# Patient Record
Sex: MALE | Race: WHITE | NOT HISPANIC OR LATINO | Employment: OTHER | URBAN - METROPOLITAN AREA
[De-identification: names, ages, dates, MRNs, and addresses within clinical notes are randomized per-mention and may not be internally consistent; named-entity substitution may affect disease eponyms.]

---

## 2017-01-08 DIAGNOSIS — E78.00 PURE HYPERCHOLESTEROLEMIA: ICD-10-CM

## 2017-01-08 DIAGNOSIS — E11.9 DIABETES MELLITUS WITHOUT COMPLICATION (HCC): Primary | ICD-10-CM

## 2017-01-08 DIAGNOSIS — I10 ESSENTIAL HYPERTENSION: ICD-10-CM

## 2017-01-16 DIAGNOSIS — Z23 NEED FOR 23-POLYVALENT PNEUMOCOCCAL POLYSACCHARIDE VACCINE: Primary | ICD-10-CM

## 2017-02-03 RX ORDER — OMEPRAZOLE 40 MG/1
CAPSULE, DELAYED RELEASE ORAL
Qty: 180 CAPSULE | Refills: 2 | Status: SHIPPED | OUTPATIENT
Start: 2017-02-03 | End: 2017-08-29 | Stop reason: SDUPTHER

## 2017-03-22 RX ORDER — LANCETS 28 GAUGE
EACH MISCELLANEOUS
Qty: 100 EACH | Refills: 6 | Status: SHIPPED | OUTPATIENT
Start: 2017-03-22 | End: 2018-07-11 | Stop reason: SDUPTHER

## 2017-04-24 ENCOUNTER — LAB (OUTPATIENT)
Dept: INTERNAL MEDICINE | Facility: CLINIC | Age: 77
End: 2017-04-24

## 2017-04-24 DIAGNOSIS — E11.9 DIABETES MELLITUS WITHOUT COMPLICATION (HCC): ICD-10-CM

## 2017-04-24 DIAGNOSIS — I10 ESSENTIAL HYPERTENSION: ICD-10-CM

## 2017-04-24 DIAGNOSIS — E78.00 PURE HYPERCHOLESTEROLEMIA: ICD-10-CM

## 2017-04-24 LAB
ALBUMIN SERPL-MCNC: 3.72 G/DL (ref 3.4–4.6)
ALBUMIN UR-MCNC: 4.6 MG/L (ref 1.3–20)
ALBUMIN/GLOB SERPL: 1.1 G/DL
ALP SERPL-CCNC: 58 U/L (ref 46–116)
ALT SERPL W P-5'-P-CCNC: 24 U/L (ref 16–63)
ANION GAP SERPL CALCULATED.3IONS-SCNC: 8 MMOL/L
AST SERPL-CCNC: 23 U/L (ref 7–37)
BILIRUB SERPL-MCNC: 0.8 MG/DL (ref 0.2–1)
BUN BLD-MCNC: 16 MG/DL (ref 6–22)
BUN/CREAT SERPL: 20.8 (ref 7–25)
CALCIUM SPEC-SCNC: 8.3 MG/DL (ref 8.6–10.5)
CHLORIDE SERPL-SCNC: 97 MMOL/L (ref 95–107)
CHOLEST SERPL-MCNC: 105 MG/DL (ref 0–200)
CO2 SERPL-SCNC: 28 MMOL/L (ref 23–32)
CREAT BLD-MCNC: 0.77 MG/DL (ref 0.7–1.3)
CREAT UR-MCNC: 30.8 MG/DL (ref 20–320)
GFR SERPL CREATININE-BSD FRML MDRD: 98 ML/MIN/1.73
GLOBULIN UR ELPH-MCNC: 3.4 GM/DL
GLUCOSE BLD-MCNC: 117 MG/DL (ref 70–100)
HBA1C MFR BLD: 5.6 % (ref 4.8–5.6)
HDLC SERPL-MCNC: 42 MG/DL (ref 40–81)
LDLC SERPL CALC-MCNC: 54 MG/DL (ref 0–100)
LDLC/HDLC SERPL: 1.29 {RATIO}
MICROALBUMIN/CREAT UR: 14.9 MG/L (ref 1.3–30)
POTASSIUM BLD-SCNC: 4.6 MMOL/L (ref 3.3–5.3)
PROT SERPL-MCNC: 7.1 G/DL (ref 6.3–8.4)
SODIUM BLD-SCNC: 133 MMOL/L (ref 136–145)
TRIGL SERPL-MCNC: 44 MG/DL (ref 0–150)
VLDLC SERPL-MCNC: 8.8 MG/DL

## 2017-04-24 PROCEDURE — 82570 ASSAY OF URINE CREATININE: CPT | Performed by: NURSE PRACTITIONER

## 2017-04-24 PROCEDURE — 80061 LIPID PANEL: CPT | Performed by: NURSE PRACTITIONER

## 2017-04-24 PROCEDURE — 80053 COMPREHEN METABOLIC PANEL: CPT | Performed by: NURSE PRACTITIONER

## 2017-04-24 PROCEDURE — 82043 UR ALBUMIN QUANTITATIVE: CPT | Performed by: NURSE PRACTITIONER

## 2017-04-28 ENCOUNTER — OFFICE VISIT (OUTPATIENT)
Dept: INTERNAL MEDICINE | Facility: CLINIC | Age: 77
End: 2017-04-28

## 2017-04-28 VITALS
HEART RATE: 81 BPM | OXYGEN SATURATION: 98 % | HEIGHT: 67 IN | WEIGHT: 151 LBS | BODY MASS INDEX: 23.7 KG/M2 | DIASTOLIC BLOOD PRESSURE: 80 MMHG | SYSTOLIC BLOOD PRESSURE: 122 MMHG

## 2017-04-28 DIAGNOSIS — E78.00 PURE HYPERCHOLESTEROLEMIA: ICD-10-CM

## 2017-04-28 DIAGNOSIS — E11.9 DIABETES MELLITUS WITHOUT COMPLICATION (HCC): Primary | ICD-10-CM

## 2017-04-28 DIAGNOSIS — M05.9 RHEUMATOID ARTHRITIS WITH POSITIVE RHEUMATOID FACTOR, INVOLVING UNSPECIFIED SITE (HCC): ICD-10-CM

## 2017-04-28 DIAGNOSIS — I10 ESSENTIAL HYPERTENSION: ICD-10-CM

## 2017-04-28 PROCEDURE — 99214 OFFICE O/P EST MOD 30 MIN: CPT | Performed by: NURSE PRACTITIONER

## 2017-06-19 RX ORDER — MECLIZINE HYDROCHLORIDE 25 MG/1
25 TABLET ORAL 3 TIMES DAILY PRN
Qty: 270 TABLET | Refills: 1 | Status: SHIPPED | OUTPATIENT
Start: 2017-06-19 | End: 2017-07-17 | Stop reason: SDUPTHER

## 2017-06-19 NOTE — TELEPHONE ENCOUNTER
----- Message from Nani Marie MA sent at 6/19/2017  8:30 AM EDT -----  Pt calling for refill    Meclizine 25mg TID    [facility]

## 2017-06-28 DIAGNOSIS — E11.9 DIABETES MELLITUS WITHOUT COMPLICATION (HCC): Primary | ICD-10-CM

## 2017-06-28 NOTE — TELEPHONE ENCOUNTER
----- Message from Marisa Marcano sent at 6/28/2017  1:34 PM EDT -----  Contact: Patient  Patient called requesting refill on     linagliptin (TRADJENTA) 5 MG tablet     Please advise.     Patient:  [phone]    Pharmacy:  [facility] - [address] [phone] [facility] [phone]

## 2017-07-17 ENCOUNTER — OFFICE VISIT (OUTPATIENT)
Dept: INTERNAL MEDICINE | Facility: CLINIC | Age: 77
End: 2017-07-17

## 2017-07-17 VITALS
HEART RATE: 77 BPM | HEIGHT: 67 IN | TEMPERATURE: 98 F | DIASTOLIC BLOOD PRESSURE: 78 MMHG | BODY MASS INDEX: 24.01 KG/M2 | SYSTOLIC BLOOD PRESSURE: 130 MMHG | OXYGEN SATURATION: 98 % | WEIGHT: 153 LBS

## 2017-07-17 DIAGNOSIS — Z72.0 TOBACCO USE: ICD-10-CM

## 2017-07-17 DIAGNOSIS — S29.019A THORACIC MYOFASCIAL STRAIN, INITIAL ENCOUNTER: Primary | ICD-10-CM

## 2017-07-17 PROCEDURE — 99214 OFFICE O/P EST MOD 30 MIN: CPT | Performed by: NURSE PRACTITIONER

## 2017-07-17 RX ORDER — ATORVASTATIN CALCIUM 40 MG/1
40 TABLET, FILM COATED ORAL DAILY
COMMUNITY
End: 2017-08-29 | Stop reason: SDUPTHER

## 2017-07-17 RX ORDER — BACLOFEN 10 MG/1
TABLET ORAL
Qty: 30 TABLET | Refills: 0 | Status: SHIPPED | OUTPATIENT
Start: 2017-07-17 | End: 2017-08-29

## 2017-07-17 NOTE — PROGRESS NOTES
"Subjective   Edward Srinivasan is a 77 y.o. male who presents due to right thoracic back tenderness.    HPI Comments: He was doing his daily stretches approximately 1 week ago when he felt a pull in the posterior chest wall (was lifting arms overhead). Since then he c/o tenderness in right thoracic area which is worse with overhead lifting and with twisting/turning. Denies numbness/tingling in upper extremities, no weakness. He states pain improves with rest and with applying pressure to muscle (leaning up against chair).    Back Pain   This is a new problem. The current episode started in the past 7 days. The problem occurs intermittently. The problem has been waxing and waning since onset. The pain is present in the thoracic spine. The quality of the pain is described as aching. The pain is at a severity of 5/10. The pain is moderate. The pain is worse during the day. The symptoms are aggravated by position and twisting. Associated symptoms include chest pain. Pertinent negatives include no abdominal pain, bladder incontinence, bowel incontinence, dysuria, fever, headaches, leg pain, numbness or weakness. He has tried heat for the symptoms. The treatment provided mild relief.        The following portions of the patient's history were reviewed and updated as appropriate: allergies, current medications, past social history and problem list.    Past Medical History:   Diagnosis Date   • Aortic valve stenosis     Mild AS by echo 8/14/15   • CAD (coronary artery disease)     See \"procedure section\" below for details.   • Carotid arterial disease     60% DENISE stenosis on 8/12/13 by Doppler ultrasound.  Followed by Dr. Floyd.   • Chronic obstructive lung disease    • Esophageal reflux     With a history of erosive gastritis   • Hyperlipidemia    • Hypertension    • Iron deficiency    • Pulmonary fibrosis     By CT scan on 8/12/15   • PVC (premature ventricular contraction)    • Rheumatoid arthritis    • Steroid-induced " diabetes mellitus    • Tobacco use          Current Outpatient Prescriptions:   •  Ascorbic Acid (VITAMIN C) 500 MG tablet, Take by mouth., Disp: , Rfl:   •  aspirin 81 MG chewable tablet, Chew., Disp: , Rfl:   •  atorvastatin (LIPITOR) 40 MG tablet, Take 40 mg by mouth Daily., Disp: , Rfl:   •  beta carotene 46577 UNIT capsule, Take by mouth., Disp: , Rfl:   •  cetirizine (ZyrTEC) 10 MG tablet, Take 10 mg by mouth daily., Disp: , Rfl:   •  Cholecalciferol (VITAMIN D-3 PO), Take  by mouth., Disp: , Rfl:   •  glucose blood (FREESTYLE LITE) test strip, 1 each by Other route 2 (two) times a day. Use as instructed, Disp: 100 each, Rfl: 7  •  Lancets (FREESTYLE) lancets, TEST BLOOD SUGAR TWO TIMES A DAY, Disp: 100 each, Rfl: 6  •  linagliptin (TRADJENTA) 5 MG tablet tablet, Take 1 tablet by mouth Daily., Disp: 90 tablet, Rfl: 3  •  lisinopril (PRINIVIL,ZESTRIL) 10 MG tablet, Take 1 tablet by mouth daily., Disp: 90 tablet, Rfl: 3  •  MAGNESIUM PO, Take by mouth., Disp: , Rfl:   •  meclizine (ANTIVERT) 25 MG tablet, Take by mouth., Disp: , Rfl:   •  nitroglycerin (NITROSTAT) 0.4 MG SL tablet, Place under the tongue. dissolve 1 tab under tongue for chest pain - if pain remains after 5 minutes, call 911 and repeat dose. Max 3 tabs in 15 minutes., Disp: , Rfl:   •  omeprazole (priLOSEC) 40 MG capsule, TAKE 1 CAPSULE TWICE DAILY, Disp: 180 capsule, Rfl: 2  •  pyridoxine (B-6) 100 MG tablet, Take by mouth., Disp: , Rfl:   •  tocilizumab (ACTEMRA) 400 MG/20ML solution injection, Monthly infusions per rheumatology, Disp: 26.88 mL, Rfl:   •  vitamin E 400 UNIT capsule, Take by mouth., Disp: , Rfl:   •  baclofen (LIORESAL) 10 MG tablet, 1/2-1 at bedtime, Disp: 30 tablet, Rfl: 0    Allergies   Allergen Reactions   • Oxaprozin        Review of Systems   Constitutional: Negative for chills, fatigue, fever and unexpected weight change.   HENT: Negative for congestion, ear pain, postnasal drip, sinus pressure, sore throat and trouble  "swallowing.    Eyes: Negative for visual disturbance.   Respiratory: Negative for cough, chest tightness and wheezing.    Cardiovascular: Positive for chest pain. Negative for palpitations and leg swelling.   Gastrointestinal: Negative for abdominal pain, blood in stool, bowel incontinence, nausea and vomiting.   Endocrine: Negative for cold intolerance and heat intolerance.   Genitourinary: Negative for bladder incontinence, dysuria, frequency and urgency.   Musculoskeletal: Positive for arthralgias, back pain and myalgias. Negative for joint swelling.   Skin: Negative for color change.   Allergic/Immunologic: Negative for immunocompromised state.   Neurological: Negative for syncope, weakness, numbness and headaches.   Hematological: Does not bruise/bleed easily.       Objective   Vitals:    07/17/17 0801   BP: 130/78   BP Location: Left arm   Patient Position: Sitting   Cuff Size: Adult   Pulse: 77   Temp: 98 °F (36.7 °C)   TempSrc: Oral   SpO2: 98%   Weight: 153 lb (69.4 kg)   Height: 67\" (170.2 cm)     Physical Exam   Constitutional: He is oriented to person, place, and time. He appears well-developed and well-nourished. No distress.   HENT:   Head: Normocephalic and atraumatic.   Right Ear: External ear normal.   Left Ear: External ear normal.   Eyes: Conjunctivae and EOM are normal. Pupils are equal, round, and reactive to light. No scleral icterus.   Neck: Normal range of motion. Neck supple. No thyromegaly present.   Cardiovascular: Normal rate, regular rhythm, normal heart sounds and intact distal pulses.  Exam reveals no gallop and no friction rub.    No murmur heard.  Pulmonary/Chest: Effort normal and breath sounds normal. No respiratory distress. He exhibits no tenderness.   Musculoskeletal:        Thoracic back: He exhibits tenderness and spasm. He exhibits normal range of motion and no swelling.   Lymphadenopathy:     He has no cervical adenopathy.   Neurological: He is alert and oriented to person, " place, and time. He has normal strength. No sensory deficit.   Skin: Skin is warm and dry. No rash noted. No erythema.   Psychiatric: He has a normal mood and affect. His behavior is normal.   Nursing note and vitals reviewed.      Assessment/Plan    was seen today for back pain, chest pain and shoulder pain.    Diagnoses and all orders for this visit:    Thoracic myofascial strain, initial encounter  Comments:  okay to take Aleve as needed, add muscle relaxer at night & apply heat for muscle spasm/tightness  Orders:  -     baclofen (LIORESAL) 10 MG tablet; 1/2-1 at bedtime    Tobacco use  Comments:  discussed smoking cessation

## 2017-07-31 ENCOUNTER — OFFICE VISIT (OUTPATIENT)
Dept: INTERNAL MEDICINE | Facility: CLINIC | Age: 77
End: 2017-07-31

## 2017-07-31 ENCOUNTER — APPOINTMENT (OUTPATIENT)
Dept: WOMENS IMAGING | Facility: HOSPITAL | Age: 77
End: 2017-07-31

## 2017-07-31 VITALS
HEIGHT: 67 IN | WEIGHT: 153 LBS | DIASTOLIC BLOOD PRESSURE: 64 MMHG | BODY MASS INDEX: 24.01 KG/M2 | SYSTOLIC BLOOD PRESSURE: 136 MMHG

## 2017-07-31 DIAGNOSIS — S29.019A THORACIC MYOFASCIAL STRAIN, INITIAL ENCOUNTER: Primary | ICD-10-CM

## 2017-07-31 PROCEDURE — 72072 X-RAY EXAM THORAC SPINE 3VWS: CPT | Performed by: NURSE PRACTITIONER

## 2017-07-31 PROCEDURE — 72072 X-RAY EXAM THORAC SPINE 3VWS: CPT | Performed by: RADIOLOGY

## 2017-07-31 PROCEDURE — 99213 OFFICE O/P EST LOW 20 MIN: CPT | Performed by: NURSE PRACTITIONER

## 2017-08-01 NOTE — PROGRESS NOTES
"Subjective   Edward Srinivasan is a 77 y.o. male who presents for f/u regarding right thoracic pain.    HPI Comments: He was doing his daily stretches approximately 2 weeks ago when he felt a pull in the posterior chest wall (was lifting arms overhead). Since then he c/o tenderness in right thoracic area which is worse with overhead lifting and with twisting/turning. Denies numbness/tingling in upper extremities, no weakness. He states pain improves with rest and with applying pressure to muscle (leaning up against chair).  He took Baclofen but states pain was not improved, still c/o pain in the right thoracic area which is worse with standing up. His sx were improving until Friday morning when he moved and c/o sudden pain in right thoracic area. Denies numbness/tingling in upper extremities, no weakness.    Back Pain   This is a new problem. The current episode started 1 to 4 weeks ago. The problem occurs intermittently. The problem is unchanged. The pain is present in the thoracic spine. The quality of the pain is described as aching. The pain is at a severity of 7/10. The pain is moderate. The pain is worse during the day. The symptoms are aggravated by position and twisting. Associated symptoms include chest pain. Pertinent negatives include no abdominal pain, bladder incontinence, bowel incontinence, dysuria, fever, headaches, leg pain, numbness or weakness. He has tried heat for the symptoms. The treatment provided mild relief.        The following portions of the patient's history were reviewed and updated as appropriate: allergies, current medications, past social history and problem list.    Past Medical History:   Diagnosis Date   • Aortic valve stenosis     Mild AS by echo 8/14/15   • CAD (coronary artery disease)     See \"procedure section\" below for details.   • Carotid arterial disease     60% DENISE stenosis on 8/12/13 by Doppler ultrasound.  Followed by Dr. Floyd.   • Chronic obstructive lung disease    • " Esophageal reflux     With a history of erosive gastritis   • Hyperlipidemia    • Hypertension    • Iron deficiency    • Pulmonary fibrosis     By CT scan on 8/12/15   • PVC (premature ventricular contraction)    • Rheumatoid arthritis    • Steroid-induced diabetes mellitus    • Tobacco use          Current Outpatient Prescriptions:   •  Ascorbic Acid (VITAMIN C) 500 MG tablet, Take by mouth., Disp: , Rfl:   •  aspirin 81 MG chewable tablet, Chew., Disp: , Rfl:   •  atorvastatin (LIPITOR) 40 MG tablet, Take 40 mg by mouth Daily., Disp: , Rfl:   •  baclofen (LIORESAL) 10 MG tablet, 1/2-1 at bedtime, Disp: 30 tablet, Rfl: 0  •  beta carotene 51624 UNIT capsule, Take by mouth., Disp: , Rfl:   •  cetirizine (ZyrTEC) 10 MG tablet, Take 10 mg by mouth daily., Disp: , Rfl:   •  Cholecalciferol (VITAMIN D-3 PO), Take  by mouth., Disp: , Rfl:   •  glucose blood (FREESTYLE LITE) test strip, 1 each by Other route 2 (two) times a day. Use as instructed, Disp: 100 each, Rfl: 7  •  Lancets (FREESTYLE) lancets, TEST BLOOD SUGAR TWO TIMES A DAY, Disp: 100 each, Rfl: 6  •  linagliptin (TRADJENTA) 5 MG tablet tablet, Take 1 tablet by mouth Daily., Disp: 90 tablet, Rfl: 3  •  lisinopril (PRINIVIL,ZESTRIL) 10 MG tablet, Take 1 tablet by mouth daily., Disp: 90 tablet, Rfl: 3  •  MAGNESIUM PO, Take by mouth., Disp: , Rfl:   •  meclizine (ANTIVERT) 25 MG tablet, Take by mouth., Disp: , Rfl:   •  nitroglycerin (NITROSTAT) 0.4 MG SL tablet, Place under the tongue. dissolve 1 tab under tongue for chest pain - if pain remains after 5 minutes, call 911 and repeat dose. Max 3 tabs in 15 minutes., Disp: , Rfl:   •  omeprazole (priLOSEC) 40 MG capsule, TAKE 1 CAPSULE TWICE DAILY, Disp: 180 capsule, Rfl: 2  •  pyridoxine (B-6) 100 MG tablet, Take by mouth., Disp: , Rfl:   •  tocilizumab (ACTEMRA) 400 MG/20ML solution injection, Monthly infusions per rheumatology, Disp: 26.88 mL, Rfl:   •  vitamin E 400 UNIT capsule, Take by mouth., Disp: , Rfl:  "    Allergies   Allergen Reactions   • Oxaprozin        Review of Systems   Constitutional: Negative for chills, fatigue, fever and unexpected weight change.   HENT: Negative for congestion, ear pain, postnasal drip, sinus pressure, sore throat and trouble swallowing.    Eyes: Negative for visual disturbance.   Respiratory: Negative for cough, chest tightness and wheezing.    Cardiovascular: Positive for chest pain. Negative for palpitations and leg swelling.   Gastrointestinal: Negative for abdominal pain, blood in stool, bowel incontinence, nausea and vomiting.   Endocrine: Negative for cold intolerance and heat intolerance.   Genitourinary: Negative for bladder incontinence, dysuria, frequency and urgency.   Musculoskeletal: Positive for back pain. Negative for arthralgias and joint swelling.   Skin: Negative for color change.   Allergic/Immunologic: Negative for immunocompromised state.   Neurological: Negative for syncope, weakness, numbness and headaches.   Hematological: Does not bruise/bleed easily.       Objective   Vitals:    07/31/17 0802   BP: 136/64   Weight: 153 lb (69.4 kg)   Height: 67\" (170.2 cm)     Physical Exam   Constitutional: He is oriented to person, place, and time. He appears well-developed and well-nourished. No distress.   HENT:   Head: Normocephalic and atraumatic.   Right Ear: External ear normal.   Left Ear: External ear normal.   Eyes: Conjunctivae are normal.   Neck: Normal range of motion. Neck supple.   Cardiovascular: Normal rate, regular rhythm, normal heart sounds and intact distal pulses.  Exam reveals no gallop and no friction rub.    No murmur heard.  Pulmonary/Chest: Effort normal and breath sounds normal. No respiratory distress.   Musculoskeletal:        Thoracic back: He exhibits tenderness. He exhibits normal range of motion and no swelling.   Lymphadenopathy:     He has no cervical adenopathy.   Neurological: He is alert and oriented to person, place, and time.   Skin: " Skin is warm and dry. No rash noted. No erythema.   Psychiatric: He has a normal mood and affect. His behavior is normal.   Nursing note and vitals reviewed.      Assessment/Plan   Edward was seen today for shoulder pain.    Diagnoses and all orders for this visit:    Thoracic myofascial strain, initial encounter  Comments:  no acute abnl on xray-will send for review, start Aleve twice a day for the next 5-7 days. He has declined PT referral but will consider if sx persist.  Orders:  -     XR Spine Thoracic 3 View

## 2017-08-03 ENCOUNTER — OFFICE VISIT (OUTPATIENT)
Dept: CARDIOLOGY | Facility: CLINIC | Age: 77
End: 2017-08-03

## 2017-08-03 VITALS
DIASTOLIC BLOOD PRESSURE: 70 MMHG | HEIGHT: 67 IN | WEIGHT: 154 LBS | SYSTOLIC BLOOD PRESSURE: 120 MMHG | BODY MASS INDEX: 24.17 KG/M2 | HEART RATE: 70 BPM

## 2017-08-03 DIAGNOSIS — I35.0 NONRHEUMATIC AORTIC VALVE STENOSIS: ICD-10-CM

## 2017-08-03 DIAGNOSIS — I25.10 CORONARY ARTERY DISEASE INVOLVING NATIVE CORONARY ARTERY OF NATIVE HEART WITHOUT ANGINA PECTORIS: Primary | ICD-10-CM

## 2017-08-03 PROCEDURE — 99214 OFFICE O/P EST MOD 30 MIN: CPT | Performed by: INTERNAL MEDICINE

## 2017-08-03 NOTE — PROGRESS NOTES
Date of Office Visit: 2017  Encounter Provider: Brian Harden MD  Place of Service: [facility] CARDIOLOGY  Patient Name: Edward Srinivasan  :1940    Chief complaint: Follow-up for coronary artery disease, mild AS    History of Present Illness:    Dear Dr. Carroll:       I again had the pleasure of seeing your patient in cardiology office on 2017.  As  you well know, he is a very pleasant, 77 year-old, white male with a past medical history  significant for coronary artery disease, hypertension, hyperlipidemia, and rheumatoid  arthritis who presents for follow-up. The patient was admitted to the hospital on  2015, with chest discomfort. He had been watching his grandson on the lawn   when he developed sudden chest discomfort, which radiated across his precordium and   was described as a pressure sensation. It then started to radiate into his neck bilaterally   as well as his jaws. His initial electrocardiogram showed no ischemic changes and his   troponin was negative. He also had been recently diagnosed with rheumatoid arthritis   and was on prednisone. As a result of this, he had recently been diagnosed with   steroid-induced diabetes. A CT angiogram of the chest on admission in the emergency   room showed chronic obstructive pulmonary disease with bullae formation; however, he   also had an interstitial pattern with early honeycombing consistent with pulmonary fibrosis.   A subsequent CT on 2015, confirmed this. He was seen by pulmonology as well.   He did undergo a Lexiscan Cardiolite stress test, which showed lateral ischemia. A   subsequent cardiac catheterization on 2015, by Dr. Stearns showed a 90% ulcerated   lesion in the mid left circumflex. He subsequently underwent placement of a 3.0 mm x   18 mm Xience drug-eluting stent, although there was a proximal edge dissection   necessitating placement of a second3.0 mm x 12 mm Xience  drug-eluting stent proximal   to the first stent.       The patient presents today for follow-up.  Overall, he is doing well.  His main issue has   been right shoulder discomfort which is likely a combination of musculoskeletal and   osteoarthritis issues.  He does continue to smoke, although he has not had any   significant chest discomfort or shortness of breath.  He also denied any syncope or   presyncope.    Past Medical History:   Diagnosis Date   • Aortic valve stenosis     Mild AS by echo 8/14/15   • CAD (coronary artery disease)    • Carotid arterial disease     60% DENISE stenosis on 8/12/13 by Doppler ultrasound.  Followed by Dr. Floyd.   • Chronic obstructive lung disease    • Esophageal reflux     With a history of erosive gastritis   • Hyperlipidemia    • Hypertension    • Iron deficiency    • Pulmonary fibrosis     By CT scan on 8/12/15   • PVC (premature ventricular contraction)    • Rheumatoid arthritis    • Steroid-induced diabetes mellitus    • Tobacco use        Past Surgical History:   Procedure Laterality Date   • BACK SURGERY      L4-L5 discectomy   • CATARACT EXTRACTION Bilateral    • VASECTOMY         Current Outpatient Prescriptions on File Prior to Visit   Medication Sig Dispense Refill   • Ascorbic Acid (VITAMIN C) 500 MG tablet Take by mouth.     • aspirin 81 MG chewable tablet Chew.     • atorvastatin (LIPITOR) 40 MG tablet Take 40 mg by mouth Daily.     • baclofen (LIORESAL) 10 MG tablet 1/2-1 at bedtime 30 tablet 0   • beta carotene 53888 UNIT capsule Take by mouth.     • cetirizine (ZyrTEC) 10 MG tablet Take 10 mg by mouth daily.     • Cholecalciferol (VITAMIN D-3 PO) Take  by mouth.     • glucose blood (FREESTYLE LITE) test strip 1 each by Other route 2 (two) times a day. Use as instructed 100 each 7   • Lancets (FREESTYLE) lancets TEST BLOOD SUGAR TWO TIMES A  each 6   • linagliptin (TRADJENTA) 5 MG tablet tablet Take 1 tablet by mouth Daily. 90 tablet 3   • lisinopril  (PRINIVIL,ZESTRIL) 10 MG tablet Take 1 tablet by mouth daily. 90 tablet 3   • MAGNESIUM PO Take by mouth.     • meclizine (ANTIVERT) 25 MG tablet Take by mouth.     • nitroglycerin (NITROSTAT) 0.4 MG SL tablet Place under the tongue. dissolve 1 tab under tongue for chest pain - if pain remains after 5 minutes, call 911 and repeat dose. Max 3 tabs in 15 minutes.     • omeprazole (priLOSEC) 40 MG capsule TAKE 1 CAPSULE TWICE DAILY 180 capsule 2   • pyridoxine (B-6) 100 MG tablet Take by mouth.     • tocilizumab (ACTEMRA) 400 MG/20ML solution injection Monthly infusions per rheumatology 26.88 mL    • vitamin E 400 UNIT capsule Take by mouth.       No current facility-administered medications on file prior to visit.      Allergies as of 08/03/2017 - Darin as Reviewed 08/03/2017   Allergen Reaction Noted   • Oxaprozin  11/20/2015     Social History     Social History   • Marital status:      Spouse name: N/A   • Number of children: N/A   • Years of education: N/A     Occupational History   • Not on file.     Social History Main Topics   • Smoking status: Current Every Day Smoker     Types: Cigarettes   • Smokeless tobacco: Never Used      Comment: 1/2 ppd, discussed benefits of cessation   • Alcohol use No   • Drug use: No   • Sexual activity: No     Other Topics Concern   • Not on file     Social History Narrative     Family History   Problem Relation Age of Onset   • Heart failure Mother      congestive   • Heart attack Father 54   • Heart attack Brother 47   • Heart disease Brother      another brother with 4 vessel CABG in his 50s   • Coronary artery disease Other      multiple family members       Review of Systems   Respiratory: Positive for cough.    Endocrine: Positive for cold intolerance and heat intolerance.   Musculoskeletal: Positive for joint pain.   Neurological: Positive for excessive daytime sleepiness.   All other systems reviewed and are negative.    Objective:     Vitals:    08/03/17 1028   BP:  "120/70   BP Location: Right arm   Patient Position: Sitting   Pulse: 70   Weight: 154 lb (69.9 kg)   Height: 67\" (170.2 cm)     Body mass index is 24.12 kg/(m^2).    Physical Exam   Constitutional: He is oriented to person, place, and time. He appears well-developed and well-nourished.   HENT:   Head: Normocephalic and atraumatic.   Eyes: Conjunctivae are normal.   Neck: Neck supple.   Cardiovascular: Normal rate and regular rhythm.  Exam reveals no gallop and no friction rub.    Murmur heard.   Harsh systolic murmur is present with a grade of 3/6  at the upper right sternal border, upper left sternal border  Pulmonary/Chest: Effort normal and breath sounds normal.   Abdominal: Soft. There is no tenderness.   Musculoskeletal: He exhibits no edema.   Neurological: He is alert and oriented to person, place, and time.   Skin: Skin is warm.   Psychiatric: He has a normal mood and affect. His behavior is normal.     Lab Review:   Procedures    Cardiac Procedures:  1. Left heart catheterization on 08/14/2015, by Dr. Stearns showed the following. The  left main was normal. The left anterior descending had luminal irregularities. The first  diagonal branch had a 60% proximal stenosis. The left circumflex had a 90% ulcerated  stenosis in the mid portion. There was 20% to 30% distal disease in the left circumflex  coronary artery. The left posterior descending artery had a 60% ostial lesion. The right  coronary artery was small, nondominant, and had a 30% lesion at the origin. The patient  subsequently underwent placement of a 3.0 mm x 18 mm Xience drug-eluting stent to the  proximal left circumflex; however, there was a proximal edge dissection necessitating  placement of a 3.0 mm x 12 mm Xience drug-eluting stent proximal to the first stent.   2. Echocardiogram on 08/14/2015, showed an ejection fraction of 55% to 60%. There   was grade 1 diastolic dysfunction. There was mild aortic stenosis.       Assessment:       Diagnosis " Plan   1. Coronary artery disease involving native coronary artery of native heart without angina pectoris  Adult Transthoracic Echo Complete   2. Nonrheumatic aortic valve stenosis  Adult Transthoracic Echo Complete     Plan:       The patient seems to be fairly stable at this point.  He has had no new symptoms   recently, and denied any chest discomfort.  We did address smoking cessation   again today.  He is not ready to quit at this point.  He will continue on the aspirin at   81 mg per day, as well as Lipitor for his history of coronary artery disease.  He   has had no angina recently.  His blood pressure is excellent at 120/70 today on the   lisinopril.  He is not on a beta blocker secondary to COPD and pulmonary fibrosis.    He did have mild aortic stenosis 2 years ago on an echocardiogram, and I am   going to check this to assess for any progression.  Otherwise, I will see him back   in 8 months unless other issues arise.    Coronary Artery Disease  Assessment  • The patient has no angina    Plan  • Lifestyle modifications discussed include adhering to a heart healthy diet, avoidance of tobacco products, maintenance of a healthy weight, medication compliance, regular exercise and regular monitoring of cholesterol and blood pressure    Subjective - Objective  • There has been a previous stent procedure using ANNEMARIE on or around 8/14/2015  • Current antiplatelet therapy includes aspirin 81 mg

## 2017-08-08 ENCOUNTER — HOSPITAL ENCOUNTER (OUTPATIENT)
Dept: CARDIOLOGY | Facility: HOSPITAL | Age: 77
Discharge: HOME | End: 2017-08-08
Attending: INTERNAL MEDICINE | Admitting: INTERNAL MEDICINE

## 2017-08-08 DIAGNOSIS — I25.10 CORONARY ARTERY DISEASE INVOLVING NATIVE CORONARY ARTERY OF NATIVE HEART WITHOUT ANGINA PECTORIS: ICD-10-CM

## 2017-08-08 DIAGNOSIS — I35.0 NONRHEUMATIC AORTIC VALVE STENOSIS: ICD-10-CM

## 2017-08-08 LAB
AORTIC DIMENSIONLESS INDEX: 0.3 CM2
BH CV ECHO MEAS - ACS: 1.2 CM
BH CV ECHO MEAS - AO MAX PG (FULL): 28.9 MMHG
BH CV ECHO MEAS - AO MAX PG: 33.1 MMHG
BH CV ECHO MEAS - AO MEAN PG (FULL): 16 MMHG
BH CV ECHO MEAS - AO MEAN PG: 18.4 MMHG
BH CV ECHO MEAS - AO ROOT AREA (BSA CORRECTED): 1.8
BH CV ECHO MEAS - AO ROOT AREA: 8.5 CM^2
BH CV ECHO MEAS - AO ROOT DIAM: 3.3 CM
BH CV ECHO MEAS - AO V2 MAX: 287.8 CM/SEC
BH CV ECHO MEAS - AO V2 MEAN: 201.4 CM/SEC
BH CV ECHO MEAS - AO V2 VTI: 63.1 CM
BH CV ECHO MEAS - AVA(I,A): 1.4 CM^2
BH CV ECHO MEAS - AVA(I,D): 1.4 CM^2
BH CV ECHO MEAS - AVA(V,A): 1.5 CM^2
BH CV ECHO MEAS - AVA(V,D): 1.5 CM^2
BH CV ECHO MEAS - BSA(HAYCOCK): 1.8 M^2
BH CV ECHO MEAS - BSA: 1.8 M^2
BH CV ECHO MEAS - BZI_BMI: 24.1 KILOGRAMS/M^2
BH CV ECHO MEAS - BZI_METRIC_HEIGHT: 170.2 CM
BH CV ECHO MEAS - BZI_METRIC_WEIGHT: 69.9 KG
BH CV ECHO MEAS - CONTRAST EF (2CH): 61.5 ML/M^2
BH CV ECHO MEAS - CONTRAST EF 4CH: 58.9 ML/M^2
BH CV ECHO MEAS - EDV(MOD-SP2): 52 ML
BH CV ECHO MEAS - EDV(MOD-SP4): 56 ML
BH CV ECHO MEAS - EDV(TEICH): 131 ML
BH CV ECHO MEAS - EF(CUBED): 70.7 %
BH CV ECHO MEAS - EF(MOD-SP2): 61.5 %
BH CV ECHO MEAS - EF(MOD-SP4): 60 %
BH CV ECHO MEAS - EF(TEICH): 61.9 %
BH CV ECHO MEAS - ESV(MOD-SP2): 20 ML
BH CV ECHO MEAS - ESV(MOD-SP4): 23 ML
BH CV ECHO MEAS - ESV(TEICH): 49.9 ML
BH CV ECHO MEAS - FS: 33.6 %
BH CV ECHO MEAS - IVS/LVPW: 0.81
BH CV ECHO MEAS - IVSD: 0.89 CM
BH CV ECHO MEAS - LAT PEAK E' VEL: 12 CM/SEC
BH CV ECHO MEAS - LV DIASTOLIC VOL/BSA (35-75): 30.9 ML/M^2
BH CV ECHO MEAS - LV MASS(C)D: 196.3 GRAMS
BH CV ECHO MEAS - LV MASS(C)DI: 108.5 GRAMS/M^2
BH CV ECHO MEAS - LV MAX PG: 4.3 MMHG
BH CV ECHO MEAS - LV MEAN PG: 2.4 MMHG
BH CV ECHO MEAS - LV SYSTOLIC VOL/BSA (12-30): 12.7 ML/M^2
BH CV ECHO MEAS - LV V1 MAX: 103.3 CM/SEC
BH CV ECHO MEAS - LV V1 MEAN: 72.1 CM/SEC
BH CV ECHO MEAS - LV V1 VTI: 21.1 CM
BH CV ECHO MEAS - LVIDD: 5.2 CM
BH CV ECHO MEAS - LVIDS: 3.5 CM
BH CV ECHO MEAS - LVLD AP2: 7 CM
BH CV ECHO MEAS - LVLD AP4: 6.8 CM
BH CV ECHO MEAS - LVLS AP2: 5.7 CM
BH CV ECHO MEAS - LVLS AP4: 5.5 CM
BH CV ECHO MEAS - LVOT AREA (M): 4.2 CM^2
BH CV ECHO MEAS - LVOT AREA: 4.3 CM^2
BH CV ECHO MEAS - LVOT DIAM: 2.3 CM
BH CV ECHO MEAS - LVPWD: 1.1 CM
BH CV ECHO MEAS - MED PEAK E' VEL: 9 CM/SEC
BH CV ECHO MEAS - MV A DUR: 0.13 SEC
BH CV ECHO MEAS - MV A MAX VEL: 111.6 CM/SEC
BH CV ECHO MEAS - MV DEC SLOPE: 280.7 CM/SEC^2
BH CV ECHO MEAS - MV DEC TIME: 0.28 SEC
BH CV ECHO MEAS - MV E MAX VEL: 79.1 CM/SEC
BH CV ECHO MEAS - MV E/A: 0.71
BH CV ECHO MEAS - MV MAX PG: 5 MMHG
BH CV ECHO MEAS - MV MEAN PG: 2.4 MMHG
BH CV ECHO MEAS - MV P1/2T MAX VEL: 80.5 CM/SEC
BH CV ECHO MEAS - MV P1/2T: 84 MSEC
BH CV ECHO MEAS - MV V2 MAX: 112.2 CM/SEC
BH CV ECHO MEAS - MV V2 MEAN: 71.9 CM/SEC
BH CV ECHO MEAS - MV V2 VTI: 41.5 CM
BH CV ECHO MEAS - MVA P1/2T LCG: 2.7 CM^2
BH CV ECHO MEAS - MVA(P1/2T): 2.6 CM^2
BH CV ECHO MEAS - MVA(VTI): 2.2 CM^2
BH CV ECHO MEAS - PA MAX PG (FULL): 3.7 MMHG
BH CV ECHO MEAS - PA MAX PG: 5.4 MMHG
BH CV ECHO MEAS - PA V2 MAX: 115.8 CM/SEC
BH CV ECHO MEAS - PULM A REVS DUR: 0.11 SEC
BH CV ECHO MEAS - PULM A REVS VEL: 40.7 CM/SEC
BH CV ECHO MEAS - PULM DIAS VEL: 46.1 CM/SEC
BH CV ECHO MEAS - PULM S/D: 1.8
BH CV ECHO MEAS - PULM SYS VEL: 83.9 CM/SEC
BH CV ECHO MEAS - RAP SYSTOLE: 3 MMHG
BH CV ECHO MEAS - RV MAX PG: 1.7 MMHG
BH CV ECHO MEAS - RV MEAN PG: 1 MMHG
BH CV ECHO MEAS - RV V1 MAX: 65.5 CM/SEC
BH CV ECHO MEAS - RV V1 MEAN: 46.8 CM/SEC
BH CV ECHO MEAS - RV V1 VTI: 13.9 CM
BH CV ECHO MEAS - RVSP: 22 MMHG
BH CV ECHO MEAS - SI(AO): 294.7 ML/M^2
BH CV ECHO MEAS - SI(CUBED): 55.7 ML/M^2
BH CV ECHO MEAS - SI(LVOT): 50.2 ML/M^2
BH CV ECHO MEAS - SI(MOD-SP2): 17.7 ML/M^2
BH CV ECHO MEAS - SI(MOD-SP4): 18.2 ML/M^2
BH CV ECHO MEAS - SI(TEICH): 44.8 ML/M^2
BH CV ECHO MEAS - SV(AO): 533.3 ML
BH CV ECHO MEAS - SV(CUBED): 100.9 ML
BH CV ECHO MEAS - SV(LVOT): 90.9 ML
BH CV ECHO MEAS - SV(MOD-SP2): 32 ML
BH CV ECHO MEAS - SV(MOD-SP4): 33 ML
BH CV ECHO MEAS - SV(TEICH): 81.1 ML
BH CV ECHO MEAS - TAPSE (>1.6): 2.1 CM2
BH CV ECHO MEAS - TR MAX VEL: 217.2 CM/SEC
BH CV XLRA - RV BASE: 2.1 CM
BH CV XLRA - TDI S': 15 CM/SEC
E/E' RATIO: 7.5
LEFT ATRIUM VOLUME INDEX: 15 ML/M2
SINUS: 3.1 CM
STJ: 3 CM
Z-SCORE OF LEFT VENTRICULAR DIMENSION IN END SYSTOLE: 3.2

## 2017-08-08 PROCEDURE — 93306 TTE W/DOPPLER COMPLETE: CPT | Performed by: INTERNAL MEDICINE

## 2017-08-08 PROCEDURE — 93306 TTE W/DOPPLER COMPLETE: CPT

## 2017-08-17 ENCOUNTER — TELEPHONE (OUTPATIENT)
Dept: INTERNAL MEDICINE | Facility: CLINIC | Age: 77
End: 2017-08-17

## 2017-08-17 DIAGNOSIS — E11.9 DIABETES MELLITUS WITHOUT COMPLICATION (HCC): ICD-10-CM

## 2017-08-17 DIAGNOSIS — E78.00 PURE HYPERCHOLESTEROLEMIA: ICD-10-CM

## 2017-08-17 DIAGNOSIS — I10 ESSENTIAL HYPERTENSION, BENIGN: Primary | ICD-10-CM

## 2017-08-17 NOTE — TELEPHONE ENCOUNTER
----- Message from Cielo Yepez sent at 8/17/2017  8:50 AM EDT -----  Contact: pt  Please have Dr Carroll put lab orders in for this patient. He is coming in on 8/21/17. Thanks

## 2017-08-21 ENCOUNTER — LAB (OUTPATIENT)
Dept: INTERNAL MEDICINE | Facility: CLINIC | Age: 77
End: 2017-08-21

## 2017-08-21 DIAGNOSIS — E11.9 DIABETES MELLITUS WITHOUT COMPLICATION (HCC): ICD-10-CM

## 2017-08-21 DIAGNOSIS — I10 ESSENTIAL HYPERTENSION, BENIGN: ICD-10-CM

## 2017-08-21 DIAGNOSIS — E78.00 PURE HYPERCHOLESTEROLEMIA: ICD-10-CM

## 2017-08-21 LAB
ALBUMIN SERPL-MCNC: 3.55 G/DL (ref 3.4–4.6)
ALBUMIN/GLOB SERPL: 1 G/DL
ALP SERPL-CCNC: 52 U/L (ref 46–116)
ALT SERPL W P-5'-P-CCNC: 32 U/L (ref 16–63)
ANION GAP SERPL CALCULATED.3IONS-SCNC: 9 MMOL/L
AST SERPL-CCNC: 23 U/L (ref 7–37)
BILIRUB SERPL-MCNC: 0.7 MG/DL (ref 0.2–1)
BUN BLD-MCNC: 18 MG/DL (ref 6–22)
BUN/CREAT SERPL: 24.3 (ref 7–25)
CALCIUM SPEC-SCNC: 8.8 MG/DL (ref 8.6–10.5)
CHLORIDE SERPL-SCNC: 100 MMOL/L (ref 95–107)
CHOLEST SERPL-MCNC: 85 MG/DL (ref 0–200)
CO2 SERPL-SCNC: 27 MMOL/L (ref 23–32)
CREAT BLD-MCNC: 0.74 MG/DL (ref 0.7–1.3)
ERYTHROCYTE [DISTWIDTH] IN BLOOD BY AUTOMATED COUNT: 13.7 % (ref 11.5–14.5)
GFR SERPL CREATININE-BSD FRML MDRD: 103 ML/MIN/1.73
GLOBULIN UR ELPH-MCNC: 3.5 GM/DL
GLUCOSE BLD-MCNC: 122 MG/DL (ref 70–100)
HBA1C MFR BLD: 5.71 % (ref 4.8–5.6)
HCT VFR BLD AUTO: 36.5 % (ref 40.4–52.2)
HDLC SERPL-MCNC: 34 MG/DL (ref 40–81)
HGB BLD-MCNC: 12.4 G/DL (ref 13.7–17.6)
LDLC SERPL CALC-MCNC: 44 MG/DL (ref 0–100)
LDLC/HDLC SERPL: 1.31 {RATIO}
MCH RBC QN AUTO: 32.6 PG (ref 27–32.7)
MCHC RBC AUTO-ENTMCNC: 34 G/DL (ref 32.6–36.4)
MCV RBC AUTO: 96.1 FL (ref 79.8–96.2)
PLATELET # BLD AUTO: 132 10*3/MM3 (ref 140–500)
POTASSIUM BLD-SCNC: 4.4 MMOL/L (ref 3.3–5.3)
PROT SERPL-MCNC: 7 G/DL (ref 6.3–8.4)
RBC # BLD AUTO: 3.8 10*6/MM3 (ref 4.6–6)
SODIUM BLD-SCNC: 136 MMOL/L (ref 136–145)
TRIGL SERPL-MCNC: 33 MG/DL (ref 0–150)
VLDLC SERPL-MCNC: 6.6 MG/DL
WBC # BLD AUTO: 9.31 10*3/MM3 (ref 4.5–10.7)

## 2017-08-21 PROCEDURE — 80061 LIPID PANEL: CPT

## 2017-08-21 PROCEDURE — 80053 COMPREHEN METABOLIC PANEL: CPT

## 2017-08-29 ENCOUNTER — OFFICE VISIT (OUTPATIENT)
Dept: INTERNAL MEDICINE | Facility: CLINIC | Age: 77
End: 2017-08-29

## 2017-08-29 VITALS
SYSTOLIC BLOOD PRESSURE: 100 MMHG | HEIGHT: 67 IN | DIASTOLIC BLOOD PRESSURE: 72 MMHG | WEIGHT: 153 LBS | BODY MASS INDEX: 24.01 KG/M2

## 2017-08-29 DIAGNOSIS — K21.9 GASTROESOPHAGEAL REFLUX DISEASE, ESOPHAGITIS PRESENCE NOT SPECIFIED: ICD-10-CM

## 2017-08-29 DIAGNOSIS — I10 ESSENTIAL HYPERTENSION, BENIGN: Primary | ICD-10-CM

## 2017-08-29 DIAGNOSIS — E78.00 PURE HYPERCHOLESTEROLEMIA: ICD-10-CM

## 2017-08-29 DIAGNOSIS — E11.9 DIABETES MELLITUS WITHOUT COMPLICATION (HCC): ICD-10-CM

## 2017-08-29 DIAGNOSIS — R42 DIZZINESS: ICD-10-CM

## 2017-08-29 DIAGNOSIS — M05.9 RHEUMATOID ARTHRITIS WITH POSITIVE RHEUMATOID FACTOR, INVOLVING UNSPECIFIED SITE (HCC): ICD-10-CM

## 2017-08-29 PROCEDURE — 99214 OFFICE O/P EST MOD 30 MIN: CPT | Performed by: NURSE PRACTITIONER

## 2017-08-29 RX ORDER — LISINOPRIL 10 MG/1
10 TABLET ORAL DAILY
Qty: 90 TABLET | Refills: 3 | Status: SHIPPED | OUTPATIENT
Start: 2017-08-29 | End: 2018-09-07 | Stop reason: SDUPTHER

## 2017-08-29 RX ORDER — MECLIZINE HYDROCHLORIDE 25 MG/1
25 TABLET ORAL 3 TIMES DAILY PRN
Qty: 270 TABLET | Refills: 1 | Status: SHIPPED | OUTPATIENT
Start: 2017-08-29 | End: 2019-06-22 | Stop reason: HOSPADM

## 2017-08-29 RX ORDER — OMEPRAZOLE 40 MG/1
40 CAPSULE, DELAYED RELEASE ORAL 2 TIMES DAILY
Qty: 180 CAPSULE | Refills: 2 | Status: SHIPPED | OUTPATIENT
Start: 2017-08-29 | End: 2018-11-09 | Stop reason: SDUPTHER

## 2017-08-29 RX ORDER — ATORVASTATIN CALCIUM 40 MG/1
40 TABLET, FILM COATED ORAL DAILY
Qty: 90 TABLET | Refills: 1 | Status: SHIPPED | OUTPATIENT
Start: 2017-08-29 | End: 2018-01-10 | Stop reason: SDUPTHER

## 2017-08-29 NOTE — PROGRESS NOTES
Subjective   Edward Srinivasan is a 77 y.o. male who presents for f/u regarding DM2, HTN and hyperlipidemia.    HPI Comments: He has noticed a small increase in his am glucose readings (100-120s) over the past few months but his recent A1c is excellent at 5.71.   He states thoracic muscular tightness and discomfort has resolved, has returned to daily exercise without difficulty.  He was switched to Simponi infusions due to low platelets which he has tolerated well thus far.    Hyperlipidemia   This is a chronic problem. The problem is controlled. Recent lipid tests were reviewed and are normal. Associated symptoms include myalgias. Pertinent negatives include no chest pain or shortness of breath. Current antihyperlipidemic treatment includes statins (tolerating the lovastatin, switching to Atorvastatin). The current treatment provides significant improvement of lipids. There are no compliance problems (denies myalgias with Atorvastatin).    Hypertension   This is a chronic problem. The problem is controlled. Pertinent negatives include no anxiety, blurred vision, chest pain, headaches, malaise/fatigue, neck pain, orthopnea, palpitations, peripheral edema, PND or shortness of breath. There are no associated agents to hypertension. Risk factors for coronary artery disease include diabetes mellitus, dyslipidemia and sedentary lifestyle. Past treatments include ACE inhibitors. The current treatment provides significant improvement. There are no compliance problems.  There is no history of angina, kidney disease, CAD/MI, CVA or heart failure.   Diabetes   He presents for his follow-up diabetic visit. He has type 2 diabetes mellitus. No MedicAlert identification noted. There are no hypoglycemic associated symptoms. Pertinent negatives for hypoglycemia include no confusion, dizziness or headaches. Pertinent negatives for diabetes include no blurred vision, no chest pain, no fatigue and no weakness. There are no hypoglycemic  complications. Symptoms are stable. Pertinent negatives for diabetic complications include no CVA. His weight is stable. He is following a diabetic diet. He monitors blood glucose at home 1-2 x per day. His home blood glucose trend is increasing steadily. His breakfast blood glucose range is generally 110-130 mg/dl. An ACE inhibitor/angiotensin II receptor blocker is being taken. He does not see a podiatrist.Eye exam is current (yearly).        The following portions of the patient's history were reviewed and updated as appropriate: allergies, current medications, past social history and problem list.    Past Medical History:   Diagnosis Date   • Aortic valve stenosis     Mild AS by echo 8/14/15   • CAD (coronary artery disease)    • Carotid arterial disease     60% DENISE stenosis on 8/12/13 by Doppler ultrasound.  Followed by Dr. Floyd.   • Chronic obstructive lung disease    • Esophageal reflux     With a history of erosive gastritis   • Hyperlipidemia    • Hypertension    • Iron deficiency    • Pulmonary fibrosis     By CT scan on 8/12/15   • PVC (premature ventricular contraction)    • Rheumatoid arthritis    • Steroid-induced diabetes mellitus    • Tobacco use          Current Outpatient Prescriptions:   •  Ascorbic Acid (VITAMIN C) 500 MG tablet, Take by mouth., Disp: , Rfl:   •  aspirin 81 MG chewable tablet, Chew., Disp: , Rfl:   •  atorvastatin (LIPITOR) 40 MG tablet, Take 1 tablet by mouth Daily., Disp: 90 tablet, Rfl: 1  •  beta carotene 95299 UNIT capsule, Take by mouth., Disp: , Rfl:   •  cetirizine (ZyrTEC) 10 MG tablet, Take 10 mg by mouth daily., Disp: , Rfl:   •  Cholecalciferol (VITAMIN D-3 PO), Take  by mouth., Disp: , Rfl:   •  glucose blood (FREESTYLE LITE) test strip, 1 each by Other route 2 (two) times a day. Use as instructed, Disp: 100 each, Rfl: 7  •  Lancets (FREESTYLE) lancets, TEST BLOOD SUGAR TWO TIMES A DAY, Disp: 100 each, Rfl: 6  •  linagliptin (TRADJENTA) 5 MG tablet tablet, Take 1  tablet by mouth Daily., Disp: 90 tablet, Rfl: 3  •  lisinopril (PRINIVIL,ZESTRIL) 10 MG tablet, Take 1 tablet by mouth Daily., Disp: 90 tablet, Rfl: 3  •  MAGNESIUM PO, Take by mouth., Disp: , Rfl:   •  meclizine (ANTIVERT) 25 MG tablet, Take 1 tablet by mouth 3 (Three) Times a Day As Needed for dizziness., Disp: 270 tablet, Rfl: 1  •  nitroglycerin (NITROSTAT) 0.4 MG SL tablet, Place under the tongue. dissolve 1 tab under tongue for chest pain - if pain remains after 5 minutes, call 911 and repeat dose. Max 3 tabs in 15 minutes., Disp: , Rfl:   •  omeprazole (priLOSEC) 40 MG capsule, Take 1 capsule by mouth 2 (Two) Times a Day., Disp: 180 capsule, Rfl: 2  •  pyridoxine (B-6) 100 MG tablet, Take by mouth., Disp: , Rfl:   •  sodium chloride 0.9 % solution 100 mL with golimumab 50 MG/4ML solution 2 mg/kg, Infuse 2 mg/kg into a venous catheter 1 (One) Time., Disp: , Rfl:   •  vitamin E 400 UNIT capsule, Take by mouth., Disp: , Rfl:     Allergies   Allergen Reactions   • Oxaprozin        Review of Systems   Constitutional: Negative for chills, fatigue, fever, malaise/fatigue and unexpected weight change.   HENT: Negative for congestion, ear pain, postnasal drip, sinus pressure, sore throat and trouble swallowing.         C/o dryness of right nostril   Eyes: Negative for blurred vision and visual disturbance.   Respiratory: Negative for cough, chest tightness, shortness of breath and wheezing.    Cardiovascular: Negative for chest pain, palpitations, orthopnea, leg swelling and PND.   Gastrointestinal: Negative for abdominal pain, blood in stool, nausea and vomiting.   Endocrine: Negative for cold intolerance and heat intolerance.   Genitourinary: Negative for dysuria, frequency and urgency.   Musculoskeletal: Positive for arthralgias and myalgias. Negative for joint swelling and neck pain.   Skin: Negative for color change.   Allergic/Immunologic: Negative for immunocompromised state.   Neurological: Negative for  "dizziness, syncope, weakness and headaches.   Hematological: Does not bruise/bleed easily.   Psychiatric/Behavioral: Negative for confusion.       Objective   Vitals:    08/29/17 0949   BP: 100/72   BP Location: Left arm   Patient Position: Sitting   Cuff Size: Adult   Weight: 153 lb (69.4 kg)   Height: 67\" (170.2 cm)     Physical Exam   Constitutional: He is oriented to person, place, and time. He appears well-developed and well-nourished. No distress.   HENT:   Head: Normocephalic and atraumatic.   Right Ear: External ear normal.   Left Ear: External ear normal.   Eyes: Conjunctivae are normal.   Neck: Normal range of motion. Neck supple.   Cardiovascular: Normal rate, regular rhythm and intact distal pulses.  Exam reveals no gallop and no friction rub.    Murmur heard.   Systolic murmur is present with a grade of 3/6   Pulmonary/Chest: Effort normal and breath sounds normal. No respiratory distress.   Lymphadenopathy:     He has no cervical adenopathy.   Neurological: He is alert and oriented to person, place, and time.   Skin: Skin is warm and dry. No rash noted. No erythema.   Psychiatric: He has a normal mood and affect. His behavior is normal.   Nursing note and vitals reviewed.      Assessment/Plan    was seen today for hyperlipidemia and hypertension.    Diagnoses and all orders for this visit:    Essential hypertension, benign  Comments:  stable on current meds  Orders:  -     lisinopril (PRINIVIL,ZESTRIL) 10 MG tablet; Take 1 tablet by mouth Daily.    Diabetes mellitus without complication  Comments:  HgBA1c 5.71, samples of Tradjenta given  Orders:  -     Discontinue: linagliptin (TRADJENTA) 5 MG tablet tablet; Take 1 tablet by mouth Daily.  -     linagliptin (TRADJENTA) 5 MG tablet tablet; Take 1 tablet by mouth Daily.    Pure hypercholesterolemia  Comments:  LDL 44, HDL 34 (discussed benefits of smoking cessation)  Orders:  -     atorvastatin (LIPITOR) 40 MG tablet; Take 1 tablet by mouth " Daily.    Gastroesophageal reflux disease, esophagitis presence not specified  -     omeprazole (priLOSEC) 40 MG capsule; Take 1 capsule by mouth 2 (Two) Times a Day.    Dizziness  Comments:  intermittently, takes Meclizine with good results  Orders:  -     meclizine (ANTIVERT) 25 MG tablet; Take 1 tablet by mouth 3 (Three) Times a Day As Needed for dizziness.    Rheumatoid arthritis with positive rheumatoid factor, involving unspecified site  Comments:  recently switched to Simponi infusions

## 2017-08-30 RX ORDER — GABAPENTIN 300 MG/1
300 CAPSULE ORAL NIGHTLY
Qty: 30 CAPSULE | Refills: 1 | Status: SHIPPED | OUTPATIENT
Start: 2017-08-30 | End: 2017-08-31

## 2017-09-04 DIAGNOSIS — E11.9 DIABETES MELLITUS WITHOUT COMPLICATION (HCC): ICD-10-CM

## 2017-09-04 DIAGNOSIS — I10 ESSENTIAL HYPERTENSION, BENIGN: Primary | ICD-10-CM

## 2017-09-04 DIAGNOSIS — E78.00 PURE HYPERCHOLESTEROLEMIA: ICD-10-CM

## 2017-11-17 DIAGNOSIS — E11.9 DIABETES MELLITUS WITHOUT COMPLICATION (HCC): ICD-10-CM

## 2017-11-17 RX ORDER — BLOOD-GLUCOSE METER
KIT MISCELLANEOUS
Qty: 100 EACH | Refills: 1 | Status: SHIPPED | OUTPATIENT
Start: 2017-11-17 | End: 2018-03-16 | Stop reason: SDUPTHER

## 2018-01-02 ENCOUNTER — LAB (OUTPATIENT)
Dept: INTERNAL MEDICINE | Facility: CLINIC | Age: 78
End: 2018-01-02

## 2018-01-02 DIAGNOSIS — I10 ESSENTIAL HYPERTENSION, BENIGN: ICD-10-CM

## 2018-01-02 DIAGNOSIS — E78.00 PURE HYPERCHOLESTEROLEMIA: ICD-10-CM

## 2018-01-02 DIAGNOSIS — E11.9 DIABETES MELLITUS WITHOUT COMPLICATION (HCC): ICD-10-CM

## 2018-01-02 LAB
ALBUMIN SERPL-MCNC: 3.8 G/DL (ref 3.5–5.2)
ALBUMIN/GLOB SERPL: 1.2 G/DL
ALP SERPL-CCNC: 66 U/L (ref 39–117)
ALT SERPL W P-5'-P-CCNC: 19 U/L (ref 1–41)
ANION GAP SERPL CALCULATED.3IONS-SCNC: 11.1 MMOL/L
AST SERPL-CCNC: 21 U/L (ref 1–40)
BASOPHILS # BLD AUTO: 0.03 10*3/MM3 (ref 0–0.2)
BASOPHILS NFR BLD AUTO: 0.4 % (ref 0–2)
BILIRUB SERPL-MCNC: 0.4 MG/DL (ref 0.1–1.2)
BUN BLD-MCNC: 18 MG/DL (ref 8–23)
BUN/CREAT SERPL: 21.2 (ref 7–25)
CALCIUM SPEC-SCNC: 8.8 MG/DL (ref 8.6–10.5)
CHLORIDE SERPL-SCNC: 99 MMOL/L (ref 98–107)
CHOLEST SERPL-MCNC: 82 MG/DL (ref 0–200)
CO2 SERPL-SCNC: 26.9 MMOL/L (ref 22–29)
CREAT BLD-MCNC: 0.85 MG/DL (ref 0.76–1.27)
DEPRECATED RDW RBC AUTO: 44.4 FL (ref 37–54)
EOSINOPHIL # BLD AUTO: 0.37 10*3/MM3 (ref 0–0.7)
EOSINOPHIL NFR BLD AUTO: 4.6 % (ref 0–5)
ERYTHROCYTE [DISTWIDTH] IN BLOOD BY AUTOMATED COUNT: 13.3 % (ref 11.5–15)
GFR SERPL CREATININE-BSD FRML MDRD: 88 ML/MIN/1.73
GLOBULIN UR ELPH-MCNC: 3.2 GM/DL
GLUCOSE BLD-MCNC: 142 MG/DL (ref 65–99)
HBA1C MFR BLD: 5.8 % (ref 4.8–5.6)
HCT VFR BLD AUTO: 35.8 % (ref 40.1–51)
HDLC SERPL-MCNC: 35 MG/DL (ref 40–60)
HGB BLD-MCNC: 12.1 G/DL (ref 13.7–17.5)
LDLC SERPL CALC-MCNC: 38 MG/DL (ref 0–100)
LDLC/HDLC SERPL: 1.09 {RATIO}
LYMPHOCYTES # BLD AUTO: 1.7 10*3/MM3 (ref 0.8–7)
LYMPHOCYTES NFR BLD AUTO: 21.1 % (ref 10–60)
MCH RBC QN AUTO: 32.1 PG (ref 26–34)
MCHC RBC AUTO-ENTMCNC: 33.8 G/DL (ref 31–37)
MCV RBC AUTO: 95 FL (ref 80–100)
MONOCYTES # BLD AUTO: 0.75 10*3/MM3 (ref 0–1)
MONOCYTES NFR BLD AUTO: 9.3 % (ref 0–13)
NEUTROPHILS # BLD AUTO: 5.2 10*3/MM3 (ref 1–11)
NEUTROPHILS NFR BLD AUTO: 64.6 % (ref 30–85)
PLATELET # BLD AUTO: 172 10*3/MM3 (ref 150–450)
PMV BLD AUTO: 9.6 FL (ref 6–12)
POTASSIUM BLD-SCNC: 4.8 MMOL/L (ref 3.5–5.2)
PROT SERPL-MCNC: 7 G/DL (ref 6–8.5)
RBC # BLD AUTO: 3.77 10*6/MM3 (ref 4.63–6.08)
SODIUM BLD-SCNC: 137 MMOL/L (ref 136–145)
TRIGL SERPL-MCNC: 45 MG/DL (ref 0–150)
VLDLC SERPL-MCNC: 9 MG/DL (ref 5–40)
WBC NRBC COR # BLD: 8.05 10*3/MM3 (ref 5–10)

## 2018-01-02 PROCEDURE — 80053 COMPREHEN METABOLIC PANEL: CPT | Performed by: NURSE PRACTITIONER

## 2018-01-02 PROCEDURE — 80061 LIPID PANEL: CPT | Performed by: NURSE PRACTITIONER

## 2018-01-02 PROCEDURE — 36415 COLL VENOUS BLD VENIPUNCTURE: CPT | Performed by: NURSE PRACTITIONER

## 2018-01-02 PROCEDURE — 85025 COMPLETE CBC W/AUTO DIFF WBC: CPT | Performed by: NURSE PRACTITIONER

## 2018-01-02 PROCEDURE — 83036 HEMOGLOBIN GLYCOSYLATED A1C: CPT | Performed by: NURSE PRACTITIONER

## 2018-01-10 ENCOUNTER — OFFICE VISIT (OUTPATIENT)
Dept: INTERNAL MEDICINE | Facility: CLINIC | Age: 78
End: 2018-01-10

## 2018-01-10 VITALS
DIASTOLIC BLOOD PRESSURE: 80 MMHG | WEIGHT: 158 LBS | OXYGEN SATURATION: 98 % | HEIGHT: 67 IN | BODY MASS INDEX: 24.8 KG/M2 | HEART RATE: 84 BPM | SYSTOLIC BLOOD PRESSURE: 122 MMHG

## 2018-01-10 DIAGNOSIS — M05.9 RHEUMATOID ARTHRITIS WITH POSITIVE RHEUMATOID FACTOR, INVOLVING UNSPECIFIED SITE (HCC): ICD-10-CM

## 2018-01-10 DIAGNOSIS — E11.42 DIABETIC PERIPHERAL NEUROPATHY (HCC): ICD-10-CM

## 2018-01-10 DIAGNOSIS — E11.9 DIABETES MELLITUS WITHOUT COMPLICATION (HCC): Primary | ICD-10-CM

## 2018-01-10 DIAGNOSIS — Z72.0 TOBACCO USE: ICD-10-CM

## 2018-01-10 DIAGNOSIS — E78.00 PURE HYPERCHOLESTEROLEMIA: ICD-10-CM

## 2018-01-10 DIAGNOSIS — I10 ESSENTIAL HYPERTENSION, BENIGN: ICD-10-CM

## 2018-01-10 PROCEDURE — 99214 OFFICE O/P EST MOD 30 MIN: CPT | Performed by: NURSE PRACTITIONER

## 2018-01-10 RX ORDER — ATORVASTATIN CALCIUM 40 MG/1
40 TABLET, FILM COATED ORAL DAILY
Qty: 90 TABLET | Refills: 1 | Status: SHIPPED | OUTPATIENT
Start: 2018-01-10 | End: 2018-09-07 | Stop reason: SDUPTHER

## 2018-01-11 NOTE — PROGRESS NOTES
Subjective   Edward Srinivasan is a 77 y.o. male who presents for f/u regarding DM2, HTN and hyperlipidemia.    HPI Comments: He reports not watching his diet as closely throughout the Holidays but recent A1c has remained stable (5.80). He continues to walk regularly which he is tolerating well. He has a hx of tobacco use (smokes mayank 7 cigarettes/day which has recently increased to around 9).    Hypertension   This is a chronic problem. The problem is controlled. Associated symptoms include shortness of breath. Pertinent negatives include no anxiety, blurred vision, chest pain, headaches, malaise/fatigue, neck pain, orthopnea, palpitations, peripheral edema or PND. There are no associated agents to hypertension. Risk factors for coronary artery disease include diabetes mellitus, dyslipidemia and sedentary lifestyle. Past treatments include ACE inhibitors. The current treatment provides significant improvement. There are no compliance problems.  There is no history of angina, kidney disease, CAD/MI, CVA or heart failure.   Hyperlipidemia   This is a chronic problem. The problem is controlled. Recent lipid tests were reviewed and are normal. Associated symptoms include myalgias and shortness of breath. Pertinent negatives include no chest pain. Current antihyperlipidemic treatment includes statins (tolerating the lovastatin, switching to Atorvastatin). The current treatment provides significant improvement of lipids. There are no compliance problems (denies myalgias with Atorvastatin).    Diabetes   He presents for his follow-up diabetic visit. He has type 2 diabetes mellitus. No MedicAlert identification noted. There are no hypoglycemic associated symptoms. Pertinent negatives for hypoglycemia include no confusion, dizziness or headaches. Pertinent negatives for diabetes include no blurred vision, no chest pain, no fatigue and no weakness. There are no hypoglycemic complications. Symptoms are stable. Pertinent negatives  for diabetic complications include no CVA. His weight is stable. He is following a diabetic diet. He monitors blood glucose at home 1-2 x per day. His home blood glucose trend is increasing steadily. His breakfast blood glucose range is generally 110-130 mg/dl. An ACE inhibitor/angiotensin II receptor blocker is being taken. He does not see a podiatrist.Eye exam is current (yearly).        The following portions of the patient's history were reviewed and updated as appropriate: allergies, current medications, past social history and problem list.    Past Medical History:   Diagnosis Date   • Aortic valve stenosis     Mild AS by echo 8/14/15   • CAD (coronary artery disease)    • Carotid arterial disease     60% DENISE stenosis on 8/12/13 by Doppler ultrasound.  Followed by Dr. Floyd.   • Chronic obstructive lung disease    • Esophageal reflux     With a history of erosive gastritis   • Hyperlipidemia    • Hypertension    • Iron deficiency    • Pulmonary fibrosis     By CT scan on 8/12/15   • PVC (premature ventricular contraction)    • Rheumatoid arthritis    • Steroid-induced diabetes mellitus    • Tobacco use          Current Outpatient Prescriptions:   •  Ascorbic Acid (VITAMIN C) 500 MG tablet, Take by mouth., Disp: , Rfl:   •  aspirin 81 MG chewable tablet, Chew., Disp: , Rfl:   •  atorvastatin (LIPITOR) 40 MG tablet, Take 1 tablet by mouth Daily., Disp: 90 tablet, Rfl: 1  •  beta carotene 39966 UNIT capsule, Take by mouth., Disp: , Rfl:   •  cetirizine (ZyrTEC) 10 MG tablet, Take 10 mg by mouth daily., Disp: , Rfl:   •  Cholecalciferol (VITAMIN D-3 PO), Take  by mouth., Disp: , Rfl:   •  FREESTYLE LITE test strip, USE 2 STRIPS TO TEST DAILY, Disp: 100 each, Rfl: 1  •  Lancets (FREESTYLE) lancets, TEST BLOOD SUGAR TWO TIMES A DAY, Disp: 100 each, Rfl: 6  •  linagliptin (TRADJENTA) 5 MG tablet tablet, Take 1 tablet by mouth Daily., Disp: 90 tablet, Rfl: 3  •  lisinopril (PRINIVIL,ZESTRIL) 10 MG tablet, Take 1  tablet by mouth Daily., Disp: 90 tablet, Rfl: 3  •  MAGNESIUM PO, Take by mouth., Disp: , Rfl:   •  meclizine (ANTIVERT) 25 MG tablet, Take 1 tablet by mouth 3 (Three) Times a Day As Needed for dizziness., Disp: 270 tablet, Rfl: 1  •  nitroglycerin (NITROSTAT) 0.4 MG SL tablet, Place under the tongue. dissolve 1 tab under tongue for chest pain - if pain remains after 5 minutes, call 911 and repeat dose. Max 3 tabs in 15 minutes., Disp: , Rfl:   •  omeprazole (priLOSEC) 40 MG capsule, Take 1 capsule by mouth 2 (Two) Times a Day., Disp: 180 capsule, Rfl: 2  •  pyridoxine (B-6) 100 MG tablet, Take by mouth., Disp: , Rfl:   •  sodium chloride 0.9 % solution 100 mL with golimumab 50 MG/4ML solution 2 mg/kg, Infuse 2 mg/kg into a venous catheter 1 (One) Time., Disp: , Rfl:   •  vitamin E 400 UNIT capsule, Take by mouth., Disp: , Rfl:     Allergies   Allergen Reactions   • Oxaprozin        Review of Systems   Constitutional: Negative for chills, fatigue, fever, malaise/fatigue and unexpected weight change.   HENT: Negative for congestion, ear pain, postnasal drip, sinus pressure, sore throat and trouble swallowing.    Eyes: Negative for blurred vision and visual disturbance.   Respiratory: Positive for cough and shortness of breath. Negative for chest tightness and wheezing.    Cardiovascular: Negative for chest pain, palpitations, orthopnea, leg swelling and PND.   Gastrointestinal: Negative for abdominal pain, blood in stool, nausea and vomiting.   Endocrine: Negative for cold intolerance and heat intolerance.   Genitourinary: Negative for dysuria, frequency and urgency.   Musculoskeletal: Positive for back pain and myalgias. Negative for arthralgias, joint swelling and neck pain.   Skin: Negative for color change.   Allergic/Immunologic: Negative for immunocompromised state.   Neurological: Positive for numbness. Negative for dizziness, syncope, weakness and headaches.   Hematological: Does not bruise/bleed easily.  "  Psychiatric/Behavioral: Negative for confusion.       Objective   Vitals:    01/10/18 1007   BP: 122/80   BP Location: Left arm   Patient Position: Sitting   Cuff Size: Adult   Pulse: 84   SpO2: 98%   Weight: 71.7 kg (158 lb)   Height: 170.2 cm (67\")     Physical Exam   Constitutional: He appears well-developed and well-nourished. He is cooperative. He does not have a sickly appearance. He does not appear ill.   HENT:   Head: Normocephalic.   Right Ear: Hearing, tympanic membrane and external ear normal. No drainage, swelling or tenderness. No mastoid tenderness. Tympanic membrane is not injected, not scarred, not erythematous and not bulging. Tympanic membrane mobility is normal. No middle ear effusion. No decreased hearing is noted.   Left Ear: Hearing, tympanic membrane and external ear normal.   Nose: Nose normal. No mucosal edema, rhinorrhea, sinus tenderness or nasal deformity. Right sinus exhibits no maxillary sinus tenderness and no frontal sinus tenderness. Left sinus exhibits no maxillary sinus tenderness and no frontal sinus tenderness.   Mouth/Throat: Oropharynx is clear and moist and mucous membranes are normal. Normal dentition.   Eyes: Conjunctivae and lids are normal. Pupils are equal, round, and reactive to light. Right eye exhibits no discharge and no exudate. Left eye exhibits no discharge and no exudate.   Neck: Trachea normal and normal range of motion. Carotid bruit is not present. No edema present. No thyroid mass and no thyromegaly present.   Cardiovascular: Regular rhythm and normal pulses.    Murmur heard.   Systolic murmur is present with a grade of 3/6   Pulmonary/Chest: Breath sounds normal. No respiratory distress. He has no decreased breath sounds. He has no wheezes. He has no rhonchi. He has no rales.     had a diabetic foot exam performed today.   During the foot exam he had a monofilament test performed.    Neurological Sensory Findings - Unaltered hot/cold right ankle/foot " discrimination and unaltered hot/cold left ankle/foot discrimination. Unaltered sharp/dull right ankle/foot discrimination and unaltered sharp/dull left ankle/foot discrimination. Right ankle/foot altered proprioception and left ankle/foot altered proprioception.    Vascular Status -  His exam exhibits right foot vasculature normal. His exam exhibits no right foot edema. His exam exhibits left foot vasculature normal. His exam exhibits no left foot edema.   Skin Integrity  -  His right foot skin is intact.      's left foot skin is intact. .  Lymphadenopathy:        Head (right side): No submental, no submandibular, no tonsillar, no preauricular, no posterior auricular and no occipital adenopathy present.        Head (left side): No submental, no submandibular, no tonsillar, no preauricular, no posterior auricular and no occipital adenopathy present.   Neurological: He is alert.   Skin: Skin is warm, dry and intact. No cyanosis. Nails show no clubbing.       Assessment/Plan    was seen today for hypertension.    Diagnoses and all orders for this visit:    Diabetes mellitus without complication  Comments:  HgB A1c 5.8 with recent labs    Pure hypercholesterolemia  Comments:  LDL 38 with recent labs, tolerating meds  Orders:  -     atorvastatin (LIPITOR) 40 MG tablet; Take 1 tablet by mouth Daily.    Essential hypertension, benign  Comments:  stable on current meds    Rheumatoid arthritis with positive rheumatoid factor, involving unspecified site  Comments:  followed by Dr. Ortega    Diabetic peripheral neuropathy  Comments:  has declined meds for now    Tobacco use  Comments:  discussed smoking cessation

## 2018-03-16 DIAGNOSIS — E11.9 DIABETES MELLITUS WITHOUT COMPLICATION (HCC): ICD-10-CM

## 2018-03-19 ENCOUNTER — TELEPHONE (OUTPATIENT)
Dept: INTERNAL MEDICINE | Facility: CLINIC | Age: 78
End: 2018-03-19

## 2018-03-19 DIAGNOSIS — E11.9 DIABETES MELLITUS WITHOUT COMPLICATION (HCC): ICD-10-CM

## 2018-03-19 NOTE — TELEPHONE ENCOUNTER
----- Message from Gena Calderón sent at 3/16/2018  1:03 PM EDT -----  Contact: pt - Dr Carroll' pr - RE: Rx refill  Pt calling and would like a return call regarding a Rx    FREESTYLE LITE test strip 100 each    Sig: USE 2 STRIPS TO TEST DAILY     [address] AT [facility] & (NESHA PIEDRA)  [phone] [facility] [phone] FX    Pt # [phone]

## 2018-03-21 ENCOUNTER — TELEPHONE (OUTPATIENT)
Dept: INTERNAL MEDICINE | Facility: CLINIC | Age: 78
End: 2018-03-21

## 2018-03-21 NOTE — TELEPHONE ENCOUNTER
PA submitted on RX Omeprazole and approved from 3/21/18---- 3/31/19 per June.     Auth # [id number]   Pt and pharmacy notified

## 2018-04-04 ENCOUNTER — OFFICE VISIT (OUTPATIENT)
Dept: CARDIOLOGY | Facility: CLINIC | Age: 78
End: 2018-04-04

## 2018-04-04 VITALS
HEART RATE: 72 BPM | SYSTOLIC BLOOD PRESSURE: 120 MMHG | WEIGHT: 159 LBS | DIASTOLIC BLOOD PRESSURE: 72 MMHG | BODY MASS INDEX: 24.96 KG/M2 | HEIGHT: 67 IN | OXYGEN SATURATION: 99 %

## 2018-04-04 DIAGNOSIS — I35.0 NONRHEUMATIC AORTIC VALVE STENOSIS: ICD-10-CM

## 2018-04-04 DIAGNOSIS — I25.10 CORONARY ARTERY DISEASE INVOLVING NATIVE CORONARY ARTERY OF NATIVE HEART WITHOUT ANGINA PECTORIS: Primary | ICD-10-CM

## 2018-04-04 PROCEDURE — 99213 OFFICE O/P EST LOW 20 MIN: CPT | Performed by: INTERNAL MEDICINE

## 2018-04-22 NOTE — PROGRESS NOTES
Date of Office Visit: 2018  Encounter Provider: Brian Harden MD  Place of Service: [facility] CARDIOLOGY  Patient Name: Edward Srinivasan  :1940    Chief complaint: Follow-up for coronary artery disease, aortic stenosis.    History of Present Illness:    Dear Dr. Carroll:       I again had the pleasure of seeing your patient in cardiology office on 2018.  As you   well know, he is a very pleasant, 78 year-old, white male with a past medical history  significant for coronary artery disease, hypertension, hyperlipidemia, and rheumatoid  arthritis who presents for follow-up. The patient was admitted to the hospital on  2015, with chest discomfort. He had been watching his grandson on the lawn   when he developed sudden chest discomfort, which radiated across his precordium   and was described as a pressure sensation. It then started to radiate into his neck   bilaterally, as well as his jaws. His initial electrocardiogram showed no ischemic   changes and his troponin was negative. He also had been recently diagnosed with   rheumatoid arthritis and was on prednisone. 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 A CT angiogram of the chest on admission in   the emergency room showed chronic obstructive pulmonary disease with bullae   formation; however, he also had an interstitial pattern with early honeycombing consistent   with pulmonary fibrosis. A subsequent CT on 2015, confirmed this. He was seen by   pulmonology as well.     He did undergo a Lexiscan Cardiolite stress test, which showed lateral ischemia. A   subsequent cardiac catheterization on 2015, by Dr. Stearns showed a 90%   ulcerated lesion in the mid left circumflex. He subsequently underwent placement of a   3.0 mm x 18 mm Xience drug-eluting stent, although there was a proximal edge   dissection necessitating placement of a second3.0 mm x 12 mm  Xience drug-eluting   stent proximal to the first stent.       The patient presents today for follow-up.  Overall, he is doing fairly well.  He has had no   new chest discomfort.  He also denied any shortness of breath.  Unfortunately, he is still   smoking.  He states that his blood pressure has been under good control.    Past Medical History:   Diagnosis Date   • Aortic valve stenosis     Mild to moderate AS by echo on 8/8/17   • CAD (coronary artery disease)    • Carotid arterial disease     Followed by Dr. Floyd.  Carotid Doppler on 9/21/17: 60-70% DENISE, 50-60% LICA.   • Chronic obstructive lung disease    • Esophageal reflux     With a history of erosive gastritis   • Hyperlipidemia    • Hypertension    • Iron deficiency    • Pulmonary fibrosis     By CT scan on 8/12/15   • PVC (premature ventricular contraction)    • Rheumatoid arthritis    • Steroid-induced diabetes mellitus    • Tobacco use        Past Surgical History:   Procedure Laterality Date   • BACK SURGERY      L4-L5 discectomy   • CATARACT EXTRACTION Bilateral    • VASECTOMY         Current Outpatient Prescriptions on File Prior to Visit   Medication Sig Dispense Refill   • Ascorbic Acid (VITAMIN C) 500 MG tablet Take by mouth.     • aspirin 81 MG chewable tablet Chew 81 mg Daily.     • atorvastatin (LIPITOR) 40 MG tablet Take 1 tablet by mouth Daily. 90 tablet 1   • beta carotene 21482 UNIT capsule Take by mouth.     • cetirizine (ZyrTEC) 10 MG tablet Take 10 mg by mouth daily.     • Cholecalciferol (VITAMIN D-3 PO) Take 2,000 Int'l Units/day by mouth Daily.     • glucose blood (FREESTYLE LITE) test strip 1 each by Other route As Needed (100). Use 2 test strips to test twice daily 100 each 1   • Lancets (FREESTYLE) lancets TEST BLOOD SUGAR TWO TIMES A  each 6   • linagliptin (TRADJENTA) 5 MG tablet tablet Take 1 tablet by mouth Daily. 90 tablet 3   • lisinopril (PRINIVIL,ZESTRIL) 10 MG tablet Take 1 tablet by mouth Daily. 90 tablet 3   •  MAGNESIUM PO Take by mouth.     • meclizine (ANTIVERT) 25 MG tablet Take 1 tablet by mouth 3 (Three) Times a Day As Needed for dizziness. 270 tablet 1   • nitroglycerin (NITROSTAT) 0.4 MG SL tablet Place under the tongue. dissolve 1 tab under tongue for chest pain - if pain remains after 5 minutes, call 911 and repeat dose. Max 3 tabs in 15 minutes.     • omeprazole (priLOSEC) 40 MG capsule Take 1 capsule by mouth 2 (Two) Times a Day. 180 capsule 2   • pyridoxine (B-6) 100 MG tablet Take by mouth.     • sodium chloride 0.9 % solution 100 mL with golimumab 50 MG/4ML solution 2 mg/kg Infuse 2 mg/kg into a venous catheter 1 (One) Time.     • vitamin E 400 UNIT capsule Take by mouth.       No current facility-administered medications on file prior to visit.      Allergies as of 04/04/2018 - Reviewed 04/04/2018   Allergen Reaction Noted   • Oxaprozin Hives 11/20/2015     Social History     Social History   • Marital status:      Spouse name: N/A   • Number of children: N/A   • Years of education: N/A     Occupational History   • Not on file.     Social History Main Topics   • Smoking status: Current Every Day Smoker     Types: Cigarettes   • Smokeless tobacco: Never Used      Comment: 1/2 ppd, discussed benefits of cessation   • Alcohol use No      Comment: no caffeine   • Drug use: No   • Sexual activity: No     Other Topics Concern   • Not on file     Social History Narrative   • No narrative on file     Family History   Problem Relation Age of Onset   • Heart failure Mother      CHF   • Heart attack Father 54   • Heart attack Brother 47   • Heart disease Brother      another brother with 4 vessel CABG in his 50s   • Coronary artery disease Other      multiple family members       Review of Systems   Musculoskeletal: Positive for joint pain.   All other systems reviewed and are negative.     Objective:     Vitals:    04/04/18 1046   BP: 120/72   BP Location: Left arm   Pulse: 72   SpO2: 99%   Weight: 72.1 kg  "(159 lb)   Height: 170.2 cm (67\")     Body mass index is 24.9 kg/m².    Physical Exam   Constitutional: He is oriented to person, place, and time. He appears well-developed and well-nourished.   HENT:   Head: Normocephalic and atraumatic.   Eyes: Conjunctivae are normal.   Neck: Neck supple.   Cardiovascular: Normal rate and regular rhythm.  Exam reveals no gallop and no friction rub.    Murmur heard.   Systolic murmur is present with a grade of 3/6  at the upper right sternal border, upper left sternal border  Pulmonary/Chest: Effort normal and breath sounds normal.   Abdominal: Soft. There is no tenderness.   Musculoskeletal: He exhibits no edema.   Neurological: He is alert and oriented to person, place, and time.   Skin: Skin is warm.   Psychiatric: He has a normal mood and affect. His behavior is normal.     Lab Review:   Procedures    Cardiac Procedures:  1. Left heart catheterization on 08/14/2015, by Dr. Stearns showed the following. The  left main was normal. The left anterior descending had luminal irregularities. The first  diagonal branch had a 60% proximal stenosis. The left circumflex had a 90% ulcerated  stenosis in the mid portion. There was 20% to 30% distal disease in the left circumflex  coronary artery. The left posterior descending artery had a 60% ostial lesion. The right  coronary artery was small, nondominant, and had a 30% lesion at the origin.  He then  underwent placement of a 3.0 mm x 18 mm Xience drug-eluting stent to the proximal   left circumflex; however, there was a proximal edge dissection necessitating  placement of a 3.0 mm x 12 mm Xience drug-eluting stent proximal to the first stent.   2.  Echocardiogram on 8/8/2017: Ejection fraction was 60%.  There was grade 1   diastolic dysfunction.  There was mild to moderate aortic stenosis with a peak   gradient of 33 mmHg and a mean gradient of 18 mmHg.  3.  Carotid artery Doppler ultrasound on 9/21/2017: There was a 60-70% stenosis in "   the right internal carotid artery.  There was a 50-60% stenosis in the left internal   carotid artery.    Assessment:       Diagnosis Plan   1. Coronary artery disease involving native coronary artery of native heart without angina pectoris     2. Nonrheumatic aortic valve stenosis       Plan:       The patient seems to be stable from a cardiac standpoint.  He has had no chest discomfort.    He will continue on the aspirin and Lipitor for the coronary artery disease.  His blood   pressure appears to be under excellent control.  He is not on a beta blocker secondary to   COPD and pulmonary fibrosis.  I have addressed smoking cessation with him on multiple   occasions, and again discussed this with him today.  He is not ready to quit unfortunately.    I did encourage him to do so.  His latest echocardiogram on 8/8/2017 showed mild to   moderate aortic stenosis.  His second heart sound is still clearly heard on exam, and he   has no symptoms.  This can be followed serially in the future.  For now, I will plan on seeing   him back in the office within the next 6 months unless other issues arise.    Coronary Artery Disease  Assessment  • The patient has no angina    Plan  • Lifestyle modifications discussed include adhering to a heart healthy diet, avoidance of tobacco products, maintenance of a healthy weight, medication compliance, regular exercise and regular monitoring of cholesterol and blood pressure    Subjective - Objective  • There has been a previous stent procedure using ANNEMARIE on or around 8/14/2015  • Current antiplatelet therapy includes aspirin 81 mg

## 2018-05-14 ENCOUNTER — OFFICE VISIT (OUTPATIENT)
Dept: INTERNAL MEDICINE | Facility: CLINIC | Age: 78
End: 2018-05-14

## 2018-05-14 VITALS
DIASTOLIC BLOOD PRESSURE: 80 MMHG | SYSTOLIC BLOOD PRESSURE: 124 MMHG | BODY MASS INDEX: 24.48 KG/M2 | HEIGHT: 67 IN | WEIGHT: 156 LBS

## 2018-05-14 DIAGNOSIS — E11.9 DIABETES MELLITUS WITHOUT COMPLICATION (HCC): ICD-10-CM

## 2018-05-14 DIAGNOSIS — M05.9 RHEUMATOID ARTHRITIS WITH POSITIVE RHEUMATOID FACTOR, INVOLVING UNSPECIFIED SITE (HCC): ICD-10-CM

## 2018-05-14 DIAGNOSIS — E78.00 PURE HYPERCHOLESTEROLEMIA: Primary | Chronic | ICD-10-CM

## 2018-05-14 DIAGNOSIS — E11.9 TYPE 2 DIABETES MELLITUS WITHOUT COMPLICATION, WITHOUT LONG-TERM CURRENT USE OF INSULIN (HCC): Chronic | ICD-10-CM

## 2018-05-14 DIAGNOSIS — I10 ESSENTIAL HYPERTENSION, BENIGN: Chronic | ICD-10-CM

## 2018-05-14 DIAGNOSIS — K21.9 GASTROESOPHAGEAL REFLUX DISEASE, ESOPHAGITIS PRESENCE NOT SPECIFIED: Chronic | ICD-10-CM

## 2018-05-14 LAB
ALBUMIN SERPL-MCNC: 4.2 G/DL (ref 3.5–5.2)
ALBUMIN/GLOB SERPL: 1.3 G/DL
ALP SERPL-CCNC: 59 U/L (ref 39–117)
ALT SERPL W P-5'-P-CCNC: 16 U/L (ref 1–41)
ANION GAP SERPL CALCULATED.3IONS-SCNC: 14.5 MMOL/L
AST SERPL-CCNC: 21 U/L (ref 1–40)
BILIRUB SERPL-MCNC: 0.6 MG/DL (ref 0.1–1.2)
BUN BLD-MCNC: 17 MG/DL (ref 8–23)
BUN/CREAT SERPL: 20.5 (ref 7–25)
CALCIUM SPEC-SCNC: 9.1 MG/DL (ref 8.6–10.5)
CHLORIDE SERPL-SCNC: 98 MMOL/L (ref 98–107)
CHOLEST SERPL-MCNC: 96 MG/DL (ref 0–200)
CO2 SERPL-SCNC: 25.5 MMOL/L (ref 22–29)
CREAT BLD-MCNC: 0.83 MG/DL (ref 0.76–1.27)
GFR SERPL CREATININE-BSD FRML MDRD: 90 ML/MIN/1.73
GLOBULIN UR ELPH-MCNC: 3.2 GM/DL
GLUCOSE BLD-MCNC: 129 MG/DL (ref 65–99)
HBA1C MFR BLD: 6.39 % (ref 4.8–5.6)
HDLC SERPL-MCNC: 34 MG/DL (ref 40–60)
LDLC SERPL CALC-MCNC: 49 MG/DL (ref 0–100)
LDLC/HDLC SERPL: 1.45 {RATIO}
POTASSIUM BLD-SCNC: 4.9 MMOL/L (ref 3.5–5.2)
PROT SERPL-MCNC: 7.4 G/DL (ref 6–8.5)
SODIUM BLD-SCNC: 138 MMOL/L (ref 136–145)
TRIGL SERPL-MCNC: 63 MG/DL (ref 0–150)
VLDLC SERPL-MCNC: 12.6 MG/DL (ref 5–40)

## 2018-05-14 PROCEDURE — 99214 OFFICE O/P EST MOD 30 MIN: CPT | Performed by: INTERNAL MEDICINE

## 2018-05-14 PROCEDURE — 80053 COMPREHEN METABOLIC PANEL: CPT | Performed by: INTERNAL MEDICINE

## 2018-05-14 PROCEDURE — 80061 LIPID PANEL: CPT | Performed by: INTERNAL MEDICINE

## 2018-05-14 NOTE — PROGRESS NOTES
Subjective   Edward Srinivasan is a 78 y.o. male.     History of Present Illness     The following portions of the patient's history were reviewed and updated as appropriate: allergies, current medications, past family history, past medical history, past social history, past surgical history and problem list.  79 yo/m with HTN, CAD, NIDDM, GERD, RA, here for follow up. He is doing remarkably well overall. No changes needed.  Review of Systems   Constitutional: Positive for fatigue.   HENT: Negative.    Eyes: Negative.    Respiratory: Negative.    Cardiovascular: Negative.    Gastrointestinal: Negative.    Endocrine: Negative.    Genitourinary: Negative.    Musculoskeletal: Positive for arthralgias.   Skin: Negative.    Allergic/Immunologic: Negative.    Neurological: Negative.    Hematological: Negative.    Psychiatric/Behavioral: Negative.        Objective   Physical Exam   Constitutional: He is oriented to person, place, and time. He appears well-developed and well-nourished.   HENT:   Head: Normocephalic and atraumatic.   Eyes: EOM are normal. Pupils are equal, round, and reactive to light.   Neck: Normal range of motion. Neck supple.   Cardiovascular: Normal rate, regular rhythm and normal heart sounds.    Pulmonary/Chest: Effort normal and breath sounds normal.   Abdominal: Soft. Bowel sounds are normal.   Musculoskeletal:   Diffuse arthropathy   Neurological: He is alert and oriented to person, place, and time. He has normal reflexes.   Skin: Skin is warm and dry.   Psychiatric: He has a normal mood and affect. His behavior is normal. Judgment and thought content normal.   Nursing note and vitals reviewed.        Assessment/Plan    was seen today for hypertension, hyperlipidemia and diabetes.    Diagnoses and all orders for this visit:    Pure hypercholesterolemia  Comments:  will check lipids today    Essential hypertension, benign  Comments:  well controlled    Gastroesophageal reflux disease, esophagitis  presence not specified  Comments:  well controlled    Type 2 diabetes mellitus without complication, without long-term current use of insulin  Comments:  need to check an A1c    Rheumatoid arthritis with positive rheumatoid factor, involving unspecified site  Comments:  well controlled at present    Diabetes mellitus without complication  Comments:  HgBA1c 5.71, samples of Tradjenta given  Orders:  -     linagliptin (TRADJENTA) 5 MG tablet tablet; Take 1 tablet by mouth Daily.

## 2018-06-08 ENCOUNTER — TELEPHONE (OUTPATIENT)
Dept: INTERNAL MEDICINE | Facility: CLINIC | Age: 78
End: 2018-06-08

## 2018-06-08 DIAGNOSIS — E11.9 DIABETES MELLITUS WITHOUT COMPLICATION (HCC): ICD-10-CM

## 2018-06-08 NOTE — TELEPHONE ENCOUNTER
----- Message from Gena Calderón sent at 6/8/2018  1:45 PM EDT -----  Contact: pt - Dr Cannon's pt - RE: Rx refill  Pt calling and would like a return call regarding a Rx that was to be called in. Pt informs Rx was to be called in to pharmacy. Could you please call Rx to pharmacy? Please advise. Thank      linagliptin (TRADJENTA) 5 MG tablet tablet       Sig: Take 1 tablet by mouth Daily.       Class: Sample         [facility] - [address] [phone] [facility] [phone] FX    Pt # [phone]

## 2018-06-29 DIAGNOSIS — E11.9 DIABETES MELLITUS WITHOUT COMPLICATION (HCC): ICD-10-CM

## 2018-06-29 RX ORDER — BLOOD-GLUCOSE METER
KIT MISCELLANEOUS
Qty: 100 EACH | Refills: 1 | Status: SHIPPED | OUTPATIENT
Start: 2018-06-29 | End: 2018-10-10 | Stop reason: SDUPTHER

## 2018-07-11 RX ORDER — LANCETS 28 GAUGE
1 EACH MISCELLANEOUS 2 TIMES DAILY
Qty: 100 EACH | Refills: 2 | Status: SHIPPED | OUTPATIENT
Start: 2018-07-11 | End: 2019-07-14 | Stop reason: SDUPTHER

## 2018-09-07 DIAGNOSIS — E78.00 PURE HYPERCHOLESTEROLEMIA: ICD-10-CM

## 2018-09-07 DIAGNOSIS — I10 ESSENTIAL HYPERTENSION, BENIGN: ICD-10-CM

## 2018-09-07 RX ORDER — LISINOPRIL 10 MG/1
10 TABLET ORAL DAILY
Qty: 90 TABLET | Refills: 3 | Status: SHIPPED | OUTPATIENT
Start: 2018-09-07 | End: 2019-06-24 | Stop reason: SDUPTHER

## 2018-09-07 RX ORDER — ATORVASTATIN CALCIUM 40 MG/1
40 TABLET, FILM COATED ORAL DAILY
Qty: 90 TABLET | Refills: 3 | Status: SHIPPED | OUTPATIENT
Start: 2018-09-07 | End: 2019-06-24 | Stop reason: SDUPTHER

## 2018-09-24 ENCOUNTER — OFFICE VISIT (OUTPATIENT)
Dept: INTERNAL MEDICINE | Facility: CLINIC | Age: 78
End: 2018-09-24

## 2018-09-24 VITALS
WEIGHT: 156 LBS | SYSTOLIC BLOOD PRESSURE: 120 MMHG | BODY MASS INDEX: 24.48 KG/M2 | HEIGHT: 67 IN | HEART RATE: 83 BPM | OXYGEN SATURATION: 97 % | DIASTOLIC BLOOD PRESSURE: 78 MMHG | TEMPERATURE: 98 F

## 2018-09-24 DIAGNOSIS — Z72.0 TOBACCO USE: ICD-10-CM

## 2018-09-24 DIAGNOSIS — J06.9 ACUTE URI: Primary | ICD-10-CM

## 2018-09-24 PROCEDURE — 99213 OFFICE O/P EST LOW 20 MIN: CPT | Performed by: NURSE PRACTITIONER

## 2018-09-24 RX ORDER — GUAIFENESIN 600 MG/1
600 TABLET, EXTENDED RELEASE ORAL EVERY 12 HOURS SCHEDULED
Qty: 14 TABLET
Start: 2018-09-24 | End: 2018-10-01

## 2018-09-24 RX ORDER — AMOXICILLIN 875 MG/1
875 TABLET, COATED ORAL EVERY 12 HOURS SCHEDULED
Qty: 14 TABLET | Refills: 0 | Status: SHIPPED | OUTPATIENT
Start: 2018-09-24 | End: 2018-10-01

## 2018-09-24 NOTE — PROGRESS NOTES
Subjective   Edward Srinivasan is a 78 y.o. male who presents due to respiratory symptoms.    URI    This is a new problem. The current episode started in the past 7 days. The problem has been rapidly worsening. There has been no fever. Associated symptoms include congestion, coughing (productive of sputum), rhinorrhea, sinus pain, sneezing and a sore throat. Pertinent negatives include no abdominal pain, chest pain, diarrhea, dysuria, ear pain, headaches, nausea, vomiting or wheezing. Associated symptoms comments: Denies shortness of breath. He has tried nothing for the symptoms.        The following portions of the patient's history were reviewed and updated as appropriate: allergies, current medications, past social history and problem list.    Past Medical History:   Diagnosis Date   • Aortic valve stenosis     Mild to moderate AS by echo on 8/8/17   • CAD (coronary artery disease)    • Carotid arterial disease (CMS/HCC)     Followed by Dr. Floyd.  Carotid Doppler on 9/21/17: 60-70% DENISE, 50-60% LICA.   • Chronic obstructive lung disease (CMS/HCC)    • Esophageal reflux     With a history of erosive gastritis   • Hyperlipidemia    • Hypertension    • Iron deficiency    • Pulmonary fibrosis (CMS/HCC)     By CT scan on 8/12/15   • PVC (premature ventricular contraction)    • Rheumatoid arthritis (CMS/HCC)    • Steroid-induced diabetes mellitus (CMS/HCC)    • Tobacco use          Current Outpatient Prescriptions:   •  Ascorbic Acid (VITAMIN C) 500 MG tablet, Take by mouth., Disp: , Rfl:   •  aspirin 81 MG chewable tablet, Chew 81 mg Daily., Disp: , Rfl:   •  atorvastatin (LIPITOR) 40 MG tablet, Take 1 tablet by mouth Daily., Disp: 90 tablet, Rfl: 3  •  beta carotene 52496 UNIT capsule, Take by mouth., Disp: , Rfl:   •  cetirizine (ZyrTEC) 10 MG tablet, Take 10 mg by mouth daily., Disp: , Rfl:   •  Cholecalciferol (VITAMIN D-3 PO), Take 2,000 Int'l Units/day by mouth Daily., Disp: , Rfl:   •  FREESTYLE LITE test strip,  USE TO TEST TWO TIMES A DAY, Disp: 100 each, Rfl: 1  •  golimumab (SIMPONI ARIA) 50 MG/4ML solution injection, Infuse  into a venous catheter. Infusion q8 weeks, Disp: , Rfl:   •  Lancets (FREESTYLE) lancets, 1 each by Other route 2 (Two) Times a Day. Use as instructed, Disp: 100 each, Rfl: 2  •  linagliptin (TRADJENTA) 5 MG tablet tablet, Take 1 tablet by mouth Daily., Disp: 90 tablet, Rfl: 3  •  lisinopril (PRINIVIL,ZESTRIL) 10 MG tablet, Take 1 tablet by mouth Daily., Disp: 90 tablet, Rfl: 3  •  MAGNESIUM PO, Take by mouth., Disp: , Rfl:   •  meclizine (ANTIVERT) 25 MG tablet, Take 1 tablet by mouth 3 (Three) Times a Day As Needed for dizziness., Disp: 270 tablet, Rfl: 1  •  nitroglycerin (NITROSTAT) 0.4 MG SL tablet, Place under the tongue. dissolve 1 tab under tongue for chest pain - if pain remains after 5 minutes, call 911 and repeat dose. Max 3 tabs in 15 minutes., Disp: , Rfl:   •  omeprazole (priLOSEC) 40 MG capsule, Take 1 capsule by mouth 2 (Two) Times a Day., Disp: 180 capsule, Rfl: 2  •  pyridoxine (B-6) 100 MG tablet, Take by mouth., Disp: , Rfl:   •  sodium chloride 0.9 % solution 100 mL with golimumab 50 MG/4ML solution 2 mg/kg, Infuse 2 mg/kg into a venous catheter 1 (One) Time., Disp: , Rfl:   •  Umeclidinium-Vilanterol (ANORO ELLIPTA IN), Inhale., Disp: , Rfl:   •  vitamin E 400 UNIT capsule, Take by mouth., Disp: , Rfl:   •  amoxicillin (AMOXIL) 875 MG tablet, Take 1 tablet by mouth Every 12 (Twelve) Hours for 7 days., Disp: 14 tablet, Rfl: 0  •  guaiFENesin (MUCINEX) 600 MG 12 hr tablet, Take 1 tablet by mouth Every 12 (Twelve) Hours for 7 days., Disp: 14 tablet, Rfl:     Allergies   Allergen Reactions   • Oxaprozin Hives       Review of Systems   Constitutional: Positive for fatigue. Negative for activity change, appetite change, chills, fever and unexpected weight change.   HENT: Positive for congestion, postnasal drip, rhinorrhea, sinus pain, sinus pressure, sneezing and sore throat.  "Negative for drooling, ear pain, facial swelling, hearing loss, mouth sores, nosebleeds, trouble swallowing and voice change.    Eyes: Negative for pain, discharge, itching and visual disturbance.   Respiratory: Positive for cough (productive of sputum). Negative for choking, chest tightness, shortness of breath and wheezing.    Cardiovascular: Negative for chest pain and palpitations.   Gastrointestinal: Negative for abdominal pain, constipation, diarrhea, nausea and vomiting.   Endocrine: Negative for polyuria.   Genitourinary: Negative for dysuria and frequency.   Musculoskeletal: Negative for back pain and joint swelling.   Neurological: Negative for headaches.       Objective   Vitals:    09/24/18 0757   BP: 120/78   BP Location: Left arm   Patient Position: Sitting   Cuff Size: Adult   Pulse: 83   Temp: 98 °F (36.7 °C)   TempSrc: Oral   SpO2: 97%   Weight: 70.8 kg (156 lb)   Height: 170.2 cm (67\")     Physical Exam   Constitutional: He is oriented to person, place, and time. He appears well-developed and well-nourished. He is cooperative. He does not appear ill. No distress.   HENT:   Head: Normocephalic and atraumatic.   Right Ear: Hearing, tympanic membrane, external ear and ear canal normal. No drainage or tenderness. Tympanic membrane is not scarred and not erythematous. No decreased hearing is noted.   Left Ear: Hearing, tympanic membrane, external ear and ear canal normal. No drainage or tenderness. Tympanic membrane is not scarred and not erythematous. No decreased hearing is noted.   Nose: No mucosal edema, rhinorrhea or nasal deformity. Right sinus exhibits no maxillary sinus tenderness and no frontal sinus tenderness. Left sinus exhibits no maxillary sinus tenderness and no frontal sinus tenderness.   Eyes: Pupils are equal, round, and reactive to light. Lids are normal. Right eye exhibits no discharge and no exudate. Left eye exhibits no discharge and no exudate. Right conjunctiva is not injected. " Left conjunctiva is not injected. No scleral icterus. Right eye exhibits normal extraocular motion and no nystagmus. Left eye exhibits normal extraocular motion and no nystagmus.   Neck: Trachea normal and full passive range of motion without pain. Neck supple. No JVD present. Carotid bruit is not present. No tracheal deviation present. No thyroid mass and no thyromegaly present.   Cardiovascular: Normal rate, regular rhythm, S1 normal and S2 normal.   No extrasystoles are present. PMI is not displaced.  Exam reveals no gallop.    No murmur heard.  Pulses:       Carotid pulses are 2+ on the right side, and 2+ on the left side.       Radial pulses are 2+ on the right side, and 2+ on the left side.        Dorsalis pedis pulses are 2+ on the right side, and 2+ on the left side.        Posterior tibial pulses are 2+ on the right side, and 2+ on the left side.   Pulmonary/Chest: Breath sounds normal. No tachypnea. No respiratory distress.   Abdominal: Soft. Bowel sounds are normal. He exhibits no distension, no abdominal bruit, no ascites and no mass. There is no hepatosplenomegaly. There is no tenderness. There is no tenderness at McBurney's point and negative Barbosa's sign. No hernia.   Musculoskeletal:        Right shoulder: Normal.        Left shoulder: Normal.        Right elbow: Normal.       Left elbow: Normal.        Right hip: Normal.        Left hip: Normal.        Right knee: Normal.        Left knee: Normal.        Cervical back: Normal.        Right hand: Normal.        Left hand: Normal.        Right foot: There is normal range of motion and no deformity.        Left foot: There is normal range of motion and no deformity.     Skin Integrity  -   He has no right foot ulcer, non-callous right foot and no right foot warmth. He has no left foot ulcer, non-callous left foot and no left foot warmth..  Lymphadenopathy:        Head (right side): No submental, no submandibular, no preauricular and no posterior  auricular adenopathy present.        Head (left side): No preauricular and no posterior auricular adenopathy present.     He has no cervical adenopathy.     He has no axillary adenopathy.        Right: No supraclavicular adenopathy present.        Left: No supraclavicular adenopathy present.   Neurological: He is alert and oriented to person, place, and time. He has normal strength and normal reflexes. No cranial nerve deficit or sensory deficit. He displays a negative Romberg sign. Coordination and gait normal.   Skin: Skin is warm and dry. No rash noted. He is not diaphoretic. No cyanosis. No pallor. Nails show no clubbing.   Psychiatric: His speech is normal and behavior is normal. Judgment and thought content normal. His mood appears not anxious. His affect is not inappropriate. Cognition and memory are normal. He does not exhibit a depressed mood.       Assessment/Plan    was seen today for uri.    Diagnoses and all orders for this visit:    Acute URI  -     guaiFENesin (MUCINEX) 600 MG 12 hr tablet; Take 1 tablet by mouth Every 12 (Twelve) Hours for 7 days.  -     amoxicillin (AMOXIL) 875 MG tablet; Take 1 tablet by mouth Every 12 (Twelve) Hours for 7 days.    Tobacco use    Symptoms appear more viral in nature, will start Mucinex for cough and add antibiotic only if sx worsen. He has a longstanding history of tobacco use, discussed cessation today which he is considering.

## 2018-10-10 DIAGNOSIS — E11.9 DIABETES MELLITUS WITHOUT COMPLICATION (HCC): ICD-10-CM

## 2018-10-10 RX ORDER — BLOOD-GLUCOSE METER
KIT MISCELLANEOUS
Qty: 100 EACH | Refills: 2 | Status: SHIPPED | OUTPATIENT
Start: 2018-10-10 | End: 2019-03-22 | Stop reason: SDUPTHER

## 2018-10-25 ENCOUNTER — OFFICE VISIT (OUTPATIENT)
Dept: CARDIOLOGY | Facility: CLINIC | Age: 78
End: 2018-10-25

## 2018-10-25 VITALS
HEART RATE: 72 BPM | DIASTOLIC BLOOD PRESSURE: 74 MMHG | WEIGHT: 154.6 LBS | BODY MASS INDEX: 24.27 KG/M2 | HEIGHT: 67 IN | SYSTOLIC BLOOD PRESSURE: 124 MMHG

## 2018-10-25 DIAGNOSIS — I35.0 NONRHEUMATIC AORTIC VALVE STENOSIS: ICD-10-CM

## 2018-10-25 DIAGNOSIS — I25.10 CORONARY ARTERY DISEASE INVOLVING NATIVE CORONARY ARTERY OF NATIVE HEART WITHOUT ANGINA PECTORIS: Primary | ICD-10-CM

## 2018-10-25 DIAGNOSIS — I65.23 BILATERAL CAROTID ARTERY STENOSIS: ICD-10-CM

## 2018-10-25 PROCEDURE — 99213 OFFICE O/P EST LOW 20 MIN: CPT | Performed by: INTERNAL MEDICINE

## 2018-10-30 ENCOUNTER — TELEPHONE (OUTPATIENT)
Dept: INTERNAL MEDICINE | Facility: CLINIC | Age: 78
End: 2018-10-30

## 2018-10-30 DIAGNOSIS — E13.9 DIABETES 1.5, MANAGED AS TYPE 2 (HCC): Primary | ICD-10-CM

## 2018-10-30 DIAGNOSIS — E78.01 FAMILIAL HYPERCHOLESTEROLEMIA: ICD-10-CM

## 2018-10-30 NOTE — TELEPHONE ENCOUNTER
----- Message from Mraisa Marcano sent at 10/30/2018 10:53 AM EDT -----  Need lab orders please; scheduled for 11/09/2018.  Thanks.

## 2018-11-04 NOTE — PROGRESS NOTES
Date of Office Visit: 10/25/2018  Encounter Provider: Brian Harden MD  Place of Service: [facility] CARDIOLOGY  Patient Name: Edward Srinivasan  :1940    Chief complaint: Follow-up for coronary artery disease, aortic stenosis, and   carotid artery disease.    History of Present Illness:    Dear Dr. Cannon:       I again had the pleasure of seeing your patient in cardiology office on 10/25/2018.  As you   well know, he is a very pleasant, 78 year-old, white male with a past medical history  significant for coronary artery disease, aortic stenosis, hyperlipidemia, and rheumatoid  arthritis who presents for follow-up. The patient was admitted to the hospital on  2015, with chest discomfort. He had been watching his grandson on the lawn   when he developed sudden chest discomfort, which radiated across his precordium   and was described as a pressure sensation. It then started to radiate into his neck   bilaterally, as well as his jaws. His initial electrocardiogram showed no ischemic   changes and his troponin was negative. He also had been recently diagnosed with   rheumatoid arthritis and was on prednisone. 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 A CT angiogram of the chest on admission in   the emergency room showed chronic obstructive pulmonary disease with bullae   formation; however, he also had an interstitial pattern with early honeycombing   consistent with pulmonary fibrosis. A subsequent CT on 2015, confirmed this. He   was seen by pulmonology as well.      He did undergo a Lexiscan Cardiolite stress test, which showed lateral ischemia. A   subsequent cardiac catheterization on 2015, by Dr. Stearns showed a 90%   ulcerated lesion in the mid left circumflex. He subsequently underwent placement of a   3.0 mm x 18 mm Xience drug-eluting stent, although there was a proximal edge   dissection necessitating  placement of a second3.0 mm x 12 mm Xience drug-eluting   stent proximal to the first stent.       The patient presents today for follow-up.  He has had mild shortness of breath with   exertion, which is unchanged.  He does continue to smoke.  He has not had any chest   pain.  He tells me that his blood pressure has been good, and it is excellent at 124/74   today.    Past Medical History:   Diagnosis Date   • Aortic valve stenosis     Mild to moderate AS by echo on 8/8/17   • CAD (coronary artery disease)    • Carotid arterial disease (CMS/HCC)     Followed by Dr. Floyd.  Carotid Doppler on 9/21/17: 60-70% DENISE, 50-60% LICA.   • Chronic obstructive lung disease (CMS/HCC)    • Esophageal reflux     With a history of erosive gastritis   • Hyperlipidemia    • Hypertension    • Iron deficiency    • Pulmonary fibrosis (CMS/HCC)     By CT scan on 8/12/15   • PVC (premature ventricular contraction)    • Rheumatoid arthritis (CMS/HCC)    • Steroid-induced diabetes mellitus (CMS/HCC)    • Tobacco use        Past Surgical History:   Procedure Laterality Date   • BACK SURGERY      L4-L5 discectomy   • CATARACT EXTRACTION Bilateral    • VASECTOMY         Current Outpatient Prescriptions on File Prior to Visit   Medication Sig Dispense Refill   • Ascorbic Acid (VITAMIN C) 500 MG tablet Take by mouth.     • aspirin 81 MG chewable tablet Chew 81 mg Daily.     • atorvastatin (LIPITOR) 40 MG tablet Take 1 tablet by mouth Daily. 90 tablet 3   • beta carotene 01334 UNIT capsule Take by mouth.     • cetirizine (ZyrTEC) 10 MG tablet Take 10 mg by mouth daily.     • Cholecalciferol (VITAMIN D-3 PO) Take 2,000 Int'l Units/day by mouth Daily.     • FREESTYLE LITE test strip TEST TWO TIMES A  each 2   • golimumab (SIMPONI ARIA) 50 MG/4ML solution injection Infuse  into a venous catheter. Infusion q8 weeks     • Lancets (FREESTYLE) lancets 1 each by Other route 2 (Two) Times a Day. Use as instructed 100 each 2   • linagliptin  (TRADJENTA) 5 MG tablet tablet Take 1 tablet by mouth Daily. 90 tablet 3   • lisinopril (PRINIVIL,ZESTRIL) 10 MG tablet Take 1 tablet by mouth Daily. 90 tablet 3   • MAGNESIUM PO Take by mouth.     • meclizine (ANTIVERT) 25 MG tablet Take 1 tablet by mouth 3 (Three) Times a Day As Needed for dizziness. 270 tablet 1   • nitroglycerin (NITROSTAT) 0.4 MG SL tablet Place under the tongue. dissolve 1 tab under tongue for chest pain - if pain remains after 5 minutes, call 911 and repeat dose. Max 3 tabs in 15 minutes.     • omeprazole (priLOSEC) 40 MG capsule Take 1 capsule by mouth 2 (Two) Times a Day. 180 capsule 2   • pyridoxine (B-6) 100 MG tablet Take by mouth.     • sodium chloride 0.9 % solution 100 mL with golimumab 50 MG/4ML solution 2 mg/kg Infuse 2 mg/kg into a venous catheter 1 (One) Time.     • Umeclidinium-Vilanterol (ANORO ELLIPTA IN) Inhale.     • vitamin E 400 UNIT capsule Take by mouth.       No current facility-administered medications on file prior to visit.      Allergies as of 10/25/2018 - Reviewed 09/24/2018   Allergen Reaction Noted   • Oxaprozin Hives 11/20/2015     Social History     Social History   • Marital status:      Spouse name: N/A   • Number of children: N/A   • Years of education: N/A     Occupational History   • Not on file.     Social History Main Topics   • Smoking status: Current Every Day Smoker     Types: Cigarettes   • Smokeless tobacco: Never Used      Comment: 1/2 ppd, discussed benefits of cessation   • Alcohol use No      Comment: no caffeine   • Drug use: No   • Sexual activity: No     Other Topics Concern   • Not on file     Social History Narrative   • No narrative on file     Family History   Problem Relation Age of Onset   • Heart failure Mother         CHF   • Heart attack Father 54   • Heart attack Brother 47   • Heart disease Brother         another brother with 4 vessel CABG in his 50s   • Coronary artery disease Other         multiple family members  "      Review of Systems   Respiratory: Positive for cough and shortness of breath.    All other systems reviewed and are negative.     Objective:     Vitals:    10/25/18 1028   BP: 124/74   Pulse: 72   Weight: 70.1 kg (154 lb 9.6 oz)   Height: 170.2 cm (67.01\")     Body mass index is 24.21 kg/m².    Physical Exam   Constitutional: He is oriented to person, place, and time. He appears well-developed and well-nourished.   HENT:   Head: Normocephalic and atraumatic.   Eyes: Conjunctivae are normal.   Neck: Neck supple.   Cardiovascular: Normal rate and regular rhythm.  Exam reveals no gallop and no friction rub.    Murmur heard.   Systolic murmur is present with a grade of 3/6  at the upper right sternal border, upper left sternal border  Pulmonary/Chest: Effort normal and breath sounds normal.   Abdominal: Soft. There is no tenderness.   Musculoskeletal: He exhibits no edema.   Neurological: He is alert and oriented to person, place, and time.   Skin: Skin is warm.   Psychiatric: He has a normal mood and affect. His behavior is normal.     Lab Review:   Procedures    Cardiac Procedures:  1. Left heart catheterization on 08/14/2015, by Dr. Stearns showed the following. The  left main was normal. The left anterior descending had luminal irregularities. The first  diagonal branch had a 60% proximal stenosis. The left circumflex had a 90% ulcerated  stenosis in the mid portion. There was 20% to 30% distal disease in the left circumflex  coronary artery. The left posterior descending artery had a 60% ostial lesion. The right  coronary artery was small, nondominant, and had a 30% lesion at the origin.  He then  underwent placement of a 3.0 mm x 18 mm Xience drug-eluting stent to the proximal   left circumflex; however, there was a proximal edge dissection necessitating  placement of a 3.0 mm x 12 mm Xience drug-eluting stent proximal to the first stent.   2.  Echocardiogram on 8/8/2017: Ejection fraction was 60%.  There was " grade 1   diastolic dysfunction.  There was mild to moderate aortic stenosis with a peak   gradient of 33 mmHg and a mean gradient of 18 mmHg.  3.  Carotid artery Doppler ultrasound on 9/21/2017: There was a 60-70% stenosis in   the right internal carotid artery.  There was a 50-60% stenosis in the left internal   carotid artery.    Assessment:       Diagnosis Plan   1. Coronary artery disease involving native coronary artery of native heart without angina pectoris     2. Nonrheumatic aortic valve stenosis     3. Bilateral carotid artery stenosis       Plan:       From a coronary artery disease standpoint, the patient seems to be stable.  He has had no   chest discomfort.  He does have lung disease and continues to smoke, and his shortness   of breath is chronic.  I have counseled him on smoking cessation on numerous occasions,   although I do not feel that he is ready to stop at this point.  He will continue on the aspirin and   Lipitor.  His blood pressure has been excellent, and he will continue on the lisinopril.  I have   not placed him on a beta blocker in the past secondary to his COPD and other long issues.    I will plan on rechecking an echocardiogram after his next visit to reevaluate the aortic   stenosis.  This was mild to moderate in August 2017.  He also has carotid artery disease   with up to a 60-70% stenosis in the right internal carotid artery, and a 50-60% stenosis in the   left internal carotid artery on 9/21/2017.  If he has not had a carotid ultrasound by the time of   his next visit, I will also plan on checking a Doppler at that time as well.  I will have him   follow-up with me in 6 months.    Coronary Artery Disease  Assessment  • The patient has no angina    Plan  • Lifestyle modifications discussed include adhering to a heart healthy diet, avoidance of tobacco products, maintenance of a healthy weight, medication compliance, regular exercise and regular monitoring of cholesterol and  blood pressure    Subjective - Objective  • There has been a previous stent procedure using ANNEMARIE on or around 8/14/2015  • Current antiplatelet therapy includes aspirin 81 mg

## 2018-11-09 ENCOUNTER — LAB (OUTPATIENT)
Dept: INTERNAL MEDICINE | Facility: CLINIC | Age: 78
End: 2018-11-09

## 2018-11-09 DIAGNOSIS — E78.01 FAMILIAL HYPERCHOLESTEROLEMIA: ICD-10-CM

## 2018-11-09 DIAGNOSIS — K21.9 GASTROESOPHAGEAL REFLUX DISEASE, ESOPHAGITIS PRESENCE NOT SPECIFIED: ICD-10-CM

## 2018-11-09 DIAGNOSIS — E13.9 DIABETES 1.5, MANAGED AS TYPE 2 (HCC): ICD-10-CM

## 2018-11-09 LAB
ALBUMIN SERPL-MCNC: 4.2 G/DL (ref 3.5–5.2)
ALBUMIN/GLOB SERPL: 1.2 G/DL
ALP SERPL-CCNC: 57 U/L (ref 39–117)
ALT SERPL W P-5'-P-CCNC: 15 U/L (ref 1–41)
ANION GAP SERPL CALCULATED.3IONS-SCNC: 11 MMOL/L
AST SERPL-CCNC: 16 U/L (ref 1–40)
BILIRUB SERPL-MCNC: 0.5 MG/DL (ref 0.1–1.2)
BUN BLD-MCNC: 17 MG/DL (ref 8–23)
BUN/CREAT SERPL: 19.1 (ref 7–25)
CALCIUM SPEC-SCNC: 9.2 MG/DL (ref 8.6–10.5)
CHLORIDE SERPL-SCNC: 99 MMOL/L (ref 98–107)
CHOLEST SERPL-MCNC: 82 MG/DL (ref 0–200)
CO2 SERPL-SCNC: 28 MMOL/L (ref 22–29)
CREAT BLD-MCNC: 0.89 MG/DL (ref 0.76–1.27)
GFR SERPL CREATININE-BSD FRML MDRD: 83 ML/MIN/1.73
GLOBULIN UR ELPH-MCNC: 3.4 GM/DL
GLUCOSE BLD-MCNC: 137 MG/DL (ref 65–99)
HBA1C MFR BLD: 6.09 % (ref 4.8–5.6)
HDLC SERPL-MCNC: 38 MG/DL (ref 40–60)
LDLC SERPL CALC-MCNC: 35 MG/DL (ref 0–100)
LDLC/HDLC SERPL: 0.93 {RATIO}
POTASSIUM BLD-SCNC: 4.6 MMOL/L (ref 3.5–5.2)
PROT SERPL-MCNC: 7.6 G/DL (ref 6–8.5)
SODIUM BLD-SCNC: 138 MMOL/L (ref 136–145)
TRIGL SERPL-MCNC: 43 MG/DL (ref 0–150)
VLDLC SERPL-MCNC: 8.6 MG/DL (ref 5–40)

## 2018-11-09 PROCEDURE — 83036 HEMOGLOBIN GLYCOSYLATED A1C: CPT | Performed by: INTERNAL MEDICINE

## 2018-11-09 PROCEDURE — 80061 LIPID PANEL: CPT | Performed by: INTERNAL MEDICINE

## 2018-11-09 PROCEDURE — 80053 COMPREHEN METABOLIC PANEL: CPT | Performed by: INTERNAL MEDICINE

## 2018-11-09 PROCEDURE — 36415 COLL VENOUS BLD VENIPUNCTURE: CPT | Performed by: INTERNAL MEDICINE

## 2018-11-09 RX ORDER — OMEPRAZOLE 40 MG/1
CAPSULE, DELAYED RELEASE ORAL
Qty: 180 CAPSULE | Refills: 0 | Status: SHIPPED | OUTPATIENT
Start: 2018-11-09 | End: 2019-01-22 | Stop reason: SDUPTHER

## 2018-11-15 ENCOUNTER — OFFICE VISIT (OUTPATIENT)
Dept: INTERNAL MEDICINE | Facility: CLINIC | Age: 78
End: 2018-11-15

## 2018-11-15 VITALS
SYSTOLIC BLOOD PRESSURE: 130 MMHG | RESPIRATION RATE: 14 BRPM | DIASTOLIC BLOOD PRESSURE: 70 MMHG | WEIGHT: 155 LBS | BODY MASS INDEX: 24.33 KG/M2 | HEIGHT: 67 IN

## 2018-11-15 DIAGNOSIS — K21.9 GASTROESOPHAGEAL REFLUX DISEASE, ESOPHAGITIS PRESENCE NOT SPECIFIED: ICD-10-CM

## 2018-11-15 DIAGNOSIS — I10 ESSENTIAL HYPERTENSION, BENIGN: ICD-10-CM

## 2018-11-15 DIAGNOSIS — E78.00 PURE HYPERCHOLESTEROLEMIA: Primary | ICD-10-CM

## 2018-11-15 DIAGNOSIS — D50.8 IRON DEFICIENCY ANEMIA SECONDARY TO INADEQUATE DIETARY IRON INTAKE: ICD-10-CM

## 2018-11-15 DIAGNOSIS — E11.9 DIABETES MELLITUS WITHOUT COMPLICATION (HCC): ICD-10-CM

## 2018-11-15 PROCEDURE — 99214 OFFICE O/P EST MOD 30 MIN: CPT | Performed by: INTERNAL MEDICINE

## 2018-11-15 NOTE — PROGRESS NOTES
Subjective   Edward Srinivasan is a 78 y.o. male. Presents with a chief complaint of DM2, HTN, Hyperlipidemia, GERD, anemia, here for follow up. He is doing well overall.    History of Present Illness     The following portions of the patient's history were reviewed and updated as appropriate: allergies, current medications, past family history, past medical history, past social history, past surgical history and problem list.    Review of Systems   Constitutional: Positive for fatigue.   HENT: Negative.    Eyes: Positive for visual disturbance.   Respiratory: Negative.    Cardiovascular: Negative.    Gastrointestinal: Negative.    Endocrine: Negative.    Genitourinary: Negative.    Musculoskeletal: Positive for arthralgias.   Skin: Negative.    Allergic/Immunologic: Negative.    Neurological: Negative.    Hematological: Negative.    Psychiatric/Behavioral: Negative.        Objective   Physical Exam   Constitutional: He is oriented to person, place, and time. He appears well-developed and well-nourished.   HENT:   Head: Normocephalic and atraumatic.   Eyes: EOM are normal. Pupils are equal, round, and reactive to light.   Neck: Normal range of motion. Neck supple.   Cardiovascular: Normal rate, regular rhythm and normal heart sounds.   Pulmonary/Chest: Effort normal and breath sounds normal.   Abdominal: Soft. Bowel sounds are normal.   Musculoskeletal:   Diffuse arthropathy   Neurological: He is alert and oriented to person, place, and time.   Skin: Skin is warm and dry.   Psychiatric: He has a normal mood and affect. His behavior is normal. Judgment and thought content normal.   Nursing note and vitals reviewed.        Assessment/Plan    was seen today for hyperlipidemia.    Diagnoses and all orders for this visit:    Pure hypercholesterolemia  Comments:  well controlled    Essential hypertension, benign  Comments:  well controlled    Gastroesophageal reflux disease, esophagitis presence not  specified  Comments:  well controlled    Diabetes mellitus without complication (CMS/HCC)  Comments:  well controlled    Iron deficiency anemia secondary to inadequate dietary iron intake  Comments:  will check a CBC

## 2019-01-22 DIAGNOSIS — K21.9 GASTROESOPHAGEAL REFLUX DISEASE, ESOPHAGITIS PRESENCE NOT SPECIFIED: ICD-10-CM

## 2019-01-22 RX ORDER — OMEPRAZOLE 40 MG/1
CAPSULE, DELAYED RELEASE ORAL
Qty: 180 CAPSULE | Refills: 0 | Status: SHIPPED | OUTPATIENT
Start: 2019-01-22 | End: 2019-06-11

## 2019-02-19 ENCOUNTER — OFFICE VISIT (OUTPATIENT)
Dept: INTERNAL MEDICINE | Facility: CLINIC | Age: 79
End: 2019-02-19

## 2019-02-19 VITALS
DIASTOLIC BLOOD PRESSURE: 70 MMHG | BODY MASS INDEX: 23.54 KG/M2 | HEIGHT: 67 IN | SYSTOLIC BLOOD PRESSURE: 110 MMHG | WEIGHT: 150 LBS

## 2019-02-19 DIAGNOSIS — I10 ESSENTIAL HYPERTENSION, BENIGN: Primary | ICD-10-CM

## 2019-02-19 DIAGNOSIS — M05.772 RHEUMATOID ARTHRITIS INVOLVING BOTH FEET WITH POSITIVE RHEUMATOID FACTOR (HCC): ICD-10-CM

## 2019-02-19 DIAGNOSIS — K21.9 GASTROESOPHAGEAL REFLUX DISEASE, ESOPHAGITIS PRESENCE NOT SPECIFIED: Chronic | ICD-10-CM

## 2019-02-19 DIAGNOSIS — M05.771 RHEUMATOID ARTHRITIS INVOLVING BOTH FEET WITH POSITIVE RHEUMATOID FACTOR (HCC): ICD-10-CM

## 2019-02-19 DIAGNOSIS — E78.00 PURE HYPERCHOLESTEROLEMIA: Chronic | ICD-10-CM

## 2019-02-19 DIAGNOSIS — E11.9 DIABETES MELLITUS WITHOUT COMPLICATION (HCC): Chronic | ICD-10-CM

## 2019-02-19 PROCEDURE — 99214 OFFICE O/P EST MOD 30 MIN: CPT | Performed by: INTERNAL MEDICINE

## 2019-02-19 NOTE — PROGRESS NOTES
Subjective   Edward Srinivasan is a 78 y.o. male.  Presents with a chief complaint of rheumatoid arthritis, principally affecting both of his feet, hyperlipidemia, type 2 diabetes mellitus, hypertension, gastroesophageal reflux disease.  All of his current medical issues are relatively stable, his blood sugar ranges 120-130 on a daily basis.  His most pressing difficulty is the pain in his feet caused by his rheumatoid arthritis.  He has a regularly scheduled appointment with his rheumatologist for infusions of biologic agents to address this issue.  I have recommended to him that he take 1 Aleve at night to help with his foot pain in the morning.  We will do a full set of labs including a CMP hemoglobin A1c and a lipid panel upon his next visit.  I have no other recommendations at this time for changes in medical therapy.    History of Present Illness patient's arthritic pain has gotten worse in both of his feet, he is due to see his rheumatology clinic tomorrow to get an infusion of simponi    The following portions of the patient's history were reviewed and updated as appropriate: allergies, current medications, past family history, past medical history, past social history, past surgical history and problem list.    Review of Systems   Constitutional: Positive for fatigue.   HENT: Negative.    Eyes: Negative.    Respiratory: Negative.    Cardiovascular: Negative.    Gastrointestinal: Negative.    Endocrine: Negative.    Genitourinary: Negative.    Musculoskeletal: Positive for arthralgias.   Skin: Negative.    Allergic/Immunologic: Negative.    Neurological: Negative.    Hematological: Negative.    Psychiatric/Behavioral: Negative.        Objective   Physical Exam   Constitutional: He is oriented to person, place, and time. He appears well-developed and well-nourished.   HENT:   Head: Normocephalic and atraumatic.   Eyes: Pupils are equal, round, and reactive to light.   Neck: Normal range of motion.    Cardiovascular: Normal rate, regular rhythm and normal heart sounds.   Pulmonary/Chest: Effort normal and breath sounds normal.   Abdominal: Soft. Bowel sounds are normal.   Musculoskeletal:   Bilateral foot arthropathy   Neurological: He is alert and oriented to person, place, and time.   Skin: Skin is warm.   Psychiatric: He has a normal mood and affect.   Nursing note and vitals reviewed.        Assessment/Plan    was seen today for hypertension, hyperlipidemia, diabetes and foot pain.    Diagnoses and all orders for this visit:    Essential hypertension, benign  Comments:  well controlled, no changes    Pure hypercholesterolemia  Comments:  well controlled, no changes  Orders:  -     Comprehensive metabolic panel; Future  -     Lipid Panel With LDL/HDL Ratio; Future    Gastroesophageal reflux disease, esophagitis presence not specified  Comments:  doing well    Diabetes mellitus without complication (CMS/HCC)  Comments:  obtain an A1c and a CMP  Orders:  -     Hemoglobin A1c; Future    Rheumatoid arthritis involving both feet with positive rheumatoid factor (CMS/HCC)  Comments:  going to get his infusion tomorrow of the Simponi

## 2019-02-21 ENCOUNTER — APPOINTMENT (OUTPATIENT)
Dept: CARDIOLOGY | Facility: HOSPITAL | Age: 79
End: 2019-02-21

## 2019-02-21 ENCOUNTER — HOSPITAL ENCOUNTER (EMERGENCY)
Facility: HOSPITAL | Age: 79
Discharge: HOME | End: 2019-02-21
Attending: EMERGENCY MEDICINE | Admitting: EMERGENCY MEDICINE

## 2019-02-21 ENCOUNTER — OFFICE VISIT (OUTPATIENT)
Dept: INTERNAL MEDICINE | Facility: CLINIC | Age: 79
End: 2019-02-21

## 2019-02-21 VITALS
TEMPERATURE: 97.5 F | HEART RATE: 95 BPM | OXYGEN SATURATION: 97 % | HEIGHT: 67 IN | WEIGHT: 155 LBS | DIASTOLIC BLOOD PRESSURE: 64 MMHG | RESPIRATION RATE: 16 BRPM | BODY MASS INDEX: 24.33 KG/M2 | SYSTOLIC BLOOD PRESSURE: 125 MMHG

## 2019-02-21 VITALS
DIASTOLIC BLOOD PRESSURE: 70 MMHG | BODY MASS INDEX: 23.7 KG/M2 | SYSTOLIC BLOOD PRESSURE: 118 MMHG | WEIGHT: 151 LBS | HEIGHT: 67 IN

## 2019-02-21 DIAGNOSIS — I10 ESSENTIAL HYPERTENSION, BENIGN: Primary | Chronic | ICD-10-CM

## 2019-02-21 DIAGNOSIS — E11.9 DIABETES MELLITUS WITHOUT COMPLICATION (HCC): Chronic | ICD-10-CM

## 2019-02-21 DIAGNOSIS — M79.89 NODULE OF SOFT TISSUE: Primary | ICD-10-CM

## 2019-02-21 DIAGNOSIS — K21.9 GASTROESOPHAGEAL REFLUX DISEASE, ESOPHAGITIS PRESENCE NOT SPECIFIED: Chronic | ICD-10-CM

## 2019-02-21 DIAGNOSIS — T80.90XA INFUSION REACTION, INITIAL ENCOUNTER: Chronic | ICD-10-CM

## 2019-02-21 DIAGNOSIS — M05.771 RHEUMATOID ARTHRITIS INVOLVING BOTH FEET WITH POSITIVE RHEUMATOID FACTOR (HCC): Chronic | ICD-10-CM

## 2019-02-21 DIAGNOSIS — M05.772 RHEUMATOID ARTHRITIS INVOLVING BOTH FEET WITH POSITIVE RHEUMATOID FACTOR (HCC): Chronic | ICD-10-CM

## 2019-02-21 PROBLEM — D84.9 IMMUNODEFICIENCY, UNSPECIFIED: Status: ACTIVE | Noted: 2019-02-21

## 2019-02-21 PROBLEM — Z92.25 PERSONAL HISTORY OF IMMUNOSUPRESSION THERAPY: Status: ACTIVE | Noted: 2019-02-21

## 2019-02-21 PROBLEM — M15.0 PRIMARY GENERALIZED (OSTEO)ARTHRITIS: Status: ACTIVE | Noted: 2019-02-21

## 2019-02-21 PROBLEM — D69.6 THROMBOCYTOPENIA (HCC): Status: ACTIVE | Noted: 2019-02-21

## 2019-02-21 LAB
ALBUMIN SERPL-MCNC: 3.3 G/DL (ref 3.5–5.2)
ALBUMIN/GLOB SERPL: 0.9 G/DL
ALP SERPL-CCNC: 58 U/L (ref 39–117)
ALT SERPL W P-5'-P-CCNC: 15 U/L (ref 1–41)
ANION GAP SERPL CALCULATED.3IONS-SCNC: 12.9 MMOL/L
AST SERPL-CCNC: 18 U/L (ref 1–40)
BASOPHILS # BLD AUTO: 0.05 10*3/MM3 (ref 0–0.2)
BASOPHILS NFR BLD AUTO: 0.6 % (ref 0–1.5)
BH CV UPPER VENOUS LEFT AXILLARY AUGMENT: NORMAL
BH CV UPPER VENOUS LEFT AXILLARY COMPETENT: NORMAL
BH CV UPPER VENOUS LEFT AXILLARY COMPRESS: NORMAL
BH CV UPPER VENOUS LEFT AXILLARY PHASIC: NORMAL
BH CV UPPER VENOUS LEFT AXILLARY SPONT: NORMAL
BH CV UPPER VENOUS LEFT BASILIC FOREARM COMPRESS: NORMAL
BH CV UPPER VENOUS LEFT BASILIC UPPER COMPRESS: NORMAL
BH CV UPPER VENOUS LEFT BRACHIAL COMPRESS: NORMAL
BH CV UPPER VENOUS LEFT CEPHALIC FOREARM COMPRESS: NORMAL
BH CV UPPER VENOUS LEFT CEPHALIC UPPER COMPRESS: NORMAL
BH CV UPPER VENOUS LEFT INTERNAL JUGULAR AUGMENT: NORMAL
BH CV UPPER VENOUS LEFT INTERNAL JUGULAR COMPETENT: NORMAL
BH CV UPPER VENOUS LEFT INTERNAL JUGULAR COMPRESS: NORMAL
BH CV UPPER VENOUS LEFT INTERNAL JUGULAR PHASIC: NORMAL
BH CV UPPER VENOUS LEFT INTERNAL JUGULAR SPONT: NORMAL
BH CV UPPER VENOUS LEFT RADIAL COMPRESS: NORMAL
BH CV UPPER VENOUS LEFT SUBCLAVIAN AUGMENT: NORMAL
BH CV UPPER VENOUS LEFT SUBCLAVIAN COMPETENT: NORMAL
BH CV UPPER VENOUS LEFT SUBCLAVIAN COMPRESS: NORMAL
BH CV UPPER VENOUS LEFT SUBCLAVIAN PHASIC: NORMAL
BH CV UPPER VENOUS LEFT SUBCLAVIAN SPONT: NORMAL
BH CV UPPER VENOUS LEFT ULNAR COMPRESS: NORMAL
BH CV UPPER VENOUS RIGHT AXILLARY AUGMENT: NORMAL
BH CV UPPER VENOUS RIGHT AXILLARY COMPETENT: NORMAL
BH CV UPPER VENOUS RIGHT AXILLARY COMPRESS: NORMAL
BH CV UPPER VENOUS RIGHT AXILLARY PHASIC: NORMAL
BH CV UPPER VENOUS RIGHT AXILLARY SPONT: NORMAL
BH CV UPPER VENOUS RIGHT BASILIC FOREARM COMPRESS: NORMAL
BH CV UPPER VENOUS RIGHT BASILIC UPPER COMPRESS: NORMAL
BH CV UPPER VENOUS RIGHT BRACHIAL COMPRESS: NORMAL
BH CV UPPER VENOUS RIGHT CEPHALIC FOREARM COMPRESS: NORMAL
BH CV UPPER VENOUS RIGHT CEPHALIC UPPER COMPRESS: NORMAL
BH CV UPPER VENOUS RIGHT INTERNAL JUGULAR AUGMENT: NORMAL
BH CV UPPER VENOUS RIGHT INTERNAL JUGULAR COMPETENT: NORMAL
BH CV UPPER VENOUS RIGHT INTERNAL JUGULAR COMPRESS: NORMAL
BH CV UPPER VENOUS RIGHT INTERNAL JUGULAR PHASIC: NORMAL
BH CV UPPER VENOUS RIGHT INTERNAL JUGULAR SPONT: NORMAL
BH CV UPPER VENOUS RIGHT RADIAL COMPRESS: NORMAL
BH CV UPPER VENOUS RIGHT SUBCLAVIAN AUGMENT: NORMAL
BH CV UPPER VENOUS RIGHT SUBCLAVIAN COMPETENT: NORMAL
BH CV UPPER VENOUS RIGHT SUBCLAVIAN COMPRESS: NORMAL
BH CV UPPER VENOUS RIGHT SUBCLAVIAN PHASIC: NORMAL
BH CV UPPER VENOUS RIGHT SUBCLAVIAN SPONT: NORMAL
BH CV UPPER VENOUS RIGHT ULNAR COMPRESS: NORMAL
BILIRUB SERPL-MCNC: 0.3 MG/DL (ref 0.1–1.2)
BUN BLD-MCNC: 23 MG/DL (ref 8–23)
BUN/CREAT SERPL: 23.2 (ref 7–25)
CALCIUM SPEC-SCNC: 8.8 MG/DL (ref 8.6–10.5)
CHLORIDE SERPL-SCNC: 96 MMOL/L (ref 98–107)
CO2 SERPL-SCNC: 24.1 MMOL/L (ref 22–29)
CREAT BLD-MCNC: 0.99 MG/DL (ref 0.76–1.27)
DEPRECATED RDW RBC AUTO: 45.8 FL (ref 37–54)
EOSINOPHIL # BLD AUTO: 0.37 10*3/MM3 (ref 0–0.4)
EOSINOPHIL NFR BLD AUTO: 4.7 % (ref 0.3–6.2)
ERYTHROCYTE [DISTWIDTH] IN BLOOD BY AUTOMATED COUNT: 13.7 % (ref 12.3–15.4)
GFR SERPL CREATININE-BSD FRML MDRD: 73 ML/MIN/1.73
GLOBULIN UR ELPH-MCNC: 3.5 GM/DL
GLUCOSE BLD-MCNC: 158 MG/DL (ref 65–99)
HCT VFR BLD AUTO: 31.2 % (ref 37.5–51)
HGB BLD-MCNC: 10.4 G/DL (ref 13–17.7)
IMM GRANULOCYTES # BLD AUTO: 0.06 10*3/MM3 (ref 0–0.05)
IMM GRANULOCYTES NFR BLD AUTO: 0.8 % (ref 0–0.5)
LYMPHOCYTES # BLD AUTO: 2.33 10*3/MM3 (ref 0.7–3.1)
LYMPHOCYTES NFR BLD AUTO: 29.9 % (ref 19.6–45.3)
MCH RBC QN AUTO: 30.5 PG (ref 26.6–33)
MCHC RBC AUTO-ENTMCNC: 33.3 G/DL (ref 31.5–35.7)
MCV RBC AUTO: 91.5 FL (ref 79–97)
MONOCYTES # BLD AUTO: 0.75 10*3/MM3 (ref 0.1–0.9)
MONOCYTES NFR BLD AUTO: 9.6 % (ref 5–12)
NEUTROPHILS # BLD AUTO: 4.24 10*3/MM3 (ref 1.4–7)
NEUTROPHILS NFR BLD AUTO: 54.4 % (ref 42.7–76)
NRBC BLD AUTO-RTO: 0 /100 WBC (ref 0–0)
PLATELET # BLD AUTO: 230 10*3/MM3 (ref 140–450)
PMV BLD AUTO: 8.6 FL (ref 6–12)
POTASSIUM BLD-SCNC: 4.3 MMOL/L (ref 3.5–5.2)
PROT SERPL-MCNC: 6.8 G/DL (ref 6–8.5)
RBC # BLD AUTO: 3.41 10*6/MM3 (ref 4.14–5.8)
SODIUM BLD-SCNC: 133 MMOL/L (ref 136–145)
WBC NRBC COR # BLD: 7.8 10*3/MM3 (ref 3.4–10.8)

## 2019-02-21 PROCEDURE — 99214 OFFICE O/P EST MOD 30 MIN: CPT | Performed by: INTERNAL MEDICINE

## 2019-02-21 PROCEDURE — 80053 COMPREHEN METABOLIC PANEL: CPT | Performed by: PHYSICIAN ASSISTANT

## 2019-02-21 PROCEDURE — 99282 EMERGENCY DEPT VISIT SF MDM: CPT

## 2019-02-21 PROCEDURE — 99283 EMERGENCY DEPT VISIT LOW MDM: CPT

## 2019-02-21 PROCEDURE — 93970 EXTREMITY STUDY: CPT

## 2019-02-21 PROCEDURE — 85025 COMPLETE CBC W/AUTO DIFF WBC: CPT | Performed by: PHYSICIAN ASSISTANT

## 2019-02-21 NOTE — ED PROVIDER NOTES
EMERGENCY DEPARTMENT ENCOUNTER    CHIEF COMPLAINT  Chief Complaint: R and L arm bumps  History given by: Pt  History limited by: Nothing  Room Number: 27/27  PMD: Freddie Cannon MD      HPI:  Pt is a 78 y.o. male who presents complaining of R and L arm knots which began immediately after having an infusion yesterday. The pt went from getting an infusion in his L arm to have blood drawn in his R arm. The pt states that the swelling has decreased some from his L arm, but his R arm bump is the same size. The pt denies fever, chills, or other symptoms. The pt was sent to the ER to rule out a blood clot.     Duration:  1 day  Onset: Gradual  Timing: Constant  Location: R and L forearm  Radiation: None  Quality: Swollen  Intensity/Severity: Moderate  Progression: Improved some  Associated Symptoms: None  Aggravating Factors: Unknown  Alleviating Factors: Unknown  Treatment before arrival: None    PAST MEDICAL HISTORY  Active Ambulatory Problems     Diagnosis Date Noted   • Absolute anemia 04/16/2016   • Essential hypertension, benign 04/19/2016   • Diabetes mellitus without complication (CMS/HCC) 04/19/2016   • Pure hypercholesterolemia 04/19/2016   • Gastroesophageal reflux disease 04/19/2016   • Intervertebral lumbar disc disorder with myelopathy, lumbar region 04/19/2016   • Rheumatoid arthritis with positive rheumatoid factor (CMS/HCC) 04/19/2016   • Tobacco use 01/10/2018   • Immunodeficiency, unspecified (CMS/HCC) 02/21/2019   • Personal history of immunosupression therapy 02/21/2019   • Primary generalized (osteo)arthritis 02/21/2019   • Thrombocytopenia (CMS/[facility]) 02/21/2019   • Infusion reaction 02/21/2019     Resolved Ambulatory Problems     Diagnosis Date Noted   • Bronchitis 04/19/2016     Past Medical History:   Diagnosis Date   • Aortic valve stenosis    • CAD (coronary artery disease)    • Carotid arterial disease (CMS/HCC)    • Chronic obstructive lung disease (CMS/[facility])    • Esophageal reflux    •  Hyperlipidemia    • Hypertension    • Iron deficiency    • Pulmonary fibrosis (CMS/HCC)    • PVC (premature ventricular contraction)    • Rheumatoid arthritis (CMS/HCC)    • Steroid-induced diabetes mellitus (CMS/HCC)    • Tobacco use        PAST SURGICAL HISTORY  Past Surgical History:   Procedure Laterality Date   • BACK SURGERY      L4-L5 discectomy   • CATARACT EXTRACTION Bilateral    • VASECTOMY         FAMILY HISTORY  Family History   Problem Relation Age of Onset   • Heart failure Mother         CHF   • Heart attack Father 54   • Heart attack Brother 47   • Heart disease Brother         another brother with 4 vessel CABG in his 50s   • Coronary artery disease Other         multiple family members       SOCIAL HISTORY  Social History     Socioeconomic History   • Marital status:      Spouse name: Not on file   • Number of children: Not on file   • Years of education: Not on file   • Highest education level: Not on file   Social Needs   • Financial resource strain: Not on file   • Food insecurity - worry: Not on file   • Food insecurity - inability: Not on file   • Transportation needs - medical: Not on file   • Transportation needs - non-medical: Not on file   Occupational History   • Not on file   Tobacco Use   • Smoking status: Current Every Day Smoker     Types: Cigarettes   • Smokeless tobacco: Never Used   • Tobacco comment: 1/2 ppd, discussed benefits of cessation   Substance and Sexual Activity   • Alcohol use: No     Comment: no caffeine   • Drug use: No   • Sexual activity: No   Other Topics Concern   • Not on file   Social History Narrative   • Not on file       ALLERGIES  Oxaprozin    REVIEW OF SYSTEMS  Review of Systems   Constitutional: Negative for activity change, appetite change and fever.   HENT: Negative for congestion and sore throat.    Respiratory: Negative for cough and shortness of breath.    Cardiovascular: Negative for chest pain and leg swelling.   Gastrointestinal: Negative  for abdominal pain, diarrhea and vomiting.   Genitourinary: Negative for decreased urine volume and dysuria.   Musculoskeletal: Negative for neck pain.        R and L forearm bumps/swelling   Skin: Negative for rash and wound.   Neurological: Negative for weakness, numbness and headaches.   All other systems reviewed and are negative.      PHYSICAL EXAM  ED Triage Vitals [02/21/19 1734]   Temp Heart Rate Resp BP SpO2   97.4 °F (36.3 °C) 98 16 -- 97 %      Temp src Heart Rate Source Patient Position BP Location FiO2 (%)   -- -- -- -- --       Physical Exam   Constitutional: He is oriented to person, place, and time. No distress.   HENT:   Head: Normocephalic and atraumatic.   Eyes: EOM are normal. Pupils are equal, round, and reactive to light.   Neck: Normal range of motion. Neck supple.   Cardiovascular: Normal rate and regular rhythm. Frequent extrasystoles are present.   Murmur heard.   Systolic murmur is present with a grade of 3/6.  Pulmonary/Chest: Effort normal and breath sounds normal. No respiratory distress.   Abdominal: Soft. There is no tenderness. There is no rebound and no guarding.   Musculoskeletal: Normal range of motion. He exhibits no edema.   L anterior wrist: large nodule without fluctuance or erythema, neuro intact distally  R lateral proximal forearm: firm nodule without fluctuance    Neurological: He is alert and oriented to person, place, and time. He has normal sensation and normal strength.   Skin: Skin is warm and dry.   Psychiatric: Mood and affect normal.   Nursing note and vitals reviewed.      LAB RESULTS  Lab Results (last 24 hours)     Procedure Component Value Units Date/Time    CBC & Differential [[id number]] Collected:  02/21/19 1804    Specimen:  Blood Updated:  02/21/19 1816    Narrative:       The following orders were created for panel order CBC & Differential.  Procedure                               Abnormality         Status                     ---------                                -----------         ------                     CBC Auto Differential[[id number]]        Abnormal            Final result                 Please view results for these tests on the individual orders.    Comprehensive Metabolic Panel [[id number]]  (Abnormal) Collected:  02/21/19 1804    Specimen:  Blood Updated:  02/21/19 1834     Glucose 158 mg/dL      BUN 23 mg/dL      Creatinine 0.99 mg/dL      Sodium 133 mmol/L      Potassium 4.3 mmol/L      Chloride 96 mmol/L      CO2 24.1 mmol/L      Calcium 8.8 mg/dL      Total Protein 6.8 g/dL      Albumin 3.30 g/dL      ALT (SGPT) 15 U/L      AST (SGOT) 18 U/L      Alkaline Phosphatase 58 U/L      Total Bilirubin 0.3 mg/dL      eGFR Non African Amer 73 mL/min/1.73      Globulin 3.5 gm/dL      A/G Ratio 0.9 g/dL      BUN/Creatinine Ratio 23.2     Anion Gap 12.9 mmol/L     Narrative:       The MDRD GFR formula is only valid for adults with stable renal function between ages 18 and 70.    CBC Auto Differential [[id number]]  (Abnormal) Collected:  02/21/19 1804    Specimen:  Blood Updated:  02/21/19 1816     WBC 7.80 10*3/mm3      RBC 3.41 10*6/mm3      Hemoglobin 10.4 g/dL      Hematocrit 31.2 %      MCV 91.5 fL      MCH 30.5 pg      MCHC 33.3 g/dL      RDW 13.7 %      RDW-SD 45.8 fl      MPV 8.6 fL      Platelets 230 10*3/mm3      Neutrophil % 54.4 %      Lymphocyte % 29.9 %      Monocyte % 9.6 %      Eosinophil % 4.7 %      Basophil % 0.6 %      Immature Grans % 0.8 %      Neutrophils, Absolute 4.24 10*3/mm3      Lymphocytes, Absolute 2.33 10*3/mm3      Monocytes, Absolute 0.75 10*3/mm3      Eosinophils, Absolute 0.37 10*3/mm3      Basophils, Absolute 0.05 10*3/mm3      Immature Grans, Absolute 0.06 10*3/mm3      nRBC 0.0 /100 WBC           I ordered the above labs and reviewed the results    RADIOLOGY  No orders to display   Doppler BUE: Negative for DVT     I ordered the above noted radiological studies. Interpreted by radiologist. Reviewed by me in PACS.        PROCEDURES  Procedures      PROGRESS AND CONSULTS     1759 Ordered CMP and CBC for further evaluation. Ordered doppler BUE for further evaluation.     1921 Rechecked the pt and discussed his negative doppler BUE. Discussed the plan to discharge the pt, and to use warm compresses for the swelling. The pt agrees with the plan.     MEDICAL DECISION MAKING  Results were reviewed/discussed with the patient and they were also made aware of online access. Pt also made aware that some labs, such as cultures, will not be resulted during ER visit and follow up with PMD is necessary.     MDM  Number of Diagnoses or Management Options     Amount and/or Complexity of Data Reviewed  Clinical lab tests: ordered and reviewed (Hemoglobin: 10.4)  Tests in the radiology section of CPT®: ordered and reviewed (Doppler BUE negative for DVT)  Decide to obtain previous medical records or to obtain history from someone other than the patient: yes  Review and summarize past medical records: yes    Patient Progress  Patient progress: stable         DIAGNOSIS  Final diagnoses:   Nodule of soft tissue       DISPOSITION  Disposition: Discharged.    Patient discharged in stable condition.    Reviewed implications of results, diagnosis, meds, responsibility to follow up, warning signs and symptoms of possible worsening, potential complications and reasons to return to ER, including new or worsening symptoms.    Patient/Family voiced understanding of above instructions.    Discussed plan for discharge, as there is no emergent indication for admission.  Pt/family is agreeable and understands need for follow up and repeat testing.  Pt is aware that discharge does not mean that nothing is wrong but it indicates no emergency is present and they must continue care with follow-up as given below or physician of their choice.     FOLLOW-UP  Freddie Cannon MD  [address] [address]  [phone]    In 1 week  if no  improvement         Medication List      No changes were made to your prescriptions during this visit.         Latest Documented Vital Signs:  As of 7:24 PM  BP- (!) 122/102 HR- 98 Temp- 97.4 °F (36.3 °C) O2 sat- 97%    --  Documentation assistance provided by sergio Rojo for Shahram Arguello PA-C.  Information recorded by the scribe was done at my direction and has been verified and validated by me.     Analy Rojo  02/21/19 [id number]       Analy Rojo  02/21/19 5642       Shahram Arguello PA  02/21/19 4635

## 2019-02-21 NOTE — PROGRESS NOTES
Subjective   Edward Srinivasan is a 78 y.o. male. Presents with a chief complaint of rheumatoid arthritis, principally affecting both of his feet with a recent infusion at his rheumatologist's office that is apparently caused a side effect of swelling of his right forearm, hyperlipidemia, type 2 diabetes mellitus, hypertension, gastroesophageal reflux disease.  All of his current medical issues are relatively stable, his blood sugar ranges 120-130 on a daily basis.  His most pressing difficulty is the pain in his feet caused by his rheumatoid arthritis.  He has a regularly scheduled appointment with his rheumatologist for infusions of biologic agents to address this issue.  I have recommended to him that he take 1 Aleve at night to help with his foot pain in the morning.     History of Present Illness patient recently had an infusion at the rheumatology office and has developed a swelling of his right forearm.    The following portions of the patient's history were reviewed and updated as appropriate: allergies, current medications, past family history, past medical history, past social history, past surgical history and problem list.    Review of Systems   Constitutional: Positive for fatigue.   HENT: Negative.    Eyes: Negative.    Respiratory: Negative.    Cardiovascular: Negative.    Gastrointestinal: Positive for abdominal distention.   Endocrine: Negative.    Genitourinary: Negative.    Musculoskeletal: Positive for arthralgias and myalgias.   Skin: Negative.    Allergic/Immunologic: Negative.    Neurological: Negative.    Hematological: Negative.    Psychiatric/Behavioral: Negative.        Objective   Physical Exam   Constitutional: He appears well-developed and well-nourished.   HENT:   Head: Normocephalic.   Eyes: Pupils are equal, round, and reactive to light.   Neck: Normal range of motion.   Cardiovascular: Normal rate, regular rhythm and normal heart sounds.   Pulmonary/Chest: Effort normal and breath  sounds normal.   Abdominal: Soft.   Musculoskeletal:   There is a swelling of the patient's right forearm just distal to the elbow on the dorsal surface of the arm.  The patient states that that came up after he had had his infusion at the rheumatology office.  Patient states that the swelling is better today but somewhat tender.  There is no bruising or discoloration of swelling and it feels like a muscle body that has swollen.   Neurological: He is alert.   Skin: Skin is warm.   Nursing note and vitals reviewed.        Assessment/Plan    was seen today for knot on both arms.    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

## 2019-02-22 ENCOUNTER — OFFICE VISIT (OUTPATIENT)
Dept: INTERNAL MEDICINE | Facility: CLINIC | Age: 79
End: 2019-02-22

## 2019-02-22 ENCOUNTER — APPOINTMENT (OUTPATIENT)
Dept: WOMENS IMAGING | Facility: HOSPITAL | Age: 79
End: 2019-02-22

## 2019-02-22 ENCOUNTER — TELEPHONE (OUTPATIENT)
Dept: INTERNAL MEDICINE | Facility: CLINIC | Age: 79
End: 2019-02-22

## 2019-02-22 VITALS
WEIGHT: 152 LBS | DIASTOLIC BLOOD PRESSURE: 70 MMHG | BODY MASS INDEX: 23.86 KG/M2 | HEIGHT: 67 IN | SYSTOLIC BLOOD PRESSURE: 112 MMHG

## 2019-02-22 DIAGNOSIS — M05.771 RHEUMATOID ARTHRITIS INVOLVING BOTH FEET WITH POSITIVE RHEUMATOID FACTOR (HCC): ICD-10-CM

## 2019-02-22 DIAGNOSIS — M51.06 INTERVERTEBRAL LUMBAR DISC DISORDER WITH MYELOPATHY, LUMBAR REGION: ICD-10-CM

## 2019-02-22 DIAGNOSIS — E11.9 DIABETES MELLITUS WITHOUT COMPLICATION (HCC): Chronic | ICD-10-CM

## 2019-02-22 DIAGNOSIS — M05.772 RHEUMATOID ARTHRITIS INVOLVING BOTH FEET WITH POSITIVE RHEUMATOID FACTOR (HCC): ICD-10-CM

## 2019-02-22 DIAGNOSIS — I10 ESSENTIAL HYPERTENSION, BENIGN: Primary | ICD-10-CM

## 2019-02-22 PROCEDURE — 72110 X-RAY EXAM L-2 SPINE 4/>VWS: CPT | Performed by: RADIOLOGY

## 2019-02-22 PROCEDURE — 99214 OFFICE O/P EST MOD 30 MIN: CPT | Performed by: INTERNAL MEDICINE

## 2019-02-22 PROCEDURE — 72110 X-RAY EXAM L-2 SPINE 4/>VWS: CPT | Performed by: INTERNAL MEDICINE

## 2019-02-22 RX ORDER — METHYLPREDNISOLONE 4 MG/1
TABLET ORAL
Qty: 21 TABLET | Refills: 0 | Status: SHIPPED | OUTPATIENT
Start: 2019-02-22 | End: 2019-04-10

## 2019-02-22 RX ORDER — OXYCODONE AND ACETAMINOPHEN 10; 325 MG/1; MG/1
1 TABLET ORAL EVERY 6 HOURS PRN
Qty: 45 TABLET | Refills: 0 | Status: SHIPPED | OUTPATIENT
Start: 2019-02-22 | End: 2019-03-13 | Stop reason: SDUPTHER

## 2019-02-22 NOTE — PROGRESS NOTES
Subjective   Edward Srinivasan is a 78 y.o. male.  Presents with a chief complaint of acute onset low back pain over the last 48 hours, with significant muscle spasm that makes it a goal for him to sleep at night.  The patient has had difficulties with his back in the past and had undergone a lumbar fusion.  Currently he has rheumatoid arthritis for which he takes a biologic infusion.  The pain the patient states radiates down both of his legs and there is no comfortable way for him to sit or stand.  No loss of sensation in his lower extremities and no change in his bowel or bladder habits.  I will obtain a lumbar series x-ray this morning and start him on aggressive management of his lower back pain with a Medrol Dosepak and Percocet for pain.  If he does not improve within 1 week I am going to have him see an orthopedic surgeon for follow-up.    History of Present Illness patient had a recent flare up of his low back pain    The following portions of the patient's history were reviewed and updated as appropriate: allergies, current medications, past family history, past medical history, past social history, past surgical history and problem list.    Review of Systems   Constitutional: Negative.    HENT: Negative.    Eyes: Negative.    Respiratory: Negative.    Cardiovascular: Negative.    Gastrointestinal: Negative.    Endocrine: Negative.    Musculoskeletal: Positive for arthralgias, back pain and myalgias.   Skin: Negative.    Allergic/Immunologic: Negative.    Neurological: Negative.    Hematological: Negative.    Psychiatric/Behavioral: Negative.        Objective   Physical Exam   Constitutional: He appears well-developed and well-nourished.   HENT:   Head: Normocephalic and atraumatic.   Eyes: Pupils are equal, round, and reactive to light.   Neck: Normal range of motion.   Cardiovascular: Normal rate, regular rhythm and normal heart sounds.   Pulmonary/Chest: Effort normal and breath sounds normal.   Abdominal:  Soft. Bowel sounds are normal.   Musculoskeletal:   Acute low back pain   Neurological: He is alert.   Skin: Skin is warm.   Psychiatric: He has a normal mood and affect. His behavior is normal. Judgment and thought content normal.   Nursing note and vitals reviewed.        Assessment/Plan    was seen today for back pain.    Diagnoses and all orders for this visit:    Essential hypertension, benign  Comments:  well controlled    Diabetes mellitus without complication (CMS/[facility])  Comments:  no change in therapy    Intervertebral lumbar disc disorder with myelopathy, lumbar region  Comments:  medrol dose pack  percocet  mg  Orders:  -     Cancel: XR Spine Lumbar 2 or 3 View (In Office)  -     XR Spine Lumbar 4+ View (In Office)    Rheumatoid arthritis involving both feet with positive rheumatoid factor (CMS/[facility])  Comments:  follow up with Rheumatology    Other orders  -     methylPREDNISolone (MEDROL, MARTA,) 4 MG tablet; Take as directed on package instructions.  -     oxyCODONE-acetaminophen (PERCOCET)  MG per tablet; Take 1 tablet by mouth Every 6 (Six) Hours As Needed for Moderate Pain .

## 2019-02-22 NOTE — TELEPHONE ENCOUNTER
----- Message from Preethi Christina sent at 2/22/2019 12:05 PM EST -----  [facility]  [facility] calling states that insurance will only approve a 7 day supply of oxyCODONE-acetaminophen (PERCOCET)  MG per tablet. Patient is willing to get the 7 day prescription instead of the 12 days that was prescribed. Pharmacy wanted to let Dr. Cannon know. Please advise.     Pharmacy:[facility] AT [facility] & ([address]) - [phone] [facility] [phone] FX

## 2019-02-22 NOTE — PROGRESS NOTES
NICKIE doppler completed.  Preliminary results:  Negative for DVT and STP in bilateral upper extremities.  Noted a fluid collection at the right proximal forearm and left distal forearm at the areas of interest.  Results given to Dr. Olguin.

## 2019-02-22 NOTE — PROGRESS NOTES
Subjective   Edward Srinivasan is a 78 y.o. male.     History of Present Illness     The following portions of the patient's history were reviewed and updated as appropriate: allergies, current medications, past family history, past medical history, past social history, past surgical history and problem list.    Review of Systems    Objective   Physical Exam      Assessment/Plan    was seen today for back pain.    Diagnoses and all orders for this visit:    Essential hypertension, benign  Comments:  well controlled    Diabetes mellitus without complication (CMS/[facility])  Comments:  no change in therapy    Intervertebral lumbar disc disorder with myelopathy, lumbar region  Comments:  medrol dose pack  percocet  mg  Orders:  -     Cancel: XR Spine Lumbar 2 or 3 View (In Office)  -     XR Spine Lumbar 4+ View (In Office)    Rheumatoid arthritis involving both feet with positive rheumatoid factor (CMS/[facility])  Comments:  follow up with Rheumatology

## 2019-03-13 RX ORDER — OXYCODONE AND ACETAMINOPHEN 10; 325 MG/1; MG/1
1 TABLET ORAL EVERY 6 HOURS PRN
Qty: 45 TABLET | Refills: 0 | Status: SHIPPED | OUTPATIENT
Start: 2019-03-13 | End: 2019-04-12 | Stop reason: SDUPTHER

## 2019-03-22 DIAGNOSIS — E11.9 DIABETES MELLITUS WITHOUT COMPLICATION (HCC): ICD-10-CM

## 2019-03-22 RX ORDER — BLOOD-GLUCOSE METER
KIT MISCELLANEOUS
Qty: 100 EACH | Refills: 1 | Status: SHIPPED | OUTPATIENT
Start: 2019-03-22 | End: 2019-07-14 | Stop reason: SDUPTHER

## 2019-04-01 DIAGNOSIS — E11.9 DIABETES MELLITUS WITHOUT COMPLICATION (HCC): ICD-10-CM

## 2019-04-01 RX ORDER — LINAGLIPTIN 5 MG/1
TABLET, FILM COATED ORAL
Qty: 90 TABLET | Refills: 3 | Status: SHIPPED | OUTPATIENT
Start: 2019-04-01 | End: 2019-06-11

## 2019-04-10 ENCOUNTER — OFFICE VISIT (OUTPATIENT)
Dept: INTERNAL MEDICINE | Facility: CLINIC | Age: 79
End: 2019-04-10

## 2019-04-10 ENCOUNTER — APPOINTMENT (OUTPATIENT)
Dept: WOMENS IMAGING | Facility: HOSPITAL | Age: 79
End: 2019-04-10

## 2019-04-10 VITALS
HEART RATE: 100 BPM | SYSTOLIC BLOOD PRESSURE: 122 MMHG | WEIGHT: 148 LBS | TEMPERATURE: 97.8 F | BODY MASS INDEX: 23.23 KG/M2 | DIASTOLIC BLOOD PRESSURE: 78 MMHG | HEIGHT: 67 IN | OXYGEN SATURATION: 98 %

## 2019-04-10 DIAGNOSIS — R05.9 COUGH: ICD-10-CM

## 2019-04-10 DIAGNOSIS — M51.06 INTERVERTEBRAL LUMBAR DISC DISORDER WITH MYELOPATHY, LUMBAR REGION: Primary | ICD-10-CM

## 2019-04-10 DIAGNOSIS — R06.02 SHORTNESS OF BREATH: ICD-10-CM

## 2019-04-10 DIAGNOSIS — R53.83 FATIGUE, UNSPECIFIED TYPE: ICD-10-CM

## 2019-04-10 DIAGNOSIS — D64.9 ANEMIA, UNSPECIFIED TYPE: ICD-10-CM

## 2019-04-10 LAB
ANION GAP SERPL CALCULATED.3IONS-SCNC: 12.6 MMOL/L
BUN BLD-MCNC: 22 MG/DL (ref 8–23)
BUN/CREAT SERPL: 23.9 (ref 7–25)
CALCIUM SPEC-SCNC: 8.9 MG/DL (ref 8.6–10.5)
CHLORIDE SERPL-SCNC: 92 MMOL/L (ref 98–107)
CO2 SERPL-SCNC: 26.4 MMOL/L (ref 22–29)
CREAT BLD-MCNC: 0.92 MG/DL (ref 0.76–1.27)
DEPRECATED RDW RBC AUTO: 46.7 FL (ref 37–54)
ERYTHROCYTE [DISTWIDTH] IN BLOOD BY AUTOMATED COUNT: 14.4 % (ref 12.3–15.4)
GFR SERPL CREATININE-BSD FRML MDRD: 80 ML/MIN/1.73
GLUCOSE BLD-MCNC: 135 MG/DL (ref 65–99)
HCT VFR BLD AUTO: 32.4 % (ref 37.5–51)
HGB BLD-MCNC: 10.7 G/DL (ref 13–17.7)
MCH RBC QN AUTO: 30.4 PG (ref 26.6–33)
MCHC RBC AUTO-ENTMCNC: 33 G/DL (ref 31.5–35.7)
MCV RBC AUTO: 92 FL (ref 79–97)
PLATELET # BLD AUTO: 264 10*3/MM3 (ref 140–450)
PMV BLD AUTO: 8.7 FL (ref 6–12)
POTASSIUM BLD-SCNC: 4.9 MMOL/L (ref 3.5–5.2)
RBC # BLD AUTO: 3.52 10*6/MM3 (ref 4.14–5.8)
SODIUM BLD-SCNC: 131 MMOL/L (ref 136–145)
TSH SERPL DL<=0.05 MIU/L-ACNC: 1.22 MIU/ML (ref 0.27–4.2)
WBC NRBC COR # BLD: 8.75 10*3/MM3 (ref 3.4–10.8)

## 2019-04-10 PROCEDURE — 84466 ASSAY OF TRANSFERRIN: CPT | Performed by: NURSE PRACTITIONER

## 2019-04-10 PROCEDURE — 71046 X-RAY EXAM CHEST 2 VIEWS: CPT | Performed by: NURSE PRACTITIONER

## 2019-04-10 PROCEDURE — 85027 COMPLETE CBC AUTOMATED: CPT | Performed by: NURSE PRACTITIONER

## 2019-04-10 PROCEDURE — 36415 COLL VENOUS BLD VENIPUNCTURE: CPT | Performed by: NURSE PRACTITIONER

## 2019-04-10 PROCEDURE — 82728 ASSAY OF FERRITIN: CPT | Performed by: NURSE PRACTITIONER

## 2019-04-10 PROCEDURE — 84443 ASSAY THYROID STIM HORMONE: CPT | Performed by: NURSE PRACTITIONER

## 2019-04-10 PROCEDURE — 71046 X-RAY EXAM CHEST 2 VIEWS: CPT | Performed by: RADIOLOGY

## 2019-04-10 PROCEDURE — 80048 BASIC METABOLIC PNL TOTAL CA: CPT | Performed by: NURSE PRACTITIONER

## 2019-04-10 PROCEDURE — 82607 VITAMIN B-12: CPT | Performed by: NURSE PRACTITIONER

## 2019-04-10 PROCEDURE — 83540 ASSAY OF IRON: CPT | Performed by: NURSE PRACTITIONER

## 2019-04-10 PROCEDURE — 99214 OFFICE O/P EST MOD 30 MIN: CPT | Performed by: NURSE PRACTITIONER

## 2019-04-11 DIAGNOSIS — D64.9 ANEMIA, UNSPECIFIED TYPE: Primary | ICD-10-CM

## 2019-04-11 LAB
BNP SERPL-MCNC: 27.5 PG/ML (ref 0–100)
FERRITIN SERPL-MCNC: 234 NG/ML (ref 30–400)
IRON 24H UR-MRATE: 34 MCG/DL (ref 59–158)
IRON SATN MFR SERPL: 12 % (ref 20–50)
TIBC SERPL-MCNC: 276 MCG/DL (ref 298–536)
TRANSFERRIN SERPL-MCNC: 185 MG/DL (ref 200–360)
VIT B12 BLD-MCNC: 448 PG/ML (ref 211–946)

## 2019-04-12 NOTE — TELEPHONE ENCOUNTER
----- Message from Beverley Owens sent at 4/12/2019 10:35 AM EDT -----  Contact: Pt  Pt is calling for a refill     FOR  oxyCODONE-acetaminophen (PERCOCET)  MG per tablet      Patient requests RX SENT TO   [facility] [address] [place] [address] [address] [facility] [address] AT [facility] & ([address])  [phone] [facility] [phone] FX      Caller# [phone]     Please and thank you.

## 2019-04-15 RX ORDER — OXYCODONE AND ACETAMINOPHEN 10; 325 MG/1; MG/1
1 TABLET ORAL EVERY 6 HOURS PRN
Qty: 45 TABLET | Refills: 0 | Status: SHIPPED | OUTPATIENT
Start: 2019-04-15 | End: 2019-05-23

## 2019-04-25 ENCOUNTER — HOSPITAL ENCOUNTER (OUTPATIENT)
Dept: MRI IMAGING | Facility: HOSPITAL | Age: 79
Discharge: HOME | End: 2019-04-25
Admitting: NURSE PRACTITIONER

## 2019-04-25 DIAGNOSIS — M51.06 INTERVERTEBRAL LUMBAR DISC DISORDER WITH MYELOPATHY, LUMBAR REGION: ICD-10-CM

## 2019-04-25 PROCEDURE — 72148 MRI LUMBAR SPINE W/O DYE: CPT

## 2019-04-26 ENCOUNTER — TELEPHONE (OUTPATIENT)
Dept: INTERNAL MEDICINE | Facility: CLINIC | Age: 79
End: 2019-04-26

## 2019-04-26 DIAGNOSIS — R19.00 ABDOMINAL MASS, UNSPECIFIED ABDOMINAL LOCATION: Primary | ICD-10-CM

## 2019-04-26 DIAGNOSIS — Z12.5 PROSTATE CANCER SCREENING: ICD-10-CM

## 2019-04-26 NOTE — TELEPHONE ENCOUNTER
I called patient to inform him of abnormal MRI of lumbar spine, he has extensive degenerative changes. There is also mention of soft tissue mass that requires CT scan abd/pelvis. He is agreeable. He would like to have PSA drawn with upcoming blood work. He is current with PT and reports improvement in symptoms, would like to discuss MRI further with Dr Cannon at his upcoming appointment.

## 2019-04-26 NOTE — ED PROVIDER NOTES
"Pt presents to the ED c/o arm \"bumps\" that started yesterday after pt had an infusion for rheumatoid arthritis. Pt denies SOA. Pt has no other complaints at this time.     PHYSICAL EXAM  GENERAL: not distressed  MUSCULOSKELETAL: there is a non-tender, palpable nodule in R arm and to volar aspect of L wrist. There is no fluctuance or erythema.     Vital signs and nursing notes reviewed.    LAB RESULTS AND RADIOLOGY  I have reviewed the patient's labs and imaging studies.    PROGRESS NOTES    1802 Spoke to midlevel provider Shahram Arguello PA-C, about the pt. After performing my own physical exam, I plan to order a doppler US to both upper extremities.    Attestation:    The LCUIO and I have discussed this patient's history, physical exam, and treatment plan.  I have reviewed the documentation and personally had a face to face interaction with the patient. I affirm the documentation and agree with the treatment and plan.  The attached note describes my personal findings.    Documentation assistance provided by sergio Mao for Dr. Olguin. Information recorded by the sergio was done at my direction and has been verified and validated by me.     Dru Mao  02/21/19 [id number]       Que Olguin MD  02/21/19 1239    " No difficulties

## 2019-05-08 ENCOUNTER — HOSPITAL ENCOUNTER (OUTPATIENT)
Dept: CT IMAGING | Facility: HOSPITAL | Age: 79
Discharge: HOME | End: 2019-05-08
Admitting: NURSE PRACTITIONER

## 2019-05-08 DIAGNOSIS — R19.00 ABDOMINAL MASS, UNSPECIFIED ABDOMINAL LOCATION: ICD-10-CM

## 2019-05-08 LAB — CREAT BLDA-MCNC: 0.7 MG/DL (ref 0.6–1.3)

## 2019-05-08 PROCEDURE — 82565 ASSAY OF CREATININE: CPT

## 2019-05-08 PROCEDURE — 25010000002 IOPAMIDOL 61 % SOLUTION: Performed by: NURSE PRACTITIONER

## 2019-05-08 PROCEDURE — 74177 CT ABD & PELVIS W/CONTRAST: CPT

## 2019-05-08 RX ADMIN — IOPAMIDOL 85 ML: 612 INJECTION, SOLUTION INTRAVENOUS at 08:03

## 2019-05-09 ENCOUNTER — OFFICE VISIT (OUTPATIENT)
Dept: CARDIOLOGY | Facility: CLINIC | Age: 79
End: 2019-05-09

## 2019-05-09 ENCOUNTER — TELEPHONE (OUTPATIENT)
Dept: INTERNAL MEDICINE | Facility: CLINIC | Age: 79
End: 2019-05-09

## 2019-05-09 VITALS
HEIGHT: 67 IN | HEART RATE: 79 BPM | DIASTOLIC BLOOD PRESSURE: 70 MMHG | RESPIRATION RATE: 16 BRPM | WEIGHT: 144 LBS | BODY MASS INDEX: 22.6 KG/M2 | OXYGEN SATURATION: 98 % | SYSTOLIC BLOOD PRESSURE: 110 MMHG

## 2019-05-09 DIAGNOSIS — I25.10 CORONARY ARTERY DISEASE INVOLVING NATIVE CORONARY ARTERY OF NATIVE HEART WITHOUT ANGINA PECTORIS: ICD-10-CM

## 2019-05-09 DIAGNOSIS — I65.23 BILATERAL CAROTID ARTERY STENOSIS: ICD-10-CM

## 2019-05-09 DIAGNOSIS — R19.07 GENERALIZED ABDOMINAL MASS: Primary | ICD-10-CM

## 2019-05-09 DIAGNOSIS — I35.0 NONRHEUMATIC AORTIC VALVE STENOSIS: Primary | ICD-10-CM

## 2019-05-09 PROCEDURE — 99214 OFFICE O/P EST MOD 30 MIN: CPT | Performed by: INTERNAL MEDICINE

## 2019-05-09 PROCEDURE — 93000 ELECTROCARDIOGRAM COMPLETE: CPT | Performed by: INTERNAL MEDICINE

## 2019-05-09 NOTE — TELEPHONE ENCOUNTER
I called patient to discuss his CT imaging findings. There was no answer so I left a message for patient to return call at his convenience.

## 2019-05-14 ENCOUNTER — HOSPITAL ENCOUNTER (OUTPATIENT)
Dept: CARDIOLOGY | Facility: HOSPITAL | Age: 79
Discharge: HOME | End: 2019-05-14
Admitting: INTERNAL MEDICINE

## 2019-05-14 VITALS
BODY MASS INDEX: 22.6 KG/M2 | HEIGHT: 67 IN | HEART RATE: 84 BPM | DIASTOLIC BLOOD PRESSURE: 62 MMHG | WEIGHT: 144 LBS | OXYGEN SATURATION: 100 % | SYSTOLIC BLOOD PRESSURE: 129 MMHG

## 2019-05-14 DIAGNOSIS — I35.0 NONRHEUMATIC AORTIC VALVE STENOSIS: ICD-10-CM

## 2019-05-14 PROCEDURE — 93306 TTE W/DOPPLER COMPLETE: CPT

## 2019-05-14 PROCEDURE — 93306 TTE W/DOPPLER COMPLETE: CPT | Performed by: INTERNAL MEDICINE

## 2019-05-15 LAB
AORTIC ARCH: 2.17 CM
AORTIC DIMENSIONLESS INDEX: 0.3 (DI)
ASCENDING AORTA: 3.3 CM
BH CV ECHO MEAS - ACS: 1.2 CM
BH CV ECHO MEAS - AO MAX PG (FULL): 28.6 MMHG
BH CV ECHO MEAS - AO MAX PG: 31 MMHG
BH CV ECHO MEAS - AO MEAN PG (FULL): 16.6 MMHG
BH CV ECHO MEAS - AO MEAN PG: 18 MMHG
BH CV ECHO MEAS - AO ROOT AREA (BSA CORRECTED): 2
BH CV ECHO MEAS - AO ROOT AREA: 10.2 CM^2
BH CV ECHO MEAS - AO ROOT DIAM: 3.6 CM
BH CV ECHO MEAS - AO V2 MAX: 281.1 CM/SEC
BH CV ECHO MEAS - AO V2 MEAN: 205.5 CM/SEC
BH CV ECHO MEAS - AO V2 VTI: 64.4 CM
BH CV ECHO MEAS - ASC AORTA: 3.3 CM
BH CV ECHO MEAS - AVA(I,A): 1.3 CM^2
BH CV ECHO MEAS - AVA(I,D): 1.3 CM^2
BH CV ECHO MEAS - AVA(V,A): 1.3 CM^2
BH CV ECHO MEAS - AVA(V,D): 1.3 CM^2
BH CV ECHO MEAS - BSA(HAYCOCK): 1.8 M^2
BH CV ECHO MEAS - BSA: 1.8 M^2
BH CV ECHO MEAS - BZI_BMI: 22.6 KILOGRAMS/M^2
BH CV ECHO MEAS - BZI_METRIC_HEIGHT: 170.2 CM
BH CV ECHO MEAS - BZI_METRIC_WEIGHT: 65.3 KG
BH CV ECHO MEAS - EDV(MOD-SP2): 53 ML
BH CV ECHO MEAS - EDV(MOD-SP4): 50 ML
BH CV ECHO MEAS - EDV(TEICH): 168.5 ML
BH CV ECHO MEAS - EF(CUBED): 73.6 %
BH CV ECHO MEAS - EF(MOD-BP): 60 %
BH CV ECHO MEAS - EF(MOD-SP2): 56.6 %
BH CV ECHO MEAS - EF(MOD-SP4): 64 %
BH CV ECHO MEAS - EF(TEICH): 64.6 %
BH CV ECHO MEAS - ESV(MOD-SP2): 23 ML
BH CV ECHO MEAS - ESV(MOD-SP4): 18 ML
BH CV ECHO MEAS - ESV(TEICH): 59.7 ML
BH CV ECHO MEAS - FS: 35.8 %
BH CV ECHO MEAS - IVS/LVPW: 0.96
BH CV ECHO MEAS - IVSD: 1.1 CM
BH CV ECHO MEAS - LAT PEAK E' VEL: 10 CM/SEC
BH CV ECHO MEAS - LV DIASTOLIC VOL/BSA (35-75): 28.4 ML/M^2
BH CV ECHO MEAS - LV MASS(C)D: 280.1 GRAMS
BH CV ECHO MEAS - LV MASS(C)DI: 159.3 GRAMS/M^2
BH CV ECHO MEAS - LV MAX PG: 3 MMHG
BH CV ECHO MEAS - LV MEAN PG: 2 MMHG
BH CV ECHO MEAS - LV SYSTOLIC VOL/BSA (12-30): 10.2 ML/M^2
BH CV ECHO MEAS - LV V1 MAX: 86.3 CM/SEC
BH CV ECHO MEAS - LV V1 MEAN: 67.4 CM/SEC
BH CV ECHO MEAS - LV V1 VTI: 20.2 CM
BH CV ECHO MEAS - LVIDD: 5.8 CM
BH CV ECHO MEAS - LVIDS: 3.7 CM
BH CV ECHO MEAS - LVLD AP2: 7.3 CM
BH CV ECHO MEAS - LVLD AP4: 6.5 CM
BH CV ECHO MEAS - LVLS AP2: 5.7 CM
BH CV ECHO MEAS - LVLS AP4: 5 CM
BH CV ECHO MEAS - LVOT AREA (M): 4.2 CM^2
BH CV ECHO MEAS - LVOT AREA: 4.3 CM^2
BH CV ECHO MEAS - LVOT DIAM: 2.3 CM
BH CV ECHO MEAS - LVPWD: 1.2 CM
BH CV ECHO MEAS - MED PEAK E' VEL: 6 CM/SEC
BH CV ECHO MEAS - MV A DUR: 0.1 SEC
BH CV ECHO MEAS - MV A MAX VEL: 110.2 CM/SEC
BH CV ECHO MEAS - MV DEC SLOPE: 698.3 CM/SEC^2
BH CV ECHO MEAS - MV DEC TIME: 0.17 SEC
BH CV ECHO MEAS - MV E MAX VEL: 65.5 CM/SEC
BH CV ECHO MEAS - MV E/A: 0.59
BH CV ECHO MEAS - MV MAX PG: 5.5 MMHG
BH CV ECHO MEAS - MV MEAN PG: 2.3 MMHG
BH CV ECHO MEAS - MV P1/2T MAX VEL: 68.8 CM/SEC
BH CV ECHO MEAS - MV P1/2T: 28.9 MSEC
BH CV ECHO MEAS - MV V2 MAX: 117.3 CM/SEC
BH CV ECHO MEAS - MV V2 MEAN: 69.7 CM/SEC
BH CV ECHO MEAS - MV V2 VTI: 19.4 CM
BH CV ECHO MEAS - MVA P1/2T LCG: 3.2 CM^2
BH CV ECHO MEAS - MVA(P1/2T): 7.6 CM^2
BH CV ECHO MEAS - MVA(VTI): 4.5 CM^2
BH CV ECHO MEAS - PA ACC TIME: 0.09 SEC
BH CV ECHO MEAS - PA MAX PG (FULL): 8.7 MMHG
BH CV ECHO MEAS - PA MAX PG: 10.4 MMHG
BH CV ECHO MEAS - PA PR(ACCEL): 39.4 MMHG
BH CV ECHO MEAS - PA V2 MAX: 161.5 CM/SEC
BH CV ECHO MEAS - PULM A REVS DUR: 0.1 SEC
BH CV ECHO MEAS - PULM A REVS VEL: 44.9 CM/SEC
BH CV ECHO MEAS - PULM DIAS VEL: 40.5 CM/SEC
BH CV ECHO MEAS - PULM S/D: 1.7
BH CV ECHO MEAS - PULM SYS VEL: 67.8 CM/SEC
BH CV ECHO MEAS - PVA(V,A): 1.2 CM^2
BH CV ECHO MEAS - PVA(V,D): 1.2 CM^2
BH CV ECHO MEAS - QP/QS: 0.43
BH CV ECHO MEAS - RAP SYSTOLE: 3 MMHG
BH CV ECHO MEAS - RV MAX PG: 1.8 MMHG
BH CV ECHO MEAS - RV MEAN PG: 1.2 MMHG
BH CV ECHO MEAS - RV V1 MAX: 66.7 CM/SEC
BH CV ECHO MEAS - RV V1 MEAN: 50.2 CM/SEC
BH CV ECHO MEAS - RV V1 VTI: 12.4 CM
BH CV ECHO MEAS - RVOT AREA: 3 CM^2
BH CV ECHO MEAS - RVOT DIAM: 2 CM
BH CV ECHO MEAS - RVSP: 24 MMHG
BH CV ECHO MEAS - SI(AO): 371.7 ML/M^2
BH CV ECHO MEAS - SI(CUBED): 82.9 ML/M^2
BH CV ECHO MEAS - SI(LVOT): 49.4 ML/M^2
BH CV ECHO MEAS - SI(MOD-SP2): 17.1 ML/M^2
BH CV ECHO MEAS - SI(MOD-SP4): 18.2 ML/M^2
BH CV ECHO MEAS - SI(TEICH): 61.9 ML/M^2
BH CV ECHO MEAS - SUP REN AO DIAM: 1.8 CM
BH CV ECHO MEAS - SV(AO): 653.6 ML
BH CV ECHO MEAS - SV(CUBED): 145.8 ML
BH CV ECHO MEAS - SV(LVOT): 86.8 ML
BH CV ECHO MEAS - SV(MOD-SP2): 30 ML
BH CV ECHO MEAS - SV(MOD-SP4): 32 ML
BH CV ECHO MEAS - SV(RVOT): 37.4 ML
BH CV ECHO MEAS - SV(TEICH): 108.9 ML
BH CV ECHO MEAS - TAPSE (>1.6): 2.8 CM2
BH CV ECHO MEAS - TR MAX VEL: 226.8 CM/SEC
BH CV ECHO MEASUREMENTS AVERAGE E/E' RATIO: 8.19
BH CV VAS BP RIGHT ARM: NORMAL MMHG
BH CV XLRA - RV BASE: 3.25 CM
BH CV XLRA - TDI S': 12 CM/SEC
LEFT ATRIUM VOLUME INDEX: 15 ML/M2
SINUS: 3.25 CM
STJ: 2.9 CM

## 2019-05-16 ENCOUNTER — LAB (OUTPATIENT)
Dept: INTERNAL MEDICINE | Facility: CLINIC | Age: 79
End: 2019-05-16

## 2019-05-16 DIAGNOSIS — Z12.5 PROSTATE CANCER SCREENING: Primary | ICD-10-CM

## 2019-05-16 DIAGNOSIS — E11.9 DIABETES MELLITUS WITHOUT COMPLICATION (HCC): Chronic | ICD-10-CM

## 2019-05-16 DIAGNOSIS — E78.00 PURE HYPERCHOLESTEROLEMIA: Chronic | ICD-10-CM

## 2019-05-16 LAB
ALBUMIN SERPL-MCNC: 3.8 G/DL (ref 3.5–5.2)
ALBUMIN/GLOB SERPL: 1 G/DL
ALP SERPL-CCNC: 59 U/L (ref 39–117)
ALT SERPL W P-5'-P-CCNC: 12 U/L (ref 1–41)
ANION GAP SERPL CALCULATED.3IONS-SCNC: 10.5 MMOL/L
AST SERPL-CCNC: 19 U/L (ref 1–40)
BILIRUB SERPL-MCNC: 0.4 MG/DL (ref 0.2–1.2)
BUN BLD-MCNC: 17 MG/DL (ref 8–23)
BUN/CREAT SERPL: 23 (ref 7–25)
CALCIUM SPEC-SCNC: 9 MG/DL (ref 8.6–10.5)
CHLORIDE SERPL-SCNC: 95 MMOL/L (ref 98–107)
CHOLEST SERPL-MCNC: 88 MG/DL (ref 0–200)
CO2 SERPL-SCNC: 25.5 MMOL/L (ref 22–29)
CREAT BLD-MCNC: 0.74 MG/DL (ref 0.76–1.27)
GFR SERPL CREATININE-BSD FRML MDRD: 103 ML/MIN/1.73
GLOBULIN UR ELPH-MCNC: 3.8 GM/DL
GLUCOSE BLD-MCNC: 116 MG/DL (ref 65–99)
HBA1C MFR BLD: 6.09 % (ref 4.8–5.6)
HDLC SERPL-MCNC: 37 MG/DL (ref 40–60)
LDLC SERPL CALC-MCNC: 42 MG/DL (ref 0–100)
LDLC/HDLC SERPL: 1.15 {RATIO}
POTASSIUM BLD-SCNC: 4.8 MMOL/L (ref 3.5–5.2)
PROT SERPL-MCNC: 7.6 G/DL (ref 6–8.5)
PSA SERPL-MCNC: 0.63 NG/ML (ref 0–4)
SODIUM BLD-SCNC: 131 MMOL/L (ref 136–145)
TRIGL SERPL-MCNC: 43 MG/DL (ref 0–150)
VLDLC SERPL-MCNC: 8.6 MG/DL (ref 5–40)

## 2019-05-16 PROCEDURE — 80053 COMPREHEN METABOLIC PANEL: CPT | Performed by: INTERNAL MEDICINE

## 2019-05-16 PROCEDURE — 36415 COLL VENOUS BLD VENIPUNCTURE: CPT | Performed by: INTERNAL MEDICINE

## 2019-05-16 PROCEDURE — 83036 HEMOGLOBIN GLYCOSYLATED A1C: CPT | Performed by: INTERNAL MEDICINE

## 2019-05-16 PROCEDURE — 80061 LIPID PANEL: CPT | Performed by: INTERNAL MEDICINE

## 2019-05-16 PROCEDURE — G0103 PSA SCREENING: HCPCS | Performed by: INTERNAL MEDICINE

## 2019-05-20 ENCOUNTER — HOSPITAL ENCOUNTER (OUTPATIENT)
Dept: CT IMAGING | Facility: HOSPITAL | Age: 79
Discharge: HOME | End: 2019-05-20
Admitting: RADIOLOGY

## 2019-05-20 VITALS
HEART RATE: 88 BPM | RESPIRATION RATE: 16 BRPM | OXYGEN SATURATION: 97 % | HEIGHT: 67 IN | BODY MASS INDEX: 22.6 KG/M2 | SYSTOLIC BLOOD PRESSURE: 111 MMHG | DIASTOLIC BLOOD PRESSURE: 60 MMHG | TEMPERATURE: 97.1 F | WEIGHT: 143.96 LBS

## 2019-05-20 DIAGNOSIS — R19.07 GENERALIZED ABDOMINAL MASS: ICD-10-CM

## 2019-05-20 LAB
INR PPP: 1.1 (ref 0.8–1.2)
PROTHROMBIN TIME: 13.2 SECONDS (ref 12.8–15.2)

## 2019-05-20 PROCEDURE — 88300 SURGICAL PATH GROSS: CPT | Performed by: INTERNAL MEDICINE

## 2019-05-20 PROCEDURE — 88377 M/PHMTRC ALYS ISHQUANT/SEMIQ: CPT

## 2019-05-20 PROCEDURE — 88342 IMHCHEM/IMCYTCHM 1ST ANTB: CPT | Performed by: INTERNAL MEDICINE

## 2019-05-20 PROCEDURE — 88365 INSITU HYBRIDIZATION (FISH): CPT

## 2019-05-20 PROCEDURE — 88341 IMHCHEM/IMCYTCHM EA ADD ANTB: CPT

## 2019-05-20 PROCEDURE — 88341 IMHCHEM/IMCYTCHM EA ADD ANTB: CPT | Performed by: INTERNAL MEDICINE

## 2019-05-20 PROCEDURE — 77012 CT SCAN FOR NEEDLE BIOPSY: CPT

## 2019-05-20 PROCEDURE — 88360 TUMOR IMMUNOHISTOCHEM/MANUAL: CPT | Performed by: INTERNAL MEDICINE

## 2019-05-20 PROCEDURE — 85610 PROTHROMBIN TIME: CPT

## 2019-05-20 PROCEDURE — 88323 CONSLTJ&REPRT MATRL PREP SLD: CPT

## 2019-05-20 PROCEDURE — 88185 FLOWCYTOMETRY/TC ADD-ON: CPT

## 2019-05-20 PROCEDURE — 88182 CELL MARKER STUDY: CPT

## 2019-05-20 PROCEDURE — 88184 FLOWCYTOMETRY/ TC 1 MARKER: CPT

## 2019-05-20 PROCEDURE — 88305 TISSUE EXAM BY PATHOLOGIST: CPT | Performed by: INTERNAL MEDICINE

## 2019-05-20 RX ORDER — SODIUM CHLORIDE 0.9 % (FLUSH) 0.9 %
3-10 SYRINGE (ML) INJECTION AS NEEDED
Status: DISCONTINUED | OUTPATIENT
Start: 2019-05-20 | End: 2019-05-21 | Stop reason: HOSPADM

## 2019-05-20 RX ORDER — SODIUM CHLORIDE 0.9 % (FLUSH) 0.9 %
3 SYRINGE (ML) INJECTION EVERY 12 HOURS SCHEDULED
Status: DISCONTINUED | OUTPATIENT
Start: 2019-05-20 | End: 2019-05-21 | Stop reason: HOSPADM

## 2019-05-20 RX ORDER — LIDOCAINE HYDROCHLORIDE 10 MG/ML
20 INJECTION, SOLUTION INFILTRATION; PERINEURAL ONCE
Status: COMPLETED | OUTPATIENT
Start: 2019-05-20 | End: 2019-05-20

## 2019-05-20 RX ADMIN — LIDOCAINE HYDROCHLORIDE 20 ML: 10 INJECTION, SOLUTION INFILTRATION; PERINEURAL at 08:39

## 2019-05-20 NOTE — H&P
Name: Edward Srinivasan ADMIT: 2019   : 1940  PCP: Freddie Cannon MD    MRN: [id number] LOS: 0 days   AGE/SEX: 79 y.o. male  ROOM: Room/bed info not found       Chief complaint abnormal nodules    Present Illness or Internal History:  Patient is a 79 y.o. male presents with retroperitoneal masses.     Past Surgical History:  Past Surgical History:   Procedure Laterality Date   • BACK SURGERY      L4-L5 discectomy   • CATARACT EXTRACTION Bilateral    • VASECTOMY         Past Medical History:  Past Medical History:   Diagnosis Date   • Aortic valve stenosis     Mild to moderate AS by echo on 17   • CAD (coronary artery disease)    • Carotid arterial disease (CMS/HCC)     Followed by Dr. Floyd.  Carotid Doppler on 17: 60-70% DENISE, 50-60% LICA.   • Chronic obstructive lung disease (CMS/HCC)    • Esophageal reflux     With a history of erosive gastritis   • Hyperlipidemia    • Hypertension    • Iron deficiency    • Pulmonary fibrosis (CMS/HCC)     By CT scan on 8/12/15   • PVC (premature ventricular contraction)    • Rheumatoid arthritis (CMS/HCC)    • Steroid-induced diabetes mellitus (CMS/HCC)    • Tobacco use        Home Medications:    (Not in a hospital admission)    Allergies:  Oxaprozin    Family History:  Family History   Problem Relation Age of Onset   • Heart failure Mother         CHF   • Heart attack Father 54   • Heart attack Brother 47   • Heart disease Brother         another brother with 4 vessel CABG in his 50s   • Coronary artery disease Other         multiple family members       Social History:  Social History     Tobacco Use   • Smoking status: Current Every Day Smoker     Types: Cigarettes   • Smokeless tobacco: Never Used   • Tobacco comment: 1/2 ppd, discussed benefits of cessation   Substance Use Topics   • Alcohol use: No     Comment: no caffeine   • Drug use: No        Objective     Physical Exam:    No exam performed today,    Vital Signs       Anticipated Surgical  Procedure:  bx    The risks, benefits and alternatives of this procedure have been discussed with the patient or responsible party: Yes        Anil Obregon MD  05/20/19  7:20 AM

## 2019-05-20 NOTE — NURSING NOTE
Verbal and Written D/C instructions given to patient and wife.  They voice understanding and are able to teach back D/C instructions.

## 2019-05-20 NOTE — DISCHARGE INSTRUCTIONS
EDUCATION /DISCHARGE INSTRUCTIONS  CT/US guided biopsy:  A biopsy is a procedure done to remove tissue for further analysis.  Before images are taken to locate the target area.  Images can be obtained using ultrasound, CT or MRI.  A physician will clean your skin with antiseptic soap, place a sterile towel around the site and administer a local anesthetic to numb the area.  The physician will then insert a special needle.  Sometimes images are taken of the needle after it is inserted to ensure the needle is in the correct area to be biopsied.   A sample is obtained and sent to the laboratory for study.  Occasionally the laboratory is unable to make a diagnosis from the sample and the procedure may need to be repeated.  Within a week the radiologist will send a report to your physician.  A pathologist will also examine the tissue and send a report.      Risks of the procedure include but are not limited to:   *  Bleeding    *  Infection   *  Puncture of surrounding organs *  Death     *  Lung collapse if the biopsy is near the chest which may require insertion of a       tube to re-inflate the lung if severe.      Benefits of the procedure:  Using x-ray helps to locate the area that requires a biopsy. The procedure is less invasive than a surgical procedure, there are no large incisions and it does not require anesthesia.      Alternatives to the procedure:  A biopsy can be performed surgically.  Risks of a surgical biopsy include exposure to anesthesia, infection, excessive bleeding and injury to abdominal organs.  A benefit of surgical biopsy is the ability to see the area to be biopsied and remove of a larger piece of tissue.    THIS EDUCATION INFORMATION WAS REVIEWED PRIOR TO PROCEDURE AND CONSENT. Patient initials__________________Time 0745    Post Procedure:    *  Expect the biopsy site may be tender up to one week.    *  Rest today (no pushing pulling or straining).   *  Slowly increase activity tomorrow.    *   If you received sedation do not drive for 24 hours.   *  Keep dressing clean and dry.   *  Leave dressing on puncture site for 24 hours.    *  You may shower when dressing removed.    Call your doctor if experiencing:   *  Signs of infection such as redness, swelling, excessive pain and / or foul        smelling drainage from the puncture site.   *  Chills or fever over 101 degrees (by mouth).   *  Unrelieved pain.   *  Any new or severe symptoms.   *  If experiencing sudden / severe shortness of breath or chest pain go to the       nearest emergency room.     Following the procedure:     Follow-up with the ordering physician as directed.    Continue to take other medications as directed by your physician unless    otherwise instructed.   If applicable, resume taking your blood thinners or Aspirin on 5/21/19.    If you have any concerns please call the Radiology Nurses Desk at [phone].  You are the most important factor in your recovery.  Follow the above instructions carefully.

## 2019-05-20 NOTE — NURSING NOTE
Returned to Triage post biopsy. Drsg to left lower abd dry and intact. No c/o pain. Positioned for comfort. Breakfast served.

## 2019-05-21 ENCOUNTER — TELEPHONE (OUTPATIENT)
Dept: INTERVENTIONAL RADIOLOGY/VASCULAR | Facility: HOSPITAL | Age: 79
End: 2019-05-21

## 2019-05-22 NOTE — PROGRESS NOTES
Date of Office Visit: 2019  Encounter Provider: Brian Harden MD  Place of Service: [facility] CARDIOLOGY  Patient Name: Edward Srinivasan  :1940    Chief complaint: Follow-up for coronary artery disease, aortic stenosis, and   carotid artery disease.    History of Present Illness:    Dear Dr. Cannon:       I again had the pleasure of seeing your patient in cardiology office on 2019.  As you   well know, he is a very pleasant, 79 year-old, white male with a past medical history  significant for coronary artery disease, aortic stenosis, hyperlipidemia, and rheumatoid  arthritis who presents for follow-up. The patient was admitted to the hospital on  2015, with chest discomfort. He had been watching his grandson on the lawn   when he developed sudden chest discomfort, which radiated across his precordium   and was described as a pressure sensation. It then started to radiate into his neck   bilaterally, as well as his jaws. His initial electrocardiogram showed no ischemic   changes and his troponin was negative. He also had been recently diagnosed with   rheumatoid arthritis and was on prednisone. 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 A CT angiogram of the chest on admission in   the emergency room showed chronic obstructive pulmonary disease with bullae   formation; however, he also had an interstitial pattern with early honeycombing   consistent with pulmonary fibrosis. A subsequent CT on 2015, confirmed this. He   was seen by pulmonology as well.      He did undergo a Lexiscan Cardiolite stress test, which showed lateral ischemia. A   subsequent cardiac catheterization on 2015, by Dr. Stearns showed a 90%   ulcerated lesion in the mid left circumflex. He subsequently underwent placement of a   3.0 mm x 18 mm Xience drug-eluting stent, although there was a proximal edge   dissection necessitating  placement of a second 3.0 mm x 12 mm Xience drug-eluting   stent proximal to the first stent.       The patient presents today for follow-up.  He recently was found of possible lymphoma   on a CT scan of his abdomen, and is likely going to need a biopsy.  He also hurt his   lower back recently, and is ambulating with a cane.  From a cardiac standpoint, he has   had no chest pain or exertional chest pressure.  He has baseline shortness of breath   that occurs with moderate exertion, which is unchanged.  Unfortunately, he does   continue to smoke.      Past Medical History:   Diagnosis Date   • Aortic valve stenosis     Mild to moderate AS by echo on 8/8/17   • CAD (coronary artery disease)    • Carotid arterial disease (CMS/HCC)     Followed by Dr. Floyd.  Carotid Doppler on 9/21/17: 60-70% DENISE, 50-60% LICA.   • Chronic obstructive lung disease (CMS/HCC)    • Esophageal reflux     With a history of erosive gastritis   • Hyperlipidemia    • Hypertension    • Iron deficiency    • Pulmonary fibrosis (CMS/HCC)     By CT scan on 8/12/15   • PVC (premature ventricular contraction)    • Rheumatoid arthritis (CMS/HCC)    • Steroid-induced diabetes mellitus (CMS/HCC)    • Tobacco use        Past Surgical History:   Procedure Laterality Date   • BACK SURGERY      L4-L5 discectomy   • CATARACT EXTRACTION Bilateral    • VASECTOMY         Current Outpatient Medications on File Prior to Visit   Medication Sig Dispense Refill   • Ascorbic Acid (VITAMIN C) 500 MG tablet Take by mouth.     • aspirin 81 MG chewable tablet Chew 81 mg Daily.     • atorvastatin (LIPITOR) 40 MG tablet Take 1 tablet by mouth Daily. 90 tablet 3   • beta carotene 61564 UNIT capsule Take by mouth.     • cetirizine (ZyrTEC) 10 MG tablet Take 10 mg by mouth daily.     • Cholecalciferol (VITAMIN D-3 PO) Take 2,000 Int'l Units/day by mouth Daily.     • FREESTYLE LITE test strip TEST TWO TIMES A  each 1   • golimumab (SIMPONI ARIA) 50 MG/4ML solution  injection Infuse  into a venous catheter. Infusion q8 weeks     • Lancets (FREESTYLE) lancets 1 each by Other route 2 (Two) Times a Day. Use as instructed 100 each 2   • lisinopril (PRINIVIL,ZESTRIL) 10 MG tablet Take 1 tablet by mouth Daily. 90 tablet 3   • MAGNESIUM PO Take by mouth.     • meclizine (ANTIVERT) 25 MG tablet Take 1 tablet by mouth 3 (Three) Times a Day As Needed for dizziness. 270 tablet 1   • nitroglycerin (NITROSTAT) 0.4 MG SL tablet Place under the tongue. dissolve 1 tab under tongue for chest pain - if pain remains after 5 minutes, call 911 and repeat dose. Max 3 tabs in 15 minutes.     • omeprazole (priLOSEC) 40 MG capsule TAKE 1 CAPSULE TWICE A  capsule 0   • oxyCODONE-acetaminophen (PERCOCET)  MG per tablet Take 1 tablet by mouth Every 6 (Six) Hours As Needed for Moderate Pain . 45 tablet 0   • pyridoxine (B-6) 100 MG tablet Take by mouth.     • sodium chloride 0.9 % solution 100 mL with golimumab 50 MG/4ML solution 2 mg/kg Infuse 2 mg/kg into a venous catheter 1 (One) Time.     • TRADJENTA 5 MG tablet tablet TAKE 1 TABLET DAILY 90 tablet 3   • Umeclidinium-Vilanterol (ANORO ELLIPTA IN) Inhale.     • vitamin E 400 UNIT capsule Take by mouth.       No current facility-administered medications on file prior to visit.      Allergies as of 05/09/2019 - Reviewed 05/08/2019   Allergen Reaction Noted   • Oxaprozin Hives 11/20/2015     Social History     Socioeconomic History   • Marital status:      Spouse name: Not on file   • Number of children: Not on file   • Years of education: Not on file   • Highest education level: Not on file   Tobacco Use   • Smoking status: Current Every Day Smoker     Types: Cigarettes   • Smokeless tobacco: Never Used   • Tobacco comment: 1/2 ppd, discussed benefits of cessation   Substance and Sexual Activity   • Alcohol use: No     Comment: no caffeine   • Drug use: No   • Sexual activity: No     Family History   Problem Relation Age of Onset   •  "Heart failure Mother         CHF   • Heart attack Father 54   • Heart attack Brother 47   • Heart disease Brother         another brother with 4 vessel CABG in his 50s   • Coronary artery disease Other         multiple family members       Review of Systems   Constitution: Positive for weight loss.   Cardiovascular: Positive for leg swelling.   Endocrine: Positive for cold intolerance.   All other systems reviewed and are negative.     Objective:     Vitals:    05/09/19 1027   BP: 110/70   BP Location: Right arm   Patient Position: Sitting   Cuff Size: Adult   Pulse: 79   Resp: 16   SpO2: 98%   Weight: 65.3 kg (144 lb)   Height: 170.2 cm (67.01\")     Body mass index is 22.55 kg/m².    Physical Exam   Constitutional: He is oriented to person, place, and time. He appears well-developed and well-nourished.   HENT:   Head: Normocephalic and atraumatic.   Eyes: Conjunctivae are normal.   Neck: Neck supple.   Cardiovascular: Normal rate and regular rhythm. Exam reveals no gallop and no friction rub.   Murmur heard.   Systolic murmur is present with a grade of 3/6 at the upper right sternal border and upper left sternal border.  Pulmonary/Chest: Effort normal and breath sounds normal.   Abdominal: Soft. There is no tenderness.   Musculoskeletal: He exhibits no edema.   Neurological: He is alert and oriented to person, place, and time.   Skin: Skin is warm.   Psychiatric: He has a normal mood and affect. His behavior is normal.     Lab Review:     ECG 12 Lead  Date/Time: 5/9/2019 10:32 AM  Performed by: Brian Harden MD  Authorized by: Brian Harden MD   Comparison: not compared with previous ECG   Previous ECG: no previous ECG available  Rhythm: sinus rhythm  Rate: normal  BPM: 87  Other findings: left atrial abnormality    Clinical impression: non-specific ECG          Cardiac Procedures:  1. Left heart catheterization on 08/14/2015, by Dr. Stearns showed the following. The  left main was normal. The left " anterior descending had luminal irregularities. The first  diagonal branch had a 60% proximal stenosis. The left circumflex had a 90% ulcerated  stenosis in the mid portion. There was 20% to 30% distal disease in the left circumflex  coronary artery. The left posterior descending artery had a 60% ostial lesion. The right  coronary artery was small, nondominant, and had a 30% lesion at the origin.  He then  underwent placement of a 3.0 mm x 18 mm Xience drug-eluting stent to the proximal   left circumflex; however, there was a proximal edge dissection necessitating  placement of a 3.0 mm x 12 mm Xience drug-eluting stent proximal to the first stent.   2.  Echocardiogram on 8/8/2017: Ejection fraction was 60%.  There was grade 1   diastolic dysfunction.  There was mild to moderate aortic stenosis with a peak   gradient of 33 mmHg and a mean gradient of 18 mmHg.  3.  Carotid artery Doppler ultrasound on 9/21/2017: There was a 60-70% stenosis in   the right internal carotid artery.  There was a 50-60% stenosis in the left internal   carotid artery.    Assessment:       Diagnosis Plan   1. Nonrheumatic aortic valve stenosis  Adult Transthoracic Echo Complete W/ Cont if Necessary Per Protocol   2. Coronary artery disease involving native coronary artery of native heart without angina pectoris     3. Bilateral carotid artery stenosis       Plan:       His shortness of breath does not change, and is likely related to ongoing tobacco use.  I have   advised him on multiple occasions to stop smoking if at all possible.  We again talked about this   today.  He may need a biopsy for possible lymphoma on a CT scan of his abdomen, and this is   being arranged.  He will continue on the aspirin and Lipitor for his coronary artery disease.  He   has had no angina.  He will continue on lisinopril.  I have not placed him on a beta-blocker in the   past secondary to his COPD and other lung issues.  He does need an echocardiogram at this    point to reevaluate the aortic stenosis.  I suspect that this is still in the moderate range based   on his exam and a mid peaking murmur.  I will see him back in 6 months, and I will plan on   checking a carotid artery ultrasound at that time.  He did have up to 60 to 70% disease in the   right internal carotid artery and 50 to 60% disease in the left internal carotid artery on 9/21/2017.    Coronary Artery Disease  Assessment  • The patient has no angina    Plan  • Lifestyle modifications discussed include adhering to a heart healthy diet, avoidance of tobacco products, maintenance of a healthy weight, medication compliance, regular exercise and regular monitoring of cholesterol and blood pressure    Subjective - Objective  • There has been a previous stent procedure using ANNEMARIE on or around 8/14/2015  • Current antiplatelet therapy includes aspirin 81 mg

## 2019-05-23 ENCOUNTER — OFFICE VISIT (OUTPATIENT)
Dept: INTERNAL MEDICINE | Facility: CLINIC | Age: 79
End: 2019-05-23

## 2019-05-23 VITALS
WEIGHT: 145 LBS | DIASTOLIC BLOOD PRESSURE: 80 MMHG | SYSTOLIC BLOOD PRESSURE: 110 MMHG | HEIGHT: 67 IN | BODY MASS INDEX: 22.76 KG/M2

## 2019-05-23 DIAGNOSIS — C85.93 NON-HODGKIN LYMPHOMA OF INTRA-ABDOMINAL LYMPH NODES, UNSPECIFIED NON-HODGKIN LYMPHOMA TYPE (HCC): Primary | ICD-10-CM

## 2019-05-23 DIAGNOSIS — E78.5 HYPERLIPIDEMIA, UNSPECIFIED HYPERLIPIDEMIA TYPE: ICD-10-CM

## 2019-05-23 DIAGNOSIS — E11.9 TYPE 2 DIABETES MELLITUS WITHOUT COMPLICATION, WITHOUT LONG-TERM CURRENT USE OF INSULIN (HCC): ICD-10-CM

## 2019-05-23 PROCEDURE — 99213 OFFICE O/P EST LOW 20 MIN: CPT | Performed by: FAMILY MEDICINE

## 2019-05-23 NOTE — PROGRESS NOTES
Subjective   Edward Srinivasan is a 79 y.o. male. CC: test results, diabetes    History of Present Illness    is a 79 year old male who comes in today for results of a recent biopsy.  The biopsy was done on an abdominal mass on 5/20.  The biopsy results are positive for non Hodgkins lymphoma.  Special stains are still pending.  The patient states that he had been told that it was probably cancer.  He is agreeable to scheduling an appointment with the [facility].    Also wanted to review recent labs which were done on 5/16.  Everything looks good on the CMP, Lipid Panel and the A1C was excellent at 6.  Diabetes is under good control.  Lipids are also controlled--total is low, HDL is a little low but the LDL is excellent.    Reviewed medications--no changes today.        The following portions of the patient's history were reviewed and updated as appropriate: allergies, current medications, past family history, past medical history, past social history, past surgical history and problem list.    Review of Systems   Constitutional: Negative for activity change, appetite change, fatigue and unexpected weight change.   HENT: Negative.    Respiratory: Negative for cough, chest tightness and shortness of breath.    Cardiovascular: Negative for chest pain, palpitations and leg swelling.   Gastrointestinal: Negative for abdominal distention, abdominal pain, constipation, diarrhea and nausea.   Genitourinary: Negative.  Negative for difficulty urinating.   Skin: Negative for rash.       Objective   Physical Exam   Constitutional: He appears well-developed and well-nourished. No distress.   Neck: Normal range of motion. Neck supple.   Cardiovascular: Normal rate, regular rhythm and normal heart sounds.   Pulmonary/Chest: Effort normal and breath sounds normal.   Lymphadenopathy:     He has no cervical adenopathy.   Skin: Skin is warm and dry. No rash noted. He is not diaphoretic.   Psychiatric: He has a normal mood and  "affect. His behavior is normal.   Nursing note and vitals reviewed.    Vitals:    05/23/19 0842   BP: 110/80   Weight: 65.8 kg (145 lb)   Height: 170.2 cm (67.01\")     Assessment/Plan    was seen today for hypertension, hyperlipidemia and diabetes.    Diagnoses and all orders for this visit:    Non-Hodgkin lymphoma of intra-abdominal lymph nodes, unspecified non-Hodgkin lymphoma type (CMS/HCC)  -     Ambulatory Referral to Hematology / Oncology    Hyperlipidemia, unspecified hyperlipidemia type    Type 2 diabetes mellitus without complication, without long-term current use of insulin (CMS/[facility])      He will return per his usual 3 months routine and will have labs done before the appointment.         "

## 2019-05-28 ENCOUNTER — CLINICAL SUPPORT (OUTPATIENT)
Dept: ONCOLOGY | Facility: CLINIC | Age: 79
End: 2019-05-28

## 2019-05-28 ENCOUNTER — CONSULT (OUTPATIENT)
Dept: ONCOLOGY | Facility: CLINIC | Age: 79
End: 2019-05-28

## 2019-05-28 ENCOUNTER — LAB (OUTPATIENT)
Dept: LAB | Facility: HOSPITAL | Age: 79
End: 2019-05-28

## 2019-05-28 VITALS
DIASTOLIC BLOOD PRESSURE: 69 MMHG | BODY MASS INDEX: 22.74 KG/M2 | HEIGHT: 67 IN | HEART RATE: 84 BPM | TEMPERATURE: 97.5 F | WEIGHT: 144.9 LBS | SYSTOLIC BLOOD PRESSURE: 115 MMHG | OXYGEN SATURATION: 100 % | RESPIRATION RATE: 16 BRPM

## 2019-05-28 DIAGNOSIS — C82.90 NHL (NODULAR HISTIOCYTIC LYMPHOMA) (HCC): Primary | ICD-10-CM

## 2019-05-28 DIAGNOSIS — D84.9 IMMUNODEFICIENCY, UNSPECIFIED (HCC): ICD-10-CM

## 2019-05-28 DIAGNOSIS — E11.42 DIABETIC PERIPHERAL NEUROPATHY (HCC): ICD-10-CM

## 2019-05-28 DIAGNOSIS — D69.6 THROMBOCYTOPENIA (HCC): ICD-10-CM

## 2019-05-28 DIAGNOSIS — C83.33 DIFFUSE LARGE B-CELL LYMPHOMA OF INTRA-ABDOMINAL LYMPH NODES (HCC): Primary | ICD-10-CM

## 2019-05-28 LAB
ALBUMIN SERPL-MCNC: 4 G/DL (ref 3.5–5.2)
ALBUMIN/GLOB SERPL: 1.1 G/DL (ref 1.1–2.4)
ALP SERPL-CCNC: 63 U/L (ref 38–116)
ALT SERPL W P-5'-P-CCNC: 12 U/L (ref 0–41)
ANION GAP SERPL CALCULATED.3IONS-SCNC: 9 MMOL/L
AST SERPL-CCNC: 18 U/L (ref 0–40)
BASOPHILS # BLD AUTO: 0.06 10*3/MM3 (ref 0–0.2)
BASOPHILS NFR BLD AUTO: 0.7 % (ref 0–1.5)
BILIRUB SERPL-MCNC: 0.3 MG/DL (ref 0.2–1.2)
BUN BLD-MCNC: 18 MG/DL (ref 6–20)
BUN/CREAT SERPL: 24.3 (ref 7.3–30)
CALCIUM SPEC-SCNC: 8.8 MG/DL (ref 8.5–10.2)
CHLORIDE SERPL-SCNC: 97 MMOL/L (ref 98–107)
CO2 SERPL-SCNC: 28 MMOL/L (ref 22–29)
CREAT BLD-MCNC: 0.74 MG/DL (ref 0.7–1.3)
CYTO UR: NORMAL
DEPRECATED RDW RBC AUTO: 45.1 FL (ref 37–54)
EOSINOPHIL # BLD AUTO: 0.5 10*3/MM3 (ref 0–0.4)
EOSINOPHIL NFR BLD AUTO: 6.1 % (ref 0.3–6.2)
ERYTHROCYTE [DISTWIDTH] IN BLOOD BY AUTOMATED COUNT: 14.3 % (ref 12.3–15.4)
GFR SERPL CREATININE-BSD FRML MDRD: 103 ML/MIN/1.73
GLOBULIN UR ELPH-MCNC: 3.7 GM/DL (ref 1.8–3.5)
GLUCOSE BLD-MCNC: 96 MG/DL (ref 74–124)
HCT VFR BLD AUTO: 32.8 % (ref 37.5–51)
HGB BLD-MCNC: 11.3 G/DL (ref 13–17.7)
IMM GRANULOCYTES # BLD AUTO: 0.04 10*3/MM3 (ref 0–0.05)
IMM GRANULOCYTES NFR BLD AUTO: 0.5 % (ref 0–0.5)
LAB AP CASE REPORT: NORMAL
LAB AP CLINICAL INFORMATION: NORMAL
LAB AP DIAGNOSIS COMMENT: NORMAL
LDH SERPL-CCNC: 341 U/L (ref 99–259)
LYMPHOCYTES # BLD AUTO: 2.18 10*3/MM3 (ref 0.7–3.1)
LYMPHOCYTES NFR BLD AUTO: 26.6 % (ref 19.6–45.3)
MCH RBC QN AUTO: 29.8 PG (ref 26.6–33)
MCHC RBC AUTO-ENTMCNC: 34.5 G/DL (ref 31.5–35.7)
MCV RBC AUTO: 86.5 FL (ref 79–97)
MONOCYTES # BLD AUTO: 0.82 10*3/MM3 (ref 0.1–0.9)
MONOCYTES NFR BLD AUTO: 10 % (ref 5–12)
NEUTROPHILS # BLD AUTO: 4.59 10*3/MM3 (ref 1.7–7)
NEUTROPHILS NFR BLD AUTO: 56.1 % (ref 42.7–76)
NRBC BLD AUTO-RTO: 0 /100 WBC (ref 0–0.2)
PATH REPORT.ADDENDUM SPEC: NORMAL
PATH REPORT.FINAL DX SPEC: NORMAL
PATH REPORT.GROSS SPEC: NORMAL
PLATELET # BLD AUTO: 205 10*3/MM3 (ref 140–450)
PMV BLD AUTO: 8.7 FL (ref 6–12)
POTASSIUM BLD-SCNC: 4.9 MMOL/L (ref 3.5–4.7)
PROT SERPL-MCNC: 7.7 G/DL (ref 6.3–8)
RBC # BLD AUTO: 3.79 10*6/MM3 (ref 4.14–5.8)
SODIUM BLD-SCNC: 134 MMOL/L (ref 134–145)
URATE SERPL-MCNC: 4.4 MG/DL (ref 2.8–7.4)
WBC NRBC COR # BLD: 8.19 10*3/MM3 (ref 3.4–10.8)

## 2019-05-28 PROCEDURE — 80053 COMPREHEN METABOLIC PANEL: CPT | Performed by: INTERNAL MEDICINE

## 2019-05-28 PROCEDURE — 85025 COMPLETE CBC W/AUTO DIFF WBC: CPT | Performed by: INTERNAL MEDICINE

## 2019-05-28 PROCEDURE — 83615 LACTATE (LD) (LDH) ENZYME: CPT | Performed by: INTERNAL MEDICINE

## 2019-05-28 PROCEDURE — 84550 ASSAY OF BLOOD/URIC ACID: CPT | Performed by: INTERNAL MEDICINE

## 2019-05-28 PROCEDURE — 36415 COLL VENOUS BLD VENIPUNCTURE: CPT | Performed by: INTERNAL MEDICINE

## 2019-05-28 PROCEDURE — 99205 OFFICE O/P NEW HI 60 MIN: CPT | Performed by: INTERNAL MEDICINE

## 2019-05-28 NOTE — PROGRESS NOTES
"REFERRING PROVIDER:    Raven Brown MD  [address] [place] [address]    REASON FOR CONSULTATION:    1.  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  CT imaging of the abdomen and pelvis on 5/8/2019 shows multiple small tissue nodules in the perirenal fat adjacent to the left kidney and a soft tissue mass interposed between the left psoas muscle and spine.    HISTORY OF PRESENT ILLNESS:  Edward Srinivasan is a 79 y.o. 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    He has a history of rheumatoid arthritis currently on injections by Dr. Velásquez, type 2 diabetes on oral medication, coronary artery disease status post stenting x2, aortic stenosis, and nonmelanoma skin cancer resected from his face.  Continues to smoke a little bit less than a half a pack per day and has done this for several decades.    He has a long history of chronic back pain.  In March 2019 he developed some low back pain on the left.  With some physical therapy this is actually improved significantly but over the past couple of weeks he has developed some right-sided back pain.  Actually over the past couple of days this has essentially resolved.  He denies any radiculopathy.  He has chronic vision changes but nothing acute.  He denies any headaches.  No other focal neurologic changes.  He has occasional lightheadedness but this is been a chronic problem for him.  No fevers.  He is cold a lot but no Reiger's.  No night sweats.  He has lost a little bit of weight which she relates to his rheumatoid arthritis and decreased appetite secondary to pain.    He had an MRI of his lumbar spine on 4/25/2019 showing some degenerative changes but also there was a soft tissue mass extending posteriorly from the left common iliac vessels hugging the lateral margin of the " L5 vertebrae extending from the left lateral margin of L4-5 interspace to L5-S1 where it pressed on the left L5 nerve root.  The mass measures 4.4 x 2.1 x 3.5 cm.  CT imaging was suggested and performed on 5/8/2019.  The CT scan showed multiple soft tissue nodules in the perirenal fat on the left adjacent to the kidney and a soft tissue mass interposed between the left psoas muscle and spine.  The mass measures 4.1 x 2.1 cm.    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           Past Medical History:   Diagnosis Date   • Aortic valve stenosis     Mild to moderate AS by echo on 8/8/17   • CAD (coronary artery disease)    • Carotid arterial disease (CMS/HCC)     Followed by Dr. Floyd.  Carotid Doppler on 9/21/17: 60-70% DENISE, 50-60% LICA.   • Chronic obstructive lung disease (CMS/HCC)    • Esophageal reflux     With a history of erosive gastritis   • Hyperlipidemia    • Hypertension    • Iron deficiency    • Pulmonary fibrosis (CMS/HCC)     By CT scan on 8/12/15   • PVC (premature ventricular contraction)    • Rheumatoid arthritis (CMS/HCC)    • Steroid-induced diabetes mellitus (CMS/HCC)    • Tobacco use        Past Surgical History:   Procedure Laterality Date   • BACK SURGERY      L4-L5 discectomy   • CATARACT EXTRACTION Bilateral    • VASECTOMY         SOCIAL HISTORY:   reports that he has been smoking cigarettes.  He has never used smokeless tobacco. He reports that he does not drink alcohol or use drugs.    FAMILY HISTORY:  family history includes Coronary artery disease in his other; Heart attack (age of onset: 47) in his brother; Heart attack (age of onset: 54) in his father; Heart disease in his brother; Heart failure in his mother.    ALLERGIES:  Allergies   Allergen Reactions   • Oxaprozin Hives       MEDICATIONS:  The medication list has been reviewed with  "the patient by the medical assistant, and the list has been updated in the electronic medical record, which I reviewed.  Medication dosages and frequencies were confirmed to be accurate.    Review of Systems   Review of Systems   Constitutional: Negative for appetite change, chills, fatigue, fever and unexpected weight change.   HENT: Negative for congestion, dental problem, ear pain, hearing loss, mouth sores, nosebleeds, postnasal drip, rhinorrhea, tinnitus and trouble swallowing.    Eyes: Positive for visual disturbance (chronic).   Respiratory: Negative for cough, chest tightness, shortness of breath, wheezing and stridor.    Cardiovascular: Negative for chest pain, palpitations and leg swelling.   Gastrointestinal: Negative for abdominal distention, abdominal pain, blood in stool, constipation, diarrhea, nausea and vomiting.   Endocrine: Negative.    Genitourinary: Negative for decreased urine volume, difficulty urinating, dysuria, flank pain, frequency, hematuria, scrotal swelling, testicular pain and urgency.   Musculoskeletal: Positive for back pain. Negative for arthralgias and joint swelling.   Allergic/Immunologic: Negative.    Neurological: Positive for light-headedness. Negative for dizziness, seizures, syncope, weakness, numbness and headaches.   Hematological: Negative for adenopathy. Does not bruise/bleed easily.   Psychiatric/Behavioral: Negative for confusion and sleep disturbance. The patient is not nervous/anxious.    All other systems reviewed and are negative.      Vitals:    05/28/19 1305   BP: 115/69   Pulse: 84   Resp: 16   Temp: 97.5 °F (36.4 °C)   TempSrc: Oral   SpO2: 100%   Weight: 65.7 kg (144 lb 14.4 oz)   Height: 170.2 cm (67\")  Comment: states ht   PainSc: 0-No pain       Physical Exam   Constitutional: He is oriented to person, place, and time. He appears well-developed and well-nourished. No distress.   HENT:   Head: Normocephalic and atraumatic. Hair is normal.   Mouth/Throat: " Oropharynx is clear and moist.   Eyes: Conjunctivae, EOM and lids are normal. Pupils are equal.   Neck: Neck supple. No JVD present. No thyroid mass and no thyromegaly present.   Cardiovascular: Normal rate and regular rhythm. Exam reveals no gallop and no friction rub.   Murmur heard.   Systolic murmur is present with a grade of 2/6.  Pulmonary/Chest: Effort normal and breath sounds normal. No respiratory distress. He has no wheezes. He has no rales.   Abdominal: Soft. He exhibits no distension and no mass. There is no splenomegaly or hepatomegaly. There is no tenderness. There is no guarding.   Musculoskeletal: Normal range of motion. He exhibits no edema, tenderness or deformity.   Lymphadenopathy:     He has no cervical adenopathy.     He has no axillary adenopathy.        Right: No inguinal and no supraclavicular adenopathy present.        Left: No inguinal and no supraclavicular adenopathy present.   Neurological: He is alert and oriented to person, place, and time. He has normal strength.   Skin: Skin is warm and dry. No rash noted.   Psychiatric: He has a normal mood and affect. His behavior is normal. Judgment and thought content normal. Cognition and memory are normal.   Nursing note and vitals reviewed.      DIAGNOSTIC DATA:    Results for orders placed or performed in visit on 05/28/19   CBC Auto Differential   Result Value Ref Range    WBC 8.19 3.40 - 10.80 10*3/mm3    RBC 3.79 (L) 4.14 - 5.80 10*6/mm3    Hemoglobin 11.3 (L) 13.0 - 17.7 g/dL    Hematocrit 32.8 (L) 37.5 - 51.0 %    MCV 86.5 79.0 - 97.0 fL    MCH 29.8 26.6 - 33.0 pg    MCHC 34.5 31.5 - 35.7 g/dL    RDW 14.3 12.3 - 15.4 %    RDW-SD 45.1 37.0 - 54.0 fl    MPV 8.7 6.0 - 12.0 fL    Platelets 205 140 - 450 10*3/mm3    Neutrophil % 56.1 42.7 - 76.0 %    Lymphocyte % 26.6 19.6 - 45.3 %    Monocyte % 10.0 5.0 - 12.0 %    Eosinophil % 6.1 0.3 - 6.2 %    Basophil % 0.7 0.0 - 1.5 %    Immature Grans % 0.5 0.0 - 0.5 %    Neutrophils, Absolute 4.59  "1.70 - 7.00 10*3/mm3    Lymphocytes, Absolute 2.18 0.70 - 3.10 10*3/mm3    Monocytes, Absolute 0.82 0.10 - 0.90 10*3/mm3    Eosinophils, Absolute 0.50 (H) 0.00 - 0.40 10*3/mm3    Basophils, Absolute 0.06 0.00 - 0.20 10*3/mm3    Immature Grans, Absolute 0.04 0.00 - 0.05 10*3/mm3    nRBC 0.0 0.0 - 0.2 /100 WBC       IMAGING:    I personally reviewed CT images of the abdomen and pelvis from 5/8/2019.  Multiple left perirenal soft tissue nodules with the soft tissue mass between the left psoas muscle and spine.    IMPRESSION:  Multiple small soft tissue nodules in the perirenal fat  adjacent to the kidney and also a soft tissue mass interposed between  the left psoas muscle and spine. The primary differential diagnostic  considerations would be lymphoma. Further evaluation with percutaneous  CT guided biopsy is suggested. A lucent lesion with sclerotic margins in  the L3 vertebral body is most likely a large Schmorl's node. There is a  small noncalcified pulmonary nodule at the right lung base posteriorly.    ASSESSMENT:  This is a 79 y.o. male with:  1.  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  Paraspinous involvement puts him at extremely high risk for CNS involvement.  I explained to him and his wife today that this is a very aggressive lymphoma.  We need staging with a PET scan, bone marrow aspiration and biopsy, and a lumbar puncture.  Once we have more staging information we will determine a treatment plan.  He is quite asymptomatic at this time.  He is in reasonably good shape for his age.  If we do find that he he has a high disease burden, we will consider inpatient treatment with dose adjusted R-EPOCH.  If he has a low disease burden, we might consider outpatient therapy with [facility] Rituxan.  Regardless, he will need at least CNS prophylaxis if there is no evidence for CNS involvement.  I did also advise him today " that he will need a Mediport for treatment.    2.  Rheumatoid arthritis currently on Cimzia (certolizumab) injections by Dr. Velásquez.  I advised him that he will need to discontinue these during chemotherapy.  He was previously on Simponi Aria (golimumab) and Actemra (tocilizumab).  These certainly could contribute to the development of lymphoma.  I learned of his treatment history from Dr. Velásquez's notes after his visit today, so I did not discuss the link between these agents and lymphoma with him today.        3.  Coronary artery disease status post stenting as well as moderate aortic stenosis by echocardiogram.  His left ventricular ejection fraction is normal at 60% by echocardiogram on 5/14/2019..    PLAN:   1.  PET scan, lumbar puncture, and bone marrow aspiration and biopsy as soon as possible.  2.  I will see him back soon after we have this data and make further plans for treatment at that time.

## 2019-05-29 ENCOUNTER — HOSPITAL ENCOUNTER (OUTPATIENT)
Dept: CT IMAGING | Facility: HOSPITAL | Age: 79
Discharge: HOME | End: 2019-05-29
Admitting: RADIOLOGY

## 2019-05-29 VITALS
HEART RATE: 79 BPM | RESPIRATION RATE: 16 BRPM | DIASTOLIC BLOOD PRESSURE: 59 MMHG | WEIGHT: 145 LBS | HEIGHT: 67 IN | SYSTOLIC BLOOD PRESSURE: 97 MMHG | BODY MASS INDEX: 22.76 KG/M2 | OXYGEN SATURATION: 100 % | TEMPERATURE: 97 F

## 2019-05-29 DIAGNOSIS — C83.33 DIFFUSE LARGE B-CELL LYMPHOMA OF INTRA-ABDOMINAL LYMPH NODES (HCC): Primary | ICD-10-CM

## 2019-05-29 PROCEDURE — 77012 CT SCAN FOR NEEDLE BIOPSY: CPT

## 2019-05-29 PROCEDURE — 88264 CHROMOSOME ANALYSIS 20-25: CPT

## 2019-05-29 PROCEDURE — 88182 CELL MARKER STUDY: CPT

## 2019-05-29 PROCEDURE — 88300 SURGICAL PATH GROSS: CPT | Performed by: INTERNAL MEDICINE

## 2019-05-29 PROCEDURE — 88313 SPECIAL STAINS GROUP 2: CPT | Performed by: INTERNAL MEDICINE

## 2019-05-29 PROCEDURE — 88237 TISSUE CULTURE BONE MARROW: CPT

## 2019-05-29 PROCEDURE — 88184 FLOWCYTOMETRY/ TC 1 MARKER: CPT

## 2019-05-29 PROCEDURE — 88185 FLOWCYTOMETRY/TC ADD-ON: CPT

## 2019-05-29 PROCEDURE — 88305 TISSUE EXAM BY PATHOLOGIST: CPT | Performed by: INTERNAL MEDICINE

## 2019-05-29 PROCEDURE — 88311 DECALCIFY TISSUE: CPT | Performed by: INTERNAL MEDICINE

## 2019-05-29 RX ORDER — SODIUM CHLORIDE 0.9 % (FLUSH) 0.9 %
3 SYRINGE (ML) INJECTION EVERY 12 HOURS SCHEDULED
Status: DISCONTINUED | OUTPATIENT
Start: 2019-05-29 | End: 2019-05-30 | Stop reason: HOSPADM

## 2019-05-29 RX ORDER — SODIUM CHLORIDE 0.9 % (FLUSH) 0.9 %
1-10 SYRINGE (ML) INJECTION AS NEEDED
Status: DISCONTINUED | OUTPATIENT
Start: 2019-05-29 | End: 2019-05-30 | Stop reason: HOSPADM

## 2019-05-29 RX ORDER — LIDOCAINE HYDROCHLORIDE 10 MG/ML
20 INJECTION, SOLUTION INFILTRATION; PERINEURAL ONCE
Status: COMPLETED | OUTPATIENT
Start: 2019-05-29 | End: 2019-05-29

## 2019-05-29 RX ADMIN — LIDOCAINE HYDROCHLORIDE 20 ML: 10 INJECTION, SOLUTION INFILTRATION; PERINEURAL at 13:52

## 2019-05-30 ENCOUNTER — HOSPITAL ENCOUNTER (OUTPATIENT)
Dept: PET IMAGING | Facility: HOSPITAL | Age: 79
Discharge: HOME | End: 2019-05-30

## 2019-05-30 ENCOUNTER — HOSPITAL ENCOUNTER (OUTPATIENT)
Dept: PET IMAGING | Facility: HOSPITAL | Age: 79
Discharge: HOME | End: 2019-05-30
Admitting: INTERNAL MEDICINE

## 2019-05-30 ENCOUNTER — TELEPHONE (OUTPATIENT)
Dept: INTERVENTIONAL RADIOLOGY/VASCULAR | Facility: HOSPITAL | Age: 79
End: 2019-05-30

## 2019-05-30 DIAGNOSIS — C83.33 DIFFUSE LARGE B-CELL LYMPHOMA OF INTRA-ABDOMINAL LYMPH NODES (HCC): ICD-10-CM

## 2019-05-30 LAB — GLUCOSE BLDC GLUCOMTR-MCNC: 87 MG/DL (ref 70–130)

## 2019-05-30 PROCEDURE — A9552 F18 FDG: HCPCS | Performed by: INTERNAL MEDICINE

## 2019-05-30 PROCEDURE — 0 FLUDEOXYGLUCOSE F18 SOLUTION: Performed by: INTERNAL MEDICINE

## 2019-05-30 PROCEDURE — 78815 PET IMAGE W/CT SKULL-THIGH: CPT

## 2019-05-30 PROCEDURE — 82962 GLUCOSE BLOOD TEST: CPT

## 2019-05-30 RX ADMIN — FLUDEOXYGLUCOSE F18 1 DOSE: 300 INJECTION INTRAVENOUS at 13:04

## 2019-05-31 ENCOUNTER — HOSPITAL ENCOUNTER (OUTPATIENT)
Dept: GENERAL RADIOLOGY | Facility: HOSPITAL | Age: 79
Discharge: HOME | End: 2019-05-31
Admitting: INTERNAL MEDICINE

## 2019-05-31 VITALS
SYSTOLIC BLOOD PRESSURE: 117 MMHG | HEART RATE: 81 BPM | TEMPERATURE: 97 F | OXYGEN SATURATION: 100 % | HEIGHT: 67 IN | DIASTOLIC BLOOD PRESSURE: 65 MMHG | WEIGHT: 145 LBS | RESPIRATION RATE: 16 BRPM | BODY MASS INDEX: 22.76 KG/M2

## 2019-05-31 DIAGNOSIS — C83.33 DIFFUSE LARGE B-CELL LYMPHOMA OF INTRA-ABDOMINAL LYMPH NODES (HCC): ICD-10-CM

## 2019-05-31 LAB
APPEARANCE CSF: CLEAR
APPEARANCE CSF: CLEAR
COLOR CSF: COLORLESS
COLOR CSF: COLORLESS
COLOR SPUN CSF: COLORLESS
COLOR SPUN CSF: COLORLESS
GLUCOSE CSF-MCNC: 67 MG/DL (ref 40–70)
METHOD: ABNORMAL
METHOD: ABNORMAL
NUC CELL # CSF MANUAL: 2 /MM3 (ref 0–5)
NUC CELL # CSF MANUAL: 2 /MM3 (ref 0–5)
PROT CSF-MCNC: 41 MG/DL (ref 15–45)
RBC # CSF MANUAL: 172 /MM3 (ref 0–0)
RBC # CSF MANUAL: 4 /MM3 (ref 0–0)
TUBE # CSF: 1
TUBE # CSF: 4

## 2019-05-31 PROCEDURE — 89050 BODY FLUID CELL COUNT: CPT | Performed by: INTERNAL MEDICINE

## 2019-05-31 PROCEDURE — 87070 CULTURE OTHR SPECIMN AEROBIC: CPT | Performed by: INTERNAL MEDICINE

## 2019-05-31 PROCEDURE — 82945 GLUCOSE OTHER FLUID: CPT | Performed by: INTERNAL MEDICINE

## 2019-05-31 PROCEDURE — 87205 SMEAR GRAM STAIN: CPT | Performed by: INTERNAL MEDICINE

## 2019-05-31 PROCEDURE — 88185 FLOWCYTOMETRY/TC ADD-ON: CPT

## 2019-05-31 PROCEDURE — 88108 CYTOPATH CONCENTRATE TECH: CPT | Performed by: INTERNAL MEDICINE

## 2019-05-31 PROCEDURE — 88184 FLOWCYTOMETRY/ TC 1 MARKER: CPT

## 2019-05-31 PROCEDURE — 77003 FLUOROGUIDE FOR SPINE INJECT: CPT

## 2019-05-31 PROCEDURE — 87015 SPECIMEN INFECT AGNT CONCNTJ: CPT | Performed by: INTERNAL MEDICINE

## 2019-05-31 PROCEDURE — 84157 ASSAY OF PROTEIN OTHER: CPT | Performed by: INTERNAL MEDICINE

## 2019-05-31 RX ORDER — LIDOCAINE HYDROCHLORIDE 10 MG/ML
10 INJECTION, SOLUTION INFILTRATION; PERINEURAL ONCE
Status: DISCONTINUED | OUTPATIENT
Start: 2019-05-31 | End: 2019-06-01 | Stop reason: HOSPADM

## 2019-05-31 RX ORDER — LIDOCAINE HYDROCHLORIDE 10 MG/ML
10 INJECTION, SOLUTION INFILTRATION; PERINEURAL ONCE
Status: COMPLETED | OUTPATIENT
Start: 2019-05-31 | End: 2019-05-31

## 2019-05-31 RX ADMIN — LIDOCAINE HYDROCHLORIDE 10 ML: 10 INJECTION, SOLUTION INFILTRATION; PERINEURAL at 14:04

## 2019-06-01 ENCOUNTER — TELEPHONE (OUTPATIENT)
Dept: INTERVENTIONAL RADIOLOGY/VASCULAR | Facility: HOSPITAL | Age: 79
End: 2019-06-01

## 2019-06-01 LAB — REF LAB TEST METHOD: NORMAL

## 2019-06-03 LAB
BACTERIA SPEC AEROBE CULT: NORMAL
GRAM STN SPEC: NORMAL

## 2019-06-04 LAB
CYTO UR: NORMAL
LAB AP CASE REPORT: NORMAL
LAB AP DIAGNOSIS COMMENT: NORMAL
PATH REPORT.FINAL DX SPEC: NORMAL
PATH REPORT.GROSS SPEC: NORMAL

## 2019-06-07 ENCOUNTER — OFFICE VISIT (OUTPATIENT)
Dept: ONCOLOGY | Facility: CLINIC | Age: 79
End: 2019-06-07

## 2019-06-07 ENCOUNTER — LAB (OUTPATIENT)
Dept: LAB | Facility: HOSPITAL | Age: 79
End: 2019-06-07

## 2019-06-07 VITALS
SYSTOLIC BLOOD PRESSURE: 111 MMHG | OXYGEN SATURATION: 100 % | BODY MASS INDEX: 22.68 KG/M2 | TEMPERATURE: 98 F | WEIGHT: 144.5 LBS | HEART RATE: 87 BPM | RESPIRATION RATE: 16 BRPM | DIASTOLIC BLOOD PRESSURE: 68 MMHG | HEIGHT: 67 IN

## 2019-06-07 DIAGNOSIS — C83.33 DIFFUSE LARGE B-CELL LYMPHOMA OF INTRA-ABDOMINAL LYMPH NODES (HCC): ICD-10-CM

## 2019-06-07 DIAGNOSIS — C83.33 DIFFUSE LARGE B-CELL LYMPHOMA OF INTRA-ABDOMINAL LYMPH NODES (HCC): Primary | ICD-10-CM

## 2019-06-07 LAB
BASOPHILS # BLD AUTO: 0.06 10*3/MM3 (ref 0–0.2)
BASOPHILS NFR BLD AUTO: 0.7 % (ref 0–1.5)
DEPRECATED RDW RBC AUTO: 44.9 FL (ref 37–54)
EOSINOPHIL # BLD AUTO: 0.51 10*3/MM3 (ref 0–0.4)
EOSINOPHIL NFR BLD AUTO: 5.8 % (ref 0.3–6.2)
ERYTHROCYTE [DISTWIDTH] IN BLOOD BY AUTOMATED COUNT: 14.2 % (ref 12.3–15.4)
HCT VFR BLD AUTO: 31.9 % (ref 37.5–51)
HGB BLD-MCNC: 10.9 G/DL (ref 13–17.7)
IMM GRANULOCYTES # BLD AUTO: 0.05 10*3/MM3 (ref 0–0.05)
IMM GRANULOCYTES NFR BLD AUTO: 0.6 % (ref 0–0.5)
LYMPHOCYTES # BLD AUTO: 2.01 10*3/MM3 (ref 0.7–3.1)
LYMPHOCYTES NFR BLD AUTO: 22.8 % (ref 19.6–45.3)
MCH RBC QN AUTO: 29.5 PG (ref 26.6–33)
MCHC RBC AUTO-ENTMCNC: 34.2 G/DL (ref 31.5–35.7)
MCV RBC AUTO: 86.4 FL (ref 79–97)
MONOCYTES # BLD AUTO: 0.77 10*3/MM3 (ref 0.1–0.9)
MONOCYTES NFR BLD AUTO: 8.7 % (ref 5–12)
NEUTROPHILS # BLD AUTO: 5.42 10*3/MM3 (ref 1.7–7)
NEUTROPHILS NFR BLD AUTO: 61.4 % (ref 42.7–76)
NRBC BLD AUTO-RTO: 0 /100 WBC (ref 0–0.2)
PLATELET # BLD AUTO: 241 10*3/MM3 (ref 140–450)
PMV BLD AUTO: 8.6 FL (ref 6–12)
RBC # BLD AUTO: 3.69 10*6/MM3 (ref 4.14–5.8)
WBC NRBC COR # BLD: 8.82 10*3/MM3 (ref 3.4–10.8)

## 2019-06-07 PROCEDURE — 85025 COMPLETE CBC W/AUTO DIFF WBC: CPT | Performed by: INTERNAL MEDICINE

## 2019-06-07 PROCEDURE — 99215 OFFICE O/P EST HI 40 MIN: CPT | Performed by: INTERNAL MEDICINE

## 2019-06-07 PROCEDURE — 36415 COLL VENOUS BLD VENIPUNCTURE: CPT | Performed by: INTERNAL MEDICINE

## 2019-06-07 RX ORDER — ALPRAZOLAM 0.25 MG/1
0.25 TABLET ORAL 2 TIMES DAILY PRN
Qty: 60 TABLET | Refills: 0 | Status: SHIPPED | OUTPATIENT
Start: 2019-06-07 | End: 2019-06-22 | Stop reason: HOSPADM

## 2019-06-07 NOTE — PROGRESS NOTES
"[facility] GROUP OUTPATIENT FOLLOW UP CLINIC VISIT    REASON FOR FOLLOW-UP:    1.  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  CT imaging of the abdomen and pelvis on 5/8/2019 shows multiple small tissue nodules in the perirenal fat adjacent to the left kidney and a soft tissue mass interposed between the left psoas muscle and spine.  2.  RA currently on Cimzia (certollizumab) injections by Dr. Velásquez  3.  Lumbar puncture 5/31/2019- for involvement with lymphoma  4.  Bone marrow aspiration and biopsy 5/29/2018 negative for lymphoma  5.  PET scan on 5/30/2019 with hypermetabolic perinephric soft tissue lesions with mesenteric lymphadenopathy, left paraspinal mass at L5, 6 cm intramuscular mass in the right upper thigh, lesion at the left clavicle and a 5 cm soft tissue mass between the right scapula and right posterior fourth rib.    HISTORY OF PRESENT ILLNESS:  Edward Srinivasan is a 79 y.o. male who returns today for follow up of the above issue.  He returns today to review the results of the PET scan, lumbar puncture, and bone marrow biopsy.  He tolerated all these procedures very well.  Today he became very anxious and requests something for anxiety today.        ONCOLOGIC HISTORY:  He has a history of rheumatoid arthritis currently on injections by Dr. Velásquez, type 2 diabetes on oral medication, coronary artery disease status post stenting x2, aortic stenosis, and nonmelanoma skin cancer resected from his face.  Continues to smoke a little bit less than a half a pack per day and has done this for several decades.     He has a long history of chronic back pain.  In March 2019 he developed some low back pain on the left.  With some physical therapy this is actually improved significantly but over the past couple of weeks he has developed some right-sided back pain.  Actually over the past couple of days " this has essentially resolved.  He denies any radiculopathy.  He has chronic vision changes but nothing acute.  He denies any headaches.  No other focal neurologic changes.  He has occasional lightheadedness but this is been a chronic problem for him.  No fevers.  He is cold a lot but no rigors.  No night sweats.  He has lost a little bit of weight which she relates to his rheumatoid arthritis and decreased appetite secondary to pain.     He had an MRI of his lumbar spine on 4/25/2019 showing some degenerative changes but also there was a soft tissue mass extending posteriorly from the left common iliac vessels hugging the lateral margin of the L5 vertebrae extending from the left lateral margin of L4-5 interspace to L5-S1 where it pressed on the left L5 nerve root.  The mass measures 4.4 x 2.1 x 3.5 cm.  CT imaging was suggested and performed on 5/8/2019.  The CT scan showed multiple soft tissue nodules in the perirenal fat on the left adjacent to the kidney and a soft tissue mass interposed between the left psoas muscle and spine.  The mass measures 4.1 x 2.1 cm.     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    He was seen initially on 5/28/2019.  PET scan, LP, and a bone marrow aspiration and biopsy were requested.    LDH was 341 with uric acid 4.4.       Bone marrow biopsy on 5/29/2019:  normocellular marrow with no evidence for lymphoma.  Flow cytometry negative.  Karyotype pending as of 6/7.      PET scan on 5/20/2019:  IMPRESSION:  1. There are hypermetabolic perinephric soft tissue lesions,  hypermetabolic mesenteric lymphadenopathy, left paraspinal mass at L5,  as well as an intensely hypermetabolic approximately 6 cm intramuscular  mass within the visualized right upper thigh. There is no hypermetabolic  lymphadenopathy within the abdomen or pelvis, but  the shotty mediastinal  and hilar nodes are hypermetabolic and there is also a hypermetabolic  right upper lobe nodular opacity. In addition, there is an intensely  hypermetabolic approximately 5 cm intramuscular mass at the right upper  back and there is a tiny hypermetabolic lesion within the left clavicle.  2. Shotty cervical nodes have low level activity.  3. Uncertain significance of the more than typical metabolic activity at  dependent atelectatic change at both lower lobes.    LP on 5/31/2019:  Cytology and flow cytometry negative.    He needs an urgent Mediport placed next week.  We will plan to start therapy with dose adjusted R-EPOCH with CNS prophylaxis with intrathecal methotrexate on 6/17/2019.    He previously had an echocardiogram on 5/15/2019 showing a normal left ventricular ejection fraction of 60%.    ALLERGIES:  Allergies   Allergen Reactions   • Oxaprozin Hives       MEDICATIONS:  The medication list has been reviewed with the patient by the medical assistant, and the list has been updated in the electronic medical record, which I reviewed.  Medication dosages and frequencies were confirmed to be accurate.    REVIEW OF SYSTEMS:  PAIN:  See Vital Signs below.  GENERAL: Weight loss.  No fevers chills or night sweats.  SKIN:  No rashes or non-healing lesions.  HEME/LYMPH:  No abnormal bleeding.  No palpable lymphadenopathy.  EYES:  No vision changes or diplopia.  ENT:  No sore throat or difficulty swallowing.  RESPIRATORY:  No cough, shortness of breath, hemoptysis, or wheezing.  CARDIOVASCULAR:  No chest pain, palpitations, orthopnea, or dyspnea on exertion.  GASTROINTESTINAL:  No abdominal pain, nausea, vomiting, constipation, diarrhea, melena, or hematochezia.  GENITOURINARY:  No dysuria or hematuria.  MUSCULOSKELETAL: Right upper extremity lesion at the elbow which has improved.  Right thigh lesion which he states has improved.  NEUROLOGIC:  No dizziness, loss of consciousness, or  "seizures.  PSYCHIATRIC:  No depression, anxiety, or mood changes.    Vitals:    06/07/19 1216   BP: 111/68   Pulse: 87   Resp: 16   Temp: 98 °F (36.7 °C)   TempSrc: Oral   SpO2: 100%   Weight: 65.5 kg (144 lb 8 oz)   Height: 170.2 cm (67.01\")   PainSc: 0-No pain       PHYSICAL EXAMINATION:  GENERAL:  Well-developed well-nourished male; awake, alert and oriented, in no acute distress.  SKIN:  Warm and dry, without rashes, purpura, or petechiae.  HEAD:  Normocephalic, atraumatic.  EYES:  Pupils equal, round and reactive to light.  Extraocular movements intact.  Conjunctivae normal.  EARS:  Hearing intact.  NOSE:  Septum midline.  No excoriations or nasal discharge.  MOUTH:  No stomatitis or ulcers.  Lips are normal.  THROAT:  Oropharynx without lesions or exudates.  NECK:  Supple with good range of motion; no thyromegaly or masses; no JVD or bruits.  LYMPHATICS:  No cervical, supraclavicular, axillary lymphadenopathy.  CHEST:  Lungs are clear to auscultation bilaterally.  No wheezes, rales, or rhonchi.  HEART:  Regular rate; normal rhythm.  No murmurs, gallops or rubs.  ABDOMEN:  Soft, non-tender, non-distended.  Normal active bowel sounds.  No organomegaly.  EXTREMITIES:  No clubbing, cyanosis, or edema.  Vague right thigh lesion  NEUROLOGICAL:  No focal neurologic deficits.  He is anxious.    DIAGNOSTIC DATA:  Results for orders placed or performed in visit on 06/07/19   CBC Auto Differential   Result Value Ref Range    WBC 8.82 3.40 - 10.80 10*3/mm3    RBC 3.69 (L) 4.14 - 5.80 10*6/mm3    Hemoglobin 10.9 (L) 13.0 - 17.7 g/dL    Hematocrit 31.9 (L) 37.5 - 51.0 %    MCV 86.4 79.0 - 97.0 fL    MCH 29.5 26.6 - 33.0 pg    MCHC 34.2 31.5 - 35.7 g/dL    RDW 14.2 12.3 - 15.4 %    RDW-SD 44.9 37.0 - 54.0 fl    MPV 8.6 6.0 - 12.0 fL    Platelets 241 140 - 450 10*3/mm3    Neutrophil % 61.4 42.7 - 76.0 %    Lymphocyte % 22.8 19.6 - 45.3 %    Monocyte % 8.7 5.0 - 12.0 %    Eosinophil % 5.8 0.3 - 6.2 %    Basophil % 0.7 0.0 - " "1.5 %    Immature Grans % 0.6 (H) 0.0 - 0.5 %    Neutrophils, Absolute 5.42 1.70 - 7.00 10*3/mm3    Lymphocytes, Absolute 2.01 0.70 - 3.10 10*3/mm3    Monocytes, Absolute 0.77 0.10 - 0.90 10*3/mm3    Eosinophils, Absolute 0.51 (H) 0.00 - 0.40 10*3/mm3    Basophils, Absolute 0.06 0.00 - 0.20 10*3/mm3    Immature Grans, Absolute 0.05 0.00 - 0.05 10*3/mm3    nRBC 0.0 0.0 - 0.2 /100 WBC       IMAGING:  PET scan images from 5/30/2019 images personally reviewed.  Scattered bony lesions and lymphadenopathy as discussed below on both sides of the diaphragm.      IMPRESSION:  1. There are hypermetabolic perinephric soft tissue lesions,  hypermetabolic mesenteric lymphadenopathy, left paraspinal mass at L5,  as well as an intensely hypermetabolic approximately 6 cm intramuscular  mass within the visualized right upper thigh. There is no hypermetabolic  lymphadenopathy within the abdomen or pelvis, but the shotty mediastinal  and hilar nodes are hypermetabolic and there is also a hypermetabolic  right upper lobe nodular opacity. In addition, there is an intensely  hypermetabolic approximately 5 cm intramuscular mass at the right upper  back and there is a tiny hypermetabolic lesion within the left clavicle.  2. Shotty cervical nodes have low level activity.  3. Uncertain significance of the more than typical metabolic activity at  dependent atelectatic change at both lower lobes.    ASSESSMENT:  This is a 79 y.o. male with:  1.  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  His LP and bone marrow biopsies are negative for involvement.  However, his PET scan indicates perinephric soft tissue lesions. hypermetabolic mesenteric lymphadenopathy, left L5 paraspinous mass, an intensely hypermetabolic 6 cm intramuscular mass in the upper right thigh, a right upper lobe nodular opacity, a hypermetabolic mass adjacent to the right " scapula, and a lesion within the left calvicle.  Paraspinous involvement puts him at extremely high risk for CNS involvement.  I explained to him and his wife again today that this is a very aggressive lymphoma. Once we have more staging information we will determine a treatment plan.  He is quite asymptomatic at this time.  He is in reasonably good shape for his age. I think we should consider inpatient treatment with dose adjusted R-EPOCH.  He will need CNS prophylaxis with IT methotrexate with each cycle of therapy.  I did also advise him today that he will need a Mediport for treatment.     2.  Rheumatoid arthritis currently on Cimzia (certolizumab) injections by Dr. Velásquez.  I advised him that he will need to discontinue these during chemotherapy.  He was previously on Simponi Aria (golimumab) and Actemra (tocilizumab).  These certainly could contribute to the development of lymphoma.  I learned of his treatment history from Dr. Velásquez's notes after his visit today, so I did not discuss the link between these agents and lymphoma with him today.         3.  Coronary artery disease status post stenting as well as moderate aortic stenosis by echocardiogram.  His left ventricular ejection fraction is normal at 60% by echocardiogram on 5/14/2019.     4.  Anxiety: This hit him suddenly today.  I gave him a prescription for alprazolam 0.25 mg to take twice daily as needed.  Prescription electronically sent to his pharmacy.    PLAN:   1.  Mediport placement urgently next week as an outpatient    2.  I will plan to see him back on Monday, 6/17/2019 with plans to admit him to [address] that day to initiate therapy with dose adjusted R-EPOCH perhaps at an initial dose reduction.  I will discuss with our clinical pharmacist.  He will need a lumbar puncture with intrathecal methotrexate for CNS prophylaxis.    3.  He has discontinued his rheumatologic medication

## 2019-06-10 DIAGNOSIS — C85.90 LYMPHOMA, UNSPECIFIED BODY REGION, UNSPECIFIED LYMPHOMA TYPE (HCC): Primary | ICD-10-CM

## 2019-06-10 DIAGNOSIS — I87.8 POOR VENOUS ACCESS: ICD-10-CM

## 2019-06-10 RX ORDER — CEFAZOLIN SODIUM 2 G/100ML
2 INJECTION, SOLUTION INTRAVENOUS ONCE
Status: CANCELLED | OUTPATIENT
Start: 2019-06-12 | End: 2019-06-10

## 2019-06-11 LAB
DX PRELIMINARY: NORMAL
LAB AP CASE REPORT: NORMAL
LAB AP CLINICAL INFORMATION: NORMAL
LAB AP CYTOGENETICS REPORT,ADDENDUM: NORMAL
LAB AP DIAGNOSIS COMMENT: NORMAL
LAB AP FLOW CYTOMETRY SUMMARY: NORMAL
LAB AP SPECIAL STAINS: NORMAL
Lab: NORMAL
PATH REPORT.GROSS SPEC: NORMAL

## 2019-06-11 RX ORDER — OMEPRAZOLE 40 MG/1
40 CAPSULE, DELAYED RELEASE ORAL DAILY
COMMUNITY
End: 2019-07-03 | Stop reason: SDUPTHER

## 2019-06-12 ENCOUNTER — APPOINTMENT (OUTPATIENT)
Dept: GENERAL RADIOLOGY | Facility: HOSPITAL | Age: 79
End: 2019-06-12

## 2019-06-12 ENCOUNTER — ANESTHESIA (OUTPATIENT)
Dept: PERIOP | Facility: HOSPITAL | Age: 79
End: 2019-06-12

## 2019-06-12 ENCOUNTER — ANESTHESIA EVENT (OUTPATIENT)
Dept: PERIOP | Facility: HOSPITAL | Age: 79
End: 2019-06-12

## 2019-06-12 ENCOUNTER — HOSPITAL ENCOUNTER (OUTPATIENT)
Facility: HOSPITAL | Age: 79
Setting detail: HOSPITAL OUTPATIENT SURGERY
Discharge: HOME | End: 2019-06-12
Attending: SURGERY | Admitting: SURGERY

## 2019-06-12 VITALS
BODY MASS INDEX: 22.77 KG/M2 | HEART RATE: 76 BPM | TEMPERATURE: 97.7 F | OXYGEN SATURATION: 97 % | SYSTOLIC BLOOD PRESSURE: 122 MMHG | WEIGHT: 145.06 LBS | RESPIRATION RATE: 16 BRPM | HEIGHT: 67 IN | DIASTOLIC BLOOD PRESSURE: 70 MMHG

## 2019-06-12 DIAGNOSIS — C85.90 LYMPHOMA, UNSPECIFIED BODY REGION, UNSPECIFIED LYMPHOMA TYPE (HCC): ICD-10-CM

## 2019-06-12 DIAGNOSIS — I87.8 POOR VENOUS ACCESS: ICD-10-CM

## 2019-06-12 LAB — GLUCOSE BLDC GLUCOMTR-MCNC: 116 MG/DL (ref 70–130)

## 2019-06-12 PROCEDURE — 76000 FLUOROSCOPY <1 HR PHYS/QHP: CPT

## 2019-06-12 PROCEDURE — 36561 INSERT TUNNELED CV CATH: CPT | Performed by: SURGERY

## 2019-06-12 PROCEDURE — S0260 H&P FOR SURGERY: HCPCS | Performed by: SURGERY

## 2019-06-12 PROCEDURE — 25010000002 MIDAZOLAM PER 1 MG: Performed by: ANESTHESIOLOGY

## 2019-06-12 PROCEDURE — 77001 FLUOROGUIDE FOR VEIN DEVICE: CPT | Performed by: SURGERY

## 2019-06-12 PROCEDURE — 82962 GLUCOSE BLOOD TEST: CPT

## 2019-06-12 PROCEDURE — 25010000002 HEPARIN (PORCINE) PER 1000 UNITS: Performed by: SURGERY

## 2019-06-12 PROCEDURE — 25010000002 PROPOFOL 10 MG/ML EMULSION: Performed by: NURSE ANESTHETIST, CERTIFIED REGISTERED

## 2019-06-12 PROCEDURE — C1788 PORT, INDWELLING, IMP: HCPCS | Performed by: SURGERY

## 2019-06-12 PROCEDURE — 25010000002 PHENYLEPHRINE PER 1 ML: Performed by: NURSE ANESTHETIST, CERTIFIED REGISTERED

## 2019-06-12 DEVICE — POWERPORT CLEARVUE IMPLANTABLE PORT WITH ATTACHABLE 8F POLYURETHANE OPEN-ENDED SINGLE-LUMEN VENOUS CATHETER INTERMEDIATE KIT
Type: IMPLANTABLE DEVICE | Status: FUNCTIONAL
Brand: POWERPORT CLEARVUE

## 2019-06-12 RX ORDER — PROMETHAZINE HYDROCHLORIDE 25 MG/ML
12.5 INJECTION, SOLUTION INTRAMUSCULAR; INTRAVENOUS ONCE AS NEEDED
Status: DISCONTINUED | OUTPATIENT
Start: 2019-06-12 | End: 2019-06-12 | Stop reason: HOSPADM

## 2019-06-12 RX ORDER — MIDAZOLAM HYDROCHLORIDE 1 MG/ML
2 INJECTION INTRAMUSCULAR; INTRAVENOUS
Status: DISCONTINUED | OUTPATIENT
Start: 2019-06-12 | End: 2019-06-12 | Stop reason: HOSPADM

## 2019-06-12 RX ORDER — PROPOFOL 10 MG/ML
VIAL (ML) INTRAVENOUS AS NEEDED
Status: DISCONTINUED | OUTPATIENT
Start: 2019-06-12 | End: 2019-06-12 | Stop reason: SURG

## 2019-06-12 RX ORDER — MIDAZOLAM HYDROCHLORIDE 1 MG/ML
1 INJECTION INTRAMUSCULAR; INTRAVENOUS
Status: DISCONTINUED | OUTPATIENT
Start: 2019-06-12 | End: 2019-06-12 | Stop reason: HOSPADM

## 2019-06-12 RX ORDER — LIDOCAINE HYDROCHLORIDE 10 MG/ML
INJECTION, SOLUTION EPIDURAL; INFILTRATION; INTRACAUDAL; PERINEURAL AS NEEDED
Status: DISCONTINUED | OUTPATIENT
Start: 2019-06-12 | End: 2019-06-12 | Stop reason: HOSPADM

## 2019-06-12 RX ORDER — LIDOCAINE HYDROCHLORIDE 20 MG/ML
INJECTION, SOLUTION INFILTRATION; PERINEURAL AS NEEDED
Status: DISCONTINUED | OUTPATIENT
Start: 2019-06-12 | End: 2019-06-12 | Stop reason: SURG

## 2019-06-12 RX ORDER — SODIUM CHLORIDE, SODIUM LACTATE, POTASSIUM CHLORIDE, CALCIUM CHLORIDE 600; 310; 30; 20 MG/100ML; MG/100ML; MG/100ML; MG/100ML
9 INJECTION, SOLUTION INTRAVENOUS CONTINUOUS
Status: DISCONTINUED | OUTPATIENT
Start: 2019-06-12 | End: 2019-06-12 | Stop reason: HOSPADM

## 2019-06-12 RX ORDER — OXYCODONE HYDROCHLORIDE AND ACETAMINOPHEN 5; 325 MG/1; MG/1
TABLET ORAL
Qty: 10 TABLET | Refills: 0 | Status: SHIPPED | OUTPATIENT
Start: 2019-06-12 | End: 2019-06-22 | Stop reason: HOSPADM

## 2019-06-12 RX ORDER — FAMOTIDINE 10 MG/ML
20 INJECTION, SOLUTION INTRAVENOUS ONCE
Status: COMPLETED | OUTPATIENT
Start: 2019-06-12 | End: 2019-06-12

## 2019-06-12 RX ORDER — PROMETHAZINE HYDROCHLORIDE 25 MG/1
25 SUPPOSITORY RECTAL ONCE AS NEEDED
Status: DISCONTINUED | OUTPATIENT
Start: 2019-06-12 | End: 2019-06-12 | Stop reason: HOSPADM

## 2019-06-12 RX ORDER — MAGNESIUM HYDROXIDE 1200 MG/15ML
LIQUID ORAL AS NEEDED
Status: DISCONTINUED | OUTPATIENT
Start: 2019-06-12 | End: 2019-06-12 | Stop reason: HOSPADM

## 2019-06-12 RX ORDER — FENTANYL CITRATE 50 UG/ML
50 INJECTION, SOLUTION INTRAMUSCULAR; INTRAVENOUS
Status: DISCONTINUED | OUTPATIENT
Start: 2019-06-12 | End: 2019-06-12 | Stop reason: HOSPADM

## 2019-06-12 RX ORDER — PROPOFOL 10 MG/ML
VIAL (ML) INTRAVENOUS CONTINUOUS PRN
Status: DISCONTINUED | OUTPATIENT
Start: 2019-06-12 | End: 2019-06-12 | Stop reason: SURG

## 2019-06-12 RX ORDER — SODIUM CHLORIDE 0.9 % (FLUSH) 0.9 %
1-10 SYRINGE (ML) INJECTION AS NEEDED
Status: DISCONTINUED | OUTPATIENT
Start: 2019-06-12 | End: 2019-06-12 | Stop reason: HOSPADM

## 2019-06-12 RX ORDER — LIDOCAINE HYDROCHLORIDE 10 MG/ML
0.5 INJECTION, SOLUTION EPIDURAL; INFILTRATION; INTRACAUDAL; PERINEURAL ONCE AS NEEDED
Status: DISCONTINUED | OUTPATIENT
Start: 2019-06-12 | End: 2019-06-12 | Stop reason: HOSPADM

## 2019-06-12 RX ORDER — CEFAZOLIN SODIUM 2 G/100ML
2 INJECTION, SOLUTION INTRAVENOUS ONCE
Status: DISCONTINUED | OUTPATIENT
Start: 2019-06-12 | End: 2019-06-12 | Stop reason: HOSPADM

## 2019-06-12 RX ORDER — PROMETHAZINE HYDROCHLORIDE 25 MG/1
25 TABLET ORAL ONCE AS NEEDED
Status: DISCONTINUED | OUTPATIENT
Start: 2019-06-12 | End: 2019-06-12 | Stop reason: HOSPADM

## 2019-06-12 RX ADMIN — PROPOFOL 30 MG: 10 INJECTION, EMULSION INTRAVENOUS at 14:26

## 2019-06-12 RX ADMIN — LIDOCAINE HYDROCHLORIDE 50 MG: 20 INJECTION, SOLUTION INFILTRATION; PERINEURAL at 14:25

## 2019-06-12 RX ADMIN — PHENYLEPHRINE HYDROCHLORIDE 100 MCG: 10 INJECTION INTRAVENOUS at 14:53

## 2019-06-12 RX ADMIN — FAMOTIDINE 20 MG: 10 INJECTION INTRAVENOUS at 13:58

## 2019-06-12 RX ADMIN — SODIUM CHLORIDE, POTASSIUM CHLORIDE, SODIUM LACTATE AND CALCIUM CHLORIDE 9 ML/HR: 600; 310; 30; 20 INJECTION, SOLUTION INTRAVENOUS at 13:59

## 2019-06-12 RX ADMIN — MIDAZOLAM 1 MG: 1 INJECTION INTRAMUSCULAR; INTRAVENOUS at 13:59

## 2019-06-12 RX ADMIN — PROPOFOL 100 MCG/KG/MIN: 10 INJECTION, EMULSION INTRAVENOUS at 14:25

## 2019-06-12 NOTE — H&P (VIEW-ONLY)
Patient Care Team:  Freddie Cannon MD as PCP - General (Internal Medicine)  Freddie Cannon MD as PCP - Claims Attributed  Mildred Ortega MD (Rheumatology)  Raven Brown MD as Referring Physician (Family Medicine)  Gabriele Garcia MD as Consulting Physician (Hematology and Oncology)  Melissa Velásquez MD as Consulting Physician (Rheumatology)    Chief complaint: B-cell lymphoma     Subjective     History of Present Illness     The patient is a very pleasant 79-year-old male who has been diagnosed with high-grade B-cell lymphoma.  He will need chemotherapy and has poor venous access.    Review of Systems   Constitutional: Negative for activity change, appetite change, fatigue and fever.   HENT: Negative for trouble swallowing and voice change.    Respiratory: Negative for chest tightness and shortness of breath.    Cardiovascular: Negative for chest pain and palpitations.   Gastrointestinal: Negative for abdominal pain, blood in stool, constipation, diarrhea, nausea and vomiting.   Endocrine: Negative for cold intolerance and heat intolerance.   Genitourinary: Negative for dysuria and flank pain.   Neurological: Negative for dizziness and light-headedness.   Hematological: Negative for adenopathy. Does not bruise/bleed easily.   Psychiatric/Behavioral: Negative for agitation and confusion.        Past Medical History:   Diagnosis Date   • Allergic rhinitis    • Aortic valve stenosis     Mild to moderate AS by echo on 8/8/17   • CAD (coronary artery disease)    • Cancer (CMS/HCC)     LYMPHOMA   • Carotid arterial disease (CMS/HCC)     Followed by Dr. Floyd.  Carotid Doppler on 9/21/17: 60-70% DENISE, 50-60% LICA.   • Chronic obstructive lung disease (CMS/HCC)    • Esophageal reflux     With a history of erosive gastritis   • Hyperlipidemia    • Hypertension    • Iron deficiency    • Pulmonary fibrosis (CMS/HCC)     By CT scan on 8/12/15   • PVC (premature ventricular contraction)    • Rheumatoid  arthritis (CMS/HCC)    • Skin cancer of face     15 year ago on left side of face   • Steroid-induced diabetes mellitus (CMS/HCC)    • Tobacco use      Past Surgical History:   Procedure Laterality Date   • BACK SURGERY  2010    L4-L5 discectomy   • CATARACT EXTRACTION Bilateral 1979   • CORONARY ANGIOPLASTY WITH STENT PLACEMENT  2015   • VASECTOMY  1979     Family History   Problem Relation Age of Onset   • Heart failure Mother         CHF   • Heart attack Father 54   • Heart disease Father    • Heart attack Brother 47   • Heart disease Brother         another brother with 4 vessel CABG in his 50s   • Coronary artery disease Other         multiple family members   • Malig Hyperthermia Neg Hx      Social History     Tobacco Use   • Smoking status: Current Every Day Smoker     Packs/day: 0.50     Years: 50.00     Pack years: 25.00     Types: Cigarettes   • Smokeless tobacco: Never Used   • Tobacco comment: 1/2 ppd, discussed benefits of cessation   Substance Use Topics   • Alcohol use: No     Comment: no caffeine   • Drug use: No     Allergies:  Oxaprozin    Objective      Vital Signs  Temp:  [97.7 °F (36.5 °C)] 97.7 °F (36.5 °C)  Heart Rate:  [85] 85  Resp:  [18] 18  BP: (130)/(83) 130/83    Physical Exam   Constitutional: He is oriented to person, place, and time. He appears well-developed and well-nourished.  Non-toxic appearance.   Eyes: EOM are normal. No scleral icterus.   Pulmonary/Chest: Effort normal. No respiratory distress.   Abdominal: Normal appearance.   Neurological: He is alert and oriented to person, place, and time. Abnormal coordination:      Skin: Skin is warm and dry.   Psychiatric: He has a normal mood and affect. His behavior is normal. Judgment and thought content normal.       Results Review:   I reviewed the patient's new clinical results.      Assessment/Plan       Lymphoma (CMS/HCC)    Poor venous access      Assessment & Plan    1.  B-cell lymphoma and poor venous access: Plan Mediport.   The patient understands the indications, alternatives, risks, and benefits of the procedure and wishes to proceed.    I discussed the patients findings and my recommendations with patient    Philippe Herrera Jr., MD  06/12/19  2:00 PM

## 2019-06-12 NOTE — ANESTHESIA POSTPROCEDURE EVALUATION
"Patient: Edward Srinivasna    Procedure Summary     Date:  06/12/19 Room / Location:  [facility] OR 03 / [facility] MAIN OR    Anesthesia Start:  1421 Anesthesia Stop:  1526    Procedure:  INSERTION RIGHT VENOUS ACCESS DEVICE (N/A ) Diagnosis:       Lymphoma, unspecified body region, unspecified lymphoma type (CMS/HCC)      Poor venous access      (Lymphoma, unspecified body region, unspecified lymphoma type (CMS/HCC) [C85.90])      (Poor venous access [I87.8])    Surgeon:  Philippe Herrera Jr., MD Provider:  Cristhian Luis MD    Anesthesia Type:  MAC ASA Status:  3          Anesthesia Type: MAC  Last vitals  BP   125/59 (06/12/19 1540)   Temp   36.5 °C (97.7 °F) (06/12/19 1520)   Pulse   79 (06/12/19 1540)   Resp   16 (06/12/19 1540)     SpO2   98 % (06/12/19 1540)     Post Anesthesia Care and Evaluation    Patient location during evaluation: bedside  Patient participation: complete - patient participated  Level of consciousness: awake and alert  Pain management: adequate  Airway patency: patent  Anesthetic complications: No anesthetic complications    Cardiovascular status: acceptable  Respiratory status: acceptable  Hydration status: acceptable    Comments: /59   Pulse 79   Temp 36.5 °C (97.7 °F) (Oral)   Resp 16   Ht 170.2 cm (67.01\")   Wt 65.8 kg (145 lb 1 oz)   SpO2 98%   BMI 22.71 kg/m²       "

## 2019-06-12 NOTE — OP NOTE
Surgeon: Philippe Herrera Jr., M.D.    Assistant: None    Pre-Operative Diagnosis:     Lymphoma, unspecified body region, unspecified lymphoma type (CMS/HCC) [C85.90]  Poor venous access [I87.8]    Post-Operative Diagnosis:    Post-Op Diagnosis Codes:     * Lymphoma, unspecified body region, unspecified lymphoma type (CMS/HCC) [C85.90]     * Poor venous access [I87.8]    Procedure Performed:     Mediport    Indications:     The patient is a pleasant 79-year-old male with B-cell lymphoma who will require chemotherapy.  He has poor venous access.  He presents for Mediport placement.  The patient understands the indications, alternatives, risks, and benefits of the procedure and wishes to proceed.    Procedure:     The patient was identified and taken to the operating room where he was placed in the supine position on the operating table.  Monitors were placed and he underwent a MAC and was appropriately monitored throughout the case by the anesthesia personnel.  The right and left chest and shoulder were prepped and draped in the standard surgical fashion.  Attention was first turned to the left side.  The left deltopectoral groove was infiltrated with 1% lidocaine without epinephrine.  A needle was advanced through the deltopectoral groove under the clavicle and the subclavian vein was entered twice but the guidewire would not pass.  Numerous other attempts were made to try to access the vein so that the guidewire could pass but they were unsuccessful.  Attention was then turned to the right side.  The right deltopectoral groove was infiltrated with 1% lidocaine without epinephrine.  A needle was advanced through the deltopectoral groove under the clavicle into the subclavian vein.  The syringe was removed from the needle and a guidewire was advanced through the needle and into the superior vena cava which was confirmed by fluoroscopy.  An area on the chest wall just inferior to the puncture site was selected for port  placement.  This area was infiltrated with 1% lidocaine without epinephrine.  An incision was made at the superior aspect of the pocket site and carried through the skin into the subcutaneous tissue.  The subcutaneous tissue was divided using Bovie electrocautery to create a pocket to permit the port.  The original puncture site was enlarged with a scalpel and carried through the skin into the subcutaneous tissue.  The area between the puncture site and a pocket was then tunneled with a hemostat and the catheter was brought through this tunnel.  The dilator and introducer were then placed over the guidewire using the Seldinger technique without difficulty.  The dilator and guidewire were removed and the catheter was placed through the introducer.  The introducer was removed by the peel-away technique.  The catheter was positioned appropriately in the superior vena cava using fluoroscopic guidance.  The catheter was then attached to the Mediport which was placed in the pocket.  The Mediport was aspirated and flushed with heparinized saline.  The Mediport was secured in the pocket using 2-0 Vicryl suture.  The subcutaneous tissue was then reapproximated using 3-0 Vicryl suture.  All skin incisions were closed with a 4-0 Monocryl in a subcuticular fashion followed by benzoin and Steri-Strips.  A sterile dressing was applied.  The sponge, needle, and instrument counts were correct at the end case.  The patient tolerated procedure well and was transferred to the recovery room in stable condition.    Estimated Blood Loss:      minimal    Specimens:     None    Philippe Herrera Jr., M.D.

## 2019-06-12 NOTE — ANESTHESIA PREPROCEDURE EVALUATION
Anesthesia Evaluation     Patient summary reviewed and Nursing notes reviewed                Airway   Mallampati: II  TM distance: >3 FB  Neck ROM: full  No difficulty expected  Comment: Good ROM neck  Dental    (+) upper dentures and lower dentures    Pulmonary - normal exam   (+) a smoker Current Abstained day of surgery, COPD,     ROS comment: Pulmonary fibrosis  Cardiovascular     Rhythm: regular  Rate: normal    (+) hypertension, valvular problems/murmurs AS, CAD, cardiac stents more than 12 months ago murmur, PVD, hyperlipidemia,  carotid artery disease carotid bilateral    ROS comment: PTCA with stent 2015/carotid dz followed by Dr. Floyd  PE comment: [facility] at [facility]    Neuro/Psych  (+) numbness,       ROS Comment: Diabetic peripheral neuropathy  GI/Hepatic/Renal/Endo    (+)  GERD,  diabetes mellitus type 2,     Musculoskeletal         ROS comment: Lumbar disc dz  Abdominal  - normal exam   Substance History      OB/GYN          Other   (+) arthritis   history of cancer      Other Comment: Lymphoma/also RA                Anesthesia Plan    ASA 3     MAC     intravenous induction   Anesthetic plan, all risks, benefits, and alternatives have been provided, discussed and informed consent has been obtained with: patient.

## 2019-06-17 ENCOUNTER — PREP FOR SURGERY (OUTPATIENT)
Dept: OTHER | Facility: HOSPITAL | Age: 79
End: 2019-06-17

## 2019-06-17 ENCOUNTER — LAB (OUTPATIENT)
Dept: LAB | Facility: HOSPITAL | Age: 79
End: 2019-06-17

## 2019-06-17 ENCOUNTER — HOSPITAL ENCOUNTER (INPATIENT)
Facility: HOSPITAL | Age: 79
LOS: 5 days | Discharge: HOME | End: 2019-06-22
Attending: INTERNAL MEDICINE | Admitting: INTERNAL MEDICINE

## 2019-06-17 ENCOUNTER — OFFICE VISIT (OUTPATIENT)
Dept: ONCOLOGY | Facility: CLINIC | Age: 79
End: 2019-06-17

## 2019-06-17 ENCOUNTER — HOSPITAL ENCOUNTER (OUTPATIENT)
Dept: GENERAL RADIOLOGY | Facility: HOSPITAL | Age: 79
Discharge: HOME | End: 2019-06-17

## 2019-06-17 VITALS
HEIGHT: 67 IN | RESPIRATION RATE: 16 BRPM | DIASTOLIC BLOOD PRESSURE: 66 MMHG | BODY MASS INDEX: 22.76 KG/M2 | HEART RATE: 71 BPM | OXYGEN SATURATION: 100 % | SYSTOLIC BLOOD PRESSURE: 102 MMHG | WEIGHT: 145 LBS | TEMPERATURE: 97 F

## 2019-06-17 VITALS
HEART RATE: 88 BPM | OXYGEN SATURATION: 99 % | TEMPERATURE: 97.9 F | HEIGHT: 67 IN | DIASTOLIC BLOOD PRESSURE: 68 MMHG | BODY MASS INDEX: 22.76 KG/M2 | WEIGHT: 145 LBS | SYSTOLIC BLOOD PRESSURE: 117 MMHG | RESPIRATION RATE: 16 BRPM

## 2019-06-17 DIAGNOSIS — C83.33 DIFFUSE LARGE B-CELL LYMPHOMA OF INTRA-ABDOMINAL LYMPH NODES (HCC): ICD-10-CM

## 2019-06-17 DIAGNOSIS — C83.33 DIFFUSE LARGE B-CELL LYMPHOMA OF INTRA-ABDOMINAL LYMPH NODES (HCC): Primary | ICD-10-CM

## 2019-06-17 DIAGNOSIS — C85.90 LYMPHOMA, UNSPECIFIED BODY REGION, UNSPECIFIED LYMPHOMA TYPE (HCC): Primary | ICD-10-CM

## 2019-06-17 PROBLEM — C83.38 DIFFUSE LARGE B-CELL LYMPHOMA OF LYMPH NODES OF MULTIPLE REGIONS: Status: ACTIVE | Noted: 2019-06-17

## 2019-06-17 LAB
ALBUMIN SERPL-MCNC: 3.2 G/DL (ref 3.5–5.2)
ALBUMIN/GLOB SERPL: 0.9 G/DL
ALP SERPL-CCNC: 54 U/L (ref 39–117)
ALT SERPL W P-5'-P-CCNC: 11 U/L (ref 1–41)
ANION GAP SERPL CALCULATED.3IONS-SCNC: 9.7 MMOL/L
AST SERPL-CCNC: 12 U/L (ref 1–40)
BASOPHILS # BLD AUTO: 0.06 10*3/MM3 (ref 0–0.2)
BASOPHILS NFR BLD AUTO: 0.7 % (ref 0–1.5)
BILIRUB SERPL-MCNC: 0.3 MG/DL (ref 0.2–1.2)
BUN BLD-MCNC: 15 MG/DL (ref 8–23)
BUN/CREAT SERPL: 18.5 (ref 7–25)
CALCIUM SPEC-SCNC: 8.5 MG/DL (ref 8.6–10.5)
CHLORIDE SERPL-SCNC: 98 MMOL/L (ref 98–107)
CO2 SERPL-SCNC: 27.3 MMOL/L (ref 22–29)
CREAT BLD-MCNC: 0.81 MG/DL (ref 0.76–1.27)
DEPRECATED RDW RBC AUTO: 45.8 FL (ref 37–54)
EOSINOPHIL # BLD AUTO: 0.56 10*3/MM3 (ref 0–0.4)
EOSINOPHIL NFR BLD AUTO: 6.7 % (ref 0.3–6.2)
ERYTHROCYTE [DISTWIDTH] IN BLOOD BY AUTOMATED COUNT: 14.3 % (ref 12.3–15.4)
GFR SERPL CREATININE-BSD FRML MDRD: 92 ML/MIN/1.73
GLOBULIN UR ELPH-MCNC: 3.5 GM/DL
GLUCOSE BLD-MCNC: 233 MG/DL (ref 65–99)
HBV SURFACE AB SER RIA-ACNC: NORMAL
HBV SURFACE AG SERPL QL IA: NORMAL
HCT VFR BLD AUTO: 34.3 % (ref 37.5–51)
HGB BLD-MCNC: 11.5 G/DL (ref 13–17.7)
IMM GRANULOCYTES # BLD AUTO: 0.03 10*3/MM3 (ref 0–0.05)
IMM GRANULOCYTES NFR BLD AUTO: 0.4 % (ref 0–0.5)
LDH SERPL-CCNC: 176 U/L (ref 135–225)
LYMPHOCYTES # BLD AUTO: 1.41 10*3/MM3 (ref 0.7–3.1)
LYMPHOCYTES NFR BLD AUTO: 16.8 % (ref 19.6–45.3)
MAGNESIUM SERPL-MCNC: 2 MG/DL (ref 1.6–2.4)
MCH RBC QN AUTO: 29.6 PG (ref 26.6–33)
MCHC RBC AUTO-ENTMCNC: 33.5 G/DL (ref 31.5–35.7)
MCV RBC AUTO: 88.4 FL (ref 79–97)
MONOCYTES # BLD AUTO: 0.75 10*3/MM3 (ref 0.1–0.9)
MONOCYTES NFR BLD AUTO: 8.9 % (ref 5–12)
NEUTROPHILS # BLD AUTO: 5.58 10*3/MM3 (ref 1.7–7)
NEUTROPHILS NFR BLD AUTO: 66.5 % (ref 42.7–76)
NRBC BLD AUTO-RTO: 0 /100 WBC (ref 0–0.2)
PHOSPHATE SERPL-MCNC: 2.7 MG/DL (ref 2.5–4.5)
PLATELET # BLD AUTO: 259 10*3/MM3 (ref 140–450)
PMV BLD AUTO: 8.1 FL (ref 6–12)
POTASSIUM BLD-SCNC: 3.8 MMOL/L (ref 3.5–5.2)
PROT SERPL-MCNC: 6.7 G/DL (ref 6–8.5)
RBC # BLD AUTO: 3.88 10*6/MM3 (ref 4.14–5.8)
SODIUM BLD-SCNC: 135 MMOL/L (ref 136–145)
URATE SERPL-MCNC: 4.7 MG/DL (ref 3.4–7)
WBC NRBC COR # BLD: 8.39 10*3/MM3 (ref 3.4–10.8)

## 2019-06-17 PROCEDURE — 99223 1ST HOSP IP/OBS HIGH 75: CPT | Performed by: INTERNAL MEDICINE

## 2019-06-17 PROCEDURE — 25010000002 RITUXIMAB 10 MG/ML SOLUTION 50 ML VIAL: Performed by: INTERNAL MEDICINE

## 2019-06-17 PROCEDURE — 25010000002 DOXORUBICIN PER 10 MG: Performed by: INTERNAL MEDICINE

## 2019-06-17 PROCEDURE — 25010000002 ETOPOSIDE 100 MG/5ML SOLUTION 5 ML VIAL: Performed by: INTERNAL MEDICINE

## 2019-06-17 PROCEDURE — 25010000002 METHOTREXATE PF 100 MG/4ML SOLUTION 2 ML VIAL: Performed by: INTERNAL MEDICINE

## 2019-06-17 PROCEDURE — 25010000002 VINCRISTINE PER 1 MG: Performed by: INTERNAL MEDICINE

## 2019-06-17 PROCEDURE — 83615 LACTATE (LD) (LDH) ENZYME: CPT | Performed by: INTERNAL MEDICINE

## 2019-06-17 PROCEDURE — 88185 FLOWCYTOMETRY/TC ADD-ON: CPT

## 2019-06-17 PROCEDURE — 87340 HEPATITIS B SURFACE AG IA: CPT | Performed by: INTERNAL MEDICINE

## 2019-06-17 PROCEDURE — 96450 CHEMOTHERAPY INTO CNS: CPT

## 2019-06-17 PROCEDURE — 63710000001 PREDNISONE PER 5 MG: Performed by: INTERNAL MEDICINE

## 2019-06-17 PROCEDURE — 86706 HEP B SURFACE ANTIBODY: CPT | Performed by: INTERNAL MEDICINE

## 2019-06-17 PROCEDURE — 3E04305 INTRODUCTION OF OTHER ANTINEOPLASTIC INTO CENTRAL VEIN, PERCUTANEOUS APPROACH: ICD-10-PCS | Performed by: INTERNAL MEDICINE

## 2019-06-17 PROCEDURE — 36415 COLL VENOUS BLD VENIPUNCTURE: CPT | Performed by: INTERNAL MEDICINE

## 2019-06-17 PROCEDURE — 80053 COMPREHEN METABOLIC PANEL: CPT | Performed by: INTERNAL MEDICINE

## 2019-06-17 PROCEDURE — 25010000002 RITUXIMAB 10 MG/ML SOLUTION 10 ML VIAL: Performed by: INTERNAL MEDICINE

## 2019-06-17 PROCEDURE — 25010000002 DIPHENHYDRAMINE PER 50 MG: Performed by: INTERNAL MEDICINE

## 2019-06-17 PROCEDURE — 85025 COMPLETE CBC W/AUTO DIFF WBC: CPT | Performed by: INTERNAL MEDICINE

## 2019-06-17 PROCEDURE — 77003 FLUOROGUIDE FOR SPINE INJECT: CPT

## 2019-06-17 PROCEDURE — 84550 ASSAY OF BLOOD/URIC ACID: CPT | Performed by: INTERNAL MEDICINE

## 2019-06-17 PROCEDURE — 84100 ASSAY OF PHOSPHORUS: CPT | Performed by: INTERNAL MEDICINE

## 2019-06-17 PROCEDURE — 83735 ASSAY OF MAGNESIUM: CPT | Performed by: INTERNAL MEDICINE

## 2019-06-17 PROCEDURE — 3E0R305 INTRODUCTION OF OTHER ANTINEOPLASTIC INTO SPINAL CANAL, PERCUTANEOUS APPROACH: ICD-10-PCS | Performed by: INTERNAL MEDICINE

## 2019-06-17 PROCEDURE — 88184 FLOWCYTOMETRY/ TC 1 MARKER: CPT | Performed by: INTERNAL MEDICINE

## 2019-06-17 RX ORDER — ACETAMINOPHEN 325 MG/1
650 TABLET ORAL ONCE
Status: CANCELLED | OUTPATIENT
Start: 2019-06-18

## 2019-06-17 RX ORDER — PROCHLORPERAZINE MALEATE 10 MG
10 TABLET ORAL EVERY 6 HOURS PRN
Status: DISCONTINUED | OUTPATIENT
Start: 2019-06-17 | End: 2019-06-22 | Stop reason: HOSPADM

## 2019-06-17 RX ORDER — PREDNISONE 50 MG/1
100 TABLET ORAL 2 TIMES DAILY WITH MEALS
Status: COMPLETED | OUTPATIENT
Start: 2019-06-17 | End: 2019-06-22

## 2019-06-17 RX ORDER — ACETAMINOPHEN 325 MG/1
650 TABLET ORAL EVERY 4 HOURS PRN
Status: DISCONTINUED | OUTPATIENT
Start: 2019-06-17 | End: 2019-06-22 | Stop reason: HOSPADM

## 2019-06-17 RX ORDER — ONDANSETRON 2 MG/ML
4 INJECTION INTRAMUSCULAR; INTRAVENOUS EVERY 6 HOURS PRN
Status: DISCONTINUED | OUTPATIENT
Start: 2019-06-17 | End: 2019-06-22

## 2019-06-17 RX ORDER — PREDNISONE 20 MG/1
100 TABLET ORAL 2 TIMES DAILY WITH MEALS
Status: CANCELLED | OUTPATIENT
Start: 2019-06-18

## 2019-06-17 RX ORDER — LIDOCAINE HYDROCHLORIDE 10 MG/ML
10 INJECTION, SOLUTION INFILTRATION; PERINEURAL ONCE
Status: COMPLETED | OUTPATIENT
Start: 2019-06-17 | End: 2019-06-17

## 2019-06-17 RX ORDER — PROCHLORPERAZINE MALEATE 10 MG
10 TABLET ORAL EVERY 6 HOURS PRN
Status: CANCELLED | OUTPATIENT
Start: 2019-06-18

## 2019-06-17 RX ORDER — CETIRIZINE HYDROCHLORIDE 10 MG/1
5 TABLET ORAL DAILY
Status: DISCONTINUED | OUTPATIENT
Start: 2019-06-18 | End: 2019-06-22 | Stop reason: HOSPADM

## 2019-06-17 RX ORDER — SODIUM CHLORIDE 9 MG/ML
30 INJECTION, SOLUTION INTRAVENOUS CONTINUOUS
Status: DISCONTINUED | OUTPATIENT
Start: 2019-06-17 | End: 2019-06-22

## 2019-06-17 RX ORDER — ONDANSETRON 2 MG/ML
4 INJECTION INTRAMUSCULAR; INTRAVENOUS EVERY 6 HOURS PRN
Status: CANCELLED | OUTPATIENT
Start: 2019-06-18

## 2019-06-17 RX ORDER — FAMOTIDINE 10 MG/ML
20 INJECTION, SOLUTION INTRAVENOUS AS NEEDED
Status: DISCONTINUED | OUTPATIENT
Start: 2019-06-17 | End: 2019-06-22

## 2019-06-17 RX ORDER — MEPERIDINE HYDROCHLORIDE 50 MG/ML
25 INJECTION INTRAMUSCULAR; INTRAVENOUS; SUBCUTANEOUS
Status: CANCELLED | OUTPATIENT
Start: 2019-06-18 | End: 2019-06-18

## 2019-06-17 RX ORDER — LORAZEPAM 0.5 MG/1
0.5 TABLET ORAL EVERY 6 HOURS PRN
Status: CANCELLED | OUTPATIENT
Start: 2019-06-17 | End: 2019-06-27

## 2019-06-17 RX ORDER — SODIUM CHLORIDE 0.9 % (FLUSH) 0.9 %
20 SYRINGE (ML) INJECTION AS NEEDED
Status: DISCONTINUED | OUTPATIENT
Start: 2019-06-17 | End: 2019-06-22

## 2019-06-17 RX ORDER — SODIUM CHLORIDE 0.9 % (FLUSH) 0.9 %
10 SYRINGE (ML) INJECTION AS NEEDED
Status: CANCELLED | OUTPATIENT
Start: 2019-06-17

## 2019-06-17 RX ORDER — PANTOPRAZOLE SODIUM 40 MG/1
40 TABLET, DELAYED RELEASE ORAL
Status: CANCELLED | OUTPATIENT
Start: 2019-06-18

## 2019-06-17 RX ORDER — MEPERIDINE HYDROCHLORIDE 50 MG/ML
25 INJECTION INTRAMUSCULAR; INTRAVENOUS; SUBCUTANEOUS
Status: ACTIVE | OUTPATIENT
Start: 2019-06-17 | End: 2019-06-18

## 2019-06-17 RX ORDER — ALLOPURINOL 300 MG/1
300 TABLET ORAL DAILY
Status: DISCONTINUED | OUTPATIENT
Start: 2019-06-18 | End: 2019-06-22

## 2019-06-17 RX ORDER — ACETAMINOPHEN 325 MG/1
650 TABLET ORAL EVERY 4 HOURS PRN
Status: CANCELLED | OUTPATIENT
Start: 2019-06-17

## 2019-06-17 RX ORDER — LISINOPRIL 10 MG/1
10 TABLET ORAL DAILY
Status: DISCONTINUED | OUTPATIENT
Start: 2019-06-18 | End: 2019-06-22 | Stop reason: HOSPADM

## 2019-06-17 RX ORDER — SODIUM CHLORIDE 0.9 % (FLUSH) 0.9 %
20 SYRINGE (ML) INJECTION AS NEEDED
Status: CANCELLED | OUTPATIENT
Start: 2019-06-17

## 2019-06-17 RX ORDER — SODIUM CHLORIDE 0.9 % (FLUSH) 0.9 %
10 SYRINGE (ML) INJECTION EVERY 12 HOURS SCHEDULED
Status: DISCONTINUED | OUTPATIENT
Start: 2019-06-17 | End: 2019-06-22 | Stop reason: HOSPADM

## 2019-06-17 RX ORDER — PANTOPRAZOLE SODIUM 40 MG/1
40 TABLET, DELAYED RELEASE ORAL
Status: DISPENSED | OUTPATIENT
Start: 2019-06-17 | End: 2019-06-22

## 2019-06-17 RX ORDER — LORAZEPAM 0.5 MG/1
0.5 TABLET ORAL EVERY 6 HOURS PRN
Status: DISCONTINUED | OUTPATIENT
Start: 2019-06-17 | End: 2019-06-22

## 2019-06-17 RX ORDER — SODIUM CHLORIDE 0.9 % (FLUSH) 0.9 %
10 SYRINGE (ML) INJECTION EVERY 12 HOURS SCHEDULED
Status: CANCELLED | OUTPATIENT
Start: 2019-06-17

## 2019-06-17 RX ORDER — DIPHENHYDRAMINE HYDROCHLORIDE 50 MG/ML
50 INJECTION INTRAMUSCULAR; INTRAVENOUS AS NEEDED
Status: DISCONTINUED | OUTPATIENT
Start: 2019-06-17 | End: 2019-06-22

## 2019-06-17 RX ORDER — FAMOTIDINE 10 MG/ML
20 INJECTION, SOLUTION INTRAVENOUS AS NEEDED
Status: CANCELLED | OUTPATIENT
Start: 2019-06-18

## 2019-06-17 RX ORDER — SODIUM CHLORIDE 9 MG/ML
75 INJECTION, SOLUTION INTRAVENOUS CONTINUOUS
Status: CANCELLED | OUTPATIENT
Start: 2019-06-18

## 2019-06-17 RX ORDER — DIPHENHYDRAMINE HYDROCHLORIDE 50 MG/ML
50 INJECTION INTRAMUSCULAR; INTRAVENOUS AS NEEDED
Status: CANCELLED | OUTPATIENT
Start: 2019-06-18

## 2019-06-17 RX ORDER — ATORVASTATIN CALCIUM 20 MG/1
40 TABLET, FILM COATED ORAL DAILY
Status: DISCONTINUED | OUTPATIENT
Start: 2019-06-17 | End: 2019-06-22 | Stop reason: HOSPADM

## 2019-06-17 RX ORDER — ALPRAZOLAM 0.25 MG/1
0.25 TABLET ORAL 2 TIMES DAILY PRN
Status: DISCONTINUED | OUTPATIENT
Start: 2019-06-17 | End: 2019-06-22

## 2019-06-17 RX ORDER — SODIUM CHLORIDE 0.9 % (FLUSH) 0.9 %
10 SYRINGE (ML) INJECTION AS NEEDED
Status: DISCONTINUED | OUTPATIENT
Start: 2019-06-17 | End: 2019-06-22

## 2019-06-17 RX ORDER — ACETAMINOPHEN 325 MG/1
650 TABLET ORAL ONCE
Status: COMPLETED | OUTPATIENT
Start: 2019-06-17 | End: 2019-06-17

## 2019-06-17 RX ADMIN — SODIUM CHLORIDE 12 MG: 9 INJECTION, SOLUTION INTRAMUSCULAR; INTRAVENOUS; SUBCUTANEOUS at 12:48

## 2019-06-17 RX ADMIN — PREDNISONE 100 MG: 50 TABLET ORAL at 18:18

## 2019-06-17 RX ADMIN — SODIUM CHLORIDE, PRESERVATIVE FREE 10 ML: 5 INJECTION INTRAVENOUS at 14:04

## 2019-06-17 RX ADMIN — LIDOCAINE HYDROCHLORIDE 3 ML: 10 INJECTION, SOLUTION INFILTRATION; PERINEURAL at 12:35

## 2019-06-17 RX ADMIN — SODIUM CHLORIDE 75 ML/HR: 9 INJECTION, SOLUTION INTRAVENOUS at 15:58

## 2019-06-17 RX ADMIN — RITUXIMAB 660 MG: 10 INJECTION, SOLUTION INTRAVENOUS at 17:01

## 2019-06-17 RX ADMIN — ACETAMINOPHEN 650 MG: 325 TABLET, FILM COATED ORAL at 16:24

## 2019-06-17 RX ADMIN — DIPHENHYDRAMINE HYDROCHLORIDE 50 MG: 50 INJECTION, SOLUTION INTRAMUSCULAR; INTRAVENOUS at 16:24

## 2019-06-17 RX ADMIN — SODIUM CHLORIDE, PRESERVATIVE FREE 10 ML: 5 INJECTION INTRAVENOUS at 20:24

## 2019-06-17 RX ADMIN — VINCRISTINE SULFATE: 1 INJECTION, SOLUTION INTRAVENOUS at 21:30

## 2019-06-17 NOTE — DISCHARGE INSTRUCTIONS
EDUCATION /DISCHARGE INSTRUCTION   A lumbar puncture involves insertion of a sterile needle into the spinal canal by the physician   This procedure is performed for several reasons: to detect increased intracranial pressure, the presence of blood in cerebrospinal fluid (CFS), to obtain CSF specimens for laboratory studies, to administer drugs and to relieve pressure by removing CSF.  You will lie on your stomach with a pillow under your stomach.  This opens the vertebral space to allow access to the spinal needle.  The physician will sterilize the area using antiseptic solution and numb the area where the spinal needle will be placed.  If CSF is being removed for specimen, you may be asked to push or beardown to assist with the flow of the CSF. When the procedure is complete, a band aid will be placed over the injection site and you will be taken to the recovery area.  POTENTIAL RISKS OF A LUMBAR PUNCTURE INCLUDE BUT ARE NOT LIMITED TO:  *  Headache    *  Swelling at puncture site  *  Bleeding into the spinal canal *  Temporary difficulty urinating  *  Leakage of CSF   *  Fever  *  Pain caused by nerve irritation  BENEFIT OF PROCEDURE:  This is the only way to obtain spinal fluid or relieve pressure due to increased CSF.  There is no alternative.  THIS EDUCATION INFORMATION WAS REVIEWED PRIOR TO PROCEDURE AND CONSENT. Patient initials__________________Time_____1049____________  FOLLOWING A LUMBAR PUNCTURE:  *  Drink plenty of liquids -  Caffeine is recommended   *  Lie down flat for the next 8 hours.  If you get a headache, lie down flat for 24 hours,        continue to force fluids and call your doctor if the headache persists.  *  Go straight home.  DO NOT DRIVE    CALL YOUR DOCTOR IF YOU HAVE:  *  A severe headache that will not go away  *  Redness, swelling or drainage from the puncture site  *  Neck stiffness, numbness or weakness  *  Any new or severe symptoms  Following the procedure, you should continue to  take all of your medications as directed by your primary physician unless otherwise instructed.  There have been no changes to the medications you provided us (with the following exceptions if applicable):  Resume taking your blood thinner or Aspirin on_____06/18/19 after 100 pm  YOU ARE THE MOST IMPORTANT FACTOR IN YOUR RECOVERY.  IF YOU HAVE QUESTIONS OR CONCERNS PLEASE CALL THE RADIOLOGY NURSES -[phone]

## 2019-06-17 NOTE — NURSING NOTE
Report called to Sonam MORGAN. Pt stable with no problems or concerns noted at this time  post Lumbar Puncture with Methadextrate. Pt being admitted inpatient rm [address] [facility].

## 2019-06-17 NOTE — H&P
"[facility] CBC GROUP OUTPATIENT FOLLOW UP CLINIC VISIT    REASON FOR FOLLOW-UP:    1.  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  CT imaging of the abdomen and pelvis on 5/8/2019 shows multiple small tissue nodules in the perirenal fat adjacent to the left kidney and a soft tissue mass interposed between the left psoas muscle and spine.  2.  RA previously on Cimzia (certollizumab) injections by Dr. Velásquez  3.  Lumbar puncture 5/31/2019- for involvement with lymphoma  4.  Bone marrow aspiration and biopsy 5/29/2018 negative for lymphoma  5.  PET scan on 5/30/2019 with hypermetabolic perinephric soft tissue lesions with mesenteric lymphadenopathy, left paraspinal mass at L5, 6 cm intramuscular mass in the right upper thigh, lesion at the left clavicle and a 5 cm soft tissue mass between the right scapula and right posterior fourth rib.  6.  Right subclavian Mediport placed by Dr. Herrera on 6/12/2019.  7.  Admission to [facility] for cycle #1 of dose adjusted R-EPOCH on 6/17/2019.    HISTORY OF PRESENT ILLNESS:  Edward Srinivasan is a 79 y.o. male who returns today for follow up of the above issue.      Today we plan for admission to [facility] for cycle #1 of dose adjusted R-EPOCH on 6/17/2019.    He will have a lumbar puncture on admission.    He has been doing well.  He had a Mediport placed by Dr. Herrera on 6/12/2019.  He tolerated this well with only some mild soreness.  No bleeding.  His anxiety has been doing well and he has not required any alprazolam that he does report some anxiety right now.  No fevers chills night sweats.  No palpable lymphadenopathy.      ONCOLOGIC HISTORY:  He has a history of rheumatoid arthritis currently on injections by Dr. Velásquez, type 2 diabetes on oral medication, coronary artery disease status post stenting x2, aortic stenosis, and nonmelanoma " skin cancer resected from his face.  Continues to smoke a little bit less than a half a pack per day and has done this for several decades.     He has a long history of chronic back pain.  In March 2019 he developed some low back pain on the left.  With some physical therapy this is actually improved significantly but over the past couple of weeks he has developed some right-sided back pain.  Actually over the past couple of days this has essentially resolved.  He denies any radiculopathy.  He has chronic vision changes but nothing acute.  He denies any headaches.  No other focal neurologic changes.  He has occasional lightheadedness but this is been a chronic problem for him.  No fevers.  He is cold a lot but no rigors.  No night sweats.  He has lost a little bit of weight which she relates to his rheumatoid arthritis and decreased appetite secondary to pain.     He had an MRI of his lumbar spine on 4/25/2019 showing some degenerative changes but also there was a soft tissue mass extending posteriorly from the left common iliac vessels hugging the lateral margin of the L5 vertebrae extending from the left lateral margin of L4-5 interspace to L5-S1 where it pressed on the left L5 nerve root.  The mass measures 4.4 x 2.1 x 3.5 cm.  CT imaging was suggested and performed on 5/8/2019.  The CT scan showed multiple soft tissue nodules in the perirenal fat on the left adjacent to the kidney and a soft tissue mass interposed between the left psoas muscle and spine.  The mass measures 4.1 x 2.1 cm.     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    He was seen initially on 5/28/2019.  PET scan, LP, and a bone marrow aspiration and biopsy were requested.    LDH was 341 with uric acid 4.4.       Bone marrow biopsy on 5/29/2019:  normocellular marrow with no evidence for  lymphoma.  Flow cytometry negative.  Karyotype pending as of 6/7.      PET scan on 5/20/2019:  IMPRESSION:  1. There are hypermetabolic perinephric soft tissue lesions,  hypermetabolic mesenteric lymphadenopathy, left paraspinal mass at L5,  as well as an intensely hypermetabolic approximately 6 cm intramuscular  mass within the visualized right upper thigh. There is no hypermetabolic  lymphadenopathy within the abdomen or pelvis, but the shotty mediastinal  and hilar nodes are hypermetabolic and there is also a hypermetabolic  right upper lobe nodular opacity. In addition, there is an intensely  hypermetabolic approximately 5 cm intramuscular mass at the right upper  back and there is a tiny hypermetabolic lesion within the left clavicle.  2. Shotty cervical nodes have low level activity.  3. Uncertain significance of the more than typical metabolic activity at  dependent atelectatic change at both lower lobes.    LP on 5/31/2019:  Cytology and flow cytometry negative.    Mediport placed in the right subclavian area by Dr. Herrera on 6/12/2019.    Plan to start therapy with dose adjusted R-EPOCH with CNS prophylaxis with intrathecal methotrexate on 6/17/2019.    He previously had an echocardiogram on 5/15/2019 showing a normal left ventricular ejection fraction of 60%.    Admission to [facility] on 6/19/2019 for cycle #1 of therapy.    ALLERGIES:  Allergies   Allergen Reactions   • Oxaprozin Hives       MEDICATIONS:  The medication list has been reviewed with the patient by the medical assistant, and the list has been updated in the electronic medical record, which I reviewed.  Medication dosages and frequencies were confirmed to be accurate.    REVIEW OF SYSTEMS:  PAIN:  See Vital Signs below.  GENERAL: Weight loss.  No fevers chills or night sweats.  SKIN:  No rashes or non-healing lesions.  HEME/LYMPH:  No abnormal bleeding.  No palpable lymphadenopathy.  EYES:  No vision changes or diplopia.  ENT:   No sore throat or difficulty swallowing.  RESPIRATORY:  No cough, shortness of breath, hemoptysis, or wheezing.  CARDIOVASCULAR:  No chest pain, palpitations, orthopnea, or dyspnea on exertion.  GASTROINTESTINAL:  No abdominal pain, nausea, vomiting, constipation, diarrhea, melena, or hematochezia.  GENITOURINARY:  No dysuria or hematuria.  MUSCULOSKELETAL: Right upper extremity lesion at the elbow which has improved.  Right thigh lesion which he states has improved.  NEUROLOGIC:  No dizziness, loss of consciousness, or seizures.  PSYCHIATRIC:  No depression, anxiety, or mood changes.    There were no vitals filed for this visit.   See Epic     PHYSICAL EXAMINATION:  GENERAL:  Well-developed well-nourished male; awake, alert and oriented, in no acute distress.  A little anxious.    SKIN:  Warm and dry, without rashes, purpura, or petechiae.  HEAD:  Normocephalic, atraumatic.  EYES:  Pupils equal, round and reactive to light.  Extraocular movements intact.  Conjunctivae normal.  EARS:  Hearing intact.  NOSE:  Septum midline.  No excoriations or nasal discharge.  MOUTH:  No stomatitis or ulcers.  Lips are normal.  THROAT:  Oropharynx without lesions or exudates.  NECK:  Supple with good range of motion; no thyromegaly or masses; no JVD or bruits.  LYMPHATICS:  No cervical, supraclavicular, axillary lymphadenopathy.  CHEST:  Lungs are clear to auscultation bilaterally.  No wheezes, rales, or rhonchi.  HEART:  Regular rate; normal rhythm.  No murmurs, gallops or rubs.  ABDOMEN:  Soft, non-tender, non-distended.  Normal active bowel sounds.  No organomegaly.  EXTREMITIES:  No clubbing, cyanosis, or edema.  Vague right thigh lesion  NEUROLOGICAL:  No focal neurologic deficits.  He is anxious.    DIAGNOSTIC DATA:  Results for orders placed or performed in visit on 06/17/19   CBC Auto Differential   Result Value Ref Range    WBC 8.39 3.40 - 10.80 10*3/mm3    RBC 3.88 (L) 4.14 - 5.80 10*6/mm3    Hemoglobin 11.5 (L) 13.0 -  "17.7 g/dL    Hematocrit 34.3 (L) 37.5 - 51.0 %    MCV 88.4 79.0 - 97.0 fL    MCH 29.6 26.6 - 33.0 pg    MCHC 33.5 31.5 - 35.7 g/dL    RDW 14.3 12.3 - 15.4 %    RDW-SD 45.8 37.0 - 54.0 fl    MPV 8.1 6.0 - 12.0 fL    Platelets 259 140 - 450 10*3/mm3    Neutrophil % 66.5 42.7 - 76.0 %    Lymphocyte % 16.8 (L) 19.6 - 45.3 %    Monocyte % 8.9 5.0 - 12.0 %    Eosinophil % 6.7 (H) 0.3 - 6.2 %    Basophil % 0.7 0.0 - 1.5 %    Immature Grans % 0.4 0.0 - 0.5 %    Neutrophils, Absolute 5.58 1.70 - 7.00 10*3/mm3    Lymphocytes, Absolute 1.41 0.70 - 3.10 10*3/mm3    Monocytes, Absolute 0.75 0.10 - 0.90 10*3/mm3    Eosinophils, Absolute 0.56 (H) 0.00 - 0.40 10*3/mm3    Basophils, Absolute 0.06 0.00 - 0.20 10*3/mm3    Immature Grans, Absolute 0.03 0.00 - 0.05 10*3/mm3    nRBC 0.0 0.0 - 0.2 /100 WBC       IMAGING:  PET scan images from 5/30/2019 images personally reviewed.  Scattered bony lesions and lymphadenopathy as discussed below on both sides of the diaphragm.      IMPRESSION:  1. There are hypermetabolic perinephric soft tissue lesions,  hypermetabolic mesenteric lymphadenopathy, left paraspinal mass at L5,  as well as an intensely hypermetabolic approximately 6 cm intramuscular  mass within the visualized right upper thigh. There is no hypermetabolic  lymphadenopathy within the abdomen or pelvis, but the shotty mediastinal  and hilar nodes are hypermetabolic and there is also a hypermetabolic  right upper lobe nodular opacity. In addition, there is an intensely  hypermetabolic approximately 5 cm intramuscular mass at the right upper  back and there is a tiny hypermetabolic lesion within the left clavicle.  2. Shotty cervical nodes have low level activity.  3. Uncertain significance of the more than typical metabolic activity at  dependent atelectatic change at both lower lobes.    ASSESSMENT:  This is a 79 y.o. male with:  1.  Stage Carlos Eduardo \"double hit\" high grade B-cell lymphoma, CD20 positive, Ki-67 98%, germinal center " immunophenotype, MYC and BCL-2 rearrangements present.  His LP and bone marrow biopsies are negative for involvement.  However, his PET scan indicates perinephric soft tissue lesions. hypermetabolic mesenteric lymphadenopathy, left L5 paraspinous mass, an intensely hypermetabolic 6 cm intramuscular mass in the upper right thigh, a right upper lobe nodular opacity, a hypermetabolic mass adjacent to the right scapula, and a lesion within the left calvicle.  Paraspinous involvement puts him at extremely high risk for CNS involvement.      He is quite asymptomatic at this time.  He is in reasonably good shape for his age. We plan for inpatient treatment with dose adjusted R-EPOCH.  He will need CNS prophylaxis with IT methotrexate with each cycle of therapy.     2.  Rheumatoid arthritis previously on Cimzia (certolizumab) injections by Dr. Velásquez. Plan to discontinue these during chemotherapy.  He was previously on Simponi Aria (golimumab) and Actemra (tocilizumab).  These certainly could contribute to the development of lymphoma.     3.  Coronary artery disease status post stenting as well as moderate aortic stenosis by echocardiogram.  His left ventricular ejection fraction is normal at 60% by echocardiogram on 5/14/2019.     4.  Anxiety:  I previously gave him a prescription for alprazolam 0.25 mg to take twice daily as needed.  Continue benzodiazepines as needed during his hospitalization.    PLAN:   1.  Inpatient [facility] today  2.  Lumbar puncture on admission with a sample to be sent for flow cytometry.  Intrathecal methotrexate 12 mg administration for CNS prophylaxis.  3.  Initiate therapy with dose adjusted R-EPOCH.  He agrees to proceed.  Curative intent.  See the treatment plan for dosing.  4.  Benzodiazepines as needed for anxiety.  5.  Lovenox 40 mg daily for DVT prophylaxis.  I do not think he necessarily needs allopurinol.  6.  IV fluid hydration  7.  At discharge, Neulasta next Monday.   Twice weekly CBC on Mondays and Thursdays for 2 weeks.  I will see him back in 3 weeks for follow-up anticipating admission for cycle #2.  Plan total of 6 cycles of therapy if tolerated with intrathecal methotrexate with each cycle for CNS prophylaxis.

## 2019-06-18 LAB
ALBUMIN SERPL-MCNC: 3.2 G/DL (ref 3.5–5.2)
ALBUMIN/GLOB SERPL: 0.9 G/DL
ALP SERPL-CCNC: 54 U/L (ref 39–117)
ALT SERPL W P-5'-P-CCNC: 11 U/L (ref 1–41)
ANION GAP SERPL CALCULATED.3IONS-SCNC: 12.7 MMOL/L
AST SERPL-CCNC: 16 U/L (ref 1–40)
BASOPHILS # BLD AUTO: 0.01 10*3/MM3 (ref 0–0.2)
BASOPHILS NFR BLD AUTO: 0.2 % (ref 0–1.5)
BILIRUB SERPL-MCNC: 0.3 MG/DL (ref 0.2–1.2)
BUN BLD-MCNC: 15 MG/DL (ref 8–23)
BUN/CREAT SERPL: 19.2 (ref 7–25)
CALCIUM SPEC-SCNC: 8.2 MG/DL (ref 8.6–10.5)
CHLORIDE SERPL-SCNC: 98 MMOL/L (ref 98–107)
CO2 SERPL-SCNC: 24.3 MMOL/L (ref 22–29)
CREAT BLD-MCNC: 0.78 MG/DL (ref 0.76–1.27)
DEPRECATED RDW RBC AUTO: 46.6 FL (ref 37–54)
EOSINOPHIL # BLD AUTO: 0.01 10*3/MM3 (ref 0–0.4)
EOSINOPHIL NFR BLD AUTO: 0.2 % (ref 0.3–6.2)
ERYTHROCYTE [DISTWIDTH] IN BLOOD BY AUTOMATED COUNT: 14.5 % (ref 12.3–15.4)
GFR SERPL CREATININE-BSD FRML MDRD: 97 ML/MIN/1.73
GLOBULIN UR ELPH-MCNC: 3.6 GM/DL
GLUCOSE BLD-MCNC: 203 MG/DL (ref 65–99)
GLUCOSE BLDC GLUCOMTR-MCNC: 265 MG/DL (ref 70–130)
GLUCOSE BLDC GLUCOMTR-MCNC: 283 MG/DL (ref 70–130)
GLUCOSE BLDC GLUCOMTR-MCNC: 327 MG/DL (ref 70–130)
HBV CORE AB SER DONR QL IA: NEGATIVE
HCT VFR BLD AUTO: 32.9 % (ref 37.5–51)
HGB BLD-MCNC: 10.7 G/DL (ref 13–17.7)
IMM GRANULOCYTES # BLD AUTO: 0.03 10*3/MM3 (ref 0–0.05)
IMM GRANULOCYTES NFR BLD AUTO: 0.6 % (ref 0–0.5)
LDH SERPL-CCNC: 200 U/L (ref 135–225)
LYMPHOCYTES # BLD AUTO: 0.29 10*3/MM3 (ref 0.7–3.1)
LYMPHOCYTES NFR BLD AUTO: 5.7 % (ref 19.6–45.3)
MAGNESIUM SERPL-MCNC: 2.1 MG/DL (ref 1.6–2.4)
MCH RBC QN AUTO: 28.9 PG (ref 26.6–33)
MCHC RBC AUTO-ENTMCNC: 32.5 G/DL (ref 31.5–35.7)
MCV RBC AUTO: 88.9 FL (ref 79–97)
MONOCYTES # BLD AUTO: 0.07 10*3/MM3 (ref 0.1–0.9)
MONOCYTES NFR BLD AUTO: 1.4 % (ref 5–12)
NEUTROPHILS # BLD AUTO: 4.67 10*3/MM3 (ref 1.7–7)
NEUTROPHILS NFR BLD AUTO: 91.9 % (ref 42.7–76)
NRBC BLD AUTO-RTO: 0 /100 WBC (ref 0–0.2)
PHOSPHATE SERPL-MCNC: 2.9 MG/DL (ref 2.5–4.5)
PLATELET # BLD AUTO: 196 10*3/MM3 (ref 140–450)
PMV BLD AUTO: 9.3 FL (ref 6–12)
POTASSIUM BLD-SCNC: 4.5 MMOL/L (ref 3.5–5.2)
PROT SERPL-MCNC: 6.8 G/DL (ref 6–8.5)
RBC # BLD AUTO: 3.7 10*6/MM3 (ref 4.14–5.8)
REF LAB TEST METHOD: NORMAL
SODIUM BLD-SCNC: 135 MMOL/L (ref 136–145)
URATE SERPL-MCNC: 4.8 MG/DL (ref 3.4–7)
WBC NRBC COR # BLD: 5.08 10*3/MM3 (ref 3.4–10.8)

## 2019-06-18 PROCEDURE — 84100 ASSAY OF PHOSPHORUS: CPT | Performed by: INTERNAL MEDICINE

## 2019-06-18 PROCEDURE — 82962 GLUCOSE BLOOD TEST: CPT

## 2019-06-18 PROCEDURE — 85025 COMPLETE CBC W/AUTO DIFF WBC: CPT | Performed by: INTERNAL MEDICINE

## 2019-06-18 PROCEDURE — 25010000002 DOXORUBICIN PER 10 MG: Performed by: INTERNAL MEDICINE

## 2019-06-18 PROCEDURE — 25010000002 ONDANSETRON PER 1 MG: Performed by: INTERNAL MEDICINE

## 2019-06-18 PROCEDURE — 25010000002 ENOXAPARIN PER 10 MG: Performed by: INTERNAL MEDICINE

## 2019-06-18 PROCEDURE — 83615 LACTATE (LD) (LDH) ENZYME: CPT | Performed by: INTERNAL MEDICINE

## 2019-06-18 PROCEDURE — 83735 ASSAY OF MAGNESIUM: CPT | Performed by: INTERNAL MEDICINE

## 2019-06-18 PROCEDURE — 25010000002 ETOPOSIDE 100 MG/5ML SOLUTION 5 ML VIAL: Performed by: INTERNAL MEDICINE

## 2019-06-18 PROCEDURE — 63710000001 PREDNISONE PER 5 MG: Performed by: INTERNAL MEDICINE

## 2019-06-18 PROCEDURE — 80053 COMPREHEN METABOLIC PANEL: CPT | Performed by: INTERNAL MEDICINE

## 2019-06-18 PROCEDURE — 99233 SBSQ HOSP IP/OBS HIGH 50: CPT | Performed by: INTERNAL MEDICINE

## 2019-06-18 PROCEDURE — 25010000002 VINCRISTINE PER 1 MG: Performed by: INTERNAL MEDICINE

## 2019-06-18 PROCEDURE — 84550 ASSAY OF BLOOD/URIC ACID: CPT | Performed by: INTERNAL MEDICINE

## 2019-06-18 RX ADMIN — LISINOPRIL 10 MG: 10 TABLET ORAL at 08:21

## 2019-06-18 RX ADMIN — CETIRIZINE HYDROCHLORIDE 5 MG: 10 TABLET, FILM COATED ORAL at 08:21

## 2019-06-18 RX ADMIN — SODIUM CHLORIDE, PRESERVATIVE FREE 10 ML: 5 INJECTION INTRAVENOUS at 08:21

## 2019-06-18 RX ADMIN — PREDNISONE 100 MG: 50 TABLET ORAL at 18:03

## 2019-06-18 RX ADMIN — ONDANSETRON 16 MG: 2 INJECTION INTRAMUSCULAR; INTRAVENOUS at 21:04

## 2019-06-18 RX ADMIN — PREDNISONE 100 MG: 50 TABLET ORAL at 08:21

## 2019-06-18 RX ADMIN — SODIUM CHLORIDE 75 ML/HR: 9 INJECTION, SOLUTION INTRAVENOUS at 18:04

## 2019-06-18 RX ADMIN — LINAGLIPTIN 5 MG: 5 TABLET, FILM COATED ORAL at 08:21

## 2019-06-18 RX ADMIN — ALLOPURINOL 300 MG: 300 TABLET ORAL at 08:21

## 2019-06-18 RX ADMIN — PANTOPRAZOLE SODIUM 40 MG: 40 TABLET, DELAYED RELEASE ORAL at 06:01

## 2019-06-18 RX ADMIN — VINCRISTINE SULFATE: 1 INJECTION, SOLUTION INTRAVENOUS at 21:32

## 2019-06-18 RX ADMIN — ENOXAPARIN SODIUM 40 MG: 40 INJECTION SUBCUTANEOUS at 15:11

## 2019-06-19 LAB
ALBUMIN SERPL-MCNC: 3.3 G/DL (ref 3.5–5.2)
ALBUMIN/GLOB SERPL: 1.2 G/DL
ALP SERPL-CCNC: 46 U/L (ref 39–117)
ALT SERPL W P-5'-P-CCNC: 59 U/L (ref 1–41)
ANION GAP SERPL CALCULATED.3IONS-SCNC: 11.4 MMOL/L
AST SERPL-CCNC: 41 U/L (ref 1–40)
BASOPHILS # BLD AUTO: 0.01 10*3/MM3 (ref 0–0.2)
BASOPHILS NFR BLD AUTO: 0.1 % (ref 0–1.5)
BILIRUB SERPL-MCNC: <0.2 MG/DL (ref 0.2–1.2)
BUN BLD-MCNC: 18 MG/DL (ref 8–23)
BUN/CREAT SERPL: 22.2 (ref 7–25)
CALCIUM SPEC-SCNC: 8.1 MG/DL (ref 8.6–10.5)
CHLORIDE SERPL-SCNC: 102 MMOL/L (ref 98–107)
CO2 SERPL-SCNC: 23.6 MMOL/L (ref 22–29)
CREAT BLD-MCNC: 0.81 MG/DL (ref 0.76–1.27)
DEPRECATED RDW RBC AUTO: 47 FL (ref 37–54)
EOSINOPHIL # BLD AUTO: 0 10*3/MM3 (ref 0–0.4)
EOSINOPHIL NFR BLD AUTO: 0 % (ref 0.3–6.2)
ERYTHROCYTE [DISTWIDTH] IN BLOOD BY AUTOMATED COUNT: 14.3 % (ref 12.3–15.4)
GFR SERPL CREATININE-BSD FRML MDRD: 92 ML/MIN/1.73
GLOBULIN UR ELPH-MCNC: 2.8 GM/DL
GLUCOSE BLD-MCNC: 254 MG/DL (ref 65–99)
GLUCOSE BLDC GLUCOMTR-MCNC: 208 MG/DL (ref 70–130)
GLUCOSE BLDC GLUCOMTR-MCNC: 278 MG/DL (ref 70–130)
GLUCOSE BLDC GLUCOMTR-MCNC: 286 MG/DL (ref 70–130)
GLUCOSE BLDC GLUCOMTR-MCNC: 291 MG/DL (ref 70–130)
HCT VFR BLD AUTO: 27.6 % (ref 37.5–51)
HGB BLD-MCNC: 9 G/DL (ref 13–17.7)
IMM GRANULOCYTES # BLD AUTO: 0.05 10*3/MM3 (ref 0–0.05)
IMM GRANULOCYTES NFR BLD AUTO: 0.7 % (ref 0–0.5)
LDH SERPL-CCNC: 188 U/L (ref 135–225)
LYMPHOCYTES # BLD AUTO: 0.5 10*3/MM3 (ref 0.7–3.1)
LYMPHOCYTES NFR BLD AUTO: 6.8 % (ref 19.6–45.3)
MAGNESIUM SERPL-MCNC: 1.9 MG/DL (ref 1.6–2.4)
MCH RBC QN AUTO: 29.4 PG (ref 26.6–33)
MCHC RBC AUTO-ENTMCNC: 32.6 G/DL (ref 31.5–35.7)
MCV RBC AUTO: 90.2 FL (ref 79–97)
MONOCYTES # BLD AUTO: 0.22 10*3/MM3 (ref 0.1–0.9)
MONOCYTES NFR BLD AUTO: 3 % (ref 5–12)
NEUTROPHILS # BLD AUTO: 6.53 10*3/MM3 (ref 1.7–7)
NEUTROPHILS NFR BLD AUTO: 89.4 % (ref 42.7–76)
NRBC BLD AUTO-RTO: 0 /100 WBC (ref 0–0.2)
PHOSPHATE SERPL-MCNC: 2.6 MG/DL (ref 2.5–4.5)
PLATELET # BLD AUTO: 164 10*3/MM3 (ref 140–450)
PMV BLD AUTO: 9 FL (ref 6–12)
POTASSIUM BLD-SCNC: 4.2 MMOL/L (ref 3.5–5.2)
PROT SERPL-MCNC: 6.1 G/DL (ref 6–8.5)
RBC # BLD AUTO: 3.06 10*6/MM3 (ref 4.14–5.8)
SODIUM BLD-SCNC: 137 MMOL/L (ref 136–145)
URATE SERPL-MCNC: 2.8 MG/DL (ref 3.4–7)
WBC NRBC COR # BLD: 7.31 10*3/MM3 (ref 3.4–10.8)

## 2019-06-19 PROCEDURE — 25010000002 ENOXAPARIN PER 10 MG: Performed by: INTERNAL MEDICINE

## 2019-06-19 PROCEDURE — 85025 COMPLETE CBC W/AUTO DIFF WBC: CPT | Performed by: INTERNAL MEDICINE

## 2019-06-19 PROCEDURE — 82962 GLUCOSE BLOOD TEST: CPT

## 2019-06-19 PROCEDURE — 63710000001 PREDNISONE PER 5 MG: Performed by: INTERNAL MEDICINE

## 2019-06-19 PROCEDURE — 25010000002 VINCRISTINE PER 1 MG: Performed by: INTERNAL MEDICINE

## 2019-06-19 PROCEDURE — 84550 ASSAY OF BLOOD/URIC ACID: CPT | Performed by: INTERNAL MEDICINE

## 2019-06-19 PROCEDURE — 25010000002 DOXORUBICIN PER 10 MG: Performed by: INTERNAL MEDICINE

## 2019-06-19 PROCEDURE — 99233 SBSQ HOSP IP/OBS HIGH 50: CPT | Performed by: INTERNAL MEDICINE

## 2019-06-19 PROCEDURE — 83615 LACTATE (LD) (LDH) ENZYME: CPT | Performed by: INTERNAL MEDICINE

## 2019-06-19 PROCEDURE — 25010000002 ONDANSETRON PER 1 MG: Performed by: INTERNAL MEDICINE

## 2019-06-19 PROCEDURE — 84100 ASSAY OF PHOSPHORUS: CPT | Performed by: INTERNAL MEDICINE

## 2019-06-19 PROCEDURE — 83735 ASSAY OF MAGNESIUM: CPT | Performed by: INTERNAL MEDICINE

## 2019-06-19 PROCEDURE — 25010000002 ETOPOSIDE 500 MG/25ML SOLUTION 25 ML VIAL: Performed by: INTERNAL MEDICINE

## 2019-06-19 PROCEDURE — 80053 COMPREHEN METABOLIC PANEL: CPT | Performed by: INTERNAL MEDICINE

## 2019-06-19 RX ADMIN — PREDNISONE 100 MG: 50 TABLET ORAL at 08:48

## 2019-06-19 RX ADMIN — ALLOPURINOL 300 MG: 300 TABLET ORAL at 08:48

## 2019-06-19 RX ADMIN — ENOXAPARIN SODIUM 40 MG: 40 INJECTION SUBCUTANEOUS at 14:56

## 2019-06-19 RX ADMIN — VINCRISTINE SULFATE: 1 INJECTION, SOLUTION INTRAVENOUS at 21:13

## 2019-06-19 RX ADMIN — ATORVASTATIN CALCIUM 40 MG: 20 TABLET, FILM COATED ORAL at 08:48

## 2019-06-19 RX ADMIN — PANTOPRAZOLE SODIUM 40 MG: 40 TABLET, DELAYED RELEASE ORAL at 05:12

## 2019-06-19 RX ADMIN — CETIRIZINE HYDROCHLORIDE 5 MG: 10 TABLET, FILM COATED ORAL at 08:48

## 2019-06-19 RX ADMIN — SODIUM CHLORIDE, PRESERVATIVE FREE 10 ML: 5 INJECTION INTRAVENOUS at 08:49

## 2019-06-19 RX ADMIN — LISINOPRIL 10 MG: 10 TABLET ORAL at 08:49

## 2019-06-19 RX ADMIN — LORAZEPAM 0.5 MG: 0.5 TABLET ORAL at 22:15

## 2019-06-19 RX ADMIN — LINAGLIPTIN 5 MG: 5 TABLET, FILM COATED ORAL at 08:49

## 2019-06-19 RX ADMIN — PREDNISONE 100 MG: 50 TABLET ORAL at 17:56

## 2019-06-19 RX ADMIN — ONDANSETRON 16 MG: 2 INJECTION INTRAMUSCULAR; INTRAVENOUS at 20:46

## 2019-06-20 LAB
ALBUMIN SERPL-MCNC: 3.1 G/DL (ref 3.5–5.2)
ALBUMIN/GLOB SERPL: 1.1 G/DL
ALP SERPL-CCNC: 45 U/L (ref 39–117)
ALT SERPL W P-5'-P-CCNC: 100 U/L (ref 1–41)
ANION GAP SERPL CALCULATED.3IONS-SCNC: 12.6 MMOL/L
AST SERPL-CCNC: 51 U/L (ref 1–40)
BASOPHILS # BLD AUTO: 0.01 10*3/MM3 (ref 0–0.2)
BASOPHILS NFR BLD AUTO: 0.1 % (ref 0–1.5)
BILIRUB SERPL-MCNC: 0.2 MG/DL (ref 0.2–1.2)
BUN BLD-MCNC: 21 MG/DL (ref 8–23)
BUN/CREAT SERPL: 30.9 (ref 7–25)
CALCIUM SPEC-SCNC: 7.6 MG/DL (ref 8.6–10.5)
CHLORIDE SERPL-SCNC: 101 MMOL/L (ref 98–107)
CO2 SERPL-SCNC: 21.4 MMOL/L (ref 22–29)
CREAT BLD-MCNC: 0.68 MG/DL (ref 0.76–1.27)
DEPRECATED RDW RBC AUTO: 46.5 FL (ref 37–54)
EOSINOPHIL # BLD AUTO: 0 10*3/MM3 (ref 0–0.4)
EOSINOPHIL NFR BLD AUTO: 0 % (ref 0.3–6.2)
ERYTHROCYTE [DISTWIDTH] IN BLOOD BY AUTOMATED COUNT: 14.4 % (ref 12.3–15.4)
GFR SERPL CREATININE-BSD FRML MDRD: 113 ML/MIN/1.73
GLOBULIN UR ELPH-MCNC: 2.7 GM/DL
GLUCOSE BLD-MCNC: 255 MG/DL (ref 65–99)
GLUCOSE BLDC GLUCOMTR-MCNC: 196 MG/DL (ref 70–130)
GLUCOSE BLDC GLUCOMTR-MCNC: 203 MG/DL (ref 70–130)
GLUCOSE BLDC GLUCOMTR-MCNC: 264 MG/DL (ref 70–130)
GLUCOSE BLDC GLUCOMTR-MCNC: 281 MG/DL (ref 70–130)
HCT VFR BLD AUTO: 27.4 % (ref 37.5–51)
HGB BLD-MCNC: 8.9 G/DL (ref 13–17.7)
IMM GRANULOCYTES # BLD AUTO: 0.08 10*3/MM3 (ref 0–0.05)
IMM GRANULOCYTES NFR BLD AUTO: 1.1 % (ref 0–0.5)
LDH SERPL-CCNC: 178 U/L (ref 135–225)
LYMPHOCYTES # BLD AUTO: 0.38 10*3/MM3 (ref 0.7–3.1)
LYMPHOCYTES NFR BLD AUTO: 5.3 % (ref 19.6–45.3)
MAGNESIUM SERPL-MCNC: 1.9 MG/DL (ref 1.6–2.4)
MCH RBC QN AUTO: 29 PG (ref 26.6–33)
MCHC RBC AUTO-ENTMCNC: 32.5 G/DL (ref 31.5–35.7)
MCV RBC AUTO: 89.3 FL (ref 79–97)
MONOCYTES # BLD AUTO: 0.14 10*3/MM3 (ref 0.1–0.9)
MONOCYTES NFR BLD AUTO: 2 % (ref 5–12)
NEUTROPHILS # BLD AUTO: 6.53 10*3/MM3 (ref 1.7–7)
NEUTROPHILS NFR BLD AUTO: 91.5 % (ref 42.7–76)
NRBC BLD AUTO-RTO: 0 /100 WBC (ref 0–0.2)
PHOSPHATE SERPL-MCNC: 2.4 MG/DL (ref 2.5–4.5)
PLATELET # BLD AUTO: 177 10*3/MM3 (ref 140–450)
PMV BLD AUTO: 9.2 FL (ref 6–12)
POTASSIUM BLD-SCNC: 4 MMOL/L (ref 3.5–5.2)
PROT SERPL-MCNC: 5.8 G/DL (ref 6–8.5)
RBC # BLD AUTO: 3.07 10*6/MM3 (ref 4.14–5.8)
SODIUM BLD-SCNC: 135 MMOL/L (ref 136–145)
URATE SERPL-MCNC: 3 MG/DL (ref 3.4–7)
WBC NRBC COR # BLD: 7.14 10*3/MM3 (ref 3.4–10.8)

## 2019-06-20 PROCEDURE — 85025 COMPLETE CBC W/AUTO DIFF WBC: CPT | Performed by: INTERNAL MEDICINE

## 2019-06-20 PROCEDURE — 84550 ASSAY OF BLOOD/URIC ACID: CPT | Performed by: INTERNAL MEDICINE

## 2019-06-20 PROCEDURE — 80053 COMPREHEN METABOLIC PANEL: CPT | Performed by: INTERNAL MEDICINE

## 2019-06-20 PROCEDURE — 25010000002 ETOPOSIDE 500 MG/25ML SOLUTION 25 ML VIAL: Performed by: INTERNAL MEDICINE

## 2019-06-20 PROCEDURE — 25010000002 FUROSEMIDE PER 20 MG: Performed by: INTERNAL MEDICINE

## 2019-06-20 PROCEDURE — 25010000002 VINCRISTINE PER 1 MG: Performed by: INTERNAL MEDICINE

## 2019-06-20 PROCEDURE — 25010000002 ONDANSETRON PER 1 MG: Performed by: INTERNAL MEDICINE

## 2019-06-20 PROCEDURE — 25010000002 DOXORUBICIN PER 10 MG: Performed by: INTERNAL MEDICINE

## 2019-06-20 PROCEDURE — 84100 ASSAY OF PHOSPHORUS: CPT | Performed by: INTERNAL MEDICINE

## 2019-06-20 PROCEDURE — 99233 SBSQ HOSP IP/OBS HIGH 50: CPT | Performed by: INTERNAL MEDICINE

## 2019-06-20 PROCEDURE — 25010000002 ENOXAPARIN PER 10 MG: Performed by: INTERNAL MEDICINE

## 2019-06-20 PROCEDURE — 83615 LACTATE (LD) (LDH) ENZYME: CPT | Performed by: INTERNAL MEDICINE

## 2019-06-20 PROCEDURE — 83735 ASSAY OF MAGNESIUM: CPT | Performed by: INTERNAL MEDICINE

## 2019-06-20 PROCEDURE — 63710000001 PREDNISONE PER 5 MG: Performed by: INTERNAL MEDICINE

## 2019-06-20 PROCEDURE — 82962 GLUCOSE BLOOD TEST: CPT

## 2019-06-20 RX ORDER — FUROSEMIDE 10 MG/ML
20 INJECTION INTRAMUSCULAR; INTRAVENOUS ONCE
Status: COMPLETED | OUTPATIENT
Start: 2019-06-20 | End: 2019-06-20

## 2019-06-20 RX ADMIN — ONDANSETRON 16 MG: 2 INJECTION INTRAMUSCULAR; INTRAVENOUS at 21:46

## 2019-06-20 RX ADMIN — FUROSEMIDE 20 MG: 10 INJECTION, SOLUTION INTRAVENOUS at 14:18

## 2019-06-20 RX ADMIN — SODIUM CHLORIDE, PRESERVATIVE FREE 10 ML: 5 INJECTION INTRAVENOUS at 21:48

## 2019-06-20 RX ADMIN — LISINOPRIL 10 MG: 10 TABLET ORAL at 10:05

## 2019-06-20 RX ADMIN — ENOXAPARIN SODIUM 40 MG: 40 INJECTION SUBCUTANEOUS at 14:17

## 2019-06-20 RX ADMIN — VINCRISTINE SULFATE: 1 INJECTION, SOLUTION INTRAVENOUS at 22:05

## 2019-06-20 RX ADMIN — ATORVASTATIN CALCIUM 40 MG: 20 TABLET, FILM COATED ORAL at 10:06

## 2019-06-20 RX ADMIN — PANTOPRAZOLE SODIUM 40 MG: 40 TABLET, DELAYED RELEASE ORAL at 05:38

## 2019-06-20 RX ADMIN — SODIUM CHLORIDE, PRESERVATIVE FREE 10 ML: 5 INJECTION INTRAVENOUS at 10:07

## 2019-06-20 RX ADMIN — PREDNISONE 100 MG: 50 TABLET ORAL at 18:07

## 2019-06-20 RX ADMIN — PREDNISONE 100 MG: 50 TABLET ORAL at 10:06

## 2019-06-20 RX ADMIN — LORAZEPAM 0.5 MG: 0.5 TABLET ORAL at 21:46

## 2019-06-20 RX ADMIN — CETIRIZINE HYDROCHLORIDE 5 MG: 10 TABLET, FILM COATED ORAL at 10:06

## 2019-06-20 RX ADMIN — LINAGLIPTIN 5 MG: 5 TABLET, FILM COATED ORAL at 10:05

## 2019-06-20 RX ADMIN — ALLOPURINOL 300 MG: 300 TABLET ORAL at 10:05

## 2019-06-21 DIAGNOSIS — C83.38 DIFFUSE LARGE B-CELL LYMPHOMA OF LYMPH NODES OF MULTIPLE REGIONS (HCC): Primary | ICD-10-CM

## 2019-06-21 LAB
ALBUMIN SERPL-MCNC: 3.2 G/DL (ref 3.5–5.2)
ALBUMIN/GLOB SERPL: 1.3 G/DL
ALP SERPL-CCNC: 44 U/L (ref 39–117)
ALT SERPL W P-5'-P-CCNC: 79 U/L (ref 1–41)
ANION GAP SERPL CALCULATED.3IONS-SCNC: 12.8 MMOL/L
AST SERPL-CCNC: 31 U/L (ref 1–40)
BASOPHILS # BLD AUTO: 0 10*3/MM3 (ref 0–0.2)
BASOPHILS NFR BLD AUTO: 0 % (ref 0–1.5)
BILIRUB SERPL-MCNC: 0.3 MG/DL (ref 0.2–1.2)
BUN BLD-MCNC: 21 MG/DL (ref 8–23)
BUN/CREAT SERPL: 26.3 (ref 7–25)
CALCIUM SPEC-SCNC: 7.9 MG/DL (ref 8.6–10.5)
CHLORIDE SERPL-SCNC: 96 MMOL/L (ref 98–107)
CO2 SERPL-SCNC: 24.2 MMOL/L (ref 22–29)
CREAT BLD-MCNC: 0.8 MG/DL (ref 0.76–1.27)
DEPRECATED RDW RBC AUTO: 44 FL (ref 37–54)
EOSINOPHIL # BLD AUTO: 0 10*3/MM3 (ref 0–0.4)
EOSINOPHIL NFR BLD AUTO: 0 % (ref 0.3–6.2)
ERYTHROCYTE [DISTWIDTH] IN BLOOD BY AUTOMATED COUNT: 13.8 % (ref 12.3–15.4)
GFR SERPL CREATININE-BSD FRML MDRD: 94 ML/MIN/1.73
GLOBULIN UR ELPH-MCNC: 2.5 GM/DL
GLUCOSE BLD-MCNC: 235 MG/DL (ref 65–99)
GLUCOSE BLDC GLUCOMTR-MCNC: 230 MG/DL (ref 70–130)
GLUCOSE BLDC GLUCOMTR-MCNC: 255 MG/DL (ref 70–130)
HCT VFR BLD AUTO: 26.6 % (ref 37.5–51)
HGB BLD-MCNC: 9 G/DL (ref 13–17.7)
IMM GRANULOCYTES # BLD AUTO: 0.08 10*3/MM3 (ref 0–0.05)
IMM GRANULOCYTES NFR BLD AUTO: 1.4 % (ref 0–0.5)
LDH SERPL-CCNC: 177 U/L (ref 135–225)
LYMPHOCYTES # BLD AUTO: 0.46 10*3/MM3 (ref 0.7–3.1)
LYMPHOCYTES NFR BLD AUTO: 7.8 % (ref 19.6–45.3)
MAGNESIUM SERPL-MCNC: 1.9 MG/DL (ref 1.6–2.4)
MCH RBC QN AUTO: 29.6 PG (ref 26.6–33)
MCHC RBC AUTO-ENTMCNC: 33.8 G/DL (ref 31.5–35.7)
MCV RBC AUTO: 87.5 FL (ref 79–97)
MONOCYTES # BLD AUTO: 0.1 10*3/MM3 (ref 0.1–0.9)
MONOCYTES NFR BLD AUTO: 1.7 % (ref 5–12)
NEUTROPHILS # BLD AUTO: 5.24 10*3/MM3 (ref 1.7–7)
NEUTROPHILS NFR BLD AUTO: 89.1 % (ref 42.7–76)
NRBC BLD AUTO-RTO: 0 /100 WBC (ref 0–0.2)
PHOSPHATE SERPL-MCNC: 2.7 MG/DL (ref 2.5–4.5)
PLATELET # BLD AUTO: 172 10*3/MM3 (ref 140–450)
PMV BLD AUTO: 9.2 FL (ref 6–12)
POTASSIUM BLD-SCNC: 3.8 MMOL/L (ref 3.5–5.2)
PROT SERPL-MCNC: 5.7 G/DL (ref 6–8.5)
RBC # BLD AUTO: 3.04 10*6/MM3 (ref 4.14–5.8)
SODIUM BLD-SCNC: 133 MMOL/L (ref 136–145)
URATE SERPL-MCNC: 3 MG/DL (ref 3.4–7)
WBC NRBC COR # BLD: 5.88 10*3/MM3 (ref 3.4–10.8)

## 2019-06-21 PROCEDURE — 80053 COMPREHEN METABOLIC PANEL: CPT | Performed by: INTERNAL MEDICINE

## 2019-06-21 PROCEDURE — 84100 ASSAY OF PHOSPHORUS: CPT | Performed by: INTERNAL MEDICINE

## 2019-06-21 PROCEDURE — 85025 COMPLETE CBC W/AUTO DIFF WBC: CPT | Performed by: INTERNAL MEDICINE

## 2019-06-21 PROCEDURE — 63710000001 PREDNISONE PER 5 MG: Performed by: INTERNAL MEDICINE

## 2019-06-21 PROCEDURE — 82962 GLUCOSE BLOOD TEST: CPT

## 2019-06-21 PROCEDURE — 25010000002 CYCLOPHOSPHAMIDE PER 100 MG: Performed by: INTERNAL MEDICINE

## 2019-06-21 PROCEDURE — 83615 LACTATE (LD) (LDH) ENZYME: CPT | Performed by: INTERNAL MEDICINE

## 2019-06-21 PROCEDURE — 99233 SBSQ HOSP IP/OBS HIGH 50: CPT | Performed by: INTERNAL MEDICINE

## 2019-06-21 PROCEDURE — 83735 ASSAY OF MAGNESIUM: CPT | Performed by: INTERNAL MEDICINE

## 2019-06-21 PROCEDURE — 84550 ASSAY OF BLOOD/URIC ACID: CPT | Performed by: INTERNAL MEDICINE

## 2019-06-21 PROCEDURE — 25010000002 ONDANSETRON PER 1 MG: Performed by: INTERNAL MEDICINE

## 2019-06-21 PROCEDURE — 25010000002 ENOXAPARIN PER 10 MG: Performed by: INTERNAL MEDICINE

## 2019-06-21 RX ADMIN — LINAGLIPTIN 5 MG: 5 TABLET, FILM COATED ORAL at 08:16

## 2019-06-21 RX ADMIN — SODIUM CHLORIDE, PRESERVATIVE FREE 10 ML: 5 INJECTION INTRAVENOUS at 20:51

## 2019-06-21 RX ADMIN — CETIRIZINE HYDROCHLORIDE 5 MG: 10 TABLET, FILM COATED ORAL at 08:17

## 2019-06-21 RX ADMIN — ALLOPURINOL 300 MG: 300 TABLET ORAL at 08:16

## 2019-06-21 RX ADMIN — ONDANSETRON 16 MG: 2 INJECTION INTRAMUSCULAR; INTRAVENOUS at 21:20

## 2019-06-21 RX ADMIN — CYCLOPHOSPHAMIDE 1320 MG: 1 INJECTION, POWDER, FOR SOLUTION INTRAVENOUS; ORAL at 21:43

## 2019-06-21 RX ADMIN — SODIUM CHLORIDE, PRESERVATIVE FREE 10 ML: 5 INJECTION INTRAVENOUS at 08:17

## 2019-06-21 RX ADMIN — PREDNISONE 100 MG: 50 TABLET ORAL at 08:17

## 2019-06-21 RX ADMIN — ATORVASTATIN CALCIUM 40 MG: 20 TABLET, FILM COATED ORAL at 08:17

## 2019-06-21 RX ADMIN — LISINOPRIL 10 MG: 10 TABLET ORAL at 08:16

## 2019-06-21 RX ADMIN — ENOXAPARIN SODIUM 40 MG: 40 INJECTION SUBCUTANEOUS at 15:42

## 2019-06-21 RX ADMIN — PANTOPRAZOLE SODIUM 40 MG: 40 TABLET, DELAYED RELEASE ORAL at 05:00

## 2019-06-21 RX ADMIN — SODIUM CHLORIDE 30 ML/HR: 9 INJECTION, SOLUTION INTRAVENOUS at 04:56

## 2019-06-21 RX ADMIN — LORAZEPAM 0.5 MG: 0.5 TABLET ORAL at 22:39

## 2019-06-21 RX ADMIN — PREDNISONE 100 MG: 50 TABLET ORAL at 17:07

## 2019-06-22 VITALS
HEIGHT: 67 IN | DIASTOLIC BLOOD PRESSURE: 66 MMHG | SYSTOLIC BLOOD PRESSURE: 143 MMHG | HEART RATE: 57 BPM | RESPIRATION RATE: 16 BRPM | WEIGHT: 145 LBS | TEMPERATURE: 97.4 F | OXYGEN SATURATION: 97 % | BODY MASS INDEX: 22.76 KG/M2

## 2019-06-22 LAB
ALBUMIN SERPL-MCNC: 3 G/DL (ref 3.5–5.2)
ALBUMIN/GLOB SERPL: 1.3 G/DL
ALP SERPL-CCNC: 40 U/L (ref 39–117)
ALT SERPL W P-5'-P-CCNC: 56 U/L (ref 1–41)
ANION GAP SERPL CALCULATED.3IONS-SCNC: 12.2 MMOL/L
AST SERPL-CCNC: 17 U/L (ref 1–40)
BASOPHILS # BLD AUTO: 0 10*3/MM3 (ref 0–0.2)
BASOPHILS NFR BLD AUTO: 0 % (ref 0–1.5)
BILIRUB SERPL-MCNC: 0.4 MG/DL (ref 0.2–1.2)
BUN BLD-MCNC: 23 MG/DL (ref 8–23)
BUN/CREAT SERPL: 35.4 (ref 7–25)
CALCIUM SPEC-SCNC: 7.9 MG/DL (ref 8.6–10.5)
CHLORIDE SERPL-SCNC: 99 MMOL/L (ref 98–107)
CO2 SERPL-SCNC: 23.8 MMOL/L (ref 22–29)
CREAT BLD-MCNC: 0.65 MG/DL (ref 0.76–1.27)
DEPRECATED RDW RBC AUTO: 43.8 FL (ref 37–54)
EOSINOPHIL # BLD AUTO: 0 10*3/MM3 (ref 0–0.4)
EOSINOPHIL NFR BLD AUTO: 0 % (ref 0.3–6.2)
ERYTHROCYTE [DISTWIDTH] IN BLOOD BY AUTOMATED COUNT: 14.1 % (ref 12.3–15.4)
GFR SERPL CREATININE-BSD FRML MDRD: 119 ML/MIN/1.73
GLOBULIN UR ELPH-MCNC: 2.4 GM/DL
GLUCOSE BLD-MCNC: 248 MG/DL (ref 65–99)
GLUCOSE BLDC GLUCOMTR-MCNC: 172 MG/DL (ref 70–130)
HCT VFR BLD AUTO: 25.4 % (ref 37.5–51)
HGB BLD-MCNC: 8.6 G/DL (ref 13–17.7)
IMM GRANULOCYTES # BLD AUTO: 0.04 10*3/MM3 (ref 0–0.05)
IMM GRANULOCYTES NFR BLD AUTO: 1 % (ref 0–0.5)
LDH SERPL-CCNC: 159 U/L (ref 135–225)
LYMPHOCYTES # BLD AUTO: 0.4 10*3/MM3 (ref 0.7–3.1)
LYMPHOCYTES NFR BLD AUTO: 10.3 % (ref 19.6–45.3)
MAGNESIUM SERPL-MCNC: 1.9 MG/DL (ref 1.6–2.4)
MCH RBC QN AUTO: 29.1 PG (ref 26.6–33)
MCHC RBC AUTO-ENTMCNC: 33.9 G/DL (ref 31.5–35.7)
MCV RBC AUTO: 85.8 FL (ref 79–97)
MONOCYTES # BLD AUTO: 0.02 10*3/MM3 (ref 0.1–0.9)
MONOCYTES NFR BLD AUTO: 0.5 % (ref 5–12)
NEUTROPHILS # BLD AUTO: 3.43 10*3/MM3 (ref 1.7–7)
NEUTROPHILS NFR BLD AUTO: 88.2 % (ref 42.7–76)
NRBC BLD AUTO-RTO: 0 /100 WBC (ref 0–0.2)
PHOSPHATE SERPL-MCNC: 2.5 MG/DL (ref 2.5–4.5)
PLATELET # BLD AUTO: 157 10*3/MM3 (ref 140–450)
PMV BLD AUTO: 9.2 FL (ref 6–12)
POTASSIUM BLD-SCNC: 3.7 MMOL/L (ref 3.5–5.2)
PROT SERPL-MCNC: 5.4 G/DL (ref 6–8.5)
RBC # BLD AUTO: 2.96 10*6/MM3 (ref 4.14–5.8)
SODIUM BLD-SCNC: 135 MMOL/L (ref 136–145)
URATE SERPL-MCNC: 2.8 MG/DL (ref 3.4–7)
WBC NRBC COR # BLD: 3.89 10*3/MM3 (ref 3.4–10.8)

## 2019-06-22 PROCEDURE — 83615 LACTATE (LD) (LDH) ENZYME: CPT | Performed by: INTERNAL MEDICINE

## 2019-06-22 PROCEDURE — 84550 ASSAY OF BLOOD/URIC ACID: CPT | Performed by: INTERNAL MEDICINE

## 2019-06-22 PROCEDURE — 25010000002 PEGFILGRASTIM 6 MG/0.6ML SOLUTION PREFILLED SYRINGE: Performed by: INTERNAL MEDICINE

## 2019-06-22 PROCEDURE — 84100 ASSAY OF PHOSPHORUS: CPT | Performed by: INTERNAL MEDICINE

## 2019-06-22 PROCEDURE — 80053 COMPREHEN METABOLIC PANEL: CPT | Performed by: INTERNAL MEDICINE

## 2019-06-22 PROCEDURE — 99239 HOSP IP/OBS DSCHRG MGMT >30: CPT | Performed by: INTERNAL MEDICINE

## 2019-06-22 PROCEDURE — 82962 GLUCOSE BLOOD TEST: CPT

## 2019-06-22 PROCEDURE — 83735 ASSAY OF MAGNESIUM: CPT | Performed by: INTERNAL MEDICINE

## 2019-06-22 PROCEDURE — 85025 COMPLETE CBC W/AUTO DIFF WBC: CPT | Performed by: INTERNAL MEDICINE

## 2019-06-22 PROCEDURE — 63710000001 PREDNISONE PER 5 MG: Performed by: INTERNAL MEDICINE

## 2019-06-22 RX ORDER — PROCHLORPERAZINE MALEATE 10 MG
10 TABLET ORAL EVERY 6 HOURS PRN
Qty: 30 TABLET | Refills: 3 | Status: SHIPPED | OUTPATIENT
Start: 2019-06-22 | End: 2019-10-21 | Stop reason: SDDI

## 2019-06-22 RX ORDER — ACETAMINOPHEN 325 MG/1
650 TABLET ORAL EVERY 4 HOURS PRN
Start: 2019-06-22 | End: 2019-10-21 | Stop reason: SDDI

## 2019-06-22 RX ADMIN — Medication 500 UNITS: at 09:52

## 2019-06-22 RX ADMIN — PREDNISONE 100 MG: 50 TABLET ORAL at 08:10

## 2019-06-22 RX ADMIN — LINAGLIPTIN 5 MG: 5 TABLET, FILM COATED ORAL at 08:09

## 2019-06-22 RX ADMIN — ALLOPURINOL 300 MG: 300 TABLET ORAL at 08:09

## 2019-06-22 RX ADMIN — PEGFILGRASTIM 6 MG: 6 INJECTION SUBCUTANEOUS at 10:30

## 2019-06-22 RX ADMIN — ATORVASTATIN CALCIUM 40 MG: 20 TABLET, FILM COATED ORAL at 08:10

## 2019-06-22 RX ADMIN — LISINOPRIL 10 MG: 10 TABLET ORAL at 08:09

## 2019-06-22 RX ADMIN — CETIRIZINE HYDROCHLORIDE 5 MG: 10 TABLET, FILM COATED ORAL at 08:10

## 2019-06-22 RX ADMIN — SODIUM CHLORIDE, PRESERVATIVE FREE 10 ML: 5 INJECTION INTRAVENOUS at 08:11

## 2019-06-23 ENCOUNTER — READMISSION MANAGEMENT (OUTPATIENT)
Dept: CALL CENTER | Facility: HOSPITAL | Age: 79
End: 2019-06-23

## 2019-06-23 NOTE — OUTREACH NOTE
Prep Survey      Responses   Facility patient discharged from?  [facility]   Is patient eligible?  Yes   Discharge diagnosis  s/p port placement and chemotherapy, Diffuse large B-cell lymphoma of lymph nodes of multiple regions   Does the patient have one of the following disease processes/diagnoses(primary or secondary)?  Other   Does the patient have Home health ordered?  No   Is there a DME ordered?  No   Comments regarding appointments  Pt to schedule follow up with PCP   Prep survey completed?  Yes          Raven Ruff RN

## 2019-06-24 ENCOUNTER — INFUSION (OUTPATIENT)
Dept: ONCOLOGY | Facility: HOSPITAL | Age: 79
End: 2019-06-24

## 2019-06-24 ENCOUNTER — LAB (OUTPATIENT)
Dept: LAB | Facility: HOSPITAL | Age: 79
End: 2019-06-24

## 2019-06-24 ENCOUNTER — READMISSION MANAGEMENT (OUTPATIENT)
Dept: CALL CENTER | Facility: HOSPITAL | Age: 79
End: 2019-06-24

## 2019-06-24 DIAGNOSIS — I10 ESSENTIAL HYPERTENSION, BENIGN: ICD-10-CM

## 2019-06-24 DIAGNOSIS — C83.33 DIFFUSE LARGE B-CELL LYMPHOMA OF INTRA-ABDOMINAL LYMPH NODES (HCC): Primary | ICD-10-CM

## 2019-06-24 DIAGNOSIS — E78.00 PURE HYPERCHOLESTEROLEMIA: ICD-10-CM

## 2019-06-24 DIAGNOSIS — K21.9 GASTROESOPHAGEAL REFLUX DISEASE, ESOPHAGITIS PRESENCE NOT SPECIFIED: ICD-10-CM

## 2019-06-24 DIAGNOSIS — C83.38 DIFFUSE LARGE B-CELL LYMPHOMA OF LYMPH NODES OF MULTIPLE REGIONS (HCC): ICD-10-CM

## 2019-06-24 LAB
ALBUMIN SERPL-MCNC: 3 G/DL (ref 3.5–5.2)
ALBUMIN/GLOB SERPL: 1.2 G/DL (ref 1.1–2.4)
ALP SERPL-CCNC: 55 U/L (ref 38–116)
ALT SERPL W P-5'-P-CCNC: 33 U/L (ref 0–41)
ANION GAP SERPL CALCULATED.3IONS-SCNC: 8.4 MMOL/L
AST SERPL-CCNC: 15 U/L (ref 0–40)
BASOPHILS # BLD AUTO: 0.1 10*3/MM3 (ref 0–0.2)
BASOPHILS NFR BLD AUTO: 0.4 % (ref 0–1.5)
BILIRUB SERPL-MCNC: 0.4 MG/DL (ref 0.2–1.2)
BUN BLD-MCNC: 23 MG/DL (ref 6–20)
BUN/CREAT SERPL: 32.4 (ref 7.3–30)
CALCIUM SPEC-SCNC: 7.8 MG/DL (ref 8.5–10.2)
CHLORIDE SERPL-SCNC: 100 MMOL/L (ref 98–107)
CO2 SERPL-SCNC: 27.6 MMOL/L (ref 22–29)
CREAT BLD-MCNC: 0.71 MG/DL (ref 0.7–1.3)
DEPRECATED RDW RBC AUTO: 46.1 FL (ref 37–54)
EOSINOPHIL # BLD AUTO: 0.06 10*3/MM3 (ref 0–0.4)
EOSINOPHIL NFR BLD AUTO: 0.3 % (ref 0.3–6.2)
ERYTHROCYTE [DISTWIDTH] IN BLOOD BY AUTOMATED COUNT: 14.3 % (ref 12.3–15.4)
GFR SERPL CREATININE-BSD FRML MDRD: 108 ML/MIN/1.73
GLOBULIN UR ELPH-MCNC: 2.6 GM/DL (ref 1.8–3.5)
GLUCOSE BLD-MCNC: 239 MG/DL (ref 74–124)
HCT VFR BLD AUTO: 28 % (ref 37.5–51)
HGB BLD-MCNC: 9.5 G/DL (ref 13–17.7)
IMM GRANULOCYTES # BLD AUTO: 3.95 10*3/MM3 (ref 0–0.05)
IMM GRANULOCYTES NFR BLD AUTO: 17 % (ref 0–0.5)
LYMPHOCYTES # BLD AUTO: 0.82 10*3/MM3 (ref 0.7–3.1)
LYMPHOCYTES NFR BLD AUTO: 3.5 % (ref 19.6–45.3)
MCH RBC QN AUTO: 29.7 PG (ref 26.6–33)
MCHC RBC AUTO-ENTMCNC: 33.9 G/DL (ref 31.5–35.7)
MCV RBC AUTO: 87.5 FL (ref 79–97)
MONOCYTES # BLD AUTO: 0.02 10*3/MM3 (ref 0.1–0.9)
MONOCYTES NFR BLD AUTO: 0.1 % (ref 5–12)
NEUTROPHILS # BLD AUTO: 18.34 10*3/MM3 (ref 1.7–7)
NEUTROPHILS NFR BLD AUTO: 78.7 % (ref 42.7–76)
NRBC BLD AUTO-RTO: 0 /100 WBC (ref 0–0.2)
PLATELET # BLD AUTO: 121 10*3/MM3 (ref 140–450)
PMV BLD AUTO: 8.8 FL (ref 6–12)
POTASSIUM BLD-SCNC: 4.1 MMOL/L (ref 3.5–4.7)
PROT SERPL-MCNC: 5.6 G/DL (ref 6.3–8)
RBC # BLD AUTO: 3.2 10*6/MM3 (ref 4.14–5.8)
SODIUM BLD-SCNC: 136 MMOL/L (ref 134–145)
WBC NRBC COR # BLD: 23.29 10*3/MM3 (ref 3.4–10.8)

## 2019-06-24 PROCEDURE — 85025 COMPLETE CBC W/AUTO DIFF WBC: CPT | Performed by: INTERNAL MEDICINE

## 2019-06-24 PROCEDURE — 36415 COLL VENOUS BLD VENIPUNCTURE: CPT | Performed by: INTERNAL MEDICINE

## 2019-06-24 PROCEDURE — 80053 COMPREHEN METABOLIC PANEL: CPT | Performed by: INTERNAL MEDICINE

## 2019-06-24 RX ORDER — LISINOPRIL 10 MG/1
TABLET ORAL
Qty: 90 TABLET | Refills: 3 | Status: SHIPPED | OUTPATIENT
Start: 2019-06-24 | End: 2020-07-15 | Stop reason: SDUPTHER

## 2019-06-24 RX ORDER — ATORVASTATIN CALCIUM 40 MG/1
TABLET, FILM COATED ORAL
Qty: 90 TABLET | Refills: 3 | Status: SHIPPED | OUTPATIENT
Start: 2019-06-24 | End: 2019-08-02 | Stop reason: HOSPADM

## 2019-06-24 RX ORDER — OMEPRAZOLE 40 MG/1
CAPSULE, DELAYED RELEASE ORAL
Qty: 180 CAPSULE | Refills: 1 | Status: SHIPPED | OUTPATIENT
Start: 2019-06-24 | End: 2019-07-03 | Stop reason: SDUPTHER

## 2019-06-24 NOTE — PROGRESS NOTES
Standing Order for IT MTX placed for every 3 weeks x 5 per VO Dr Jose Hebert to be sent for flow cytometry   Message to appt desk to arrange

## 2019-06-24 NOTE — OUTREACH NOTE
Medical Week 1 Survey      Responses   Facility patient discharged from?  [facility]   Does the patient have one of the following disease processes/diagnoses(primary or secondary)?  Other   Is there a successful TCM telephone encounter documented?  No   Week 1 attempt successful?  No   Unsuccessful attempts  Attempt 1          Edith Abarca RN

## 2019-06-24 NOTE — PROGRESS NOTES
Patient here for lab review.  Reviewed CBC with patient and wife. Counts are stable at this time.   Patient has a list of questions today.   States that he his swelling in his legs, reassured patient that he received a lot of IV fluids in the hospital and that this should improve over the next few days.  Encouraged him to keep legs elevated when he is at home. States that he had foods with a lot of salt and thought that may be contributing to his swelling.   Instructed him to also watch is salt intake.   Patient also had questions re hi home meds, they were marked to discontinue on his discharge papers.   Reviewed med list with patient and told him it was ok to continue his home meds.   Patient v/u.  Patient was initially on injection schedule for neulasta, but he received this in the hospital on Saturday, day of discharge.

## 2019-06-26 ENCOUNTER — READMISSION MANAGEMENT (OUTPATIENT)
Dept: CALL CENTER | Facility: HOSPITAL | Age: 79
End: 2019-06-26

## 2019-06-26 NOTE — OUTREACH NOTE
Medical Week 1 Survey      Responses   Facility patient discharged from?  [facility]   Does the patient have one of the following disease processes/diagnoses(primary or secondary)?  Other   Is there a successful TCM telephone encounter documented?  No   Week 1 attempt successful?  Yes   Call start time  1446   Call end time  1454   Discharge diagnosis  s/p port placement and chemotherapy, Diffuse large B-cell lymphoma of lymph nodes of multiple regions   Meds reviewed with patient/caregiver?  Yes   Is the patient having any side effects they believe may be caused by any medication additions or changes?  No   Does the patient have all medications ordered at discharge?  Yes   Is the patient taking all medications as directed (includes completed medication regime)?  No   What is preventing the patient from taking all medications as directed?  Other   Medication comments  Dosen't need to take meds. He has restarted soem of the d/c meds on AVS--PCP stated to take ASA and vitamins   Does the patient have a primary care provider?   Yes   Does the patient have an appointment with their PCP within 7 days of discharge?  Yes   Has the patient kept scheduled appointments due by today?  Yes   Has home health visited the patient within 72 hours of discharge?  N/A   Psychosocial issues?  No   Did the patient receive a copy of their discharge instructions?  Yes   Nursing interventions  Reviewed instructions with patient   What is the patient's perception of their health status since discharge?  Improving   Is the patient/caregiver able to teach back signs and symptoms related to disease process for when to call PCP?  Yes   Is the patient/caregiver able to teach back signs and symptoms related to disease process for when to call 911?  Yes   Is the patient/caregiver able to teach back the hierarchy of who to call/visit for symptoms/problems? PCP, Specialist, Home health nurse, Urgent Care, ED, 911  Yes   Additional teach back  comments  having some LE edema. I have advised him to f/u with MD. he needs to protect his feet and he may benefit from LAQUITA hose. he will ask domonique CROW on Friday.   Week 1 call completed?  Yes          Cristin Lux RN

## 2019-06-27 ENCOUNTER — CLINICAL SUPPORT (OUTPATIENT)
Dept: ONCOLOGY | Facility: HOSPITAL | Age: 79
End: 2019-06-27

## 2019-06-27 ENCOUNTER — LAB (OUTPATIENT)
Dept: LAB | Facility: HOSPITAL | Age: 79
End: 2019-06-27

## 2019-06-27 VITALS
HEART RATE: 85 BPM | BODY MASS INDEX: 24.66 KG/M2 | DIASTOLIC BLOOD PRESSURE: 59 MMHG | WEIGHT: 157.5 LBS | TEMPERATURE: 97.5 F | SYSTOLIC BLOOD PRESSURE: 125 MMHG

## 2019-06-27 DIAGNOSIS — C83.38 DIFFUSE LARGE B-CELL LYMPHOMA OF LYMPH NODES OF MULTIPLE REGIONS (HCC): ICD-10-CM

## 2019-06-27 LAB
ALBUMIN SERPL-MCNC: 3.3 G/DL (ref 3.5–5.2)
ALBUMIN/GLOB SERPL: 1.2 G/DL (ref 1.1–2.4)
ALP SERPL-CCNC: 49 U/L (ref 38–116)
ALT SERPL W P-5'-P-CCNC: 16 U/L (ref 0–41)
ANION GAP SERPL CALCULATED.3IONS-SCNC: 10.1 MMOL/L (ref 5–15)
AST SERPL-CCNC: 11 U/L (ref 0–40)
BASOPHILS # BLD AUTO: 0 10*3/MM3 (ref 0–0.2)
BASOPHILS NFR BLD AUTO: 0 % (ref 0–1.5)
BILIRUB SERPL-MCNC: 0.7 MG/DL (ref 0.2–1.2)
BUN BLD-MCNC: 14 MG/DL (ref 6–20)
BUN/CREAT SERPL: 20 (ref 7.3–30)
CALCIUM SPEC-SCNC: 8.2 MG/DL (ref 8.5–10.2)
CHLORIDE SERPL-SCNC: 96 MMOL/L (ref 98–107)
CO2 SERPL-SCNC: 24.9 MMOL/L (ref 22–29)
CREAT BLD-MCNC: 0.7 MG/DL (ref 0.7–1.3)
DEPRECATED RDW RBC AUTO: 44.6 FL (ref 37–54)
EOSINOPHIL # BLD AUTO: 0.01 10*3/MM3 (ref 0–0.4)
EOSINOPHIL NFR BLD AUTO: 2 % (ref 0.3–6.2)
ERYTHROCYTE [DISTWIDTH] IN BLOOD BY AUTOMATED COUNT: 14.2 % (ref 12.3–15.4)
GFR SERPL CREATININE-BSD FRML MDRD: 109 ML/MIN/1.73
GLOBULIN UR ELPH-MCNC: 2.7 GM/DL (ref 1.8–3.5)
GLUCOSE BLD-MCNC: 227 MG/DL (ref 74–124)
HCT VFR BLD AUTO: 26.7 % (ref 37.5–51)
HGB BLD-MCNC: 8.9 G/DL (ref 13–17.7)
IMM GRANULOCYTES # BLD AUTO: 0 10*3/MM3 (ref 0–0.05)
IMM GRANULOCYTES NFR BLD AUTO: 0 % (ref 0–0.5)
LYMPHOCYTES # BLD AUTO: 0.41 10*3/MM3 (ref 0.7–3.1)
LYMPHOCYTES NFR BLD AUTO: 80.4 % (ref 19.6–45.3)
MCH RBC QN AUTO: 28.8 PG (ref 26.6–33)
MCHC RBC AUTO-ENTMCNC: 33.3 G/DL (ref 31.5–35.7)
MCV RBC AUTO: 86.4 FL (ref 79–97)
MONOCYTES # BLD AUTO: 0.05 10*3/MM3 (ref 0.1–0.9)
MONOCYTES NFR BLD AUTO: 9.8 % (ref 5–12)
NEUTROPHILS # BLD AUTO: 0.04 10*3/MM3 (ref 1.7–7)
NEUTROPHILS NFR BLD AUTO: 7.8 % (ref 42.7–76)
NRBC BLD AUTO-RTO: 0 /100 WBC (ref 0–0.2)
PLATELET # BLD AUTO: 47 10*3/MM3 (ref 140–450)
PMV BLD AUTO: 10.1 FL (ref 6–12)
POTASSIUM BLD-SCNC: 4.6 MMOL/L (ref 3.5–4.7)
PROT SERPL-MCNC: 6 G/DL (ref 6.3–8)
RBC # BLD AUTO: 3.09 10*6/MM3 (ref 4.14–5.8)
SODIUM BLD-SCNC: 131 MMOL/L (ref 134–145)
WBC NRBC COR # BLD: 0.51 10*3/MM3 (ref 3.4–10.8)

## 2019-06-27 PROCEDURE — 80053 COMPREHEN METABOLIC PANEL: CPT | Performed by: INTERNAL MEDICINE

## 2019-06-27 PROCEDURE — 85025 COMPLETE CBC W/AUTO DIFF WBC: CPT | Performed by: INTERNAL MEDICINE

## 2019-06-27 PROCEDURE — 36415 COLL VENOUS BLD VENIPUNCTURE: CPT | Performed by: INTERNAL MEDICINE

## 2019-06-27 RX ORDER — LEVOFLOXACIN 500 MG/1
500 TABLET, FILM COATED ORAL DAILY
Qty: 5 TABLET | Refills: 0 | Status: SHIPPED | OUTPATIENT
Start: 2019-06-27 | End: 2019-07-12 | Stop reason: HOSPADM

## 2019-06-27 RX ORDER — FUROSEMIDE 20 MG/1
20 TABLET ORAL DAILY
Qty: 5 TABLET | Refills: 0 | Status: SHIPPED | OUTPATIENT
Start: 2019-06-27 | End: 2019-07-12 | Stop reason: HOSPADM

## 2019-06-27 NOTE — PROGRESS NOTES
Lab Results   Component Value Date    WBC 0.51 (C) 06/27/2019    HGB 8.9 (L) 06/27/2019    HCT 26.7 (L) 06/27/2019    MCV 86.4 06/27/2019    PLT 47 (L) 06/27/2019     CBC RESULTS R/W PT AND HIS WIFE. PT JUST REC'D 1ST C OF EPOCH-R + NEULASTA LAST WK. PT C/O FEELING TIRED AND MISAEL LE EDEMA. OTHERWISE PT IS DOING WELL EATING AND DRINKING OK AND NO NAUSEA. DENIES BLDING AND FEVER. COUNTS TODAY ARE DOWN. S/W SO NP AND SHE IS GOING TO S/W DR. SINGH. PT MIGHT NEED ABO A/O DIURETIC. ADVISED PT TO MONITOR FOR BLDING AND TEMP. PT ALSO ADVISED OF NEUTROPENIC PRECAUTIONS. PT TO CALL HERE W/ ANY ISSUES. PT TO F/U 7/1. COPY OF LABS PROVIDED TO THE PT. WILL CALL PT W/ ANY NEW ORDERS.     PER DR. SINGH PT TO START ON LEVAQUIN 500MG DAILY UNTIL HE RTN'S HERE Monday FOR REPEAT LABS. ON Monday IF ANC >500 THEN PT CAN STOP ABO. RX SENT IN AS ORDERED AND EXPLAINED TO WIFE HOW PT IS TO TAKE. ALSO PER DR. SINGH PT TO START ON LASIX (NO KCL) 20MG DAILY UNTIL Monday. RX SENT IN AS ORDERED. PT ALSO ADVISED TO ELV LEGS AS MUCH AS POSSIBLE. WIFE V/U TO ALL.

## 2019-07-01 ENCOUNTER — LAB (OUTPATIENT)
Dept: LAB | Facility: HOSPITAL | Age: 79
End: 2019-07-01

## 2019-07-01 ENCOUNTER — CLINICAL SUPPORT (OUTPATIENT)
Dept: ONCOLOGY | Facility: HOSPITAL | Age: 79
End: 2019-07-01

## 2019-07-01 DIAGNOSIS — C83.38 DIFFUSE LARGE B-CELL LYMPHOMA OF LYMPH NODES OF MULTIPLE REGIONS (HCC): ICD-10-CM

## 2019-07-01 LAB
ALBUMIN SERPL-MCNC: 3.8 G/DL (ref 3.5–5.2)
ALBUMIN/GLOB SERPL: 1.5 G/DL (ref 1.1–2.4)
ALP SERPL-CCNC: 73 U/L (ref 38–116)
ALT SERPL W P-5'-P-CCNC: 18 U/L (ref 0–41)
ANION GAP SERPL CALCULATED.3IONS-SCNC: 13.6 MMOL/L (ref 5–15)
AST SERPL-CCNC: 22 U/L (ref 0–40)
BASOPHILS # BLD AUTO: 0.16 10*3/MM3 (ref 0–0.2)
BASOPHILS NFR BLD AUTO: 0.6 % (ref 0–1.5)
BILIRUB SERPL-MCNC: 0.4 MG/DL (ref 0.2–1.2)
BUN BLD-MCNC: 17 MG/DL (ref 6–20)
BUN/CREAT SERPL: 18.9 (ref 7.3–30)
CALCIUM SPEC-SCNC: 8.7 MG/DL (ref 8.5–10.2)
CHLORIDE SERPL-SCNC: 92 MMOL/L (ref 98–107)
CO2 SERPL-SCNC: 23.4 MMOL/L (ref 22–29)
CREAT BLD-MCNC: 0.9 MG/DL (ref 0.7–1.3)
DEPRECATED RDW RBC AUTO: 45 FL (ref 37–54)
EOSINOPHIL # BLD AUTO: 0.03 10*3/MM3 (ref 0–0.4)
EOSINOPHIL NFR BLD AUTO: 0.1 % (ref 0.3–6.2)
ERYTHROCYTE [DISTWIDTH] IN BLOOD BY AUTOMATED COUNT: 16.4 % (ref 12.3–15.4)
GFR SERPL CREATININE-BSD FRML MDRD: 82 ML/MIN/1.73
GLOBULIN UR ELPH-MCNC: 2.6 GM/DL (ref 1.8–3.5)
GLUCOSE BLD-MCNC: 212 MG/DL (ref 74–124)
HCT VFR BLD AUTO: 27.4 % (ref 37.5–51)
HGB BLD-MCNC: 9.4 G/DL (ref 13–17.7)
IMM GRANULOCYTES # BLD AUTO: 5.17 10*3/MM3 (ref 0–0.05)
IMM GRANULOCYTES NFR BLD AUTO: 18.7 % (ref 0–0.5)
LYMPHOCYTES # BLD AUTO: 1.31 10*3/MM3 (ref 0.7–3.1)
LYMPHOCYTES NFR BLD AUTO: 4.7 % (ref 19.6–45.3)
MCH RBC QN AUTO: 29.7 PG (ref 26.6–33)
MCHC RBC AUTO-ENTMCNC: 34.3 G/DL (ref 31.5–35.7)
MCV RBC AUTO: 86.7 FL (ref 79–97)
MONOCYTES # BLD AUTO: 3.41 10*3/MM3 (ref 0.1–0.9)
MONOCYTES NFR BLD AUTO: 12.3 % (ref 5–12)
NEUTROPHILS # BLD AUTO: 17.58 10*3/MM3 (ref 1.7–7)
NEUTROPHILS NFR BLD AUTO: 63.6 % (ref 42.7–76)
NRBC BLD AUTO-RTO: 0.3 /100 WBC (ref 0–0.2)
PLATELET # BLD AUTO: 185 10*3/MM3 (ref 140–450)
PMV BLD AUTO: 9.2 FL (ref 6–12)
POTASSIUM BLD-SCNC: 4.6 MMOL/L (ref 3.5–4.7)
PROT SERPL-MCNC: 6.4 G/DL (ref 6.3–8)
RBC # BLD AUTO: 3.16 10*6/MM3 (ref 4.14–5.8)
SODIUM BLD-SCNC: 129 MMOL/L (ref 134–145)
WBC NRBC COR # BLD: 27.66 10*3/MM3 (ref 3.4–10.8)

## 2019-07-01 PROCEDURE — 36415 COLL VENOUS BLD VENIPUNCTURE: CPT | Performed by: INTERNAL MEDICINE

## 2019-07-01 PROCEDURE — 85025 COMPLETE CBC W/AUTO DIFF WBC: CPT | Performed by: INTERNAL MEDICINE

## 2019-07-01 PROCEDURE — 80053 COMPREHEN METABOLIC PANEL: CPT | Performed by: INTERNAL MEDICINE

## 2019-07-01 NOTE — PROGRESS NOTES
Pt here for CBC review.  See notes from Friday.  Pt can discontinue antibiotic.  Swelling is better.  Pt has not had BM in 6 days and c/o abdominal pain.  Reviewed c/o and counts with Dr. Garcia.  Will start senokot and miralax.  Wife v/u to call on Wednesday if no BM by Wednesday and can try fleets enema if needed Wednesday.  Understands to call or go to ER for severe abdominal pain, n/v. Will return on Friday to see MD.    Lab Results   Component Value Date    WBC 27.66 (H) 07/01/2019    HGB 9.4 (L) 07/01/2019    HCT 27.4 (L) 07/01/2019    MCV 86.7 07/01/2019     07/01/2019

## 2019-07-02 RX ORDER — SENNA AND DOCUSATE SODIUM 50; 8.6 MG/1; MG/1
1 TABLET, FILM COATED ORAL DAILY
COMMUNITY
End: 2021-02-12

## 2019-07-02 RX ORDER — POLYETHYLENE GLYCOL 3350 17 G/17G
17 POWDER, FOR SOLUTION ORAL DAILY
COMMUNITY
End: 2021-02-12

## 2019-07-03 ENCOUNTER — READMISSION MANAGEMENT (OUTPATIENT)
Dept: CALL CENTER | Facility: HOSPITAL | Age: 79
End: 2019-07-03

## 2019-07-03 DIAGNOSIS — K21.9 GASTROESOPHAGEAL REFLUX DISEASE, ESOPHAGITIS PRESENCE NOT SPECIFIED: ICD-10-CM

## 2019-07-03 RX ORDER — OMEPRAZOLE 40 MG/1
40 CAPSULE, DELAYED RELEASE ORAL DAILY
Qty: 90 CAPSULE | Refills: 1
Start: 2019-07-03 | End: 2020-07-10 | Stop reason: SDUPTHER

## 2019-07-05 ENCOUNTER — TELEPHONE (OUTPATIENT)
Dept: ONCOLOGY | Facility: HOSPITAL | Age: 79
End: 2019-07-05

## 2019-07-05 ENCOUNTER — CLINICAL SUPPORT (OUTPATIENT)
Dept: ONCOLOGY | Facility: HOSPITAL | Age: 79
End: 2019-07-05

## 2019-07-05 ENCOUNTER — LAB (OUTPATIENT)
Dept: LAB | Facility: HOSPITAL | Age: 79
End: 2019-07-05

## 2019-07-05 DIAGNOSIS — C83.38 DIFFUSE LARGE B-CELL LYMPHOMA OF LYMPH NODES OF MULTIPLE REGIONS (HCC): ICD-10-CM

## 2019-07-05 LAB
ALBUMIN SERPL-MCNC: 3.9 G/DL (ref 3.5–5.2)
ALBUMIN/GLOB SERPL: 1.3 G/DL (ref 1.1–2.4)
ALP SERPL-CCNC: 89 U/L (ref 38–116)
ALT SERPL W P-5'-P-CCNC: 13 U/L (ref 0–41)
ANION GAP SERPL CALCULATED.3IONS-SCNC: 12.2 MMOL/L (ref 5–15)
AST SERPL-CCNC: 16 U/L (ref 0–40)
BASOPHILS # BLD AUTO: 0.14 10*3/MM3 (ref 0–0.2)
BASOPHILS NFR BLD AUTO: 0.6 % (ref 0–1.5)
BILIRUB SERPL-MCNC: 0.3 MG/DL (ref 0.2–1.2)
BUN BLD-MCNC: 15 MG/DL (ref 6–20)
BUN/CREAT SERPL: 20 (ref 7.3–30)
CALCIUM SPEC-SCNC: 8.9 MG/DL (ref 8.5–10.2)
CHLORIDE SERPL-SCNC: 93 MMOL/L (ref 98–107)
CO2 SERPL-SCNC: 23.8 MMOL/L (ref 22–29)
CREAT BLD-MCNC: 0.75 MG/DL (ref 0.7–1.3)
DEPRECATED RDW RBC AUTO: 51.8 FL (ref 37–54)
EOSINOPHIL # BLD AUTO: 0.11 10*3/MM3 (ref 0–0.4)
EOSINOPHIL NFR BLD AUTO: 0.5 % (ref 0.3–6.2)
ERYTHROCYTE [DISTWIDTH] IN BLOOD BY AUTOMATED COUNT: 18.4 % (ref 12.3–15.4)
GFR SERPL CREATININE-BSD FRML MDRD: 101 ML/MIN/1.73
GLOBULIN UR ELPH-MCNC: 3 GM/DL (ref 1.8–3.5)
GLUCOSE BLD-MCNC: 213 MG/DL (ref 74–124)
HCT VFR BLD AUTO: 31 % (ref 37.5–51)
HGB BLD-MCNC: 10.4 G/DL (ref 13–17.7)
IMM GRANULOCYTES # BLD AUTO: 5.43 10*3/MM3 (ref 0–0.05)
IMM GRANULOCYTES NFR BLD AUTO: 24 % (ref 0–0.5)
LYMPHOCYTES # BLD AUTO: 1.09 10*3/MM3 (ref 0.7–3.1)
LYMPHOCYTES NFR BLD AUTO: 4.8 % (ref 19.6–45.3)
MCH RBC QN AUTO: 29.9 PG (ref 26.6–33)
MCHC RBC AUTO-ENTMCNC: 33.5 G/DL (ref 31.5–35.7)
MCV RBC AUTO: 89.1 FL (ref 79–97)
MONOCYTES # BLD AUTO: 1.99 10*3/MM3 (ref 0.1–0.9)
MONOCYTES NFR BLD AUTO: 8.8 % (ref 5–12)
NEUTROPHILS # BLD AUTO: 13.82 10*3/MM3 (ref 1.7–7)
NEUTROPHILS NFR BLD AUTO: 61.3 % (ref 42.7–76)
NRBC BLD AUTO-RTO: 0.1 /100 WBC (ref 0–0.2)
PLATELET # BLD AUTO: 401 10*3/MM3 (ref 140–450)
PMV BLD AUTO: 8.3 FL (ref 6–12)
POTASSIUM BLD-SCNC: 5 MMOL/L (ref 3.5–4.7)
PROT SERPL-MCNC: 6.9 G/DL (ref 6.3–8)
RBC # BLD AUTO: 3.48 10*6/MM3 (ref 4.14–5.8)
SODIUM BLD-SCNC: 129 MMOL/L (ref 134–145)
WBC NRBC COR # BLD: 22.58 10*3/MM3 (ref 3.4–10.8)

## 2019-07-05 PROCEDURE — 36415 COLL VENOUS BLD VENIPUNCTURE: CPT | Performed by: INTERNAL MEDICINE

## 2019-07-05 PROCEDURE — 80053 COMPREHEN METABOLIC PANEL: CPT | Performed by: INTERNAL MEDICINE

## 2019-07-05 PROCEDURE — 85025 COMPLETE CBC W/AUTO DIFF WBC: CPT | Performed by: INTERNAL MEDICINE

## 2019-07-05 NOTE — TELEPHONE ENCOUNTER
----- Message from Gabriele Garcia MD sent at 7/5/2019 10:08 AM EDT -----  I'll address    ----- Message -----  From: Cielo Lau RN  Sent: 7/5/2019   9:52 AM  To: MD Dr. Jose Mackenzie, this pt was here for an RN review today and will see you Monday.  His sodium today was 129.  Looks like it is not abnormal to be low.  Was 129 on Monday as well.  Do I need to do anything further with this today or will you address it Monday?

## 2019-07-05 NOTE — PROGRESS NOTES
Pt here for cbc review.  Labs stable for pt today.  Constipation has gotten better and pt is now going daily.  He is going to cut the senokot back to once a day and see how he does.  Will return on Monday to see Dr. Garcia.  No further questions/concerns.     Lab Results   Component Value Date    WBC 22.58 (H) 07/05/2019    HGB 10.4 (L) 07/05/2019    HCT 31.0 (L) 07/05/2019    MCV 89.1 07/05/2019     07/05/2019

## 2019-07-08 ENCOUNTER — HOSPITAL ENCOUNTER (INPATIENT)
Facility: HOSPITAL | Age: 79
LOS: 4 days | Discharge: HOME | End: 2019-07-12
Attending: INTERNAL MEDICINE | Admitting: INTERNAL MEDICINE

## 2019-07-08 ENCOUNTER — LAB (OUTPATIENT)
Dept: LAB | Facility: HOSPITAL | Age: 79
End: 2019-07-08

## 2019-07-08 ENCOUNTER — HOSPITAL ENCOUNTER (OUTPATIENT)
Dept: GENERAL RADIOLOGY | Facility: HOSPITAL | Age: 79
Discharge: HOME | End: 2019-07-08

## 2019-07-08 ENCOUNTER — PREP FOR SURGERY (OUTPATIENT)
Dept: OTHER | Facility: HOSPITAL | Age: 79
End: 2019-07-08

## 2019-07-08 ENCOUNTER — OFFICE VISIT (OUTPATIENT)
Dept: ONCOLOGY | Facility: CLINIC | Age: 79
End: 2019-07-08

## 2019-07-08 VITALS
HEART RATE: 70 BPM | OXYGEN SATURATION: 100 % | SYSTOLIC BLOOD PRESSURE: 131 MMHG | RESPIRATION RATE: 16 BRPM | DIASTOLIC BLOOD PRESSURE: 64 MMHG

## 2019-07-08 VITALS
HEIGHT: 67 IN | BODY MASS INDEX: 22.13 KG/M2 | SYSTOLIC BLOOD PRESSURE: 117 MMHG | WEIGHT: 141 LBS | OXYGEN SATURATION: 97 % | RESPIRATION RATE: 16 BRPM | TEMPERATURE: 98 F | DIASTOLIC BLOOD PRESSURE: 66 MMHG | HEART RATE: 92 BPM

## 2019-07-08 DIAGNOSIS — C83.38 DIFFUSE LARGE B-CELL LYMPHOMA OF LYMPH NODES OF MULTIPLE REGIONS (HCC): Primary | ICD-10-CM

## 2019-07-08 DIAGNOSIS — C83.38 DIFFUSE LARGE B-CELL LYMPHOMA OF LYMPH NODES OF MULTIPLE REGIONS (HCC): ICD-10-CM

## 2019-07-08 DIAGNOSIS — C83.33 DIFFUSE LARGE B-CELL LYMPHOMA OF INTRA-ABDOMINAL LYMPH NODES (HCC): ICD-10-CM

## 2019-07-08 DIAGNOSIS — C83.33 DIFFUSE LARGE B-CELL LYMPHOMA OF INTRA-ABDOMINAL LYMPH NODES (HCC): Primary | ICD-10-CM

## 2019-07-08 LAB
ALBUMIN SERPL-MCNC: 3.7 G/DL (ref 3.5–5.2)
ALBUMIN SERPL-MCNC: 4.1 G/DL (ref 3.5–5.2)
ALBUMIN/GLOB SERPL: 1.3 G/DL
ALBUMIN/GLOB SERPL: 1.3 G/DL (ref 1.1–2.4)
ALP SERPL-CCNC: 80 U/L (ref 38–116)
ALP SERPL-CCNC: 84 U/L (ref 39–117)
ALT SERPL W P-5'-P-CCNC: 10 U/L (ref 0–41)
ALT SERPL W P-5'-P-CCNC: 14 U/L (ref 1–41)
ANION GAP SERPL CALCULATED.3IONS-SCNC: 8.9 MMOL/L (ref 5–15)
ANION GAP SERPL CALCULATED.3IONS-SCNC: 9.8 MMOL/L (ref 5–15)
ANISOCYTOSIS BLD QL: ABNORMAL
APPEARANCE CSF: CLEAR
APPEARANCE CSF: CLEAR
AST SERPL-CCNC: 14 U/L (ref 0–40)
AST SERPL-CCNC: 15 U/L (ref 1–40)
BASOPHILS # BLD AUTO: 0.12 10*3/MM3 (ref 0–0.2)
BASOPHILS NFR BLD AUTO: 0.8 % (ref 0–1.5)
BILIRUB SERPL-MCNC: 0.3 MG/DL (ref 0.2–1.2)
BILIRUB SERPL-MCNC: 0.4 MG/DL (ref 0.2–1.2)
BUN BLD-MCNC: 16 MG/DL (ref 8–23)
BUN BLD-MCNC: 17 MG/DL (ref 6–20)
BUN/CREAT SERPL: 21.3 (ref 7.3–30)
BUN/CREAT SERPL: 22.2 (ref 7–25)
CALCIUM SPEC-SCNC: 8.6 MG/DL (ref 8.5–10.2)
CALCIUM SPEC-SCNC: 8.8 MG/DL (ref 8.6–10.5)
CHLORIDE SERPL-SCNC: 94 MMOL/L (ref 98–107)
CHLORIDE SERPL-SCNC: 94 MMOL/L (ref 98–107)
CO2 SERPL-SCNC: 26.2 MMOL/L (ref 22–29)
CO2 SERPL-SCNC: 27.1 MMOL/L (ref 22–29)
COLOR CSF: COLORLESS
COLOR CSF: COLORLESS
CREAT BLD-MCNC: 0.72 MG/DL (ref 0.76–1.27)
CREAT BLD-MCNC: 0.8 MG/DL (ref 0.7–1.3)
DEPRECATED RDW RBC AUTO: 55.1 FL (ref 37–54)
DEPRECATED RDW RBC AUTO: 56 FL (ref 37–54)
ELLIPTOCYTES BLD QL SMEAR: ABNORMAL
EOSINOPHIL # BLD AUTO: 0.11 10*3/MM3 (ref 0–0.4)
EOSINOPHIL # BLD MANUAL: 0.37 10*3/MM3 (ref 0–0.4)
EOSINOPHIL NFR BLD AUTO: 0.7 % (ref 0.3–6.2)
EOSINOPHIL NFR BLD MANUAL: 2 % (ref 0.3–6.2)
ERYTHROCYTE [DISTWIDTH] IN BLOOD BY AUTOMATED COUNT: 18 % (ref 12.3–15.4)
ERYTHROCYTE [DISTWIDTH] IN BLOOD BY AUTOMATED COUNT: 18.2 % (ref 12.3–15.4)
GFR SERPL CREATININE-BSD FRML MDRD: 106 ML/MIN/1.73
GFR SERPL CREATININE-BSD FRML MDRD: 94 ML/MIN/1.73
GLOBULIN UR ELPH-MCNC: 2.9 GM/DL (ref 1.8–3.5)
GLOBULIN UR ELPH-MCNC: 3.2 GM/DL
GLUCOSE BLD-MCNC: 267 MG/DL (ref 74–124)
GLUCOSE BLD-MCNC: 95 MG/DL (ref 65–99)
GLUCOSE BLDC GLUCOMTR-MCNC: 169 MG/DL (ref 70–130)
GLUCOSE BLDC GLUCOMTR-MCNC: 195 MG/DL (ref 70–130)
GLUCOSE CSF-MCNC: 67 MG/DL (ref 40–70)
HCT VFR BLD AUTO: 29.9 % (ref 37.5–51)
HCT VFR BLD AUTO: 33.9 % (ref 37.5–51)
HGB BLD-MCNC: 10.1 G/DL (ref 13–17.7)
HGB BLD-MCNC: 11 G/DL (ref 13–17.7)
IMM GRANULOCYTES # BLD AUTO: 1.84 10*3/MM3 (ref 0–0.05)
IMM GRANULOCYTES NFR BLD AUTO: 11.7 % (ref 0–0.5)
LYMPHOCYTES # BLD AUTO: 1.12 10*3/MM3 (ref 0.7–3.1)
LYMPHOCYTES # BLD MANUAL: 1.83 10*3/MM3 (ref 0.7–3.1)
LYMPHOCYTES NFR BLD AUTO: 7.1 % (ref 19.6–45.3)
LYMPHOCYTES NFR BLD MANUAL: 10 % (ref 19.6–45.3)
MCH RBC QN AUTO: 29.1 PG (ref 26.6–33)
MCH RBC QN AUTO: 30 PG (ref 26.6–33)
MCHC RBC AUTO-ENTMCNC: 32.4 G/DL (ref 31.5–35.7)
MCHC RBC AUTO-ENTMCNC: 33.8 G/DL (ref 31.5–35.7)
MCV RBC AUTO: 88.7 FL (ref 79–97)
MCV RBC AUTO: 89.7 FL (ref 79–97)
METAMYELOCYTES NFR BLD MANUAL: 4 % (ref 0–0)
MONOCYTES # BLD AUTO: 1.3 10*3/MM3 (ref 0.1–0.9)
MONOCYTES NFR BLD AUTO: 8.3 % (ref 5–12)
MYELOCYTES NFR BLD MANUAL: 3 % (ref 0–0)
NEUTROPHILS # BLD AUTO: 11.21 10*3/MM3 (ref 1.7–7)
NEUTROPHILS # BLD AUTO: 14.78 10*3/MM3 (ref 1.7–7)
NEUTROPHILS NFR BLD AUTO: 71.4 % (ref 42.7–76)
NEUTROPHILS NFR BLD MANUAL: 81 % (ref 42.7–76)
NRBC BLD AUTO-RTO: 0 /100 WBC (ref 0–0.2)
NRBC BLD AUTO-RTO: 0.1 /100 WBC (ref 0–0.2)
NUC CELL # CSF MANUAL: 2 /MM3 (ref 0–5)
NUC CELL # CSF MANUAL: 2 /MM3 (ref 0–5)
PLAT MORPH BLD: NORMAL
PLATELET # BLD AUTO: 374 10*3/MM3 (ref 140–450)
PLATELET # BLD AUTO: 415 10*3/MM3 (ref 140–450)
PMV BLD AUTO: 8.3 FL (ref 6–12)
PMV BLD AUTO: 8.8 FL (ref 6–12)
POTASSIUM BLD-SCNC: 4.7 MMOL/L (ref 3.5–5.2)
POTASSIUM BLD-SCNC: 5.2 MMOL/L (ref 3.5–4.7)
PROT CSF-MCNC: 39 MG/DL (ref 15–45)
PROT SERPL-MCNC: 6.6 G/DL (ref 6.3–8)
PROT SERPL-MCNC: 7.3 G/DL (ref 6–8.5)
RBC # BLD AUTO: 3.37 10*6/MM3 (ref 4.14–5.8)
RBC # BLD AUTO: 3.78 10*6/MM3 (ref 4.14–5.8)
RBC # CSF MANUAL: 0 /MM3 (ref 0–0)
RBC # CSF MANUAL: 0 /MM3 (ref 0–0)
SODIUM BLD-SCNC: 130 MMOL/L (ref 134–145)
SODIUM BLD-SCNC: 130 MMOL/L (ref 136–145)
TUBE # CSF: 1
TUBE # CSF: 3
WBC MORPH BLD: NORMAL
WBC NRBC COR # BLD: 15.7 10*3/MM3 (ref 3.4–10.8)
WBC NRBC COR # BLD: 18.25 10*3/MM3 (ref 3.4–10.8)

## 2019-07-08 PROCEDURE — 87070 CULTURE OTHR SPECIMN AEROBIC: CPT | Performed by: INTERNAL MEDICINE

## 2019-07-08 PROCEDURE — 85025 COMPLETE CBC W/AUTO DIFF WBC: CPT | Performed by: INTERNAL MEDICINE

## 2019-07-08 PROCEDURE — 82945 GLUCOSE OTHER FLUID: CPT | Performed by: INTERNAL MEDICINE

## 2019-07-08 PROCEDURE — 84157 ASSAY OF PROTEIN OTHER: CPT | Performed by: INTERNAL MEDICINE

## 2019-07-08 PROCEDURE — 87015 SPECIMEN INFECT AGNT CONCNTJ: CPT | Performed by: INTERNAL MEDICINE

## 2019-07-08 PROCEDURE — 63710000001 INSULIN LISPRO (HUMAN) PER 5 UNITS: Performed by: INTERNAL MEDICINE

## 2019-07-08 PROCEDURE — 25010000002 VINCRISTINE PER 1 MG: Performed by: INTERNAL MEDICINE

## 2019-07-08 PROCEDURE — 63710000001 PREDNISONE PER 5 MG: Performed by: INTERNAL MEDICINE

## 2019-07-08 PROCEDURE — 36415 COLL VENOUS BLD VENIPUNCTURE: CPT | Performed by: INTERNAL MEDICINE

## 2019-07-08 PROCEDURE — 80053 COMPREHEN METABOLIC PANEL: CPT | Performed by: INTERNAL MEDICINE

## 2019-07-08 PROCEDURE — 89050 BODY FLUID CELL COUNT: CPT | Performed by: INTERNAL MEDICINE

## 2019-07-08 PROCEDURE — 82962 GLUCOSE BLOOD TEST: CPT

## 2019-07-08 PROCEDURE — 25010000002 DIPHENHYDRAMINE PER 50 MG: Performed by: INTERNAL MEDICINE

## 2019-07-08 PROCEDURE — 25010000002 METHOTREXATE PF 100 MG/4ML SOLUTION 2 ML VIAL: Performed by: INTERNAL MEDICINE

## 2019-07-08 PROCEDURE — 25010000002 ONDANSETRON PER 1 MG: Performed by: INTERNAL MEDICINE

## 2019-07-08 PROCEDURE — 96450 CHEMOTHERAPY INTO CNS: CPT

## 2019-07-08 PROCEDURE — 25010000002 ETOPOSIDE 500 MG/25ML SOLUTION 25 ML VIAL: Performed by: INTERNAL MEDICINE

## 2019-07-08 PROCEDURE — 25010000002 DOXORUBICIN PER 10 MG: Performed by: INTERNAL MEDICINE

## 2019-07-08 PROCEDURE — 87205 SMEAR GRAM STAIN: CPT | Performed by: INTERNAL MEDICINE

## 2019-07-08 PROCEDURE — 25010000002 RITUXIMAB 10 MG/ML SOLUTION 50 ML VIAL: Performed by: INTERNAL MEDICINE

## 2019-07-08 PROCEDURE — 25010000002 RITUXIMAB 10 MG/ML SOLUTION 10 ML VIAL: Performed by: INTERNAL MEDICINE

## 2019-07-08 PROCEDURE — 77003 FLUOROGUIDE FOR SPINE INJECT: CPT

## 2019-07-08 PROCEDURE — 85007 BL SMEAR W/DIFF WBC COUNT: CPT | Performed by: INTERNAL MEDICINE

## 2019-07-08 PROCEDURE — 25010000003 LIDOCAINE 1 % SOLUTION: Performed by: INTERNAL MEDICINE

## 2019-07-08 PROCEDURE — 99223 1ST HOSP IP/OBS HIGH 75: CPT | Performed by: INTERNAL MEDICINE

## 2019-07-08 PROCEDURE — 3E0R305 INTRODUCTION OF OTHER ANTINEOPLASTIC INTO SPINAL CANAL, PERCUTANEOUS APPROACH: ICD-10-PCS | Performed by: RADIOLOGY

## 2019-07-08 RX ORDER — PANTOPRAZOLE SODIUM 40 MG/1
40 TABLET, DELAYED RELEASE ORAL EVERY MORNING
Status: DISCONTINUED | OUTPATIENT
Start: 2019-07-09 | End: 2019-07-09

## 2019-07-08 RX ORDER — PROCHLORPERAZINE MALEATE 10 MG
10 TABLET ORAL EVERY 6 HOURS PRN
Status: DISCONTINUED | OUTPATIENT
Start: 2019-07-08 | End: 2019-07-12 | Stop reason: HOSPADM

## 2019-07-08 RX ORDER — ONDANSETRON 2 MG/ML
4 INJECTION INTRAMUSCULAR; INTRAVENOUS EVERY 6 HOURS PRN
Status: DISCONTINUED | OUTPATIENT
Start: 2019-07-08 | End: 2019-07-12 | Stop reason: HOSPADM

## 2019-07-08 RX ORDER — PREDNISONE 50 MG/1
100 TABLET ORAL 2 TIMES DAILY WITH MEALS
Status: DISCONTINUED | OUTPATIENT
Start: 2019-07-08 | End: 2019-07-12 | Stop reason: HOSPADM

## 2019-07-08 RX ORDER — LIDOCAINE HYDROCHLORIDE 10 MG/ML
10 INJECTION, SOLUTION INFILTRATION; PERINEURAL ONCE
Status: COMPLETED | OUTPATIENT
Start: 2019-07-08 | End: 2019-07-08

## 2019-07-08 RX ORDER — CETIRIZINE HYDROCHLORIDE 10 MG/1
10 TABLET ORAL DAILY
Status: DISCONTINUED | OUTPATIENT
Start: 2019-07-08 | End: 2019-07-12 | Stop reason: HOSPADM

## 2019-07-08 RX ORDER — PANTOPRAZOLE SODIUM 40 MG/1
40 TABLET, DELAYED RELEASE ORAL
Status: DISCONTINUED | OUTPATIENT
Start: 2019-07-08 | End: 2019-07-08

## 2019-07-08 RX ORDER — LISINOPRIL 10 MG/1
10 TABLET ORAL DAILY
Status: DISCONTINUED | OUTPATIENT
Start: 2019-07-08 | End: 2019-07-12 | Stop reason: HOSPADM

## 2019-07-08 RX ORDER — SENNA AND DOCUSATE SODIUM 50; 8.6 MG/1; MG/1
1 TABLET, FILM COATED ORAL DAILY
Status: DISCONTINUED | OUTPATIENT
Start: 2019-07-08 | End: 2019-07-10

## 2019-07-08 RX ORDER — ACETAMINOPHEN 325 MG/1
650 TABLET ORAL ONCE
Status: COMPLETED | OUTPATIENT
Start: 2019-07-08 | End: 2019-07-08

## 2019-07-08 RX ORDER — SODIUM CHLORIDE 0.9 % (FLUSH) 0.9 %
10 SYRINGE (ML) INJECTION EVERY 12 HOURS SCHEDULED
Status: DISCONTINUED | OUTPATIENT
Start: 2019-07-08 | End: 2019-07-12 | Stop reason: HOSPADM

## 2019-07-08 RX ORDER — NICOTINE POLACRILEX 4 MG
15 LOZENGE BUCCAL
Status: DISCONTINUED | OUTPATIENT
Start: 2019-07-08 | End: 2019-07-12 | Stop reason: HOSPADM

## 2019-07-08 RX ORDER — ACYCLOVIR 200 MG/1
400 CAPSULE ORAL DAILY
Status: DISCONTINUED | OUTPATIENT
Start: 2019-07-08 | End: 2019-07-09

## 2019-07-08 RX ORDER — SODIUM CHLORIDE 0.9 % (FLUSH) 0.9 %
20 SYRINGE (ML) INJECTION AS NEEDED
Status: DISCONTINUED | OUTPATIENT
Start: 2019-07-08 | End: 2019-07-12 | Stop reason: HOSPADM

## 2019-07-08 RX ORDER — SODIUM CHLORIDE 0.9 % (FLUSH) 0.9 %
10 SYRINGE (ML) INJECTION AS NEEDED
Status: DISCONTINUED | OUTPATIENT
Start: 2019-07-08 | End: 2019-07-12 | Stop reason: HOSPADM

## 2019-07-08 RX ORDER — POLYETHYLENE GLYCOL 3350 17 G/17G
17 POWDER, FOR SOLUTION ORAL DAILY PRN
Status: DISCONTINUED | OUTPATIENT
Start: 2019-07-08 | End: 2019-07-11

## 2019-07-08 RX ORDER — DIPHENHYDRAMINE HYDROCHLORIDE 50 MG/ML
50 INJECTION INTRAMUSCULAR; INTRAVENOUS AS NEEDED
Status: DISCONTINUED | OUTPATIENT
Start: 2019-07-08 | End: 2019-07-12 | Stop reason: HOSPADM

## 2019-07-08 RX ORDER — SODIUM CHLORIDE 0.9 % (FLUSH) 0.9 %
10 SYRINGE (ML) INJECTION AS NEEDED
Status: CANCELLED | OUTPATIENT
Start: 2019-07-08

## 2019-07-08 RX ORDER — ATORVASTATIN CALCIUM 20 MG/1
40 TABLET, FILM COATED ORAL DAILY
Status: DISCONTINUED | OUTPATIENT
Start: 2019-07-08 | End: 2019-07-12 | Stop reason: HOSPADM

## 2019-07-08 RX ORDER — ACETAMINOPHEN 325 MG/1
650 TABLET ORAL EVERY 4 HOURS PRN
Status: DISCONTINUED | OUTPATIENT
Start: 2019-07-08 | End: 2019-07-12 | Stop reason: HOSPADM

## 2019-07-08 RX ORDER — POLYETHYLENE GLYCOL 3350 17 G/17G
17 POWDER, FOR SOLUTION ORAL DAILY
Status: DISCONTINUED | OUTPATIENT
Start: 2019-07-08 | End: 2019-07-08

## 2019-07-08 RX ORDER — MEPERIDINE HYDROCHLORIDE 50 MG/ML
25 INJECTION INTRAMUSCULAR; INTRAVENOUS; SUBCUTANEOUS
Status: ACTIVE | OUTPATIENT
Start: 2019-07-08 | End: 2019-07-09

## 2019-07-08 RX ORDER — SODIUM CHLORIDE 0.9 % (FLUSH) 0.9 %
20 SYRINGE (ML) INJECTION AS NEEDED
Status: CANCELLED | OUTPATIENT
Start: 2019-07-08

## 2019-07-08 RX ORDER — ACETAMINOPHEN 325 MG/1
650 TABLET ORAL EVERY 4 HOURS PRN
Status: CANCELLED | OUTPATIENT
Start: 2019-07-08

## 2019-07-08 RX ORDER — SODIUM CHLORIDE 0.9 % (FLUSH) 0.9 %
10 SYRINGE (ML) INJECTION EVERY 12 HOURS SCHEDULED
Status: CANCELLED | OUTPATIENT
Start: 2019-07-08

## 2019-07-08 RX ORDER — DEXTROSE MONOHYDRATE 25 G/50ML
25 INJECTION, SOLUTION INTRAVENOUS
Status: DISCONTINUED | OUTPATIENT
Start: 2019-07-08 | End: 2019-07-12 | Stop reason: HOSPADM

## 2019-07-08 RX ORDER — SULFAMETHOXAZOLE AND TRIMETHOPRIM 800; 160 MG/1; MG/1
1 TABLET ORAL 3 TIMES WEEKLY
Status: COMPLETED | OUTPATIENT
Start: 2019-07-08 | End: 2019-07-12

## 2019-07-08 RX ORDER — FAMOTIDINE 10 MG/ML
20 INJECTION, SOLUTION INTRAVENOUS AS NEEDED
Status: DISCONTINUED | OUTPATIENT
Start: 2019-07-08 | End: 2019-07-12 | Stop reason: HOSPADM

## 2019-07-08 RX ORDER — SODIUM CHLORIDE 9 MG/ML
50 INJECTION, SOLUTION INTRAVENOUS CONTINUOUS
Status: DISCONTINUED | OUTPATIENT
Start: 2019-07-08 | End: 2019-07-12 | Stop reason: HOSPADM

## 2019-07-08 RX ADMIN — PANTOPRAZOLE SODIUM 40 MG: 40 TABLET, DELAYED RELEASE ORAL at 11:26

## 2019-07-08 RX ADMIN — INSULIN LISPRO 2 UNITS: 100 INJECTION, SOLUTION INTRAVENOUS; SUBCUTANEOUS at 22:06

## 2019-07-08 RX ADMIN — SODIUM CHLORIDE, PRESERVATIVE FREE 10 ML: 5 INJECTION INTRAVENOUS at 21:54

## 2019-07-08 RX ADMIN — CETIRIZINE HYDROCHLORIDE 10 MG: 10 TABLET, FILM COATED ORAL at 18:00

## 2019-07-08 RX ADMIN — RITUXIMAB 650 MG: 10 INJECTION, SOLUTION INTRAVENOUS at 11:53

## 2019-07-08 RX ADMIN — SENNOSIDES,DOCUSATE SODIUM 1 TABLET: 50; 8.6 TABLET, FILM COATED ORAL at 18:10

## 2019-07-08 RX ADMIN — SULFAMETHOXAZOLE AND TRIMETHOPRIM 160 MG: 800; 160 TABLET ORAL at 14:44

## 2019-07-08 RX ADMIN — ACYCLOVIR 400 MG: 200 CAPSULE ORAL at 18:00

## 2019-07-08 RX ADMIN — SODIUM CHLORIDE, PRESERVATIVE FREE 10 ML: 5 INJECTION INTRAVENOUS at 11:15

## 2019-07-08 RX ADMIN — PREDNISONE 100 MG: 50 TABLET ORAL at 14:44

## 2019-07-08 RX ADMIN — ONDANSETRON 16 MG: 2 INJECTION INTRAMUSCULAR; INTRAVENOUS at 15:22

## 2019-07-08 RX ADMIN — ATORVASTATIN CALCIUM 40 MG: 20 TABLET, FILM COATED ORAL at 18:01

## 2019-07-08 RX ADMIN — LINAGLIPTIN 5 MG: 5 TABLET, FILM COATED ORAL at 18:00

## 2019-07-08 RX ADMIN — INSULIN LISPRO 2 UNITS: 100 INJECTION, SOLUTION INTRAVENOUS; SUBCUTANEOUS at 18:10

## 2019-07-08 RX ADMIN — LIDOCAINE HYDROCHLORIDE 4 ML: 10 INJECTION, SOLUTION INFILTRATION; PERINEURAL at 13:35

## 2019-07-08 RX ADMIN — Medication 400 MG: at 18:00

## 2019-07-08 RX ADMIN — ACETAMINOPHEN 650 MG: 325 TABLET, FILM COATED ORAL at 11:26

## 2019-07-08 RX ADMIN — PREDNISONE 100 MG: 50 TABLET ORAL at 18:24

## 2019-07-08 RX ADMIN — SODIUM CHLORIDE 75 ML/HR: 9 INJECTION, SOLUTION INTRAVENOUS at 11:14

## 2019-07-08 RX ADMIN — DIPHENHYDRAMINE HYDROCHLORIDE 25 MG: 50 INJECTION, SOLUTION INTRAMUSCULAR; INTRAVENOUS at 11:15

## 2019-07-08 RX ADMIN — VINCRISTINE SULFATE: 1 INJECTION, SOLUTION INTRAVENOUS at 15:57

## 2019-07-08 RX ADMIN — SODIUM CHLORIDE 12 MG: 9 INJECTION, SOLUTION INTRAMUSCULAR; INTRAVENOUS; SUBCUTANEOUS at 14:00

## 2019-07-08 NOTE — PLAN OF CARE
Problem: Chemotherapy Effects (Adult)  Goal: Signs and Symptoms of Listed Potential Problems Will be Absent, Minimized or Managed (Chemotherapy Effects)  Outcome: Outcome(s) achieved Date Met: 07/08/19

## 2019-07-08 NOTE — H&P (VIEW-ONLY)
"[facility] OUT[facility]IENT FOLLOW UP CLINIC VISIT    REASON FOR FOLLOW-UP:    1.  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  CT imaging of the abdomen and pelvis on 5/8/2019 shows multiple small tissue nodules in the perirenal fat adjacent to the left kidney and a soft tissue mass interposed between the left psoas muscle and spine.  2.  RA previously on Cimzia (certollizumab) injections by Dr. Velásquez  3.  Lumbar puncture 5/31/2019- for involvement with lymphoma  4.  Bone marrow aspiration and biopsy 5/29/2018 negative for lymphoma  5.  PET scan on 5/30/2019 with hypermetabolic perinephric soft tissue lesions with mesenteric lymphadenopathy, left paraspinal mass at L5, 6 cm intramuscular mass in the right upper thigh, lesion at the left clavicle and a 5 cm soft tissue mass between the right scapula and right posterior fourth rib.  6.  Right subclavian Mediport placed by Dr. Herrera on 6/12/2019.  7.  Admission to [facility] for cycle #1 of dose adjusted R–EPOCH on 6/17/2019.    HISTORY OF PRESENT ILLNESS:  Edward Srinivasan is a 79 y.o. male who returns today for follow up of the above issue.      He initiated dose adjusted R–EPOCH on 6/17/2019.  His LP showed no evidence for lymphoma.  He tolerated therapy well.  He received Neulasta on discharge on 6/22.    Today we plan for admission to [facility] for cycle #2 of dose adjusted R–EPOCH on 7/8/2019.    He will have a lumbar puncture on admission.    He generally tolerated therapy well.  He reports some mild fatigue which is improving over the past week.  He has had some constipation which improved with Senokot and MiraLAX.  He is now taking Senokot just once daily with improvement.  He had lower extremity edema during the admission which also has improved over the past couple of weeks.  No fevers or chills.  No nausea or " vomiting.  He does have some weight loss.  His right thigh lesion which had been prominent has disappeared.      ONCOLOGIC HISTORY:  He has a history of rheumatoid arthritis currently on injections by Dr. Velásquez, type 2 diabetes on oral medication, coronary artery disease status post stenting x2, aortic stenosis, and nonmelanoma skin cancer resected from his face.  Continues to smoke a little bit less than a half a pack per day and has done this for several decades.     He has a long history of chronic back pain.  In March 2019 he developed some low back pain on the left.  With some physical therapy this is actually improved significantly but over the past couple of weeks he has developed some right-sided back pain.  Actually over the past couple of days this has essentially resolved.  He denies any radiculopathy.  He has chronic vision changes but nothing acute.  He denies any headaches.  No other focal neurologic changes.  He has occasional lightheadedness but this is been a chronic problem for him.  No fevers.  He is cold a lot but no rigors.  No night sweats.  He has lost a little bit of weight which she relates to his rheumatoid arthritis and decreased appetite secondary to pain.     He had an MRI of his lumbar spine on 4/25/2019 showing some degenerative changes but also there was a soft tissue mass extending posteriorly from the left common iliac vessels hugging the lateral margin of the L5 vertebrae extending from the left lateral margin of L4-5 interspace to L5-S1 where it pressed on the left L5 nerve root.  The mass measures 4.4 x 2.1 x 3.5 cm.  CT imaging was suggested and performed on 5/8/2019.  The CT scan showed multiple soft tissue nodules in the perirenal fat on the left adjacent to the kidney and a soft tissue mass interposed between the left psoas muscle and spine.  The mass measures 4.1 x 2.1 cm.     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    He was seen initially on 5/28/2019.  PET scan, LP, and a bone marrow aspiration and biopsy were requested.    LDH was 341 with uric acid 4.4.       Bone marrow biopsy on 5/29/2019:  normocellular marrow with no evidence for lymphoma.  Flow cytometry negative.  Karyotype pending as of 6/7.      PET scan on 5/20/2019:  IMPRESSION:  1. There are hypermetabolic perinephric soft tissue lesions,  hypermetabolic mesenteric lymphadenopathy, left paraspinal mass at L5,  as well as an intensely hypermetabolic approximately 6 cm intramuscular  mass within the visualized right upper thigh. There is no hypermetabolic  lymphadenopathy within the abdomen or pelvis, but the shotty mediastinal  and hilar nodes are hypermetabolic and there is also a hypermetabolic  right upper lobe nodular opacity. In addition, there is an intensely  hypermetabolic approximately 5 cm intramuscular mass at the right upper  back and there is a tiny hypermetabolic lesion within the left clavicle.  2. Shotty cervical nodes have low level activity.  3. Uncertain significance of the more than typical metabolic activity at  dependent atelectatic change at both lower lobes.    LP on 5/31/2019:  Cytology and flow cytometry negative.    Mediport placed in the right subclavian area by Dr. Herrera on 6/12/2019.    Plan to start therapy with dose adjusted R–EPOCH with CNS prophylaxis with intrathecal methotrexate on 6/17/2019.    He previously had an echocardiogram on 5/15/2019 showing a normal left ventricular ejection fraction of 60%.    Admission to [facility] on 6/19/2019 for cycle #1 of therapy.    He received Neulasta on discharge on 6/22/2019.    His lowest ANC was 0.04 on 6/27 with a platelet count of 47,000 that day.  He was only neutropenic on one day and his WBC and platelet count improved rapidly.    Admission for cycle #2 on  7/8/2019.    ALLERGIES:  Allergies   Allergen Reactions   • Oxaprozin Hives       MEDICATIONS:  The medication list has been reviewed with the patient by the medical assistant, and the list has been updated in the electronic medical record, which I reviewed.  Medication dosages and frequencies were confirmed to be accurate.    REVIEW OF SYSTEMS:  PAIN:  See Vital Signs below.  GENERAL: Weight loss.  No fevers chills or night sweats.  SKIN:  No rashes or non-healing lesions.  HEME/LYMPH:  No abnormal bleeding.  No palpable lymphadenopathy.  EYES:  No vision changes or diplopia.  ENT:  No sore throat or difficulty swallowing.  RESPIRATORY:  No cough, shortness of breath, hemoptysis, or wheezing.  CARDIOVASCULAR:  No chest pain, palpitations, orthopnea, or dyspnea on exertion.  GASTROINTESTINAL:  No abdominal pain, nausea, vomiting, constipation, diarrhea, melena, or hematochezia.  GENITOURINARY:  No dysuria or hematuria.  MUSCULOSKELETAL: Right upper extremity lesion at the elbow which has improved.  Right thigh lesion which he states has improved.  NEUROLOGIC:  No dizziness, loss of consciousness, or seizures.  PSYCHIATRIC:  No depression, anxiety, or mood changes.    There were no vitals filed for this visit.   See Epic for vitals.       PHYSICAL EXAMINATION:  GENERAL:  Well-developed well-nourished male; awake, alert and oriented, in no acute distress.   SKIN:  Warm and dry, without rashes, purpura, or petechiae.  HEAD:  Normocephalic, atraumatic.  Alopecia is developing.  EYES:  Pupils equal, round and reactive to light.  Extraocular movements intact.  Conjunctivae normal.  EARS:  Hearing intact.  NOSE:  Septum midline.  No excoriations or nasal discharge.  MOUTH:  No stomatitis or ulcers.  Lips are normal.  THROAT:  Oropharynx without lesions or exudates.  NECK:  Supple with good range of motion; no thyromegaly or masses; no JVD or bruits.  LYMPHATICS:  No cervical, supraclavicular, axillary  lymphadenopathy.  CHEST:  Lungs are clear to auscultation bilaterally.  No wheezes, rales, or rhonchi.  HEART:  Regular rate; normal rhythm.  No murmurs, gallops or rubs.  ABDOMEN:  Soft, non-tender, non-distended.  Normal active bowel sounds.  No organomegaly.  EXTREMITIES:  No clubbing, cyanosis, or edema.  No palpable right thigh lesion  NEUROLOGICAL:  No focal neurologic deficits.  He is anxious.    DIAGNOSTIC DATA:  Results for orders placed or performed in visit on 07/08/19   CBC Auto Differential   Result Value Ref Range    WBC 15.70 (H) 3.40 - 10.80 10*3/mm3    RBC 3.37 (L) 4.14 - 5.80 10*6/mm3    Hemoglobin 10.1 (L) 13.0 - 17.7 g/dL    Hematocrit 29.9 (L) 37.5 - 51.0 %    MCV 88.7 79.0 - 97.0 fL    MCH 30.0 26.6 - 33.0 pg    MCHC 33.8 31.5 - 35.7 g/dL    RDW 18.0 (H) 12.3 - 15.4 %    RDW-SD 55.1 (H) 37.0 - 54.0 fl    MPV 8.3 6.0 - 12.0 fL    Platelets 415 140 - 450 10*3/mm3    Neutrophil % 71.4 42.7 - 76.0 %    Lymphocyte % 7.1 (L) 19.6 - 45.3 %    Monocyte % 8.3 5.0 - 12.0 %    Eosinophil % 0.7 0.3 - 6.2 %    Basophil % 0.8 0.0 - 1.5 %    Immature Grans % 11.7 (H) 0.0 - 0.5 %    Neutrophils, Absolute 11.21 (H) 1.70 - 7.00 10*3/mm3    Lymphocytes, Absolute 1.12 0.70 - 3.10 10*3/mm3    Monocytes, Absolute 1.30 (H) 0.10 - 0.90 10*3/mm3    Eosinophils, Absolute 0.11 0.00 - 0.40 10*3/mm3    Basophils, Absolute 0.12 0.00 - 0.20 10*3/mm3    Immature Grans, Absolute 1.84 (H) 0.00 - 0.05 10*3/mm3    nRBC 0.0 0.0 - 0.2 /100 WBC       IMAGING:  PET scan images from 5/30/2019 images personally reviewed.  Scattered bony lesions and lymphadenopathy as discussed below on both sides of the diaphragm.      IMPRESSION:  1. There are hypermetabolic perinephric soft tissue lesions,  hypermetabolic mesenteric lymphadenopathy, left paraspinal mass at L5,  as well as an intensely hypermetabolic approximately 6 cm intramuscular  mass within the visualized right upper thigh. There is no hypermetabolic  lymphadenopathy within the  "abdomen or pelvis, but the shotty mediastinal  and hilar nodes are hypermetabolic and there is also a hypermetabolic  right upper lobe nodular opacity. In addition, there is an intensely  hypermetabolic approximately 5 cm intramuscular mass at the right upper  back and there is a tiny hypermetabolic lesion within the left clavicle.  2. Shotty cervical nodes have low level activity.  3. Uncertain significance of the more than typical metabolic activity at  dependent atelectatic change at both lower lobes.    ASSESSMENT:  This is a 79 y.o. male with:  1.  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  His LP and bone marrow biopsies are negative for involvement.  However, his PET scan indicates perinephric soft tissue lesions. hypermetabolic mesenteric lymphadenopathy, left L5 paraspinous mass, an intensely hypermetabolic 6 cm intramuscular mass in the upper right thigh, a right upper lobe nodular opacity, a hypermetabolic mass adjacent to the right scapula, and a lesion within the left calvicle.  Paraspinous involvement puts him at extremely high risk for CNS involvement.      Plan 6 cycles of inpatient treatment with dose adjusted R–EPOCH.  Plan CNS prophylaxis with IT methotrexate with each cycle of therapy.    Admission to [facility] on 6/19/2019 for cycle #1 of therapy.    He received Neulasta on discharge on 6/22/2019.    His lowest ANC was 0.04 on 6/27 with a platelet count of 47,000 that day.  He was only neutropenic on one day and his WBC and platelet count improved rapidly.    Admit 7/8 for cycle #2.  LP today.       2.  Rheumatoid arthritis previously on Cimzia (certolizumab) injections by Dr. Velásquez. Plan to discontinue these during chemotherapy.  He was previously on Simponi Aria (golimumab) and Actemra (tocilizumab).  These certainly could contribute to the development of lymphoma.     3. "  Coronary artery disease status post stenting as well as moderate aortic stenosis by echocardiogram.  His left ventricular ejection fraction is normal at 60% by echocardiogram on 5/14/2019.     4.  Anxiety:  I previously gave him a prescription for alprazolam 0.25 mg to take twice daily as needed.  Continue benzodiazepines as needed during his hospitalization.    5.  Hyponatremia: Continue to monitor.    6.  Volume overload with cycle #1: We will likely need to decrease IV fluids with this cycle.    7.  Constipation: Continue Senokot daily and MiraLAX as needed.  Increase Senokot to twice daily if needed.    PLAN:   1.  Inpatient [facility] today  2.  Lumbar puncture on admission with a sample to be sent for flow cytometry.  Intrathecal methotrexate 12 mg administration for CNS prophylaxis.  3.  Cycle #2 of therapy with dose adjusted R–EPOCH to start today    4.  Benzodiazepines as needed for anxiety although he has not required any at this point.  5.  Lovenox 40 mg daily for DVT prophylaxis.  No allopurinol  6.  IV fluid hydration  7.  Continue Bactrim and acyclovir for prophylaxis.  7.  At discharge, Neulasta either the day following chemotherapy or next Monday.  Twice weekly CBC on Mondays and Thursdays for 2 weeks.  CT neck chest abdomen and pelvis in 2 weeks.  I will see him back on Thursday, 7/25/2019 to review imaging.  Nurse practitioner visit the following Monday for admission as I will be out that day.  I will have everything arranged for his admission that day.  Plan total of 6 cycles of therapy if tolerated with intrathecal methotrexate with each cycle for CNS prophylaxis.

## 2019-07-08 NOTE — NURSING NOTE
Nursing Chemotherapy Verification:      Etoposide/Doxorubicin/Vincristine 24 hour hang started    Chemotherapy Regimen:   Treatment Plans     Name Type Plan dates Plan Provider         Active    IP LYMPHOMA R-EPOCH (Dose Adjusted) RiTUXimab / Etoposide / DOXOrubicin / VinCRIStine / Cyclophosphamide / PredniSONE ONCOLOGY TREATMENT  5/27/2019 - Present Gabriele Garcia MD                     Current height and weight:   62 kg (136 lb 9.6 oz)  Calculated BSA from current height and weight (Jaz): 1.72 m2    Relevant Labs  Results from last 7 days   Lab Units 07/08/19  1031 07/08/19  0724 07/05/19  0848   WBC 10*3/mm3 18.25* 15.70* 22.58*   HEMOGLOBIN g/dL 11.0* 10.1* 10.4*   HEMATOCRIT % 33.9* 29.9* 31.0*   PLATELETS 10*3/mm3 374 415 401     Lab Results   Component Value Date    NEUTROABS 14.78 (H) 07/08/2019       Results from last 7 days   Lab Units 07/08/19  1031 07/08/19  0724 07/05/19  0848   CREATININE mg/dL 0.72* 0.80 0.75       Serum creatinine: 0.72 mg/dL (L) 07/08/19 1031  Estimated creatinine clearance: 67.8 mL/min (A)    Lab Results   Component Value Date    HEPBCAB Negative 06/17/2019       Verification attestation:  I have personally reviewed the planned regimen and its administration and dosing. I understand the potential side effects.The patient has been instructed on the regimen, potential side effects, and self care measures; the consent form has been completed. I have confirmed that the appropriate premedication, prehydration, post medication and/or emergency medications are ordered in addition to the chemotherapy.    I have independently verified that the height and weight is current and calculated the BSA. I have verified the doses with the planned regimen and have clarified any deviations with the physician (Dr. Gabriele Garcia). I have confirmed the route of administration and patient IV access.    Nurse: Siri Dawn RN  2nd verification Nurse: Sonam Lopes RN

## 2019-07-08 NOTE — NURSING NOTE
Nursing Chemotherapy Verification    Chemotherapy Regimen:   Treatment Plans     Name Type Plan dates Plan Provider         Active    IP LYMPHOMA R-EPOCH (Dose Adjusted) RiTUXimab / Etoposide / DOXOrubicin / VinCRIStine / Cyclophosphamide / PredniSONE ONCOLOGY TREATMENT  5/27/2019 - Present Gabriele Garcia MD                     Current height and weight:   62 kg (136 lb 9.6 oz)  Calculated BSA from current height and weight (Jaz): 1.71 m2    Relevant Labs  Results from last 7 days   Lab Units 07/08/19  1031 07/08/19  0724 07/05/19  0848   WBC 10*3/mm3 18.25* 15.70* 22.58*   HEMOGLOBIN g/dL 11.0* 10.1* 10.4*   HEMATOCRIT % 33.9* 29.9* 31.0*   PLATELETS 10*3/mm3 374 415 401     Lab Results   Component Value Date    NEUTROABS 14.78 (H) 07/08/2019       Results from last 7 days   Lab Units 07/08/19  1031 07/08/19  0724 07/05/19  0848   CREATININE mg/dL 0.72* 0.80 0.75       Serum creatinine: 0.72 mg/dL (L) 07/08/19 1031  Estimated creatinine clearance: 67.8 mL/min (A)    Lab Results   Component Value Date    HEPBCAB Negative 06/17/2019       Verification attestation:  I have personally reviewed the planned regimen and its administration and dosing. I understand the potential side effects.The patient has been instructed on the regimen, potential side effects, and self care measures; the consent form has been completed. I have confirmed that the appropriate premedication, prehydration, post medication and/or emergency medications are ordered in addition to the chemotherapy.    I have independently verified that the height and weight is current and calculated the BSA. I have verified the doses with the planned regimen and have clarified any deviations with the physician (Dr. Gabriele Garcia). I have confirmed the route of administration and patient IV access.    Nurse: Siri Dawn RN  2nd verification Nurse: Jyothi Anand RN

## 2019-07-08 NOTE — PROGRESS NOTES
Order entered for lumbar puncture for chemotherapy administration to be scheduled for 7/29/19 per in-basket message Dr. Garcia

## 2019-07-08 NOTE — H&P
"[facility] CBC GROUP OUTPATIENT FOLLOW UP CLINIC VISIT    REASON FOR FOLLOW-UP:    1.  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  CT imaging of the abdomen and pelvis on 5/8/2019 shows multiple small tissue nodules in the perirenal fat adjacent to the left kidney and a soft tissue mass interposed between the left psoas muscle and spine.  2.  RA previously on Cimzia (certollizumab) injections by Dr. Velásquez  3.  Lumbar puncture 5/31/2019- for involvement with lymphoma  4.  Bone marrow aspiration and biopsy 5/29/2018 negative for lymphoma  5.  PET scan on 5/30/2019 with hypermetabolic perinephric soft tissue lesions with mesenteric lymphadenopathy, left paraspinal mass at L5, 6 cm intramuscular mass in the right upper thigh, lesion at the left clavicle and a 5 cm soft tissue mass between the right scapula and right posterior fourth rib.  6.  Right subclavian Mediport placed by Dr. Herrera on 6/12/2019.  7.  Admission to [facility] for cycle #1 of dose adjusted R-EPOCH on 6/17/2019.    HISTORY OF PRESENT ILLNESS:  Edward Srinivasan is a 79 y.o. male who returns today for follow up of the above issue.      He initiated dose adjusted R-EPOCH on 6/17/2019.  His LP showed no evidence for lymphoma.  He tolerated therapy well.  He received Neulasta on discharge on 6/22.    Today we plan for admission to [facility] for cycle #2 of dose adjusted R-EPOCH on 7/8/2019.    He will have a lumbar puncture on admission.    He generally tolerated therapy well.  He reports some mild fatigue which is improving over the past week.  He has had some constipation which improved with Senokot and MiraLAX.  He is now taking Senokot just once daily with improvement.  He had lower extremity edema during the admission which also has improved over the past couple of weeks.  No fevers or chills.  No nausea or " vomiting.  He does have some weight loss.  His right thigh lesion which had been prominent has disappeared.      ONCOLOGIC HISTORY:  He has a history of rheumatoid arthritis currently on injections by Dr. Velásquez, type 2 diabetes on oral medication, coronary artery disease status post stenting x2, aortic stenosis, and nonmelanoma skin cancer resected from his face.  Continues to smoke a little bit less than a half a pack per day and has done this for several decades.     He has a long history of chronic back pain.  In March 2019 he developed some low back pain on the left.  With some physical therapy this is actually improved significantly but over the past couple of weeks he has developed some right-sided back pain.  Actually over the past couple of days this has essentially resolved.  He denies any radiculopathy.  He has chronic vision changes but nothing acute.  He denies any headaches.  No other focal neurologic changes.  He has occasional lightheadedness but this is been a chronic problem for him.  No fevers.  He is cold a lot but no rigors.  No night sweats.  He has lost a little bit of weight which she relates to his rheumatoid arthritis and decreased appetite secondary to pain.     He had an MRI of his lumbar spine on 4/25/2019 showing some degenerative changes but also there was a soft tissue mass extending posteriorly from the left common iliac vessels hugging the lateral margin of the L5 vertebrae extending from the left lateral margin of L4-5 interspace to L5-S1 where it pressed on the left L5 nerve root.  The mass measures 4.4 x 2.1 x 3.5 cm.  CT imaging was suggested and performed on 5/8/2019.  The CT scan showed multiple soft tissue nodules in the perirenal fat on the left adjacent to the kidney and a soft tissue mass interposed between the left psoas muscle and spine.  The mass measures 4.1 x 2.1 cm.     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    He was seen initially on 5/28/2019.  PET scan, LP, and a bone marrow aspiration and biopsy were requested.    LDH was 341 with uric acid 4.4.       Bone marrow biopsy on 5/29/2019:  normocellular marrow with no evidence for lymphoma.  Flow cytometry negative.  Karyotype pending as of 6/7.      PET scan on 5/20/2019:  IMPRESSION:  1. There are hypermetabolic perinephric soft tissue lesions,  hypermetabolic mesenteric lymphadenopathy, left paraspinal mass at L5,  as well as an intensely hypermetabolic approximately 6 cm intramuscular  mass within the visualized right upper thigh. There is no hypermetabolic  lymphadenopathy within the abdomen or pelvis, but the shotty mediastinal  and hilar nodes are hypermetabolic and there is also a hypermetabolic  right upper lobe nodular opacity. In addition, there is an intensely  hypermetabolic approximately 5 cm intramuscular mass at the right upper  back and there is a tiny hypermetabolic lesion within the left clavicle.  2. Shotty cervical nodes have low level activity.  3. Uncertain significance of the more than typical metabolic activity at  dependent atelectatic change at both lower lobes.    LP on 5/31/2019:  Cytology and flow cytometry negative.    Mediport placed in the right subclavian area by Dr. Herrera on 6/12/2019.    Plan to start therapy with dose adjusted R-EPOCH with CNS prophylaxis with intrathecal methotrexate on 6/17/2019.    He previously had an echocardiogram on 5/15/2019 showing a normal left ventricular ejection fraction of 60%.    Admission to [facility] on 6/19/2019 for cycle #1 of therapy.    He received Neulasta on discharge on 6/22/2019.    His lowest ANC was 0.04 on 6/27 with a platelet count of 47,000 that day.  He was only neutropenic on one day and his WBC and platelet count improved rapidly.    Admission for cycle #2 on  7/8/2019.    ALLERGIES:  Allergies   Allergen Reactions   • Oxaprozin Hives       MEDICATIONS:  The medication list has been reviewed with the patient by the medical assistant, and the list has been updated in the electronic medical record, which I reviewed.  Medication dosages and frequencies were confirmed to be accurate.    REVIEW OF SYSTEMS:  PAIN:  See Vital Signs below.  GENERAL: Weight loss.  No fevers chills or night sweats.  SKIN:  No rashes or non-healing lesions.  HEME/LYMPH:  No abnormal bleeding.  No palpable lymphadenopathy.  EYES:  No vision changes or diplopia.  ENT:  No sore throat or difficulty swallowing.  RESPIRATORY:  No cough, shortness of breath, hemoptysis, or wheezing.  CARDIOVASCULAR:  No chest pain, palpitations, orthopnea, or dyspnea on exertion.  GASTROINTESTINAL:  No abdominal pain, nausea, vomiting, constipation, diarrhea, melena, or hematochezia.  GENITOURINARY:  No dysuria or hematuria.  MUSCULOSKELETAL: Right upper extremity lesion at the elbow which has improved.  Right thigh lesion which he states has improved.  NEUROLOGIC:  No dizziness, loss of consciousness, or seizures.  PSYCHIATRIC:  No depression, anxiety, or mood changes.    There were no vitals filed for this visit.   See Epic for vitals.       PHYSICAL EXAMINATION:  GENERAL:  Well-developed well-nourished male; awake, alert and oriented, in no acute distress.   SKIN:  Warm and dry, without rashes, purpura, or petechiae.  HEAD:  Normocephalic, atraumatic.  Alopecia is developing.  EYES:  Pupils equal, round and reactive to light.  Extraocular movements intact.  Conjunctivae normal.  EARS:  Hearing intact.  NOSE:  Septum midline.  No excoriations or nasal discharge.  MOUTH:  No stomatitis or ulcers.  Lips are normal.  THROAT:  Oropharynx without lesions or exudates.  NECK:  Supple with good range of motion; no thyromegaly or masses; no JVD or bruits.  LYMPHATICS:  No cervical, supraclavicular, axillary  lymphadenopathy.  CHEST:  Lungs are clear to auscultation bilaterally.  No wheezes, rales, or rhonchi.  HEART:  Regular rate; normal rhythm.  No murmurs, gallops or rubs.  ABDOMEN:  Soft, non-tender, non-distended.  Normal active bowel sounds.  No organomegaly.  EXTREMITIES:  No clubbing, cyanosis, or edema.  No palpable right thigh lesion  NEUROLOGICAL:  No focal neurologic deficits.  He is anxious.    DIAGNOSTIC DATA:  Results for orders placed or performed in visit on 07/08/19   CBC Auto Differential   Result Value Ref Range    WBC 15.70 (H) 3.40 - 10.80 10*3/mm3    RBC 3.37 (L) 4.14 - 5.80 10*6/mm3    Hemoglobin 10.1 (L) 13.0 - 17.7 g/dL    Hematocrit 29.9 (L) 37.5 - 51.0 %    MCV 88.7 79.0 - 97.0 fL    MCH 30.0 26.6 - 33.0 pg    MCHC 33.8 31.5 - 35.7 g/dL    RDW 18.0 (H) 12.3 - 15.4 %    RDW-SD 55.1 (H) 37.0 - 54.0 fl    MPV 8.3 6.0 - 12.0 fL    Platelets 415 140 - 450 10*3/mm3    Neutrophil % 71.4 42.7 - 76.0 %    Lymphocyte % 7.1 (L) 19.6 - 45.3 %    Monocyte % 8.3 5.0 - 12.0 %    Eosinophil % 0.7 0.3 - 6.2 %    Basophil % 0.8 0.0 - 1.5 %    Immature Grans % 11.7 (H) 0.0 - 0.5 %    Neutrophils, Absolute 11.21 (H) 1.70 - 7.00 10*3/mm3    Lymphocytes, Absolute 1.12 0.70 - 3.10 10*3/mm3    Monocytes, Absolute 1.30 (H) 0.10 - 0.90 10*3/mm3    Eosinophils, Absolute 0.11 0.00 - 0.40 10*3/mm3    Basophils, Absolute 0.12 0.00 - 0.20 10*3/mm3    Immature Grans, Absolute 1.84 (H) 0.00 - 0.05 10*3/mm3    nRBC 0.0 0.0 - 0.2 /100 WBC       IMAGING:  PET scan images from 5/30/2019 images personally reviewed.  Scattered bony lesions and lymphadenopathy as discussed below on both sides of the diaphragm.      IMPRESSION:  1. There are hypermetabolic perinephric soft tissue lesions,  hypermetabolic mesenteric lymphadenopathy, left paraspinal mass at L5,  as well as an intensely hypermetabolic approximately 6 cm intramuscular  mass within the visualized right upper thigh. There is no hypermetabolic  lymphadenopathy within the  "abdomen or pelvis, but the shotty mediastinal  and hilar nodes are hypermetabolic and there is also a hypermetabolic  right upper lobe nodular opacity. In addition, there is an intensely  hypermetabolic approximately 5 cm intramuscular mass at the right upper  back and there is a tiny hypermetabolic lesion within the left clavicle.  2. Shotty cervical nodes have low level activity.  3. Uncertain significance of the more than typical metabolic activity at  dependent atelectatic change at both lower lobes.    ASSESSMENT:  This is a 79 y.o. male with:  1.  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  His LP and bone marrow biopsies are negative for involvement.  However, his PET scan indicates perinephric soft tissue lesions. hypermetabolic mesenteric lymphadenopathy, left L5 paraspinous mass, an intensely hypermetabolic 6 cm intramuscular mass in the upper right thigh, a right upper lobe nodular opacity, a hypermetabolic mass adjacent to the right scapula, and a lesion within the left calvicle.  Paraspinous involvement puts him at extremely high risk for CNS involvement.      Plan 6 cycles of inpatient treatment with dose adjusted R-EPOCH.  Plan CNS prophylaxis with IT methotrexate with each cycle of therapy.    [facility] for cycle #1 of therapy.    He received Neulasta on discharge on 6/22/2019.    His lowest ANC was 0.04 on 6/27 with a platelet count of 47,000 that day.  He was only neutropenic on one day and his WBC and platelet count improved rapidly.    Admit 7/8 for cycle #2.  LP today.       2.  Rheumatoid arthritis previously on Cimzia (certolizumab) injections by Dr. Velásquez. Plan to discontinue these during chemotherapy.  He was previously on Simponi Aria (golimumab) and Actemra (tocilizumab).  These certainly could contribute to the development of lymphoma.     3. "  Coronary artery disease status post stenting as well as moderate aortic stenosis by echocardiogram.  His left ventricular ejection fraction is normal at 60% by echocardiogram on 5/14/2019.     4.  Anxiety:  I previously gave him a prescription for alprazolam 0.25 mg to take twice daily as needed.  Continue benzodiazepines as needed during his hospitalization.    5.  Hyponatremia: Continue to monitor.    6.  Volume overload with cycle #1: We will likely need to decrease IV fluids with this cycle.    7.  Constipation: Continue Senokot daily and MiraLAX as needed.  Increase Senokot to twice daily if needed.    PLAN:   1.  Inpatient [facility] today  2.  Lumbar puncture on admission with a sample to be sent for flow cytometry.  Intrathecal methotrexate 12 mg administration for CNS prophylaxis.  3.  Cycle #2 of therapy with dose adjusted R-EPOCH to start today    4.  Benzodiazepines as needed for anxiety although he has not required any at this point.  5.  Lovenox 40 mg daily for DVT prophylaxis.  No allopurinol  6.  IV fluid hydration  7.  Continue Bactrim and acyclovir for prophylaxis.  7.  At discharge, Neulasta either the day following chemotherapy or next Monday.  Twice weekly CBC on Mondays and Thursdays for 2 weeks.  CT neck chest abdomen and pelvis in 2 weeks.  I will see him back on Thursday, 7/25/2019 to review imaging.  Nurse practitioner visit the following Monday for admission as I will be out that day.  I will have everything arranged for his admission that day.  Plan total of 6 cycles of therapy if tolerated with intrathecal methotrexate with each cycle for CNS prophylaxis.

## 2019-07-08 NOTE — PLAN OF CARE
Problem: Patient Care Overview  Goal: Plan of Care Review  Outcome: Ongoing (interventions implemented as appropriate)   07/08/19 1073   Coping/Psychosocial   Plan of Care Reviewed With patient   Plan of Care Review   Progress no change   OTHER   Outcome Summary Patient admitted for chemo,cycle 2 of EPOCH. IT methotrexate completed in radiology. Tolerating chemo no c/o pain n/v/d     Goal: Individualization and Mutuality  Outcome: Ongoing (interventions implemented as appropriate)    Goal: Discharge Needs Assessment  Outcome: Ongoing (interventions implemented as appropriate)    Goal: Interprofessional Rounds/Family Conf  Outcome: Ongoing (interventions implemented as appropriate)      Problem: Oncology Care (Adult)  Goal: Signs and Symptoms of Listed Potential Problems Will be Absent, Minimized or Managed (Oncology Care)  Outcome: Ongoing (interventions implemented as appropriate)

## 2019-07-09 ENCOUNTER — READMISSION MANAGEMENT (OUTPATIENT)
Dept: CALL CENTER | Facility: HOSPITAL | Age: 79
End: 2019-07-09

## 2019-07-09 LAB
ALBUMIN SERPL-MCNC: 3.7 G/DL (ref 3.5–5.2)
ALBUMIN/GLOB SERPL: 1.4 G/DL
ALP SERPL-CCNC: 70 U/L (ref 39–117)
ALT SERPL W P-5'-P-CCNC: 12 U/L (ref 1–41)
ANION GAP SERPL CALCULATED.3IONS-SCNC: 11.9 MMOL/L (ref 5–15)
AST SERPL-CCNC: 11 U/L (ref 1–40)
BASOPHILS # BLD AUTO: 0.03 10*3/MM3 (ref 0–0.2)
BASOPHILS NFR BLD AUTO: 0.2 % (ref 0–1.5)
BILIRUB SERPL-MCNC: 0.2 MG/DL (ref 0.2–1.2)
BUN BLD-MCNC: 16 MG/DL (ref 8–23)
BUN/CREAT SERPL: 24.2 (ref 7–25)
CALCIUM SPEC-SCNC: 8.2 MG/DL (ref 8.6–10.5)
CHLORIDE SERPL-SCNC: 98 MMOL/L (ref 98–107)
CO2 SERPL-SCNC: 22.1 MMOL/L (ref 22–29)
CREAT BLD-MCNC: 0.66 MG/DL (ref 0.76–1.27)
DEPRECATED RDW RBC AUTO: 53.2 FL (ref 37–54)
EOSINOPHIL # BLD AUTO: 0 10*3/MM3 (ref 0–0.4)
EOSINOPHIL NFR BLD AUTO: 0 % (ref 0.3–6.2)
ERYTHROCYTE [DISTWIDTH] IN BLOOD BY AUTOMATED COUNT: 17.2 % (ref 12.3–15.4)
GFR SERPL CREATININE-BSD FRML MDRD: 117 ML/MIN/1.73
GLOBULIN UR ELPH-MCNC: 2.6 GM/DL
GLUCOSE BLD-MCNC: 215 MG/DL (ref 65–99)
GLUCOSE BLDC GLUCOMTR-MCNC: 227 MG/DL (ref 70–130)
GLUCOSE BLDC GLUCOMTR-MCNC: 248 MG/DL (ref 70–130)
GLUCOSE BLDC GLUCOMTR-MCNC: 253 MG/DL (ref 70–130)
GLUCOSE BLDC GLUCOMTR-MCNC: 264 MG/DL (ref 70–130)
HCT VFR BLD AUTO: 28.9 % (ref 37.5–51)
HGB BLD-MCNC: 9.6 G/DL (ref 13–17.7)
IMM GRANULOCYTES # BLD AUTO: 0.77 10*3/MM3 (ref 0–0.05)
IMM GRANULOCYTES NFR BLD AUTO: 4.2 % (ref 0–0.5)
LYMPHOCYTES # BLD AUTO: 0.4 10*3/MM3 (ref 0.7–3.1)
LYMPHOCYTES NFR BLD AUTO: 2.2 % (ref 19.6–45.3)
MAGNESIUM SERPL-MCNC: 1.9 MG/DL (ref 1.6–2.4)
MCH RBC QN AUTO: 29.4 PG (ref 26.6–33)
MCHC RBC AUTO-ENTMCNC: 33.2 G/DL (ref 31.5–35.7)
MCV RBC AUTO: 88.4 FL (ref 79–97)
MONOCYTES # BLD AUTO: 0.16 10*3/MM3 (ref 0.1–0.9)
MONOCYTES NFR BLD AUTO: 0.9 % (ref 5–12)
NEUTROPHILS # BLD AUTO: 17.14 10*3/MM3 (ref 1.7–7)
NEUTROPHILS NFR BLD AUTO: 92.5 % (ref 42.7–76)
NRBC BLD AUTO-RTO: 0 /100 WBC (ref 0–0.2)
PLATELET # BLD AUTO: 283 10*3/MM3 (ref 140–450)
PMV BLD AUTO: 8.8 FL (ref 6–12)
POTASSIUM BLD-SCNC: 4.5 MMOL/L (ref 3.5–5.2)
PROT SERPL-MCNC: 6.3 G/DL (ref 6–8.5)
RBC # BLD AUTO: 3.27 10*6/MM3 (ref 4.14–5.8)
SODIUM BLD-SCNC: 132 MMOL/L (ref 136–145)
WBC NRBC COR # BLD: 18.5 10*3/MM3 (ref 3.4–10.8)

## 2019-07-09 PROCEDURE — 25010000002 ENOXAPARIN PER 10 MG: Performed by: INTERNAL MEDICINE

## 2019-07-09 PROCEDURE — 85025 COMPLETE CBC W/AUTO DIFF WBC: CPT | Performed by: INTERNAL MEDICINE

## 2019-07-09 PROCEDURE — 63710000001 PREDNISONE PER 5 MG: Performed by: INTERNAL MEDICINE

## 2019-07-09 PROCEDURE — 83735 ASSAY OF MAGNESIUM: CPT | Performed by: INTERNAL MEDICINE

## 2019-07-09 PROCEDURE — 25010000002 ONDANSETRON PER 1 MG: Performed by: INTERNAL MEDICINE

## 2019-07-09 PROCEDURE — 25010000002 DOXORUBICIN PER 10 MG: Performed by: INTERNAL MEDICINE

## 2019-07-09 PROCEDURE — 80053 COMPREHEN METABOLIC PANEL: CPT | Performed by: INTERNAL MEDICINE

## 2019-07-09 PROCEDURE — 25010000002 VINCRISTINE PER 1 MG: Performed by: INTERNAL MEDICINE

## 2019-07-09 PROCEDURE — 25010000002 ETOPOSIDE 500 MG/25ML SOLUTION 25 ML VIAL: Performed by: INTERNAL MEDICINE

## 2019-07-09 PROCEDURE — 99233 SBSQ HOSP IP/OBS HIGH 50: CPT | Performed by: INTERNAL MEDICINE

## 2019-07-09 PROCEDURE — 63710000001 INSULIN LISPRO (HUMAN) PER 5 UNITS: Performed by: INTERNAL MEDICINE

## 2019-07-09 PROCEDURE — 82962 GLUCOSE BLOOD TEST: CPT

## 2019-07-09 RX ORDER — PANTOPRAZOLE SODIUM 40 MG/1
40 TABLET, DELAYED RELEASE ORAL
Status: DISCONTINUED | OUTPATIENT
Start: 2019-07-09 | End: 2019-07-12 | Stop reason: HOSPADM

## 2019-07-09 RX ORDER — ACYCLOVIR 200 MG/1
400 CAPSULE ORAL 2 TIMES DAILY
Status: DISCONTINUED | OUTPATIENT
Start: 2019-07-09 | End: 2019-07-12 | Stop reason: HOSPADM

## 2019-07-09 RX ORDER — ALUMINA, MAGNESIA, AND SIMETHICONE 2400; 2400; 240 MG/30ML; MG/30ML; MG/30ML
15 SUSPENSION ORAL EVERY 4 HOURS PRN
Status: DISCONTINUED | OUTPATIENT
Start: 2019-07-09 | End: 2019-07-12 | Stop reason: HOSPADM

## 2019-07-09 RX ADMIN — INSULIN LISPRO 4 UNITS: 100 INJECTION, SOLUTION INTRAVENOUS; SUBCUTANEOUS at 12:48

## 2019-07-09 RX ADMIN — ATORVASTATIN CALCIUM 40 MG: 20 TABLET, FILM COATED ORAL at 08:57

## 2019-07-09 RX ADMIN — VINCRISTINE SULFATE: 1 INJECTION, SOLUTION INTRAVENOUS at 16:48

## 2019-07-09 RX ADMIN — INSULIN LISPRO 4 UNITS: 100 INJECTION, SOLUTION INTRAVENOUS; SUBCUTANEOUS at 22:07

## 2019-07-09 RX ADMIN — LINAGLIPTIN 5 MG: 5 TABLET, FILM COATED ORAL at 08:58

## 2019-07-09 RX ADMIN — PANTOPRAZOLE SODIUM 40 MG: 40 TABLET, DELAYED RELEASE ORAL at 06:34

## 2019-07-09 RX ADMIN — ONDANSETRON 16 MG: 2 INJECTION INTRAMUSCULAR; INTRAVENOUS at 16:05

## 2019-07-09 RX ADMIN — PREDNISONE 100 MG: 50 TABLET ORAL at 08:57

## 2019-07-09 RX ADMIN — CETIRIZINE HYDROCHLORIDE 10 MG: 10 TABLET, FILM COATED ORAL at 08:58

## 2019-07-09 RX ADMIN — ALUMINUM HYDROXIDE, MAGNESIUM HYDROXIDE, AND DIMETHICONE 15 ML: 400; 400; 40 SUSPENSION ORAL at 22:07

## 2019-07-09 RX ADMIN — INSULIN LISPRO 3 UNITS: 100 INJECTION, SOLUTION INTRAVENOUS; SUBCUTANEOUS at 18:21

## 2019-07-09 RX ADMIN — PREDNISONE 100 MG: 50 TABLET ORAL at 18:21

## 2019-07-09 RX ADMIN — ACYCLOVIR 400 MG: 200 CAPSULE ORAL at 20:01

## 2019-07-09 RX ADMIN — ACYCLOVIR 400 MG: 200 CAPSULE ORAL at 08:58

## 2019-07-09 RX ADMIN — SODIUM CHLORIDE, PRESERVATIVE FREE 10 ML: 5 INJECTION INTRAVENOUS at 20:01

## 2019-07-09 RX ADMIN — ENOXAPARIN SODIUM 40 MG: 40 INJECTION SUBCUTANEOUS at 08:58

## 2019-07-09 RX ADMIN — SENNOSIDES,DOCUSATE SODIUM 1 TABLET: 50; 8.6 TABLET, FILM COATED ORAL at 08:58

## 2019-07-09 RX ADMIN — INSULIN LISPRO 3 UNITS: 100 INJECTION, SOLUTION INTRAVENOUS; SUBCUTANEOUS at 08:58

## 2019-07-09 RX ADMIN — SODIUM CHLORIDE 75 ML/HR: 9 INJECTION, SOLUTION INTRAVENOUS at 08:58

## 2019-07-09 RX ADMIN — Medication 400 MG: at 08:58

## 2019-07-09 RX ADMIN — LISINOPRIL 10 MG: 10 TABLET ORAL at 08:58

## 2019-07-09 NOTE — PLAN OF CARE
Problem: Patient Care Overview  Goal: Plan of Care Review  Outcome: Ongoing (interventions implemented as appropriate)   07/09/19 0615   Coping/Psychosocial   Plan of Care Reviewed With patient   Plan of Care Review   Progress no change   OTHER   Outcome Summary Uneventful night. Recieving chemo,cycle 2 of EPOCH. No complaints of pain/n/v/d. VSS Will continue to monitor.

## 2019-07-09 NOTE — OUTREACH NOTE
Medical Week 3 Survey      Responses   Facility patient discharged from?  [facility]   Does the patient have one of the following disease processes/diagnoses(primary or secondary)?  Other   Week 3 attempt successful?  No   Revoke  Readmitted          Elvira Grover RN

## 2019-07-09 NOTE — NURSING NOTE
"Nursing Chemotherapy Verification    Chemotherapy Regimen:   Treatment Plans     Name Type Plan dates Plan Provider         Active    IP LYMPHOMA R-EPOCH (Dose Adjusted) RiTUXimab / Etoposide / DOXOrubicin / VinCRIStine / Cyclophosphamide / PredniSONE ONCOLOGY TREATMENT  5/27/2019 - Present Gabriele Garcia MD                     Current height and weight: 170.2 cm (67.01\") 62 kg (136 lb 9.6 oz)  Calculated BSA from current height and weight (Jaz): 1.74  Relevant Labs  Results from last 7 days   Lab Units 07/09/19  0635 07/08/19  1031 07/08/19  0724   WBC 10*3/mm3 18.50* 18.25* 15.70*   HEMOGLOBIN g/dL 9.6* 11.0* 10.1*   HEMATOCRIT % 28.9* 33.9* 29.9*   PLATELETS 10*3/mm3 283 374 415     Lab Results   Component Value Date    NEUTROABS 17.14 (H) 07/09/2019       Results from last 7 days   Lab Units 07/09/19  0635 07/08/19  1031 07/08/19  0724   CREATININE mg/dL 0.66* 0.72* 0.80       Serum creatinine: 0.66 mg/dL (L) 07/09/19 0635  Estimated creatinine clearance: 67.8 mL/min (A)    Lab Results   Component Value Date    HEPBCAB Negative 06/17/2019       Verification attestation:  I have personally reviewed the planned regimen and its administration and dosing. I understand the potential side effects.The patient has been instructed on the regimen, potential side effects, and self care measures; the consent form has been completed. I have confirmed that the appropriate premedication, prehydration, post medication and/or emergency medications are ordered in addition to the chemotherapy.    I have independently verified that the height and weight is current and calculated the BSA. I have verified the doses with the planned regimen and have clarified any deviations with the physician (Dr. Garcia). I have confirmed the route of administration and patient IV access.    Nurse: Jyothi Anand RN  2nd verification Nurse: Elisha Lomeli RN    "

## 2019-07-09 NOTE — PLAN OF CARE
Problem: Patient Care Overview  Goal: Plan of Care Review  Outcome: Ongoing (interventions implemented as appropriate)   07/09/19 1620   Coping/Psychosocial   Plan of Care Reviewed With patient;spouse   Plan of Care Review   Progress no change   OTHER   Outcome Summary Patient tolerating Day 2 of chemo. No c/o of n/v/d or pain. VSS. Ambulating in the room. Continue to monitory      07/09/19 1620   Coping/Psychosocial   Plan of Care Reviewed With patient;spouse   Plan of Care Review   Progress no change   OTHER   Outcome Summary Patient tolerating Day 2 of chemo. No c/o of n/v/d or pain. VSS. Ambulating in the room. Continue to monitor     Goal: Individualization and Mutuality  Outcome: Ongoing (interventions implemented as appropriate)    Goal: Discharge Needs Assessment  Outcome: Ongoing (interventions implemented as appropriate)    Goal: Interprofessional Rounds/Family Conf  Outcome: Ongoing (interventions implemented as appropriate)      Problem: Oncology Care (Adult)  Goal: Signs and Symptoms of Listed Potential Problems Will be Absent, Minimized or Managed (Oncology Care)  Outcome: Ongoing (interventions implemented as appropriate)

## 2019-07-09 NOTE — PROGRESS NOTES
"[facility] CBC GROUP INPATIENT PROGRESS NOTE    Length of Stay:  1 days    CHIEF COMPLAINT/REASON FOR VISIT:  1.  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  CT imaging of the abdomen and pelvis on 5/8/2019 shows multiple small tissue nodules in the perirenal fat adjacent to the left kidney and a soft tissue mass interposed between the left psoas muscle and spine.  2.  RA previously on Cimzia (certollizumab) injections by Dr. Velásquez  3.  Lumbar puncture 5/31/2019- for involvement with lymphoma  4.  Bone marrow aspiration and biopsy 5/29/2018 negative for lymphoma  5.  PET scan on 5/30/2019 with hypermetabolic perinephric soft tissue lesions with mesenteric lymphadenopathy, left paraspinal mass at L5, 6 cm intramuscular mass in the right upper thigh, lesion at the left clavicle and a 5 cm soft tissue mass between the right scapula and right posterior fourth rib.  6.  Right subclavian Mediport placed by Dr. Herrera on 6/12/2019.  7.  Admission to [facility] for cycle #1 of dose adjusted R-EPOCH on 6/17/2019.  8.  Cycle #2 initiated on 7/8/2019      SUBJECTIVE:  LP done yesterday.  He received Rituxan and started his infusion yesterday.  He has tolerated therapy well.  He reports some constipation.  No headache.  No mucositis.  No n/v.  No leg edema       ROS:  Positive for - constipation   Negative for - fever, bleeding, n/v    OBJECTIVE:  Vitals:    07/08/19 1709 07/08/19 1812 07/08/19 2035 07/09/19 0723   BP: 123/64  128/68 121/55   BP Location: Right arm  Right arm Left arm   Patient Position: Lying  Sitting Sitting   Pulse: 78  86 64   Resp: 16  16 16   Temp: 97.5 °F (36.4 °C)  97.4 °F (36.3 °C) 97 °F (36.1 °C)   TempSrc: Oral  Oral Oral   SpO2: 99%  97% 98%   Weight:       Height:  170.2 cm (67.01\")           PHYSICAL EXAMINATION:  General:  NAD, alert/oriented, in chair  Lymph:  No " "palpable LAD  Chest/Lungs:  CTAB, benign mediport  Heart:  RRR grade II/VI systolic murmur  Abdomen/GI:  Soft, NT, ND, NABS  Extremities:  WWP, no edema    DIAGNOSTIC DATA:  Results Review:     I reviewed the patient's new clinical results.    Results from last 7 days   Lab Units 07/09/19  0635   WBC 10*3/mm3 18.50*   HEMOGLOBIN g/dL 9.6*   HEMATOCRIT % 28.9*   PLATELETS 10*3/mm3 283     Lab Results   Component Value Date    NEUTROABS 17.14 (H) 07/09/2019     Results from last 7 days   Lab Units 07/09/19  0635   SODIUM mmol/L 132*   POTASSIUM mmol/L 4.5   CHLORIDE mmol/L 98   CO2 mmol/L 22.1   BUN mg/dL 16   CREATININE mg/dL 0.66*   GLUCOSE mg/dL 215*   CALCIUM mg/dL 8.2*         Results from last 7 days   Lab Units 07/09/19  0635   MAGNESIUM mg/dL 1.9       IMAGING:    None today    Assessment/Plan   ASSESSMENT/PLAN:  This is a 79 y.o. male with:     1.  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  His LP and bone marrow biopsies are negative for involvement.  However, his PET scan indicates perinephric soft tissue lesions. hypermetabolic mesenteric lymphadenopathy, left L5 paraspinous mass, an intensely hypermetabolic 6 cm intramuscular mass in the upper right thigh, a right upper lobe nodular opacity, a hypermetabolic mass adjacent to the right scapula, and a lesion within the left calvicle.  Paraspinous involvement puts him at extremely high risk for CNS involvement.       Plan 6 cycles of inpatient treatment with dose adjusted R-EPOCH.  Plan CNS prophylaxis with IT methotrexate with each cycle of therapy.     Admission to [facility] on 6/19/2019 for cycle #1 of therapy.     He received Neulasta on discharge on 6/22/2019.     His lowest ANC was 0.04 on 6/27 with a platelet count of 47,000 that day.  He was only neutropenic on one day and his WBC and platelet count improved rapidly.     Admit 7/8 " for cycle #2.    Cycle #2 day 2 today      2.  Rheumatoid arthritis previously on Cimzia (certolizumab) injections by Dr. Velásquez. Plan to discontinue these during chemotherapy.  He was previously on Simponi Aria (golimumab) and Actemra (tocilizumab).  These certainly could contribute to the development of lymphoma.     3.  Coronary artery disease status post stenting as well as moderate aortic stenosis by echocardiogram.  His left ventricular ejection fraction is normal at 60% by echocardiogram on 5/14/2019.     4.  Anxiety:  I previously gave him a prescription for alprazolam 0.25 mg to take twice daily as needed.  Continue benzodiazepines as needed during his hospitalization.     5.  Hyponatremia: Continue to monitor.  Improved today     6.  Volume overload with cycle #1: We will likely need to decrease IV fluids with this cycle.     7.  Constipation: Continue Senokot daily and MiraLAX as needed.  Increase Senokot to twice daily if needed.     PLAN:   1.  Cycle #2 day 2 of treatment today  2.  Lovenox 40 mg daily for DVT prophylaxis  3.  PPI for GI prophylaxis  4.  Daily labs  5.  IV fluids and watch for volume overload.  Decrease rate to 50 mL/hr today  6.  Continue Bactrim and acyclovir for prophylaxis.  7.  At discharge, Neulasta either the day following chemotherapy or next Monday.  Twice weekly CBC on Mondays and Thursdays for 2 weeks.  CT neck chest abdomen and pelvis in 2 weeks.  I will see him back on Thursday, 7/25/2019 to review imaging.  Nurse practitioner visit the following Monday for admission as I will be out that day.  I will have everything arranged for his admission that day.  Plan total of 6 cycles of therapy if tolerated with intrathecal methotrexate with each cycle for CNS prophylaxis.         Gabriele Garcia MD

## 2019-07-10 LAB
ALBUMIN SERPL-MCNC: 3.3 G/DL (ref 3.5–5.2)
ALBUMIN/GLOB SERPL: 1.4 G/DL
ALP SERPL-CCNC: 62 U/L (ref 39–117)
ALT SERPL W P-5'-P-CCNC: 11 U/L (ref 1–41)
ANION GAP SERPL CALCULATED.3IONS-SCNC: 9.4 MMOL/L (ref 5–15)
AST SERPL-CCNC: 11 U/L (ref 1–40)
BASOPHILS # BLD AUTO: 0.04 10*3/MM3 (ref 0–0.2)
BASOPHILS NFR BLD AUTO: 0.2 % (ref 0–1.5)
BILIRUB SERPL-MCNC: 0.2 MG/DL (ref 0.2–1.2)
BUN BLD-MCNC: 18 MG/DL (ref 8–23)
BUN/CREAT SERPL: 26.9 (ref 7–25)
CALCIUM SPEC-SCNC: 8 MG/DL (ref 8.6–10.5)
CHLORIDE SERPL-SCNC: 100 MMOL/L (ref 98–107)
CO2 SERPL-SCNC: 23.6 MMOL/L (ref 22–29)
CREAT BLD-MCNC: 0.67 MG/DL (ref 0.76–1.27)
DEPRECATED RDW RBC AUTO: 54.7 FL (ref 37–54)
EOSINOPHIL # BLD AUTO: 0 10*3/MM3 (ref 0–0.4)
EOSINOPHIL NFR BLD AUTO: 0 % (ref 0.3–6.2)
ERYTHROCYTE [DISTWIDTH] IN BLOOD BY AUTOMATED COUNT: 17.6 % (ref 12.3–15.4)
GFR SERPL CREATININE-BSD FRML MDRD: 115 ML/MIN/1.73
GLOBULIN UR ELPH-MCNC: 2.4 GM/DL
GLUCOSE BLD-MCNC: 215 MG/DL (ref 65–99)
GLUCOSE BLDC GLUCOMTR-MCNC: 201 MG/DL (ref 70–130)
GLUCOSE BLDC GLUCOMTR-MCNC: 214 MG/DL (ref 70–130)
GLUCOSE BLDC GLUCOMTR-MCNC: 281 MG/DL (ref 70–130)
GLUCOSE BLDC GLUCOMTR-MCNC: 325 MG/DL (ref 70–130)
HCT VFR BLD AUTO: 24.5 % (ref 37.5–51)
HGB BLD-MCNC: 8.1 G/DL (ref 13–17.7)
IMM GRANULOCYTES # BLD AUTO: 0.52 10*3/MM3 (ref 0–0.05)
IMM GRANULOCYTES NFR BLD AUTO: 2.3 % (ref 0–0.5)
LYMPHOCYTES # BLD AUTO: 0.34 10*3/MM3 (ref 0.7–3.1)
LYMPHOCYTES NFR BLD AUTO: 1.5 % (ref 19.6–45.3)
MAGNESIUM SERPL-MCNC: 2.4 MG/DL (ref 1.6–2.4)
MCH RBC QN AUTO: 29.7 PG (ref 26.6–33)
MCHC RBC AUTO-ENTMCNC: 33.1 G/DL (ref 31.5–35.7)
MCV RBC AUTO: 89.7 FL (ref 79–97)
MONOCYTES # BLD AUTO: 0.53 10*3/MM3 (ref 0.1–0.9)
MONOCYTES NFR BLD AUTO: 2.4 % (ref 5–12)
NEUTROPHILS # BLD AUTO: 20.71 10*3/MM3 (ref 1.7–7)
NEUTROPHILS NFR BLD AUTO: 93.6 % (ref 42.7–76)
NRBC BLD AUTO-RTO: 0 /100 WBC (ref 0–0.2)
PLATELET # BLD AUTO: 237 10*3/MM3 (ref 140–450)
PMV BLD AUTO: 9 FL (ref 6–12)
POTASSIUM BLD-SCNC: 4.1 MMOL/L (ref 3.5–5.2)
PROT SERPL-MCNC: 5.7 G/DL (ref 6–8.5)
RBC # BLD AUTO: 2.73 10*6/MM3 (ref 4.14–5.8)
SODIUM BLD-SCNC: 133 MMOL/L (ref 136–145)
WBC NRBC COR # BLD: 22.14 10*3/MM3 (ref 3.4–10.8)

## 2019-07-10 PROCEDURE — 25010000002 DOXORUBICIN PER 10 MG: Performed by: INTERNAL MEDICINE

## 2019-07-10 PROCEDURE — 82962 GLUCOSE BLOOD TEST: CPT

## 2019-07-10 PROCEDURE — 63710000001 PREDNISONE PER 5 MG: Performed by: INTERNAL MEDICINE

## 2019-07-10 PROCEDURE — 85025 COMPLETE CBC W/AUTO DIFF WBC: CPT | Performed by: INTERNAL MEDICINE

## 2019-07-10 PROCEDURE — 25010000002 ETOPOSIDE 500 MG/25ML SOLUTION 25 ML VIAL: Performed by: INTERNAL MEDICINE

## 2019-07-10 PROCEDURE — 83735 ASSAY OF MAGNESIUM: CPT | Performed by: INTERNAL MEDICINE

## 2019-07-10 PROCEDURE — 25010000002 ENOXAPARIN PER 10 MG: Performed by: INTERNAL MEDICINE

## 2019-07-10 PROCEDURE — 25010000002 VINCRISTINE PER 1 MG: Performed by: INTERNAL MEDICINE

## 2019-07-10 PROCEDURE — 80053 COMPREHEN METABOLIC PANEL: CPT | Performed by: INTERNAL MEDICINE

## 2019-07-10 PROCEDURE — 25010000002 ONDANSETRON PER 1 MG: Performed by: INTERNAL MEDICINE

## 2019-07-10 PROCEDURE — 99233 SBSQ HOSP IP/OBS HIGH 50: CPT | Performed by: INTERNAL MEDICINE

## 2019-07-10 PROCEDURE — 63710000001 INSULIN LISPRO (HUMAN) PER 5 UNITS: Performed by: INTERNAL MEDICINE

## 2019-07-10 RX ORDER — SENNA AND DOCUSATE SODIUM 50; 8.6 MG/1; MG/1
1 TABLET, FILM COATED ORAL 2 TIMES DAILY
Status: DISCONTINUED | OUTPATIENT
Start: 2019-07-10 | End: 2019-07-11

## 2019-07-10 RX ADMIN — PANTOPRAZOLE SODIUM 40 MG: 40 TABLET, DELAYED RELEASE ORAL at 17:18

## 2019-07-10 RX ADMIN — PREDNISONE 100 MG: 50 TABLET ORAL at 08:04

## 2019-07-10 RX ADMIN — INSULIN LISPRO 3 UNITS: 100 INJECTION, SOLUTION INTRAVENOUS; SUBCUTANEOUS at 12:16

## 2019-07-10 RX ADMIN — PANTOPRAZOLE SODIUM 40 MG: 40 TABLET, DELAYED RELEASE ORAL at 06:52

## 2019-07-10 RX ADMIN — CETIRIZINE HYDROCHLORIDE 10 MG: 10 TABLET, FILM COATED ORAL at 08:05

## 2019-07-10 RX ADMIN — LINAGLIPTIN 5 MG: 5 TABLET, FILM COATED ORAL at 08:04

## 2019-07-10 RX ADMIN — PREDNISONE 100 MG: 50 TABLET ORAL at 17:38

## 2019-07-10 RX ADMIN — POLYETHYLENE GLYCOL 3350 17 G: 17 POWDER, FOR SOLUTION ORAL at 06:52

## 2019-07-10 RX ADMIN — LISINOPRIL 10 MG: 10 TABLET ORAL at 08:04

## 2019-07-10 RX ADMIN — ONDANSETRON 16 MG: 2 INJECTION INTRAMUSCULAR; INTRAVENOUS at 15:29

## 2019-07-10 RX ADMIN — SODIUM CHLORIDE, PRESERVATIVE FREE 10 ML: 5 INJECTION INTRAVENOUS at 20:29

## 2019-07-10 RX ADMIN — SENNOSIDES,DOCUSATE SODIUM 1 TABLET: 50; 8.6 TABLET, FILM COATED ORAL at 08:05

## 2019-07-10 RX ADMIN — ACYCLOVIR 400 MG: 200 CAPSULE ORAL at 20:29

## 2019-07-10 RX ADMIN — Medication 400 MG: at 08:04

## 2019-07-10 RX ADMIN — ENOXAPARIN SODIUM 40 MG: 40 INJECTION SUBCUTANEOUS at 08:04

## 2019-07-10 RX ADMIN — VINCRISTINE SULFATE: 1 INJECTION, SOLUTION INTRAVENOUS at 16:25

## 2019-07-10 RX ADMIN — INSULIN LISPRO 3 UNITS: 100 INJECTION, SOLUTION INTRAVENOUS; SUBCUTANEOUS at 08:04

## 2019-07-10 RX ADMIN — SENNOSIDES,DOCUSATE SODIUM 1 TABLET: 50; 8.6 TABLET, FILM COATED ORAL at 20:29

## 2019-07-10 RX ADMIN — SULFAMETHOXAZOLE AND TRIMETHOPRIM 160 MG: 800; 160 TABLET ORAL at 08:04

## 2019-07-10 RX ADMIN — ATORVASTATIN CALCIUM 40 MG: 20 TABLET, FILM COATED ORAL at 08:04

## 2019-07-10 RX ADMIN — INSULIN LISPRO 5 UNITS: 100 INJECTION, SOLUTION INTRAVENOUS; SUBCUTANEOUS at 21:38

## 2019-07-10 RX ADMIN — INSULIN LISPRO 4 UNITS: 100 INJECTION, SOLUTION INTRAVENOUS; SUBCUTANEOUS at 17:38

## 2019-07-10 RX ADMIN — SODIUM CHLORIDE 50 ML/HR: 9 INJECTION, SOLUTION INTRAVENOUS at 05:00

## 2019-07-10 RX ADMIN — ACYCLOVIR 400 MG: 200 CAPSULE ORAL at 08:05

## 2019-07-10 NOTE — PLAN OF CARE
Problem: Patient Care Overview  Goal: Plan of Care Review  Outcome: Ongoing (interventions implemented as appropriate)   07/10/19 0514   Coping/Psychosocial   Plan of Care Reviewed With patient   Plan of Care Review   Progress no change   OTHER   Outcome Summary EPOCH continued. No n/v/d/pain. Complaints of heartburn, Maalox ordered/protonix increased to BID. VSS. Will continue to monitor.

## 2019-07-10 NOTE — PLAN OF CARE
Problem: Patient Care Overview  Goal: Plan of Care Review   07/10/19 0576   Coping/Psychosocial   Plan of Care Reviewed With patient;spouse   Plan of Care Review   Progress no change   OTHER   Outcome Summary Pt is A&Ox4. EPOCH continued. No complaints of n/v/d/pain. VSS. Pt ambulated independantly x2. Will continue to monitor.        Problem: Oncology Care (Adult)  Goal: Signs and Symptoms of Listed Potential Problems Will be Absent, Minimized or Managed (Oncology Care)  Outcome: Ongoing (interventions implemented as appropriate)

## 2019-07-10 NOTE — PROGRESS NOTES
Discharge Planning Assessment  [facility]     Patient Name: Edward Srinivasan  MRN: [id number]  Today's Date: 7/10/2019    Admit Date: 7/8/2019    Discharge Needs Assessment     Row Name 07/10/19 1427       Living Environment    Lives With  spouse    Name(s) of Who Lives With Patient  Radha Srinivasan/spouse [phone]    Current Living Arrangements  home/apartment/condo    Primary Care Provided by  self    Provides Primary Care For  no one    Family Caregiver if Needed  spouse    Family Caregiver Names  Radha Srinivasan/spouse [phone]    Quality of Family Relationships  helpful;involved;supportive    Able to Return to Prior Arrangements  yes    Living Arrangement Comments  Lives with spouse in a single-level house. Two steps to enter.       Resource/Environmental Concerns    Resource/Environmental Concerns  none    Home Accessibility Concerns  stairs to enter home       Transition Planning    Patient/Family Anticipates Transition to  home with family    Patient/Family Anticipated Services at Transition  none    Transportation Anticipated  family or friend will provide       Discharge Needs Assessment    Concerns to be Addressed  no discharge needs identified;denies needs/concerns at this time    Equipment Currently Used at Home  cane, straight;glucometer    Anticipated Changes Related to Illness  none    Equipment Needed After Discharge  none    Offered/Gave Vendor List  no    Patient's Choice of Community Agency(s)  Denies using HH/SNF in the past.    Discharge Coordination/Progress  Spoke with patient/spouse at bedside. Confirmed information on facesheet. IMM 7/8/19.        Discharge Plan     Row Name 07/10/19 144       Plan    Plan  Home with spouse. No needs noted. Continue to follow.    Patient/Family in Agreement with Plan  yes        Destination      No service coordination in this encounter.      Durable Medical Equipment      No service coordination in this encounter.      Dialysis/Infusion      No service  coordination in this encounter.      Home Medical Care      No service coordination in this encounter.      Therapy      No service coordination in this encounter.      Community Resources      No service coordination in this encounter.          Demographic Summary     Row Name 07/10/19 1426       General Information    Admission Type  inpatient    Referral Source  admission list    Reason for Consult  discharge planning        Functional Status     Row Name 07/10/19 1426       Functional Status    Usual Activity Tolerance  good    Current Activity Tolerance  moderate       Functional Status, IADL    Medications  independent    Meal Preparation  assistive person    Housekeeping  assistive person    Laundry  assistive person    Shopping  assistive person        Psychosocial    No documentation.       Abuse/Neglect    No documentation.       Legal    No documentation.       Substance Abuse    No documentation.       Patient Forms    No documentation.           Leticia Reddy RN

## 2019-07-10 NOTE — PROGRESS NOTES
"[facility] INPATIENT PROGRESS NOTE    Length of Stay:  2 days    CHIEF COMPLAINT/REASON FOR VISIT:  1.  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  CT imaging of the abdomen and pelvis on 5/8/2019 shows multiple small tissue nodules in the perirenal fat adjacent to the left kidney and a soft tissue mass interposed between the left psoas muscle and spine.  2.  RA previously on Cimzia (certollizumab) injections by Dr. Velásquez  3.  Lumbar puncture 5/31/2019- for involvement with lymphoma  4.  Bone marrow aspiration and biopsy 5/29/2018 negative for lymphoma  5.  PET scan on 5/30/2019 with hypermetabolic perinephric soft tissue lesions with mesenteric lymphadenopathy, left paraspinal mass at L5, 6 cm intramuscular mass in the right upper thigh, lesion at the left clavicle and a 5 cm soft tissue mass between the right scapula and right posterior fourth rib.  6.  Right subclavian Mediport placed by Dr. Herrera on 6/12/2019.  7.  Admission to [facility] for cycle #1 of dose adjusted R-EPOCH on 6/17/2019.  8.  Cycle #2 initiated on 7/8/2019      SUBJECTIVE:  C2D3 of treatment today.  He had some dyspepsia overnight which improved.  Afebrile.  No edema.  No dyspnea.  He did have a BM but the stool was hard.      ROS:  Positive for - constipation   Negative for - fever, bleeding, n/v    OBJECTIVE:  Vitals:    07/09/19 1546 07/09/19 2100 07/10/19 0540 07/10/19 0800   BP: 110/56 115/56 125/70 123/57   BP Location: Right arm Left arm Left arm Left arm   Patient Position: Sitting  Sitting Sitting   Pulse: 76 65 68 67   Resp: 16 15 16 16   Temp: 97 °F (36.1 °C) 97.3 °F (36.3 °C) 96.8 °F (36 °C) 96.9 °F (36.1 °C)   TempSrc: Oral Oral Oral Oral   SpO2: 97% 97% 99% 97%   Weight:       Height:             PHYSICAL EXAMINATION:  General:  NAD, alert/oriented, in chair  Lymph:  No palpable " "LAD  Chest/Lungs:  CTAB, benign mediport  Heart:  RRR grade II/VI systolic murmur  Abdomen/GI:  Soft, NT, ND, NABS  Extremities:  WWP, no edema  No change from prior    DIAGNOSTIC DATA:  Results Review:     I reviewed the patient's new clinical results.    Results from last 7 days   Lab Units 07/10/19  0501   WBC 10*3/mm3 22.14*   HEMOGLOBIN g/dL 8.1*   HEMATOCRIT % 24.5*   PLATELETS 10*3/mm3 237     Lab Results   Component Value Date    NEUTROABS 20.71 (H) 07/10/2019     Results from last 7 days   Lab Units 07/10/19  0501   SODIUM mmol/L 133*   POTASSIUM mmol/L 4.1   CHLORIDE mmol/L 100   CO2 mmol/L 23.6   BUN mg/dL 18   CREATININE mg/dL 0.67*   GLUCOSE mg/dL 215*   CALCIUM mg/dL 8.0*         Results from last 7 days   Lab Units 07/10/19  0501   MAGNESIUM mg/dL 2.4       IMAGING:    None today    Assessment/Plan   ASSESSMENT/PLAN:  This is a 79 y.o. male with:     1.  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  His LP and bone marrow biopsies are negative for involvement.  However, his PET scan indicates perinephric soft tissue lesions. hypermetabolic mesenteric lymphadenopathy, left L5 paraspinous mass, an intensely hypermetabolic 6 cm intramuscular mass in the upper right thigh, a right upper lobe nodular opacity, a hypermetabolic mass adjacent to the right scapula, and a lesion within the left calvicle.  Paraspinous involvement puts him at extremely high risk for CNS involvement.       Plan 6 cycles of inpatient treatment with dose adjusted R-EPOCH.  Plan CNS prophylaxis with IT methotrexate with each cycle of therapy.     Admission to [facility] on 6/19/2019 for cycle #1 of therapy.     He received Neulasta on discharge on 6/22/2019.     His lowest ANC was 0.04 on 6/27 with a platelet count of 47,000 that day.  He was only neutropenic on one day and his WBC and platelet count improved " rapidly.     Admit 7/8 for cycle #2.    Cycle #2 day 3 today      2.  Rheumatoid arthritis previously on Cimzia (certolizumab) injections by Dr. Velásquez. Plan to discontinue these during chemotherapy.  He was previously on Simponi Aria (golimumab) and Actemra (tocilizumab).  These certainly could contribute to the development of lymphoma.     3.  Coronary artery disease status post stenting as well as moderate aortic stenosis by echocardiogram.  His left ventricular ejection fraction is normal at 60% by echocardiogram on 5/14/2019.     4.  Anxiety:  I previously gave him a prescription for alprazolam 0.25 mg to take twice daily as needed.  Continue benzodiazepines as needed during his hospitalization.     5.  Hyponatremia: Continue to monitor.  Improved again today.       6.  Volume overload with cycle #1: We will likely need to decrease IV fluids with this cycle.     7.  Constipation: Continue Senokot daily and MiraLAX as needed.  Increase Senokot to twice daily if needed.    7.  Anemia:  Due to chemotherapy.  No transfusion but will monitor.       PLAN:   1.  Cycle #2 day 3 of treatment today  2.  Lovenox 40 mg daily for DVT prophylaxis  3.  PPI for GI prophylaxis  4.  Daily labs  5.  IV fluids and watch for volume overload.  Decreased rate to 50 mL/hr   6.  Continue Bactrim and acyclovir for prophylaxis.  7.  Bowel regimen:  Increase senokot to bid if necessary.  He doesn't want to at this time.    8.  At discharge, Neulasta next Monday.  Twice weekly CBC on Mondays and Thursdays for 2 weeks.  CT neck chest abdomen and pelvis in 2 weeks.  I will see him back on Thursday, 7/25/2019 to review imaging.  Nurse practitioner visit the following Monday for admission as I will be out that day.  I will have everything arranged for his admission that day.  Plan total of 6 cycles of therapy if tolerated with intrathecal methotrexate with each cycle for CNS prophylaxis.         Gabriele Garcia MD

## 2019-07-11 LAB
ABO GROUP BLD: NORMAL
ALBUMIN SERPL-MCNC: 3.2 G/DL (ref 3.5–5.2)
ALBUMIN/GLOB SERPL: 1.9 G/DL
ALP SERPL-CCNC: 55 U/L (ref 39–117)
ALT SERPL W P-5'-P-CCNC: 26 U/L (ref 1–41)
ANION GAP SERPL CALCULATED.3IONS-SCNC: 10.1 MMOL/L (ref 5–15)
AST SERPL-CCNC: 19 U/L (ref 1–40)
BACTERIA SPEC AEROBE CULT: NORMAL
BASOPHILS # BLD AUTO: 0.02 10*3/MM3 (ref 0–0.2)
BASOPHILS NFR BLD AUTO: 0.1 % (ref 0–1.5)
BILIRUB SERPL-MCNC: 0.2 MG/DL (ref 0.2–1.2)
BLD GP AB SCN SERPL QL: NEGATIVE
BUN BLD-MCNC: 21 MG/DL (ref 8–23)
BUN/CREAT SERPL: 32.8 (ref 7–25)
CALCIUM SPEC-SCNC: 7.7 MG/DL (ref 8.6–10.5)
CHLORIDE SERPL-SCNC: 101 MMOL/L (ref 98–107)
CO2 SERPL-SCNC: 21.9 MMOL/L (ref 22–29)
CREAT BLD-MCNC: 0.64 MG/DL (ref 0.76–1.27)
DEPRECATED RDW RBC AUTO: 56.1 FL (ref 37–54)
EOSINOPHIL # BLD AUTO: 0 10*3/MM3 (ref 0–0.4)
EOSINOPHIL NFR BLD AUTO: 0 % (ref 0.3–6.2)
ERYTHROCYTE [DISTWIDTH] IN BLOOD BY AUTOMATED COUNT: 17.6 % (ref 12.3–15.4)
GFR SERPL CREATININE-BSD FRML MDRD: 121 ML/MIN/1.73
GLOBULIN UR ELPH-MCNC: 1.7 GM/DL
GLUCOSE BLD-MCNC: 204 MG/DL (ref 65–99)
GLUCOSE BLDC GLUCOMTR-MCNC: 167 MG/DL (ref 70–130)
GLUCOSE BLDC GLUCOMTR-MCNC: 171 MG/DL (ref 70–130)
GLUCOSE BLDC GLUCOMTR-MCNC: 232 MG/DL (ref 70–130)
GLUCOSE BLDC GLUCOMTR-MCNC: 297 MG/DL (ref 70–130)
GRAM STN SPEC: NORMAL
HCT VFR BLD AUTO: 23.3 % (ref 37.5–51)
HGB BLD-MCNC: 7.6 G/DL (ref 13–17.7)
IMM GRANULOCYTES # BLD AUTO: 0.33 10*3/MM3 (ref 0–0.05)
IMM GRANULOCYTES NFR BLD AUTO: 1.9 % (ref 0–0.5)
LYMPHOCYTES # BLD AUTO: 0.34 10*3/MM3 (ref 0.7–3.1)
LYMPHOCYTES NFR BLD AUTO: 2 % (ref 19.6–45.3)
MAGNESIUM SERPL-MCNC: 2.2 MG/DL (ref 1.6–2.4)
MCH RBC QN AUTO: 29.2 PG (ref 26.6–33)
MCHC RBC AUTO-ENTMCNC: 32.6 G/DL (ref 31.5–35.7)
MCV RBC AUTO: 89.6 FL (ref 79–97)
MONOCYTES # BLD AUTO: 0.39 10*3/MM3 (ref 0.1–0.9)
MONOCYTES NFR BLD AUTO: 2.3 % (ref 5–12)
NEUTROPHILS # BLD AUTO: 16.02 10*3/MM3 (ref 1.7–7)
NEUTROPHILS NFR BLD AUTO: 93.7 % (ref 42.7–76)
NRBC BLD AUTO-RTO: 0 /100 WBC (ref 0–0.2)
PLATELET # BLD AUTO: 192 10*3/MM3 (ref 140–450)
PMV BLD AUTO: 8.8 FL (ref 6–12)
POTASSIUM BLD-SCNC: 4.4 MMOL/L (ref 3.5–5.2)
PROT SERPL-MCNC: 4.9 G/DL (ref 6–8.5)
RBC # BLD AUTO: 2.6 10*6/MM3 (ref 4.14–5.8)
RH BLD: POSITIVE
SODIUM BLD-SCNC: 133 MMOL/L (ref 136–145)
T&S EXPIRATION DATE: NORMAL
WBC NRBC COR # BLD: 17.1 10*3/MM3 (ref 3.4–10.8)

## 2019-07-11 PROCEDURE — 86901 BLOOD TYPING SEROLOGIC RH(D): CPT | Performed by: INTERNAL MEDICINE

## 2019-07-11 PROCEDURE — 82962 GLUCOSE BLOOD TEST: CPT

## 2019-07-11 PROCEDURE — 85025 COMPLETE CBC W/AUTO DIFF WBC: CPT | Performed by: INTERNAL MEDICINE

## 2019-07-11 PROCEDURE — 25010000002 VINCRISTINE PER 1 MG: Performed by: INTERNAL MEDICINE

## 2019-07-11 PROCEDURE — 36430 TRANSFUSION BLD/BLD COMPNT: CPT

## 2019-07-11 PROCEDURE — 25010000002 DOXORUBICIN PER 10 MG: Performed by: INTERNAL MEDICINE

## 2019-07-11 PROCEDURE — 63710000001 INSULIN LISPRO (HUMAN) PER 5 UNITS: Performed by: INTERNAL MEDICINE

## 2019-07-11 PROCEDURE — 25010000002 ONDANSETRON PER 1 MG: Performed by: INTERNAL MEDICINE

## 2019-07-11 PROCEDURE — 25010000002 ETOPOSIDE 500 MG/25ML SOLUTION 25 ML VIAL: Performed by: INTERNAL MEDICINE

## 2019-07-11 PROCEDURE — 86900 BLOOD TYPING SEROLOGIC ABO: CPT | Performed by: INTERNAL MEDICINE

## 2019-07-11 PROCEDURE — 86923 COMPATIBILITY TEST ELECTRIC: CPT

## 2019-07-11 PROCEDURE — 86900 BLOOD TYPING SEROLOGIC ABO: CPT

## 2019-07-11 PROCEDURE — 63710000001 PREDNISONE PER 5 MG: Performed by: INTERNAL MEDICINE

## 2019-07-11 PROCEDURE — P9016 RBC LEUKOCYTES REDUCED: HCPCS

## 2019-07-11 PROCEDURE — 83735 ASSAY OF MAGNESIUM: CPT | Performed by: INTERNAL MEDICINE

## 2019-07-11 PROCEDURE — 99233 SBSQ HOSP IP/OBS HIGH 50: CPT | Performed by: INTERNAL MEDICINE

## 2019-07-11 PROCEDURE — 86850 RBC ANTIBODY SCREEN: CPT | Performed by: INTERNAL MEDICINE

## 2019-07-11 PROCEDURE — 25010000002 ENOXAPARIN PER 10 MG: Performed by: INTERNAL MEDICINE

## 2019-07-11 PROCEDURE — 80053 COMPREHEN METABOLIC PANEL: CPT | Performed by: INTERNAL MEDICINE

## 2019-07-11 PROCEDURE — 86901 BLOOD TYPING SEROLOGIC RH(D): CPT

## 2019-07-11 RX ORDER — SENNA AND DOCUSATE SODIUM 50; 8.6 MG/1; MG/1
2 TABLET, FILM COATED ORAL 2 TIMES DAILY
Status: DISCONTINUED | OUTPATIENT
Start: 2019-07-11 | End: 2019-07-12 | Stop reason: HOSPADM

## 2019-07-11 RX ORDER — POLYETHYLENE GLYCOL 3350 17 G/17G
17 POWDER, FOR SOLUTION ORAL DAILY
Status: DISCONTINUED | OUTPATIENT
Start: 2019-07-12 | End: 2019-07-12 | Stop reason: HOSPADM

## 2019-07-11 RX ORDER — ACETAMINOPHEN 325 MG/1
650 TABLET ORAL ONCE
Status: COMPLETED | OUTPATIENT
Start: 2019-07-11 | End: 2019-07-11

## 2019-07-11 RX ADMIN — POLYETHYLENE GLYCOL 3350 17 G: 17 POWDER, FOR SOLUTION ORAL at 06:30

## 2019-07-11 RX ADMIN — PANTOPRAZOLE SODIUM 40 MG: 40 TABLET, DELAYED RELEASE ORAL at 17:42

## 2019-07-11 RX ADMIN — INSULIN LISPRO 4 UNITS: 100 INJECTION, SOLUTION INTRAVENOUS; SUBCUTANEOUS at 20:41

## 2019-07-11 RX ADMIN — INSULIN LISPRO 2 UNITS: 100 INJECTION, SOLUTION INTRAVENOUS; SUBCUTANEOUS at 12:15

## 2019-07-11 RX ADMIN — SENNOSIDES,DOCUSATE SODIUM 1 TABLET: 50; 8.6 TABLET, FILM COATED ORAL at 08:00

## 2019-07-11 RX ADMIN — ACYCLOVIR 400 MG: 200 CAPSULE ORAL at 08:00

## 2019-07-11 RX ADMIN — SODIUM CHLORIDE, PRESERVATIVE FREE 10 ML: 5 INJECTION INTRAVENOUS at 20:42

## 2019-07-11 RX ADMIN — PREDNISONE 100 MG: 50 TABLET ORAL at 17:42

## 2019-07-11 RX ADMIN — LINAGLIPTIN 5 MG: 5 TABLET, FILM COATED ORAL at 08:00

## 2019-07-11 RX ADMIN — INSULIN LISPRO 6 UNITS: 100 INJECTION, SOLUTION INTRAVENOUS; SUBCUTANEOUS at 17:42

## 2019-07-11 RX ADMIN — Medication 400 MG: at 08:00

## 2019-07-11 RX ADMIN — PANTOPRAZOLE SODIUM 40 MG: 40 TABLET, DELAYED RELEASE ORAL at 06:30

## 2019-07-11 RX ADMIN — ATORVASTATIN CALCIUM 40 MG: 20 TABLET, FILM COATED ORAL at 08:00

## 2019-07-11 RX ADMIN — CETIRIZINE HYDROCHLORIDE 10 MG: 10 TABLET, FILM COATED ORAL at 08:00

## 2019-07-11 RX ADMIN — ENOXAPARIN SODIUM 40 MG: 40 INJECTION SUBCUTANEOUS at 08:00

## 2019-07-11 RX ADMIN — VINCRISTINE SULFATE: 1 INJECTION, SOLUTION INTRAVENOUS at 16:05

## 2019-07-11 RX ADMIN — SENNOSIDES,DOCUSATE SODIUM 2 TABLET: 50; 8.6 TABLET, FILM COATED ORAL at 20:08

## 2019-07-11 RX ADMIN — INSULIN LISPRO 2 UNITS: 100 INJECTION, SOLUTION INTRAVENOUS; SUBCUTANEOUS at 07:59

## 2019-07-11 RX ADMIN — LISINOPRIL 10 MG: 10 TABLET ORAL at 08:00

## 2019-07-11 RX ADMIN — ACYCLOVIR 400 MG: 200 CAPSULE ORAL at 20:09

## 2019-07-11 RX ADMIN — ONDANSETRON 16 MG: 2 INJECTION INTRAMUSCULAR; INTRAVENOUS at 15:33

## 2019-07-11 RX ADMIN — ACETAMINOPHEN 650 MG: 325 TABLET, FILM COATED ORAL at 12:32

## 2019-07-11 RX ADMIN — SODIUM CHLORIDE, PRESERVATIVE FREE 10 ML: 5 INJECTION INTRAVENOUS at 08:01

## 2019-07-11 RX ADMIN — PREDNISONE 100 MG: 50 TABLET ORAL at 08:00

## 2019-07-11 NOTE — PLAN OF CARE
Problem: Patient Care Overview  Goal: Plan of Care Review  Outcome: Ongoing (interventions implemented as appropriate)   07/10/19 1644 07/10/19 2029 07/11/19 0311   Coping/Psychosocial   Plan of Care Reviewed With --  spouse;patient --    Plan of Care Review   Progress no change --  --    OTHER   Outcome Summary --  --  No complaints throughout shift. Patient states that he would like to get his bowel regimen on track again, PRN miralax and scheduled senna given. EPOCH continued. VSS. Will continue to monitor.     Goal: Individualization and Mutuality  Outcome: Ongoing (interventions implemented as appropriate)    Goal: Discharge Needs Assessment  Outcome: Ongoing (interventions implemented as appropriate)    Goal: Interprofessional Rounds/Family Conf  Outcome: Ongoing (interventions implemented as appropriate)      Problem: Oncology Care (Adult)  Goal: Signs and Symptoms of Listed Potential Problems Will be Absent, Minimized or Managed (Oncology Care)  Outcome: Ongoing (interventions implemented as appropriate)

## 2019-07-11 NOTE — PROGRESS NOTES
"[facility] CBC GROUP INPATIENT PROGRESS NOTE    Length of Stay:  3 days    CHIEF COMPLAINT/REASON FOR VISIT:  1.  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  CT imaging of the abdomen and pelvis on 5/8/2019 shows multiple small tissue nodules in the perirenal fat adjacent to the left kidney and a soft tissue mass interposed between the left psoas muscle and spine.  2.  RA previously on Cimzia (certollizumab) injections by Dr. Velásquez  3.  Lumbar puncture 5/31/2019- for involvement with lymphoma  4.  Bone marrow aspiration and biopsy 5/29/2018 negative for lymphoma  5.  PET scan on 5/30/2019 with hypermetabolic perinephric soft tissue lesions with mesenteric lymphadenopathy, left paraspinal mass at L5, 6 cm intramuscular mass in the right upper thigh, lesion at the left clavicle and a 5 cm soft tissue mass between the right scapula and right posterior fourth rib.  6.  Right subclavian Mediport placed by Dr. Herrera on 6/12/2019.  7.  Admission to [facility] for cycle #1 of dose adjusted R-EPOCH on 6/17/2019.  8.  Cycle #2 initiated on 7/8/2019      SUBJECTIVE:  C2D4 of treatment today.  Afebrile.  Trace ankle edema, increased slightly.  Still no BM.  Didn't sleep well overnight.      ROS:  Positive for - constipation   Negative for - fever, bleeding, n/v    OBJECTIVE:  Vitals:    07/10/19 1601 07/10/19 1900 07/11/19 0300 07/11/19 0729   BP: 123/56 121/58 149/64 142/70   BP Location: Left arm Left arm Left arm Left arm   Patient Position: Sitting Sitting Lying Sitting   Pulse: 69 69 64 81   Resp: 16 16 16 16   Temp: 97 °F (36.1 °C) 96.9 °F (36.1 °C) 97.3 °F (36.3 °C) 97.2 °F (36.2 °C)   TempSrc: Oral Oral Oral Oral   SpO2: 99% 100% 97% 99%   Weight:       Height:             PHYSICAL EXAMINATION:  General:  NAD, alert/oriented, in chair\  Mouth:  MMM, no ulcers  Lymph:  No palpable " "LAD  Chest/Lungs:  CTAB, benign mediport  Heart:  RRR grade II/VI systolic murmur  Abdomen/GI:  Soft, NT, ND, NABS  Extremities:  WWP, trace BLE ankle edema    DIAGNOSTIC DATA:  Results Review:     I reviewed the patient's new clinical results.    Results from last 7 days   Lab Units 07/11/19  0414   WBC 10*3/mm3 17.10*   HEMOGLOBIN g/dL 7.6*   HEMATOCRIT % 23.3*   PLATELETS 10*3/mm3 192     Lab Results   Component Value Date    NEUTROABS 16.02 (H) 07/11/2019     Results from last 7 days   Lab Units 07/11/19  0414   SODIUM mmol/L 133*   POTASSIUM mmol/L 4.4   CHLORIDE mmol/L 101   CO2 mmol/L 21.9*   BUN mg/dL 21   CREATININE mg/dL 0.64*   GLUCOSE mg/dL 204*   CALCIUM mg/dL 7.7*         Results from last 7 days   Lab Units 07/11/19  0414   MAGNESIUM mg/dL 2.2       IMAGING:    None today    Assessment/Plan   ASSESSMENT/PLAN:  This is a 79 y.o. male with:     1.  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  His LP and bone marrow biopsies are negative for involvement.  However, his PET scan indicates perinephric soft tissue lesions. hypermetabolic mesenteric lymphadenopathy, left L5 paraspinous mass, an intensely hypermetabolic 6 cm intramuscular mass in the upper right thigh, a right upper lobe nodular opacity, a hypermetabolic mass adjacent to the right scapula, and a lesion within the left calvicle.  Paraspinous involvement puts him at extremely high risk for CNS involvement.       Plan 6 cycles of inpatient treatment with dose adjusted R-EPOCH.  Plan CNS prophylaxis with IT methotrexate with each cycle of therapy.     Admission to [facility] on 6/19/2019 for cycle #1 of therapy.     He received Neulasta on discharge on 6/22/2019.     His lowest ANC was 0.04 on 6/27 with a platelet count of 47,000 that day.  He was only neutropenic on one day and his WBC and platelet count improved " rapidly.     Admit 7/8 for cycle #2.    Cycle #2 day 4 today      2.  Rheumatoid arthritis previously on Cimzia (certolizumab) injections by Dr. Velásquez. Plan to discontinue these during chemotherapy.  He was previously on Simponi Aria (golimumab) and Actemra (tocilizumab).  These certainly could contribute to the development of lymphoma.     3.  Coronary artery disease status post stenting as well as moderate aortic stenosis by echocardiogram.  His left ventricular ejection fraction is normal at 60% by echocardiogram on 5/14/2019.     4.  Anxiety:  I previously gave him a prescription for alprazolam 0.25 mg to take twice daily as needed.  Continue benzodiazepines as needed during his hospitalization.     5.  Hyponatremia: Continue to monitor.  Improved again today.       6.  Volume overload with cycle #1: We will likely need to decrease IV fluids with this cycle.     7.  Constipation: Continue Senokot bid, increase to 2 tablets and MiraLAX daily.    7.  Anemia:  Due to chemotherapy.  Transfuse 2 unit pRBCs.     PLAN:   1.  Cycle #2 day 4 of treatment today  2.  Lovenox 40 mg daily for DVT prophylaxis  3.  PPI for GI prophylaxis  4.  Daily labs  5.  IV fluids and watch for volume overload.  Decreased rate to 50 mL/hr   6.  Continue Bactrim and acyclovir for prophylaxis.  7.  Bowel regimen:  Increase Senokot S today to 2 tablets bid and schedule miralax daily  8.  Transfuse 1 unit pRBCs  9.  At discharge, Neulasta next Monday.  Twice weekly CBC on Mondays and Thursdays for 2 weeks.  CT neck chest abdomen and pelvis in 2 weeks.  I will see him back on Thursday, 7/25/2019 to review imaging.  Nurse practitioner visit the following Monday for admission as I will be out that day.  I will have everything arranged for his admission that day.  Plan total of 6 cycles of therapy if tolerated with intrathecal methotrexate with each cycle for CNS prophylaxis.         Gabriele Garcia MD

## 2019-07-11 NOTE — PLAN OF CARE
Problem: Patient Care Overview  Goal: Plan of Care Review   07/11/19 5339   OTHER   Outcome Summary Pt is A&Ox4, no complaints of n/v/d/pain. Had BM. EPOCH day 4 cycle 2. Insulin coverage x3. 1Unit PRBC's given. Will continue to monitor.        Problem: Oncology Care (Adult)  Goal: Signs and Symptoms of Listed Potential Problems Will be Absent, Minimized or Managed (Oncology Care)  Outcome: Ongoing (interventions implemented as appropriate)

## 2019-07-12 VITALS
HEIGHT: 67 IN | TEMPERATURE: 97 F | BODY MASS INDEX: 21.44 KG/M2 | DIASTOLIC BLOOD PRESSURE: 63 MMHG | HEART RATE: 67 BPM | SYSTOLIC BLOOD PRESSURE: 148 MMHG | OXYGEN SATURATION: 97 % | WEIGHT: 136.6 LBS | RESPIRATION RATE: 18 BRPM

## 2019-07-12 LAB
ABO + RH BLD: NORMAL
ALBUMIN SERPL-MCNC: 3.3 G/DL (ref 3.5–5.2)
ALBUMIN/GLOB SERPL: 1.6 G/DL
ALP SERPL-CCNC: 57 U/L (ref 39–117)
ALT SERPL W P-5'-P-CCNC: 31 U/L (ref 1–41)
ANION GAP SERPL CALCULATED.3IONS-SCNC: 11.3 MMOL/L (ref 5–15)
AST SERPL-CCNC: 17 U/L (ref 1–40)
BASOPHILS # BLD AUTO: 0.03 10*3/MM3 (ref 0–0.2)
BASOPHILS NFR BLD AUTO: 0.2 % (ref 0–1.5)
BH BB BLOOD EXPIRATION DATE: NORMAL
BH BB BLOOD TYPE BARCODE: 6200
BH BB DISPENSE STATUS: NORMAL
BH BB PRODUCT CODE: NORMAL
BH BB UNIT NUMBER: NORMAL
BILIRUB SERPL-MCNC: 0.4 MG/DL (ref 0.2–1.2)
BUN BLD-MCNC: 21 MG/DL (ref 8–23)
BUN/CREAT SERPL: 28.8 (ref 7–25)
CALCIUM SPEC-SCNC: 7.9 MG/DL (ref 8.6–10.5)
CHLORIDE SERPL-SCNC: 97 MMOL/L (ref 98–107)
CO2 SERPL-SCNC: 23.7 MMOL/L (ref 22–29)
CREAT BLD-MCNC: 0.73 MG/DL (ref 0.76–1.27)
DEPRECATED RDW RBC AUTO: 50.9 FL (ref 37–54)
EOSINOPHIL # BLD AUTO: 0 10*3/MM3 (ref 0–0.4)
EOSINOPHIL NFR BLD AUTO: 0 % (ref 0.3–6.2)
ERYTHROCYTE [DISTWIDTH] IN BLOOD BY AUTOMATED COUNT: 16.9 % (ref 12.3–15.4)
GFR SERPL CREATININE-BSD FRML MDRD: 104 ML/MIN/1.73
GLOBULIN UR ELPH-MCNC: 2.1 GM/DL
GLUCOSE BLD-MCNC: 215 MG/DL (ref 65–99)
GLUCOSE BLDC GLUCOMTR-MCNC: 180 MG/DL (ref 70–130)
GLUCOSE BLDC GLUCOMTR-MCNC: 206 MG/DL (ref 70–130)
HCT VFR BLD AUTO: 27.8 % (ref 37.5–51)
HGB BLD-MCNC: 9.6 G/DL (ref 13–17.7)
IMM GRANULOCYTES # BLD AUTO: 0.32 10*3/MM3 (ref 0–0.05)
IMM GRANULOCYTES NFR BLD AUTO: 1.6 % (ref 0–0.5)
LYMPHOCYTES # BLD AUTO: 0.23 10*3/MM3 (ref 0.7–3.1)
LYMPHOCYTES NFR BLD AUTO: 1.2 % (ref 19.6–45.3)
MAGNESIUM SERPL-MCNC: 2 MG/DL (ref 1.6–2.4)
MCH RBC QN AUTO: 29.9 PG (ref 26.6–33)
MCHC RBC AUTO-ENTMCNC: 34.5 G/DL (ref 31.5–35.7)
MCV RBC AUTO: 86.6 FL (ref 79–97)
MONOCYTES # BLD AUTO: 0.22 10*3/MM3 (ref 0.1–0.9)
MONOCYTES NFR BLD AUTO: 1.1 % (ref 5–12)
NEUTROPHILS # BLD AUTO: 18.7 10*3/MM3 (ref 1.7–7)
NEUTROPHILS NFR BLD AUTO: 95.9 % (ref 42.7–76)
NRBC BLD AUTO-RTO: 0 /100 WBC (ref 0–0.2)
PLATELET # BLD AUTO: 183 10*3/MM3 (ref 140–450)
PMV BLD AUTO: 8.7 FL (ref 6–12)
POTASSIUM BLD-SCNC: 4 MMOL/L (ref 3.5–5.2)
PROT SERPL-MCNC: 5.4 G/DL (ref 6–8.5)
RBC # BLD AUTO: 3.21 10*6/MM3 (ref 4.14–5.8)
SODIUM BLD-SCNC: 132 MMOL/L (ref 136–145)
UNIT  ABO: NORMAL
UNIT  RH: NORMAL
WBC NRBC COR # BLD: 19.5 10*3/MM3 (ref 3.4–10.8)

## 2019-07-12 PROCEDURE — 85025 COMPLETE CBC W/AUTO DIFF WBC: CPT | Performed by: INTERNAL MEDICINE

## 2019-07-12 PROCEDURE — 63710000001 INSULIN LISPRO (HUMAN) PER 5 UNITS: Performed by: INTERNAL MEDICINE

## 2019-07-12 PROCEDURE — 99239 HOSP IP/OBS DSCHRG MGMT >30: CPT | Performed by: INTERNAL MEDICINE

## 2019-07-12 PROCEDURE — 80053 COMPREHEN METABOLIC PANEL: CPT | Performed by: INTERNAL MEDICINE

## 2019-07-12 PROCEDURE — 83735 ASSAY OF MAGNESIUM: CPT | Performed by: INTERNAL MEDICINE

## 2019-07-12 PROCEDURE — 82962 GLUCOSE BLOOD TEST: CPT

## 2019-07-12 PROCEDURE — 25010000002 ONDANSETRON PER 1 MG: Performed by: INTERNAL MEDICINE

## 2019-07-12 PROCEDURE — 63710000001 PREDNISONE PER 5 MG: Performed by: INTERNAL MEDICINE

## 2019-07-12 PROCEDURE — 25010000002 ENOXAPARIN PER 10 MG: Performed by: INTERNAL MEDICINE

## 2019-07-12 PROCEDURE — 25010000002 CYCLOPHOSPHAMIDE PER 100 MG: Performed by: INTERNAL MEDICINE

## 2019-07-12 RX ORDER — SULFAMETHOXAZOLE AND TRIMETHOPRIM 800; 160 MG/1; MG/1
1 TABLET ORAL 3 TIMES WEEKLY
Qty: 12 TABLET | Refills: 2 | Status: SHIPPED | OUTPATIENT
Start: 2019-07-12 | End: 2019-09-02 | Stop reason: SDUPTHER

## 2019-07-12 RX ORDER — ACYCLOVIR 200 MG/1
400 CAPSULE ORAL 2 TIMES DAILY
Qty: 60 CAPSULE | Refills: 0 | Status: SHIPPED | OUTPATIENT
Start: 2019-07-12 | End: 2019-07-25 | Stop reason: SDUPTHER

## 2019-07-12 RX ORDER — FUROSEMIDE 40 MG/1
40 TABLET ORAL ONCE
Status: COMPLETED | OUTPATIENT
Start: 2019-07-12 | End: 2019-07-12

## 2019-07-12 RX ADMIN — LISINOPRIL 10 MG: 10 TABLET ORAL at 08:49

## 2019-07-12 RX ADMIN — Medication 400 MG: at 08:49

## 2019-07-12 RX ADMIN — ATORVASTATIN CALCIUM 40 MG: 20 TABLET, FILM COATED ORAL at 08:48

## 2019-07-12 RX ADMIN — ENOXAPARIN SODIUM 40 MG: 40 INJECTION SUBCUTANEOUS at 08:51

## 2019-07-12 RX ADMIN — CETIRIZINE HYDROCHLORIDE 10 MG: 10 TABLET, FILM COATED ORAL at 08:49

## 2019-07-12 RX ADMIN — POLYETHYLENE GLYCOL 3350 17 G: 17 POWDER, FOR SOLUTION ORAL at 08:49

## 2019-07-12 RX ADMIN — ONDANSETRON 16 MG: 2 INJECTION INTRAMUSCULAR; INTRAVENOUS at 15:40

## 2019-07-12 RX ADMIN — INSULIN LISPRO 2 UNITS: 100 INJECTION, SOLUTION INTRAVENOUS; SUBCUTANEOUS at 08:48

## 2019-07-12 RX ADMIN — FUROSEMIDE 40 MG: 40 TABLET ORAL at 13:06

## 2019-07-12 RX ADMIN — INSULIN LISPRO 4 UNITS: 100 INJECTION, SOLUTION INTRAVENOUS; SUBCUTANEOUS at 13:06

## 2019-07-12 RX ADMIN — PREDNISONE 100 MG: 50 TABLET ORAL at 08:48

## 2019-07-12 RX ADMIN — CYCLOPHOSPHAMIDE 1290 MG: 1 INJECTION, POWDER, FOR SOLUTION INTRAVENOUS; ORAL at 16:13

## 2019-07-12 RX ADMIN — PANTOPRAZOLE SODIUM 40 MG: 40 TABLET, DELAYED RELEASE ORAL at 05:56

## 2019-07-12 RX ADMIN — LINAGLIPTIN 5 MG: 5 TABLET, FILM COATED ORAL at 08:49

## 2019-07-12 RX ADMIN — ACYCLOVIR 400 MG: 200 CAPSULE ORAL at 08:48

## 2019-07-12 RX ADMIN — SULFAMETHOXAZOLE AND TRIMETHOPRIM 160 MG: 800; 160 TABLET ORAL at 08:49

## 2019-07-12 RX ADMIN — SENNOSIDES,DOCUSATE SODIUM 2 TABLET: 50; 8.6 TABLET, FILM COATED ORAL at 08:48

## 2019-07-12 NOTE — PLAN OF CARE
Problem: Patient Care Overview  Goal: Plan of Care Review  Outcome: Ongoing (interventions implemented as appropriate)   07/10/19 1644 07/11/19 2009 07/12/19 0319   Coping/Psychosocial   Plan of Care Reviewed With --  patient --    Plan of Care Review   Progress no change --  --    OTHER   Outcome Summary --  --  No complaints. Patient receiving 23 hr EPOCH. Patient has slept well overnight. VSS. Will continue to monitor.     Goal: Individualization and Mutuality  Outcome: Ongoing (interventions implemented as appropriate)    Goal: Discharge Needs Assessment  Outcome: Ongoing (interventions implemented as appropriate)    Goal: Interprofessional Rounds/Family Conf  Outcome: Ongoing (interventions implemented as appropriate)      Problem: Oncology Care (Adult)  Goal: Signs and Symptoms of Listed Potential Problems Will be Absent, Minimized or Managed (Oncology Care)  Outcome: Ongoing (interventions implemented as appropriate)

## 2019-07-12 NOTE — NURSING NOTE
"Nursing Chemotherapy Verification    Chemotherapy Regimen:   Treatment Plans     Name Type Plan dates Plan Provider         Active    IP LYMPHOMA R-EPOCH (Dose Adjusted) RiTUXimab / Etoposide / DOXOrubicin / VinCRIStine / Cyclophosphamide / PredniSONE ONCOLOGY TREATMENT  5/27/2019 - Present Gabriele Garcia MD                     Current height and weight: 170.2 cm (67.01\") 62 kg (136 lb 9.6 oz)  Calculated BSA from current height and weight (Jaz): 1.72    Relevant Labs  Results from last 7 days   Lab Units 07/12/19  0434 07/11/19 0414 07/10/19  0501   WBC 10*3/mm3 19.50* 17.10* 22.14*   HEMOGLOBIN g/dL 9.6* 7.6* 8.1*   HEMATOCRIT % 27.8* 23.3* 24.5*   PLATELETS 10*3/mm3 183 192 237     Lab Results   Component Value Date    NEUTROABS 18.70 (H) 07/12/2019       Results from last 7 days   Lab Units 07/12/19  0434 07/11/19 0414 07/10/19  0501   CREATININE mg/dL 0.73* 0.64* 0.67*       Serum creatinine: 0.73 mg/dL (L) 07/12/19 0434  Estimated creatinine clearance: 67.8 mL/min (A)    Lab Results   Component Value Date    HEPBCAB Negative 06/17/2019       Verification attestation:  I have personally reviewed the planned regimen and its administration and dosing. I understand the potential side effects.The patient has been instructed on the regimen, potential side effects, and self care measures; the consent form has been completed. I have confirmed that the appropriate premedication, prehydration, post medication and/or emergency medications are ordered in addition to the chemotherapy.    I have independently verified that the height and weight is current and calculated the BSA. I have verified the doses with the planned regimen and have clarified any deviations with the physician Dr. Gabriele Garcia. I have confirmed the route of administration and patient IV access.    Nurse: Xi King RN  2nd verification Nurse: Danisha Lomeli RN  "

## 2019-07-12 NOTE — DISCHARGE SUMMARY
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  Constipation  3.  Volume overload requiring diuresis  4.  Anemia requiring packed red blood cell transfusion, 1 unit    Presenting Problem/History of Present Illness  Active Hospital Problems    Diagnosis  POA   • Diffuse large B-cell lymphoma of lymph nodes of multiple regions (CMS/HCC) [C83.38]  Yes      Resolved Hospital Problems   No resolved problems to display.        Hospital Course  The patient is a 79 y.o. male who presented with lymphoma for admission.    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  He was admitted for cycle #2 of dose adjusted R-EPOCH.  We proceeded at the same doses as cycle #1.  He did have a lumbar puncture on the day of admission.  Flow cytometry was not sent on the specimen but they have been negative in the past.  He tolerated the lumbar puncture very well.  He tolerated his chemotherapy extremely well.  He had no different problems during the admission.    He did become more anemic and required 1 unit of packed red blood cells.  His hemoglobin improved on the day of discharge.  On the day of discharge as well he became a little bit volume overloaded with mild worsening edema in his legs.  He had become significantly volume overloaded after cycle #1.  We did administer 40 mg of oral Lasix on the day of discharge and he did diurese well.  He will keep an eye on his volume status at home.    At discharge she is doing well and feels well enough to go home.  He will return on Monday to the office for Neulasta.  We will obtain twice weekly labs on Mondays and Thursdays.  PET scan in a week and a half and I will see him back on Thursday in 2 weeks to review the PET scan.  He will then be admitted the following Monday for cycle #3.  Dosing to be determined based on his blood counts.    He was started on prophylactic Bactrim and acyclovir  during the admission as well.    Procedures Performed         Consults:   Consults     No orders found from 6/9/2019 to 7/9/2019.          Pertinent Test Results:    Condition on Discharge:  Stable    Vital Signs  Temp:  [96.8 °F (36 °C)-97.3 °F (36.3 °C)] 97 °F (36.1 °C)  Heart Rate:  [56-91] 67  Resp:  [18] 18  BP: (148-170)/(63-72) 148/63    Physical Exam:     General Appearance:    Alert, cooperative, in no acute distress   Head:    Normocephalic, without obvious abnormality, atraumatic   Eyes:            Lids and lashes normal, conjunctivae and sclerae normal, no   icterus, no pallor, corneas clear, PERRLA   Ears:    Ears appear intact with no abnormalities noted   Throat:   No oral lesions, no thrush, oral mucosa moist   Neck:   No adenopathy, supple, trachea midline, no thyromegaly, no   carotid bruit, no JVD   Back:     No kyphosis present, no scoliosis present, no skin lesions,      erythema or scars, no tenderness to percussion or                   palpation,   range of motion normal   Lungs:     Clear to auscultation,respirations regular, even and                  unlabored    Heart:    Regular rhythm and normal rate, normal S1 and S2, no            murmur, no gallop, no rub, no click   Chest Wall:   Benign-appearing Mediport.  No abnormalities observed   Abdomen:     Normal bowel sounds, no masses, no organomegaly, soft        non-tender, non-distended, no guarding, no rebound                tenderness   Rectal:     Deferred   Extremities:   Moves all extremities well, no edema, no cyanosis, no             redness.  Trace bilateral lower extremity edema.   Pulses:   Pulses palpable and equal bilaterally   Skin:   No bleeding, bruising or rash   Lymph nodes:   No palpable adenopathy   Neurologic:   Cranial nerves 2 - 12 grossly intact, sensation intact, DTR       present and equal bilaterally       Discharge Disposition  Home or Self Care    Discharge Medications     Discharge Medications      New  Medications      Instructions Start Date   acyclovir 200 MG capsule  Commonly known as:  ZOVIRAX   400 mg, Oral, 2 Times Daily      sulfamethoxazole-trimethoprim 800-160 MG per tablet  Commonly known as:  BACTRIM DS,SEPTRA DS   1 tablet, Oral, 3 Times Weekly, Take on Monday, Wed, Friday         Continue These Medications      Instructions Start Date   acetaminophen 325 MG tablet  Commonly known as:  TYLENOL   650 mg, Oral, Every 4 Hours PRN      ANORO ELLIPTA IN   Inhalation, Every 6 Hours PRN      atorvastatin 40 MG tablet  Commonly known as:  LIPITOR   TAKE 1 TABLET DAILY      cetirizine 10 MG tablet  Commonly known as:  zyrTEC   10 mg, Oral, Daily      freestyle lancets   1 each, Other, 2 Times Daily, Use as instructed      FREESTYLE LITE test strip  Generic drug:  glucose blood   TEST TWO TIMES A DAY      linagliptin 5 MG tablet tablet  Commonly known as:  TRADJENTA   5 mg, Oral, Daily      lisinopril 10 MG tablet  Commonly known as:  PRINIVIL,ZESTRIL   TAKE 1 TABLET DAILY      MAGNESIUM PO   500 mg, Oral, Daily      omeprazole 40 MG capsule  Commonly known as:  priLOSEC   40 mg, Oral, Daily      polyethylene glycol packet  Commonly known as:  MIRALAX   17 g, Oral, Daily      prochlorperazine 10 MG tablet  Commonly known as:  COMPAZINE   10 mg, Oral, Every 6 Hours PRN      pyridoxine 100 MG tablet  Commonly known as:  VITAMIN B-6   100 mg, Oral, Daily      sennosides-docusate sodium 8.6-50 MG tablet  Commonly known as:  SENOKOT-S   1 tablet, Oral, Daily      VITAMIN D-3 PO   2,000 Int'l Units/day, Oral, Daily      vitamin E 400 UNIT capsule   400 Units, Oral, Daily         Stop These Medications    furosemide 20 MG tablet  Commonly known as:  LASIX     levoFLOXacin 500 MG tablet  Commonly known as:  LEVAQUIN            Discharge Diet: as tolerated    Activity at Discharge:     Follow-up Appointments  Future Appointments   Date Time Provider Department Center   7/15/2019 11:20 AM LAB CHAIR 6 MIGUELINA POPE  LAB  [facility]   7/15/2019 11:45 AM INJECTION CHAIR CBC [facility]  [facility]   7/18/2019  9:30 AM LAB CHAIR 3 CBC [facility]  LAB [facility]   7/18/2019 10:00 AM RN REV 2 [facility]  [facility]   7/22/2019 12:30 PM LAB CHAIR 5 CBC OLGAE  LAB [facility]   7/22/2019  1:00 PM RN REV 2 CBC [facility]  [facility]   7/22/2019  2:30 PM TERRIE PET CT  [facility] PET [facility]   7/25/2019 11:30 AM LAB CHAIR 6 CBC OLGAE  LAB [facility]   7/25/2019 12:00 PM Gabriele Garcia MD [facility] CBC [facility] CBC [facility]   7/29/2019  7:50 AM LAB CHAIR 3 [facility]  LAB [facility]   7/29/2019  8:20 AM Raquel Berger APRN [facility] CBC [facility] CBC [place]   7/29/2019  9:30 AM TER[facility] XR FL 1  [facility] XRAY [facility]   8/22/2019 10:15 AM LAB PC [facility]AST [facility] PC [facility]ST None   8/29/2019 10:45 AM Freddie Cannon MD [facility] PC [facility] None   11/26/2019 11:20 AM Brian Harden MD [facility] CD L[facility] None         Test Results Pending at Discharge       Gabriele Garcia MD  07/12/19  5:00 PM    Time: Discharge >30 min

## 2019-07-13 ENCOUNTER — READMISSION MANAGEMENT (OUTPATIENT)
Dept: CALL CENTER | Facility: HOSPITAL | Age: 79
End: 2019-07-13

## 2019-07-13 NOTE — OUTREACH NOTE
Prep Survey      Responses   Facility patient discharged from?  [facility]   Is patient eligible?  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  Other   Does the patient have Home health ordered?  No   Is there a DME ordered?  No   Prep survey completed?  Yes          Paulette Holm RN

## 2019-07-14 ENCOUNTER — EPISODE CHANGES (OUTPATIENT)
Dept: CASE MANAGEMENT | Facility: OTHER | Age: 79
End: 2019-07-14

## 2019-07-14 ENCOUNTER — READMISSION MANAGEMENT (OUTPATIENT)
Dept: CALL CENTER | Facility: HOSPITAL | Age: 79
End: 2019-07-14

## 2019-07-14 DIAGNOSIS — E11.9 DIABETES MELLITUS WITHOUT COMPLICATION (HCC): ICD-10-CM

## 2019-07-14 NOTE — OUTREACH NOTE
Medical Week 1 Survey      Responses   Facility patient discharged from?  [facility]   Does the patient have one of the following disease processes/diagnoses(primary or secondary)?  Other   Is there a successful TCM telephone encounter documented?  No   Week 1 attempt successful?  Yes   Call start time  1815   Revoke  Decline to participate          Emely Bhatti, RN

## 2019-07-15 ENCOUNTER — LAB (OUTPATIENT)
Dept: LAB | Facility: HOSPITAL | Age: 79
End: 2019-07-15

## 2019-07-15 ENCOUNTER — INFUSION (OUTPATIENT)
Dept: ONCOLOGY | Facility: HOSPITAL | Age: 79
End: 2019-07-15

## 2019-07-15 ENCOUNTER — EPISODE CHANGES (OUTPATIENT)
Dept: CASE MANAGEMENT | Facility: OTHER | Age: 79
End: 2019-07-15

## 2019-07-15 VITALS
HEART RATE: 90 BPM | TEMPERATURE: 98.3 F | DIASTOLIC BLOOD PRESSURE: 61 MMHG | OXYGEN SATURATION: 97 % | RESPIRATION RATE: 16 BRPM | SYSTOLIC BLOOD PRESSURE: 109 MMHG

## 2019-07-15 DIAGNOSIS — C83.33 DIFFUSE LARGE B-CELL LYMPHOMA OF INTRA-ABDOMINAL LYMPH NODES (HCC): Primary | ICD-10-CM

## 2019-07-15 DIAGNOSIS — C83.38 DIFFUSE LARGE B-CELL LYMPHOMA OF LYMPH NODES OF MULTIPLE REGIONS (HCC): ICD-10-CM

## 2019-07-15 LAB
ALBUMIN SERPL-MCNC: 3.4 G/DL (ref 3.5–5.2)
ALBUMIN/GLOB SERPL: 1.5 G/DL (ref 1.1–2.4)
ALP SERPL-CCNC: 50 U/L (ref 38–116)
ALT SERPL W P-5'-P-CCNC: 15 U/L (ref 0–41)
ANION GAP SERPL CALCULATED.3IONS-SCNC: 9.5 MMOL/L (ref 5–15)
AST SERPL-CCNC: 11 U/L (ref 0–40)
BASOPHILS # BLD AUTO: 0.04 10*3/MM3 (ref 0–0.2)
BASOPHILS NFR BLD AUTO: 0.4 % (ref 0–1.5)
BILIRUB SERPL-MCNC: 0.5 MG/DL (ref 0.2–1.2)
BUN BLD-MCNC: 19 MG/DL (ref 6–20)
BUN/CREAT SERPL: 29.2 (ref 7.3–30)
CALCIUM SPEC-SCNC: 8.4 MG/DL (ref 8.5–10.2)
CHLORIDE SERPL-SCNC: 95 MMOL/L (ref 98–107)
CO2 SERPL-SCNC: 26.5 MMOL/L (ref 22–29)
CREAT BLD-MCNC: 0.65 MG/DL (ref 0.7–1.3)
DEPRECATED RDW RBC AUTO: 53.5 FL (ref 37–54)
EOSINOPHIL # BLD AUTO: 0.04 10*3/MM3 (ref 0–0.4)
EOSINOPHIL NFR BLD AUTO: 0.4 % (ref 0.3–6.2)
ERYTHROCYTE [DISTWIDTH] IN BLOOD BY AUTOMATED COUNT: 16.6 % (ref 12.3–15.4)
GFR SERPL CREATININE-BSD FRML MDRD: 119 ML/MIN/1.73
GLOBULIN UR ELPH-MCNC: 2.2 GM/DL (ref 1.8–3.5)
GLUCOSE BLD-MCNC: 204 MG/DL (ref 74–124)
HCT VFR BLD AUTO: 32.2 % (ref 37.5–51)
HGB BLD-MCNC: 10.7 G/DL (ref 13–17.7)
IMM GRANULOCYTES # BLD AUTO: 0.13 10*3/MM3 (ref 0–0.05)
IMM GRANULOCYTES NFR BLD AUTO: 1.3 % (ref 0–0.5)
LYMPHOCYTES # BLD AUTO: 0.69 10*3/MM3 (ref 0.7–3.1)
LYMPHOCYTES NFR BLD AUTO: 6.9 % (ref 19.6–45.3)
MCH RBC QN AUTO: 29.6 PG (ref 26.6–33)
MCHC RBC AUTO-ENTMCNC: 33.2 G/DL (ref 31.5–35.7)
MCV RBC AUTO: 89.2 FL (ref 79–97)
MONOCYTES # BLD AUTO: 0.05 10*3/MM3 (ref 0.1–0.9)
MONOCYTES NFR BLD AUTO: 0.5 % (ref 5–12)
NEUTROPHILS # BLD AUTO: 8.99 10*3/MM3 (ref 1.7–7)
NEUTROPHILS NFR BLD AUTO: 90.5 % (ref 42.7–76)
NRBC BLD AUTO-RTO: 0 /100 WBC (ref 0–0.2)
PLATELET # BLD AUTO: 155 10*3/MM3 (ref 140–450)
PMV BLD AUTO: 8.8 FL (ref 6–12)
POTASSIUM BLD-SCNC: 4.3 MMOL/L (ref 3.5–4.7)
PROT SERPL-MCNC: 5.6 G/DL (ref 6.3–8)
RBC # BLD AUTO: 3.61 10*6/MM3 (ref 4.14–5.8)
SODIUM BLD-SCNC: 131 MMOL/L (ref 134–145)
WBC NRBC COR # BLD: 9.94 10*3/MM3 (ref 3.4–10.8)

## 2019-07-15 PROCEDURE — 36415 COLL VENOUS BLD VENIPUNCTURE: CPT | Performed by: INTERNAL MEDICINE

## 2019-07-15 PROCEDURE — 25010000002 PEGFILGRASTIM 6 MG/0.6ML SOLUTION PREFILLED SYRINGE: Performed by: INTERNAL MEDICINE

## 2019-07-15 PROCEDURE — 96372 THER/PROPH/DIAG INJ SC/IM: CPT | Performed by: INTERNAL MEDICINE

## 2019-07-15 PROCEDURE — 85025 COMPLETE CBC W/AUTO DIFF WBC: CPT | Performed by: INTERNAL MEDICINE

## 2019-07-15 PROCEDURE — 80053 COMPREHEN METABOLIC PANEL: CPT | Performed by: INTERNAL MEDICINE

## 2019-07-15 RX ORDER — BLOOD-GLUCOSE METER
KIT MISCELLANEOUS
Qty: 100 EACH | Refills: 1 | Status: SHIPPED | OUTPATIENT
Start: 2019-07-15 | End: 2019-08-26

## 2019-07-15 RX ORDER — LANCETS 28 GAUGE
EACH MISCELLANEOUS
Qty: 100 EACH | Refills: 3 | Status: SHIPPED | OUTPATIENT
Start: 2019-07-15 | End: 2019-07-16 | Stop reason: SDUPTHER

## 2019-07-15 RX ADMIN — PEGFILGRASTIM 6 MG: 6 INJECTION SUBCUTANEOUS at 11:43

## 2019-07-15 NOTE — PROGRESS NOTES
Pt is here for lab with RN review and neulasta . CBC reviewed with pt, counts are stable for this pt at this time. Pt has no complaints.  Copy of labs given to pt and f/u appt reviewed. Pt is instructed to call the office with any concerns or new symptoms prior to next visit. Pt vu

## 2019-07-16 DIAGNOSIS — E11.9 DIABETES MELLITUS WITHOUT COMPLICATION (HCC): Primary | ICD-10-CM

## 2019-07-16 RX ORDER — LANCETS 28 GAUGE
EACH MISCELLANEOUS
Qty: 100 EACH | Refills: 3 | Status: SHIPPED | OUTPATIENT
Start: 2019-07-16 | End: 2019-08-26

## 2019-07-18 ENCOUNTER — CLINICAL SUPPORT (OUTPATIENT)
Dept: ONCOLOGY | Facility: HOSPITAL | Age: 79
End: 2019-07-18

## 2019-07-18 ENCOUNTER — LAB (OUTPATIENT)
Dept: LAB | Facility: HOSPITAL | Age: 79
End: 2019-07-18

## 2019-07-18 DIAGNOSIS — C83.38 DIFFUSE LARGE B-CELL LYMPHOMA OF LYMPH NODES OF MULTIPLE REGIONS (HCC): ICD-10-CM

## 2019-07-18 LAB
BASOPHILS # BLD AUTO: 0.03 10*3/MM3 (ref 0–0.2)
BASOPHILS NFR BLD AUTO: 0.5 % (ref 0–1.5)
DEPRECATED RDW RBC AUTO: 52.8 FL (ref 37–54)
EOSINOPHIL # BLD AUTO: 0.03 10*3/MM3 (ref 0–0.4)
EOSINOPHIL NFR BLD AUTO: 0.5 % (ref 0.3–6.2)
ERYTHROCYTE [DISTWIDTH] IN BLOOD BY AUTOMATED COUNT: 16.5 % (ref 12.3–15.4)
HCT VFR BLD AUTO: 29.7 % (ref 37.5–51)
HGB BLD-MCNC: 10 G/DL (ref 13–17.7)
IMM GRANULOCYTES # BLD AUTO: 0.91 10*3/MM3 (ref 0–0.05)
IMM GRANULOCYTES NFR BLD AUTO: 15 % (ref 0–0.5)
LYMPHOCYTES # BLD AUTO: 0.43 10*3/MM3 (ref 0.7–3.1)
LYMPHOCYTES NFR BLD AUTO: 7.1 % (ref 19.6–45.3)
MCH RBC QN AUTO: 29.8 PG (ref 26.6–33)
MCHC RBC AUTO-ENTMCNC: 33.7 G/DL (ref 31.5–35.7)
MCV RBC AUTO: 88.4 FL (ref 79–97)
MONOCYTES # BLD AUTO: 0.2 10*3/MM3 (ref 0.1–0.9)
MONOCYTES NFR BLD AUTO: 3.3 % (ref 5–12)
NEUTROPHILS # BLD AUTO: 4.47 10*3/MM3 (ref 1.7–7)
NEUTROPHILS NFR BLD AUTO: 73.6 % (ref 42.7–76)
NRBC BLD AUTO-RTO: 0 /100 WBC (ref 0–0.2)
PLATELET # BLD AUTO: 57 10*3/MM3 (ref 140–450)
PMV BLD AUTO: 10.4 FL (ref 6–12)
RBC # BLD AUTO: 3.36 10*6/MM3 (ref 4.14–5.8)
WBC NRBC COR # BLD: 6.07 10*3/MM3 (ref 3.4–10.8)

## 2019-07-18 PROCEDURE — 85025 COMPLETE CBC W/AUTO DIFF WBC: CPT | Performed by: INTERNAL MEDICINE

## 2019-07-18 PROCEDURE — 36415 COLL VENOUS BLD VENIPUNCTURE: CPT | Performed by: INTERNAL MEDICINE

## 2019-07-18 NOTE — PROGRESS NOTES
Patient here for CBC and RN Review. Platelets decreased from 155,000 to 57,000. Patient denied any bleeding. All other counts stable for patient at this time. Patient back on Monday for CBC and RN Review. Told him to call us if he had any signs of bleeding or needed anything.   Lab Results   Component Value Date    WBC 6.07 07/18/2019    HGB 10.0 (L) 07/18/2019    HCT 29.7 (L) 07/18/2019    MCV 88.4 07/18/2019    PLT 57 (L) 07/18/2019

## 2019-07-22 ENCOUNTER — HOSPITAL ENCOUNTER (OUTPATIENT)
Dept: PET IMAGING | Facility: HOSPITAL | Age: 79
Discharge: HOME | End: 2019-07-22
Admitting: INTERNAL MEDICINE

## 2019-07-22 ENCOUNTER — INFUSION (OUTPATIENT)
Dept: ONCOLOGY | Facility: HOSPITAL | Age: 79
End: 2019-07-22

## 2019-07-22 ENCOUNTER — LAB (OUTPATIENT)
Dept: LAB | Facility: HOSPITAL | Age: 79
End: 2019-07-22

## 2019-07-22 DIAGNOSIS — C83.38 DIFFUSE LARGE B-CELL LYMPHOMA OF LYMPH NODES OF MULTIPLE REGIONS (HCC): ICD-10-CM

## 2019-07-22 DIAGNOSIS — C83.38 DIFFUSE LARGE B-CELL LYMPHOMA OF LYMPH NODES OF MULTIPLE REGIONS (HCC): Primary | ICD-10-CM

## 2019-07-22 LAB
BASOPHILS # BLD AUTO: 0.08 10*3/MM3 (ref 0–0.2)
BASOPHILS NFR BLD AUTO: 0.4 % (ref 0–1.5)
CREAT BLDA-MCNC: 0.7 MG/DL (ref 0.6–1.3)
DEPRECATED RDW RBC AUTO: 54.7 FL (ref 37–54)
EOSINOPHIL # BLD AUTO: 0.12 10*3/MM3 (ref 0–0.4)
EOSINOPHIL NFR BLD AUTO: 0.6 % (ref 0.3–6.2)
ERYTHROCYTE [DISTWIDTH] IN BLOOD BY AUTOMATED COUNT: 18.3 % (ref 12.3–15.4)
HCT VFR BLD AUTO: 31.1 % (ref 37.5–51)
HGB BLD-MCNC: 10.5 G/DL (ref 13–17.7)
IMM GRANULOCYTES # BLD AUTO: 3.84 10*3/MM3 (ref 0–0.05)
IMM GRANULOCYTES NFR BLD AUTO: 19.4 % (ref 0–0.5)
LYMPHOCYTES # BLD AUTO: 1.11 10*3/MM3 (ref 0.7–3.1)
LYMPHOCYTES NFR BLD AUTO: 5.6 % (ref 19.6–45.3)
MCH RBC QN AUTO: 30.5 PG (ref 26.6–33)
MCHC RBC AUTO-ENTMCNC: 33.8 G/DL (ref 31.5–35.7)
MCV RBC AUTO: 90.4 FL (ref 79–97)
MONOCYTES # BLD AUTO: 2.33 10*3/MM3 (ref 0.1–0.9)
MONOCYTES NFR BLD AUTO: 11.8 % (ref 5–12)
NEUTROPHILS # BLD AUTO: 12.32 10*3/MM3 (ref 1.7–7)
NEUTROPHILS NFR BLD AUTO: 62.2 % (ref 42.7–76)
NRBC BLD AUTO-RTO: 0.3 /100 WBC (ref 0–0.2)
PLATELET # BLD AUTO: 168 10*3/MM3 (ref 140–450)
PMV BLD AUTO: 10 FL (ref 6–12)
RBC # BLD AUTO: 3.44 10*6/MM3 (ref 4.14–5.8)
WBC NRBC COR # BLD: 19.8 10*3/MM3 (ref 3.4–10.8)

## 2019-07-22 PROCEDURE — 71260 CT THORAX DX C+: CPT

## 2019-07-22 PROCEDURE — 82565 ASSAY OF CREATININE: CPT

## 2019-07-22 PROCEDURE — 74177 CT ABD & PELVIS W/CONTRAST: CPT

## 2019-07-22 PROCEDURE — 0 DIATRIZOATE MEGLUMINE & SODIUM PER 1 ML: Performed by: INTERNAL MEDICINE

## 2019-07-22 PROCEDURE — 85025 COMPLETE CBC W/AUTO DIFF WBC: CPT | Performed by: INTERNAL MEDICINE

## 2019-07-22 PROCEDURE — 25010000002 IOPAMIDOL 61 % SOLUTION: Performed by: INTERNAL MEDICINE

## 2019-07-22 PROCEDURE — 70491 CT SOFT TISSUE NECK W/DYE: CPT

## 2019-07-22 PROCEDURE — 36415 COLL VENOUS BLD VENIPUNCTURE: CPT | Performed by: INTERNAL MEDICINE

## 2019-07-22 RX ADMIN — IOPAMIDOL 85 ML: 612 INJECTION, SOLUTION INTRAVENOUS at 14:20

## 2019-07-22 RX ADMIN — DIATRIZOATE MEGLUMINE AND DIATRIZOATE SODIUM 30 ML: 660; 100 LIQUID ORAL; RECTAL at 13:05

## 2019-07-22 NOTE — PROGRESS NOTES
Pt here for RN review. 2 weeks out from EPOCH-R. States he is eating and drinking well this week. The first week after chemo was tiring and no appetite but now feeling better. Denies any N/V. Reviewed labs with pt and wife. Labs improved from last week. Pt will return next week for next cycle of chemo. Informed pt to call with any questions or concerns      Lab Results   Component Value Date    WBC 19.80 (H) 07/22/2019    HGB 10.5 (L) 07/22/2019    HCT 31.1 (L) 07/22/2019    MCV 90.4 07/22/2019     07/22/2019

## 2019-07-25 ENCOUNTER — APPOINTMENT (OUTPATIENT)
Dept: LAB | Facility: HOSPITAL | Age: 79
End: 2019-07-25

## 2019-07-25 ENCOUNTER — PREP FOR SURGERY (OUTPATIENT)
Dept: OTHER | Facility: HOSPITAL | Age: 79
End: 2019-07-25

## 2019-07-25 ENCOUNTER — OFFICE VISIT (OUTPATIENT)
Dept: ONCOLOGY | Facility: CLINIC | Age: 79
End: 2019-07-25

## 2019-07-25 VITALS
TEMPERATURE: 98 F | OXYGEN SATURATION: 98 % | SYSTOLIC BLOOD PRESSURE: 104 MMHG | WEIGHT: 140.7 LBS | HEIGHT: 67 IN | RESPIRATION RATE: 16 BRPM | HEART RATE: 81 BPM | DIASTOLIC BLOOD PRESSURE: 62 MMHG | BODY MASS INDEX: 22.08 KG/M2

## 2019-07-25 DIAGNOSIS — C83.38 DIFFUSE LARGE B-CELL LYMPHOMA OF LYMPH NODES OF MULTIPLE REGIONS (HCC): Primary | ICD-10-CM

## 2019-07-25 DIAGNOSIS — C83.33 DIFFUSE LARGE B-CELL LYMPHOMA OF INTRA-ABDOMINAL LYMPH NODES (HCC): Primary | ICD-10-CM

## 2019-07-25 LAB
BASOPHILS # BLD AUTO: 0.18 10*3/MM3 (ref 0–0.2)
BASOPHILS NFR BLD AUTO: 0.8 % (ref 0–1.5)
DEPRECATED RDW RBC AUTO: 59.5 FL (ref 37–54)
EOSINOPHIL # BLD AUTO: 0.13 10*3/MM3 (ref 0–0.4)
EOSINOPHIL NFR BLD AUTO: 0.5 % (ref 0.3–6.2)
ERYTHROCYTE [DISTWIDTH] IN BLOOD BY AUTOMATED COUNT: 18.8 % (ref 12.3–15.4)
HCT VFR BLD AUTO: 33.6 % (ref 37.5–51)
HGB BLD-MCNC: 11.2 G/DL (ref 13–17.7)
IMM GRANULOCYTES # BLD AUTO: 4.44 10*3/MM3 (ref 0–0.05)
IMM GRANULOCYTES NFR BLD AUTO: 18.7 % (ref 0–0.5)
LYMPHOCYTES # BLD AUTO: 1.22 10*3/MM3 (ref 0.7–3.1)
LYMPHOCYTES NFR BLD AUTO: 5.1 % (ref 19.6–45.3)
MCH RBC QN AUTO: 30 PG (ref 26.6–33)
MCHC RBC AUTO-ENTMCNC: 33.3 G/DL (ref 31.5–35.7)
MCV RBC AUTO: 90.1 FL (ref 79–97)
MONOCYTES # BLD AUTO: 2.04 10*3/MM3 (ref 0.1–0.9)
MONOCYTES NFR BLD AUTO: 8.6 % (ref 5–12)
NEUTROPHILS # BLD AUTO: 15.73 10*3/MM3 (ref 1.7–7)
NEUTROPHILS NFR BLD AUTO: 66.3 % (ref 42.7–76)
NRBC BLD AUTO-RTO: 0.2 /100 WBC (ref 0–0.2)
PLATELET # BLD AUTO: 292 10*3/MM3 (ref 140–450)
PMV BLD AUTO: 8.9 FL (ref 6–12)
RBC # BLD AUTO: 3.73 10*6/MM3 (ref 4.14–5.8)
WBC NRBC COR # BLD: 23.74 10*3/MM3 (ref 3.4–10.8)

## 2019-07-25 PROCEDURE — 85025 COMPLETE CBC W/AUTO DIFF WBC: CPT | Performed by: INTERNAL MEDICINE

## 2019-07-25 PROCEDURE — 99215 OFFICE O/P EST HI 40 MIN: CPT | Performed by: INTERNAL MEDICINE

## 2019-07-25 PROCEDURE — 36415 COLL VENOUS BLD VENIPUNCTURE: CPT | Performed by: INTERNAL MEDICINE

## 2019-07-25 RX ORDER — ASCORBIC ACID 500 MG
500 TABLET ORAL DAILY
COMMUNITY

## 2019-07-25 RX ORDER — ACYCLOVIR 200 MG/1
400 CAPSULE ORAL 2 TIMES DAILY
Qty: 60 CAPSULE | Refills: 4 | Status: SHIPPED | OUTPATIENT
Start: 2019-07-25 | End: 2019-08-09

## 2019-07-25 RX ORDER — SODIUM CHLORIDE 0.9 % (FLUSH) 0.9 %
20 SYRINGE (ML) INJECTION AS NEEDED
Status: CANCELLED | OUTPATIENT
Start: 2019-07-25

## 2019-07-25 RX ORDER — ASPIRIN 81 MG/1
81 TABLET ORAL DAILY
COMMUNITY
End: 2019-08-29

## 2019-07-25 RX ORDER — SODIUM CHLORIDE 0.9 % (FLUSH) 0.9 %
10 SYRINGE (ML) INJECTION EVERY 12 HOURS SCHEDULED
Status: CANCELLED | OUTPATIENT
Start: 2019-07-25

## 2019-07-25 RX ORDER — SODIUM CHLORIDE 0.9 % (FLUSH) 0.9 %
10 SYRINGE (ML) INJECTION AS NEEDED
Status: CANCELLED | OUTPATIENT
Start: 2019-07-25

## 2019-07-25 RX ORDER — MECLIZINE HCL 25MG 25 MG/1
25 TABLET, CHEWABLE ORAL 3 TIMES DAILY PRN
COMMUNITY
End: 2019-09-03

## 2019-07-25 RX ORDER — ACETAMINOPHEN 325 MG/1
650 TABLET ORAL EVERY 4 HOURS PRN
Status: CANCELLED | OUTPATIENT
Start: 2019-07-25

## 2019-07-25 NOTE — PROGRESS NOTES
"[facility] CBC GROUP OUTPATIENT FOLLOW UP CLINIC VISIT    REASON FOR FOLLOW-UP:    1.  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  CT imaging of the abdomen and pelvis on 5/8/2019 shows multiple small tissue nodules in the perirenal fat adjacent to the left kidney and a soft tissue mass interposed between the left psoas muscle and spine.  2.  RA previously on Cimzia (certollizumab) injections by Dr. Velásquez  3.  Lumbar puncture 5/31/2019 negative for involvement with lymphoma  4.  Bone marrow aspiration and biopsy 5/29/2018 negative for lymphoma  5.  PET scan on 5/30/2019 with hypermetabolic perinephric soft tissue lesions with mesenteric lymphadenopathy, left paraspinal mass at L5, 6 cm intramuscular mass in the right upper thigh, lesion at the left clavicle and a 5 cm soft tissue mass between the right scapula and right posterior fourth rib.  6.  Right subclavian Mediport placed by Dr. Herrera on 6/12/2019.  7.  Admission to [facility] for cycle #1 of dose adjusted R-EPOCH on 6/17/2019.  Plan for a lumbar puncture with intrathecal methotrexate with each cycle.  8.  Cycle #2 initiated on 7/8/2019.  Same dosees pursued per the protocol.    HISTORY OF PRESENT ILLNESS:  Edward Srinivasan is a 79 y.o. male who returns today for follow up of the above issue.  We proceeded with cycle #2 of therapy as of 7/8/2019.  We proceeded at the same doses as cycle #1.  He tolerated this very well.  Blood counts were actually better this time with no neutropenia and only 1 day of thrombocytopenia with a platelet count of 57,000.  He denies any headache.  His right thigh mass is gone.  He only has some mild fatigue.  He is having daily bowel movements with a bowel regimen.  He continues acyclovir and Bactrim for prophylaxis.        ONCOLOGIC HISTORY:  He has a history of rheumatoid arthritis currently on injections " by Dr. Velásquez, type 2 diabetes on oral medication, coronary artery disease status post stenting x2, aortic stenosis, and nonmelanoma skin cancer resected from his face.  Continues to smoke a little bit less than a half a pack per day and has done this for several decades.     He has a long history of chronic back pain.  In March 2019 he developed some low back pain on the left.  With some physical therapy this is actually improved significantly but over the past couple of weeks he has developed some right-sided back pain.  Actually over the past couple of days this has essentially resolved.  He denies any radiculopathy.  He has chronic vision changes but nothing acute.  He denies any headaches.  No other focal neurologic changes.  He has occasional lightheadedness but this is been a chronic problem for him.  No fevers.  He is cold a lot but no rigors.  No night sweats.  He has lost a little bit of weight which she relates to his rheumatoid arthritis and decreased appetite secondary to pain.     He had an MRI of his lumbar spine on 4/25/2019 showing some degenerative changes but also there was a soft tissue mass extending posteriorly from the left common iliac vessels hugging the lateral margin of the L5 vertebrae extending from the left lateral margin of L4-5 interspace to L5-S1 where it pressed on the left L5 nerve root.  The mass measures 4.4 x 2.1 x 3.5 cm.  CT imaging was suggested and performed on 5/8/2019.  The CT scan showed multiple soft tissue nodules in the perirenal fat on the left adjacent to the kidney and a soft tissue mass interposed between the left psoas muscle and spine.  The mass measures 4.1 x 2.1 cm.     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     He was seen initially on 5/28/2019.  PET scan, LP, and a bone marrow aspiration and biopsy were  requested.     LDH was 341 with uric acid 4.4.        Bone marrow biopsy on 5/29/2019:  normocellular marrow with no evidence for lymphoma.  Flow cytometry negative.  Karyotype pending as of 6/7.       PET scan on 5/20/2019:  IMPRESSION:  1. There are hypermetabolic perinephric soft tissue lesions,  hypermetabolic mesenteric lymphadenopathy, left paraspinal mass at L5,  as well as an intensely hypermetabolic approximately 6 cm intramuscular  mass within the visualized right upper thigh. There is no hypermetabolic  lymphadenopathy within the abdomen or pelvis, but the shotty mediastinal  and hilar nodes are hypermetabolic and there is also a hypermetabolic  right upper lobe nodular opacity. In addition, there is an intensely  hypermetabolic approximately 5 cm intramuscular mass at the right upper  back and there is a tiny hypermetabolic lesion within the left clavicle.  2. Shotty cervical nodes have low level activity.  3. Uncertain significance of the more than typical metabolic activity at  dependent atelectatic change at both lower lobes.     LP on 5/31/2019:  Cytology and flow cytometry negative.     Mediport placed in the right subclavian area by Dr. Herrera on 6/12/2019.     Plan to start therapy with dose adjusted R-EPOCH with CNS prophylaxis with intrathecal methotrexate on 6/17/2019.     He previously had an echocardiogram on 5/15/2019 showing a normal left ventricular ejection fraction of 60%.     Admission to [facility] on 6/19/2019 for cycle #1 of therapy.     He received Neulasta on discharge on 6/22/2019.     His lowest ANC was 0.04 on 6/27 with a platelet count of 47,000 that day.  He was only neutropenic on one day and his WBC and platelet count improved rapidly.     Admission for cycle #2 on 7/8/2019.  We proceeded with the same doses per the protocol.      ALLERGIES:  Allergies   Allergen Reactions   • Oxaprozin Hives       MEDICATIONS:  The medication list has been reviewed with the  "patient by the medical assistant, and the list has been updated in the electronic medical record, which I reviewed.  Medication dosages and frequencies were confirmed to be accurate.    REVIEW OF SYSTEMS:  PAIN:  See Vital Signs below.  GENERAL: Mild fatigue  SKIN:  No rashes or non-healing lesions.  HEME/LYMPH:  No abnormal bleeding.  No palpable lymphadenopathy.  EYES:  No vision changes or diplopia.  ENT:  No sore throat or difficulty swallowing.  RESPIRATORY:  No cough, shortness of breath, hemoptysis, or wheezing.  CARDIOVASCULAR:  No chest pain, palpitations, orthopnea, or dyspnea on exertion.  GASTROINTESTINAL: Constipation controlled with medication  GENITOURINARY:  No dysuria or hematuria.  MUSCULOSKELETAL:  No joint pain, swelling, or erythema.  NEUROLOGIC:  No dizziness, loss of consciousness, or seizures.  PSYCHIATRIC:  No depression, anxiety, or mood changes.    Vitals:    07/25/19 1216   BP: 104/62   Pulse: 81   Resp: 16   Temp: 98 °F (36.7 °C)   TempSrc: Oral   SpO2: 98%   Weight: 63.8 kg (140 lb 11.2 oz)   Height: 170.2 cm (67.01\")   PainSc: 0-No pain  Comment: LYMPHOMA       PHYSICAL EXAMINATION:  GENERAL:  Well-developed well-nourished male; awake, alert and oriented, in no acute distress.  SKIN:  Warm and dry, without rashes, purpura, or petechiae.  HEAD:  Normocephalic, atraumatic.  EARS:  Hearing intact.  NOSE:  Septum midline.  No excoriations or nasal discharge.  MOUTH:  No stomatitis or ulcers.  Lips are normal.  THROAT:  Oropharynx without lesions or exudates.  NECK:  Supple with good range of motion; no thyromegaly or masses; no JVD or bruits.  LYMPHATICS:  No cervical, supraclavicular, axillary lymphadenopathy.  CHEST:  Lungs are clear to auscultation bilaterally.  No wheezes, rales, or rhonchi.  Benign-appearing Mediport.  HEART:  Regular rate; normal rhythm.  Systolic murmur present  ABDOMEN:  Soft, non-tender, non-distended.  Normal active bowel sounds.  No organomegaly.  EXTREMITIES:  " No clubbing, cyanosis, or edema.  NEUROLOGICAL:  No focal neurologic deficits.    DIAGNOSTIC DATA:  Results for orders placed or performed in visit on 07/25/19   CBC Auto Differential   Result Value Ref Range    WBC 23.74 (H) 3.40 - 10.80 10*3/mm3    RBC 3.73 (L) 4.14 - 5.80 10*6/mm3    Hemoglobin 11.2 (L) 13.0 - 17.7 g/dL    Hematocrit 33.6 (L) 37.5 - 51.0 %    MCV 90.1 79.0 - 97.0 fL    MCH 30.0 26.6 - 33.0 pg    MCHC 33.3 31.5 - 35.7 g/dL    RDW 18.8 (H) 12.3 - 15.4 %    RDW-SD 59.5 (H) 37.0 - 54.0 fl    MPV 8.9 6.0 - 12.0 fL    Platelets 292 140 - 450 10*3/mm3    Neutrophil % 66.3 42.7 - 76.0 %    Lymphocyte % 5.1 (L) 19.6 - 45.3 %    Monocyte % 8.6 5.0 - 12.0 %    Eosinophil % 0.5 0.3 - 6.2 %    Basophil % 0.8 0.0 - 1.5 %    Immature Grans % 18.7 (H) 0.0 - 0.5 %    Neutrophils, Absolute 15.73 (H) 1.70 - 7.00 10*3/mm3    Lymphocytes, Absolute 1.22 0.70 - 3.10 10*3/mm3    Monocytes, Absolute 2.04 (H) 0.10 - 0.90 10*3/mm3    Eosinophils, Absolute 0.13 0.00 - 0.40 10*3/mm3    Basophils, Absolute 0.18 0.00 - 0.20 10*3/mm3    Immature Grans, Absolute 4.44 (H) 0.00 - 0.05 10*3/mm3    nRBC 0.2 0.0 - 0.2 /100 WBC       IMAGING: CT imaging of the neck chest abdomen and pelvis from 7/22/2019 shows a nice response to therapy.  Decreasing lymphadenopathy.  Images personally reviewed.    IMPRESSION:  1. Significant interval improvement. There has been near-complete  resolution of the perinephric lesions and significant decrease in the  left paraspinal mass at L5. The mesenteric lymphadenopathy has  decreased. The shotty mediastinal nodes are slightly smaller and there  is near-complete resolution of the right posterior chest wall mass. Also  resolution of a single enlarged left cervical node. There has also been  resolution of a 1.5 cm right upper lobe nodular opacity which was  hypermetabolic.  2. Significantly thickened gastric folds. The stomach was collapsed on  all of the previous CT examinations which limits  "comparison. Please  correlate clinically for gastritis.    ASSESSMENT:  This is a 79 y.o. male with:  1.  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  His LP and bone marrow biopsies are negative for involvement.  However, his PET scan indicates perinephric soft tissue lesions. hypermetabolic mesenteric lymphadenopathy, left L5 paraspinous mass, an intensely hypermetabolic 6 cm intramuscular mass in the upper right thigh, a right upper lobe nodular opacity, a hypermetabolic mass adjacent to the right scapula, and a lesion within the left calvicle.  Paraspinous involvement puts him at extremely high risk for CNS involvement.       Plan 6 cycles of inpatient treatment with dose adjusted R-EPOCH.  Plan CNS prophylaxis with IT methotrexate with each cycle of therapy.     Admission to [facility] on 6/19/2019 for cycle #1 of therapy.     He received Neulasta on discharge on 6/22/2019.     His lowest ANC was 0.04 on 6/27 with a platelet count of 47,000 that day.  He was only neutropenic on one day and his WBC and platelet count improved rapidly.     Admit 7/8 for cycle #2.  Per the protocol with neutropenia x1 day, we proceeded with the same doses as cycle #1.  Slight dose adjustment for his decreased weight.    Cycle #3 due 7/29/2019.  Lumbar puncture scheduled that day.  Labs for flow cytometry.   Per the protocol, his doses of Adriamycin, etoposide, and Cytoxan could be increased by 20%.  I will discuss this with the pharmacist.     2.  Rheumatoid arthritis previously on Cimzia (certolizumab) injections by Dr. Velásquez. Plan to discontinue these during chemotherapy.  He was previously on Simponi Aria (golimumab) and Actemra (tocilizumab).  These certainly could contribute to the development of lymphoma.     3.  Coronary artery disease status post stenting as well as moderate aortic stenosis by " echocardiogram.  His left ventricular ejection fraction is normal at 60% by echocardiogram on 5/14/2019.     4.  Anxiety:  I previously gave him a prescription for alprazolam 0.25 mg to take twice daily as needed.  Continue benzodiazepines as needed during his hospitalization.     5.  Hyponatremia: Continue to monitor.     6.  Volume overload with cycle #1: We will likely need to decrease IV fluids with this cycle.     7.  Constipation: Continue Senokot and MiraLAX daily     PLAN:   1.  Inpatient [facility] on Monday  2.  Lumbar puncture on admission with a sample to be sent for flow cytometry.  Intrathecal methotrexate 12 mg administration for CNS prophylaxis.  3.  Cycle #3 of therapy with dose adjusted R-EPOCH to start on Monday    4.  Benzodiazepines as needed for anxiety although he has not required any at this point.  5.  Lovenox 40 mg daily for DVT prophylaxis.  No allopurinol at this point  6.  IV fluid hydration  7.  Continue Bactrim and acyclovir for prophylaxis.  8.  At discharge on either Friday or Saturday, Neulasta the following Monday.  This will be arranged. Twice weekly CBC on Mondays and Thursdays for 2 weeks.  This will be arranged.  Plan total of 6 cycles of therapy if tolerated with intrathecal methotrexate with each cycle for CNS prophylaxis.  9.  I will see him back in the morning of August 19 for admission for cycle #4 of therapy.

## 2019-07-25 NOTE — H&P
"[facility] CBC GROUP ADMISSION HISTORY AND PHYSICAL    REASON FOR ADMISSION / CHIEF COMPLAINT:    1.  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  CT imaging of the abdomen and pelvis on 5/8/2019 shows multiple small tissue nodules in the perirenal fat adjacent to the left kidney and a soft tissue mass interposed between the left psoas muscle and spine.  2.  RA previously on Cimzia (certollizumab) injections by Dr. Velásquez  3.  Lumbar puncture 5/31/2019 negative for involvement with lymphoma  4.  Bone marrow aspiration and biopsy 5/29/2018 negative for lymphoma  5.  PET scan on 5/30/2019 with hypermetabolic perinephric soft tissue lesions with mesenteric lymphadenopathy, left paraspinal mass at L5, 6 cm intramuscular mass in the right upper thigh, lesion at the left clavicle and a 5 cm soft tissue mass between the right scapula and right posterior fourth rib.  6.  Right subclavian Mediport placed by Dr. Herrera on 6/12/2019.  7.  Admission to [facility] for cycle #1 of dose adjusted R-EPOCH on 6/17/2019.  Plan for a lumbar puncture with intrathecal methotrexate with each cycle.  8.  Cycle #2 initiated on 7/8/2019.  Same dosees pursued per the protocol.  9.  Admission for cycle #3 on 7/29/2019.    HISTORY OF PRESENT ILLNESS:  Edward Srinivasan is a 79 y.o. male who is being admitted for cycle #3 of dose adjusted R-EPOCH.  He has tolerated cycles #1 and 2 very well.  With cycle #2, we pursued the same doses as cycle #1 per the protocol.     Blood counts were actually better this time with no neutropenia and only 1 day of thrombocytopenia with a platelet count of 57,000.  He denies any headache.  His right thigh mass is gone.  He only has some mild fatigue.  He is having daily bowel movements with a bowel regimen.  He continues acyclovir and Bactrim for prophylaxis.         ONCOLOGIC HISTORY:  He has a " history of rheumatoid arthritis currently on injections by Dr. Velásquez, type 2 diabetes on oral medication, coronary artery disease status post stenting x2, aortic stenosis, and nonmelanoma skin cancer resected from his face.  Continues to smoke a little bit less than a half a pack per day and has done this for several decades.     He has a long history of chronic back pain.  In March 2019 he developed some low back pain on the left.  With some physical therapy this is actually improved significantly but over the past couple of weeks he has developed some right-sided back pain.  Actually over the past couple of days this has essentially resolved.  He denies any radiculopathy.  He has chronic vision changes but nothing acute.  He denies any headaches.  No other focal neurologic changes.  He has occasional lightheadedness but this is been a chronic problem for him.  No fevers.  He is cold a lot but no rigors.  No night sweats.  He has lost a little bit of weight which she relates to his rheumatoid arthritis and decreased appetite secondary to pain.     He had an MRI of his lumbar spine on 4/25/2019 showing some degenerative changes but also there was a soft tissue mass extending posteriorly from the left common iliac vessels hugging the lateral margin of the L5 vertebrae extending from the left lateral margin of L4-5 interspace to L5-S1 where it pressed on the left L5 nerve root.  The mass measures 4.4 x 2.1 x 3.5 cm.  CT imaging was suggested and performed on 5/8/2019.  The CT scan showed multiple soft tissue nodules in the perirenal fat on the left adjacent to the kidney and a soft tissue mass interposed between the left psoas muscle and spine.  The mass measures 4.1 x 2.1 cm.     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     He was seen initially on 5/28/2019.   PET scan, LP, and a bone marrow aspiration and biopsy were requested.     LDH was 341 with uric acid 4.4.        Bone marrow biopsy on 5/29/2019:  normocellular marrow with no evidence for lymphoma.  Flow cytometry negative.  Karyotype pending as of 6/7.       PET scan on 5/20/2019:  IMPRESSION:  1. There are hypermetabolic perinephric soft tissue lesions,  hypermetabolic mesenteric lymphadenopathy, left paraspinal mass at L5,  as well as an intensely hypermetabolic approximately 6 cm intramuscular  mass within the visualized right upper thigh. There is no hypermetabolic  lymphadenopathy within the abdomen or pelvis, but the shotty mediastinal  and hilar nodes are hypermetabolic and there is also a hypermetabolic  right upper lobe nodular opacity. In addition, there is an intensely  hypermetabolic approximately 5 cm intramuscular mass at the right upper  back and there is a tiny hypermetabolic lesion within the left clavicle.  2. Shotty cervical nodes have low level activity.  3. Uncertain significance of the more than typical metabolic activity at  dependent atelectatic change at both lower lobes.     LP on 5/31/2019:  Cytology and flow cytometry negative.     Mediport placed in the right subclavian area by Dr. Herrera on 6/12/2019.     Plan to start therapy with dose adjusted R-EPOCH with CNS prophylaxis with intrathecal methotrexate on 6/17/2019.     He previously had an echocardiogram on 5/15/2019 showing a normal left ventricular ejection fraction of 60%.     Admission to [facility] on 6/19/2019 for cycle #1 of therapy.     He received Neulasta on discharge on 6/22/2019.     His lowest ANC was 0.04 on 6/27 with a platelet count of 47,000 that day.  He was only neutropenic on one day and his WBC and platelet count improved rapidly.     Admission for cycle #2 on 7/8/2019.  We proceeded with the same doses per the protocol.         Past Medical History:   Diagnosis Date   • Allergic rhinitis    •  Aortic valve stenosis     Mild to moderate AS by echo on 8/8/17   • CAD (coronary artery disease)    • Cancer (CMS/HCC)     LYMPHOMA   • Carotid arterial disease (CMS/HCC)     Followed by Dr. Floyd.  Carotid Doppler on 9/21/17: 60-70% DENISE, 50-60% LICA.   • Chronic obstructive lung disease (CMS/HCC)    • Esophageal reflux     With a history of erosive gastritis   • Hyperlipidemia    • Hypertension    • Iron deficiency    • Legally blind     PT WEARS GLASSES    • Pulmonary fibrosis (CMS/HCC)     By CT scan on 8/12/15   • PVC (premature ventricular contraction)    • Rheumatoid arthritis (CMS/HCC)    • Skin cancer of face     15 year ago on left side of face   • Steroid-induced diabetes mellitus (CMS/HCC)    • Tobacco use        Past Surgical History:   Procedure Laterality Date   • BACK SURGERY  2010    L4-L5 discectomy   • CATARACT EXTRACTION Bilateral 1979   • CORONARY ANGIOPLASTY WITH STENT PLACEMENT  2015   • VASECTOMY  1979   • VENOUS ACCESS DEVICE (PORT) INSERTION N/A 6/12/2019    Procedure: INSERTION RIGHT VENOUS ACCESS DEVICE;  Surgeon: Philippe Herrera Jr., MD;  Location: [facility];  Service: General       SOCIAL HISTORY:   reports that he has been smoking cigarettes.  He has a 25.00 pack-year smoking history. He has never used smokeless tobacco. He reports that he does not drink alcohol or use drugs.    FAMILY HISTORY:  family history includes Coronary artery disease in his other; Heart attack (age of onset: 47) in his brother; Heart attack (age of onset: 54) in his father; Heart disease in his brother and father; Heart failure in his mother.    ALLERGIES:  Allergies   Allergen Reactions   • Oxaprozin Hives       MEDICATIONS:  As listed in the electronic medical record.    Review of Systems   Constitutional: Positive for fatigue. Negative for appetite change, chills, fever and unexpected weight change.   HENT: Negative for congestion, dental problem, ear pain, hearing loss, mouth sores, nosebleeds,  postnasal drip, rhinorrhea, tinnitus and trouble swallowing.    Eyes: Negative.    Respiratory: Negative for cough, chest tightness, shortness of breath, wheezing and stridor.    Cardiovascular: Negative for chest pain, palpitations and leg swelling.   Gastrointestinal: Positive for constipation. Negative for abdominal distention, abdominal pain, blood in stool, diarrhea, nausea and vomiting.   Endocrine: Negative.    Genitourinary: Negative for decreased urine volume, difficulty urinating, dysuria, flank pain, frequency, hematuria, scrotal swelling, testicular pain and urgency.   Musculoskeletal: Negative for arthralgias, back pain and joint swelling.   Allergic/Immunologic: Negative.    Neurological: Negative for dizziness, seizures, syncope, weakness, light-headedness, numbness and headaches.   Hematological: Negative for adenopathy. Does not bruise/bleed easily.   Psychiatric/Behavioral: Negative for confusion and sleep disturbance. The patient is not nervous/anxious.    All other systems reviewed and are negative.      There were no vitals filed for this visit.    Physical Exam   Constitutional: He is oriented to person, place, and time. He appears well-developed and well-nourished. No distress.   HENT:   Head: Normocephalic and atraumatic. Hair is normal.   Mouth/Throat: Oropharynx is clear and moist.   Eyes: Conjunctivae, EOM and lids are normal. Pupils are equal.   Neck: Neck supple. No JVD present. No thyroid mass and no thyromegaly present.   Cardiovascular: Normal rate and regular rhythm. Exam reveals no gallop and no friction rub.   Murmur heard.   Systolic murmur is present with a grade of 2/6.  Pulmonary/Chest: Effort normal and breath sounds normal. No respiratory distress. He has no wheezes. He has no rales.   Benign-appearing Mediport   Abdominal: Soft. He exhibits no distension and no mass. There is no splenomegaly or hepatomegaly. There is no tenderness. There is no guarding.   Musculoskeletal:  Normal range of motion. He exhibits no edema, tenderness or deformity.   Lymphadenopathy:     He has no cervical adenopathy.     He has no axillary adenopathy.        Right: No inguinal and no supraclavicular adenopathy present.        Left: No inguinal and no supraclavicular adenopathy present.   Neurological: He is alert and oriented to person, place, and time. He has normal strength.   Skin: Skin is warm and dry. No rash noted.   Psychiatric: He has a normal mood and affect. His behavior is normal. Judgment and thought content normal. Cognition and memory are normal.   Nursing note and vitals reviewed.      DIAGNOSTIC DATA:  WBC   Date Value Ref Range Status   07/25/2019 23.74 (H) 3.40 - 10.80 10*3/mm3 Final     RBC   Date Value Ref Range Status   07/25/2019 3.73 (L) 4.14 - 5.80 10*6/mm3 Final     Hemoglobin   Date Value Ref Range Status   07/25/2019 11.2 (L) 13.0 - 17.7 g/dL Final     Hematocrit   Date Value Ref Range Status   07/25/2019 33.6 (L) 37.5 - 51.0 % Final     MCV   Date Value Ref Range Status   07/25/2019 90.1 79.0 - 97.0 fL Final     MCH   Date Value Ref Range Status   07/25/2019 30.0 26.6 - 33.0 pg Final     MCHC   Date Value Ref Range Status   07/25/2019 33.3 31.5 - 35.7 g/dL Final     RDW   Date Value Ref Range Status   07/25/2019 18.8 (H) 12.3 - 15.4 % Final     RDW-SD   Date Value Ref Range Status   07/25/2019 59.5 (H) 37.0 - 54.0 fl Final     MPV   Date Value Ref Range Status   07/25/2019 8.9 6.0 - 12.0 fL Final     Platelets   Date Value Ref Range Status   07/25/2019 292 140 - 450 10*3/mm3 Final     Neutrophil %   Date Value Ref Range Status   07/25/2019 66.3 42.7 - 76.0 % Final     Lymphocyte %   Date Value Ref Range Status   07/25/2019 5.1 (L) 19.6 - 45.3 % Final     Monocyte %   Date Value Ref Range Status   07/25/2019 8.6 5.0 - 12.0 % Final     Eosinophil %   Date Value Ref Range Status   07/25/2019 0.5 0.3 - 6.2 % Final     Basophil %   Date Value Ref Range Status   07/25/2019 0.8 0.0 -  1.5 % Final     Immature Grans %   Date Value Ref Range Status   07/25/2019 18.7 (H) 0.0 - 0.5 % Final     Neutrophils, Absolute   Date Value Ref Range Status   07/25/2019 15.73 (H) 1.70 - 7.00 10*3/mm3 Final     Lymphocytes, Absolute   Date Value Ref Range Status   07/25/2019 1.22 0.70 - 3.10 10*3/mm3 Final     Monocytes, Absolute   Date Value Ref Range Status   07/25/2019 2.04 (H) 0.10 - 0.90 10*3/mm3 Final     Eosinophils, Absolute   Date Value Ref Range Status   07/25/2019 0.13 0.00 - 0.40 10*3/mm3 Final     Basophils, Absolute   Date Value Ref Range Status   07/25/2019 0.18 0.00 - 0.20 10*3/mm3 Final     Immature Grans, Absolute   Date Value Ref Range Status   07/25/2019 4.44 (H) 0.00 - 0.05 10*3/mm3 Final     nRBC   Date Value Ref Range Status   07/25/2019 0.2 0.0 - 0.2 /100 WBC Final       Glucose   Date Value Ref Range Status   07/15/2019 204 (H) 74 - 124 mg/dL Final     Sodium   Date Value Ref Range Status   07/15/2019 131 (L) 134 - 145 mmol/L Final     Potassium   Date Value Ref Range Status   07/15/2019 4.3 3.5 - 4.7 mmol/L Final     CO2   Date Value Ref Range Status   07/15/2019 26.5 22.0 - 29.0 mmol/L Final     Chloride   Date Value Ref Range Status   07/15/2019 95 (L) 98 - 107 mmol/L Final     Anion Gap   Date Value Ref Range Status   07/15/2019 9.5 5.0 - 15.0 mmol/L Final     Creatinine   Date Value Ref Range Status   07/22/2019 0.70 0.60 - 1.30 mg/dL Final     Comment:     Serial Number: [id number]:  [id number]     BUN   Date Value Ref Range Status   07/15/2019 19 6 - 20 mg/dL Final     BUN/Creatinine Ratio   Date Value Ref Range Status   07/15/2019 29.2 7.3 - 30.0 Final     Calcium   Date Value Ref Range Status   07/15/2019 8.4 (L) 8.5 - 10.2 mg/dL Final     eGFR Non  Amer   Date Value Ref Range Status   07/15/2019 119 >60 mL/min/1.73 Final     Alkaline Phosphatase   Date Value Ref Range Status   07/15/2019 50 38 - 116 U/L Final     Total Protein   Date Value Ref Range Status   07/15/2019 5.6  "(L) 6.3 - 8.0 g/dL Final     ALT (SGPT)   Date Value Ref Range Status   07/15/2019 15 0 - 41 U/L Final     AST (SGOT)   Date Value Ref Range Status   07/15/2019 11 0 - 40 U/L Final     Total Bilirubin   Date Value Ref Range Status   07/15/2019 0.5 0.2 - 1.2 mg/dL Final     Albumin   Date Value Ref Range Status   07/15/2019 3.40 (L) 3.50 - 5.20 g/dL Final     Globulin   Date Value Ref Range Status   07/15/2019 2.2 1.8 - 3.5 gm/dL Final         IMAGING:  CT imaging of the neck chest abdomen and pelvis from 7/22/2019 shows a nice response to therapy.  Decreasing lymphadenopathy.  Images personally reviewed.     IMPRESSION:  1. Significant interval improvement. There has been near-complete  resolution of the perinephric lesions and significant decrease in the  left paraspinal mass at L5. The mesenteric lymphadenopathy has  decreased. The shotty mediastinal nodes are slightly smaller and there  is near-complete resolution of the right posterior chest wall mass. Also  resolution of a single enlarged left cervical node. There has also been  resolution of a 1.5 cm right upper lobe nodular opacity which was  hypermetabolic.  2. Significantly thickened gastric folds. The stomach was collapsed on  all of the previous CT examinations which limits comparison. Please  correlate clinically for gastritis.    ASSESSMENT:  This is a 79 y.o. male with:  1.  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  His LP and bone marrow biopsies are negative for involvement.  However, his PET scan indicates perinephric soft tissue lesions. hypermetabolic mesenteric lymphadenopathy, left L5 paraspinous mass, an intensely hypermetabolic 6 cm intramuscular mass in the upper right thigh, a right upper lobe nodular opacity, a hypermetabolic mass adjacent to the right scapula, and a lesion within the left calvicle.  Paraspinous involvement puts him at " extremely high risk for CNS involvement.       Plan 6 cycles of inpatient treatment with dose adjusted R-EPOCH.  Plan CNS prophylaxis with IT methotrexate with each cycle of therapy.     Admission to [facility] on 6/19/2019 for cycle #1 of therapy.     He received Neulasta on discharge on 6/22/2019.     His lowest ANC was 0.04 on 6/27 with a platelet count of 47,000 that day.  He was only neutropenic on one day and his WBC and platelet count improved rapidly.     Admit 7/8 for cycle #2.  Per the protocol with neutropenia x1 day, we proceeded with the same doses as cycle #1.  Slight dose adjustment for his decreased weight.     Cycle #3 due 7/29/2019.  Lumbar puncture scheduled that day.  Labs for flow cytometry.   Per the protocol, his doses of Adriamycin, etoposide, and Cytoxan could be increased by 20%.  I will discuss this with the pharmacist.     2.  Rheumatoid arthritis previously on Cimzia (certolizumab) injections by Dr. Velásquez. Plan to discontinue these during chemotherapy.  He was previously on Simponi Aria (golimumab) and Actemra (tocilizumab).  These certainly could contribute to the development of lymphoma.     3.  Coronary artery disease status post stenting as well as moderate aortic stenosis by echocardiogram.  His left ventricular ejection fraction is normal at 60% by echocardiogram on 5/14/2019.     4.  Anxiety:  I previously gave him a prescription for alprazolam 0.25 mg to take twice daily as needed.  Continue benzodiazepines as needed during his hospitalization.     5.  Hyponatremia: Continue to monitor.     6.  Volume overload with cycle #1: We will likely need to decrease IV fluids with this cycle.     7.  Constipation: Continue Senokot and MiraLAX daily     PLAN:   1.  Inpatient [facility]  2.  Lumbar puncture on admission with a sample to be sent for flow cytometry.  Intrathecal methotrexate 12 mg administration for CNS prophylaxis.  3.  Cycle #3 of therapy with  dose adjusted R-EPOCH to start following his LP.   4.  Benzodiazepines as needed for anxiety although he has not required any at this point.  5.  Lovenox 40 mg daily for DVT prophylaxis.  No allopurinol at this point  6.  IV fluid hydration.  We have decreased his maintenance fluids to 50 ml/hr due to volume overload with cycle #1.  7.  Continue Bactrim and acyclovir for prophylaxis.  8.  At discharge on either Friday or Saturday, Neulasta the following Monday.  This has been arranged. Twice weekly CBC on Mondays and Thursdays for 2 weeks.  This has been arranged.  Plan total of 6 cycles of therapy if tolerated with intrathecal methotrexate with each cycle for CNS prophylaxis.  9.  I will see him back in the morning of August 19 for admission for cycle #4 of therapy.    Gabriele Garcia MD

## 2019-07-26 DIAGNOSIS — C83.38 DIFFUSE LARGE B-CELL LYMPHOMA OF LYMPH NODES OF MULTIPLE REGIONS (HCC): Primary | ICD-10-CM

## 2019-07-29 ENCOUNTER — APPOINTMENT (OUTPATIENT)
Dept: LAB | Facility: HOSPITAL | Age: 79
End: 2019-07-29

## 2019-07-29 ENCOUNTER — HOSPITAL ENCOUNTER (INPATIENT)
Facility: HOSPITAL | Age: 79
LOS: 4 days | Discharge: HOME | End: 2019-08-02
Attending: INTERNAL MEDICINE | Admitting: INTERNAL MEDICINE

## 2019-07-29 ENCOUNTER — HOSPITAL ENCOUNTER (OUTPATIENT)
Dept: GENERAL RADIOLOGY | Facility: HOSPITAL | Age: 79
Discharge: HOME | End: 2019-07-29

## 2019-07-29 ENCOUNTER — APPOINTMENT (OUTPATIENT)
Dept: ONCOLOGY | Facility: CLINIC | Age: 79
End: 2019-07-29

## 2019-07-29 DIAGNOSIS — C83.38 DIFFUSE LARGE B-CELL LYMPHOMA OF LYMPH NODES OF MULTIPLE REGIONS (HCC): ICD-10-CM

## 2019-07-29 DIAGNOSIS — C83.33 DIFFUSE LARGE B-CELL LYMPHOMA OF INTRA-ABDOMINAL LYMPH NODES (HCC): Primary | ICD-10-CM

## 2019-07-29 LAB
ALBUMIN SERPL-MCNC: 3.9 G/DL (ref 3.5–5.2)
ALBUMIN/GLOB SERPL: 1.5 G/DL
ALP SERPL-CCNC: 76 U/L (ref 39–117)
ALT SERPL W P-5'-P-CCNC: 15 U/L (ref 1–41)
ANION GAP SERPL CALCULATED.3IONS-SCNC: 9.8 MMOL/L (ref 5–15)
AST SERPL-CCNC: 16 U/L (ref 1–40)
BASOPHILS # BLD AUTO: 0.1 10*3/MM3 (ref 0–0.2)
BASOPHILS NFR BLD AUTO: 0.6 % (ref 0–1.5)
BILIRUB SERPL-MCNC: 0.2 MG/DL (ref 0.2–1.2)
BUN BLD-MCNC: 21 MG/DL (ref 8–23)
BUN/CREAT SERPL: 33.9 (ref 7–25)
CALCIUM SPEC-SCNC: 8.8 MG/DL (ref 8.6–10.5)
CHLORIDE SERPL-SCNC: 99 MMOL/L (ref 98–107)
CO2 SERPL-SCNC: 27.2 MMOL/L (ref 22–29)
CREAT BLD-MCNC: 0.62 MG/DL (ref 0.76–1.27)
DEPRECATED RDW RBC AUTO: 61.3 FL (ref 37–54)
EOSINOPHIL # BLD AUTO: 0.11 10*3/MM3 (ref 0–0.4)
EOSINOPHIL NFR BLD AUTO: 0.6 % (ref 0.3–6.2)
ERYTHROCYTE [DISTWIDTH] IN BLOOD BY AUTOMATED COUNT: 18.8 % (ref 12.3–15.4)
GFR SERPL CREATININE-BSD FRML MDRD: 126 ML/MIN/1.73
GLOBULIN UR ELPH-MCNC: 2.6 GM/DL
GLUCOSE BLD-MCNC: 130 MG/DL (ref 65–99)
HCT VFR BLD AUTO: 30.7 % (ref 37.5–51)
HGB BLD-MCNC: 10.2 G/DL (ref 13–17.7)
IMM GRANULOCYTES # BLD AUTO: 0.84 10*3/MM3 (ref 0–0.05)
IMM GRANULOCYTES NFR BLD AUTO: 4.9 % (ref 0–0.5)
LYMPHOCYTES # BLD AUTO: 0.97 10*3/MM3 (ref 0.7–3.1)
LYMPHOCYTES NFR BLD AUTO: 5.7 % (ref 19.6–45.3)
MCH RBC QN AUTO: 30.2 PG (ref 26.6–33)
MCHC RBC AUTO-ENTMCNC: 33.2 G/DL (ref 31.5–35.7)
MCV RBC AUTO: 90.8 FL (ref 79–97)
MONOCYTES # BLD AUTO: 1.2 10*3/MM3 (ref 0.1–0.9)
MONOCYTES NFR BLD AUTO: 7 % (ref 5–12)
NEUTROPHILS # BLD AUTO: 13.85 10*3/MM3 (ref 1.7–7)
NEUTROPHILS NFR BLD AUTO: 81.2 % (ref 42.7–76)
NRBC BLD AUTO-RTO: 0 /100 WBC (ref 0–0.2)
PLATELET # BLD AUTO: 254 10*3/MM3 (ref 140–450)
PMV BLD AUTO: 9.2 FL (ref 6–12)
POTASSIUM BLD-SCNC: 4.6 MMOL/L (ref 3.5–5.2)
PROT SERPL-MCNC: 6.5 G/DL (ref 6–8.5)
RBC # BLD AUTO: 3.38 10*6/MM3 (ref 4.14–5.8)
REF LAB TEST METHOD: NORMAL
SODIUM BLD-SCNC: 136 MMOL/L (ref 136–145)
WBC NRBC COR # BLD: 17.07 10*3/MM3 (ref 3.4–10.8)

## 2019-07-29 PROCEDURE — 25010000002 DOXORUBICIN PER 10 MG: Performed by: INTERNAL MEDICINE

## 2019-07-29 PROCEDURE — 25010000002 RITUXIMAB 10 MG/ML SOLUTION 50 ML VIAL: Performed by: INTERNAL MEDICINE

## 2019-07-29 PROCEDURE — 25010000003 LIDOCAINE 1 % SOLUTION: Performed by: INTERNAL MEDICINE

## 2019-07-29 PROCEDURE — 88185 FLOWCYTOMETRY/TC ADD-ON: CPT

## 2019-07-29 PROCEDURE — 88184 FLOWCYTOMETRY/ TC 1 MARKER: CPT | Performed by: INTERNAL MEDICINE

## 2019-07-29 PROCEDURE — 96450 CHEMOTHERAPY INTO CNS: CPT

## 2019-07-29 PROCEDURE — 25010000002 ETOPOSIDE 100 MG/5ML SOLUTION 5 ML VIAL: Performed by: INTERNAL MEDICINE

## 2019-07-29 PROCEDURE — 80053 COMPREHEN METABOLIC PANEL: CPT | Performed by: INTERNAL MEDICINE

## 2019-07-29 PROCEDURE — 25010000002 VINCRISTINE PER 1 MG: Performed by: INTERNAL MEDICINE

## 2019-07-29 PROCEDURE — 85025 COMPLETE CBC W/AUTO DIFF WBC: CPT | Performed by: INTERNAL MEDICINE

## 2019-07-29 PROCEDURE — 99223 1ST HOSP IP/OBS HIGH 75: CPT | Performed by: INTERNAL MEDICINE

## 2019-07-29 PROCEDURE — 63710000001 PREDNISONE PER 5 MG: Performed by: INTERNAL MEDICINE

## 2019-07-29 PROCEDURE — 25010000002 DIPHENHYDRAMINE PER 50 MG: Performed by: INTERNAL MEDICINE

## 2019-07-29 PROCEDURE — 25010000002 ONDANSETRON PER 1 MG: Performed by: INTERNAL MEDICINE

## 2019-07-29 PROCEDURE — 77003 FLUOROGUIDE FOR SPINE INJECT: CPT

## 2019-07-29 PROCEDURE — 25010000002 RITUXIMAB 10 MG/ML SOLUTION 10 ML VIAL: Performed by: INTERNAL MEDICINE

## 2019-07-29 RX ORDER — DIPHENHYDRAMINE HYDROCHLORIDE 50 MG/ML
50 INJECTION INTRAMUSCULAR; INTRAVENOUS AS NEEDED
Status: DISCONTINUED | OUTPATIENT
Start: 2019-07-29 | End: 2019-08-02 | Stop reason: HOSPADM

## 2019-07-29 RX ORDER — SULFAMETHOXAZOLE AND TRIMETHOPRIM 800; 160 MG/1; MG/1
1 TABLET ORAL 3 TIMES WEEKLY
Status: DISCONTINUED | OUTPATIENT
Start: 2019-07-29 | End: 2019-08-02 | Stop reason: HOSPADM

## 2019-07-29 RX ORDER — LISINOPRIL 10 MG/1
10 TABLET ORAL DAILY
Status: DISCONTINUED | OUTPATIENT
Start: 2019-07-29 | End: 2019-08-02 | Stop reason: HOSPADM

## 2019-07-29 RX ORDER — ACYCLOVIR 200 MG/1
400 CAPSULE ORAL 2 TIMES DAILY
Status: DISCONTINUED | OUTPATIENT
Start: 2019-07-29 | End: 2019-08-02 | Stop reason: HOSPADM

## 2019-07-29 RX ORDER — PANTOPRAZOLE SODIUM 40 MG/1
40 TABLET, DELAYED RELEASE ORAL EVERY MORNING
Status: DISCONTINUED | OUTPATIENT
Start: 2019-07-29 | End: 2019-08-02 | Stop reason: HOSPADM

## 2019-07-29 RX ORDER — PROCHLORPERAZINE MALEATE 10 MG
10 TABLET ORAL EVERY 6 HOURS PRN
Status: DISCONTINUED | OUTPATIENT
Start: 2019-07-29 | End: 2019-08-02 | Stop reason: HOSPADM

## 2019-07-29 RX ORDER — LIDOCAINE HYDROCHLORIDE 10 MG/ML
10 INJECTION, SOLUTION INFILTRATION; PERINEURAL ONCE
Status: COMPLETED | OUTPATIENT
Start: 2019-07-29 | End: 2019-07-29

## 2019-07-29 RX ORDER — CETIRIZINE HYDROCHLORIDE 10 MG/1
10 TABLET ORAL DAILY
Status: DISCONTINUED | OUTPATIENT
Start: 2019-07-29 | End: 2019-08-02 | Stop reason: HOSPADM

## 2019-07-29 RX ORDER — SODIUM CHLORIDE 0.9 % (FLUSH) 0.9 %
20 SYRINGE (ML) INJECTION AS NEEDED
Status: DISCONTINUED | OUTPATIENT
Start: 2019-07-29 | End: 2019-08-02 | Stop reason: HOSPADM

## 2019-07-29 RX ORDER — PREDNISONE 50 MG/1
100 TABLET ORAL 2 TIMES DAILY WITH MEALS
Status: DISCONTINUED | OUTPATIENT
Start: 2019-07-29 | End: 2019-08-02 | Stop reason: HOSPADM

## 2019-07-29 RX ORDER — PANTOPRAZOLE SODIUM 40 MG/1
40 TABLET, DELAYED RELEASE ORAL
Status: DISCONTINUED | OUTPATIENT
Start: 2019-07-29 | End: 2019-07-30

## 2019-07-29 RX ORDER — ATORVASTATIN CALCIUM 20 MG/1
40 TABLET, FILM COATED ORAL DAILY
Status: DISCONTINUED | OUTPATIENT
Start: 2019-07-29 | End: 2019-08-02 | Stop reason: HOSPADM

## 2019-07-29 RX ORDER — ACETAMINOPHEN 325 MG/1
650 TABLET ORAL ONCE
Status: COMPLETED | OUTPATIENT
Start: 2019-07-29 | End: 2019-07-29

## 2019-07-29 RX ORDER — ACETAMINOPHEN 325 MG/1
650 TABLET ORAL EVERY 4 HOURS PRN
Status: DISCONTINUED | OUTPATIENT
Start: 2019-07-29 | End: 2019-08-02 | Stop reason: HOSPADM

## 2019-07-29 RX ORDER — SODIUM CHLORIDE 0.9 % (FLUSH) 0.9 %
10 SYRINGE (ML) INJECTION AS NEEDED
Status: DISCONTINUED | OUTPATIENT
Start: 2019-07-29 | End: 2019-08-02 | Stop reason: HOSPADM

## 2019-07-29 RX ORDER — SENNA AND DOCUSATE SODIUM 50; 8.6 MG/1; MG/1
1 TABLET, FILM COATED ORAL DAILY
Status: DISCONTINUED | OUTPATIENT
Start: 2019-07-29 | End: 2019-08-02 | Stop reason: HOSPADM

## 2019-07-29 RX ORDER — SODIUM CHLORIDE 9 MG/ML
50 INJECTION, SOLUTION INTRAVENOUS CONTINUOUS
Status: DISCONTINUED | OUTPATIENT
Start: 2019-07-29 | End: 2019-08-02 | Stop reason: HOSPADM

## 2019-07-29 RX ORDER — SODIUM CHLORIDE 0.9 % (FLUSH) 0.9 %
10 SYRINGE (ML) INJECTION EVERY 12 HOURS SCHEDULED
Status: DISCONTINUED | OUTPATIENT
Start: 2019-07-29 | End: 2019-08-02 | Stop reason: HOSPADM

## 2019-07-29 RX ORDER — MEPERIDINE HYDROCHLORIDE 50 MG/ML
25 INJECTION INTRAMUSCULAR; INTRAVENOUS; SUBCUTANEOUS
Status: ACTIVE | OUTPATIENT
Start: 2019-07-29 | End: 2019-07-30

## 2019-07-29 RX ORDER — ONDANSETRON 2 MG/ML
4 INJECTION INTRAMUSCULAR; INTRAVENOUS EVERY 6 HOURS PRN
Status: DISCONTINUED | OUTPATIENT
Start: 2019-07-29 | End: 2019-08-02 | Stop reason: HOSPADM

## 2019-07-29 RX ORDER — FAMOTIDINE 10 MG/ML
20 INJECTION, SOLUTION INTRAVENOUS AS NEEDED
Status: DISCONTINUED | OUTPATIENT
Start: 2019-07-29 | End: 2019-08-02 | Stop reason: HOSPADM

## 2019-07-29 RX ADMIN — SULFAMETHOXAZOLE AND TRIMETHOPRIM 160 MG: 800; 160 TABLET ORAL at 14:36

## 2019-07-29 RX ADMIN — PANTOPRAZOLE SODIUM 40 MG: 40 TABLET, DELAYED RELEASE ORAL at 14:36

## 2019-07-29 RX ADMIN — SODIUM CHLORIDE, PRESERVATIVE FREE 10 ML: 5 INJECTION INTRAVENOUS at 21:31

## 2019-07-29 RX ADMIN — SODIUM CHLORIDE 75 ML/HR: 9 INJECTION, SOLUTION INTRAVENOUS at 12:48

## 2019-07-29 RX ADMIN — LINAGLIPTIN 5 MG: 5 TABLET, FILM COATED ORAL at 14:36

## 2019-07-29 RX ADMIN — LIDOCAINE HYDROCHLORIDE 1 ML: 10 INJECTION, SOLUTION INFILTRATION; PERINEURAL at 11:13

## 2019-07-29 RX ADMIN — ATORVASTATIN CALCIUM 40 MG: 20 TABLET, FILM COATED ORAL at 14:35

## 2019-07-29 RX ADMIN — ONDANSETRON 16 MG: 2 INJECTION INTRAMUSCULAR; INTRAVENOUS at 15:21

## 2019-07-29 RX ADMIN — ACETAMINOPHEN 650 MG: 325 TABLET, FILM COATED ORAL at 12:48

## 2019-07-29 RX ADMIN — VINCRISTINE SULFATE: 1 INJECTION, SOLUTION INTRAVENOUS at 15:45

## 2019-07-29 RX ADMIN — RITUXIMAB 650 MG: 10 INJECTION, SOLUTION INTRAVENOUS at 13:49

## 2019-07-29 RX ADMIN — ACYCLOVIR 400 MG: 200 CAPSULE ORAL at 21:30

## 2019-07-29 RX ADMIN — SENNOSIDES AND DOCUSATE SODIUM 1 TABLET: 8.6; 5 TABLET ORAL at 14:36

## 2019-07-29 RX ADMIN — DIPHENHYDRAMINE HYDROCHLORIDE 25 MG: 50 INJECTION, SOLUTION INTRAMUSCULAR; INTRAVENOUS at 12:48

## 2019-07-29 RX ADMIN — CETIRIZINE HYDROCHLORIDE 10 MG: 10 TABLET, FILM COATED ORAL at 14:36

## 2019-07-29 RX ADMIN — PREDNISONE 100 MG: 50 TABLET ORAL at 18:18

## 2019-07-29 RX ADMIN — ACYCLOVIR 400 MG: 200 CAPSULE ORAL at 18:18

## 2019-07-29 NOTE — H&P (VIEW-ONLY)
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  He received 2 cycles of chemotherapy with dose adjusted R-EPOCH with Neulasta support.  He tolerated the chemotherapy treatment well.  Cycle #2 was given with the same dose as cycle #1.  Although he developed thrombocytopenia, lowest platelet count was 57,000.    Patient had a right thigh mass due to the lymphoma.  This mass has resolved.  He had CT scans on 7/22/2019 which showed significant interval improvement.  There was near complete resolution of the perinephric lesions and the left paraspinal mass at L5.  Mesenteric lymphadenopathy and mediastinal lymph nodes also decreased in size and there was near complete resolution of the right posterior chest wall mass.  There was resolution of a left cervical lymph node and a 1.5 cm right upper lobe nodular opacity.    Patient is on prophylaxis with acyclovir and Bactrim.    Patient reports no headaches or nausea.  He reports fatigue.  No problems with bleeding or bruising.  He is no longer feeling the mass in the right thigh.  He is not feeling any new masses.    ONCOLOGIC HISTORY:  He has a history of rheumatoid arthritis currently on injections by Dr. Velásquez, type 2 diabetes on oral medication, coronary artery disease status post stenting x2, aortic stenosis, and nonmelanoma skin cancer resected from his face.  Continues to smoke a little bit less than a half a pack per day and has done this for several decades.     He has a long history of chronic back pain.  In March 2019 he developed some low back pain on the left.  With some physical therapy this is actually improved significantly but over the past couple of weeks he has developed some right-sided back pain.  Actually over the past couple of days this has essentially resolved.  He denies any radiculopathy.  He has chronic vision changes but nothing  acute.  He denies any headaches.  No other focal neurologic changes.  He has occasional lightheadedness but this is been a chronic problem for him.  No fevers.  He is cold a lot but no rigors.  No night sweats.  He has lost a little bit of weight which she relates to his rheumatoid arthritis and decreased appetite secondary to pain.     He had an MRI of his lumbar spine on 4/25/2019 showing some degenerative changes but also there was a soft tissue mass extending posteriorly from the left common iliac vessels hugging the lateral margin of the L5 vertebrae extending from the left lateral margin of L4-5 interspace to L5-S1 where it pressed on the left L5 nerve root.  The mass measures 4.4 x 2.1 x 3.5 cm.  CT imaging was suggested and performed on 5/8/2019.  The CT scan showed multiple soft tissue nodules in the perirenal fat on the left adjacent to the kidney and a soft tissue mass interposed between the left psoas muscle and spine.  The mass measures 4.1 x 2.1 cm.     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     He was seen initially on 5/28/2019.  PET scan, LP, and a bone marrow aspiration and biopsy were requested.     LDH was 341 with uric acid 4.4.        Bone marrow biopsy on 5/29/2019:  normocellular marrow with no evidence for lymphoma.  Flow cytometry negative.  Karyotype pending as of 6/7.       PET scan on 5/20/2019:  IMPRESSION:  1. There are hypermetabolic perinephric soft tissue lesions,  hypermetabolic mesenteric lymphadenopathy, left paraspinal mass at L5,  as well as an intensely hypermetabolic approximately 6 cm intramuscular  mass within the visualized right upper thigh. There is no hypermetabolic  lymphadenopathy within the abdomen or pelvis, but the shotty mediastinal  and hilar nodes are hypermetabolic and there is also a hypermetabolic  right  upper lobe nodular opacity. In addition, there is an intensely  hypermetabolic approximately 5 cm intramuscular mass at the right upper  back and there is a tiny hypermetabolic lesion within the left clavicle.  2. Shotty cervical nodes have low level activity.  3. Uncertain significance of the more than typical metabolic activity at  dependent atelectatic change at both lower lobes.     LP on 5/31/2019:  Cytology and flow cytometry negative.     Mediport placed in the right subclavian area by Dr. Herrera on 6/12/2019.     Plan to start therapy with dose adjusted R–EPOCH with CNS prophylaxis with intrathecal methotrexate on 6/17/2019.     He previously had an echocardiogram on 5/15/2019 showing a normal left ventricular ejection fraction of 60%.     Admission to [facility] on 6/19/2019 for cycle #1 of therapy.     He received Neulasta on discharge on 6/22/2019.     His lowest ANC was 0.04 on 6/27 with a platelet count of 47,000 that day.  He was only neutropenic on one day and his WBC and platelet count improved rapidly.     Admission for cycle #2 on 7/8/2019.  We proceeded with the same doses per the protocol.        Past Medical History:   Diagnosis Date   • Allergic rhinitis    • Aortic valve stenosis     Mild to moderate AS by echo on 8/8/17   • CAD (coronary artery disease)    • Cancer (CMS/HCC)     LYMPHOMA   • Carotid arterial disease (CMS/HCC)     Followed by Dr. Floyd.  Carotid Doppler on 9/21/17: 60-70% DENISE, 50-60% LICA.   • Chronic obstructive lung disease (CMS/HCC)    • Esophageal reflux     With a history of erosive gastritis   • Hyperlipidemia    • Hypertension    • Iron deficiency    • Legally blind     PT WEARS GLASSES    • Pulmonary fibrosis (CMS/HCC)     By CT scan on 8/12/15   • PVC (premature ventricular contraction)    • Rheumatoid arthritis (CMS/HCC)    • Skin cancer of face     15 year ago on left side of face   • Steroid-induced diabetes mellitus (CMS/HCC)    • Tobacco use         Past Surgical History:   Procedure Laterality Date   • BACK SURGERY  2010    L4-L5 discectomy   • CATARACT EXTRACTION Bilateral 1979   • CORONARY ANGIOPLASTY WITH STENT PLACEMENT  2015   • VASECTOMY  1979   • VENOUS ACCESS DEVICE (PORT) INSERTION N/A 6/12/2019    Procedure: INSERTION RIGHT VENOUS ACCESS DEVICE;  Surgeon: Philippe Herrera Jr., MD;  Location: [facility];  Service: General       Social History     Socioeconomic History   • Marital status:      Spouse name: Radha   • Number of children: Not on file   • Years of education: High school   • Highest education level: Not on file   Occupational History   • Occupation:      Employer: RETIRED     Comment: [employer]   Tobacco Use   • Smoking status: Current Every Day Smoker     Packs/day: 0.50     Years: 50.00     Pack years: 25.00     Types: Cigarettes   • Smokeless tobacco: Never Used   • Tobacco comment: 1/2 ppd, discussed benefits of cessation   Substance and Sexual Activity   • Alcohol use: No     Comment: no caffeine   • Drug use: No   • Sexual activity: Defer       Cancer-related family history is not on file.    SCHEDULED MEDS:    enoxaparin 40 mg Subcutaneous Q24H   methotrexate INTRATHECAL chemo injection 12 mg Intrathecal Once   sodium chloride 10 mL Intravenous Q12H     INFUSIONS:     PRN MEDS:  •  acetaminophen  •  heparin flush (porcine)  •  sodium chloride  •  sodium chloride    REVIEW OF SYSTEMS:  GENERAL: Fatigue.  Negative for fever, chills, weight loss.   SKIN: Negative for skin rash or lesion.  HEME/LYMPH: Negative for easy bruisability or enlarged lymph nodes.  EYES:  Negative for vision changes.  ENT:  Negative for mouth sores, mouth bleeding, epistaxis or hoarseness.  RESPIRATORY:  Negative for shortness of breath, cough, sputum or hemoptysis.   CVS:  Negative for chest pain, lower extremity swelling.   GI:  Negative for abdominal pain, nausea, vomiting, constipation, diarrhea, hematochezia or melena.   :   "Negative for hematuria, dysuria or increased frequency.   MUSCULOSKELETAL:  Negative for back pain.  NEUROLOGICAL:  Negative for headaches, dizziness or numbness.  PSYCHIATRIC:  Negative for anxiety or depression.     Objective   VITAL SIGNS:  Vitals:    07/29/19 0751 07/29/19 0752   BP: 99/60    BP Location: Right arm    Patient Position: Sitting    Pulse: 97    Resp: 16    Temp: 97 °F (36.1 °C)    TempSrc: Oral    SpO2: 98%    Weight: 61.6 kg (135 lb 12.8 oz) 60.5 kg (133 lb 6.4 oz)   Height: 170.2 cm (67\")        Wt Readings from Last 3 Encounters:   07/29/19 60.5 kg (133 lb 6.4 oz)   07/25/19 63.8 kg (140 lb 11.2 oz)   07/08/19 62 kg (136 lb 9.6 oz)       PHYSICAL EXAMINATION:  GENERAL:  The patient appears in good general condition, not in acute distress.  SKIN: Warm and dry. No skin rashes, ecchymosis or petechiae.  HEAD:  Normocephalic.  EYES:  No Jaundice. No Pallor. Pupils equal. EOMI.  MOUTH: No Ulcers. No Thrush. No Exudates.  NECK:  Supple with Good ROM. No Thyromegaly. No Masses.  LYMPHATICS:  No cervical or supraclavicular lymphadenopathy.  CHEST: Normal respiratory effort. Lungs clear to auscultation.  Port in the right upper chest appears benign.  CARDIAC:  Normal S1 & S2.  Ejection systolic murmur grade 3 the aortic area.  ABDOMEN:  Soft. No tenderness. No Hepatomegaly. No Splenomegaly. No masses.  EXTREMITIES:  No arthritic changes. No Edema. No Calf tenderness.  NEUROLOGICAL:  No Focal neurological deficits.         RESULT REVIEW:  Results from last 7 days   Lab Units 07/29/19  0957 07/25/19  1201 07/22/19  1236   WBC 10*3/mm3 17.07* 23.74* 19.80*   NEUTROS ABS 10*3/mm3 13.85* 15.73* 12.32*   HEMOGLOBIN g/dL 10.2* 11.2* 10.5*   HEMATOCRIT % 30.7* 33.6* 31.1*   PLATELETS 10*3/mm3 254 292 168     Results from last 7 days   Lab Units 07/29/19  0957 07/22/19  1414   SODIUM mmol/L 136  --    POTASSIUM mmol/L 4.6  --    CHLORIDE mmol/L 99  --    CO2 mmol/L 27.2  --    BUN mg/dL 21  --    CREATININE " "mg/dL 0.62* 0.70   CALCIUM mg/dL 8.8  --    ALBUMIN g/dL 3.90  --    BILIRUBIN mg/dL 0.2  --    ALK PHOS U/L 76  --    ALT (SGPT) U/L 15  --    AST (SGOT) U/L 16  --              Assessment/Plan   1.  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  His LP and bone marrow biopsies are negative for involvement.  However, his PET scan indicates perinephric soft tissue lesions. hypermetabolic mesenteric lymphadenopathy, left L5 paraspinous mass, an intensely hypermetabolic 6 cm intramuscular mass in the upper right thigh, a right upper lobe nodular opacity, a hypermetabolic mass adjacent to the right scapula, and a lesion within the left calvicle.  Paraspinous involvement puts him at extremely high risk for CNS involvement.       Plan 6 cycles of inpatient treatment with dose adjusted R–EPOCH.  Plan CNS prophylaxis with IT methotrexate with each cycle of therapy.     Admission on 6/19/2019 for cycle #1 of therapy with Neulasta support.      His lowest ANC was 0.04 on 6/27 with a platelet count of 47,000 that day.  He was only neutropenic on one day and his WBC and platelet count improved rapidly.     Admission on 7/8 for cycle #2.  Per the protocol with neutropenia x1 day, we proceeded with the same doses as cycle #1.  He had a slight dose adjustment for his decreased weight.    CT scans on 7/22/2019 after 2 cycles of chemotherapy showed evidence of response.  All of the areas of lymphoma showed evidence of improvement.  In addition, a right thigh mass resolved since the start of treatment.     2.  Rheumatoid arthritis.  He was previously on Cimzia. They were discontinued due to the patient receiving chemotherapy.    3.  Coronary disease.  Status post stent.  Echocardiogram showed moderate aortic stenosis.  Ejection fraction was 60% on 5/14/2019.     4.  Anxiety.  He is on alprazolam 0.25 mg twice daily as needed.    5.  " History of hyponatremia, resolved.  Patient developed volume overload with cycle #1.    6.  Constipation.  He is on Senokot and MiraLAX daily.     PLAN:    1.  Patient is admitted to [facility] for cycle #3 of chemotherapy.  2.  Patient will have LP today with IT Methotrexate.  CSF will be sent for flow cytometry.  3.  The dose of Adriamycin, Etoposide and Cytoxan will be increased by 20% as per the protocol.   4.  Patient will receive Neulasta on 8/5/19 as outpatient.  5.  Patient will have DVT prophylaxis with Lovenox 40 mg subcutaneously daily starting tomorrow 7/30/2019.  6.  Patient's IV hydration will be at 50 mL/h due to prior problem with volume overload.  7.  Continue Bactrim and acyclovir for prophylaxis.      Stevie Talely MD  07/29/19

## 2019-07-30 LAB
ALBUMIN SERPL-MCNC: 3.9 G/DL (ref 3.5–5.2)
ALBUMIN/GLOB SERPL: 1.7 G/DL
ALP SERPL-CCNC: 67 U/L (ref 39–117)
ALT SERPL W P-5'-P-CCNC: 17 U/L (ref 1–41)
ANION GAP SERPL CALCULATED.3IONS-SCNC: 10.9 MMOL/L (ref 5–15)
AST SERPL-CCNC: 16 U/L (ref 1–40)
BASOPHILS # BLD AUTO: 0.05 10*3/MM3 (ref 0–0.2)
BASOPHILS NFR BLD AUTO: 0.3 % (ref 0–1.5)
BILIRUB SERPL-MCNC: 0.2 MG/DL (ref 0.2–1.2)
BUN BLD-MCNC: 19 MG/DL (ref 8–23)
BUN/CREAT SERPL: 31.1 (ref 7–25)
CALCIUM SPEC-SCNC: 8.3 MG/DL (ref 8.6–10.5)
CHLORIDE SERPL-SCNC: 99 MMOL/L (ref 98–107)
CO2 SERPL-SCNC: 23.1 MMOL/L (ref 22–29)
CREAT BLD-MCNC: 0.61 MG/DL (ref 0.76–1.27)
DEPRECATED RDW RBC AUTO: 62 FL (ref 37–54)
EOSINOPHIL # BLD AUTO: 0 10*3/MM3 (ref 0–0.4)
EOSINOPHIL NFR BLD AUTO: 0 % (ref 0.3–6.2)
ERYTHROCYTE [DISTWIDTH] IN BLOOD BY AUTOMATED COUNT: 18.6 % (ref 12.3–15.4)
GFR SERPL CREATININE-BSD FRML MDRD: 128 ML/MIN/1.73
GLOBULIN UR ELPH-MCNC: 2.3 GM/DL
GLUCOSE BLD-MCNC: 186 MG/DL (ref 65–99)
HCT VFR BLD AUTO: 32.3 % (ref 37.5–51)
HGB BLD-MCNC: 10.4 G/DL (ref 13–17.7)
IMM GRANULOCYTES # BLD AUTO: 0.47 10*3/MM3 (ref 0–0.05)
IMM GRANULOCYTES NFR BLD AUTO: 2.8 % (ref 0–0.5)
LYMPHOCYTES # BLD AUTO: 0.3 10*3/MM3 (ref 0.7–3.1)
LYMPHOCYTES NFR BLD AUTO: 1.8 % (ref 19.6–45.3)
MCH RBC QN AUTO: 29.6 PG (ref 26.6–33)
MCHC RBC AUTO-ENTMCNC: 32.2 G/DL (ref 31.5–35.7)
MCV RBC AUTO: 92 FL (ref 79–97)
MONOCYTES # BLD AUTO: 0.14 10*3/MM3 (ref 0.1–0.9)
MONOCYTES NFR BLD AUTO: 0.8 % (ref 5–12)
NEUTROPHILS # BLD AUTO: 15.61 10*3/MM3 (ref 1.7–7)
NEUTROPHILS NFR BLD AUTO: 94.3 % (ref 42.7–76)
NRBC BLD AUTO-RTO: 0 /100 WBC (ref 0–0.2)
PLATELET # BLD AUTO: 227 10*3/MM3 (ref 140–450)
PMV BLD AUTO: 9.1 FL (ref 6–12)
POTASSIUM BLD-SCNC: 4.4 MMOL/L (ref 3.5–5.2)
PROT SERPL-MCNC: 6.2 G/DL (ref 6–8.5)
RBC # BLD AUTO: 3.51 10*6/MM3 (ref 4.14–5.8)
SODIUM BLD-SCNC: 133 MMOL/L (ref 136–145)
WBC NRBC COR # BLD: 16.57 10*3/MM3 (ref 3.4–10.8)

## 2019-07-30 PROCEDURE — 63710000001 PREDNISONE PER 5 MG: Performed by: INTERNAL MEDICINE

## 2019-07-30 PROCEDURE — 25010000002 ENOXAPARIN PER 10 MG: Performed by: INTERNAL MEDICINE

## 2019-07-30 PROCEDURE — 80053 COMPREHEN METABOLIC PANEL: CPT | Performed by: INTERNAL MEDICINE

## 2019-07-30 PROCEDURE — 25010000002 DOXORUBICIN PER 10 MG: Performed by: INTERNAL MEDICINE

## 2019-07-30 PROCEDURE — 25010000002 ETOPOSIDE 100 MG/5ML SOLUTION 5 ML VIAL: Performed by: INTERNAL MEDICINE

## 2019-07-30 PROCEDURE — 85025 COMPLETE CBC W/AUTO DIFF WBC: CPT | Performed by: INTERNAL MEDICINE

## 2019-07-30 PROCEDURE — 25010000002 VINCRISTINE PER 1 MG: Performed by: INTERNAL MEDICINE

## 2019-07-30 PROCEDURE — 25010000002 ONDANSETRON PER 1 MG: Performed by: INTERNAL MEDICINE

## 2019-07-30 PROCEDURE — 99233 SBSQ HOSP IP/OBS HIGH 50: CPT | Performed by: INTERNAL MEDICINE

## 2019-07-30 RX ORDER — POLYETHYLENE GLYCOL 3350 17 G/17G
17 POWDER, FOR SOLUTION ORAL DAILY PRN
Status: DISCONTINUED | OUTPATIENT
Start: 2019-07-30 | End: 2019-08-02 | Stop reason: HOSPADM

## 2019-07-30 RX ORDER — POLYETHYLENE GLYCOL 3350 17 G/17G
17 POWDER, FOR SOLUTION ORAL DAILY
Status: DISCONTINUED | OUTPATIENT
Start: 2019-07-30 | End: 2019-07-30

## 2019-07-30 RX ADMIN — PREDNISONE 100 MG: 50 TABLET ORAL at 09:28

## 2019-07-30 RX ADMIN — SODIUM CHLORIDE 75 ML/HR: 9 INJECTION, SOLUTION INTRAVENOUS at 02:55

## 2019-07-30 RX ADMIN — ACYCLOVIR 400 MG: 200 CAPSULE ORAL at 09:29

## 2019-07-30 RX ADMIN — ATORVASTATIN CALCIUM 40 MG: 20 TABLET, FILM COATED ORAL at 09:28

## 2019-07-30 RX ADMIN — ACYCLOVIR 400 MG: 200 CAPSULE ORAL at 21:06

## 2019-07-30 RX ADMIN — SENNOSIDES AND DOCUSATE SODIUM 1 TABLET: 8.6; 5 TABLET ORAL at 09:29

## 2019-07-30 RX ADMIN — SODIUM CHLORIDE, PRESERVATIVE FREE 10 ML: 5 INJECTION INTRAVENOUS at 09:29

## 2019-07-30 RX ADMIN — PREDNISONE 100 MG: 50 TABLET ORAL at 18:35

## 2019-07-30 RX ADMIN — ENOXAPARIN SODIUM 40 MG: 40 INJECTION SUBCUTANEOUS at 09:29

## 2019-07-30 RX ADMIN — SODIUM CHLORIDE, PRESERVATIVE FREE 10 ML: 5 INJECTION INTRAVENOUS at 21:06

## 2019-07-30 RX ADMIN — PANTOPRAZOLE SODIUM 40 MG: 40 TABLET, DELAYED RELEASE ORAL at 06:31

## 2019-07-30 RX ADMIN — SODIUM CHLORIDE 50 ML/HR: 9 INJECTION, SOLUTION INTRAVENOUS at 18:35

## 2019-07-30 RX ADMIN — ONDANSETRON 16 MG: 2 INJECTION INTRAMUSCULAR; INTRAVENOUS at 15:12

## 2019-07-30 RX ADMIN — VINCRISTINE SULFATE: 1 INJECTION, SOLUTION INTRAVENOUS at 15:41

## 2019-07-30 RX ADMIN — LINAGLIPTIN 5 MG: 5 TABLET, FILM COATED ORAL at 09:28

## 2019-07-30 RX ADMIN — CETIRIZINE HYDROCHLORIDE 10 MG: 10 TABLET, FILM COATED ORAL at 09:28

## 2019-07-30 RX ADMIN — LISINOPRIL 10 MG: 10 TABLET ORAL at 09:29

## 2019-07-31 LAB
ALBUMIN SERPL-MCNC: 3.6 G/DL (ref 3.5–5.2)
ALBUMIN/GLOB SERPL: 1.7 G/DL
ALP SERPL-CCNC: 60 U/L (ref 39–117)
ALT SERPL W P-5'-P-CCNC: 15 U/L (ref 1–41)
ANION GAP SERPL CALCULATED.3IONS-SCNC: 11.4 MMOL/L (ref 5–15)
AST SERPL-CCNC: 12 U/L (ref 1–40)
BASOPHILS # BLD AUTO: 0.03 10*3/MM3 (ref 0–0.2)
BASOPHILS NFR BLD AUTO: 0.2 % (ref 0–1.5)
BILIRUB SERPL-MCNC: 0.2 MG/DL (ref 0.2–1.2)
BUN BLD-MCNC: 16 MG/DL (ref 8–23)
BUN/CREAT SERPL: 21.9 (ref 7–25)
CALCIUM SPEC-SCNC: 8.4 MG/DL (ref 8.6–10.5)
CHLORIDE SERPL-SCNC: 99 MMOL/L (ref 98–107)
CO2 SERPL-SCNC: 21.6 MMOL/L (ref 22–29)
CREAT BLD-MCNC: 0.73 MG/DL (ref 0.76–1.27)
DEPRECATED RDW RBC AUTO: 60.3 FL (ref 37–54)
EOSINOPHIL # BLD AUTO: 0 10*3/MM3 (ref 0–0.4)
EOSINOPHIL NFR BLD AUTO: 0 % (ref 0.3–6.2)
ERYTHROCYTE [DISTWIDTH] IN BLOOD BY AUTOMATED COUNT: 18.3 % (ref 12.3–15.4)
GFR SERPL CREATININE-BSD FRML MDRD: 104 ML/MIN/1.73
GLOBULIN UR ELPH-MCNC: 2.1 GM/DL
GLUCOSE BLD-MCNC: 233 MG/DL (ref 65–99)
HCT VFR BLD AUTO: 26.6 % (ref 37.5–51)
HGB BLD-MCNC: 8.8 G/DL (ref 13–17.7)
IMM GRANULOCYTES # BLD AUTO: 0.23 10*3/MM3 (ref 0–0.05)
IMM GRANULOCYTES NFR BLD AUTO: 1.3 % (ref 0–0.5)
LYMPHOCYTES # BLD AUTO: 0.23 10*3/MM3 (ref 0.7–3.1)
LYMPHOCYTES NFR BLD AUTO: 1.3 % (ref 19.6–45.3)
MCH RBC QN AUTO: 30 PG (ref 26.6–33)
MCHC RBC AUTO-ENTMCNC: 33.1 G/DL (ref 31.5–35.7)
MCV RBC AUTO: 90.8 FL (ref 79–97)
MONOCYTES # BLD AUTO: 0.28 10*3/MM3 (ref 0.1–0.9)
MONOCYTES NFR BLD AUTO: 1.6 % (ref 5–12)
NEUTROPHILS # BLD AUTO: 16.59 10*3/MM3 (ref 1.7–7)
NEUTROPHILS NFR BLD AUTO: 95.6 % (ref 42.7–76)
NRBC BLD AUTO-RTO: 0 /100 WBC (ref 0–0.2)
PLATELET # BLD AUTO: 187 10*3/MM3 (ref 140–450)
PMV BLD AUTO: 9 FL (ref 6–12)
POTASSIUM BLD-SCNC: 4 MMOL/L (ref 3.5–5.2)
PROT SERPL-MCNC: 5.7 G/DL (ref 6–8.5)
RBC # BLD AUTO: 2.93 10*6/MM3 (ref 4.14–5.8)
SODIUM BLD-SCNC: 132 MMOL/L (ref 136–145)
WBC NRBC COR # BLD: 17.36 10*3/MM3 (ref 3.4–10.8)

## 2019-07-31 PROCEDURE — 80053 COMPREHEN METABOLIC PANEL: CPT | Performed by: INTERNAL MEDICINE

## 2019-07-31 PROCEDURE — 25010000002 VINCRISTINE PER 1 MG: Performed by: INTERNAL MEDICINE

## 2019-07-31 PROCEDURE — 25010000002 ONDANSETRON PER 1 MG: Performed by: INTERNAL MEDICINE

## 2019-07-31 PROCEDURE — 63710000001 PREDNISONE PER 5 MG: Performed by: INTERNAL MEDICINE

## 2019-07-31 PROCEDURE — 25010000002 ETOPOSIDE 500 MG/25ML SOLUTION 25 ML VIAL: Performed by: INTERNAL MEDICINE

## 2019-07-31 PROCEDURE — 85025 COMPLETE CBC W/AUTO DIFF WBC: CPT | Performed by: INTERNAL MEDICINE

## 2019-07-31 PROCEDURE — 25010000002 DOXORUBICIN PER 10 MG: Performed by: INTERNAL MEDICINE

## 2019-07-31 PROCEDURE — 25010000002 ENOXAPARIN PER 10 MG: Performed by: INTERNAL MEDICINE

## 2019-07-31 PROCEDURE — 99233 SBSQ HOSP IP/OBS HIGH 50: CPT | Performed by: INTERNAL MEDICINE

## 2019-07-31 RX ADMIN — LISINOPRIL 10 MG: 10 TABLET ORAL at 08:36

## 2019-07-31 RX ADMIN — ACYCLOVIR 400 MG: 200 CAPSULE ORAL at 21:24

## 2019-07-31 RX ADMIN — ONDANSETRON 16 MG: 2 INJECTION INTRAMUSCULAR; INTRAVENOUS at 15:13

## 2019-07-31 RX ADMIN — SULFAMETHOXAZOLE AND TRIMETHOPRIM 160 MG: 800; 160 TABLET ORAL at 08:36

## 2019-07-31 RX ADMIN — POLYETHYLENE GLYCOL 3350 17 G: 17 POWDER, FOR SOLUTION ORAL at 06:27

## 2019-07-31 RX ADMIN — ATORVASTATIN CALCIUM 40 MG: 20 TABLET, FILM COATED ORAL at 08:35

## 2019-07-31 RX ADMIN — LINAGLIPTIN 5 MG: 5 TABLET, FILM COATED ORAL at 08:35

## 2019-07-31 RX ADMIN — PANTOPRAZOLE SODIUM 40 MG: 40 TABLET, DELAYED RELEASE ORAL at 06:23

## 2019-07-31 RX ADMIN — SODIUM CHLORIDE, PRESERVATIVE FREE 10 ML: 5 INJECTION INTRAVENOUS at 21:25

## 2019-07-31 RX ADMIN — PREDNISONE 100 MG: 50 TABLET ORAL at 08:35

## 2019-07-31 RX ADMIN — ENOXAPARIN SODIUM 40 MG: 40 INJECTION SUBCUTANEOUS at 08:35

## 2019-07-31 RX ADMIN — CETIRIZINE HYDROCHLORIDE 10 MG: 10 TABLET, FILM COATED ORAL at 08:35

## 2019-07-31 RX ADMIN — SODIUM CHLORIDE, PRESERVATIVE FREE 10 ML: 5 INJECTION INTRAVENOUS at 08:36

## 2019-07-31 RX ADMIN — SENNOSIDES AND DOCUSATE SODIUM 1 TABLET: 8.6; 5 TABLET ORAL at 08:35

## 2019-07-31 RX ADMIN — PREDNISONE 100 MG: 50 TABLET ORAL at 17:52

## 2019-07-31 RX ADMIN — ACYCLOVIR 400 MG: 200 CAPSULE ORAL at 08:35

## 2019-07-31 RX ADMIN — VINCRISTINE SULFATE: 1 INJECTION, SOLUTION INTRAVENOUS at 15:38

## 2019-08-01 LAB
ALBUMIN SERPL-MCNC: 3.6 G/DL (ref 3.5–5.2)
ALBUMIN/GLOB SERPL: 2.3 G/DL
ALP SERPL-CCNC: 52 U/L (ref 39–117)
ALT SERPL W P-5'-P-CCNC: 53 U/L (ref 1–41)
ANION GAP SERPL CALCULATED.3IONS-SCNC: 10.6 MMOL/L (ref 5–15)
AST SERPL-CCNC: 33 U/L (ref 1–40)
BASOPHILS # BLD AUTO: 0.01 10*3/MM3 (ref 0–0.2)
BASOPHILS NFR BLD AUTO: 0.1 % (ref 0–1.5)
BILIRUB SERPL-MCNC: 0.2 MG/DL (ref 0.2–1.2)
BUN BLD-MCNC: 19 MG/DL (ref 8–23)
BUN/CREAT SERPL: 33.9 (ref 7–25)
CALCIUM SPEC-SCNC: 8.4 MG/DL (ref 8.6–10.5)
CHLORIDE SERPL-SCNC: 104 MMOL/L (ref 98–107)
CO2 SERPL-SCNC: 24.4 MMOL/L (ref 22–29)
CREAT BLD-MCNC: 0.56 MG/DL (ref 0.76–1.27)
DEPRECATED RDW RBC AUTO: 62.4 FL (ref 37–54)
EOSINOPHIL # BLD AUTO: 0 10*3/MM3 (ref 0–0.4)
EOSINOPHIL NFR BLD AUTO: 0 % (ref 0.3–6.2)
ERYTHROCYTE [DISTWIDTH] IN BLOOD BY AUTOMATED COUNT: 18.6 % (ref 12.3–15.4)
GFR SERPL CREATININE-BSD FRML MDRD: 141 ML/MIN/1.73
GLOBULIN UR ELPH-MCNC: 1.6 GM/DL
GLUCOSE BLD-MCNC: 237 MG/DL (ref 65–99)
GLUCOSE BLDC GLUCOMTR-MCNC: 207 MG/DL (ref 70–130)
GLUCOSE BLDC GLUCOMTR-MCNC: 281 MG/DL (ref 70–130)
GLUCOSE BLDC GLUCOMTR-MCNC: 288 MG/DL (ref 70–130)
HCT VFR BLD AUTO: 26.3 % (ref 37.5–51)
HGB BLD-MCNC: 8.7 G/DL (ref 13–17.7)
IMM GRANULOCYTES # BLD AUTO: 0.23 10*3/MM3 (ref 0–0.05)
IMM GRANULOCYTES NFR BLD AUTO: 1.6 % (ref 0–0.5)
LYMPHOCYTES # BLD AUTO: 0.2 10*3/MM3 (ref 0.7–3.1)
LYMPHOCYTES NFR BLD AUTO: 1.4 % (ref 19.6–45.3)
MCH RBC QN AUTO: 30.6 PG (ref 26.6–33)
MCHC RBC AUTO-ENTMCNC: 33.1 G/DL (ref 31.5–35.7)
MCV RBC AUTO: 92.6 FL (ref 79–97)
MONOCYTES # BLD AUTO: 0.28 10*3/MM3 (ref 0.1–0.9)
MONOCYTES NFR BLD AUTO: 1.9 % (ref 5–12)
NEUTROPHILS # BLD AUTO: 13.86 10*3/MM3 (ref 1.7–7)
NEUTROPHILS NFR BLD AUTO: 95 % (ref 42.7–76)
NRBC BLD AUTO-RTO: 0 /100 WBC (ref 0–0.2)
PLATELET # BLD AUTO: 172 10*3/MM3 (ref 140–450)
PMV BLD AUTO: 9 FL (ref 6–12)
POTASSIUM BLD-SCNC: 4 MMOL/L (ref 3.5–5.2)
PROT SERPL-MCNC: 5.2 G/DL (ref 6–8.5)
RBC # BLD AUTO: 2.84 10*6/MM3 (ref 4.14–5.8)
SODIUM BLD-SCNC: 139 MMOL/L (ref 136–145)
WBC NRBC COR # BLD: 14.58 10*3/MM3 (ref 3.4–10.8)

## 2019-08-01 PROCEDURE — 25010000002 ONDANSETRON PER 1 MG: Performed by: INTERNAL MEDICINE

## 2019-08-01 PROCEDURE — 63710000001 INSULIN LISPRO (HUMAN) PER 5 UNITS: Performed by: INTERNAL MEDICINE

## 2019-08-01 PROCEDURE — 80053 COMPREHEN METABOLIC PANEL: CPT | Performed by: INTERNAL MEDICINE

## 2019-08-01 PROCEDURE — 82962 GLUCOSE BLOOD TEST: CPT

## 2019-08-01 PROCEDURE — 36591 DRAW BLOOD OFF VENOUS DEVICE: CPT

## 2019-08-01 PROCEDURE — 25010000002 VINCRISTINE PER 1 MG: Performed by: INTERNAL MEDICINE

## 2019-08-01 PROCEDURE — 25010000002 ENOXAPARIN PER 10 MG: Performed by: INTERNAL MEDICINE

## 2019-08-01 PROCEDURE — 85025 COMPLETE CBC W/AUTO DIFF WBC: CPT | Performed by: INTERNAL MEDICINE

## 2019-08-01 PROCEDURE — 25010000002 DOXORUBICIN PER 10 MG: Performed by: INTERNAL MEDICINE

## 2019-08-01 PROCEDURE — 99233 SBSQ HOSP IP/OBS HIGH 50: CPT | Performed by: INTERNAL MEDICINE

## 2019-08-01 PROCEDURE — 25010000002 ETOPOSIDE 500 MG/25ML SOLUTION 25 ML VIAL: Performed by: INTERNAL MEDICINE

## 2019-08-01 PROCEDURE — 63710000001 PREDNISONE PER 5 MG: Performed by: INTERNAL MEDICINE

## 2019-08-01 RX ORDER — NICOTINE POLACRILEX 4 MG
15 LOZENGE BUCCAL
Status: DISCONTINUED | OUTPATIENT
Start: 2019-08-01 | End: 2019-08-02 | Stop reason: HOSPADM

## 2019-08-01 RX ORDER — DEXTROSE MONOHYDRATE 25 G/50ML
25 INJECTION, SOLUTION INTRAVENOUS
Status: DISCONTINUED | OUTPATIENT
Start: 2019-08-01 | End: 2019-08-02 | Stop reason: HOSPADM

## 2019-08-01 RX ADMIN — SENNOSIDES AND DOCUSATE SODIUM 1 TABLET: 8.6; 5 TABLET ORAL at 09:03

## 2019-08-01 RX ADMIN — ACYCLOVIR 400 MG: 200 CAPSULE ORAL at 20:57

## 2019-08-01 RX ADMIN — POLYETHYLENE GLYCOL 3350 17 G: 17 POWDER, FOR SOLUTION ORAL at 06:24

## 2019-08-01 RX ADMIN — INSULIN LISPRO 6 UNITS: 100 INJECTION, SOLUTION INTRAVENOUS; SUBCUTANEOUS at 20:58

## 2019-08-01 RX ADMIN — LISINOPRIL 10 MG: 10 TABLET ORAL at 09:01

## 2019-08-01 RX ADMIN — ATORVASTATIN CALCIUM 40 MG: 20 TABLET, FILM COATED ORAL at 09:02

## 2019-08-01 RX ADMIN — PREDNISONE 100 MG: 50 TABLET ORAL at 09:01

## 2019-08-01 RX ADMIN — LINAGLIPTIN 5 MG: 5 TABLET, FILM COATED ORAL at 09:02

## 2019-08-01 RX ADMIN — PANTOPRAZOLE SODIUM 40 MG: 40 TABLET, DELAYED RELEASE ORAL at 06:24

## 2019-08-01 RX ADMIN — ONDANSETRON 16 MG: 2 INJECTION INTRAMUSCULAR; INTRAVENOUS at 14:41

## 2019-08-01 RX ADMIN — ENOXAPARIN SODIUM 40 MG: 40 INJECTION SUBCUTANEOUS at 09:02

## 2019-08-01 RX ADMIN — PREDNISONE 100 MG: 50 TABLET ORAL at 17:09

## 2019-08-01 RX ADMIN — VINCRISTINE SULFATE: 1 INJECTION, SOLUTION INTRAVENOUS at 15:02

## 2019-08-01 RX ADMIN — INSULIN LISPRO 6 UNITS: 100 INJECTION, SOLUTION INTRAVENOUS; SUBCUTANEOUS at 17:09

## 2019-08-01 RX ADMIN — ACYCLOVIR 400 MG: 200 CAPSULE ORAL at 09:02

## 2019-08-01 RX ADMIN — SODIUM CHLORIDE, PRESERVATIVE FREE 10 ML: 5 INJECTION INTRAVENOUS at 20:57

## 2019-08-01 RX ADMIN — INSULIN LISPRO 4 UNITS: 100 INJECTION, SOLUTION INTRAVENOUS; SUBCUTANEOUS at 11:48

## 2019-08-01 RX ADMIN — CETIRIZINE HYDROCHLORIDE 10 MG: 10 TABLET, FILM COATED ORAL at 09:02

## 2019-08-01 NOTE — PLAN OF CARE
Problem: Patient Care Overview  Goal: Plan of Care Review  Outcome: Ongoing (interventions implemented as appropriate)   08/01/19 1611   Coping/Psychosocial   Plan of Care Reviewed With patient;spouse   Plan of Care Review   Progress no change   OTHER   Outcome Summary no c/o day 4 of EPOCH infusing at 23.6 cc/hr, started accuchecks ac & hs with insulin coverage     Goal: Individualization and Mutuality  Outcome: Ongoing (interventions implemented as appropriate)      Problem: Oncology Care (Adult)  Goal: Signs and Symptoms of Listed Potential Problems Will be Absent, Minimized or Managed (Oncology Care)  Outcome: Ongoing (interventions implemented as appropriate)      Problem: Chemotherapy Effects (Adult)  Goal: Signs and Symptoms of Listed Potential Problems Will be Absent, Minimized or Managed (Chemotherapy Effects)  Outcome: Ongoing (interventions implemented as appropriate)

## 2019-08-01 NOTE — PROGRESS NOTES
Subjective     CHIEF COMPLAINT:     Stage ABIEL double hit B-cell lymphoma    HISTORY OF PRESENT ILLNESS:    Patient is tolerating the treatment well.  No nausea vomiting.  He is having constipation.  He noticed some swelling in the lower extremities.      REVIEW OF SYSTEMS:  GENERAL:  Negative for fever.   HEME/LYMPH: Negative for easy bruisability.  RESPIRATORY:  Negative for shortness of breath.   CVS:  lower extremity swelling.   GI: constipation.       SCHEDULED MEDS:    acyclovir 400 mg Oral BID   atorvastatin 40 mg Oral Daily   cetirizine 10 mg Oral Daily   DOXOrubicin (ADRIAMYCIN), etoposide and vinCRIStine chemo IVPB (pediatric)  Intravenous Once   enoxaparin 40 mg Subcutaneous Q24H   insulin lispro 0-9 Units Subcutaneous 4x Daily With Meals & Nightly   linagliptin 5 mg Oral Daily   lisinopril 10 mg Oral Daily   methotrexate INTRATHECAL chemo injection 12 mg Intrathecal Once   pantoprazole 40 mg Oral QAM   predniSONE 100 mg Oral BID With Meals   sennosides-docusate sodium 1 tablet Oral Daily   sodium chloride 10 mL Intravenous Q12H   sulfamethoxazole-trimethoprim 1 tablet Oral Once per day on Mon Wed Fri     INFUSIONS:    sodium chloride 50 mL/hr Last Rate: 50 mL/hr (08/01/19 0620)     PRN MEDS:  •  acetaminophen  •  diphenhydrAMINE  •  famotidine  •  heparin flush (porcine)  •  hydrocortisone sodium succinate  •  ondansetron  •  polyethylene glycol  •  prochlorperazine  •  sodium chloride  •  sodium chloride       Objective   VITAL SIGNS:  Vitals:    07/31/19 1644 07/31/19 1948 08/01/19 0419 08/01/19 0736   BP: 124/55 127/58 146/71 135/60   BP Location: Left arm Left arm Left arm Left arm   Patient Position: Sitting Sitting Lying Sitting   Pulse: 66 64 60 62   Resp: 18 18 16 16   Temp: 97 °F (36.1 °C) 97.4 °F (36.3 °C) 97 °F (36.1 °C) 96.9 °F (36.1 °C)   TempSrc: Oral Oral Oral Oral   SpO2: 97% 99% 97% 99%   Weight:       Height:           PHYSICAL EXAMINATION:  GENERAL:  The patient appears in good general  "condition, not in acute distress.  SKIN: Warm and dry. No skin rashes. No ecchymosis.   HEAD:  Normocephalic.  Alopecia secondary to chemotherapy.  EYES:  No Jaundice. Pallor. Pupils equal. EOMI.  NECK:  Supple. No Thyromegaly. No Masses.  LYMPHATICS:  No cervical or supraclavicular lymphadenopathy.  CHEST: Normal respiratory effort. Lungs clear to auscultation.   CARDIAC:  Normal S1 & S2. ESM grade III.  Trace bilateral ankle edema.  ABDOMEN: Nondistended.  EXTREMITIES:  No clubbing. No Calf tenderness.  NEUROLOGICAL:  No Focal neurological deficits.        RESULT REVIEW:   Results from last 7 days   Lab Units 08/01/19 0415 07/31/19  0426 07/30/19  0521 07/29/19  0957 07/25/19  1201   WBC 10*3/mm3 14.58* 17.36* 16.57* 17.07* 23.74*   NEUTROS ABS 10*3/mm3 13.86* 16.59* 15.61* 13.85* 15.73*   HEMOGLOBIN g/dL 8.7* 8.8* 10.4* 10.2* 11.2*   HEMATOCRIT % 26.3* 26.6* 32.3* 30.7* 33.6*   PLATELETS 10*3/mm3 172 187 227 254 292     Results from last 7 days   Lab Units 08/01/19 0415 07/31/19  0426 07/30/19  0521   SODIUM mmol/L 139 132* 133*   POTASSIUM mmol/L 4.0 4.0 4.4   CHLORIDE mmol/L 104 99 99   CO2 mmol/L 24.4 21.6* 23.1   BUN mg/dL 19 16 19   CREATININE mg/dL 0.56* 0.73* 0.61*   CALCIUM mg/dL 8.4* 8.4* 8.3*   ALBUMIN g/dL 3.60 3.60 3.90   BILIRUBIN mg/dL 0.2 0.2 0.2   ALK PHOS U/L 52 60 67   ALT (SGPT) U/L 53* 15 17   AST (SGOT) U/L 33 12 16       Assessment/Plan   1.  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  His LP and bone marrow biopsies are negative for involvement.  However, his PET scan indicates perinephric soft tissue lesions. hypermetabolic mesenteric lymphadenopathy, left L5 paraspinous mass, an intensely hypermetabolic 6 cm intramuscular mass in the upper right thigh, a right upper lobe nodular opacity, a hypermetabolic mass adjacent to the right scapula, and a lesion within the left calvicle. "  Paraspinous involvement puts him at extremely high risk for CNS involvement.       · Plan 6 cycles of inpatient treatment with dose adjusted R-EPOCH.  Plan CNS prophylaxis with IT methotrexate with each cycle of therapy.  · Admission on 6/19/2019 for cycle #1 of therapy with Neulasta support.   · His lowest ANC was 0.04 on 6/27 with a platelet count of 47,000 that day.  He was only neutropenic on one day and his WBC and platelet count improved rapidly.  · Admission on 7/8 for cycle #2.  Per the protocol with neutropenia x1 day, we proceeded with the same doses as cycle #1.  He had a slight dose adjustment for his decreased weight.  · CT scans on 7/22/2019 after 2 cycles of chemotherapy showed evidence of response.  All of the areas of lymphoma showed evidence of improvement.  In addition, a right thigh mass resolved since the start of treatment.  · Patient was admitted on 7/29/2019 to receive cycle #3 for chemotherapy with 20% dose increase in the doses of Cytoxan, Adriamycin and etoposide as per the protocol.  · CSF flow cytometry from 7/29/2019 was negative for involvement with lymphoma.    Patient is tolerating the treatment well.  No problem with nausea or vomiting.     2.  Rheumatoid arthritis.  He was previously on Cimzia.  It discontinued due to the patient receiving chemotherapy. He is not having problem with pain.      3.  Coronary disease.  Status post stent.  Echocardiogram showed moderate aortic stenosis.  Ejection fraction was 60% on 5/14/2019.      4.  Hyponatremia.  Patient has history of hyponatremia and he developed volume overload with cycle #1.  Sodium level improved on today's lab.     5.  Constipation.  This has worsened secondary to vincristine. He is on Senokot daily.  He is also taking Miralax.    6.  Anemia due to chemotherapy.  Hemoglobin decreased due to delusional effect and it is 8.7 today.    7.  Diabetes mellitus without complications.  He developed hyperglycemia secondary to  prednisone.    8.  ID prophylaxis.  He is on acyclovir and Bactrim.      9.  Elevated liver enzymes.  This may be attributed to chemotherapy treatment.     PLAN:     1.  Day #4 of R-EPOCH today.   2.  Continue with current free water restriction.  3.  Start sliding scale insulin.  4.  Monitor liver enzymes daily.       Stevie Talley MD  08/01/19

## 2019-08-01 NOTE — PLAN OF CARE
Problem: Patient Care Overview  Goal: Plan of Care Review  Outcome: Ongoing (interventions implemented as appropriate)   08/01/19 0420   Coping/Psychosocial   Plan of Care Reviewed With patient   Plan of Care Review   Progress no change   OTHER   Outcome Summary VSS, A&Ox4, ad chris. EPOCH day 3. No c/o pain or nausea. Requesting miralax in the morning d/t slight constipation yesterday morning. Cont to monitor      Goal: Individualization and Mutuality  Outcome: Ongoing (interventions implemented as appropriate)      Problem: Oncology Care (Adult)  Goal: Signs and Symptoms of Listed Potential Problems Will be Absent, Minimized or Managed (Oncology Care)  Outcome: Ongoing (interventions implemented as appropriate)      Problem: Chemotherapy Effects (Adult)  Goal: Signs and Symptoms of Listed Potential Problems Will be Absent, Minimized or Managed (Chemotherapy Effects)  Outcome: Ongoing (interventions implemented as appropriate)

## 2019-08-02 ENCOUNTER — RESULTS ENCOUNTER (OUTPATIENT)
Dept: ONCOLOGY | Facility: CLINIC | Age: 79
End: 2019-08-02

## 2019-08-02 VITALS
WEIGHT: 133.4 LBS | HEIGHT: 67 IN | TEMPERATURE: 97 F | DIASTOLIC BLOOD PRESSURE: 75 MMHG | OXYGEN SATURATION: 96 % | HEART RATE: 63 BPM | RESPIRATION RATE: 18 BRPM | BODY MASS INDEX: 20.94 KG/M2 | SYSTOLIC BLOOD PRESSURE: 152 MMHG

## 2019-08-02 DIAGNOSIS — C83.33 DIFFUSE LARGE B-CELL LYMPHOMA OF INTRA-ABDOMINAL LYMPH NODES (HCC): ICD-10-CM

## 2019-08-02 LAB
ALBUMIN SERPL-MCNC: 3.6 G/DL (ref 3.5–5.2)
ALBUMIN/GLOB SERPL: 1.9 G/DL
ALP SERPL-CCNC: 53 U/L (ref 39–117)
ALT SERPL W P-5'-P-CCNC: 75 U/L (ref 1–41)
ANION GAP SERPL CALCULATED.3IONS-SCNC: 10.5 MMOL/L (ref 5–15)
AST SERPL-CCNC: 31 U/L (ref 1–40)
BASOPHILS # BLD AUTO: 0.01 10*3/MM3 (ref 0–0.2)
BASOPHILS NFR BLD AUTO: 0.1 % (ref 0–1.5)
BILIRUB SERPL-MCNC: 0.2 MG/DL (ref 0.2–1.2)
BUN BLD-MCNC: 20 MG/DL (ref 8–23)
BUN/CREAT SERPL: 28.6 (ref 7–25)
CALCIUM SPEC-SCNC: 8.6 MG/DL (ref 8.6–10.5)
CHLORIDE SERPL-SCNC: 101 MMOL/L (ref 98–107)
CO2 SERPL-SCNC: 24.5 MMOL/L (ref 22–29)
CREAT BLD-MCNC: 0.7 MG/DL (ref 0.76–1.27)
DEPRECATED RDW RBC AUTO: 60.4 FL (ref 37–54)
EOSINOPHIL # BLD AUTO: 0 10*3/MM3 (ref 0–0.4)
EOSINOPHIL NFR BLD AUTO: 0 % (ref 0.3–6.2)
ERYTHROCYTE [DISTWIDTH] IN BLOOD BY AUTOMATED COUNT: 18.4 % (ref 12.3–15.4)
GFR SERPL CREATININE-BSD FRML MDRD: 109 ML/MIN/1.73
GLOBULIN UR ELPH-MCNC: 1.9 GM/DL
GLUCOSE BLD-MCNC: 219 MG/DL (ref 65–99)
GLUCOSE BLDC GLUCOMTR-MCNC: 177 MG/DL (ref 70–130)
HCT VFR BLD AUTO: 25 % (ref 37.5–51)
HGB BLD-MCNC: 8.3 G/DL (ref 13–17.7)
IMM GRANULOCYTES # BLD AUTO: 0.18 10*3/MM3 (ref 0–0.05)
IMM GRANULOCYTES NFR BLD AUTO: 1.4 % (ref 0–0.5)
LYMPHOCYTES # BLD AUTO: 0.22 10*3/MM3 (ref 0.7–3.1)
LYMPHOCYTES NFR BLD AUTO: 1.7 % (ref 19.6–45.3)
MCH RBC QN AUTO: 30.4 PG (ref 26.6–33)
MCHC RBC AUTO-ENTMCNC: 33.2 G/DL (ref 31.5–35.7)
MCV RBC AUTO: 91.6 FL (ref 79–97)
MONOCYTES # BLD AUTO: 0.25 10*3/MM3 (ref 0.1–0.9)
MONOCYTES NFR BLD AUTO: 1.9 % (ref 5–12)
NEUTROPHILS # BLD AUTO: 12.57 10*3/MM3 (ref 1.7–7)
NEUTROPHILS NFR BLD AUTO: 94.9 % (ref 42.7–76)
NRBC BLD AUTO-RTO: 0.1 /100 WBC (ref 0–0.2)
PLATELET # BLD AUTO: 147 10*3/MM3 (ref 140–450)
PMV BLD AUTO: 9.2 FL (ref 6–12)
POTASSIUM BLD-SCNC: 3.7 MMOL/L (ref 3.5–5.2)
PROT SERPL-MCNC: 5.5 G/DL (ref 6–8.5)
RBC # BLD AUTO: 2.73 10*6/MM3 (ref 4.14–5.8)
SODIUM BLD-SCNC: 136 MMOL/L (ref 136–145)
WBC NRBC COR # BLD: 13.23 10*3/MM3 (ref 3.4–10.8)

## 2019-08-02 PROCEDURE — 25010000002 CYCLOPHOSPHAMIDE PER 100 MG: Performed by: INTERNAL MEDICINE

## 2019-08-02 PROCEDURE — 63710000001 PREDNISONE PER 5 MG: Performed by: INTERNAL MEDICINE

## 2019-08-02 PROCEDURE — 63710000001 INSULIN LISPRO (HUMAN) PER 5 UNITS: Performed by: INTERNAL MEDICINE

## 2019-08-02 PROCEDURE — 85025 COMPLETE CBC W/AUTO DIFF WBC: CPT | Performed by: INTERNAL MEDICINE

## 2019-08-02 PROCEDURE — 82962 GLUCOSE BLOOD TEST: CPT

## 2019-08-02 PROCEDURE — 99239 HOSP IP/OBS DSCHRG MGMT >30: CPT | Performed by: INTERNAL MEDICINE

## 2019-08-02 PROCEDURE — 80053 COMPREHEN METABOLIC PANEL: CPT | Performed by: INTERNAL MEDICINE

## 2019-08-02 PROCEDURE — 25010000002 ONDANSETRON PER 1 MG: Performed by: INTERNAL MEDICINE

## 2019-08-02 PROCEDURE — 25010000002 ENOXAPARIN PER 10 MG: Performed by: INTERNAL MEDICINE

## 2019-08-02 RX ORDER — PREDNISONE 50 MG/1
50 TABLET ORAL DAILY
Qty: 2 TABLET | Refills: 0 | Status: SHIPPED | OUTPATIENT
Start: 2019-08-02 | End: 2019-08-29

## 2019-08-02 RX ADMIN — ATORVASTATIN CALCIUM 40 MG: 20 TABLET, FILM COATED ORAL at 08:42

## 2019-08-02 RX ADMIN — SULFAMETHOXAZOLE AND TRIMETHOPRIM 160 MG: 800; 160 TABLET ORAL at 08:42

## 2019-08-02 RX ADMIN — SENNOSIDES AND DOCUSATE SODIUM 1 TABLET: 8.6; 5 TABLET ORAL at 08:42

## 2019-08-02 RX ADMIN — PREDNISONE 100 MG: 50 TABLET ORAL at 15:51

## 2019-08-02 RX ADMIN — INSULIN LISPRO 4 UNITS: 100 INJECTION, SOLUTION INTRAVENOUS; SUBCUTANEOUS at 08:43

## 2019-08-02 RX ADMIN — POLYETHYLENE GLYCOL 3350 17 G: 17 POWDER, FOR SOLUTION ORAL at 06:11

## 2019-08-02 RX ADMIN — LINAGLIPTIN 5 MG: 5 TABLET, FILM COATED ORAL at 08:42

## 2019-08-02 RX ADMIN — ENOXAPARIN SODIUM 40 MG: 40 INJECTION SUBCUTANEOUS at 08:43

## 2019-08-02 RX ADMIN — PREDNISONE 100 MG: 50 TABLET ORAL at 08:42

## 2019-08-02 RX ADMIN — CYCLOPHOSPHAMIDE 1500 MG: 1 INJECTION, POWDER, FOR SOLUTION INTRAVENOUS; ORAL at 15:00

## 2019-08-02 RX ADMIN — PANTOPRAZOLE SODIUM 40 MG: 40 TABLET, DELAYED RELEASE ORAL at 06:11

## 2019-08-02 RX ADMIN — Medication 500 UNITS: at 15:51

## 2019-08-02 RX ADMIN — SODIUM CHLORIDE 50 ML/HR: 9 INJECTION, SOLUTION INTRAVENOUS at 04:01

## 2019-08-02 RX ADMIN — CETIRIZINE HYDROCHLORIDE 10 MG: 10 TABLET, FILM COATED ORAL at 08:42

## 2019-08-02 RX ADMIN — ONDANSETRON 16 MG: 2 INJECTION INTRAMUSCULAR; INTRAVENOUS at 15:00

## 2019-08-02 RX ADMIN — INSULIN LISPRO 2 UNITS: 100 INJECTION, SOLUTION INTRAVENOUS; SUBCUTANEOUS at 12:48

## 2019-08-02 RX ADMIN — ACYCLOVIR 400 MG: 200 CAPSULE ORAL at 08:42

## 2019-08-02 RX ADMIN — LISINOPRIL 10 MG: 10 TABLET ORAL at 08:42

## 2019-08-02 NOTE — PROGRESS NOTES
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  No nausea or vomiting.  He had a bowel movement after taking MiraLAX for multiple days.      REVIEW OF SYSTEMS:  GENERAL:  Negative for fever.   HEME/LYMPH: Negative for easy bruisability.  RESPIRATORY:  Negative for shortness of breath.   CVS: Increasing lower extremity swelling.   GI: constipation has resolved.       SCHEDULED MEDS:    acyclovir 400 mg Oral BID   atorvastatin 40 mg Oral Daily   cetirizine 10 mg Oral Daily   DOXOrubicin (ADRIAMYCIN), etoposide and vinCRIStine chemo IVPB (pediatric)  Intravenous Once   enoxaparin 40 mg Subcutaneous Q24H   insulin lispro 0-9 Units Subcutaneous 4x Daily With Meals & Nightly   linagliptin 5 mg Oral Daily   lisinopril 10 mg Oral Daily   methotrexate INTRATHECAL chemo injection 12 mg Intrathecal Once   pantoprazole 40 mg Oral QAM   predniSONE 100 mg Oral BID With Meals   sennosides-docusate sodium 1 tablet Oral Daily   sodium chloride 10 mL Intravenous Q12H   sulfamethoxazole-trimethoprim 1 tablet Oral Once per day on Mon Wed Fri     INFUSIONS:    sodium chloride 50 mL/hr Last Rate: 50 mL/hr (08/02/19 0401)     PRN MEDS:  •  acetaminophen  •  dextrose  •  dextrose  •  diphenhydrAMINE  •  famotidine  •  glucagon (human recombinant)  •  heparin flush (porcine)  •  hydrocortisone sodium succinate  •  ondansetron  •  polyethylene glycol  •  prochlorperazine  •  sodium chloride  •  sodium chloride       Objective   VITAL SIGNS:  Vitals:    08/01/19 2051 08/02/19 0405 08/02/19 0841 08/02/19 0844   BP: 147/62 156/70  152/75   BP Location: Left arm Right arm     Patient Position: Sitting Lying     Pulse: 72 64 63    Resp: 18 18 18    Temp: 97.4 °F (36.3 °C) 97 °F (36.1 °C) 97 °F (36.1 °C)    TempSrc: Oral Oral Oral    SpO2: 96% 96% 96%    Weight:       Height:           PHYSICAL EXAMINATION:  GENERAL:  The patient appears  "in good general condition, not in acute distress.  SKIN: Warm and dry. No skin rashes. No ecchymosis.   HEAD:  Normocephalic.  Alopecia secondary to chemotherapy.  EYES:  No Jaundice. Pallor. Pupils equal. EOMI.  NECK:  Supple. No Thyromegaly. No Masses.  LYMPHATICS:  No cervical or supraclavicular lymphadenopathy.  CHEST: Normal respiratory effort. Lungs clear to auscultation.   CARDIAC:  Normal S1 & S2. ESM grade III.  Edema in both lower extremities.  ABDOMEN: Nondistended.  EXTREMITIES:  No clubbing. No Calf tenderness.  NEUROLOGICAL:  No Focal neurological deficits.        RESULT REVIEW:   Results from last 7 days   Lab Units 08/02/19  0402 08/01/19 0415 07/31/19  0426 07/30/19  0521 07/29/19  0957   WBC 10*3/mm3 13.23* 14.58* 17.36* 16.57* 17.07*   NEUTROS ABS 10*3/mm3 12.57* 13.86* 16.59* 15.61* 13.85*   HEMOGLOBIN g/dL 8.3* 8.7* 8.8* 10.4* 10.2*   HEMATOCRIT % 25.0* 26.3* 26.6* 32.3* 30.7*   PLATELETS 10*3/mm3 147 172 187 227 254     Results from last 7 days   Lab Units 08/02/19  0402 08/01/19 0415 07/31/19  0426   SODIUM mmol/L 136 139 132*   POTASSIUM mmol/L 3.7 4.0 4.0   CHLORIDE mmol/L 101 104 99   CO2 mmol/L 24.5 24.4 21.6*   BUN mg/dL 20 19 16   CREATININE mg/dL 0.70* 0.56* 0.73*   CALCIUM mg/dL 8.6 8.4* 8.4*   ALBUMIN g/dL 3.60 3.60 3.60   BILIRUBIN mg/dL 0.2 0.2 0.2   ALK PHOS U/L 53 52 60   ALT (SGPT) U/L 75* 53* 15   AST (SGOT) U/L 31 33 12       Assessment/Plan   1.  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  His LP and bone marrow biopsies are negative for involvement.  However, his PET scan indicates perinephric soft tissue lesions. hypermetabolic mesenteric lymphadenopathy, left L5 paraspinous mass, an intensely hypermetabolic 6 cm intramuscular mass in the upper right thigh, a right upper lobe nodular opacity, a hypermetabolic mass adjacent to the right scapula, and a lesion within the " left calvicle.  Paraspinous involvement puts him at extremely high risk for CNS involvement.       · Plan 6 cycles of inpatient treatment with dose adjusted R-EPOCH.  Plan CNS prophylaxis with IT methotrexate with each cycle of therapy.  · Admission on 6/19/2019 for cycle #1 of therapy with Neulasta support.   · His lowest ANC was 0.04 on 6/27 with a platelet count of 47,000 that day.  He was only neutropenic on one day and his WBC and platelet count improved rapidly.  · Admission on 7/8 for cycle #2.  Per the protocol with neutropenia x1 day, we proceeded with the same doses as cycle #1.  He had a slight dose adjustment for his decreased weight.  · CT scans on 7/22/2019 after 2 cycles of chemotherapy showed evidence of response.  All of the areas of lymphoma showed evidence of improvement.  In addition, a right thigh mass resolved since the start of treatment.  · Patient was admitted on 7/29/2019 to receive cycle #3 for chemotherapy with 20% dose increase in the doses of Cytoxan, Adriamycin and etoposide as per the protocol.  · CSF flow cytometry from 7/29/2019 was negative for involvement with lymphoma.    Patient is tolerating the treatment well.  No problem with nausea or vomiting.     2.  Rheumatoid arthritis.  He was previously on Cimzia.  It discontinued due to the patient receiving chemotherapy. He is not having problem with pain.      3.  Coronary disease.  Status post stent.  Echocardiogram showed moderate aortic stenosis.  Ejection fraction was 60% on 5/14/2019.  He takes aspirin which is stopped during the hospital stay as he receives Lovenox prophylaxis.     4.  Hyponatremia.  Patient has history of hyponatremia and he developed volume overload with cycle #1.  Sodium level is normal.     5.  Constipation.  This has worsened secondary to vincristine. He is on Senokot daily.  He responded to MiraLAX.    6.  Anemia due to chemotherapy.  Hemoglobin decreased to 8.3 today but he is not symptomatic.    7.   Diabetes mellitus without complications.  He developed hyperglycemia secondary to prednisone.  He is on sliding scale insulin.    8.  ID prophylaxis.  He is on acyclovir and Bactrim.      9.  Elevated liver enzymes. This is limited to an elevation of ALT.  This may be attributed to chemotherapy treatment.  He is on Lipitor for his hyperlipidemia.  Although it is not likely contributing, we will hold Lipitor to avoid further worsening of the liver enzymes.     PLAN:     1.  Patient will receive day #5 of R-EPOCH today with Cytoxan.  2.  Patient is scheduled to receive Neulasta on 8/5/2019.  3.  Patient will have CBC monitored twice week after discharge.  He has follow-up with Dr. Garcia on 8/19/2019.  4.  Hold Lipitor for now.    Plan to discharge the patient later today after he completes his chemotherapy treatment.      Stevie Talley MD  08/02/19

## 2019-08-02 NOTE — DISCHARGE SUMMARY
Date of Discharge:  8/2/2019    Discharge Diagnosis:   Stage ABIEL double hit high grade Lymphoma  Hyponatremia  Elevated liver enzymes  Anemia due to chemotherapy  Diabetes mellitus without complication  Constipation due to chemotherapy  Rheumatoid arthritis  Coronary disease    Presenting Problem/History of Present Illness  Active Hospital Problems    Diagnosis  POA   • **Diffuse large B-cell lymphoma of lymph nodes of multiple regions (CMS/HCC) [C83.38]  Yes      Resolved Hospital Problems   No resolved problems to display.      Patient received 2 cycles of chemotherapy with R-EPOCH regimen.  CT scans on 7/22/2019 showed evidence of response.  He was admitted on 7/29/2019.    Hospital Course  Patient was admitted to the oncology floor.  He was started on chemotherapy treatment with R-EPOCH.  Based on his tolerance of cycle #2, chemotherapy treatment doses for Adriamycin, etoposide and Cytoxan with increased by 20% per protocol.    Patient tolerated the chemotherapy treatment well.  He did not have problems with nausea vomiting.  Maintenance IV fluids were given at 50 mm/h due to prior problem with hyponatremia.  He developed mild hyponatremia which improved with free water restriction and with increasing salt intake.  Patient continued to be anemic but did not require PRBC transfusion as hemoglobin stayed >8.    Patient was noted to have increase in the ALT.  It was noted on admission but increased to 53 on 8/1/2019 and 75 on 8/2/2019.  This was most likely secondary to chemotherapy.  Patient does not drink alcohol.  He takes Lipitor which she has been on for several years.    Procedures Performed    Lumbar puncture with intrathecal chemotherapy with methotrexate on 7/29/2019.    Consults:   Consults     No orders found from 6/30/2019 to 7/30/2019.          Pertinent Test Results:   Component      Latest Ref Rng & Units 8/2/2019   WBC      3.40 - 10.80 10*3/mm3 13.23 (H)   RBC      4.14 - 5.80 10*6/mm3 2.73 (L)    Hemoglobin      13.0 - 17.7 g/dL 8.3 (L)   Hematocrit      37.5 - 51.0 % 25.0 (L)   MCV      79.0 - 97.0 fL 91.6   MCH      26.6 - 33.0 pg 30.4   MCHC      31.5 - 35.7 g/dL 33.2   RDW      12.3 - 15.4 % 18.4 (H)   RDW-SD      37.0 - 54.0 fl 60.4 (H)   MPV      6.0 - 12.0 fL 9.2   Platelets      140 - 450 10*3/mm3 147   Neutrophil Rel %      42.7 - 76.0 % 94.9 (H)   Lymphocyte Rel %      19.6 - 45.3 % 1.7 (L)   Monocyte Rel %      5.0 - 12.0 % 1.9 (L)   Eosinophil Rel %      0.3 - 6.2 % 0.0 (L)   Basophil Rel %      0.0 - 1.5 % 0.1   Immature Granulocyte Rel %      0.0 - 0.5 % 1.4 (H)   Neutrophils Absolute      1.70 - 7.00 10*3/mm3 12.57 (H)   Lymphocytes Absolute      0.70 - 3.10 10*3/mm3 0.22 (L)   Monocytes Absolute      0.10 - 0.90 10*3/mm3 0.25   Eosinophils Absolute      0.00 - 0.40 10*3/mm3 0.00   Basophils Absolute      0.00 - 0.20 10*3/mm3 0.01   Immature Grans, Absolute      0.00 - 0.05 10*3/mm3 0.18 (H)   nRBC      0.0 - 0.2 /100 WBC 0.1   Glucose      65 - 99 mg/dL 219 (H)   BUN      8 - 23 mg/dL 20   Creatinine      0.76 - 1.27 mg/dL 0.70 (L)   Sodium      136 - 145 mmol/L 136   Potassium      3.5 - 5.2 mmol/L 3.7   Chloride      98 - 107 mmol/L 101   CO2      22.0 - 29.0 mmol/L 24.5   Calcium      8.6 - 10.5 mg/dL 8.6   Total Protein      6.0 - 8.5 g/dL 5.5 (L)   Albumin      3.50 - 5.20 g/dL 3.60   ALT (SGPT)      1 - 41 U/L 75 (H)   AST (SGOT)      1 - 40 U/L 31   Alkaline Phosphatase      39 - 117 U/L 53   Total Bilirubin      0.2 - 1.2 mg/dL 0.2   eGFR Non African Am      >60 mL/min/1.73 109   Globulin      gm/dL 1.9   A/G Ratio      g/dL 1.9   BUN/Creatinine Ratio      7.0 - 25.0 28.6 (H)   Anion Gap      5.0 - 15.0 mmol/L 10.5     Condition on Discharge:    Stable    Vital Signs  Temp:  [97 °F (36.1 °C)-97.4 °F (36.3 °C)] 97 °F (36.1 °C)  Heart Rate:  [63-79] 63  Resp:  [18] 18  BP: (147-156)/(62-75) 152/75    Physical Exam:  GENERAL:  The patient appears in good general condition, not in acute  distress.  SKIN: Warm and dry. No skin rashes. No ecchymosis.   HEAD:  Normocephalic.  Alopecia secondary to chemotherapy.  EYES:  No Jaundice. Pallor. Pupils equal. EOMI.  NECK:  Supple. No Thyromegaly. No Masses.  LYMPHATICS:  No cervical or supraclavicular lymphadenopathy.  CHEST: Normal respiratory effort. Lungs clear to auscultation.   CARDIAC:  Normal S1 & S2. ESM grade III.  Edema in both lower extremities.  ABDOMEN: Nondistended.  EXTREMITIES:  No clubbing. No Calf tenderness.  NEUROLOGICAL:  No Focal neurological deficits.       Discharge Disposition  Home or Self CareHome    Discharge Medications     Discharge Medications      New Medications      Instructions Start Date   predniSONE 50 MG tablet  Commonly known as:  DELTASONE   50 mg, Oral, Daily, Take on the morning of 8/3 with food         Continue These Medications      Instructions Start Date   acetaminophen 325 MG tablet  Commonly known as:  TYLENOL   650 mg, Oral, Every 4 Hours PRN      acyclovir 200 MG capsule  Commonly known as:  ZOVIRAX   400 mg, Oral, 2 Times Daily      aspirin 81 MG EC tablet   81 mg, Oral, Daily      beta carotene 45629 UNIT capsule   10,000 Units, Oral, Daily      cetirizine 10 MG tablet  Commonly known as:  zyrTEC   10 mg, Oral, Daily      freestyle lancets   Use one lancet to test twice a day as instructed      FREESTYLE LITE test strip  Generic drug:  glucose blood   USE ONE STRIP TO TEST TWICE A DAY      linagliptin 5 MG tablet tablet  Commonly known as:  TRADJENTA   5 mg, Oral, Daily      lisinopril 10 MG tablet  Commonly known as:  PRINIVIL,ZESTRIL   TAKE 1 TABLET DAILY      MAGNESIUM PO   500 mg, Oral, Daily      meclizine 25 MG chewable tablet chewable tablet   25 mg, Oral, 3 Times Daily PRN      omeprazole 40 MG capsule  Commonly known as:  priLOSEC   40 mg, Oral, Daily      polyethylene glycol packet  Commonly known as:  MIRALAX   17 g, Oral, Daily      prochlorperazine 10 MG tablet  Commonly known as:  COMPAZINE    10 mg, Oral, Every 6 Hours PRN      pyridoxine 100 MG tablet  Commonly known as:  VITAMIN B-6   100 mg, Oral, Daily      sennosides-docusate sodium 8.6-50 MG tablet  Commonly known as:  SENOKOT-S   1 tablet, Oral, Daily      sulfamethoxazole-trimethoprim 800-160 MG per tablet  Commonly known as:  BACTRIM DS,SEPTRA DS   1 tablet, Oral, 3 Times Weekly, Take on Monday, Wed, Friday      vitamin C 500 MG tablet  Commonly known as:  ASCORBIC ACID   500 mg, Oral, Daily      VITAMIN D-3 PO   2,000 Int'l Units/day, Oral, Daily      vitamin E 400 UNIT capsule   400 Units, Oral, Daily         Stop These Medications    ANORO ELLIPTA IN     atorvastatin 40 MG tablet  Commonly known as:  LIPITOR            Discharge Diet:   Regular diet but avoid free sugar (patient lost weight on diabetic diet)    Activity at Discharge:   As tolerated    Follow-up Appointments  Future Appointments   Date Time Provider Department Center   8/5/2019 10:20 AM LAB CHAIR 2 [facility]   8/5/2019 11:00 AM INJECTION CHAIR [facility]   8/8/2019  8:00 AM LAB CHAIR 1 [facility]   8/8/2019  8:30 AM RN REV 2 [facility]   8/12/2019  8:00 AM LAB CHAIR 2 [facility]   8/12/2019  8:30 AM RN REV 2 [facility]   8/15/2019  8:30 AM LAB CHAIR 3 [facility]   8/15/2019  9:00 AM RN REV 2 [facility]   8/19/2019  7:30 AM LAB CHAIR 6 [facility]   8/19/2019  8:00 AM Gabriele Garcia MD [facility] [place]   8/19/2019  9:30 AM TERRIE XR FL 1  [facility]   8/22/2019 10:15 AM LAB PC [facility] PC [facility] None   8/29/2019 10:45 AM Freddie Cannon MD [facility] PC [facility] None   11/26/2019 11:20 AM Brian Harden MD [facility] CD [facility] None         Test Results Pending at Discharge  None     Stevie Talley MD  08/02/19  2:12 PM    Time: Discharge 40 min

## 2019-08-02 NOTE — PLAN OF CARE
Problem: Patient Care Overview  Goal: Plan of Care Review  Outcome: Ongoing (interventions implemented as appropriate)   08/02/19 0603   Coping/Psychosocial   Plan of Care Reviewed With patient   Plan of Care Review   Progress no change   OTHER   Outcome Summary VSS. A&Ox4. Ad chris. EPOCH day 4 cont with no issues. ACHS with SSI requiring coverage. Urinal at bedside to monitor output. Miralax given per patient's request. Cytoxan after EPOCH completed and d/c. Cont to monitor.

## 2019-08-02 NOTE — PROGRESS NOTES
Continued Stay Note  [facility]     Patient Name: Edward Srinivasan  MRN: [id number]  Today's Date: 8/2/2019    Admit Date: 7/29/2019    Discharge Plan     Row Name 08/02/19 1234       Plan    Plan  Plans dc home with spouse.    Patient/Family in Agreement with Plan  yes    Plan Comments  Discharge plans unchanged. Plans dc home with spouse. No needs identified. Continue to follow.        Discharge Codes    No documentation.             Leticia Reddy RN

## 2019-08-03 ENCOUNTER — READMISSION MANAGEMENT (OUTPATIENT)
Dept: CALL CENTER | Facility: HOSPITAL | Age: 79
End: 2019-08-03

## 2019-08-03 NOTE — OUTREACH NOTE
Prep Survey      Responses   Facility patient discharged from?  [facility]   Is patient eligible?  Yes   Discharge diagnosis  Diffuse large B-cell lymphoma of lymph nodes   Does the patient have one of the following disease processes/diagnoses(primary or secondary)?  Other   Does the patient have Home health ordered?  No   Is there a DME ordered?  No   Prep survey completed?  Yes          Tawny Paredes RN

## 2019-08-04 ENCOUNTER — READMISSION MANAGEMENT (OUTPATIENT)
Dept: CALL CENTER | Facility: HOSPITAL | Age: 79
End: 2019-08-04

## 2019-08-04 NOTE — OUTREACH NOTE
Medical Week 1 Survey      Responses   Facility patient discharged from?  [facility]   Does the patient have one of the following disease processes/diagnoses(primary or secondary)?  Other   Is there a successful TCM telephone encounter documented?  No   Week 1 attempt successful?  Yes   Call start time  1211   Rescheduled  Rescheduled-pt requested   Call end time  1211          Genny Johnston RN

## 2019-08-05 ENCOUNTER — INFUSION (OUTPATIENT)
Dept: ONCOLOGY | Facility: HOSPITAL | Age: 79
End: 2019-08-05

## 2019-08-05 ENCOUNTER — APPOINTMENT (OUTPATIENT)
Dept: LAB | Facility: HOSPITAL | Age: 79
End: 2019-08-05

## 2019-08-05 ENCOUNTER — READMISSION MANAGEMENT (OUTPATIENT)
Dept: CALL CENTER | Facility: HOSPITAL | Age: 79
End: 2019-08-05

## 2019-08-05 DIAGNOSIS — C83.38 DIFFUSE LARGE B-CELL LYMPHOMA OF LYMPH NODES OF MULTIPLE REGIONS (HCC): Primary | ICD-10-CM

## 2019-08-05 DIAGNOSIS — C83.33 DIFFUSE LARGE B-CELL LYMPHOMA OF INTRA-ABDOMINAL LYMPH NODES (HCC): Primary | ICD-10-CM

## 2019-08-05 LAB
BASOPHILS # BLD AUTO: 0.02 10*3/MM3 (ref 0–0.2)
BASOPHILS NFR BLD AUTO: 0.2 % (ref 0–1.5)
DEPRECATED RDW RBC AUTO: 60 FL (ref 37–54)
EOSINOPHIL # BLD AUTO: 0.02 10*3/MM3 (ref 0–0.4)
EOSINOPHIL NFR BLD AUTO: 0.2 % (ref 0.3–6.2)
ERYTHROCYTE [DISTWIDTH] IN BLOOD BY AUTOMATED COUNT: 18.3 % (ref 12.3–15.4)
HCT VFR BLD AUTO: 27 % (ref 37.5–51)
HGB BLD-MCNC: 9.2 G/DL (ref 13–17.7)
IMM GRANULOCYTES # BLD AUTO: 0.18 10*3/MM3 (ref 0–0.05)
IMM GRANULOCYTES NFR BLD AUTO: 1.6 % (ref 0–0.5)
LYMPHOCYTES # BLD AUTO: 0.53 10*3/MM3 (ref 0.7–3.1)
LYMPHOCYTES NFR BLD AUTO: 4.7 % (ref 19.6–45.3)
MCH RBC QN AUTO: 31 PG (ref 26.6–33)
MCHC RBC AUTO-ENTMCNC: 34.1 G/DL (ref 31.5–35.7)
MCV RBC AUTO: 90.9 FL (ref 79–97)
MONOCYTES # BLD AUTO: 0.05 10*3/MM3 (ref 0.1–0.9)
MONOCYTES NFR BLD AUTO: 0.4 % (ref 5–12)
NEUTROPHILS # BLD AUTO: 10.47 10*3/MM3 (ref 1.7–7)
NEUTROPHILS NFR BLD AUTO: 92.9 % (ref 42.7–76)
NRBC BLD AUTO-RTO: 0 /100 WBC (ref 0–0.2)
PLATELET # BLD AUTO: 107 10*3/MM3 (ref 140–450)
PMV BLD AUTO: 9.5 FL (ref 6–12)
RBC # BLD AUTO: 2.97 10*6/MM3 (ref 4.14–5.8)
WBC NRBC COR # BLD: 11.27 10*3/MM3 (ref 3.4–10.8)

## 2019-08-05 PROCEDURE — 85025 COMPLETE CBC W/AUTO DIFF WBC: CPT | Performed by: INTERNAL MEDICINE

## 2019-08-05 PROCEDURE — 25010000002 PEGFILGRASTIM 6 MG/0.6ML SOLUTION PREFILLED SYRINGE: Performed by: INTERNAL MEDICINE

## 2019-08-05 PROCEDURE — 96372 THER/PROPH/DIAG INJ SC/IM: CPT | Performed by: INTERNAL MEDICINE

## 2019-08-05 PROCEDURE — 36416 COLLJ CAPILLARY BLOOD SPEC: CPT | Performed by: INTERNAL MEDICINE

## 2019-08-05 RX ADMIN — PEGFILGRASTIM 6 MG: 6 INJECTION SUBCUTANEOUS at 11:02

## 2019-08-05 NOTE — OUTREACH NOTE
Medical Week 1 Survey      Responses   Facility patient discharged from?  [facility]   Does the patient have one of the following disease processes/diagnoses(primary or secondary)?  Other   Is there a successful TCM telephone encounter documented?  No   Week 1 attempt successful?  No   Rescheduled  Revoked          Cristin Lux RN

## 2019-08-05 NOTE — PROGRESS NOTES
Pt presents for lab review and Neulasta. CBC stable for this pt at this time. Pt c/o frequent BM that are improving but is asking about using Imodium. Pt had been constipated and had taken Miralax x 3 days. Advised pt to give this some time as it's improving so that he doesn't experience constipation again w/ use of Imodium. Pt also reports swelling in his feet is improving. Encouraged pt to elevate feet during the day, he v/u.     Lab Results   Component Value Date    WBC 11.27 (H) 08/05/2019    HGB 9.2 (L) 08/05/2019    HCT 27.0 (L) 08/05/2019    MCV 90.9 08/05/2019     (L) 08/05/2019

## 2019-08-08 ENCOUNTER — LAB (OUTPATIENT)
Dept: LAB | Facility: HOSPITAL | Age: 79
End: 2019-08-08

## 2019-08-08 ENCOUNTER — CLINICAL SUPPORT (OUTPATIENT)
Dept: ONCOLOGY | Facility: HOSPITAL | Age: 79
End: 2019-08-08

## 2019-08-08 DIAGNOSIS — C82.90 NHL (NODULAR HISTIOCYTIC LYMPHOMA) (HCC): Primary | ICD-10-CM

## 2019-08-08 LAB
BASOPHILS # BLD AUTO: 0.01 10*3/MM3 (ref 0–0.2)
BASOPHILS NFR BLD AUTO: 0.3 % (ref 0–1.5)
DEPRECATED RDW RBC AUTO: 58.4 FL (ref 37–54)
EOSINOPHIL # BLD AUTO: 0 10*3/MM3 (ref 0–0.4)
EOSINOPHIL NFR BLD AUTO: 0 % (ref 0.3–6.2)
ERYTHROCYTE [DISTWIDTH] IN BLOOD BY AUTOMATED COUNT: 18 % (ref 12.3–15.4)
HCT VFR BLD AUTO: 25.3 % (ref 37.5–51)
HGB BLD-MCNC: 8.6 G/DL (ref 13–17.7)
IMM GRANULOCYTES # BLD AUTO: 0.41 10*3/MM3 (ref 0–0.05)
IMM GRANULOCYTES NFR BLD AUTO: 12.9 % (ref 0–0.5)
LYMPHOCYTES # BLD AUTO: 0.24 10*3/MM3 (ref 0.7–3.1)
LYMPHOCYTES NFR BLD AUTO: 7.5 % (ref 19.6–45.3)
MCH RBC QN AUTO: 30.4 PG (ref 26.6–33)
MCHC RBC AUTO-ENTMCNC: 34 G/DL (ref 31.5–35.7)
MCV RBC AUTO: 89.4 FL (ref 79–97)
MONOCYTES # BLD AUTO: 0.1 10*3/MM3 (ref 0.1–0.9)
MONOCYTES NFR BLD AUTO: 3.1 % (ref 5–12)
NEUTROPHILS # BLD AUTO: 2.43 10*3/MM3 (ref 1.7–7)
NEUTROPHILS NFR BLD AUTO: 76.2 % (ref 42.7–76)
NRBC BLD AUTO-RTO: 0 /100 WBC (ref 0–0.2)
PLATELET # BLD AUTO: 36 10*3/MM3 (ref 140–450)
PMV BLD AUTO: 11.8 FL (ref 6–12)
RBC # BLD AUTO: 2.83 10*6/MM3 (ref 4.14–5.8)
WBC NRBC COR # BLD: 3.19 10*3/MM3 (ref 3.4–10.8)

## 2019-08-08 PROCEDURE — 85025 COMPLETE CBC W/AUTO DIFF WBC: CPT

## 2019-08-08 PROCEDURE — 36415 COLL VENOUS BLD VENIPUNCTURE: CPT

## 2019-08-08 NOTE — PROGRESS NOTES
Patient here for CBC/RN Review. Platelets dropped from 107,000 on 8/5 to 36,000 today. No signs of bleeding per pt. Discussed bleeding precautions with patient. Patient stated he has been tired this week after chemo. Message sent to Dr. Garcia about platelets and told patient I would call him if anything changes before his appointment on Monday. Pt. V/u.   Lab Results   Component Value Date    WBC 3.19 (L) 08/08/2019    HGB 8.6 (L) 08/08/2019    HCT 25.3 (L) 08/08/2019    MCV 89.4 08/08/2019    PLT 36 (L) 08/08/2019

## 2019-08-12 ENCOUNTER — LAB (OUTPATIENT)
Dept: LAB | Facility: HOSPITAL | Age: 79
End: 2019-08-12

## 2019-08-12 ENCOUNTER — CLINICAL SUPPORT (OUTPATIENT)
Dept: ONCOLOGY | Facility: HOSPITAL | Age: 79
End: 2019-08-12

## 2019-08-12 VITALS
SYSTOLIC BLOOD PRESSURE: 112 MMHG | HEART RATE: 107 BPM | RESPIRATION RATE: 18 BRPM | OXYGEN SATURATION: 99 % | DIASTOLIC BLOOD PRESSURE: 64 MMHG | TEMPERATURE: 98.1 F

## 2019-08-12 DIAGNOSIS — C82.90 NHL (NODULAR HISTIOCYTIC LYMPHOMA) (HCC): Primary | ICD-10-CM

## 2019-08-12 LAB
BASOPHILS # BLD AUTO: 0.04 10*3/MM3 (ref 0–0.2)
BASOPHILS NFR BLD AUTO: 0.3 % (ref 0–1.5)
DEPRECATED RDW RBC AUTO: 64.9 FL (ref 37–54)
EOSINOPHIL # BLD AUTO: 0.04 10*3/MM3 (ref 0–0.4)
EOSINOPHIL NFR BLD AUTO: 0.3 % (ref 0.3–6.2)
ERYTHROCYTE [DISTWIDTH] IN BLOOD BY AUTOMATED COUNT: 20.1 % (ref 12.3–15.4)
HCT VFR BLD AUTO: 27.3 % (ref 37.5–51)
HGB BLD-MCNC: 8.9 G/DL (ref 13–17.7)
IMM GRANULOCYTES # BLD AUTO: 3.51 10*3/MM3 (ref 0–0.05)
IMM GRANULOCYTES NFR BLD AUTO: 23.9 % (ref 0–0.5)
LYMPHOCYTES # BLD AUTO: 0.67 10*3/MM3 (ref 0.7–3.1)
LYMPHOCYTES NFR BLD AUTO: 4.6 % (ref 19.6–45.3)
MCH RBC QN AUTO: 30.6 PG (ref 26.6–33)
MCHC RBC AUTO-ENTMCNC: 32.6 G/DL (ref 31.5–35.7)
MCV RBC AUTO: 93.8 FL (ref 79–97)
MONOCYTES # BLD AUTO: 1.92 10*3/MM3 (ref 0.1–0.9)
MONOCYTES NFR BLD AUTO: 13.1 % (ref 5–12)
NEUTROPHILS # BLD AUTO: 8.48 10*3/MM3 (ref 1.7–7)
NEUTROPHILS NFR BLD AUTO: 57.8 % (ref 42.7–76)
NRBC BLD AUTO-RTO: 0.7 /100 WBC (ref 0–0.2)
PLATELET # BLD AUTO: 119 10*3/MM3 (ref 140–450)
PMV BLD AUTO: 10.7 FL (ref 6–12)
RBC # BLD AUTO: 2.91 10*6/MM3 (ref 4.14–5.8)
WBC NRBC COR # BLD: 14.66 10*3/MM3 (ref 3.4–10.8)

## 2019-08-12 PROCEDURE — 36415 COLL VENOUS BLD VENIPUNCTURE: CPT

## 2019-08-12 PROCEDURE — 85025 COMPLETE CBC W/AUTO DIFF WBC: CPT

## 2019-08-12 NOTE — PROGRESS NOTES
Lab Results   Component Value Date    WBC 14.66 (H) 08/12/2019    HGB 8.9 (L) 08/12/2019    HCT 27.3 (L) 08/12/2019    MCV 93.8 08/12/2019     (L) 08/12/2019     Pt is here for lab with RN review.  CBC reviewed with pt, counts are stable for this pt at this time. Platelets increased from 36,000 on 8/8/2019 to 119,000. Pt has no complaints except for fatigue. S/S of anemia reviewed with patient and spouse Copy of labs given to pt and f/u appt reviewed. Pt is instructed to call the office with any concerns or new symptoms prior to next visit. Pt vu

## 2019-08-15 ENCOUNTER — APPOINTMENT (OUTPATIENT)
Dept: LAB | Facility: HOSPITAL | Age: 79
End: 2019-08-15

## 2019-08-15 ENCOUNTER — CLINICAL SUPPORT (OUTPATIENT)
Dept: ONCOLOGY | Facility: HOSPITAL | Age: 79
End: 2019-08-15

## 2019-08-15 VITALS — HEART RATE: 102 BPM | SYSTOLIC BLOOD PRESSURE: 115 MMHG | DIASTOLIC BLOOD PRESSURE: 71 MMHG

## 2019-08-15 DIAGNOSIS — C83.38 DIFFUSE LARGE B-CELL LYMPHOMA OF LYMPH NODES OF MULTIPLE REGIONS (HCC): Primary | ICD-10-CM

## 2019-08-15 LAB
BASOPHILS # BLD AUTO: 0.14 10*3/MM3 (ref 0–0.2)
BASOPHILS NFR BLD AUTO: 0.7 % (ref 0–1.5)
DEPRECATED RDW RBC AUTO: 67.7 FL (ref 37–54)
EOSINOPHIL # BLD AUTO: 0.1 10*3/MM3 (ref 0–0.4)
EOSINOPHIL NFR BLD AUTO: 0.5 % (ref 0.3–6.2)
ERYTHROCYTE [DISTWIDTH] IN BLOOD BY AUTOMATED COUNT: 20.5 % (ref 12.3–15.4)
HCT VFR BLD AUTO: 32.1 % (ref 37.5–51)
HGB BLD-MCNC: 10.5 G/DL (ref 13–17.7)
IMM GRANULOCYTES # BLD AUTO: 2.93 10*3/MM3 (ref 0–0.05)
IMM GRANULOCYTES NFR BLD AUTO: 15.1 % (ref 0–0.5)
LYMPHOCYTES # BLD AUTO: 0.92 10*3/MM3 (ref 0.7–3.1)
LYMPHOCYTES NFR BLD AUTO: 4.7 % (ref 19.6–45.3)
MCH RBC QN AUTO: 31 PG (ref 26.6–33)
MCHC RBC AUTO-ENTMCNC: 32.7 G/DL (ref 31.5–35.7)
MCV RBC AUTO: 94.7 FL (ref 79–97)
MONOCYTES # BLD AUTO: 1.67 10*3/MM3 (ref 0.1–0.9)
MONOCYTES NFR BLD AUTO: 8.6 % (ref 5–12)
NEUTROPHILS # BLD AUTO: 13.67 10*3/MM3 (ref 1.7–7)
NEUTROPHILS NFR BLD AUTO: 70.4 % (ref 42.7–76)
NRBC BLD AUTO-RTO: 0.3 /100 WBC (ref 0–0.2)
PLATELET # BLD AUTO: 220 10*3/MM3 (ref 140–450)
PMV BLD AUTO: 9.4 FL (ref 6–12)
RBC # BLD AUTO: 3.39 10*6/MM3 (ref 4.14–5.8)
WBC NRBC COR # BLD: 19.43 10*3/MM3 (ref 3.4–10.8)

## 2019-08-15 PROCEDURE — 85025 COMPLETE CBC W/AUTO DIFF WBC: CPT | Performed by: INTERNAL MEDICINE

## 2019-08-15 PROCEDURE — 36416 COLLJ CAPILLARY BLOOD SPEC: CPT | Performed by: INTERNAL MEDICINE

## 2019-08-15 NOTE — PROGRESS NOTES
Lab Results   Component Value Date    WBC 19.43 (H) 08/15/2019    HGB 10.5 (L) 08/15/2019    HCT 32.1 (L) 08/15/2019    MCV 94.7 08/15/2019     08/15/2019     CBC RESULTS R/W PT AND HIS WIFE. PT FINISHED UP C3 CHEMO ABOUT 2 WKS AGO. PT REC'D NEULASTA 8/5. PT REPORTS FEELING GREAT AND DENIES ANY SE'S. COUNTS HAVE IMPROVED TODAY. PT TO RTN NEXT WK TO SEE MD. PT TO CALL W/ FEVER OR INFECTION. PT GIVEN COPY OF LABS AND SENT HOME.

## 2019-08-19 ENCOUNTER — PREP FOR SURGERY (OUTPATIENT)
Dept: OTHER | Facility: HOSPITAL | Age: 79
End: 2019-08-19

## 2019-08-19 ENCOUNTER — HOSPITAL ENCOUNTER (OUTPATIENT)
Dept: GENERAL RADIOLOGY | Facility: HOSPITAL | Age: 79
Discharge: HOME | End: 2019-08-19

## 2019-08-19 ENCOUNTER — LAB (OUTPATIENT)
Dept: LAB | Facility: HOSPITAL | Age: 79
End: 2019-08-19

## 2019-08-19 ENCOUNTER — OFFICE VISIT (OUTPATIENT)
Dept: ONCOLOGY | Facility: CLINIC | Age: 79
End: 2019-08-19

## 2019-08-19 ENCOUNTER — HOSPITAL ENCOUNTER (INPATIENT)
Facility: HOSPITAL | Age: 79
LOS: 4 days | Discharge: HOME | End: 2019-08-23
Attending: INTERNAL MEDICINE | Admitting: INTERNAL MEDICINE

## 2019-08-19 VITALS
TEMPERATURE: 98.2 F | HEIGHT: 67 IN | SYSTOLIC BLOOD PRESSURE: 109 MMHG | DIASTOLIC BLOOD PRESSURE: 69 MMHG | OXYGEN SATURATION: 99 % | RESPIRATION RATE: 14 BRPM | WEIGHT: 142.1 LBS | HEART RATE: 101 BPM | BODY MASS INDEX: 22.3 KG/M2

## 2019-08-19 VITALS
RESPIRATION RATE: 16 BRPM | SYSTOLIC BLOOD PRESSURE: 120 MMHG | DIASTOLIC BLOOD PRESSURE: 73 MMHG | HEART RATE: 94 BPM | OXYGEN SATURATION: 100 %

## 2019-08-19 DIAGNOSIS — C83.33 DIFFUSE LARGE B-CELL LYMPHOMA OF INTRA-ABDOMINAL LYMPH NODES (HCC): ICD-10-CM

## 2019-08-19 DIAGNOSIS — C83.38 DIFFUSE LARGE B-CELL LYMPHOMA OF LYMPH NODES OF MULTIPLE REGIONS (HCC): Primary | ICD-10-CM

## 2019-08-19 DIAGNOSIS — C83.33 DIFFUSE LARGE B-CELL LYMPHOMA OF INTRA-ABDOMINAL LYMPH NODES (HCC): Primary | ICD-10-CM

## 2019-08-19 LAB
ALBUMIN SERPL-MCNC: 3.8 G/DL (ref 3.5–5.2)
ALBUMIN/GLOB SERPL: 1.4 G/DL
ALP SERPL-CCNC: 73 U/L (ref 39–117)
ALT SERPL W P-5'-P-CCNC: 14 U/L (ref 1–41)
ANION GAP SERPL CALCULATED.3IONS-SCNC: 12.6 MMOL/L (ref 5–15)
AST SERPL-CCNC: 16 U/L (ref 1–40)
BASOPHILS # BLD AUTO: 0.07 10*3/MM3 (ref 0–0.2)
BASOPHILS # BLD AUTO: 0.11 10*3/MM3 (ref 0–0.2)
BASOPHILS NFR BLD AUTO: 0.5 % (ref 0–1.5)
BASOPHILS NFR BLD AUTO: 0.8 % (ref 0–1.5)
BILIRUB SERPL-MCNC: 0.2 MG/DL (ref 0.2–1.2)
BUN BLD-MCNC: 19 MG/DL (ref 8–23)
BUN/CREAT SERPL: 28.8 (ref 7–25)
CALCIUM SPEC-SCNC: 8.7 MG/DL (ref 8.6–10.5)
CHLORIDE SERPL-SCNC: 99 MMOL/L (ref 98–107)
CO2 SERPL-SCNC: 24.4 MMOL/L (ref 22–29)
CREAT BLD-MCNC: 0.66 MG/DL (ref 0.76–1.27)
DEPRECATED RDW RBC AUTO: 67.8 FL (ref 37–54)
DEPRECATED RDW RBC AUTO: 69.9 FL (ref 37–54)
EOSINOPHIL # BLD AUTO: 0.09 10*3/MM3 (ref 0–0.4)
EOSINOPHIL # BLD AUTO: 0.1 10*3/MM3 (ref 0–0.4)
EOSINOPHIL NFR BLD AUTO: 0.6 % (ref 0.3–6.2)
EOSINOPHIL NFR BLD AUTO: 0.7 % (ref 0.3–6.2)
ERYTHROCYTE [DISTWIDTH] IN BLOOD BY AUTOMATED COUNT: 19.7 % (ref 12.3–15.4)
ERYTHROCYTE [DISTWIDTH] IN BLOOD BY AUTOMATED COUNT: 19.8 % (ref 12.3–15.4)
GFR SERPL CREATININE-BSD FRML MDRD: 117 ML/MIN/1.73
GLOBULIN UR ELPH-MCNC: 2.7 GM/DL
GLUCOSE BLD-MCNC: 169 MG/DL (ref 65–99)
GLUCOSE BLDC GLUCOMTR-MCNC: 113 MG/DL (ref 70–130)
GLUCOSE BLDC GLUCOMTR-MCNC: 203 MG/DL (ref 70–130)
GLUCOSE BLDC GLUCOMTR-MCNC: 238 MG/DL (ref 70–130)
HCT VFR BLD AUTO: 32.5 % (ref 37.5–51)
HCT VFR BLD AUTO: 32.7 % (ref 37.5–51)
HGB BLD-MCNC: 10.3 G/DL (ref 13–17.7)
HGB BLD-MCNC: 10.7 G/DL (ref 13–17.7)
IMM GRANULOCYTES # BLD AUTO: 0.67 10*3/MM3 (ref 0–0.05)
IMM GRANULOCYTES # BLD AUTO: 0.75 10*3/MM3 (ref 0–0.05)
IMM GRANULOCYTES NFR BLD AUTO: 5 % (ref 0–0.5)
IMM GRANULOCYTES NFR BLD AUTO: 5.2 % (ref 0–0.5)
LYMPHOCYTES # BLD AUTO: 0.76 10*3/MM3 (ref 0.7–3.1)
LYMPHOCYTES # BLD AUTO: 0.97 10*3/MM3 (ref 0.7–3.1)
LYMPHOCYTES NFR BLD AUTO: 5.3 % (ref 19.6–45.3)
LYMPHOCYTES NFR BLD AUTO: 7.2 % (ref 19.6–45.3)
MCH RBC QN AUTO: 30.7 PG (ref 26.6–33)
MCH RBC QN AUTO: 31 PG (ref 26.6–33)
MCHC RBC AUTO-ENTMCNC: 31.7 G/DL (ref 31.5–35.7)
MCHC RBC AUTO-ENTMCNC: 32.7 G/DL (ref 31.5–35.7)
MCV RBC AUTO: 94.8 FL (ref 79–97)
MCV RBC AUTO: 96.7 FL (ref 79–97)
MONOCYTES # BLD AUTO: 1.08 10*3/MM3 (ref 0.1–0.9)
MONOCYTES # BLD AUTO: 1.11 10*3/MM3 (ref 0.1–0.9)
MONOCYTES NFR BLD AUTO: 7.7 % (ref 5–12)
MONOCYTES NFR BLD AUTO: 8 % (ref 5–12)
NEUTROPHILS # BLD AUTO: 10.55 10*3/MM3 (ref 1.7–7)
NEUTROPHILS # BLD AUTO: 11.56 10*3/MM3 (ref 1.7–7)
NEUTROPHILS NFR BLD AUTO: 78.3 % (ref 42.7–76)
NEUTROPHILS NFR BLD AUTO: 80.7 % (ref 42.7–76)
NRBC BLD AUTO-RTO: 0.1 /100 WBC (ref 0–0.2)
NRBC BLD AUTO-RTO: 0.1 /100 WBC (ref 0–0.2)
PLATELET # BLD AUTO: 235 10*3/MM3 (ref 140–450)
PLATELET # BLD AUTO: 257 10*3/MM3 (ref 140–450)
PMV BLD AUTO: 8.7 FL (ref 6–12)
PMV BLD AUTO: 9.2 FL (ref 6–12)
POTASSIUM BLD-SCNC: 3.9 MMOL/L (ref 3.5–5.2)
PROT SERPL-MCNC: 6.5 G/DL (ref 6–8.5)
RBC # BLD AUTO: 3.36 10*6/MM3 (ref 4.14–5.8)
RBC # BLD AUTO: 3.45 10*6/MM3 (ref 4.14–5.8)
SODIUM BLD-SCNC: 136 MMOL/L (ref 136–145)
WBC NRBC COR # BLD: 13.48 10*3/MM3 (ref 3.4–10.8)
WBC NRBC COR # BLD: 14.34 10*3/MM3 (ref 3.4–10.8)

## 2019-08-19 PROCEDURE — 25010000002 DIPHENHYDRAMINE PER 50 MG: Performed by: INTERNAL MEDICINE

## 2019-08-19 PROCEDURE — 25010000003 LIDOCAINE 1 % SOLUTION: Performed by: INTERNAL MEDICINE

## 2019-08-19 PROCEDURE — 77003 FLUOROGUIDE FOR SPINE INJECT: CPT

## 2019-08-19 PROCEDURE — 25010000003 LIDOCAINE 1 % SOLUTION: Performed by: RADIOLOGY

## 2019-08-19 PROCEDURE — 82962 GLUCOSE BLOOD TEST: CPT

## 2019-08-19 PROCEDURE — 85025 COMPLETE CBC W/AUTO DIFF WBC: CPT | Performed by: INTERNAL MEDICINE

## 2019-08-19 PROCEDURE — 36416 COLLJ CAPILLARY BLOOD SPEC: CPT | Performed by: INTERNAL MEDICINE

## 2019-08-19 PROCEDURE — 88184 FLOWCYTOMETRY/ TC 1 MARKER: CPT | Performed by: INTERNAL MEDICINE

## 2019-08-19 PROCEDURE — 3E03305 INTRODUCTION OF OTHER ANTINEOPLASTIC INTO PERIPHERAL VEIN, PERCUTANEOUS APPROACH: ICD-10-PCS | Performed by: INTERNAL MEDICINE

## 2019-08-19 PROCEDURE — 99223 1ST HOSP IP/OBS HIGH 75: CPT | Performed by: INTERNAL MEDICINE

## 2019-08-19 PROCEDURE — 25010000002 ONDANSETRON PER 1 MG: Performed by: INTERNAL MEDICINE

## 2019-08-19 PROCEDURE — 25010000002 METHOTREXATE PF 100 MG/4ML SOLUTION 2 ML VIAL: Performed by: INTERNAL MEDICINE

## 2019-08-19 PROCEDURE — 96450 CHEMOTHERAPY INTO CNS: CPT

## 2019-08-19 PROCEDURE — 25010000002 DOXORUBICIN PER 10 MG: Performed by: INTERNAL MEDICINE

## 2019-08-19 PROCEDURE — 88185 FLOWCYTOMETRY/TC ADD-ON: CPT

## 2019-08-19 PROCEDURE — 63710000001 INSULIN LISPRO (HUMAN) PER 5 UNITS: Performed by: INTERNAL MEDICINE

## 2019-08-19 PROCEDURE — 25010000002 ETOPOSIDE 500 MG/25ML SOLUTION 25 ML VIAL: Performed by: INTERNAL MEDICINE

## 2019-08-19 PROCEDURE — 25010000002 RITUXIMAB 10 MG/ML SOLUTION 50 ML VIAL: Performed by: INTERNAL MEDICINE

## 2019-08-19 PROCEDURE — 25010000002 VINCRISTINE PER 1 MG: Performed by: INTERNAL MEDICINE

## 2019-08-19 PROCEDURE — 25010000003 LIDOCAINE 1 % SOLUTION

## 2019-08-19 PROCEDURE — 25010000002 RITUXIMAB 10 MG/ML SOLUTION 10 ML VIAL: Performed by: INTERNAL MEDICINE

## 2019-08-19 PROCEDURE — 80053 COMPREHEN METABOLIC PANEL: CPT | Performed by: INTERNAL MEDICINE

## 2019-08-19 PROCEDURE — 63710000001 PREDNISONE PER 5 MG: Performed by: INTERNAL MEDICINE

## 2019-08-19 RX ORDER — SODIUM CHLORIDE 0.9 % (FLUSH) 0.9 %
10 SYRINGE (ML) INJECTION EVERY 12 HOURS SCHEDULED
Status: CANCELLED | OUTPATIENT
Start: 2019-08-19

## 2019-08-19 RX ORDER — ACETAMINOPHEN 325 MG/1
650 TABLET ORAL EVERY 4 HOURS PRN
Status: DISCONTINUED | OUTPATIENT
Start: 2019-08-19 | End: 2019-08-23 | Stop reason: HOSPADM

## 2019-08-19 RX ORDER — DEXTROSE MONOHYDRATE 25 G/50ML
25 INJECTION, SOLUTION INTRAVENOUS
Status: CANCELLED | OUTPATIENT
Start: 2019-08-19

## 2019-08-19 RX ORDER — SODIUM CHLORIDE 9 MG/ML
50 INJECTION, SOLUTION INTRAVENOUS CONTINUOUS
Status: DISCONTINUED | OUTPATIENT
Start: 2019-08-19 | End: 2019-08-23 | Stop reason: HOSPADM

## 2019-08-19 RX ORDER — ACYCLOVIR 200 MG/1
CAPSULE ORAL 2 TIMES DAILY
COMMUNITY
Start: 2019-08-14 | End: 2019-10-21

## 2019-08-19 RX ORDER — PROCHLORPERAZINE MALEATE 10 MG
10 TABLET ORAL EVERY 6 HOURS PRN
Status: CANCELLED | OUTPATIENT
Start: 2019-08-19

## 2019-08-19 RX ORDER — DIPHENHYDRAMINE HYDROCHLORIDE 50 MG/ML
50 INJECTION INTRAMUSCULAR; INTRAVENOUS AS NEEDED
Status: CANCELLED | OUTPATIENT
Start: 2019-08-19

## 2019-08-19 RX ORDER — LIDOCAINE HYDROCHLORIDE 10 MG/ML
10 INJECTION, SOLUTION INFILTRATION; PERINEURAL ONCE
Status: COMPLETED | OUTPATIENT
Start: 2019-08-19 | End: 2019-08-19

## 2019-08-19 RX ORDER — LISINOPRIL 10 MG/1
10 TABLET ORAL DAILY
Status: DISCONTINUED | OUTPATIENT
Start: 2019-08-19 | End: 2019-08-23 | Stop reason: HOSPADM

## 2019-08-19 RX ORDER — DEXTROSE MONOHYDRATE 25 G/50ML
25 INJECTION, SOLUTION INTRAVENOUS
Status: DISCONTINUED | OUTPATIENT
Start: 2019-08-19 | End: 2019-08-23 | Stop reason: HOSPADM

## 2019-08-19 RX ORDER — ONDANSETRON 2 MG/ML
4 INJECTION INTRAMUSCULAR; INTRAVENOUS EVERY 6 HOURS PRN
Status: DISCONTINUED | OUTPATIENT
Start: 2019-08-19 | End: 2019-08-23 | Stop reason: HOSPADM

## 2019-08-19 RX ORDER — SODIUM CHLORIDE 9 MG/ML
75 INJECTION, SOLUTION INTRAVENOUS CONTINUOUS
Status: CANCELLED | OUTPATIENT
Start: 2019-08-19

## 2019-08-19 RX ORDER — ACYCLOVIR 200 MG/1
400 CAPSULE ORAL 2 TIMES DAILY
Status: DISCONTINUED | OUTPATIENT
Start: 2019-08-19 | End: 2019-08-23 | Stop reason: HOSPADM

## 2019-08-19 RX ORDER — ACETAMINOPHEN 325 MG/1
650 TABLET ORAL ONCE
Status: COMPLETED | OUTPATIENT
Start: 2019-08-19 | End: 2019-08-19

## 2019-08-19 RX ORDER — LIDOCAINE HYDROCHLORIDE 10 MG/ML
10 INJECTION, SOLUTION INFILTRATION; PERINEURAL ONCE
Status: DISCONTINUED | OUTPATIENT
Start: 2019-08-19 | End: 2019-08-19

## 2019-08-19 RX ORDER — NICOTINE POLACRILEX 4 MG
15 LOZENGE BUCCAL
Status: CANCELLED | OUTPATIENT
Start: 2019-08-19

## 2019-08-19 RX ORDER — ONDANSETRON 2 MG/ML
4 INJECTION INTRAMUSCULAR; INTRAVENOUS EVERY 6 HOURS PRN
Status: CANCELLED | OUTPATIENT
Start: 2019-08-19

## 2019-08-19 RX ORDER — SODIUM CHLORIDE 0.9 % (FLUSH) 0.9 %
20 SYRINGE (ML) INJECTION AS NEEDED
Status: DISCONTINUED | OUTPATIENT
Start: 2019-08-19 | End: 2019-08-23 | Stop reason: HOSPADM

## 2019-08-19 RX ORDER — POLYETHYLENE GLYCOL 3350 17 G/17G
17 POWDER, FOR SOLUTION ORAL DAILY
Status: DISCONTINUED | OUTPATIENT
Start: 2019-08-19 | End: 2019-08-23 | Stop reason: HOSPADM

## 2019-08-19 RX ORDER — ACETAMINOPHEN 325 MG/1
650 TABLET ORAL EVERY 4 HOURS PRN
Status: CANCELLED | OUTPATIENT
Start: 2019-08-19

## 2019-08-19 RX ORDER — FAMOTIDINE 10 MG/ML
20 INJECTION, SOLUTION INTRAVENOUS AS NEEDED
Status: CANCELLED | OUTPATIENT
Start: 2019-08-19

## 2019-08-19 RX ORDER — SODIUM CHLORIDE 0.9 % (FLUSH) 0.9 %
10 SYRINGE (ML) INJECTION EVERY 12 HOURS SCHEDULED
Status: DISCONTINUED | OUTPATIENT
Start: 2019-08-19 | End: 2019-08-23 | Stop reason: HOSPADM

## 2019-08-19 RX ORDER — ALPRAZOLAM 0.25 MG/1
0.25 TABLET ORAL DAILY PRN
Status: DISCONTINUED | OUTPATIENT
Start: 2019-08-19 | End: 2019-08-23 | Stop reason: HOSPADM

## 2019-08-19 RX ORDER — SENNA AND DOCUSATE SODIUM 50; 8.6 MG/1; MG/1
1 TABLET, FILM COATED ORAL DAILY
Status: DISCONTINUED | OUTPATIENT
Start: 2019-08-19 | End: 2019-08-20

## 2019-08-19 RX ORDER — SODIUM CHLORIDE 0.9 % (FLUSH) 0.9 %
10 SYRINGE (ML) INJECTION AS NEEDED
Status: CANCELLED | OUTPATIENT
Start: 2019-08-19

## 2019-08-19 RX ORDER — MEPERIDINE HYDROCHLORIDE 50 MG/ML
25 INJECTION INTRAMUSCULAR; INTRAVENOUS; SUBCUTANEOUS
Status: ACTIVE | OUTPATIENT
Start: 2019-08-19 | End: 2019-08-20

## 2019-08-19 RX ORDER — PROCHLORPERAZINE MALEATE 10 MG
10 TABLET ORAL EVERY 6 HOURS PRN
Status: DISCONTINUED | OUTPATIENT
Start: 2019-08-19 | End: 2019-08-23 | Stop reason: HOSPADM

## 2019-08-19 RX ORDER — DIPHENHYDRAMINE HYDROCHLORIDE 50 MG/ML
50 INJECTION INTRAMUSCULAR; INTRAVENOUS AS NEEDED
Status: DISCONTINUED | OUTPATIENT
Start: 2019-08-19 | End: 2019-08-23 | Stop reason: HOSPADM

## 2019-08-19 RX ORDER — PANTOPRAZOLE SODIUM 40 MG/1
40 TABLET, DELAYED RELEASE ORAL
Status: CANCELLED | OUTPATIENT
Start: 2019-08-19

## 2019-08-19 RX ORDER — SODIUM CHLORIDE 0.9 % (FLUSH) 0.9 %
20 SYRINGE (ML) INJECTION AS NEEDED
Status: CANCELLED | OUTPATIENT
Start: 2019-08-19

## 2019-08-19 RX ORDER — CETIRIZINE HYDROCHLORIDE 10 MG/1
10 TABLET ORAL DAILY
Status: DISCONTINUED | OUTPATIENT
Start: 2019-08-19 | End: 2019-08-23 | Stop reason: HOSPADM

## 2019-08-19 RX ORDER — NICOTINE POLACRILEX 4 MG
15 LOZENGE BUCCAL
Status: DISCONTINUED | OUTPATIENT
Start: 2019-08-19 | End: 2019-08-23 | Stop reason: HOSPADM

## 2019-08-19 RX ORDER — SULFAMETHOXAZOLE AND TRIMETHOPRIM 800; 160 MG/1; MG/1
1 TABLET ORAL 3 TIMES WEEKLY
Status: COMPLETED | OUTPATIENT
Start: 2019-08-19 | End: 2019-08-21

## 2019-08-19 RX ORDER — SODIUM CHLORIDE 0.9 % (FLUSH) 0.9 %
10 SYRINGE (ML) INJECTION AS NEEDED
Status: DISCONTINUED | OUTPATIENT
Start: 2019-08-19 | End: 2019-08-23 | Stop reason: HOSPADM

## 2019-08-19 RX ORDER — MEPERIDINE HYDROCHLORIDE 50 MG/ML
25 INJECTION INTRAMUSCULAR; INTRAVENOUS; SUBCUTANEOUS
Status: CANCELLED | OUTPATIENT
Start: 2019-08-19 | End: 2019-08-20

## 2019-08-19 RX ORDER — FAMOTIDINE 10 MG/ML
20 INJECTION, SOLUTION INTRAVENOUS AS NEEDED
Status: DISCONTINUED | OUTPATIENT
Start: 2019-08-19 | End: 2019-08-23 | Stop reason: HOSPADM

## 2019-08-19 RX ORDER — ALPRAZOLAM 0.25 MG/1
0.25 TABLET ORAL DAILY PRN
Status: ON HOLD | COMMUNITY
End: 2019-09-09

## 2019-08-19 RX ORDER — ACETAMINOPHEN 325 MG/1
650 TABLET ORAL ONCE
Status: CANCELLED | OUTPATIENT
Start: 2019-08-19

## 2019-08-19 RX ORDER — PREDNISONE 50 MG/1
100 TABLET ORAL 2 TIMES DAILY WITH MEALS
Status: COMPLETED | OUTPATIENT
Start: 2019-08-19 | End: 2019-08-23

## 2019-08-19 RX ORDER — PANTOPRAZOLE SODIUM 40 MG/1
40 TABLET, DELAYED RELEASE ORAL
Status: COMPLETED | OUTPATIENT
Start: 2019-08-19 | End: 2019-08-23

## 2019-08-19 RX ORDER — PREDNISONE 20 MG/1
100 TABLET ORAL 2 TIMES DAILY WITH MEALS
Status: CANCELLED | OUTPATIENT
Start: 2019-08-19

## 2019-08-19 RX ORDER — ASPIRIN 81 MG/1
81 TABLET ORAL DAILY
Status: DISCONTINUED | OUTPATIENT
Start: 2019-08-19 | End: 2019-08-23 | Stop reason: HOSPADM

## 2019-08-19 RX ADMIN — DIPHENHYDRAMINE HYDROCHLORIDE 25 MG: 50 INJECTION, SOLUTION INTRAMUSCULAR; INTRAVENOUS at 13:49

## 2019-08-19 RX ADMIN — LIDOCAINE HYDROCHLORIDE 1 ML: 10 INJECTION, SOLUTION INFILTRATION; PERINEURAL at 12:24

## 2019-08-19 RX ADMIN — VINCRISTINE SULFATE: 1 INJECTION, SOLUTION INTRAVENOUS at 17:09

## 2019-08-19 RX ADMIN — INSULIN LISPRO 4 UNITS: 100 INJECTION, SOLUTION INTRAVENOUS; SUBCUTANEOUS at 21:20

## 2019-08-19 RX ADMIN — PREDNISONE 100 MG: 50 TABLET ORAL at 17:26

## 2019-08-19 RX ADMIN — SODIUM CHLORIDE 12 MG: 9 INJECTION, SOLUTION INTRAMUSCULAR; INTRAVENOUS; SUBCUTANEOUS at 12:25

## 2019-08-19 RX ADMIN — ONDANSETRON 16 MG: 2 INJECTION INTRAMUSCULAR; INTRAVENOUS at 16:36

## 2019-08-19 RX ADMIN — SODIUM CHLORIDE, PRESERVATIVE FREE 10 ML: 5 INJECTION INTRAVENOUS at 21:21

## 2019-08-19 RX ADMIN — SULFAMETHOXAZOLE AND TRIMETHOPRIM 160 MG: 800; 160 TABLET ORAL at 21:26

## 2019-08-19 RX ADMIN — INSULIN LISPRO 4 UNITS: 100 INJECTION, SOLUTION INTRAVENOUS; SUBCUTANEOUS at 17:26

## 2019-08-19 RX ADMIN — LIDOCAINE HYDROCHLORIDE 6 ML: 10 INJECTION, SOLUTION INFILTRATION; PERINEURAL at 12:03

## 2019-08-19 RX ADMIN — ACETAMINOPHEN 650 MG: 325 TABLET, FILM COATED ORAL at 13:55

## 2019-08-19 RX ADMIN — SODIUM CHLORIDE, PRESERVATIVE FREE 10 ML: 5 INJECTION INTRAVENOUS at 13:27

## 2019-08-19 RX ADMIN — PANTOPRAZOLE SODIUM 40 MG: 40 TABLET, DELAYED RELEASE ORAL at 13:27

## 2019-08-19 RX ADMIN — RITUXIMAB 650 MG: 10 INJECTION, SOLUTION INTRAVENOUS at 14:59

## 2019-08-19 RX ADMIN — PREDNISONE 100 MG: 50 TABLET ORAL at 13:26

## 2019-08-19 RX ADMIN — ACYCLOVIR 400 MG: 200 CAPSULE ORAL at 21:20

## 2019-08-19 RX ADMIN — SODIUM CHLORIDE 75 ML/HR: 9 INJECTION, SOLUTION INTRAVENOUS at 13:49

## 2019-08-19 NOTE — PROGRESS NOTES
Orders placed for IT MTX with flow cytometry to be done on 9/9/19 prior to admission for DA-R-EPOCH  Message to appt desk to arrange

## 2019-08-19 NOTE — H&P
"[facility] CBC GROUP ADMISSION HISTORY AND PHYSICAL    REASON FOR ADMISSION / CHIEF COMPLAINT:    1.  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  CT imaging of the abdomen and pelvis on 5/8/2019 shows multiple small tissue nodules in the perirenal fat adjacent to the left kidney and a soft tissue mass interposed between the left psoas muscle and spine.  2.  RA previously on Cimzia (certollizumab) injections by Dr. Velásquez  3.  Lumbar puncture 5/31/2019 negative for involvement with lymphoma  4.  Bone marrow aspiration and biopsy 5/29/2018 negative for lymphoma  5.  PET scan on 5/30/2019 with hypermetabolic perinephric soft tissue lesions with mesenteric lymphadenopathy, left paraspinal mass at L5, 6 cm intramuscular mass in the right upper thigh, lesion at the left clavicle and a 5 cm soft tissue mass between the right scapula and right posterior fourth rib.  6.  Right subclavian Mediport placed by Dr. Herrera on 6/12/2019.  7.  Admission to [facility] for cycle #1 of dose adjusted R-EPOCH on 6/17/2019.  Plan for a lumbar puncture with intrathecal methotrexate with each cycle.  8.  Cycle #2 initiated on 7/8/2019.  Same dosees pursued per the protocol.  9.  Admission for cycle #3 on 7/29/2019.    HISTORY OF PRESENT ILLNESS:  Edward Srinivasan is a 79 y.o. male who is being admitted for cycle #4 of dose adjusted R-EPOCH.  He has tolerated hte first three cycles very well.  With cycle #2, we pursued the same doses as cycle #1 per the protocol.  With cycle #3, per protocol, doses were adjusted upward by 20%.     Following cycle #3, his sim platelet count was 36,000 with a sim neutrophil count of 2.43 on 8/8/2019.  Blood counts have improved significantly.    He had a little bit more fatigue following cycle #3 of therapy but tolerated therapy well without any fevers or chills, mucositis, nausea or " vomiting.  Constipation is been controlled with Senokot S once daily.  Blood sugars were excellent at home after being home for couple of days.  Overall, no new problems today.       ONCOLOGIC HISTORY:  He has a history of rheumatoid arthritis currently on injections by Dr. Velásquez, type 2 diabetes on oral medication, coronary artery disease status post stenting x2, aortic stenosis, and nonmelanoma skin cancer resected from his face.  Continues to smoke a little bit less than a half a pack per day and has done this for several decades.     He has a long history of chronic back pain.  In March 2019 he developed some low back pain on the left.  With some physical therapy this is actually improved significantly but over the past couple of weeks he has developed some right-sided back pain.  Actually over the past couple of days this has essentially resolved.  He denies any radiculopathy.  He has chronic vision changes but nothing acute.  He denies any headaches.  No other focal neurologic changes.  He has occasional lightheadedness but this is been a chronic problem for him.  No fevers.  He is cold a lot but no rigors.  No night sweats.  He has lost a little bit of weight which she relates to his rheumatoid arthritis and decreased appetite secondary to pain.     He had an MRI of his lumbar spine on 4/25/2019 showing some degenerative changes but also there was a soft tissue mass extending posteriorly from the left common iliac vessels hugging the lateral margin of the L5 vertebrae extending from the left lateral margin of L4-5 interspace to L5-S1 where it pressed on the left L5 nerve root.  The mass measures 4.4 x 2.1 x 3.5 cm.  CT imaging was suggested and performed on 5/8/2019.  The CT scan showed multiple soft tissue nodules in the perirenal fat on the left adjacent to the kidney and a soft tissue mass interposed between the left psoas muscle and spine.  The mass measures 4.1 x 2.1 cm.     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     He was seen initially on 5/28/2019.  PET scan, LP, and a bone marrow aspiration and biopsy were requested.     LDH was 341 with uric acid 4.4.        Bone marrow biopsy on 5/29/2019:  normocellular marrow with no evidence for lymphoma.  Flow cytometry negative.  Karyotype pending as of 6/7.       PET scan on 5/20/2019:  IMPRESSION:  1. There are hypermetabolic perinephric soft tissue lesions,  hypermetabolic mesenteric lymphadenopathy, left paraspinal mass at L5,  as well as an intensely hypermetabolic approximately 6 cm intramuscular  mass within the visualized right upper thigh. There is no hypermetabolic  lymphadenopathy within the abdomen or pelvis, but the shotty mediastinal  and hilar nodes are hypermetabolic and there is also a hypermetabolic  right upper lobe nodular opacity. In addition, there is an intensely  hypermetabolic approximately 5 cm intramuscular mass at the right upper  back and there is a tiny hypermetabolic lesion within the left clavicle.  2. Shotty cervical nodes have low level activity.  3. Uncertain significance of the more than typical metabolic activity at  dependent atelectatic change at both lower lobes.     LP on 5/31/2019:  Cytology and flow cytometry negative.     Mediport placed in the right subclavian area by Dr. Herrera on 6/12/2019.     Plan to start therapy with dose adjusted R-EPOCH with CNS prophylaxis with intrathecal methotrexate on 6/17/2019.     He previously had an echocardiogram on 5/15/2019 showing a normal left ventricular ejection fraction of 60%.     Admission to [facility] on 6/19/2019 for cycle #1 of therapy.     He received Neulasta on discharge on 6/22/2019.     His lowest ANC was 0.04 on 6/27 with a platelet count of 47,000 that day.  He was only neutropenic on one day and  his WBC and platelet count improved rapidly.     Admission for cycle #2 on 7/8/2019.  We proceeded with the same doses per the protocol.    Admission for cycle #3 on 7/29/2019.  20% increase in Adriamycin, etoposide, and Cytoxan doses per the protocol.    Sim platelet count 36,000 with a sim white blood cell count of 2.43 following cycle #3.    With cycle #4, dose increase by 20% for Adriamycin, etoposide, and Cytoxan per protocol.         Past Medical History:   Diagnosis Date   • Allergic rhinitis    • Aortic valve stenosis     Mild to moderate AS by echo on 8/8/17   • CAD (coronary artery disease)    • Cancer (CMS/HCC)     LYMPHOMA   • Carotid arterial disease (CMS/HCC)     Followed by Dr. Floyd.  Carotid Doppler on 9/21/17: 60-70% DENISE, 50-60% LICA.   • Chronic obstructive lung disease (CMS/HCC)    • Esophageal reflux     With a history of erosive gastritis   • Hyperlipidemia    • Hypertension    • Iron deficiency    • Legally blind     PT WEARS GLASSES    • Pulmonary fibrosis (CMS/HCC)     By CT scan on 8/12/15   • PVC (premature ventricular contraction)    • Rheumatoid arthritis (CMS/HCC)    • Skin cancer of face     15 year ago on left side of face   • Steroid-induced diabetes mellitus (CMS/HCC)    • Tobacco use        Past Surgical History:   Procedure Laterality Date   • BACK SURGERY  2010    L4-L5 discectomy   • CATARACT EXTRACTION Bilateral 1979   • CORONARY ANGIOPLASTY WITH STENT PLACEMENT  2015   • VASECTOMY  1979   • VENOUS ACCESS DEVICE (PORT) INSERTION N/A 6/12/2019    Procedure: INSERTION RIGHT VENOUS ACCESS DEVICE;  Surgeon: Philippe Herrera Jr., MD;  Location: [facility];  Service: General       SOCIAL HISTORY:   reports that he has been smoking cigarettes.  He has a 25.00 pack-year smoking history. He has never used smokeless tobacco. He reports that he does not drink alcohol or use drugs.    FAMILY HISTORY:  family history includes Coronary artery disease in his other; Heart attack (age of  onset: 47) in his brother; Heart attack (age of onset: 54) in his father; Heart disease in his brother and father; Heart failure in his mother.    ALLERGIES:  Allergies   Allergen Reactions   • Oxaprozin Hives       MEDICATIONS:  As listed in the electronic medical record.    Review of Systems   Constitutional: Positive for fatigue. Negative for appetite change, chills, fever and unexpected weight change.   HENT: Negative for congestion, dental problem, ear pain, hearing loss, mouth sores, nosebleeds, postnasal drip, rhinorrhea, tinnitus and trouble swallowing.    Eyes: Negative.    Respiratory: Negative for cough, chest tightness, shortness of breath, wheezing and stridor.    Cardiovascular: Negative for chest pain, palpitations and leg swelling.   Gastrointestinal: Positive for constipation. Negative for abdominal distention, abdominal pain, blood in stool, diarrhea, nausea and vomiting.   Endocrine: Negative.    Genitourinary: Negative for decreased urine volume, difficulty urinating, dysuria, flank pain, frequency, hematuria, scrotal swelling, testicular pain and urgency.   Musculoskeletal: Negative for arthralgias, back pain and joint swelling.   Allergic/Immunologic: Negative.    Neurological: Negative for dizziness, seizures, syncope, weakness, light-headedness, numbness and headaches.   Hematological: Negative for adenopathy. Does not bruise/bleed easily.   Psychiatric/Behavioral: Negative for confusion and sleep disturbance. The patient is not nervous/anxious.    All other systems reviewed and are negative.      There were no vitals filed for this visit.  See vital signs from the office visit dated 8/19/2019.  Vitals reviewed.    Physical Exam   Constitutional: He is oriented to person, place, and time. He appears well-developed and well-nourished. No distress.   HENT:   Head: Normocephalic and atraumatic. Hair is normal.   Mouth/Throat: Oropharynx is clear and moist.   Eyes: Conjunctivae, EOM and lids are  normal. Pupils are equal.   Neck: Neck supple. No JVD present. No thyroid mass and no thyromegaly present.   Cardiovascular: Normal rate and regular rhythm. Exam reveals no gallop and no friction rub.   Murmur heard.   Systolic murmur is present with a grade of 2/6.  Pulmonary/Chest: Effort normal and breath sounds normal. No respiratory distress. He has no wheezes. He has no rales.   Benign-appearing Mediport   Abdominal: Soft. He exhibits no distension and no mass. There is no splenomegaly or hepatomegaly. There is no tenderness. There is no guarding.   Musculoskeletal: Normal range of motion. He exhibits no edema, tenderness or deformity.   Lymphadenopathy:     He has no cervical adenopathy.     He has no axillary adenopathy.        Right: No inguinal and no supraclavicular adenopathy present.        Left: No inguinal and no supraclavicular adenopathy present.   Neurological: He is alert and oriented to person, place, and time. He has normal strength.   Skin: Skin is warm and dry. No rash noted.   Psychiatric: He has a normal mood and affect. His behavior is normal. Judgment and thought content normal. Cognition and memory are normal.   Nursing note and vitals reviewed.      DIAGNOSTIC DATA:  WBC   Date Value Ref Range Status   08/19/2019 14.34 (H) 3.40 - 10.80 10*3/mm3 Final     RBC   Date Value Ref Range Status   08/19/2019 3.45 (L) 4.14 - 5.80 10*6/mm3 Final     Hemoglobin   Date Value Ref Range Status   08/19/2019 10.7 (L) 13.0 - 17.7 g/dL Final     Hematocrit   Date Value Ref Range Status   08/19/2019 32.7 (L) 37.5 - 51.0 % Final     MCV   Date Value Ref Range Status   08/19/2019 94.8 79.0 - 97.0 fL Final     MCH   Date Value Ref Range Status   08/19/2019 31.0 26.6 - 33.0 pg Final     MCHC   Date Value Ref Range Status   08/19/2019 32.7 31.5 - 35.7 g/dL Final     RDW   Date Value Ref Range Status   08/19/2019 19.7 (H) 12.3 - 15.4 % Final     RDW-SD   Date Value Ref Range Status   08/19/2019 67.8 (H) 37.0 -  54.0 fl Final     MPV   Date Value Ref Range Status   08/19/2019 8.7 6.0 - 12.0 fL Final     Platelets   Date Value Ref Range Status   08/19/2019 235 140 - 450 10*3/mm3 Final     Neutrophil %   Date Value Ref Range Status   08/19/2019 80.7 (H) 42.7 - 76.0 % Final     Lymphocyte %   Date Value Ref Range Status   08/19/2019 5.3 (L) 19.6 - 45.3 % Final     Monocyte %   Date Value Ref Range Status   08/19/2019 7.7 5.0 - 12.0 % Final     Eosinophil %   Date Value Ref Range Status   08/19/2019 0.6 0.3 - 6.2 % Final     Basophil %   Date Value Ref Range Status   08/19/2019 0.5 0.0 - 1.5 % Final     Immature Grans %   Date Value Ref Range Status   08/19/2019 5.2 (H) 0.0 - 0.5 % Final     Neutrophils, Absolute   Date Value Ref Range Status   08/19/2019 11.56 (H) 1.70 - 7.00 10*3/mm3 Final     Lymphocytes, Absolute   Date Value Ref Range Status   08/19/2019 0.76 0.70 - 3.10 10*3/mm3 Final     Monocytes, Absolute   Date Value Ref Range Status   08/19/2019 1.11 (H) 0.10 - 0.90 10*3/mm3 Final     Eosinophils, Absolute   Date Value Ref Range Status   08/19/2019 0.09 0.00 - 0.40 10*3/mm3 Final     Basophils, Absolute   Date Value Ref Range Status   08/19/2019 0.07 0.00 - 0.20 10*3/mm3 Final     Immature Grans, Absolute   Date Value Ref Range Status   08/19/2019 0.75 (H) 0.00 - 0.05 10*3/mm3 Final     nRBC   Date Value Ref Range Status   08/19/2019 0.1 0.0 - 0.2 /100 WBC Final       Glucose   Date Value Ref Range Status   08/02/2019 219 (H) 65 - 99 mg/dL Final     Sodium   Date Value Ref Range Status   08/02/2019 136 136 - 145 mmol/L Final     Potassium   Date Value Ref Range Status   08/02/2019 3.7 3.5 - 5.2 mmol/L Final     CO2   Date Value Ref Range Status   08/02/2019 24.5 22.0 - 29.0 mmol/L Final     Chloride   Date Value Ref Range Status   08/02/2019 101 98 - 107 mmol/L Final     Anion Gap   Date Value Ref Range Status   08/02/2019 10.5 5.0 - 15.0 mmol/L Final     Creatinine   Date Value Ref Range Status   08/02/2019 0.70 (L)  0.76 - 1.27 mg/dL Final   07/22/2019 0.70 0.60 - 1.30 mg/dL Final     Comment:     Serial Number: [id number]:  [id number]     BUN   Date Value Ref Range Status   08/02/2019 20 8 - 23 mg/dL Final     BUN/Creatinine Ratio   Date Value Ref Range Status   08/02/2019 28.6 (H) 7.0 - 25.0 Final     Calcium   Date Value Ref Range Status   08/02/2019 8.6 8.6 - 10.5 mg/dL Final     eGFR Non  Amer   Date Value Ref Range Status   08/02/2019 109 >60 mL/min/1.73 Final     Alkaline Phosphatase   Date Value Ref Range Status   08/02/2019 53 39 - 117 U/L Final     Total Protein   Date Value Ref Range Status   08/02/2019 5.5 (L) 6.0 - 8.5 g/dL Final     ALT (SGPT)   Date Value Ref Range Status   08/02/2019 75 (H) 1 - 41 U/L Final     AST (SGOT)   Date Value Ref Range Status   08/02/2019 31 1 - 40 U/L Final     Total Bilirubin   Date Value Ref Range Status   08/02/2019 0.2 0.2 - 1.2 mg/dL Final     Albumin   Date Value Ref Range Status   08/02/2019 3.60 3.50 - 5.20 g/dL Final     Globulin   Date Value Ref Range Status   08/02/2019 1.9 gm/dL Final         IMAGING:  CT imaging of the neck chest abdomen and pelvis from 7/22/2019 shows a nice response to therapy.  Decreasing lymphadenopathy.     IMPRESSION:  1. Significant interval improvement. There has been near-complete  resolution of the perinephric lesions and significant decrease in the  left paraspinal mass at L5. The mesenteric lymphadenopathy has  decreased. The shotty mediastinal nodes are slightly smaller and there  is near-complete resolution of the right posterior chest wall mass. Also  resolution of a single enlarged left cervical node. There has also been  resolution of a 1.5 cm right upper lobe nodular opacity which was  hypermetabolic.  2. Significantly thickened gastric folds. The stomach was collapsed on  all of the previous CT examinations which limits comparison. Please  correlate clinically for gastritis.    ASSESSMENT:  This is a 79 y.o. male with:  1.  Stage  "Carlos Eduardo \"double hit\" high grade B-cell lymphoma, CD20 positive, Ki-67 98%, germinal center immunophenotype, MYC and BCL-2 rearrangements present.  His LP and bone marrow biopsies are negative for involvement.  However, his PET scan indicates perinephric soft tissue lesions. hypermetabolic mesenteric lymphadenopathy, left L5 paraspinous mass, an intensely hypermetabolic 6 cm intramuscular mass in the upper right thigh, a right upper lobe nodular opacity, a hypermetabolic mass adjacent to the right scapula, and a lesion within the left calvicle.  Paraspinous involvement puts him at extremely high risk for CNS involvement.       Plan 6 cycles of inpatient treatment with dose adjusted R-EPOCH.  Plan CNS prophylaxis with IT methotrexate with each cycle of therapy.     Admission to [facility] on 6/19/2019 for cycle #1 of therapy.     He received Neulasta on discharge on 6/22/2019.     His lowest ANC was 0.04 on 6/27 with a platelet count of 47,000 that day.  He was only neutropenic on one day and his WBC and platelet count improved rapidly.     Admit 7/8 for cycle #2.  Per the protocol with neutropenia x1 day, we proceeded with the same doses as cycle #1.  Slight dose adjustment for his decreased weight.     Cycle #3 initiated 7/29/2019.  Lumbar puncture scheduled that day.  Labs for flow cytometry.   Per the protocol, his doses of Adriamycin, etoposide, and Cytoxan increased by 20%.    Admit 8/19/2019 for cycle #4 of therapy.  Lumbar puncture with intrathecal methotrexate on admission.  Proceed with cycle #4 of therapy.  20% dose increase.     2.  Rheumatoid arthritis previously on Cimzia (certolizumab) injections by Dr. Velásquez. Plan to discontinue these during chemotherapy.  He was previously on Simponi Aria (golimumab) and Actemra (tocilizumab).  These certainly could contribute to the development of lymphoma.     3.  Coronary artery disease status post stenting as well as moderate aortic stenosis by " echocardiogram.  His left ventricular ejection fraction is normal at 60% by echocardiogram on 5/14/2019.     4.  Anxiety:  I previously gave him a prescription for alprazolam 0.25 mg to take twice daily as needed.  Continue benzodiazepines as needed during his hospitalization.     5.  Hyponatremia: Continue to monitor.     6.  Volume overload with cycle #1: We will likely need to decrease IV fluids with this cycle.     7.  Constipation: Continue Senokot and MiraLAX daily while hospitalized.    8.  Type 2 diabetes: Insulin sliding scale during the admission.     PLAN:   1.  Inpatient [facility]  2.  Lumbar puncture on admission with a sample to be sent for flow cytometry.  Intrathecal methotrexate 12 mg administration for CNS prophylaxis.  3.  Cycle #4 of therapy with dose adjusted R-EPOCH to start following his LP.  20% dose increase in Adriamycin, Cytoxan, etoposide per protocol.  4.  Benzodiazepines as needed for anxiety although he has not required any at this point.  5.  Lovenox 40 mg daily for DVT prophylaxis.  No allopurinol at this point  6.  IV fluid hydration.  We have decreased his maintenance fluids to 50 ml/hr due to volume overload with cycle #1.  7.  Continue Bactrim and acyclovir for prophylaxis.  8.  Moderate dose sliding scale insulin  9.  Neulasta next Monday.  Twice weekly CBC on Mondays and Thursdays for 2 weeks.   Plan total of 6 cycles of therapy if tolerated with intrathecal methotrexate with each cycle for CNS prophylaxis.  10.  I will see him back in the morning of September 9 for admission for cycle #5 of therapy.    Gabriele Garcia MD

## 2019-08-19 NOTE — PROGRESS NOTES
"Adult Nutrition  Assessment/PES    Patient Name:  Edward Srinivasan  YOB: 1940  MRN: [id number]  Admit Date:  8/19/2019    Assessment Date:  8/19/2019      Reason for Assessment     Row Name 08/19/19 1418          Reason for Assessment    Reason For Assessment  identified at risk by screening criteria     Diagnosis  cancer diagnosis/related complications Primary Problem:  Diffuse large B-cell lymphoma of lymph nodes of multiple regions (CMS/HCC) DM, HtN         Nutrition/Diet History     Row Name 08/19/19 1419          Nutrition/Diet History    Typical Food/Fluid Intake  great appetite, requesting double portions of protein to help with weight maintenance         Anthropometrics     Row Name 08/19/19 1419 08/19/19 1047       Anthropometrics    Height    170.2 cm (67.01\")    Weight    64.5 kg (142 lb 1.6 oz)       Ideal Body Weight (IBW)    Ideal Body Weight (IBW) (kg)    68.12    % Ideal Body Weight   94.62       Body Mass Index (BMI)    BMI (kg/m2)    22.3    BMI Assessment  BMI 18.5-24.9: normal      Labs/Tests/Procedures/Meds     Row Name 08/19/19 1419          Labs/Procedures/Meds    Lab Results Reviewed  reviewed, pertinent        Diagnostic Tests/Procedures    Diagnostic Test/Procedure Reviewed  reviewed, pertinent        Medications    Pertinent Medications Reviewed  reviewed, pertinent           Estimated/Assessed Needs     Row Name 08/19/19 1047 08/19/19 0805       Calculation Measurements    Height  170.2 cm (67.01\")  170.2 cm (67.01\")        Nutrition Prescription Ordered     Row Name 08/19/19 1419          Nutrition Prescription PO    Common Modifiers  Consistent Carbohydrate         Evaluation of Received Nutrient/Fluid Intake     Row Name 08/19/19 1420 08/19/19 1047       Calculation Measurements    Height  --  170.2 cm (67.01\")       PO Evaluation    Number of Meals  4  --    % PO Intake  100  --    Row Name 08/19/19 0805          Calculation Measurements    Height  170.2 cm (67.01\")     " "    Evaluation of Prescribed Nutrient/Fluid Intake     Row Name 08/19/19 1047 08/19/19 0805       Calculation Measurements    Height  170.2 cm (67.01\")  170.2 cm (67.01\")            Problem/Interventions:  Problem 1     Row Name 08/19/19 1420          Nutrition Diagnoses Problem 1    Problem 1  Nutrition Appropriate for Condition at this Time                 Intervention Goal     Row Name 08/19/19 1420          Intervention Goal    General  Maintain nutrition;Meet nutritional needs for age/condition     PO  Tolerate PO;Maintain intake     Weight  Maintain weight         Nutrition Intervention     Row Name 08/19/19 1420          Nutrition Intervention    RD/Tech Action  Interview for preference;Follow Tx progress;Care plan reviewd;Encourage intake;Adjusted portion           Education/Evaluation     Row Name 08/19/19 1420          Education    Education  Will Instruct as appropriate        Monitor/Evaluation    Monitor  Per protocol           Electronically signed by:  Graciela Morin RD  08/19/19 2:20 PM  "

## 2019-08-19 NOTE — PLAN OF CARE
Problem: Patient Care Overview  Goal: Plan of Care Review  Outcome: Ongoing (interventions implemented as appropriate)   08/19/19 1610   Coping/Psychosocial   Plan of Care Reviewed With patient;spouse   OTHER   Outcome Summary Pt was a direct admit to the floor for chemo today. Pt had a LP with methotrexate. Rapid infusion rituxan ran and tolerated well. 24 hour infusion started. VSS. Ambulating in room, tolerating regular diet. Continue to monitor pt.      Goal: Individualization and Mutuality  Outcome: Ongoing (interventions implemented as appropriate)    Goal: Interprofessional Rounds/Family Conf  Outcome: Ongoing (interventions implemented as appropriate)      Problem: Chemotherapy Effects (Adult)  Goal: Signs and Symptoms of Listed Potential Problems Will be Absent, Minimized or Managed (Chemotherapy Effects)  Outcome: Ongoing (interventions implemented as appropriate)      Problem: Fall Risk (Adult)  Goal: Identify Related Risk Factors and Signs and Symptoms  Outcome: Ongoing (interventions implemented as appropriate)   08/19/19 1610   Fall Risk (Adult)   Related Risk Factors (Fall Risk) impaired vision;inadequate lighting     Goal: Absence of Fall  Outcome: Ongoing (interventions implemented as appropriate)   08/19/19 1610   Fall Risk (Adult)   Absence of Fall making progress toward outcome

## 2019-08-19 NOTE — NURSING NOTE
"Nursing Chemotherapy Verification    Chemotherapy Regimen:   Treatment Plans     Name Type Plan dates Plan Provider         Active    IP LYMPHOMA R-EPOCH (Dose Adjusted) RiTUXimab / Etoposide / DOXOrubicin / VinCRIStine / Cyclophosphamide / PredniSONE ONCOLOGY TREATMENT  5/27/2019 - Present Gabriele Garcia MD                     Current height and weight: 170.2 cm (67.01\") 64.5 kg (142 lb 1.6 oz)  Calculated BSA from current height and weight (Jaz): 1.75    Relevant Labs  Results from last 7 days   Lab Units 08/19/19  1354 08/19/19  0728 08/15/19  0836   WBC 10*3/mm3 13.48* 14.34* 19.43*   HEMOGLOBIN g/dL 10.3* 10.7* 10.5*   HEMATOCRIT % 32.5* 32.7* 32.1*   PLATELETS 10*3/mm3 257 235 220     Lab Results   Component Value Date    NEUTROABS 10.55 (H) 08/19/2019       Results from last 7 days   Lab Units 08/19/19  1354   CREATININE mg/dL 0.66*       Serum creatinine: 0.66 mg/dL (L) 08/19/19 1354  Estimated creatinine clearance: 70.5 mL/min (A)    Lab Results   Component Value Date    HEPBCAB Negative 06/17/2019       Verification attestation:  I have personally reviewed the planned regimen and its administration and dosing. I understand the potential side effects.The patient has been instructed on the regimen, potential side effects, and self care measures; the consent form has been completed. I have confirmed that the appropriate premedication, prehydration, post medication and/or emergency medications are ordered in addition to the chemotherapy.    I have independently verified that the height and weight is current and calculated the BSA. I have verified the doses with the planned regimen and have clarified any deviations with the physician Dr. Garcia. I have confirmed the route of administration and patient IV access.    Nurse: Xi King RN  2nd verification Nurse: Sonam Lopes RN  "

## 2019-08-20 LAB
ALBUMIN SERPL-MCNC: 3.6 G/DL (ref 3.5–5.2)
ALBUMIN/GLOB SERPL: 1.4 G/DL
ALP SERPL-CCNC: 67 U/L (ref 39–117)
ALT SERPL W P-5'-P-CCNC: 14 U/L (ref 1–41)
ANION GAP SERPL CALCULATED.3IONS-SCNC: 9.4 MMOL/L (ref 5–15)
AST SERPL-CCNC: 15 U/L (ref 1–40)
BASOPHILS # BLD AUTO: 0.02 10*3/MM3 (ref 0–0.2)
BASOPHILS NFR BLD AUTO: 0.2 % (ref 0–1.5)
BILIRUB SERPL-MCNC: 0.2 MG/DL (ref 0.2–1.2)
BUN BLD-MCNC: 21 MG/DL (ref 8–23)
BUN/CREAT SERPL: 32.8 (ref 7–25)
CALCIUM SPEC-SCNC: 8.3 MG/DL (ref 8.6–10.5)
CHLORIDE SERPL-SCNC: 103 MMOL/L (ref 98–107)
CO2 SERPL-SCNC: 23.6 MMOL/L (ref 22–29)
CREAT BLD-MCNC: 0.64 MG/DL (ref 0.76–1.27)
DEPRECATED RDW RBC AUTO: 67.9 FL (ref 37–54)
EOSINOPHIL # BLD AUTO: 0 10*3/MM3 (ref 0–0.4)
EOSINOPHIL NFR BLD AUTO: 0 % (ref 0.3–6.2)
ERYTHROCYTE [DISTWIDTH] IN BLOOD BY AUTOMATED COUNT: 19.2 % (ref 12.3–15.4)
GFR SERPL CREATININE-BSD FRML MDRD: 121 ML/MIN/1.73
GLOBULIN UR ELPH-MCNC: 2.5 GM/DL
GLUCOSE BLD-MCNC: 195 MG/DL (ref 65–99)
GLUCOSE BLDC GLUCOMTR-MCNC: 177 MG/DL (ref 70–130)
GLUCOSE BLDC GLUCOMTR-MCNC: 192 MG/DL (ref 70–130)
GLUCOSE BLDC GLUCOMTR-MCNC: 208 MG/DL (ref 70–130)
GLUCOSE BLDC GLUCOMTR-MCNC: 234 MG/DL (ref 70–130)
HCT VFR BLD AUTO: 29.3 % (ref 37.5–51)
HGB BLD-MCNC: 9.3 G/DL (ref 13–17.7)
IMM GRANULOCYTES # BLD AUTO: 0.31 10*3/MM3 (ref 0–0.05)
IMM GRANULOCYTES NFR BLD AUTO: 2.4 % (ref 0–0.5)
LYMPHOCYTES # BLD AUTO: 0.35 10*3/MM3 (ref 0.7–3.1)
LYMPHOCYTES NFR BLD AUTO: 2.7 % (ref 19.6–45.3)
MCH RBC QN AUTO: 30.7 PG (ref 26.6–33)
MCHC RBC AUTO-ENTMCNC: 31.7 G/DL (ref 31.5–35.7)
MCV RBC AUTO: 96.7 FL (ref 79–97)
MONOCYTES # BLD AUTO: 0.09 10*3/MM3 (ref 0.1–0.9)
MONOCYTES NFR BLD AUTO: 0.7 % (ref 5–12)
NEUTROPHILS # BLD AUTO: 12.31 10*3/MM3 (ref 1.7–7)
NEUTROPHILS NFR BLD AUTO: 94 % (ref 42.7–76)
NRBC BLD AUTO-RTO: 0.1 /100 WBC (ref 0–0.2)
PLATELET # BLD AUTO: 189 10*3/MM3 (ref 140–450)
PMV BLD AUTO: 9.1 FL (ref 6–12)
POTASSIUM BLD-SCNC: 4.7 MMOL/L (ref 3.5–5.2)
PROT SERPL-MCNC: 6.1 G/DL (ref 6–8.5)
RBC # BLD AUTO: 3.03 10*6/MM3 (ref 4.14–5.8)
REF LAB TEST METHOD: NORMAL
SODIUM BLD-SCNC: 136 MMOL/L (ref 136–145)
WBC NRBC COR # BLD: 13.08 10*3/MM3 (ref 3.4–10.8)

## 2019-08-20 PROCEDURE — 25010000002 ENOXAPARIN PER 10 MG: Performed by: INTERNAL MEDICINE

## 2019-08-20 PROCEDURE — 25010000002 DOXORUBICIN PER 10 MG: Performed by: INTERNAL MEDICINE

## 2019-08-20 PROCEDURE — 25010000002 ETOPOSIDE 500 MG/25ML SOLUTION 25 ML VIAL: Performed by: INTERNAL MEDICINE

## 2019-08-20 PROCEDURE — 25010000002 VINCRISTINE PER 1 MG: Performed by: INTERNAL MEDICINE

## 2019-08-20 PROCEDURE — 82962 GLUCOSE BLOOD TEST: CPT

## 2019-08-20 PROCEDURE — 99233 SBSQ HOSP IP/OBS HIGH 50: CPT | Performed by: INTERNAL MEDICINE

## 2019-08-20 PROCEDURE — 63710000001 INSULIN LISPRO (HUMAN) PER 5 UNITS: Performed by: INTERNAL MEDICINE

## 2019-08-20 PROCEDURE — 80053 COMPREHEN METABOLIC PANEL: CPT | Performed by: INTERNAL MEDICINE

## 2019-08-20 PROCEDURE — 63710000001 PREDNISONE PER 5 MG: Performed by: INTERNAL MEDICINE

## 2019-08-20 PROCEDURE — 85025 COMPLETE CBC W/AUTO DIFF WBC: CPT | Performed by: INTERNAL MEDICINE

## 2019-08-20 PROCEDURE — 25010000002 ONDANSETRON PER 1 MG: Performed by: INTERNAL MEDICINE

## 2019-08-20 RX ORDER — SENNA AND DOCUSATE SODIUM 50; 8.6 MG/1; MG/1
2 TABLET, FILM COATED ORAL 2 TIMES DAILY
Status: DISCONTINUED | OUTPATIENT
Start: 2019-08-20 | End: 2019-08-23 | Stop reason: HOSPADM

## 2019-08-20 RX ADMIN — ASPIRIN 81 MG: 81 TABLET, COATED ORAL at 08:02

## 2019-08-20 RX ADMIN — SODIUM CHLORIDE, PRESERVATIVE FREE 10 ML: 5 INJECTION INTRAVENOUS at 08:05

## 2019-08-20 RX ADMIN — SODIUM CHLORIDE, PRESERVATIVE FREE 10 ML: 5 INJECTION INTRAVENOUS at 21:15

## 2019-08-20 RX ADMIN — PANTOPRAZOLE SODIUM 40 MG: 40 TABLET, DELAYED RELEASE ORAL at 05:05

## 2019-08-20 RX ADMIN — LISINOPRIL 10 MG: 10 TABLET ORAL at 08:02

## 2019-08-20 RX ADMIN — CETIRIZINE HYDROCHLORIDE 10 MG: 10 TABLET, FILM COATED ORAL at 08:02

## 2019-08-20 RX ADMIN — SENNOSIDES AND DOCUSATE SODIUM 2 TABLET: 8.6; 5 TABLET ORAL at 21:14

## 2019-08-20 RX ADMIN — LINAGLIPTIN 5 MG: 5 TABLET, FILM COATED ORAL at 08:02

## 2019-08-20 RX ADMIN — VINCRISTINE SULFATE: 1 INJECTION, SOLUTION INTRAVENOUS at 17:12

## 2019-08-20 RX ADMIN — PREDNISONE 100 MG: 50 TABLET ORAL at 08:01

## 2019-08-20 RX ADMIN — INSULIN LISPRO 2 UNITS: 100 INJECTION, SOLUTION INTRAVENOUS; SUBCUTANEOUS at 08:03

## 2019-08-20 RX ADMIN — INSULIN LISPRO 4 UNITS: 100 INJECTION, SOLUTION INTRAVENOUS; SUBCUTANEOUS at 17:53

## 2019-08-20 RX ADMIN — INSULIN LISPRO 2 UNITS: 100 INJECTION, SOLUTION INTRAVENOUS; SUBCUTANEOUS at 13:07

## 2019-08-20 RX ADMIN — ACYCLOVIR 400 MG: 200 CAPSULE ORAL at 08:01

## 2019-08-20 RX ADMIN — INSULIN LISPRO 4 UNITS: 100 INJECTION, SOLUTION INTRAVENOUS; SUBCUTANEOUS at 21:10

## 2019-08-20 RX ADMIN — SENNOSIDES AND DOCUSATE SODIUM 1 TABLET: 8.6; 5 TABLET ORAL at 08:02

## 2019-08-20 RX ADMIN — PREDNISONE 100 MG: 50 TABLET ORAL at 17:53

## 2019-08-20 RX ADMIN — ACYCLOVIR 400 MG: 200 CAPSULE ORAL at 21:49

## 2019-08-20 RX ADMIN — ONDANSETRON 16 MG: 2 INJECTION INTRAMUSCULAR; INTRAVENOUS at 16:38

## 2019-08-20 RX ADMIN — ENOXAPARIN SODIUM 40 MG: 40 INJECTION SUBCUTANEOUS at 09:48

## 2019-08-20 NOTE — PLAN OF CARE
"Problem: Patient Care Overview  Goal: Plan of Care Review  Outcome: Ongoing (interventions implemented as appropriate)   08/20/19 1550   Coping/Psychosocial   Plan of Care Reviewed With patient   OTHER   Outcome Summary Pt tolerating chemo well. No complaints at this time, will continue to monitor.    Plan of Care Review   Progress no change     Goal: Individualization and Mutuality  Outcome: Ongoing (interventions implemented as appropriate)   08/19/19 1610   Individualization   Patient Specific Preferences Goes by \"Aaron\"   Patient Specific Goals (Include Timeframe) Discharge Friday   Mutuality/Individual Preferences   How to Address Anxieties/Fears Explain care         "

## 2019-08-20 NOTE — PLAN OF CARE
Problem: Patient Care Overview  Goal: Plan of Care Review  Outcome: Ongoing (interventions implemented as appropriate)   08/20/19 0439   Coping/Psychosocial   Plan of Care Reviewed With patient   OTHER   Outcome Summary Admit for chemo 8/19 for 4 cycle, day 1 EPOCH, tolerated rituxan, no c/o pain, N/V, IVF cont   Plan of Care Review   Progress no change       Problem: Chemotherapy Effects (Adult)  Goal: Signs and Symptoms of Listed Potential Problems Will be Absent, Minimized or Managed (Chemotherapy Effects)  Outcome: Ongoing (interventions implemented as appropriate)      Problem: Fall Risk (Adult)  Goal: Absence of Fall  Outcome: Ongoing (interventions implemented as appropriate)

## 2019-08-20 NOTE — PROGRESS NOTES
"  Subjective   REASON FOR ADMISSION / CHIEF COMPLAINT:    1.  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  CT imaging of the abdomen and pelvis on 5/8/2019 shows multiple small tissue nodules in the perirenal fat adjacent to the left kidney and a soft tissue mass interposed between the left psoas muscle and spine.  2.  RA previously on Cimzia (certollizumab) injections by Dr. Velásquez  3.  Lumbar puncture 5/31/2019 negative for involvement with lymphoma  4.  Bone marrow aspiration and biopsy 5/29/2018 negative for lymphoma  5.  PET scan on 5/30/2019 with hypermetabolic perinephric soft tissue lesions with mesenteric lymphadenopathy, left paraspinal mass at L5, 6 cm intramuscular mass in the right upper thigh, lesion at the left clavicle and a 5 cm soft tissue mass between the right scapula and right posterior fourth rib.  6.  Right subclavian Mediport placed by Dr. Herrera on 6/12/2019.  7.  Admission to [facility] for cycle #1 of dose adjusted R-EPOCH on 6/17/2019.  Plan for a lumbar puncture with intrathecal methotrexate with each cycle.  8.  Cycle #2 initiated on 7/8/2019.  Same dosees pursued per the protocol.  9.  Admission for cycle #3 on 7/29/2019.  10.  Admission for cycle #4 on 8/19/2019    HISTORY OF PRESENT ILLNESS:   Edward Srinivasan is a 79 y.o. male who is being admitted for cycle #4 of dose adjusted R-EPOCH.  He tolerated the first three cycles very well.  With cycle #2, we pursued the same doses as cycle #1 per the protocol.  With cycle #3, per protocol, doses were adjusted upward by 20%.      Following cycle #3, his sim platelet count was 36,000 with a sim neutrophil count of 2.43 on 8/8/2019.  Blood counts have improved significantly.     He had a little bit more fatigue following cycle #3 of therapy but tolerated therapy well without any fevers or chills, mucositis, nausea or " vomiting.  Constipation is been controlled with Senokot S once daily.  Blood sugars were excellent at home after being home for couple of days.      8/20/2019 day #2 cycle #4 DA EPOCH R  Afebrile vital signs stable.  No new complaints.  He tolerated the lumbar puncture with intrathecal methotrexate on 8/19/2019 very well.  We will proceed with a #2 treatment as ordered.    History of Present Illness      Past Medical History, Past Surgical History, Social History, Family History have been reviewed and are without significant changes except as mentioned.    Review of Systems   Constitutional: Positive for fatigue. Negative for appetite change, chills, fever and unexpected weight change.   HENT: Negative for congestion, dental problem, ear pain, hearing loss, mouth sores, nosebleeds, postnasal drip, rhinorrhea, tinnitus and trouble swallowing.    Eyes: Negative.    Respiratory: Negative for cough, chest tightness, shortness of breath, wheezing and stridor.    Cardiovascular: Negative for chest pain, palpitations and leg swelling.   Gastrointestinal: Positive for constipation. Negative for abdominal distention, abdominal pain, blood in stool, diarrhea, nausea and vomiting.   Endocrine: Negative.    Genitourinary: Negative for decreased urine volume, difficulty urinating, dysuria, flank pain, frequency, hematuria, scrotal swelling, testicular pain and urgency.   Musculoskeletal: Negative for arthralgias, back pain and joint swelling.   Allergic/Immunologic: Negative.    Neurological: Negative for dizziness, seizures, syncope, weakness, light-headedness, numbness and headaches.   Hematological: Negative for adenopathy. Does not bruise/bleed easily.   Psychiatric/Behavioral: Negative for confusion and sleep disturbance. The patient is not nervous/anxious.        A comprehensive 14 point review of systems was performed and was negative except as mentioned.    Medications:  The current medication list was reviewed in the  EMR    ALLERGIES:    Allergies   Allergen Reactions   • Oxaprozin Hives       Objective      Vitals:    08/19/19 1936 08/19/19 2335 08/20/19 0326 08/20/19 0738   BP: 110/56 124/59 116/64 128/56   BP Location: Right arm Left arm Left arm Left arm   Patient Position: Lying Lying Lying Sitting   Pulse: 76 71 68 67   Resp: 18 16 16 16   Temp: 96.9 °F (36.1 °C) 97.7 °F (36.5 °C) 97 °F (36.1 °C) 97.2 °F (36.2 °C)   TempSrc: Oral Oral Oral Oral   SpO2: 96% 97% 98% 100%   Weight:       Height:         Current Status 8/19/2019   ECOG score 1       Physical Exam    Constitutional: He is oriented to person, place, and time. He appears well-developed and well-nourished. No distress.   HENT:   Head: Normocephalic and atraumatic. Hair is normal.   Mouth/Throat: Oropharynx is clear and moist.   Eyes: Conjunctivae, EOM and lids are normal. Pupils are equal.   Neck: Neck supple. No JVD present. No thyroid mass and no thyromegaly present.   Cardiovascular: Normal rate and regular rhythm. Exam reveals no gallop and no friction rub.   Murmur heard.   Systolic murmur is present with a grade of 2/6.  Pulmonary/Chest: Effort normal and breath sounds normal. No respiratory distress. He has no wheezes. He has no rales.   Benign-appearing Mediport   Abdominal: Soft. He exhibits no distension and no mass. There is no splenomegaly or hepatomegaly. There is no tenderness. There is no guarding.   Musculoskeletal: Normal range of motion. He exhibits no edema, tenderness or deformity.   Lymphadenopathy:     He has no cervical adenopathy.     He has no axillary adenopathy.        Right: No inguinal and no supraclavicular adenopathy present.        Left: No inguinal and no supraclavicular adenopathy present.   Neurological: He is alert and oriented to person, place, and time. He has normal strength.   Skin: Skin is warm and dry. No rash noted.   Psychiatric: He has a normal mood and affect. His behavior is normal. Judgment and thought content  normal. Cognition and memory are normal.   Nursing note and vitals reviewed.      RECENT LABS:  Hematology WBC   Date Value Ref Range Status   08/20/2019 13.08 (H) 3.40 - 10.80 10*3/mm3 Final     RBC   Date Value Ref Range Status   08/20/2019 3.03 (L) 4.14 - 5.80 10*6/mm3 Final     Hemoglobin   Date Value Ref Range Status   08/20/2019 9.3 (L) 13.0 - 17.7 g/dL Final     Hematocrit   Date Value Ref Range Status   08/20/2019 29.3 (L) 37.5 - 51.0 % Final     Platelets   Date Value Ref Range Status   08/20/2019 189 140 - 450 10*3/mm3 Final            Lab Results   Component Value Date    GLUCOSE 195 (H) 08/20/2019    BUN 21 08/20/2019    CREATININE 0.64 (L) 08/20/2019    EGFRIFNONA 121 08/20/2019    BCR 32.8 (H) 08/20/2019    K 4.7 08/20/2019    CO2 23.6 08/20/2019    CALCIUM 8.3 (L) 08/20/2019    ALBUMIN 3.60 08/20/2019    AST 15 08/20/2019    ALT 14 08/20/2019     CSF flow cytometry pending    EXAM: [place] CHEMO ADMIN CNS REQUIR/INCL LP (RAD)- - 08/19/2019     HISTORY: 79-year-old male with intrathecal chemotherapy; C83.38-Diffuse  large b-cell lymphoma, lymph nodes of multiple sites       FINDINGS:   Risks, benefits and alternatives were discussed with the patient.  Informed consent obtained. A timeout was performed including the  patient's name, date of birth and procedure to be performed.      The patient was then placed prone on the fluoroscopy table and a  location for lumbar puncture was identified at L3-4. This location was  marked and then sterilely cleaned and draped. The skin was then  anesthetized with 5 mL of buffered lidocaine.      A 20-gauge spinal needle was then advanced into the CSF fluid under  fluoroscopic guidance. A single image was obtained. Approximately 10 mL  of fluid was then obtained and submitted to the laboratory for  evaluation. The pharmacy prepared methotrexate chemotherapy was then  infused intrathecally.     The patient tolerated the procedure well. No evidence of immediate  complication.  "     The patient was then moved to the holding area and watch for 1 hour  after which time the patient was discharged to home in stable condition.     IMPRESSION:  Successful fluoroscopically guided lumbar puncture. Samples  submitted to the laboratory. Intrathecal methotrexate administered.    Assessment/Plan   ASSESSMENT:  This is a 79 y.o. male with:  1.  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  His LP and bone marrow biopsies are negative for involvement.  However, his PET scan indicates perinephric soft tissue lesions. hypermetabolic mesenteric lymphadenopathy, left L5 paraspinous mass, an intensely hypermetabolic 6 cm intramuscular mass in the upper right thigh, a right upper lobe nodular opacity, a hypermetabolic mass adjacent to the right scapula, and a lesion within the left calvicle.  Paraspinous involvement puts him at extremely high risk for CNS involvement.   2.  Patient admitted for cycle #4 dose adjusted R-EPOCH  3.  Lumbar puncture with intrathecal methotrexate with each cycle of treatment.  Patient received his intrathecal methotrexate yesterday on 8/19/2019 with no complications.    PLAN:   1.  Lumbar puncture on admission with sample  sent for flow cytometry.  Intrathecal methotrexate 12 mg administration for CNS prophylaxis.  3.  Cycle #4 of therapy with dose adjusted R-EPOCH to start following his LP.  20% dose increase in Adriamycin, Cytoxan, etoposide per protocol.  4.  Benzodiazepines as needed for anxiety although he has not required any at this point.  5.  Lovenox 40 mg daily for DVT prophylaxis.  No allopurinol at this point  6.  IV fluid hydration.  We have decreased his maintenance fluids to 50 ml/hr due to volume overload with cycle #1.  7.  Continue Bactrim and acyclovir for prophylaxis.  8.  Moderate dose sliding scale insulin  9.  Neulasta next Monday.  Twice weekly CBC on " Mondays and Thursdays for 2 weeks.   Plan total of 6 cycles of therapy if tolerated with intrathecal methotrexate with each cycle for CNS prophylaxis.  10.  Dr Garcia will see him back in the morning of September 9 for admission for cycle #5 of therapy.                            8/20/2019      CC:

## 2019-08-21 LAB
ALBUMIN SERPL-MCNC: 4.1 G/DL (ref 3.5–5.2)
ALBUMIN/GLOB SERPL: 2.7 G/DL
ALP SERPL-CCNC: 66 U/L (ref 39–117)
ALT SERPL W P-5'-P-CCNC: 15 U/L (ref 1–41)
ANION GAP SERPL CALCULATED.3IONS-SCNC: 11.9 MMOL/L (ref 5–15)
AST SERPL-CCNC: 14 U/L (ref 1–40)
BASOPHILS # BLD AUTO: 0.03 10*3/MM3 (ref 0–0.2)
BASOPHILS NFR BLD AUTO: 0.2 % (ref 0–1.5)
BILIRUB SERPL-MCNC: 0.2 MG/DL (ref 0.2–1.2)
BUN BLD-MCNC: 18 MG/DL (ref 8–23)
BUN/CREAT SERPL: 25.7 (ref 7–25)
CALCIUM SPEC-SCNC: 8.4 MG/DL (ref 8.6–10.5)
CHLORIDE SERPL-SCNC: 102 MMOL/L (ref 98–107)
CO2 SERPL-SCNC: 22.1 MMOL/L (ref 22–29)
CREAT BLD-MCNC: 0.7 MG/DL (ref 0.76–1.27)
DEPRECATED RDW RBC AUTO: 67.4 FL (ref 37–54)
EOSINOPHIL # BLD AUTO: 0 10*3/MM3 (ref 0–0.4)
EOSINOPHIL NFR BLD AUTO: 0 % (ref 0.3–6.2)
ERYTHROCYTE [DISTWIDTH] IN BLOOD BY AUTOMATED COUNT: 19.4 % (ref 12.3–15.4)
GFR SERPL CREATININE-BSD FRML MDRD: 109 ML/MIN/1.73
GLOBULIN UR ELPH-MCNC: 1.5 GM/DL
GLUCOSE BLD-MCNC: 220 MG/DL (ref 65–99)
GLUCOSE BLDC GLUCOMTR-MCNC: 217 MG/DL (ref 70–130)
GLUCOSE BLDC GLUCOMTR-MCNC: 270 MG/DL (ref 70–130)
GLUCOSE BLDC GLUCOMTR-MCNC: 276 MG/DL (ref 70–130)
GLUCOSE BLDC GLUCOMTR-MCNC: 324 MG/DL (ref 70–130)
HBA1C MFR BLD: 6.1 % (ref 4.8–5.6)
HCT VFR BLD AUTO: 25.9 % (ref 37.5–51)
HGB BLD-MCNC: 8.5 G/DL (ref 13–17.7)
IMM GRANULOCYTES # BLD AUTO: 0.19 10*3/MM3 (ref 0–0.05)
IMM GRANULOCYTES NFR BLD AUTO: 1.2 % (ref 0–0.5)
LYMPHOCYTES # BLD AUTO: 0.23 10*3/MM3 (ref 0.7–3.1)
LYMPHOCYTES NFR BLD AUTO: 1.5 % (ref 19.6–45.3)
MCH RBC QN AUTO: 31.6 PG (ref 26.6–33)
MCHC RBC AUTO-ENTMCNC: 32.8 G/DL (ref 31.5–35.7)
MCV RBC AUTO: 96.3 FL (ref 79–97)
MONOCYTES # BLD AUTO: 0.24 10*3/MM3 (ref 0.1–0.9)
MONOCYTES NFR BLD AUTO: 1.5 % (ref 5–12)
NEUTROPHILS # BLD AUTO: 14.95 10*3/MM3 (ref 1.7–7)
NEUTROPHILS NFR BLD AUTO: 95.6 % (ref 42.7–76)
NRBC BLD AUTO-RTO: 0 /100 WBC (ref 0–0.2)
PLATELET # BLD AUTO: 149 10*3/MM3 (ref 140–450)
PMV BLD AUTO: 8.7 FL (ref 6–12)
POTASSIUM BLD-SCNC: 4.1 MMOL/L (ref 3.5–5.2)
PROT SERPL-MCNC: 5.6 G/DL (ref 6–8.5)
RBC # BLD AUTO: 2.69 10*6/MM3 (ref 4.14–5.8)
SODIUM BLD-SCNC: 136 MMOL/L (ref 136–145)
WBC NRBC COR # BLD: 15.64 10*3/MM3 (ref 3.4–10.8)

## 2019-08-21 PROCEDURE — 25010000002 ENOXAPARIN PER 10 MG: Performed by: INTERNAL MEDICINE

## 2019-08-21 PROCEDURE — 99232 SBSQ HOSP IP/OBS MODERATE 35: CPT | Performed by: INTERNAL MEDICINE

## 2019-08-21 PROCEDURE — 85025 COMPLETE CBC W/AUTO DIFF WBC: CPT | Performed by: INTERNAL MEDICINE

## 2019-08-21 PROCEDURE — 25010000002 VINCRISTINE PER 1 MG: Performed by: INTERNAL MEDICINE

## 2019-08-21 PROCEDURE — 63710000001 PREDNISONE PER 5 MG: Performed by: INTERNAL MEDICINE

## 2019-08-21 PROCEDURE — 83036 HEMOGLOBIN GLYCOSYLATED A1C: CPT | Performed by: INTERNAL MEDICINE

## 2019-08-21 PROCEDURE — 25010000002 DOXORUBICIN PER 10 MG: Performed by: INTERNAL MEDICINE

## 2019-08-21 PROCEDURE — 25010000002 ETOPOSIDE 500 MG/25ML SOLUTION 25 ML VIAL: Performed by: INTERNAL MEDICINE

## 2019-08-21 PROCEDURE — 80053 COMPREHEN METABOLIC PANEL: CPT | Performed by: INTERNAL MEDICINE

## 2019-08-21 PROCEDURE — 82962 GLUCOSE BLOOD TEST: CPT

## 2019-08-21 PROCEDURE — 63710000001 INSULIN LISPRO (HUMAN) PER 5 UNITS: Performed by: INTERNAL MEDICINE

## 2019-08-21 PROCEDURE — 25010000002 ONDANSETRON PER 1 MG: Performed by: INTERNAL MEDICINE

## 2019-08-21 RX ADMIN — SODIUM CHLORIDE 75 ML/HR: 9 INJECTION, SOLUTION INTRAVENOUS at 21:03

## 2019-08-21 RX ADMIN — INSULIN LISPRO 6 UNITS: 100 INJECTION, SOLUTION INTRAVENOUS; SUBCUTANEOUS at 21:02

## 2019-08-21 RX ADMIN — INSULIN LISPRO 4 UNITS: 100 INJECTION, SOLUTION INTRAVENOUS; SUBCUTANEOUS at 08:24

## 2019-08-21 RX ADMIN — ONDANSETRON 16 MG: 2 INJECTION INTRAMUSCULAR; INTRAVENOUS at 16:29

## 2019-08-21 RX ADMIN — SENNOSIDES AND DOCUSATE SODIUM 2 TABLET: 8.6; 5 TABLET ORAL at 21:01

## 2019-08-21 RX ADMIN — PANTOPRAZOLE SODIUM 40 MG: 40 TABLET, DELAYED RELEASE ORAL at 05:08

## 2019-08-21 RX ADMIN — LINAGLIPTIN 5 MG: 5 TABLET, FILM COATED ORAL at 08:23

## 2019-08-21 RX ADMIN — INSULIN LISPRO 6 UNITS: 100 INJECTION, SOLUTION INTRAVENOUS; SUBCUTANEOUS at 17:42

## 2019-08-21 RX ADMIN — SENNOSIDES AND DOCUSATE SODIUM 2 TABLET: 8.6; 5 TABLET ORAL at 08:23

## 2019-08-21 RX ADMIN — PREDNISONE 100 MG: 50 TABLET ORAL at 17:42

## 2019-08-21 RX ADMIN — ACYCLOVIR 400 MG: 200 CAPSULE ORAL at 08:28

## 2019-08-21 RX ADMIN — ASPIRIN 81 MG: 81 TABLET, COATED ORAL at 08:23

## 2019-08-21 RX ADMIN — SULFAMETHOXAZOLE AND TRIMETHOPRIM 160 MG: 800; 160 TABLET ORAL at 08:23

## 2019-08-21 RX ADMIN — ENOXAPARIN SODIUM 40 MG: 40 INJECTION SUBCUTANEOUS at 11:49

## 2019-08-21 RX ADMIN — LISINOPRIL 10 MG: 10 TABLET ORAL at 08:23

## 2019-08-21 RX ADMIN — VINCRISTINE SULFATE: 1 INJECTION, SOLUTION INTRAVENOUS at 17:02

## 2019-08-21 RX ADMIN — ACYCLOVIR 400 MG: 200 CAPSULE ORAL at 21:01

## 2019-08-21 RX ADMIN — SODIUM CHLORIDE, PRESERVATIVE FREE 10 ML: 5 INJECTION INTRAVENOUS at 08:24

## 2019-08-21 RX ADMIN — SODIUM CHLORIDE, PRESERVATIVE FREE 10 ML: 5 INJECTION INTRAVENOUS at 21:07

## 2019-08-21 RX ADMIN — CETIRIZINE HYDROCHLORIDE 10 MG: 10 TABLET, FILM COATED ORAL at 08:23

## 2019-08-21 RX ADMIN — SODIUM CHLORIDE 75 ML/HR: 9 INJECTION, SOLUTION INTRAVENOUS at 06:34

## 2019-08-21 RX ADMIN — INSULIN LISPRO 7 UNITS: 100 INJECTION, SOLUTION INTRAVENOUS; SUBCUTANEOUS at 11:49

## 2019-08-21 RX ADMIN — PREDNISONE 100 MG: 50 TABLET ORAL at 08:23

## 2019-08-21 NOTE — NURSING NOTE
"Nursing Chemotherapy Verification    Chemotherapy Regimen:   Treatment Plans     Name Type Plan dates Plan Provider         Active    IP LYMPHOMA R-EPOCH (Dose Adjusted) RiTUXimab / Etoposide / DOXOrubicin / VinCRIStine / Cyclophosphamide / PredniSONE ONCOLOGY TREATMENT  5/27/2019 - Present Gabriele Garcia MD                     Current height and weight: 170.2 cm (67.01\") 64.5 kg (142 lb 1.6 oz)  Calculated BSA from current height and weight (Jaz): 1.75    Relevant Labs  Results from last 7 days   Lab Units 08/21/19  0501 08/20/19  0341 08/19/19  1354   WBC 10*3/mm3 15.64* 13.08* 13.48*   HEMOGLOBIN g/dL 8.5* 9.3* 10.3*   HEMATOCRIT % 25.9* 29.3* 32.5*   PLATELETS 10*3/mm3 149 189 257     Lab Results   Component Value Date    NEUTROABS 14.95 (H) 08/21/2019       Results from last 7 days   Lab Units 08/21/19  0501 08/20/19  0341 08/19/19  1354   CREATININE mg/dL 0.70* 0.64* 0.66*       Serum creatinine: 0.7 mg/dL (L) 08/21/19 0501  Estimated creatinine clearance: 70.5 mL/min (A)    Lab Results   Component Value Date    HEPBCAB Negative 06/17/2019       Verification attestation:  I have personally reviewed the planned regimen and its administration and dosing. I understand the potential side effects.The patient has been instructed on the regimen, potential side effects, and self care measures; the consent form has been completed. I have confirmed that the appropriate premedication, prehydration, post medication and/or emergency medications are ordered in addition to the chemotherapy.    I have independently verified that the height and weight is current and calculated the BSA. I have verified the doses with the planned regimen and have clarified any deviations with the physician, Dr. Weathers. I have confirmed the route of administration and patient IV access.    Nurse: Xi King RN  2nd verification Nurse: Jyothi Marroquin RN    "

## 2019-08-21 NOTE — PLAN OF CARE
"Problem: Patient Care Overview  Goal: Plan of Care Review  Outcome: Ongoing (interventions implemented as appropriate)   08/21/19 1713   Coping/Psychosocial   Plan of Care Reviewed With patient   OTHER   Outcome Summary Pt tolerating chemo well. VSS. Ambulating in [place], no complaints at this time. Cycle 4, Day 3 of E-POCH. Will continue to monitor.    Plan of Care Review   Progress no change     Goal: Individualization and Mutuality  Outcome: Ongoing (interventions implemented as appropriate)   08/19/19 1610   Individualization   Patient Specific Preferences Goes by \"Aaron\"   Patient Specific Goals (Include Timeframe) Discharge Friday   Mutuality/Individual Preferences   How to Address Anxieties/Fears Explain care       Problem: Chemotherapy Effects (Adult)  Goal: Signs and Symptoms of Listed Potential Problems Will be Absent, Minimized or Managed (Chemotherapy Effects)  Outcome: Ongoing (interventions implemented as appropriate)   08/20/19 0439   Goal/Outcome Evaluation   Problems Assessed (Chemotherapy Effects) all   Problems Present (Chemotherapy Effects) none       Problem: Fall Risk (Adult)  Goal: Absence of Fall  Outcome: Ongoing (interventions implemented as appropriate)   08/21/19 1713   Fall Risk (Adult)   Absence of Fall making progress toward outcome         "

## 2019-08-21 NOTE — PROGRESS NOTES
Discharge Planning Assessment  [facility]     Patient Name: Edward Srinivasan  MRN: [id number]  Today's Date: 8/21/2019    Admit Date: 8/19/2019    Discharge Needs Assessment     Row Name 08/21/19 8252       Living Environment    Lives With  spouse    Name(s) of Who Lives With Patient  wife, Radha Srinivasan, [phone]    Current Living Arrangements  home/apartment/condo    Primary Care Provided by  self    Provides Primary Care For  no one    Family Caregiver if Needed  spouse    Quality of Family Relationships  helpful;involved;supportive    Able to Return to Prior Arrangements  yes       Resource/Environmental Concerns    Resource/Environmental Concerns  none       Transition Planning    Patient/Family Anticipates Transition to  home with family    Patient/Family Anticipated Services at Transition  none    Transportation Anticipated  family or friend will provide       Discharge Needs Assessment    Concerns to be Addressed  no discharge needs identified;denies needs/concerns at this time    Equipment Currently Used at Home  cane, straight    Discharge Coordination/Progress  Home with wife        Discharge Plan     Row Name 08/21/19 3081       Plan    Plan  Home with wife    Patient/Family in Agreement with Plan  yes    Plan Comments  IMM letter checked. CCP met with pt and wife to discuss d/c planning. Facesheet verified. CCP role explained. Pt resides with his wife in a two level home with basement steps. Pt uses cane for mobility, and reports no past home health or sub-acute rehab. Pt confirms pharmacy is [facility] on [address]. Pt anticipates no d/c needs. CCP to follow to assist should d/c needs arise. Alice Barraza [facility]        Destination      No service coordination in this encounter.      Durable Medical Equipment      No service coordination in this encounter.      Dialysis/Infusion      No service coordination in this encounter.      Home Medical Care      No service coordination in this encounter.       Therapy      No service coordination in this encounter.      Community Resources      No service coordination in this encounter.          Demographic Summary     Row Name 08/21/19 1613       General Information    Admission Type  inpatient    Arrived From  home    Required Notices Provided  Important Message from Medicare    Referral Source  admission list    Reason for Consult  discharge planning    Preferred Language  English        Functional Status     Row Name 08/21/19 1614       Functional Status    Usual Activity Tolerance  good    Current Activity Tolerance  good       Functional Status, IADL    Medications  independent    Meal Preparation  independent    Housekeeping  independent    Laundry  independent    Shopping  independent       Mental Status Summary    Recent Changes in Mental Status/Cognitive Functioning  no changes        Psychosocial    No documentation.       Abuse/Neglect    No documentation.       Legal    No documentation.       Substance Abuse    No documentation.       Patient Forms    No documentation.           Nela Barraza LCSW

## 2019-08-21 NOTE — NURSING NOTE
"Nursing Chemotherapy Verification    Chemotherapy Regimen:   Treatment Plans     Name Type Plan dates Plan Provider         Active    IP LYMPHOMA R-EPOCH (Dose Adjusted) RiTUXimab / Etoposide / DOXOrubicin / VinCRIStine / Cyclophosphamide / PredniSONE ONCOLOGY TREATMENT  5/27/2019 - Present Gabriele Garcia MD                     Current height and weight: 170.2 cm (67.01\") 64.5 kg (142 lb 1.6 oz)  Calculated BSA from current height and weight (Jaz): 1.75    Relevant Labs  Results from last 7 days   Lab Units 08/21/19  0501 08/20/19  0341 08/19/19  1354   WBC 10*3/mm3 15.64* 13.08* 13.48*   HEMOGLOBIN g/dL 8.5* 9.3* 10.3*   HEMATOCRIT % 25.9* 29.3* 32.5*   PLATELETS 10*3/mm3 149 189 257     Lab Results   Component Value Date    NEUTROABS 14.95 (H) 08/21/2019       Results from last 7 days   Lab Units 08/21/19  0501 08/20/19  0341 08/19/19  1354   CREATININE mg/dL 0.70* 0.64* 0.66*       Serum creatinine: 0.7 mg/dL (L) 08/21/19 0501  Estimated creatinine clearance: 70.5 mL/min (A)    Lab Results   Component Value Date    HEPBCAB Negative 06/17/2019       Verification attestation:  I have personally reviewed the planned regimen and its administration and dosing. I understand the potential side effects.The patient has been instructed on the regimen, potential side effects, and self care measures; the consent form has been completed. I have confirmed that the appropriate premedication, prehydration, post medication and/or emergency medications are ordered in addition to the chemotherapy.    I have independently verified that the height and weight is current and calculated the BSA. I have verified the doses with the planned regimen and have clarified any deviations with the physician, Dr. Weathers. I have confirmed the route of administration and patient IV access.    Nurse: Xi King RN  2nd verification Nurse: Sonam Lopes RN  "

## 2019-08-21 NOTE — PROGRESS NOTES
"  Subjective   REASON FOR ADMISSION / CHIEF COMPLAINT:    1.  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  CT imaging of the abdomen and pelvis on 5/8/2019 shows multiple small tissue nodules in the perirenal fat adjacent to the left kidney and a soft tissue mass interposed between the left psoas muscle and spine.  2.  RA previously on Cimzia (certollizumab) injections by Dr. Velásquez  3.  Lumbar puncture 5/31/2019 negative for involvement with lymphoma  4.  Bone marrow aspiration and biopsy 5/29/2018 negative for lymphoma  5.  PET scan on 5/30/2019 with hypermetabolic perinephric soft tissue lesions with mesenteric lymphadenopathy, left paraspinal mass at L5, 6 cm intramuscular mass in the right upper thigh, lesion at the left clavicle and a 5 cm soft tissue mass between the right scapula and right posterior fourth rib.  6.  Right subclavian Mediport placed by Dr. Herrera on 6/12/2019.  7.  [facility] dose adjusted R-EPOCH on 6/17/2019.  Plan for a lumbar puncture with intrathecal methotrexate with each cycle.  8.  Cycle #2 initiated on 7/8/2019.  Same dosees pursued per the protocol.  9.  Admission for cycle #3 on 7/29/2019.  10.  Admission for cycle #4 on 8/19/2019  HISTORY OF PRESENT ILLNESS:   Edward Srinivasan is a 79 y.o. male who is being admitted for cycle #4 of dose adjusted R-EPOCH.  He tolerated the first three cycles very well.  With cycle #2, we pursued the same doses as cycle #1 per the protocol.  With cycle #3, per protocol, doses were adjusted upward by 20%.      Following cycle #3, his sim platelet count was 36,000 with a sim neutrophil count of 2.43 on 8/8/2019.  Blood counts have improved significantly.     He had a little bit more fatigue following cycle #3 of therapy but tolerated therapy well without any fevers or chills, mucositis, nausea or " vomiting.  Constipation is been controlled with Senokot S once daily.  Blood sugars were excellent at home after being home for couple of days.       8/20/2019 day #2 cycle #4 DA EPOCH R  Afebrile vital signs stable.  No new complaints.  He tolerated the lumbar puncture with intrathecal methotrexate on 8/19/2019 very well.  We will proceed with a #2 treatment as ordered.    8/21/2019 Day#3 cycle #4  Afebrile vital signs stable.  He continues to tolerate chemotherapy very well.  We will proceed with day #3 treatment as ordered.  History of Present Illness      Past Medical History, Past Surgical History, Social History, Family History have been reviewed and are without significant changes except as mentioned.    Review of Systems   A comprehensive 14 point review of systems was performed and was negative except as mentioned.    Medications:  The current medication list was reviewed in the EMR    ALLERGIES:    Allergies   Allergen Reactions   • Oxaprozin Hives       Objective      Vitals:    08/20/19 1614 08/20/19 2007 08/21/19 0440 08/21/19 0827   BP: 125/66 123/66 143/68 125/69   BP Location: Left arm Left arm Right arm Left arm   Patient Position: Sitting Sitting Lying Sitting   Pulse: 88 79 74 80   Resp: 18 18 18 18   Temp: 97.2 °F (36.2 °C) 96.7 °F (35.9 °C) 97.5 °F (36.4 °C) 97.6 °F (36.4 °C)   TempSrc: Oral Oral Oral Oral   SpO2: 99% 96% 97% 96%   Weight:       Height:         Current Status 8/19/2019   ECOG score 1       Physical Exam      RECENT LABS:  Hematology WBC   Date Value Ref Range Status   08/21/2019 15.64 (H) 3.40 - 10.80 10*3/mm3 Final     RBC   Date Value Ref Range Status   08/21/2019 2.69 (L) 4.14 - 5.80 10*6/mm3 Final     Hemoglobin   Date Value Ref Range Status   08/21/2019 8.5 (L) 13.0 - 17.7 g/dL Final     Hematocrit   Date Value Ref Range Status   08/21/2019 25.9 (L) 37.5 - 51.0 % Final     Platelets   Date Value Ref Range Status   08/21/2019 149 140 - 450 10*3/mm3 Final            Lab  "Results   Component Value Date    GLUCOSE 220 (H) 08/21/2019    BUN 18 08/21/2019    CREATININE 0.70 (L) 08/21/2019    EGFRIFNONA 109 08/21/2019    BCR 25.7 (H) 08/21/2019    K 4.1 08/21/2019    CO2 22.1 08/21/2019    CALCIUM 8.4 (L) 08/21/2019    ALBUMIN 4.10 08/21/2019    AST 14 08/21/2019    ALT 15 08/21/2019     CSF flow cytometry pending     EXAM: [place] CHEMO ADMIN CNS REQUIR/INCL LP (RAD)- - 08/19/2019     HISTORY: 79-year-old male with intrathecal chemotherapy; C83.38-Diffuse  large b-cell lymphoma, lymph nodes of multiple sites       FINDINGS:   Risks, benefits and alternatives were discussed with the patient.  Informed consent obtained. A timeout was performed including the  patient's name, date of birth and procedure to be performed.      The patient was then placed prone on the fluoroscopy table and a  location for lumbar puncture was identified at L3-4. This location was  marked and then sterilely cleaned and draped. The skin was then  anesthetized with 5 mL of buffered lidocaine.      A 20-gauge spinal needle was then advanced into the CSF fluid under  fluoroscopic guidance. A single image was obtained. Approximately 10 mL  of fluid was then obtained and submitted to the laboratory for  evaluation. The pharmacy prepared methotrexate chemotherapy was then  infused intrathecally.     The patient tolerated the procedure well. No evidence of immediate  complication.      The patient was then moved to the holding area and watch for 1 hour  after which time the patient was discharged to home in stable condition.     IMPRESSION:  Successful fluoroscopically guided lumbar puncture. Samples  submitted to the laboratory. Intrathecal methotrexate administered.    Assessment/Plan     1.  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  His LP and bone marrow biopsies are negative for involvement.  However, his PET scan " indicates perinephric soft tissue lesions. hypermetabolic mesenteric lymphadenopathy, left L5 paraspinous mass, an intensely hypermetabolic 6 cm intramuscular mass in the upper right thigh, a right upper lobe nodular opacity, a hypermetabolic mass adjacent to the right scapula, and a lesion within the left calvicle.  Paraspinous involvement puts him at extremely high risk for CNS involvement.   2.  Patient admitted for cycle #4 dose adjusted R-EPOCH  3.  Lumbar puncture with intrathecal methotrexate with each cycle of treatment.  Patient received his intrathecal methotrexate yesterday on 8/19/2019 with no complications.     PLAN:   1.  Lumbar puncture on admission with sample  sent for flow cytometry.  Intrathecal methotrexate 12 mg administration for CNS prophylaxis.  3.  Cycle #4 of therapy with dose adjusted R-EPOCH to start following his LP.  20% dose increase in Adriamycin, Cytoxan, etoposide per protocol.  4.  Benzodiazepines as needed for anxiety although he has not required any at this point.  5.  Lovenox 40 mg daily for DVT prophylaxis.  No allopurinol at this point  6.  IV fluid hydration.  We have decreased his maintenance fluids to 50 ml/hr due to volume overload with cycle #1.  7.  Continue Bactrim and acyclovir for prophylaxis.  8.  Moderate dose sliding scale insulin  9.  Neulasta next Monday.  Twice weekly CBC on Mondays and Thursdays for 2 weeks.   Plan total of 6 cycles of therapy if tolerated with intrathecal methotrexate with each cycle for CNS prophylaxis.  10.  Dr Garcia will see him back in the morning of September 9 for admission for cycle #5 of therapy.  11.  Proceed with day #3 chemotherapy today as ordered                8/21/2019      CC:

## 2019-08-22 LAB
ABO GROUP BLD: NORMAL
ALBUMIN SERPL-MCNC: 3.8 G/DL (ref 3.5–5.2)
ALBUMIN/GLOB SERPL: 2.9 G/DL
ALP SERPL-CCNC: 56 U/L (ref 39–117)
ALT SERPL W P-5'-P-CCNC: 35 U/L (ref 1–41)
ANION GAP SERPL CALCULATED.3IONS-SCNC: 8.1 MMOL/L (ref 5–15)
AST SERPL-CCNC: 25 U/L (ref 1–40)
BASOPHILS # BLD AUTO: 0.01 10*3/MM3 (ref 0–0.2)
BASOPHILS NFR BLD AUTO: 0.1 % (ref 0–1.5)
BILIRUB SERPL-MCNC: 0.2 MG/DL (ref 0.2–1.2)
BLD GP AB SCN SERPL QL: NEGATIVE
BUN BLD-MCNC: 23 MG/DL (ref 8–23)
BUN/CREAT SERPL: 34.8 (ref 7–25)
CALCIUM SPEC-SCNC: 8.3 MG/DL (ref 8.6–10.5)
CHLORIDE SERPL-SCNC: 105 MMOL/L (ref 98–107)
CO2 SERPL-SCNC: 21.9 MMOL/L (ref 22–29)
CREAT BLD-MCNC: 0.66 MG/DL (ref 0.76–1.27)
DEPRECATED RDW RBC AUTO: 69.2 FL (ref 37–54)
EOSINOPHIL # BLD AUTO: 0 10*3/MM3 (ref 0–0.4)
EOSINOPHIL NFR BLD AUTO: 0 % (ref 0.3–6.2)
ERYTHROCYTE [DISTWIDTH] IN BLOOD BY AUTOMATED COUNT: 19.5 % (ref 12.3–15.4)
FERRITIN SERPL-MCNC: 286 NG/ML (ref 30–400)
FOLATE SERPL-MCNC: 13.6 NG/ML (ref 4.78–24.2)
GFR SERPL CREATININE-BSD FRML MDRD: 117 ML/MIN/1.73
GLOBULIN UR ELPH-MCNC: 1.3 GM/DL
GLUCOSE BLD-MCNC: 225 MG/DL (ref 65–99)
GLUCOSE BLDC GLUCOMTR-MCNC: 188 MG/DL (ref 70–130)
GLUCOSE BLDC GLUCOMTR-MCNC: 219 MG/DL (ref 70–130)
GLUCOSE BLDC GLUCOMTR-MCNC: 243 MG/DL (ref 70–130)
GLUCOSE BLDC GLUCOMTR-MCNC: 273 MG/DL (ref 70–130)
HCT VFR BLD AUTO: 24.4 % (ref 37.5–51)
HGB BLD-MCNC: 7.8 G/DL (ref 13–17.7)
IMM GRANULOCYTES # BLD AUTO: 0.16 10*3/MM3 (ref 0–0.05)
IMM GRANULOCYTES NFR BLD AUTO: 1.3 % (ref 0–0.5)
IRON 24H UR-MRATE: 223 MCG/DL (ref 59–158)
IRON SATN MFR SERPL: 85 % (ref 20–50)
LYMPHOCYTES # BLD AUTO: 0.2 10*3/MM3 (ref 0.7–3.1)
LYMPHOCYTES NFR BLD AUTO: 1.6 % (ref 19.6–45.3)
MCH RBC QN AUTO: 31.1 PG (ref 26.6–33)
MCHC RBC AUTO-ENTMCNC: 32 G/DL (ref 31.5–35.7)
MCV RBC AUTO: 97.2 FL (ref 79–97)
MONOCYTES # BLD AUTO: 0.32 10*3/MM3 (ref 0.1–0.9)
MONOCYTES NFR BLD AUTO: 2.6 % (ref 5–12)
NEUTROPHILS # BLD AUTO: 11.53 10*3/MM3 (ref 1.7–7)
NEUTROPHILS NFR BLD AUTO: 94.4 % (ref 42.7–76)
NRBC BLD AUTO-RTO: 0 /100 WBC (ref 0–0.2)
PLATELET # BLD AUTO: 135 10*3/MM3 (ref 140–450)
PMV BLD AUTO: 9.1 FL (ref 6–12)
POTASSIUM BLD-SCNC: 4 MMOL/L (ref 3.5–5.2)
PROT SERPL-MCNC: 5.1 G/DL (ref 6–8.5)
RBC # BLD AUTO: 2.51 10*6/MM3 (ref 4.14–5.8)
RH BLD: POSITIVE
SODIUM BLD-SCNC: 135 MMOL/L (ref 136–145)
T&S EXPIRATION DATE: NORMAL
TIBC SERPL-MCNC: 264 MCG/DL (ref 298–536)
TRANSFERRIN SERPL-MCNC: 177 MG/DL (ref 200–360)
VIT B12 BLD-MCNC: >2000 PG/ML (ref 211–946)
WBC NRBC COR # BLD: 12.22 10*3/MM3 (ref 3.4–10.8)

## 2019-08-22 PROCEDURE — 63710000001 PREDNISONE PER 5 MG: Performed by: INTERNAL MEDICINE

## 2019-08-22 PROCEDURE — 63710000001 INSULIN LISPRO (HUMAN) PER 5 UNITS: Performed by: INTERNAL MEDICINE

## 2019-08-22 PROCEDURE — 25010000002 DOXORUBICIN PER 10 MG: Performed by: INTERNAL MEDICINE

## 2019-08-22 PROCEDURE — 82962 GLUCOSE BLOOD TEST: CPT

## 2019-08-22 PROCEDURE — 36591 DRAW BLOOD OFF VENOUS DEVICE: CPT

## 2019-08-22 PROCEDURE — P9016 RBC LEUKOCYTES REDUCED: HCPCS

## 2019-08-22 PROCEDURE — 82607 VITAMIN B-12: CPT | Performed by: INTERNAL MEDICINE

## 2019-08-22 PROCEDURE — 86900 BLOOD TYPING SEROLOGIC ABO: CPT | Performed by: INTERNAL MEDICINE

## 2019-08-22 PROCEDURE — 25010000002 ONDANSETRON PER 1 MG: Performed by: INTERNAL MEDICINE

## 2019-08-22 PROCEDURE — 83540 ASSAY OF IRON: CPT | Performed by: INTERNAL MEDICINE

## 2019-08-22 PROCEDURE — 86923 COMPATIBILITY TEST ELECTRIC: CPT

## 2019-08-22 PROCEDURE — 99233 SBSQ HOSP IP/OBS HIGH 50: CPT | Performed by: INTERNAL MEDICINE

## 2019-08-22 PROCEDURE — 86900 BLOOD TYPING SEROLOGIC ABO: CPT

## 2019-08-22 PROCEDURE — 63710000001 DIPHENHYDRAMINE PER 50 MG: Performed by: INTERNAL MEDICINE

## 2019-08-22 PROCEDURE — 25010000002 VINCRISTINE PER 1 MG: Performed by: INTERNAL MEDICINE

## 2019-08-22 PROCEDURE — 84466 ASSAY OF TRANSFERRIN: CPT | Performed by: INTERNAL MEDICINE

## 2019-08-22 PROCEDURE — 86901 BLOOD TYPING SEROLOGIC RH(D): CPT | Performed by: INTERNAL MEDICINE

## 2019-08-22 PROCEDURE — 82746 ASSAY OF FOLIC ACID SERUM: CPT | Performed by: INTERNAL MEDICINE

## 2019-08-22 PROCEDURE — 25010000002 ETOPOSIDE 500 MG/25ML SOLUTION 25 ML VIAL: Performed by: INTERNAL MEDICINE

## 2019-08-22 PROCEDURE — 36430 TRANSFUSION BLD/BLD COMPNT: CPT

## 2019-08-22 PROCEDURE — 80053 COMPREHEN METABOLIC PANEL: CPT | Performed by: INTERNAL MEDICINE

## 2019-08-22 PROCEDURE — 82728 ASSAY OF FERRITIN: CPT | Performed by: INTERNAL MEDICINE

## 2019-08-22 PROCEDURE — 85025 COMPLETE CBC W/AUTO DIFF WBC: CPT | Performed by: INTERNAL MEDICINE

## 2019-08-22 PROCEDURE — 86850 RBC ANTIBODY SCREEN: CPT | Performed by: INTERNAL MEDICINE

## 2019-08-22 PROCEDURE — 25010000002 ENOXAPARIN PER 10 MG: Performed by: INTERNAL MEDICINE

## 2019-08-22 RX ORDER — ACETAMINOPHEN 650 MG/1
650 SUPPOSITORY RECTAL ONCE
Status: COMPLETED | OUTPATIENT
Start: 2019-08-22 | End: 2019-08-22

## 2019-08-22 RX ORDER — DIPHENHYDRAMINE HYDROCHLORIDE 50 MG/ML
25 INJECTION INTRAMUSCULAR; INTRAVENOUS ONCE
Status: DISCONTINUED | OUTPATIENT
Start: 2019-08-22 | End: 2019-08-22

## 2019-08-22 RX ORDER — ACETAMINOPHEN 325 MG/1
650 TABLET ORAL ONCE
Status: COMPLETED | OUTPATIENT
Start: 2019-08-22 | End: 2019-08-22

## 2019-08-22 RX ORDER — DIPHENHYDRAMINE HCL 25 MG
25 CAPSULE ORAL EVERY 6 HOURS PRN
Status: DISCONTINUED | OUTPATIENT
Start: 2019-08-22 | End: 2019-08-23 | Stop reason: HOSPADM

## 2019-08-22 RX ADMIN — ACETAMINOPHEN 650 MG: 325 TABLET, FILM COATED ORAL at 12:10

## 2019-08-22 RX ADMIN — INSULIN LISPRO 6 UNITS: 100 INJECTION, SOLUTION INTRAVENOUS; SUBCUTANEOUS at 17:53

## 2019-08-22 RX ADMIN — SENNOSIDES AND DOCUSATE SODIUM 2 TABLET: 8.6; 5 TABLET ORAL at 08:22

## 2019-08-22 RX ADMIN — SODIUM CHLORIDE 75 ML/HR: 9 INJECTION, SOLUTION INTRAVENOUS at 04:33

## 2019-08-22 RX ADMIN — INSULIN LISPRO 4 UNITS: 100 INJECTION, SOLUTION INTRAVENOUS; SUBCUTANEOUS at 12:08

## 2019-08-22 RX ADMIN — INSULIN LISPRO 4 UNITS: 100 INJECTION, SOLUTION INTRAVENOUS; SUBCUTANEOUS at 21:43

## 2019-08-22 RX ADMIN — ONDANSETRON 16 MG: 2 INJECTION INTRAMUSCULAR; INTRAVENOUS at 16:30

## 2019-08-22 RX ADMIN — DIPHENHYDRAMINE HYDROCHLORIDE 25 MG: 25 CAPSULE ORAL at 12:12

## 2019-08-22 RX ADMIN — ENOXAPARIN SODIUM 40 MG: 40 INJECTION SUBCUTANEOUS at 11:37

## 2019-08-22 RX ADMIN — VINCRISTINE SULFATE: 1 INJECTION, SOLUTION INTRAVENOUS at 17:07

## 2019-08-22 RX ADMIN — PREDNISONE 100 MG: 50 TABLET ORAL at 08:22

## 2019-08-22 RX ADMIN — INSULIN LISPRO 2 UNITS: 100 INJECTION, SOLUTION INTRAVENOUS; SUBCUTANEOUS at 08:21

## 2019-08-22 RX ADMIN — ASPIRIN 81 MG: 81 TABLET, COATED ORAL at 08:22

## 2019-08-22 RX ADMIN — SENNOSIDES AND DOCUSATE SODIUM 2 TABLET: 8.6; 5 TABLET ORAL at 20:14

## 2019-08-22 RX ADMIN — LISINOPRIL 10 MG: 10 TABLET ORAL at 08:22

## 2019-08-22 RX ADMIN — LINAGLIPTIN 5 MG: 5 TABLET, FILM COATED ORAL at 08:22

## 2019-08-22 RX ADMIN — SODIUM CHLORIDE 50 ML/HR: 9 INJECTION, SOLUTION INTRAVENOUS at 21:43

## 2019-08-22 RX ADMIN — ACYCLOVIR 400 MG: 200 CAPSULE ORAL at 20:14

## 2019-08-22 RX ADMIN — SODIUM CHLORIDE, PRESERVATIVE FREE 10 ML: 5 INJECTION INTRAVENOUS at 20:14

## 2019-08-22 RX ADMIN — CETIRIZINE HYDROCHLORIDE 10 MG: 10 TABLET, FILM COATED ORAL at 08:22

## 2019-08-22 RX ADMIN — ACYCLOVIR 400 MG: 200 CAPSULE ORAL at 08:22

## 2019-08-22 RX ADMIN — PANTOPRAZOLE SODIUM 40 MG: 40 TABLET, DELAYED RELEASE ORAL at 04:32

## 2019-08-22 RX ADMIN — PREDNISONE 100 MG: 50 TABLET ORAL at 17:52

## 2019-08-22 NOTE — PROGRESS NOTES
LOS: 3 days       Chief Complaint: Stage Carlos Eduardo double hit high-grade B-cell non-Hodgkin's lymphoma, rheumatoid arthritis, diabetes mellitus      Interval History: The patient this morning reports that he is tolerating treatment very well.  He denies any specific side effects from chemotherapy currently.  He has been ambulating without difficulty.      Review of Systems:    Review of systems was obtained with pertinent positive findings as noted in the interval history.  All other systems negative.    Objective     Vital Signs  Temp:  [96.9 °F (36.1 °C)-98.4 °F (36.9 °C)] 97 °F (36.1 °C)  Heart Rate:  [63-80] 63  Resp:  [16-18] 18  BP: (125-147)/(55-70) 147/70        Physical Exam:     GENERAL:  Well-developed, well-nourished in no acute distress.   SKIN:  Warm, dry without rashes, purpura or petechiae.  MOUTH:  Tongue is well-papillated; no stomatitis or ulcers.  Lips normal.  THROAT:  Oropharynx without lesions or exudates.  NECK:  Supple with good range of motion; no thyromegaly or masses, no JVD.  LYMPHATICS:  No cervical, supraclavicular, axillary or inguinal adenopathy.  CHEST:  Lungs clear to percussion and auscultation. Good airflow.  CARDIAC:  Regular rate and rhythm.  2/6 systolic murmur.  ABDOMEN:  Soft, nontender with no organomegaly or masses.  EXTREMITIES:  No clubbing, cyanosis or edema.  NEUROLOGICAL:  Cranial Nerves II-XII grossly intact.  No focal neurological deficits.  PSYCHIATRIC:  Normal affect and mood.           Results Review:     I reviewed the patient's new clinical results.    Results from last 7 days   Lab Units 08/22/19  0440   WBC 10*3/mm3 12.22*   HEMOGLOBIN g/dL 7.8*   HEMATOCRIT % 24.4*   PLATELETS 10*3/mm3 135*     Lab Results   Component Value Date    NEUTROABS 11.53 (H) 08/22/2019     Results from last 7 days   Lab Units 08/22/19  0440   SODIUM mmol/L 135*   POTASSIUM mmol/L 4.0   CHLORIDE mmol/L 105   CO2 mmol/L 21.9*   BUN mg/dL 23   CREATININE mg/dL 0.66*   GLUCOSE mg/dL 225*  "  CALCIUM mg/dL 8.3*                 Medication Review: Yes     Assessment/Plan     1. Stage Carlos Eduardo \"double hit\" high grade B-cell non-Hodgkin's lymphoma:  · CD20 positive, Ki-67 98%, germinal center immunophenotype, MYC and BCL-2 rearrangements present.    · LP and bone marrow biopsies negative for involvement.    · Initial PET scan indicated perinephric soft tissue lesions. hypermetabolic mesenteric lymphadenopathy, left L5 paraspinous mass, an intensely hypermetabolic 6 cm intramuscular mass in the upper right thigh, a right upper lobe nodular opacity, a hypermetabolic mass adjacent to the right scapula, and a lesion within the left calvicle.  Paraspinous involvement puts him at extremely high risk for CNS involvement.    · Plan 6 cycles of inpatient treatment with dose adjusted R-EPOCH.  Plan CNS prophylaxis with IT methotrexate with each cycle of therapy.  · Admission to [facility] on 6/19/2019 for cycle #1 of therapy. Received Neulasta on discharge on 6/22/2019.  · Admit 7/8 for cycle #2.  Per the protocol with neutropenia x1 day, we proceeded with the same doses as cycle #1.  Slight dose adjustment for his decreased weight.  · CT scans 7/22/2019 with significant response, near complete resolution of perinephric lesions, decrease in left paraspinous mass at L5, decreased mesenteric lymphadenopathy, decreased mediastinal lymph nodes, near complete resolution right posterior chest wall mass and left cervical lymph node, resolution of right upper lobe pulmonary nodule.  · Cycle #3 initiated 7/29/2019.  Lumbar puncture scheduled that day, flow cytometry negative.   Per the protocol, his doses of Adriamycin, etoposide, and Cytoxan increased by 20%.  · Admit 8/19/2019 for cycle #4 of therapy. Proceed with cycle #4 of therapy, 20% dose increase per protocol.  · LP on admission 8/19/2019 with prophylactic methotrexate administered.  Flow cytometry negative.  · With previous volume overload, decrease IV " fluid rate to 50 cc/h (currently 75 cc/h).  · Today is cycle 4-day 4 DA-EPOCH-R.  Patient tolerating treatment well.  Proceed with chemotherapy as planned.  Patient will receive day 5 Cytoxan tomorrow and we will plan discharge thereafter.  Scheduled for nurse practitioner visit and Neulasta on 8/26/2019 (day 8) and he is scheduled for twice weekly CBCs with RN review x2 weeks, visit with Dr. Garcia on 9/9/2019 for hospital admission and cycle 5 chemotherapy.  2. Chemotherapy-induced anemia:  · Anemia evaluation negative 4/10/2019  · Hemoglobin today down to 7.8.  We are anticipating discharge tomorrow.  We will proceed with 1 unit PRBC transfusion today.  We will send off repeat anemia evaluation with iron panel, ferritin, B12, folate.  3. Diabetes mellitus:  · Currently receiving Tradjenta 5 mg daily, sliding scale insulin  · Exacerbated by steroids  · Glucose currently in 200-300 range  4. Rheumatoid arthritis:  · Patient previously on Cimzia, currently off treatment due to diagnosis of lymphoma.  No current symptoms.  5. Coronary artery disease:  · Patient continues on aspirin 81 mg daily  6. Constipation:  · Currently continuing on Senokot to twice daily and MiraLAX daily  7. DVT prophylaxis:  · Lovenox 40 mg daily  8. Antibiotic prophylaxis:  · Acyclovir 400 mg twice daily  · Bactrim DS 1 p.o. Monday Wednesday Friday   9. GI prophylaxis:  · Protonix 40 mg daily    Plan:  1. Proceed with cycle 4-day 4 DA-EPOCH-R today as planned  2. Additional labs today with iron panel, ferritin, B12, folate  3. Transfuse 1 unit PRBC  4. Anticipate discharge home tomorrow following day 5 dose of Cytoxan.  Discharge follow-up arranged as outlined above.    The patient and all the above issues were new to me today.  Reviewed multiple records including scan results, progress notes, laboratory studies, discharge summaries, chemotherapy records.    Cristhian Silva MD  08/22/19  7:41 AM

## 2019-08-22 NOTE — PLAN OF CARE
Problem: Patient Care Overview  Goal: Plan of Care Review  Outcome: Ongoing (interventions implemented as appropriate)   08/22/19 0453   Coping/Psychosocial   Plan of Care Reviewed With patient   OTHER   Outcome Summary VSS. A&Ox4. Ad chris C4D3 of EPOCH. No c/o pain or nausea. ACHS with SSI. Possible d/c Friday.    Plan of Care Review   Progress no change       Problem: Chemotherapy Effects (Adult)  Goal: Signs and Symptoms of Listed Potential Problems Will be Absent, Minimized or Managed (Chemotherapy Effects)  Outcome: Ongoing (interventions implemented as appropriate)      Problem: Fall Risk (Adult)  Goal: Identify Related Risk Factors and Signs and Symptoms  Outcome: Outcome(s) achieved Date Met: 08/22/19    Goal: Absence of Fall  Outcome: Ongoing (interventions implemented as appropriate)

## 2019-08-22 NOTE — PLAN OF CARE
Problem: Patient Care Overview  Goal: Plan of Care Review  Outcome: Ongoing (interventions implemented as appropriate)   08/22/19 8633   Coping/Psychosocial   Plan of Care Reviewed With patient;spouse   OTHER   Outcome Summary day 4 of Epoch infusing at 23.7cc/hr, recd 1 unit PC, covered blood sugars with insulin, home tomorrow after chemo, no c/o     Goal: Individualization and Mutuality  Outcome: Ongoing (interventions implemented as appropriate)      Problem: Chemotherapy Effects (Adult)  Goal: Signs and Symptoms of Listed Potential Problems Will be Absent, Minimized or Managed (Chemotherapy Effects)  Outcome: Ongoing (interventions implemented as appropriate)      Problem: Fall Risk (Adult)  Goal: Absence of Fall  Outcome: Outcome(s) achieved Date Met: 08/22/19

## 2019-08-23 VITALS
WEIGHT: 147.5 LBS | HEIGHT: 67 IN | SYSTOLIC BLOOD PRESSURE: 133 MMHG | HEART RATE: 67 BPM | RESPIRATION RATE: 18 BRPM | DIASTOLIC BLOOD PRESSURE: 71 MMHG | TEMPERATURE: 96.9 F | OXYGEN SATURATION: 97 % | BODY MASS INDEX: 23.15 KG/M2

## 2019-08-23 LAB
ABO + RH BLD: NORMAL
ALBUMIN SERPL-MCNC: 3.5 G/DL (ref 3.5–5.2)
ALBUMIN/GLOB SERPL: 2.2 G/DL
ALP SERPL-CCNC: 57 U/L (ref 39–117)
ALT SERPL W P-5'-P-CCNC: 53 U/L (ref 1–41)
ANION GAP SERPL CALCULATED.3IONS-SCNC: 11.3 MMOL/L (ref 5–15)
AST SERPL-CCNC: 31 U/L (ref 1–40)
BASOPHILS # BLD AUTO: 0.02 10*3/MM3 (ref 0–0.2)
BASOPHILS NFR BLD AUTO: 0.1 % (ref 0–1.5)
BH BB BLOOD EXPIRATION DATE: NORMAL
BH BB BLOOD TYPE BARCODE: 6200
BH BB DISPENSE STATUS: NORMAL
BH BB PRODUCT CODE: NORMAL
BH BB UNIT NUMBER: NORMAL
BILIRUB SERPL-MCNC: 0.4 MG/DL (ref 0.2–1.2)
BUN BLD-MCNC: 25 MG/DL (ref 8–23)
BUN/CREAT SERPL: 31.6 (ref 7–25)
CALCIUM SPEC-SCNC: 8.6 MG/DL (ref 8.6–10.5)
CHLORIDE SERPL-SCNC: 105 MMOL/L (ref 98–107)
CO2 SERPL-SCNC: 21.7 MMOL/L (ref 22–29)
CREAT BLD-MCNC: 0.79 MG/DL (ref 0.76–1.27)
DEPRECATED RDW RBC AUTO: 65.7 FL (ref 37–54)
EOSINOPHIL # BLD AUTO: 0 10*3/MM3 (ref 0–0.4)
EOSINOPHIL NFR BLD AUTO: 0 % (ref 0.3–6.2)
ERYTHROCYTE [DISTWIDTH] IN BLOOD BY AUTOMATED COUNT: 19.4 % (ref 12.3–15.4)
GFR SERPL CREATININE-BSD FRML MDRD: 95 ML/MIN/1.73
GLOBULIN UR ELPH-MCNC: 1.6 GM/DL
GLUCOSE BLD-MCNC: 198 MG/DL (ref 65–99)
GLUCOSE BLDC GLUCOMTR-MCNC: 183 MG/DL (ref 70–130)
GLUCOSE BLDC GLUCOMTR-MCNC: 223 MG/DL (ref 70–130)
GLUCOSE BLDC GLUCOMTR-MCNC: 291 MG/DL (ref 70–130)
HCT VFR BLD AUTO: 27.5 % (ref 37.5–51)
HGB BLD-MCNC: 9.3 G/DL (ref 13–17.7)
IMM GRANULOCYTES # BLD AUTO: 0.22 10*3/MM3 (ref 0–0.05)
IMM GRANULOCYTES NFR BLD AUTO: 1.2 % (ref 0–0.5)
LYMPHOCYTES # BLD AUTO: 0.18 10*3/MM3 (ref 0.7–3.1)
LYMPHOCYTES NFR BLD AUTO: 1 % (ref 19.6–45.3)
MCH RBC QN AUTO: 31.6 PG (ref 26.6–33)
MCHC RBC AUTO-ENTMCNC: 33.8 G/DL (ref 31.5–35.7)
MCV RBC AUTO: 93.5 FL (ref 79–97)
MONOCYTES # BLD AUTO: 0.17 10*3/MM3 (ref 0.1–0.9)
MONOCYTES NFR BLD AUTO: 0.9 % (ref 5–12)
NEUTROPHILS # BLD AUTO: 17.64 10*3/MM3 (ref 1.7–7)
NEUTROPHILS NFR BLD AUTO: 96.8 % (ref 42.7–76)
NRBC BLD AUTO-RTO: 0.1 /100 WBC (ref 0–0.2)
PLATELET # BLD AUTO: 127 10*3/MM3 (ref 140–450)
PMV BLD AUTO: 9.7 FL (ref 6–12)
POTASSIUM BLD-SCNC: 4.1 MMOL/L (ref 3.5–5.2)
PROT SERPL-MCNC: 5.1 G/DL (ref 6–8.5)
RBC # BLD AUTO: 2.94 10*6/MM3 (ref 4.14–5.8)
SODIUM BLD-SCNC: 138 MMOL/L (ref 136–145)
UNIT  ABO: NORMAL
UNIT  RH: NORMAL
WBC NRBC COR # BLD: 18.23 10*3/MM3 (ref 3.4–10.8)

## 2019-08-23 PROCEDURE — 25010000002 ENOXAPARIN PER 10 MG: Performed by: INTERNAL MEDICINE

## 2019-08-23 PROCEDURE — 25010000002 CYCLOPHOSPHAMIDE PER 100 MG: Performed by: INTERNAL MEDICINE

## 2019-08-23 PROCEDURE — 63710000001 PREDNISONE PER 5 MG: Performed by: INTERNAL MEDICINE

## 2019-08-23 PROCEDURE — 85025 COMPLETE CBC W/AUTO DIFF WBC: CPT | Performed by: INTERNAL MEDICINE

## 2019-08-23 PROCEDURE — 25010000002 ONDANSETRON PER 1 MG: Performed by: INTERNAL MEDICINE

## 2019-08-23 PROCEDURE — 80053 COMPREHEN METABOLIC PANEL: CPT | Performed by: INTERNAL MEDICINE

## 2019-08-23 PROCEDURE — 82962 GLUCOSE BLOOD TEST: CPT

## 2019-08-23 PROCEDURE — 99239 HOSP IP/OBS DSCHRG MGMT >30: CPT | Performed by: INTERNAL MEDICINE

## 2019-08-23 PROCEDURE — 63710000001 INSULIN LISPRO (HUMAN) PER 5 UNITS: Performed by: INTERNAL MEDICINE

## 2019-08-23 RX ORDER — SULFAMETHOXAZOLE AND TRIMETHOPRIM 800; 160 MG/1; MG/1
1 TABLET ORAL 3 TIMES WEEKLY
Status: DISCONTINUED | OUTPATIENT
Start: 2019-08-23 | End: 2019-08-23 | Stop reason: HOSPADM

## 2019-08-23 RX ADMIN — PREDNISONE 100 MG: 50 TABLET ORAL at 08:23

## 2019-08-23 RX ADMIN — CETIRIZINE HYDROCHLORIDE 10 MG: 10 TABLET, FILM COATED ORAL at 08:24

## 2019-08-23 RX ADMIN — CYCLOPHOSPHAMIDE 1860 MG: 2 INJECTION, POWDER, FOR SOLUTION INTRAVENOUS; ORAL at 17:09

## 2019-08-23 RX ADMIN — SULFAMETHOXAZOLE AND TRIMETHOPRIM 160 MG: 800; 160 TABLET ORAL at 11:32

## 2019-08-23 RX ADMIN — INSULIN LISPRO 6 UNITS: 100 INJECTION, SOLUTION INTRAVENOUS; SUBCUTANEOUS at 17:40

## 2019-08-23 RX ADMIN — PANTOPRAZOLE SODIUM 40 MG: 40 TABLET, DELAYED RELEASE ORAL at 04:29

## 2019-08-23 RX ADMIN — INSULIN LISPRO 4 UNITS: 100 INJECTION, SOLUTION INTRAVENOUS; SUBCUTANEOUS at 12:43

## 2019-08-23 RX ADMIN — PREDNISONE 100 MG: 50 TABLET ORAL at 17:40

## 2019-08-23 RX ADMIN — SODIUM CHLORIDE, PRESERVATIVE FREE 10 ML: 5 INJECTION INTRAVENOUS at 08:24

## 2019-08-23 RX ADMIN — LISINOPRIL 10 MG: 10 TABLET ORAL at 08:23

## 2019-08-23 RX ADMIN — ONDANSETRON 16 MG: 2 INJECTION INTRAMUSCULAR; INTRAVENOUS at 16:36

## 2019-08-23 RX ADMIN — ASPIRIN 81 MG: 81 TABLET, COATED ORAL at 08:24

## 2019-08-23 RX ADMIN — LINAGLIPTIN 5 MG: 5 TABLET, FILM COATED ORAL at 08:24

## 2019-08-23 RX ADMIN — ACYCLOVIR 400 MG: 200 CAPSULE ORAL at 08:24

## 2019-08-23 RX ADMIN — ENOXAPARIN SODIUM 40 MG: 40 INJECTION SUBCUTANEOUS at 11:32

## 2019-08-23 RX ADMIN — SENNOSIDES AND DOCUSATE SODIUM 2 TABLET: 8.6; 5 TABLET ORAL at 08:23

## 2019-08-23 RX ADMIN — INSULIN LISPRO 2 UNITS: 100 INJECTION, SOLUTION INTRAVENOUS; SUBCUTANEOUS at 08:24

## 2019-08-23 NOTE — DISCHARGE SUMMARY
"  Date of Discharge:  8/23/2019    Discharge Diagnosis:   1. Stage Carlos Eduardo \"double hit\" high grade B-cell non-Hodgkin's lymphoma  2. Chemotherapy-induced anemia  3. Diabetes mellitus    Presenting Problem/History of Present Illness  Active Hospital Problems    Diagnosis  POA   • Diffuse large B-cell lymphoma of lymph nodes of multiple regions (CMS/HCC) [C83.38]  Yes      Resolved Hospital Problems   No resolved problems to display.        Hospital Course  1. Stage Carlos Eduardo \"double hit\" high grade B-cell non-Hodgkin's lymphoma:  · CD20 positive, Ki-67 98%, germinal center immunophenotype, MYC and BCL-2 rearrangements present.    · LP and bone marrow biopsies negative for involvement.    · Initial PET scan indicated perinephric soft tissue lesions. hypermetabolic mesenteric lymphadenopathy, left L5 paraspinous mass, an intensely hypermetabolic 6 cm intramuscular mass in the upper right thigh, a right upper lobe nodular opacity, a hypermetabolic mass adjacent to the right scapula, and a lesion within the left calvicle.  Paraspinous involvement puts him at extremely high risk for CNS involvement.    · Plan 6 cycles of inpatient treatment with dose adjusted R-EPOCH.  Plan CNS prophylaxis with IT methotrexate with each cycle of therapy.  · Admission to [facility] on 6/19/2019 for cycle #1 of therapy. Received Neulasta on discharge on 6/22/2019.  · Admit 7/8 for cycle #2.  Per the protocol with neutropenia x1 day, we proceeded with the same doses as cycle #1.  Slight dose adjustment for his decreased weight.  · CT scans 7/22/2019 with significant response, near complete resolution of perinephric lesions, decrease in left paraspinous mass at L5, decreased mesenteric lymphadenopathy, decreased mediastinal lymph nodes, near complete resolution right posterior chest wall mass and left cervical lymph node, resolution of right upper lobe pulmonary nodule.  · Cycle #3 initiated 7/29/2019.  Lumbar puncture scheduled that " day, flow cytometry negative.   Per the protocol, his doses of Adriamycin, etoposide, and Cytoxan increased by 20%.  · Admit 8/19/2019 for cycle #4 of therapy. Proceed with cycle #4 of therapy, 20% dose increase per protocol.  · LP on admission 8/19/2019 with prophylactic methotrexate administered.  Flow cytometry negative.  · With previous volume overload, decreased IV fluid rate to 50 cc/h (currently 75 cc/h).  · Today is cycle 4-day 5 DA-EPOCH-R.  Patient has tolerated treatment well with no significant side effects or complications.  Proceed with chemotherapy as planned with final dose of Cytoxan this evening prior to discharge. Scheduled for nurse practitioner visit and Neulasta on 8/26/2019 (day 8) and he is scheduled for twice weekly CBCs with RN review x2 weeks, visit with Dr. Garcia on 9/9/2019 for hospital admission and cycle 5 chemotherapy.  2. Chemotherapy-induced anemia:  · Anemia evaluation negative 4/10/2019  · Hemoglobin decreased to 7.8 on 8/22/2019 and patient received 1 unit PRBC transfusion.    · Anemia evaluation 8/22/2019 with iron 223, ferritin 286, iron saturation 85%, TIBC 264, folate 13.6, B12 greater than 2000.  Consistent as expected with anemia secondary to chronic disease/chemotherapy.  3. Diabetes mellitus:  · Currently receiving Tradjenta 5 mg daily, sliding scale insulin  · Exacerbated by steroids  · Glucose currently in 100-200 range  4. Rheumatoid arthritis:  · Patient previously on Cimzia, currently off treatment due to diagnosis of lymphoma.  No current symptoms.  5. Coronary artery disease:  · Patient continues on aspirin 81 mg daily  6. Constipation:  · Currently continuing on Senokot to twice daily and MiraLAX daily  8. Antibiotic prophylaxis:  · Acyclovir 400 mg twice daily  · Bactrim DS 1 p.o. Monday Wednesday Friday   9. GI prophylaxis:  · Protonix 40 mg daily           Consults:   Consults     No orders found from 7/21/2019 to 8/20/2019.          Condition on Discharge:   Good    Vital Signs  Temp:  [97 °F (36.1 °C)-97.4 °F (36.3 °C)] 97 °F (36.1 °C)  Heart Rate:  [60-73] 64  Resp:  [16-18] 18  BP: (135-159)/(61-82) 152/70    Physical Exam:  GENERAL:  Well-developed, well-nourished in no acute distress.   SKIN:  Warm, dry without rashes, purpura or petechiae.  MOUTH:  Tongue is well-papillated; no stomatitis or ulcers.  Lips normal.  THROAT:  Oropharynx without lesions or exudates.  NECK:  Supple with good range of motion; no thyromegaly or masses, no JVD.  LYMPHATICS:  No cervical, supraclavicular, axillary or inguinal adenopathy.  CHEST:  Lungs clear to percussion and auscultation. Good airflow.  CARDIAC:  Regular rate and rhythm.  2/6 systolic murmur.  ABDOMEN:  Soft, nontender with no organomegaly or masses.  EXTREMITIES:  No clubbing, cyanosis or edema.  NEUROLOGICAL:  Cranial Nerves II-XII grossly intact.  No focal neurological deficits.  PSYCHIATRIC:  Normal affect and mood.   Patient examined today, unchanged from above      Discharge Disposition  Home or Self Care    Discharge Medications     Discharge Medications      Continue These Medications      Instructions Start Date   acetaminophen 325 MG tablet  Commonly known as:  TYLENOL   650 mg, Oral, Every 4 Hours PRN      acyclovir 200 MG capsule  Commonly known as:  ZOVIRAX   2 Times Daily      ALPRAZolam 0.25 MG tablet  Commonly known as:  XANAX   0.25 mg, Oral, Daily PRN      aspirin 81 MG EC tablet   81 mg, Oral, Daily      beta carotene 98173 UNIT capsule   10,000 Units, Oral, Daily      cetirizine 10 MG tablet  Commonly known as:  zyrTEC   10 mg, Oral, Daily      freestyle lancets   Use one lancet to test twice a day as instructed      FREESTYLE LITE test strip  Generic drug:  glucose blood   USE ONE STRIP TO TEST TWICE A DAY      linagliptin 5 MG tablet tablet  Commonly known as:  TRADJENTA   5 mg, Oral, Daily      lisinopril 10 MG tablet  Commonly known as:  PRINIVIL,ZESTRIL   TAKE 1 TABLET DAILY      MAGNESIUM PO    500 mg, Oral, Daily      meclizine 25 MG chewable tablet chewable tablet   25 mg, Oral, 3 Times Daily PRN      omeprazole 40 MG capsule  Commonly known as:  priLOSEC   40 mg, Oral, Daily      polyethylene glycol packet  Commonly known as:  MIRALAX   17 g, Oral, Daily      predniSONE 50 MG tablet  Commonly known as:  DELTASONE   50 mg, Oral, Daily, Take on the morning of 8/3 with food      prochlorperazine 10 MG tablet  Commonly known as:  COMPAZINE   10 mg, Oral, Every 6 Hours PRN      pyridoxine 100 MG tablet  Commonly known as:  VITAMIN B-6   100 mg, Oral, Daily      sennosides-docusate sodium 8.6-50 MG tablet  Commonly known as:  SENOKOT-S   1 tablet, Oral, Daily      sulfamethoxazole-trimethoprim 800-160 MG per tablet  Commonly known as:  BACTRIM DS,SEPTRA DS   1 tablet, Oral, 3 Times Weekly, Take on Monday, Wed, Friday      vitamin C 500 MG tablet  Commonly known as:  ASCORBIC ACID   500 mg, Oral, Daily      VITAMIN D-3 PO   2,000 Int'l Units/day, Oral, Daily      vitamin E 400 UNIT capsule   400 Units, Oral, Daily             Discharge Diet: Regular as tolerated    Activity at Discharge: As tolerated    Follow-up Appointments  Future Appointments   Date Time Provider Department Center   8/26/2019  7:50 AM LAB CHAIR 6 [facility]   8/26/2019  8:20 AM Raquel Berger APRN [facility]   8/26/2019  9:15 AM INJECTION CHAIR [facility]   8/29/2019  9:30 AM LAB CHAIR 4 [facility] LAB [facility]   8/29/2019 10:00 AM RN REV 2 [facility]   8/29/2019 10:45 AM Freddie Cannon MD [facility] None   9/3/2019 12:30 PM LAB CHAIR 5 [facility]   9/3/2019  1:00 PM RN REV 2 [facility]   9/6/2019  1:30 PM LAB CHAIR 3 [facility]   9/6/2019  2:00 PM RN REV 1 [facility]   9/9/2019  7:30 AM LAB CHAIR 6 [facility] LAB [facility]   9/9/2019  8:00 AM Gabriele Garcia MD [facility] CBC [facility]RES  CBC [facility]   9/9/2019  9:30 AM TERRIE XR [place]  1 [facility] TYE XRAY [facility]   11/26/2019 11:20 AM Brian Harden MD [facility] CD [facility] None            Cristhian Silva MD  08/23/19  9:31 AM

## 2019-08-23 NOTE — PLAN OF CARE
Problem: Patient Care Overview  Goal: Plan of Care Review  Outcome: Ongoing (interventions implemented as appropriate)   08/23/19 5239   Coping/Psychosocial   Plan of Care Reviewed With patient   OTHER   Outcome Summary VSS. Ad chris. A&Ox4. C4D4 of EPOCH started at 1702. Multiple episodes of occlusion. Port flushed mulitple times. +Blood return but difficult to flush. Tear noted in dressing. Went ahead and reaccess port. Patent, no longer positional, +blood return. D/t multiple occlusion episdoes and port reaccess, increased rate from 23.7 to 25.9. Patient to get Cytoxan afterwards and D/C home. Cont to monitor.   Plan of Care Review   Progress no change

## 2019-08-23 NOTE — NURSING NOTE
Multiple episodes of pressure build up in line causing line occlusion episodes.  Port dressing torn and compromised.  Port +blood return but difficult flushing.  Positional at times.  Explained to patient need to redress port and to go ahead and reaccess to complete chemo administration safely.  Patient verbalized understanding.  Port access with no issue.  +blood return, patent, and flushes easily.    C4D4.  Final day for EPOCH.  Chemo started at 1702 going at 23.7 ml/h.  Chemo to be completed at 1702.  Cytoxan afterwards and patient to be discharged.  Rate changed to 25.9 ml/h to account for delay.  Cont to monitor.

## 2019-08-24 ENCOUNTER — READMISSION MANAGEMENT (OUTPATIENT)
Dept: CALL CENTER | Facility: HOSPITAL | Age: 79
End: 2019-08-24

## 2019-08-24 NOTE — OUTREACH NOTE
Prep Survey      Responses   Facility patient discharged from?  [facility]   Is patient eligible?  Yes   Discharge diagnosis  Chemotherapy-induced anemia,  non-Hodgkin's lymphoma   Does the patient have one of the following disease processes/diagnoses(primary or secondary)?  Other   Does the patient have Home health ordered?  No   Is there a DME ordered?  No   Prep survey completed?  Yes          Andree Mayo RN

## 2019-08-25 NOTE — PROGRESS NOTES
"  Subjective .     REASONS FOR FOLLOWUP:    1.  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  CT imaging of the abdomen and pelvis on 5/8/2019 shows multiple small tissue nodules in the perirenal fat adjacent to the left kidney and a soft tissue mass interposed between the left psoas muscle and spine.  2.  RA previously on Cimzia (certollizumab) injections by Dr. Velásquez  3.  Lumbar puncture 5/31/2019 negative for involvement with lymphoma  4.  Bone marrow aspiration and biopsy 5/29/2018 negative for lymphoma  5.  PET scan on 5/30/2019 with hypermetabolic perinephric soft tissue lesions with mesenteric lymphadenopathy, left paraspinal mass at L5, 6 cm intramuscular mass in the right upper thigh, lesion at the left clavicle and a 5 cm soft tissue mass between the right scapula and right posterior fourth rib.  6.  Right subclavian Mediport placed by Dr. Herrera on 6/12/2019.  7.  Admission to [facility] for cycle #1 of dose adjusted R-EPOCH on 6/17/2019.  Plan for a lumbar puncture with intrathecal methotrexate with each cycle.  8.  Cycle #2 initiated on 7/8/2019.  Same dosees pursued per the protocol.  9.  Admission for cycle #3 on 7/29/2019.  10. Admission on 8/19/2019 for cycle #4  11.Hospital return and Neulasta today    HISTORY OF PRESENT ILLNESS:  The patient is a 79 y.o. year old male who is here for follow-up with the above-mentioned history.    History of Present Illness     Patient is here today following hospital discharge on August 23, 2018 for cycle #4 of DA R-EPOCH with a 20% dose increase given his excellent tolerance.  He will receive growth factor support with Neulasta today.  Overall, he tolerated cycle #4 remarkably well.  He denies any issues with nausea or vomiting.  No infectious symptoms including fevers or chills.  No active bleeding or bruising noted.  No issues with upper or " lower extremity neuropathy. He did have one loose bowel movement, though did not require Imodium.     His labs today reveal a total white blood cell count of 9.39, ANC of 8.87, improved hemoglobin up to 10.1, and platelets of 78,000 in the absence of excess bleeding or bruising.    He has no other concerns.    Past Medical History:   Diagnosis Date   • Allergic rhinitis    • Aortic valve stenosis     Mild to moderate AS by echo on 8/8/17   • CAD (coronary artery disease)    • Cancer (CMS/HCC)     LYMPHOMA   • Carotid arterial disease (CMS/HCC)     Followed by Dr. Floyd.  Carotid Doppler on 9/21/17: 60-70% DENISE, 50-60% LICA.   • Chronic obstructive lung disease (CMS/HCC)    • Esophageal reflux     With a history of erosive gastritis   • Hyperlipidemia    • Hypertension    • Iron deficiency    • Legally blind     PT WEARS GLASSES    • Pulmonary fibrosis (CMS/HCC)     By CT scan on 8/12/15   • PVC (premature ventricular contraction)    • Rheumatoid arthritis (CMS/HCC)    • Skin cancer of face     15 year ago on left side of face   • Steroid-induced diabetes mellitus (CMS/HCC)    • Tobacco use        ONCOLOGIC HISTORY:  (History from previous dates can be found in the separate document.)    Current Outpatient Medications on File Prior to Visit   Medication Sig Dispense Refill   • acetaminophen (TYLENOL) 325 MG tablet Take 2 tablets by mouth Every 4 (Four) Hours As Needed for Mild Pain  or Fever (temperature greater than 101 F).     • acyclovir (ZOVIRAX) 200 MG capsule 2 (Two) Times a Day.     • ALPRAZolam (XANAX) 0.25 MG tablet Take 0.25 mg by mouth Daily As Needed for Anxiety.     • aspirin 81 MG EC tablet Take 81 mg by mouth Daily.     • beta carotene 62791 UNIT capsule Take 10,000 Units by mouth Daily.     • cetirizine (ZyrTEC) 10 MG tablet Take 10 mg by mouth daily.     • Cholecalciferol (VITAMIN D-3 PO) Take 2,000 Int'l Units/day by mouth Daily.     • FREESTYLE LITE test strip USE ONE STRIP TO TEST TWICE A   each 1   • Lancets (FREESTYLE) lancets Use one lancet to test twice a day as instructed 100 each 3   • linagliptin (TRADJENTA) 5 MG tablet tablet Take 5 mg by mouth Daily.     • lisinopril (PRINIVIL,ZESTRIL) 10 MG tablet TAKE 1 TABLET DAILY 90 tablet 3   • MAGNESIUM PO Take 500 mg by mouth Daily.     • meclizine 25 MG chewable tablet chewable tablet Chew 25 mg 3 (Three) Times a Day As Needed.     • omeprazole (priLOSEC) 40 MG capsule Take 1 capsule by mouth Daily. 90 capsule 1   • polyethylene glycol (MIRALAX) packet Take 17 g by mouth Daily.     • predniSONE (DELTASONE) 50 MG tablet Take 1 tablet by mouth Daily. Take on the morning of 8/3 with food 2 tablet 0   • prochlorperazine (COMPAZINE) 10 MG tablet Take 1 tablet by mouth Every 6 (Six) Hours As Needed for Nausea or Vomiting. 30 tablet 3   • pyridoxine (B-6) 100 MG tablet Take 100 mg by mouth Daily.     • sennosides-docusate sodium (SENOKOT-S) 8.6-50 MG tablet Take 1 tablet by mouth Daily.     • sulfamethoxazole-trimethoprim (BACTRIM DS,SEPTRA DS) 800-160 MG per tablet Take 1 tablet by mouth 3 (Three) Times a Week. Take on Monday, Wed, Friday 12 tablet 2   • vitamin C (ASCORBIC ACID) 500 MG tablet Take 500 mg by mouth Daily.     • vitamin E 400 UNIT capsule Take 400 Units by mouth Daily.       No current facility-administered medications on file prior to visit.        ALLERGIES:     Allergies   Allergen Reactions   • Oxaprozin Hives       Social History     Socioeconomic History   • Marital status:      Spouse name: Radha   • Number of children: Not on file   • Years of education: High school   • Highest education level: Not on file   Occupational History   • Occupation:      Employer: RETIRED     Comment: [employer]   Tobacco Use   • Smoking status: Current Every Day Smoker     Packs/day: 0.50     Years: 50.00     Pack years: 25.00     Types: Cigarettes   • Smokeless tobacco: Never Used   • Tobacco comment: 1/2 ppd, discussed benefits of  cessation   Substance and Sexual Activity   • Alcohol use: No     Comment: no caffeine   • Drug use: No   • Sexual activity: Defer         Cancer-related family history is not on file.     Review of Systems   Constitutional: Negative for appetite change, chills, fatigue and fever.   HENT: Negative for hearing loss, mouth sores and tinnitus.    Respiratory: Negative for cough, shortness of breath, wheezing and stridor.    Cardiovascular: Negative for chest pain, palpitations and leg swelling.   Gastrointestinal: Positive for diarrhea (see HPI ). Negative for abdominal pain, blood in stool, constipation, nausea and vomiting.   Genitourinary: Negative for dysuria, frequency and hematuria.   Musculoskeletal: Negative for gait problem and myalgias.   Skin: Negative for color change, pallor and rash.   Neurological: Negative for weakness and headaches.   Hematological: Negative for adenopathy. Does not bruise/bleed easily.   Psychiatric/Behavioral: Negative for agitation and behavioral problems. The patient is not nervous/anxious.      A comprehensive 14 point review of systems was performed and was negative except as mentioned.    Objective      There were no vitals filed for this visit.  Current Status 8/19/2019   ECOG score 1       Physical Exam   Constitutional: He is oriented to person, place, and time. He appears well-developed and well-nourished. No distress.   HENT:   Head: Normocephalic.   Eyes: Conjunctivae and EOM are normal. Pupils are equal, round, and reactive to light.   Cardiovascular: Normal rate, regular rhythm and normal heart sounds.   Pulmonary/Chest: No respiratory distress. He has no wheezes. He has no rales.   Abdominal: Soft. Bowel sounds are normal. He exhibits no distension and no mass. There is no tenderness. There is no rebound and no guarding. No hernia.   Musculoskeletal: Normal range of motion.   Lymphadenopathy:        Head (right side): No preauricular, no posterior auricular and no  "occipital adenopathy present.        Head (left side): No preauricular, no posterior auricular and no occipital adenopathy present.     He has no cervical adenopathy.     He has no axillary adenopathy.        Right: No inguinal adenopathy present.        Left: No inguinal adenopathy present.   Neurological: He is alert and oriented to person, place, and time. No cranial nerve deficit. Coordination normal.   Skin: Skin is warm and dry. No rash noted. He is not diaphoretic. No cyanosis or erythema. No pallor. Nails show no clubbing.   Psychiatric: He has a normal mood and affect. His behavior is normal. Judgment and thought content normal.   Vitals reviewed.      Exam unchanged from previous    RECENT LABS:  Hematology WBC   Date Value Ref Range Status   08/23/2019 18.23 (H) 3.40 - 10.80 10*3/mm3 Final     RBC   Date Value Ref Range Status   08/23/2019 2.94 (L) 4.14 - 5.80 10*6/mm3 Final     Hemoglobin   Date Value Ref Range Status   08/23/2019 9.3 (L) 13.0 - 17.7 g/dL Final     Hematocrit   Date Value Ref Range Status   08/23/2019 27.5 (L) 37.5 - 51.0 % Final     Platelets   Date Value Ref Range Status   08/23/2019 127 (L) 140 - 450 10*3/mm3 Final        Assessment/Plan     1. 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    · LP and bone marrow biopsies negative for involvement.    · Initial PET scan indicated perinephric soft tissue lesions. hypermetabolic mesenteric lymphadenopathy, left L5 paraspinous mass, an intensely hypermetabolic 6 cm intramuscular mass in the upper right thigh, a right upper lobe nodular opacity, a hypermetabolic mass adjacent to the right scapula, and a lesion within the left calvicle.  Paraspinous involvement puts him at extremely high risk for CNS involvement.    · Plan 6 cycles of inpatient treatment with dose adjusted R-EPOCH.  Plan CNS prophylaxis with IT methotrexate " with each cycle of therapy.  · Admission to [facility] on 6/19/2019 for cycle #1 of therapy. Received Neulasta on discharge on 6/22/2019.  · Admit 7/8 for cycle #2.  Per the protocol with neutropenia x1 day, we proceeded with the same doses as cycle #1.  Slight dose adjustment for his decreased weight.  · CT scans 7/22/2019 with significant response, near complete resolution of perinephric lesions, decrease in left paraspinous mass at L5, decreased mesenteric lymphadenopathy, decreased mediastinal lymph nodes, near complete resolution right posterior chest wall mass and left cervical lymph node, resolution of right upper lobe pulmonary nodule.  · Cycle #3 initiated 7/29/2019.  Lumbar puncture scheduled that day, flow cytometry negative.   Per the protocol, his doses of Adriamycin, etoposide, and Cytoxan increased by 20%.  · Admit 8/19/2019 for cycle #4 of therapy. Proceed with cycle #4 of therapy, 20% dose increase per protocol.  · LP on admission 8/19/2019 with prophylactic methotrexate administered.  Flow cytometry negative.  · With previous volume overload, decreased IV fluid rate to 50 cc/h (currently 75 cc/h).  · Patient comes to the office today, August 26, 2018 following completion of cycle #4 of chemotherapy on August 23, 2019.  Today is day 8.  He continues to tolerate therapy quite well.  He will receive Neulasta today and will return twice weekly for the next 2 weeks for lab and iron review.  He will see Dr. Garcia on September 9, 2019 just prior to cycle #5 of inpatient chemotherapy.  2. TChemotherapy-induced anemia:  · Anemia evaluation negative 4/10/2019  · Anemia evaluation 8/22/2019 with iron 223, ferritin 286, iron saturation 85%, TIBC 264, folate 13.6, B12 greater than 2000.  Consistent as expected with anemia secondary to chronic disease/chemotherapy.    · patient did require transfusion support while hospitalized  · Hemoglobin today is 10  3. Diabetes mellitus:  · Currently receiving  Tradjenta 5 mg daily, sliding scale insulin  · Exacerbated by steroids  · Most recent glucose levels have been in the 100-200 range  4. Rheumatoid arthritis:  · Patient previously on Cimzia, currently off treatment due to diagnosis of lymphoma.  No symptoms currently  ·   5. Coronary artery disease:  · Patient continues on aspirin 81 mg daily  6. Constipation:  · Currently continuing on Senokot to twice daily and MiraLAX daily  · Constipation well controlled on this regimen.  He did have one episode of loose stools and therefore held off on taking his senna as  8. Antibiotic prophylaxis:  · Acyclovir 400 mg twice daily  · Bactrim DS 1 p.o. Monday Wednesday Friday   9. GI prophylaxis:  · Protonix 40 mg daily    ASSESSMENT/PLAN:  1.  Proceed with growth factor support with Neulasta today  2.  Return twice weekly for lab and RN reviews  3.  MD visit with Dr. Garcia on February 9, 2019 just prior to inpatient hospitalization for cycle #5 of chemotherapy.  We will maintain 20% dose increase  4.  I have asked the patient to call our office if any new issues or concerns prior to his next visit.  He has verbalized understanding.            Cc:  Raven Brown MD

## 2019-08-26 ENCOUNTER — APPOINTMENT (OUTPATIENT)
Dept: LAB | Facility: HOSPITAL | Age: 79
End: 2019-08-26

## 2019-08-26 ENCOUNTER — APPOINTMENT (OUTPATIENT)
Dept: ONCOLOGY | Facility: HOSPITAL | Age: 79
End: 2019-08-26

## 2019-08-26 ENCOUNTER — EPISODE CHANGES (OUTPATIENT)
Dept: CASE MANAGEMENT | Facility: OTHER | Age: 79
End: 2019-08-26

## 2019-08-26 ENCOUNTER — OFFICE VISIT (OUTPATIENT)
Dept: ONCOLOGY | Facility: CLINIC | Age: 79
End: 2019-08-26

## 2019-08-26 ENCOUNTER — INFUSION (OUTPATIENT)
Dept: ONCOLOGY | Facility: HOSPITAL | Age: 79
End: 2019-08-26

## 2019-08-26 VITALS
DIASTOLIC BLOOD PRESSURE: 79 MMHG | RESPIRATION RATE: 12 BRPM | SYSTOLIC BLOOD PRESSURE: 125 MMHG | WEIGHT: 152.8 LBS | HEIGHT: 67 IN | HEART RATE: 108 BPM | OXYGEN SATURATION: 98 % | TEMPERATURE: 98.2 F | BODY MASS INDEX: 23.98 KG/M2

## 2019-08-26 DIAGNOSIS — C83.38 DIFFUSE LARGE B-CELL LYMPHOMA OF LYMPH NODES OF MULTIPLE REGIONS (HCC): Primary | ICD-10-CM

## 2019-08-26 DIAGNOSIS — C83.33 DIFFUSE LARGE B-CELL LYMPHOMA OF INTRA-ABDOMINAL LYMPH NODES (HCC): Primary | ICD-10-CM

## 2019-08-26 LAB
BASOPHILS # BLD AUTO: 0.01 10*3/MM3 (ref 0–0.2)
BASOPHILS NFR BLD AUTO: 0.1 % (ref 0–1.5)
DEPRECATED RDW RBC AUTO: 61.9 FL (ref 37–54)
EOSINOPHIL # BLD AUTO: 0.01 10*3/MM3 (ref 0–0.4)
EOSINOPHIL NFR BLD AUTO: 0.1 % (ref 0.3–6.2)
ERYTHROCYTE [DISTWIDTH] IN BLOOD BY AUTOMATED COUNT: 18.1 % (ref 12.3–15.4)
HCT VFR BLD AUTO: 29.8 % (ref 37.5–51)
HGB BLD-MCNC: 10.1 G/DL (ref 13–17.7)
IMM GRANULOCYTES # BLD AUTO: 0.19 10*3/MM3 (ref 0–0.05)
IMM GRANULOCYTES NFR BLD AUTO: 2 % (ref 0–0.5)
LYMPHOCYTES # BLD AUTO: 0.28 10*3/MM3 (ref 0.7–3.1)
LYMPHOCYTES NFR BLD AUTO: 3 % (ref 19.6–45.3)
MCH RBC QN AUTO: 31.7 PG (ref 26.6–33)
MCHC RBC AUTO-ENTMCNC: 33.9 G/DL (ref 31.5–35.7)
MCV RBC AUTO: 93.4 FL (ref 79–97)
MONOCYTES # BLD AUTO: 0.03 10*3/MM3 (ref 0.1–0.9)
MONOCYTES NFR BLD AUTO: 0.3 % (ref 5–12)
NEUTROPHILS # BLD AUTO: 8.87 10*3/MM3 (ref 1.7–7)
NEUTROPHILS NFR BLD AUTO: 94.5 % (ref 42.7–76)
NRBC BLD AUTO-RTO: 0 /100 WBC (ref 0–0.2)
PLATELET # BLD AUTO: 78 10*3/MM3 (ref 140–450)
PMV BLD AUTO: 9.8 FL (ref 6–12)
RBC # BLD AUTO: 3.19 10*6/MM3 (ref 4.14–5.8)
WBC NRBC COR # BLD: 9.39 10*3/MM3 (ref 3.4–10.8)

## 2019-08-26 PROCEDURE — 36416 COLLJ CAPILLARY BLOOD SPEC: CPT | Performed by: NURSE PRACTITIONER

## 2019-08-26 PROCEDURE — 99213 OFFICE O/P EST LOW 20 MIN: CPT | Performed by: NURSE PRACTITIONER

## 2019-08-26 PROCEDURE — 96372 THER/PROPH/DIAG INJ SC/IM: CPT | Performed by: NURSE PRACTITIONER

## 2019-08-26 PROCEDURE — 25010000002 PEGFILGRASTIM 6 MG/0.6ML SOLUTION PREFILLED SYRINGE: Performed by: INTERNAL MEDICINE

## 2019-08-26 PROCEDURE — 85025 COMPLETE CBC W/AUTO DIFF WBC: CPT | Performed by: NURSE PRACTITIONER

## 2019-08-26 RX ADMIN — PEGFILGRASTIM 6 MG: 6 INJECTION SUBCUTANEOUS at 08:57

## 2019-08-26 NOTE — PROGRESS NOTES
Case Management Discharge Note    Final Note: Home, no needs identified.     Destination      No service has been selected for the patient.      Durable Medical Equipment      No service has been selected for the patient.      Dialysis/Infusion      No service has been selected for the patient.      Home Medical Care      No service has been selected for the patient.      Therapy      No service has been selected for the patient.      Community Resources      No service has been selected for the patient.        Transportation Services  Private: Car    Final Discharge Disposition Code: 01 - home or self-care

## 2019-08-27 ENCOUNTER — READMISSION MANAGEMENT (OUTPATIENT)
Dept: CALL CENTER | Facility: HOSPITAL | Age: 79
End: 2019-08-27

## 2019-08-27 NOTE — OUTREACH NOTE
Medical Week 1 Survey      Responses   Facility patient discharged from?  [facility]   Does the patient have one of the following disease processes/diagnoses(primary or secondary)?  Other   Is there a successful TCM telephone encounter documented?  No   Week 1 attempt successful?  Yes   Call start time  1045   Unsuccessful attempts  Attempt 1   Revoke  Decline to participate [He states he has everything he needs. ]   Call end time  1046          Jyothi Machado RN

## 2019-08-28 ENCOUNTER — EPISODE CHANGES (OUTPATIENT)
Dept: CASE MANAGEMENT | Facility: OTHER | Age: 79
End: 2019-08-28

## 2019-08-29 ENCOUNTER — OFFICE VISIT (OUTPATIENT)
Dept: INTERNAL MEDICINE | Facility: CLINIC | Age: 79
End: 2019-08-29

## 2019-08-29 ENCOUNTER — APPOINTMENT (OUTPATIENT)
Dept: LAB | Facility: HOSPITAL | Age: 79
End: 2019-08-29

## 2019-08-29 ENCOUNTER — CLINICAL SUPPORT (OUTPATIENT)
Dept: ONCOLOGY | Facility: HOSPITAL | Age: 79
End: 2019-08-29

## 2019-08-29 VITALS — OXYGEN SATURATION: 99 % | WEIGHT: 141 LBS | HEART RATE: 95 BPM | BODY MASS INDEX: 22.13 KG/M2 | HEIGHT: 67 IN

## 2019-08-29 VITALS — TEMPERATURE: 98.1 F

## 2019-08-29 DIAGNOSIS — Z92.25 PERSONAL HISTORY OF IMMUNOSUPRESSION THERAPY: ICD-10-CM

## 2019-08-29 DIAGNOSIS — I10 ESSENTIAL HYPERTENSION, BENIGN: Primary | Chronic | ICD-10-CM

## 2019-08-29 DIAGNOSIS — E11.9 DIABETES MELLITUS WITHOUT COMPLICATION (HCC): Chronic | ICD-10-CM

## 2019-08-29 DIAGNOSIS — K21.9 GASTROESOPHAGEAL REFLUX DISEASE, ESOPHAGITIS PRESENCE NOT SPECIFIED: Chronic | ICD-10-CM

## 2019-08-29 DIAGNOSIS — C83.33 DIFFUSE LARGE B-CELL LYMPHOMA OF INTRA-ABDOMINAL LYMPH NODES (HCC): Primary | ICD-10-CM

## 2019-08-29 DIAGNOSIS — E11.42 DIABETIC PERIPHERAL NEUROPATHY (HCC): ICD-10-CM

## 2019-08-29 DIAGNOSIS — C83.38 DIFFUSE LARGE B-CELL LYMPHOMA OF LYMPH NODES OF MULTIPLE REGIONS (HCC): ICD-10-CM

## 2019-08-29 LAB
BASOPHILS # BLD AUTO: 0.01 10*3/MM3 (ref 0–0.2)
BASOPHILS NFR BLD AUTO: 0.4 % (ref 0–1.5)
DEPRECATED RDW RBC AUTO: 60.1 FL (ref 37–54)
EOSINOPHIL # BLD AUTO: 0 10*3/MM3 (ref 0–0.4)
EOSINOPHIL NFR BLD AUTO: 0 % (ref 0.3–6.2)
ERYTHROCYTE [DISTWIDTH] IN BLOOD BY AUTOMATED COUNT: 17.5 % (ref 12.3–15.4)
HCT VFR BLD AUTO: 29.1 % (ref 37.5–51)
HGB BLD-MCNC: 9.8 G/DL (ref 13–17.7)
IMM GRANULOCYTES # BLD AUTO: 0.24 10*3/MM3 (ref 0–0.05)
IMM GRANULOCYTES NFR BLD AUTO: 10.2 % (ref 0–0.5)
LYMPHOCYTES # BLD AUTO: 0.25 10*3/MM3 (ref 0.7–3.1)
LYMPHOCYTES NFR BLD AUTO: 10.6 % (ref 19.6–45.3)
MCH RBC QN AUTO: 31.5 PG (ref 26.6–33)
MCHC RBC AUTO-ENTMCNC: 33.7 G/DL (ref 31.5–35.7)
MCV RBC AUTO: 93.6 FL (ref 79–97)
MONOCYTES # BLD AUTO: 0.07 10*3/MM3 (ref 0.1–0.9)
MONOCYTES NFR BLD AUTO: 3 % (ref 5–12)
NEUTROPHILS # BLD AUTO: 1.78 10*3/MM3 (ref 1.7–7)
NEUTROPHILS NFR BLD AUTO: 75.8 % (ref 42.7–76)
NRBC BLD AUTO-RTO: 0 /100 WBC (ref 0–0.2)
PLATELET # BLD AUTO: 21 10*3/MM3 (ref 140–450)
PMV BLD AUTO: 11.8 FL (ref 6–12)
RBC # BLD AUTO: 3.11 10*6/MM3 (ref 4.14–5.8)
WBC NRBC COR # BLD: 2.35 10*3/MM3 (ref 3.4–10.8)

## 2019-08-29 PROCEDURE — 99214 OFFICE O/P EST MOD 30 MIN: CPT | Performed by: INTERNAL MEDICINE

## 2019-08-29 PROCEDURE — 36416 COLLJ CAPILLARY BLOOD SPEC: CPT | Performed by: INTERNAL MEDICINE

## 2019-08-29 PROCEDURE — 85025 COMPLETE CBC W/AUTO DIFF WBC: CPT | Performed by: INTERNAL MEDICINE

## 2019-08-29 NOTE — PROGRESS NOTES
CBC reviewed with pt and wife. WBC 2.35, plts 21K. Pt denies bleeding but states he has had quite a bit of bruising. He denies any fevers. He does complain of diarrhea but has not had any today and is not taking any imodium. I advised him to try this if the diarrhea continues today.     Discussed pt's labs with Raquel Berger NP. Per Raquel, pt does not need a platelet transfusion today but should stop his baby aspirin until we recheck on Tuesday. Pt states he also takes a high dose vitamin E as a blood thinner. He would like to hold this also until Tuesday. This is OK with Raquel. Educated pt on bleeding precautions and when to call us. He v/u.       Lab Results   Component Value Date    WBC 2.35 (L) 08/29/2019    HGB 9.8 (L) 08/29/2019    HCT 29.1 (L) 08/29/2019    MCV 93.6 08/29/2019    PLT 21 (L) 08/29/2019

## 2019-08-29 NOTE — PROGRESS NOTES
Subjective   Edward Srinivasan is a 79 y.o. male.     History of Present Illness currently undergoing chemotherapy    The following portions of the patient's history were reviewed and updated as appropriate: allergies, current medications, past family history, past medical history, past social history, past surgical history and problem list.    Review of Systems   Constitutional: Positive for fatigue.   HENT: Negative.    Eyes: Negative.    Respiratory: Negative.    Cardiovascular: Negative.    Gastrointestinal: Negative.    Endocrine: Negative.    Genitourinary: Negative.    Musculoskeletal: Positive for arthralgias.   Skin: Negative.    Allergic/Immunologic: Negative.    Neurological: Positive for numbness.   Hematological: Negative.    Psychiatric/Behavioral: Negative.        Objective   Physical Exam   Constitutional: He appears well-developed and well-nourished.   HENT:   Head: Normocephalic.   Eyes: Pupils are equal, round, and reactive to light.   Neck: Normal range of motion.   Cardiovascular: Normal rate, regular rhythm and normal heart sounds.   Pulmonary/Chest: Effort normal and breath sounds normal.   Abdominal: Soft. Bowel sounds are normal.   Musculoskeletal: Normal range of motion.   Neurological: He is alert.   Skin: Skin is warm.   Psychiatric: He has a normal mood and affect.   Nursing note and vitals reviewed.        Assessment/Plan    was seen today for hypertension.    Diagnoses and all orders for this visit:    Essential hypertension, benign  Comments:  well controlled    Gastroesophageal reflux disease, esophagitis presence not specified  Comments:  well controlled    Diabetes mellitus without complication (CMS/HCC)  Comments:  check an A1c    Diabetic peripheral neuropathy (CMS/HCC)  Comments:  stable for now    Diffuse large B-cell lymphoma of lymph nodes of multiple regions (CMS/HCC)  Comments:  undergoing chemotherapy    Personal history of immunosupression therapy  Comments:  wears a  mask at all times

## 2019-09-03 ENCOUNTER — LAB (OUTPATIENT)
Dept: LAB | Facility: HOSPITAL | Age: 79
End: 2019-09-03

## 2019-09-03 ENCOUNTER — CLINICAL SUPPORT (OUTPATIENT)
Dept: ONCOLOGY | Facility: HOSPITAL | Age: 79
End: 2019-09-03

## 2019-09-03 ENCOUNTER — TELEPHONE (OUTPATIENT)
Dept: INTERNAL MEDICINE | Facility: CLINIC | Age: 79
End: 2019-09-03

## 2019-09-03 VITALS — TEMPERATURE: 98.3 F | SYSTOLIC BLOOD PRESSURE: 112 MMHG | HEART RATE: 96 BPM | DIASTOLIC BLOOD PRESSURE: 61 MMHG

## 2019-09-03 DIAGNOSIS — C83.33 DIFFUSE LARGE B-CELL LYMPHOMA OF INTRA-ABDOMINAL LYMPH NODES (HCC): Primary | ICD-10-CM

## 2019-09-03 DIAGNOSIS — R42 DIZZINESS: Primary | ICD-10-CM

## 2019-09-03 LAB
BASOPHILS # BLD AUTO: 0.14 10*3/MM3 (ref 0–0.2)
BASOPHILS NFR BLD AUTO: 0.6 % (ref 0–1.5)
DEPRECATED RDW RBC AUTO: 60.2 FL (ref 37–54)
EOSINOPHIL # BLD AUTO: 0.01 10*3/MM3 (ref 0–0.4)
EOSINOPHIL NFR BLD AUTO: 0 % (ref 0.3–6.2)
ERYTHROCYTE [DISTWIDTH] IN BLOOD BY AUTOMATED COUNT: 18.5 % (ref 12.3–15.4)
HCT VFR BLD AUTO: 27 % (ref 37.5–51)
HGB BLD-MCNC: 9.2 G/DL (ref 13–17.7)
IMM GRANULOCYTES # BLD AUTO: 2.91 10*3/MM3 (ref 0–0.05)
IMM GRANULOCYTES NFR BLD AUTO: 12.6 % (ref 0–0.5)
LYMPHOCYTES # BLD AUTO: 0.79 10*3/MM3 (ref 0.7–3.1)
LYMPHOCYTES NFR BLD AUTO: 3.4 % (ref 19.6–45.3)
MCH RBC QN AUTO: 32.3 PG (ref 26.6–33)
MCHC RBC AUTO-ENTMCNC: 34.1 G/DL (ref 31.5–35.7)
MCV RBC AUTO: 94.7 FL (ref 79–97)
MONOCYTES # BLD AUTO: 2.59 10*3/MM3 (ref 0.1–0.9)
MONOCYTES NFR BLD AUTO: 11.2 % (ref 5–12)
NEUTROPHILS # BLD AUTO: 16.62 10*3/MM3 (ref 1.7–7)
NEUTROPHILS NFR BLD AUTO: 72.2 % (ref 42.7–76)
NRBC BLD AUTO-RTO: 0.3 /100 WBC (ref 0–0.2)
PLATELET # BLD AUTO: 95 10*3/MM3 (ref 140–450)
PMV BLD AUTO: 10.2 FL (ref 6–12)
RBC # BLD AUTO: 2.85 10*6/MM3 (ref 4.14–5.8)
WBC NRBC COR # BLD: 23.06 10*3/MM3 (ref 3.4–10.8)

## 2019-09-03 PROCEDURE — 85025 COMPLETE CBC W/AUTO DIFF WBC: CPT | Performed by: INTERNAL MEDICINE

## 2019-09-03 PROCEDURE — 36415 COLL VENOUS BLD VENIPUNCTURE: CPT | Performed by: INTERNAL MEDICINE

## 2019-09-03 RX ORDER — SULFAMETHOXAZOLE AND TRIMETHOPRIM 800; 160 MG/1; MG/1
TABLET ORAL
Qty: 12 TABLET | Refills: 4 | Status: SHIPPED | OUTPATIENT
Start: 2019-09-03 | End: 2019-09-04 | Stop reason: SDUPTHER

## 2019-09-03 RX ORDER — MECLIZINE HYDROCHLORIDE 25 MG/1
25 TABLET ORAL 3 TIMES DAILY PRN
Qty: 180 TABLET | Refills: 1 | Status: SHIPPED | OUTPATIENT
Start: 2019-09-03 | End: 2020-03-25 | Stop reason: SDUPTHER

## 2019-09-03 NOTE — PROGRESS NOTES
CBC reviewed with pt and wife. Counts stable. VSS. Pt currently off baby aspirin and vitamin E due to low platelets last week. Confirmed with Dr. Garcia. OK to go back on both these medications. OK with other labs also. Informed pt and he v/u. Copy of labs given to pt.       Lab Results   Component Value Date    WBC 23.06 (H) 09/03/2019    HGB 9.2 (L) 09/03/2019    HCT 27.0 (L) 09/03/2019    MCV 94.7 09/03/2019    PLT 95 (L) 09/03/2019

## 2019-09-03 NOTE — TELEPHONE ENCOUNTER
----- Message from Stacy Kraft sent at 9/3/2019 12:20 PM EDT -----  Mr. Srinivasan forgot to request a refill on this when he was in last.    Meclizine Tabs 25 mg - take 1 tablet 3 times per day as needed for dizziness.  90 DAY SUPPLY PLEASE    [facility]  for [facility] - [address] - [phone]  - [phone] (Phone)  [phone] (Fax)    Patient call back at:  [phone]    Thank you,    Stacy

## 2019-09-04 RX ORDER — SULFAMETHOXAZOLE AND TRIMETHOPRIM 800; 160 MG/1; MG/1
1 TABLET ORAL 3 TIMES WEEKLY
Qty: 12 TABLET | Refills: 4 | Status: SHIPPED | OUTPATIENT
Start: 2019-09-04 | End: 2019-09-19 | Stop reason: SDUPTHER

## 2019-09-06 ENCOUNTER — CLINICAL SUPPORT (OUTPATIENT)
Dept: ONCOLOGY | Facility: HOSPITAL | Age: 79
End: 2019-09-06

## 2019-09-06 ENCOUNTER — LAB (OUTPATIENT)
Dept: LAB | Facility: HOSPITAL | Age: 79
End: 2019-09-06

## 2019-09-06 VITALS
OXYGEN SATURATION: 97 % | HEART RATE: 96 BPM | DIASTOLIC BLOOD PRESSURE: 65 MMHG | TEMPERATURE: 98.8 F | SYSTOLIC BLOOD PRESSURE: 101 MMHG

## 2019-09-06 DIAGNOSIS — C85.90 LYMPHOMA, UNSPECIFIED BODY REGION, UNSPECIFIED LYMPHOMA TYPE (HCC): Primary | ICD-10-CM

## 2019-09-06 LAB
BASOPHILS # BLD AUTO: 0.05 10*3/MM3 (ref 0–0.2)
BASOPHILS NFR BLD AUTO: 0.2 % (ref 0–1.5)
DEPRECATED RDW RBC AUTO: 64 FL (ref 37–54)
EOSINOPHIL # BLD AUTO: 0.05 10*3/MM3 (ref 0–0.4)
EOSINOPHIL NFR BLD AUTO: 0.2 % (ref 0.3–6.2)
ERYTHROCYTE [DISTWIDTH] IN BLOOD BY AUTOMATED COUNT: 19.7 % (ref 12.3–15.4)
HCT VFR BLD AUTO: 29.6 % (ref 37.5–51)
HGB BLD-MCNC: 10 G/DL (ref 13–17.7)
IMM GRANULOCYTES # BLD AUTO: 1.99 10*3/MM3 (ref 0–0.05)
IMM GRANULOCYTES NFR BLD AUTO: 8.2 % (ref 0–0.5)
LYMPHOCYTES # BLD AUTO: 0.83 10*3/MM3 (ref 0.7–3.1)
LYMPHOCYTES NFR BLD AUTO: 3.4 % (ref 19.6–45.3)
MCH RBC QN AUTO: 31.9 PG (ref 26.6–33)
MCHC RBC AUTO-ENTMCNC: 33.8 G/DL (ref 31.5–35.7)
MCV RBC AUTO: 94.6 FL (ref 79–97)
MONOCYTES # BLD AUTO: 2.06 10*3/MM3 (ref 0.1–0.9)
MONOCYTES NFR BLD AUTO: 8.5 % (ref 5–12)
NEUTROPHILS # BLD AUTO: 19.31 10*3/MM3 (ref 1.7–7)
NEUTROPHILS NFR BLD AUTO: 79.5 % (ref 42.7–76)
NRBC BLD AUTO-RTO: 0.1 /100 WBC (ref 0–0.2)
PLATELET # BLD AUTO: 166 10*3/MM3 (ref 140–450)
PMV BLD AUTO: 9.2 FL (ref 6–12)
RBC # BLD AUTO: 3.13 10*6/MM3 (ref 4.14–5.8)
WBC NRBC COR # BLD: 24.29 10*3/MM3 (ref 3.4–10.8)

## 2019-09-06 PROCEDURE — 36416 COLLJ CAPILLARY BLOOD SPEC: CPT | Performed by: INTERNAL MEDICINE

## 2019-09-06 PROCEDURE — 85025 COMPLETE CBC W/AUTO DIFF WBC: CPT | Performed by: INTERNAL MEDICINE

## 2019-09-06 NOTE — PROGRESS NOTES
Pt is here for lab with RN review.  CBC reviewed with pt, counts are stable for this pt at this time. Pt has no complaints and VSS. Pt returns to our office on Monday to see Dr. Garcia. Her then goes to [facility] for his 96hr TX.  Copy of labs given to pt and f/u appt reviewed. Pt is instructed to call the office with any concerns or new symptoms prior to next visit. Pt vu.   Lab Results   Component Value Date    WBC 24.29 (H) 09/06/2019    HGB 10.0 (L) 09/06/2019    HCT 29.6 (L) 09/06/2019    MCV 94.6 09/06/2019     09/06/2019

## 2019-09-09 ENCOUNTER — LAB (OUTPATIENT)
Dept: LAB | Facility: HOSPITAL | Age: 79
End: 2019-09-09

## 2019-09-09 ENCOUNTER — APPOINTMENT (OUTPATIENT)
Dept: GENERAL RADIOLOGY | Facility: HOSPITAL | Age: 79
End: 2019-09-09

## 2019-09-09 ENCOUNTER — HOSPITAL ENCOUNTER (INPATIENT)
Facility: HOSPITAL | Age: 79
LOS: 4 days | Discharge: HOME | End: 2019-09-13
Attending: INTERNAL MEDICINE | Admitting: INTERNAL MEDICINE

## 2019-09-09 ENCOUNTER — OFFICE VISIT (OUTPATIENT)
Dept: ONCOLOGY | Facility: CLINIC | Age: 79
End: 2019-09-09

## 2019-09-09 ENCOUNTER — PREP FOR SURGERY (OUTPATIENT)
Dept: OTHER | Facility: HOSPITAL | Age: 79
End: 2019-09-09

## 2019-09-09 VITALS
HEART RATE: 80 BPM | HEIGHT: 67 IN | RESPIRATION RATE: 16 BRPM | OXYGEN SATURATION: 97 % | TEMPERATURE: 97.6 F | BODY MASS INDEX: 22.07 KG/M2 | WEIGHT: 140.6 LBS | DIASTOLIC BLOOD PRESSURE: 66 MMHG | SYSTOLIC BLOOD PRESSURE: 100 MMHG

## 2019-09-09 DIAGNOSIS — C83.33 DIFFUSE LARGE B-CELL LYMPHOMA OF INTRA-ABDOMINAL LYMPH NODES (HCC): Primary | ICD-10-CM

## 2019-09-09 DIAGNOSIS — C83.33 DIFFUSE LARGE B-CELL LYMPHOMA OF INTRA-ABDOMINAL LYMPH NODES (HCC): ICD-10-CM

## 2019-09-09 DIAGNOSIS — D84.9 IMMUNODEFICIENCY, UNSPECIFIED (HCC): Primary | ICD-10-CM

## 2019-09-09 DIAGNOSIS — C83.38 DIFFUSE LARGE B-CELL LYMPHOMA OF LYMPH NODES OF MULTIPLE REGIONS (HCC): Primary | ICD-10-CM

## 2019-09-09 LAB
BASOPHILS # BLD AUTO: 0.07 10*3/MM3 (ref 0–0.2)
BASOPHILS NFR BLD AUTO: 0.4 % (ref 0–1.5)
DEPRECATED RDW RBC AUTO: 70 FL (ref 37–54)
EOSINOPHIL # BLD AUTO: 0.04 10*3/MM3 (ref 0–0.4)
EOSINOPHIL NFR BLD AUTO: 0.2 % (ref 0.3–6.2)
ERYTHROCYTE [DISTWIDTH] IN BLOOD BY AUTOMATED COUNT: 19.5 % (ref 12.3–15.4)
GLUCOSE BLDC GLUCOMTR-MCNC: 235 MG/DL (ref 70–130)
GLUCOSE BLDC GLUCOMTR-MCNC: 250 MG/DL (ref 70–130)
GLUCOSE BLDC GLUCOMTR-MCNC: 95 MG/DL (ref 70–130)
HCT VFR BLD AUTO: 30.7 % (ref 37.5–51)
HGB BLD-MCNC: 10.2 G/DL (ref 13–17.7)
IMM GRANULOCYTES # BLD AUTO: 0.94 10*3/MM3 (ref 0–0.05)
IMM GRANULOCYTES NFR BLD AUTO: 5 % (ref 0–0.5)
LYMPHOCYTES # BLD AUTO: 0.71 10*3/MM3 (ref 0.7–3.1)
LYMPHOCYTES NFR BLD AUTO: 3.8 % (ref 19.6–45.3)
MCH RBC QN AUTO: 32.2 PG (ref 26.6–33)
MCHC RBC AUTO-ENTMCNC: 33.2 G/DL (ref 31.5–35.7)
MCV RBC AUTO: 96.8 FL (ref 79–97)
MONOCYTES # BLD AUTO: 1.59 10*3/MM3 (ref 0.1–0.9)
MONOCYTES NFR BLD AUTO: 8.4 % (ref 5–12)
NEUTROPHILS # BLD AUTO: 15.48 10*3/MM3 (ref 1.7–7)
NEUTROPHILS NFR BLD AUTO: 82.2 % (ref 42.7–76)
NRBC BLD AUTO-RTO: 0 /100 WBC (ref 0–0.2)
PLATELET # BLD AUTO: 194 10*3/MM3 (ref 140–450)
PMV BLD AUTO: 9.4 FL (ref 6–12)
RBC # BLD AUTO: 3.17 10*6/MM3 (ref 4.14–5.8)
WBC NRBC COR # BLD: 18.83 10*3/MM3 (ref 3.4–10.8)

## 2019-09-09 PROCEDURE — 88184 FLOWCYTOMETRY/ TC 1 MARKER: CPT | Performed by: INTERNAL MEDICINE

## 2019-09-09 PROCEDURE — 25010000002 ENOXAPARIN PER 10 MG: Performed by: INTERNAL MEDICINE

## 2019-09-09 PROCEDURE — 25010000002 ONDANSETRON PER 1 MG: Performed by: INTERNAL MEDICINE

## 2019-09-09 PROCEDURE — 25010000003 LIDOCAINE 1 % SOLUTION: Performed by: INTERNAL MEDICINE

## 2019-09-09 PROCEDURE — 36415 COLL VENOUS BLD VENIPUNCTURE: CPT | Performed by: INTERNAL MEDICINE

## 2019-09-09 PROCEDURE — 25010000002 DOXORUBICIN PER 10 MG: Performed by: INTERNAL MEDICINE

## 2019-09-09 PROCEDURE — 82962 GLUCOSE BLOOD TEST: CPT

## 2019-09-09 PROCEDURE — 63710000001 INSULIN LISPRO (HUMAN) PER 5 UNITS: Performed by: INTERNAL MEDICINE

## 2019-09-09 PROCEDURE — 77003 FLUOROGUIDE FOR SPINE INJECT: CPT

## 2019-09-09 PROCEDURE — 88185 FLOWCYTOMETRY/TC ADD-ON: CPT

## 2019-09-09 PROCEDURE — 85025 COMPLETE CBC W/AUTO DIFF WBC: CPT | Performed by: INTERNAL MEDICINE

## 2019-09-09 PROCEDURE — 009U3ZX DRAINAGE OF SPINAL CANAL, PERCUTANEOUS APPROACH, DIAGNOSTIC: ICD-10-PCS | Performed by: INTERNAL MEDICINE

## 2019-09-09 PROCEDURE — 25010000002 RITUXIMAB 10 MG/ML SOLUTION 50 ML VIAL: Performed by: INTERNAL MEDICINE

## 2019-09-09 PROCEDURE — 63710000001 PREDNISONE PER 5 MG: Performed by: INTERNAL MEDICINE

## 2019-09-09 PROCEDURE — 99223 1ST HOSP IP/OBS HIGH 75: CPT | Performed by: INTERNAL MEDICINE

## 2019-09-09 PROCEDURE — 25010000002 DIPHENHYDRAMINE PER 50 MG: Performed by: INTERNAL MEDICINE

## 2019-09-09 PROCEDURE — 25010000002 VINCRISTINE PER 1 MG: Performed by: INTERNAL MEDICINE

## 2019-09-09 PROCEDURE — B01BZZZ FLUOROSCOPY OF SPINAL CORD: ICD-10-PCS | Performed by: INTERNAL MEDICINE

## 2019-09-09 PROCEDURE — 25010000002 LIDOCAINE PER 10 MG: Performed by: INTERNAL MEDICINE

## 2019-09-09 PROCEDURE — 25010000002 ETOPOSIDE 100 MG/5ML SOLUTION 5 ML VIAL: Performed by: INTERNAL MEDICINE

## 2019-09-09 PROCEDURE — 3E0R305 INTRODUCTION OF OTHER ANTINEOPLASTIC INTO SPINAL CANAL, PERCUTANEOUS APPROACH: ICD-10-PCS | Performed by: INTERNAL MEDICINE

## 2019-09-09 PROCEDURE — 25010000002 RITUXIMAB 10 MG/ML SOLUTION 10 ML VIAL: Performed by: INTERNAL MEDICINE

## 2019-09-09 PROCEDURE — 96450 CHEMOTHERAPY INTO CNS: CPT

## 2019-09-09 RX ORDER — DIPHENHYDRAMINE HYDROCHLORIDE 50 MG/ML
50 INJECTION INTRAMUSCULAR; INTRAVENOUS AS NEEDED
Status: CANCELLED | OUTPATIENT
Start: 2019-09-09

## 2019-09-09 RX ORDER — ONDANSETRON 2 MG/ML
4 INJECTION INTRAMUSCULAR; INTRAVENOUS EVERY 6 HOURS PRN
Status: CANCELLED | OUTPATIENT
Start: 2019-09-09

## 2019-09-09 RX ORDER — SODIUM CHLORIDE 0.9 % (FLUSH) 0.9 %
10 SYRINGE (ML) INJECTION AS NEEDED
Status: CANCELLED | OUTPATIENT
Start: 2019-09-09

## 2019-09-09 RX ORDER — PROCHLORPERAZINE MALEATE 10 MG
10 TABLET ORAL EVERY 6 HOURS PRN
Status: DISCONTINUED | OUTPATIENT
Start: 2019-09-09 | End: 2019-09-13 | Stop reason: HOSPADM

## 2019-09-09 RX ORDER — PROCHLORPERAZINE MALEATE 10 MG
10 TABLET ORAL EVERY 6 HOURS PRN
Status: CANCELLED | OUTPATIENT
Start: 2019-09-09

## 2019-09-09 RX ORDER — CETIRIZINE HYDROCHLORIDE 10 MG/1
10 TABLET ORAL DAILY
Status: DISCONTINUED | OUTPATIENT
Start: 2019-09-09 | End: 2019-09-13 | Stop reason: HOSPADM

## 2019-09-09 RX ORDER — MELATONIN
2000 DAILY
Status: DISCONTINUED | OUTPATIENT
Start: 2019-09-09 | End: 2019-09-13 | Stop reason: HOSPADM

## 2019-09-09 RX ORDER — DEXTROSE MONOHYDRATE 25 G/50ML
25 INJECTION, SOLUTION INTRAVENOUS
Status: DISCONTINUED | OUTPATIENT
Start: 2019-09-09 | End: 2019-09-13 | Stop reason: HOSPADM

## 2019-09-09 RX ORDER — MEPERIDINE HYDROCHLORIDE 50 MG/ML
25 INJECTION INTRAMUSCULAR; INTRAVENOUS; SUBCUTANEOUS
Status: CANCELLED | OUTPATIENT
Start: 2019-09-09 | End: 2019-09-10

## 2019-09-09 RX ORDER — NICOTINE POLACRILEX 4 MG
15 LOZENGE BUCCAL
Status: CANCELLED | OUTPATIENT
Start: 2019-09-09

## 2019-09-09 RX ORDER — ACYCLOVIR 200 MG/1
400 CAPSULE ORAL 2 TIMES DAILY
Status: DISCONTINUED | OUTPATIENT
Start: 2019-09-09 | End: 2019-09-13 | Stop reason: HOSPADM

## 2019-09-09 RX ORDER — SODIUM CHLORIDE 9 MG/ML
75 INJECTION, SOLUTION INTRAVENOUS CONTINUOUS
Status: CANCELLED | OUTPATIENT
Start: 2019-09-09

## 2019-09-09 RX ORDER — SODIUM CHLORIDE 0.9 % (FLUSH) 0.9 %
10 SYRINGE (ML) INJECTION EVERY 12 HOURS SCHEDULED
Status: CANCELLED | OUTPATIENT
Start: 2019-09-09

## 2019-09-09 RX ORDER — SENNA AND DOCUSATE SODIUM 50; 8.6 MG/1; MG/1
1 TABLET, FILM COATED ORAL NIGHTLY PRN
Status: DISCONTINUED | OUTPATIENT
Start: 2019-09-09 | End: 2019-09-13 | Stop reason: HOSPADM

## 2019-09-09 RX ORDER — ASPIRIN 81 MG/1
81 TABLET ORAL DAILY
COMMUNITY

## 2019-09-09 RX ORDER — ACETAMINOPHEN 325 MG/1
650 TABLET ORAL EVERY 4 HOURS PRN
Status: DISCONTINUED | OUTPATIENT
Start: 2019-09-09 | End: 2019-09-13 | Stop reason: HOSPADM

## 2019-09-09 RX ORDER — MEPERIDINE HYDROCHLORIDE 50 MG/ML
25 INJECTION INTRAMUSCULAR; INTRAVENOUS; SUBCUTANEOUS
Status: ACTIVE | OUTPATIENT
Start: 2019-09-09 | End: 2019-09-10

## 2019-09-09 RX ORDER — SODIUM CHLORIDE 0.9 % (FLUSH) 0.9 %
10 SYRINGE (ML) INJECTION EVERY 12 HOURS SCHEDULED
Status: DISCONTINUED | OUTPATIENT
Start: 2019-09-09 | End: 2019-09-13 | Stop reason: HOSPADM

## 2019-09-09 RX ORDER — SODIUM CHLORIDE 0.9 % (FLUSH) 0.9 %
10 SYRINGE (ML) INJECTION AS NEEDED
Status: DISCONTINUED | OUTPATIENT
Start: 2019-09-09 | End: 2019-09-13 | Stop reason: HOSPADM

## 2019-09-09 RX ORDER — ASCORBIC ACID 500 MG
500 TABLET ORAL DAILY
Status: DISCONTINUED | OUTPATIENT
Start: 2019-09-09 | End: 2019-09-13 | Stop reason: HOSPADM

## 2019-09-09 RX ORDER — DEXTROSE MONOHYDRATE 25 G/50ML
25 INJECTION, SOLUTION INTRAVENOUS
Status: CANCELLED | OUTPATIENT
Start: 2019-09-09

## 2019-09-09 RX ORDER — SODIUM CHLORIDE 0.9 % (FLUSH) 0.9 %
20 SYRINGE (ML) INJECTION AS NEEDED
Status: CANCELLED | OUTPATIENT
Start: 2019-09-09

## 2019-09-09 RX ORDER — ACETAMINOPHEN 325 MG/1
650 TABLET ORAL EVERY 4 HOURS PRN
Status: CANCELLED | OUTPATIENT
Start: 2019-09-09

## 2019-09-09 RX ORDER — PANTOPRAZOLE SODIUM 40 MG/1
40 TABLET, DELAYED RELEASE ORAL
Status: COMPLETED | OUTPATIENT
Start: 2019-09-09 | End: 2019-09-13

## 2019-09-09 RX ORDER — FAMOTIDINE 10 MG/ML
20 INJECTION, SOLUTION INTRAVENOUS AS NEEDED
Status: DISCONTINUED | OUTPATIENT
Start: 2019-09-09 | End: 2019-09-13 | Stop reason: HOSPADM

## 2019-09-09 RX ORDER — PANTOPRAZOLE SODIUM 40 MG/1
40 TABLET, DELAYED RELEASE ORAL EVERY MORNING
Status: DISCONTINUED | OUTPATIENT
Start: 2019-09-10 | End: 2019-09-09 | Stop reason: SDUPTHER

## 2019-09-09 RX ORDER — SULFAMETHOXAZOLE AND TRIMETHOPRIM 800; 160 MG/1; MG/1
1 TABLET ORAL 3 TIMES WEEKLY
Status: DISCONTINUED | OUTPATIENT
Start: 2019-09-09 | End: 2019-09-13 | Stop reason: HOSPADM

## 2019-09-09 RX ORDER — NICOTINE POLACRILEX 4 MG
15 LOZENGE BUCCAL
Status: DISCONTINUED | OUTPATIENT
Start: 2019-09-09 | End: 2019-09-13 | Stop reason: HOSPADM

## 2019-09-09 RX ORDER — ACETAMINOPHEN 325 MG/1
650 TABLET ORAL ONCE
Status: CANCELLED | OUTPATIENT
Start: 2019-09-09

## 2019-09-09 RX ORDER — LANOLIN ALCOHOL/MO/W.PET/CERES
100 CREAM (GRAM) TOPICAL DAILY
Status: DISCONTINUED | OUTPATIENT
Start: 2019-09-09 | End: 2019-09-13 | Stop reason: HOSPADM

## 2019-09-09 RX ORDER — SODIUM CHLORIDE 0.9 % (FLUSH) 0.9 %
20 SYRINGE (ML) INJECTION AS NEEDED
Status: DISCONTINUED | OUTPATIENT
Start: 2019-09-09 | End: 2019-09-13 | Stop reason: HOSPADM

## 2019-09-09 RX ORDER — PANTOPRAZOLE SODIUM 40 MG/1
40 TABLET, DELAYED RELEASE ORAL
Status: CANCELLED | OUTPATIENT
Start: 2019-09-09

## 2019-09-09 RX ORDER — PREDNISONE 20 MG/1
100 TABLET ORAL 2 TIMES DAILY WITH MEALS
Status: CANCELLED | OUTPATIENT
Start: 2019-09-09

## 2019-09-09 RX ORDER — POLYETHYLENE GLYCOL 3350 17 G/17G
17 POWDER, FOR SOLUTION ORAL DAILY PRN
Status: DISCONTINUED | OUTPATIENT
Start: 2019-09-09 | End: 2019-09-13 | Stop reason: HOSPADM

## 2019-09-09 RX ORDER — PREDNISONE 50 MG/1
100 TABLET ORAL 2 TIMES DAILY WITH MEALS
Status: DISCONTINUED | OUTPATIENT
Start: 2019-09-09 | End: 2019-09-13 | Stop reason: HOSPADM

## 2019-09-09 RX ORDER — ACETAMINOPHEN 325 MG/1
650 TABLET ORAL ONCE
Status: COMPLETED | OUTPATIENT
Start: 2019-09-09 | End: 2019-09-09

## 2019-09-09 RX ORDER — FAMOTIDINE 10 MG/ML
20 INJECTION, SOLUTION INTRAVENOUS AS NEEDED
Status: CANCELLED | OUTPATIENT
Start: 2019-09-09

## 2019-09-09 RX ORDER — LIDOCAINE HYDROCHLORIDE 10 MG/ML
5 INJECTION, SOLUTION INFILTRATION; PERINEURAL ONCE
Status: COMPLETED | OUTPATIENT
Start: 2019-09-09 | End: 2019-09-09

## 2019-09-09 RX ORDER — DIPHENHYDRAMINE HYDROCHLORIDE 50 MG/ML
50 INJECTION INTRAMUSCULAR; INTRAVENOUS AS NEEDED
Status: DISCONTINUED | OUTPATIENT
Start: 2019-09-09 | End: 2019-09-13 | Stop reason: HOSPADM

## 2019-09-09 RX ORDER — LISINOPRIL 10 MG/1
10 TABLET ORAL DAILY
Status: DISCONTINUED | OUTPATIENT
Start: 2019-09-10 | End: 2019-09-13 | Stop reason: HOSPADM

## 2019-09-09 RX ORDER — ONDANSETRON 2 MG/ML
4 INJECTION INTRAMUSCULAR; INTRAVENOUS EVERY 6 HOURS PRN
Status: DISCONTINUED | OUTPATIENT
Start: 2019-09-09 | End: 2019-09-13 | Stop reason: HOSPADM

## 2019-09-09 RX ORDER — SODIUM CHLORIDE 9 MG/ML
25 INJECTION, SOLUTION INTRAVENOUS CONTINUOUS
Status: DISCONTINUED | OUTPATIENT
Start: 2019-09-09 | End: 2019-09-13 | Stop reason: HOSPADM

## 2019-09-09 RX ORDER — LIDOCAINE HYDROCHLORIDE ANHYDROUS AND DEXTROSE MONOHYDRATE 5; 400 G/100ML; MG/100ML
4 INJECTION, SOLUTION INTRAVENOUS CONTINUOUS
Status: DISCONTINUED | OUTPATIENT
Start: 2019-09-09 | End: 2019-09-09

## 2019-09-09 RX ADMIN — DIPHENHYDRAMINE HYDROCHLORIDE 25 MG: 50 INJECTION, SOLUTION INTRAMUSCULAR; INTRAVENOUS at 12:26

## 2019-09-09 RX ADMIN — PREDNISONE 100 MG: 50 TABLET ORAL at 12:25

## 2019-09-09 RX ADMIN — ENOXAPARIN SODIUM 40 MG: 40 INJECTION SUBCUTANEOUS at 12:25

## 2019-09-09 RX ADMIN — LINAGLIPTIN 5 MG: 5 TABLET, FILM COATED ORAL at 14:55

## 2019-09-09 RX ADMIN — RITUXIMAB 650 MG: 10 INJECTION, SOLUTION INTRAVENOUS at 12:45

## 2019-09-09 RX ADMIN — ACETAMINOPHEN 650 MG: 325 TABLET, FILM COATED ORAL at 12:31

## 2019-09-09 RX ADMIN — Medication 100 MG: at 14:56

## 2019-09-09 RX ADMIN — SODIUM CHLORIDE, PRESERVATIVE FREE 10 ML: 5 INJECTION INTRAVENOUS at 20:37

## 2019-09-09 RX ADMIN — CETIRIZINE HYDROCHLORIDE 10 MG: 10 TABLET, FILM COATED ORAL at 14:55

## 2019-09-09 RX ADMIN — LIDOCAINE HYDROCHLORIDE 5 ML: 10 INJECTION, SOLUTION INFILTRATION; PERINEURAL at 12:07

## 2019-09-09 RX ADMIN — PANTOPRAZOLE SODIUM 40 MG: 40 TABLET, DELAYED RELEASE ORAL at 12:25

## 2019-09-09 RX ADMIN — VITAMIN D, TAB 1000IU (100/BT) 2000 UNITS: 25 TAB at 14:55

## 2019-09-09 RX ADMIN — INSULIN LISPRO 4 UNITS: 100 INJECTION, SOLUTION INTRAVENOUS; SUBCUTANEOUS at 17:57

## 2019-09-09 RX ADMIN — SODIUM CHLORIDE 75 ML/HR: 9 INJECTION, SOLUTION INTRAVENOUS at 10:26

## 2019-09-09 RX ADMIN — ACYCLOVIR 400 MG: 200 CAPSULE ORAL at 20:37

## 2019-09-09 RX ADMIN — OXYCODONE HYDROCHLORIDE AND ACETAMINOPHEN 500 MG: 500 TABLET ORAL at 14:55

## 2019-09-09 RX ADMIN — INSULIN LISPRO 6 UNITS: 100 INJECTION, SOLUTION INTRAVENOUS; SUBCUTANEOUS at 20:37

## 2019-09-09 RX ADMIN — LIDOCAINE HYDROCHLORIDE ANHYDROUS AND DEXTROSE MONOHYDRATE: .4; 5 INJECTION, SOLUTION INTRAVENOUS at 11:40

## 2019-09-09 RX ADMIN — SULFAMETHOXAZOLE AND TRIMETHOPRIM 160 MG: 800; 160 TABLET ORAL at 14:55

## 2019-09-09 RX ADMIN — ONDANSETRON 16 MG: 2 INJECTION INTRAMUSCULAR; INTRAVENOUS at 14:23

## 2019-09-09 RX ADMIN — VINCRISTINE SULFATE: 1 INJECTION, SOLUTION INTRAVENOUS at 14:29

## 2019-09-09 NOTE — PLAN OF CARE
Problem: Patient Care Overview  Goal: Plan of Care Review  Outcome: Ongoing (interventions implemented as appropriate)   09/09/19 [id number]   Coping/Psychosocial   Plan of Care Reviewed With patient;spouse   OTHER   Outcome Summary cycle 5 day 1, after IT MTX Rituxan infused without problems, EPOCH infusing at 23.6 cc/hr, MP with good blood return no c/o     Goal: Individualization and Mutuality  Outcome: Ongoing (interventions implemented as appropriate)      Problem: Chemotherapy Effects (Adult)  Goal: Signs and Symptoms of Listed Potential Problems Will be Absent, Minimized or Managed (Chemotherapy Effects)  Outcome: Ongoing (interventions implemented as appropriate)

## 2019-09-09 NOTE — PROGRESS NOTES
Discharge Planning Assessment  [facility]     Patient Name: Edward Srinivasan  MRN: [id number]  Today's Date: 9/9/2019    Admit Date: 9/9/2019    Discharge Needs Assessment     Row Name 09/09/19 1418       Living Environment    Lives With  spouse    Name(s) of Who Lives With Patient  wife Radha Srinivasan [phone]    Current Living Arrangements  home/apartment/condo    Primary Care Provided by  self    Provides Primary Care For  no one, unable/limited ability to care for self    Family Caregiver if Needed  spouse    Family Caregiver Names  wife Radha Srinivasan [phone]    Quality of Family Relationships  helpful;involved;supportive    Able to Return to Prior Arrangements  yes       Resource/Environmental Concerns    Home Accessibility Concerns  stairs to enter home    Transportation Concerns  car, none       Transition Planning    Patient/Family Anticipates Transition to  home with family    Patient/Family Anticipated Services at Transition  none    Transportation Anticipated  family or friend will provide       Discharge Needs Assessment    Concerns to be Addressed  denies needs/concerns at this time    Equipment Currently Used at Home  cane, straight    Anticipated Changes Related to Illness  none    Equipment Needed After Discharge  none    Offered/Gave Vendor List  yes    Current Discharge Risk  physical impairment        Discharge Plan     Row Name 09/09/19 1418       Plan    Plan  Plans home; denies needs.  Follow to see if the patient is d/c on Lovenox or Humalog.    Patient/Family in Agreement with Plan  yes    Plan Comments  Met with the patient and his wife of 58 years Radha Srinivasan [phone] at bedside; explained role of CCP, verified facesheet, checked IMM and discussed discharge planning needs.  The patient plans to return home with assistance from his wife. The patient has a cane and has 2 steps to enter his single story home with a basement. The home has 13 steps with 1 handrail to get to the basement.  The patient's PCP is Freddie Cannon, pharmacy is [facility] on [address] and he denies any trouble remembering to take his medication or with affording his medication. The patient denies any HH/SNF history, was provided with a HH/SNF list and his living will is in [facility].  The patient's wife drives him to his appointments therefore she will also transport him home upon d/c.  The patient states that he was on Tradjenta prior to admission.  CCP will follow to see if the patient will need Lovenox or Humalog upon d/c and will assist as needed.  Chelly Lund [facility]        Destination      No service coordination in this encounter.      Durable Medical Equipment      No service coordination in this encounter.      Dialysis/Infusion      No service coordination in this encounter.      Home Medical Care      No service coordination in this encounter.      Therapy      No service coordination in this encounter.      Community Resources      No service coordination in this encounter.          Demographic Summary     Row Name 09/09/19 1419       General Information    Admission Type  inpatient    Arrived From  home    Referral Source  admission list    Reason for Consult  discharge planning    Preferred Language  English     Used During This Interaction  no        Functional Status     Row Name 09/09/19 1413       Functional Status    Usual Activity Tolerance  good    Current Activity Tolerance  moderate       Functional Status, IADL    Medications  independent    Meal Preparation  independent    Housekeeping  independent    Laundry  independent    Shopping  independent       Mental Status    General Appearance WDL  WDL       Mental Status Summary    Recent Changes in Mental Status/Cognitive Functioning  no changes        Psychosocial    No documentation.       Abuse/Neglect    No documentation.       Legal    No documentation.       Substance Abuse    No documentation.       Patient Forms     Row Name 09/09/19 1410        Patient Forms    Provider Choice List  Delivered    Delivered to  Patient    Method of delivery  In person            SAM Gandara

## 2019-09-09 NOTE — H&P
"[facility] CBC GROUP ADMISSION HISTORY AND PHYSICAL    REASON FOR ADMISSION / CHIEF COMPLAINT:    1.  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  CT imaging of the abdomen and pelvis on 5/8/2019 shows multiple small tissue nodules in the perirenal fat adjacent to the left kidney and a soft tissue mass interposed between the left psoas muscle and spine.  2.  RA previously on Cimzia (certollizumab) injections by Dr. Velásquez  3.  Lumbar puncture 5/31/2019 negative for involvement with lymphoma  4.  Bone marrow aspiration and biopsy 5/29/2018 negative for lymphoma  5.  PET scan on 5/30/2019 with hypermetabolic perinephric soft tissue lesions with mesenteric lymphadenopathy, left paraspinal mass at L5, 6 cm intramuscular mass in the right upper thigh, lesion at the left clavicle and a 5 cm soft tissue mass between the right scapula and right posterior fourth rib.  6.  Right subclavian Mediport placed by Dr. Herrera on 6/12/2019.  7.  Admission to [facility] for cycle #1 of dose adjusted R-EPOCH on 6/17/2019.  Plan for a lumbar puncture with intrathecal methotrexate with each cycle.  8.  Cycle #2 initiated on 7/8/2019.  Same dosees pursued per the protocol.  9.  Admission for cycle #3 on 7/29/2019.  20% dose increase per protocol.  10.  Admission for cycle #4 on 8/19/2019.  20% dose increase per protocol.    11.  Admission for cycle #5 on 9/9/2019.      HISTORY OF PRESENT ILLNESS:  Edward Srinivasan is a 79 y.o. male who is being admitted for cycle #5 of dose adjusted R-EPOCH.      With cycle #4, his sim platelet count was 21,000 with a sim neutrophil count of 1.78.  Therefore, for cycle #5, we will reduce his doses by 20% to the last prior tolerated dose.    He has required packed red blood cell transfusion for anemia.  Otherwise, following cycle #4 he has done very well.  Minimal constipation.  " Minimal nausea.  He does report fatigue which is been ongoing and a little bit worse compared to prior cycles.  No fevers or chills.  Decreased appetite and he has struggled to gain weight.  Otherwise, he has tolerated therapy extremely well.     ONCOLOGIC HISTORY:  He has a history of rheumatoid arthritis currently on injections by Dr. Velásquez, type 2 diabetes on oral medication, coronary artery disease status post stenting x2, aortic stenosis, and nonmelanoma skin cancer resected from his face.  Continues to smoke a little bit less than a half a pack per day and has done this for several decades.     He has a long history of chronic back pain.  In March 2019 he developed some low back pain on the left.  With some physical therapy this is actually improved significantly but over the past couple of weeks he has developed some right-sided back pain.  Actually over the past couple of days this has essentially resolved.  He denies any radiculopathy.  He has chronic vision changes but nothing acute.  He denies any headaches.  No other focal neurologic changes.  He has occasional lightheadedness but this is been a chronic problem for him.  No fevers.  He is cold a lot but no rigors.  No night sweats.  He has lost a little bit of weight which she relates to his rheumatoid arthritis and decreased appetite secondary to pain.     He had an MRI of his lumbar spine on 4/25/2019 showing some degenerative changes but also there was a soft tissue mass extending posteriorly from the left common iliac vessels hugging the lateral margin of the L5 vertebrae extending from the left lateral margin of L4-5 interspace to L5-S1 where it pressed on the left L5 nerve root.  The mass measures 4.4 x 2.1 x 3.5 cm.  CT imaging was suggested and performed on 5/8/2019.  The CT scan showed multiple soft tissue nodules in the perirenal fat on the left adjacent to the kidney and a soft tissue mass interposed between the left psoas muscle and spine.   The mass measures 4.1 x 2.1 cm.     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     He was seen initially on 5/28/2019.  PET scan, LP, and a bone marrow aspiration and biopsy were requested.     LDH was 341 with uric acid 4.4.        Bone marrow biopsy on 5/29/2019:  normocellular marrow with no evidence for lymphoma.  Flow cytometry negative.  Karyotype pending as of 6/7.       PET scan on 5/20/2019:  IMPRESSION:  1. There are hypermetabolic perinephric soft tissue lesions,  hypermetabolic mesenteric lymphadenopathy, left paraspinal mass at L5,  as well as an intensely hypermetabolic approximately 6 cm intramuscular  mass within the visualized right upper thigh. There is no hypermetabolic  lymphadenopathy within the abdomen or pelvis, but the shotty mediastinal  and hilar nodes are hypermetabolic and there is also a hypermetabolic  right upper lobe nodular opacity. In addition, there is an intensely  hypermetabolic approximately 5 cm intramuscular mass at the right upper  back and there is a tiny hypermetabolic lesion within the left clavicle.  2. Shotty cervical nodes have low level activity.  3. Uncertain significance of the more than typical metabolic activity at  dependent atelectatic change at both lower lobes.     LP on 5/31/2019:  Cytology and flow cytometry negative.     Mediport placed in the right subclavian area by Dr. Herrera on 6/12/2019.     Plan to start therapy with dose adjusted R-EPOCH with CNS prophylaxis with intrathecal methotrexate on 6/17/2019.     He previously had an echocardiogram on 5/15/2019 showing a normal left ventricular ejection fraction of 60%.     Admission to [facility] on 6/19/2019 for cycle #1 of therapy.     He received Neulasta on discharge on 6/22/2019.     His lowest ANC was 0.04 on 6/27 with a platelet count  of 47,000 that day.  He was only neutropenic on one day and his WBC and platelet count improved rapidly.     Admission for cycle #2 on 7/8/2019.  We proceeded with the same doses per the protocol.    Admission for cycle #3 on 7/29/2019.  20% increase in Adriamycin, etoposide, and Cytoxan doses per the protocol.    Sim platelet count 36,000 with a sim white blood cell count of 2.43 following cycle #3.    With cycle #4, dose increase by 20% for Adriamycin, etoposide, and Cytoxan per protocol.    Following cycle #4, his sim platelet count was 21,000 with a sim neutrophil count of 1.78.  Therefore, for cycle #5, we will reduce his doses by 20% to the last prior tolerated dose (cycle #3 doses).      Past Medical History:   Diagnosis Date   • Allergic rhinitis    • Aortic valve stenosis     Mild to moderate AS by echo on 8/8/17   • CAD (coronary artery disease)    • Cancer (CMS/HCC)     LYMPHOMA   • Carotid arterial disease (CMS/HCC)     Followed by Dr. Floyd.  Carotid Doppler on 9/21/17: 60-70% DENISE, 50-60% LICA.   • Chronic obstructive lung disease (CMS/HCC)    • Esophageal reflux     With a history of erosive gastritis   • Hyperlipidemia    • Hypertension    • Iron deficiency    • Legally blind     PT WEARS GLASSES    • Pulmonary fibrosis (CMS/HCC)     By CT scan on 8/12/15   • PVC (premature ventricular contraction)    • Rheumatoid arthritis (CMS/HCC)    • Skin cancer of face     15 year ago on left side of face   • Steroid-induced diabetes mellitus (CMS/HCC)    • Tobacco use        Past Surgical History:   Procedure Laterality Date   • BACK SURGERY  2010    L4-L5 discectomy   • CATARACT EXTRACTION Bilateral 1979   • CORONARY ANGIOPLASTY WITH STENT PLACEMENT  2015   • VASECTOMY  1979   • VENOUS ACCESS DEVICE (PORT) INSERTION N/A 6/12/2019    Procedure: INSERTION RIGHT VENOUS ACCESS DEVICE;  Surgeon: Philippe Herrera Jr., MD;  Location: [facility];  Service: General       SOCIAL HISTORY:   reports that he has  been smoking cigarettes.  He has a 25.00 pack-year smoking history. He has never used smokeless tobacco. He reports that he does not drink alcohol or use drugs.    FAMILY HISTORY:  family history includes Coronary artery disease in his other; Heart attack (age of onset: 47) in his brother; Heart attack (age of onset: 54) in his father; Heart disease in his brother and father; Heart failure in his mother.    ALLERGIES:  Allergies   Allergen Reactions   • Oxaprozin Hives       MEDICATIONS:  As listed in the electronic medical record.    Review of Systems   Constitutional: Positive for fatigue. Negative for appetite change, chills, fever and unexpected weight change.   HENT: Negative for congestion, dental problem, ear pain, hearing loss, mouth sores, nosebleeds, postnasal drip, rhinorrhea, tinnitus and trouble swallowing.    Eyes: Negative.    Respiratory: Negative for cough, chest tightness, shortness of breath, wheezing and stridor.    Cardiovascular: Negative for chest pain, palpitations and leg swelling.   Gastrointestinal: Positive for constipation. Negative for abdominal distention, abdominal pain, blood in stool, diarrhea, nausea and vomiting.   Endocrine: Negative.    Genitourinary: Negative for decreased urine volume, difficulty urinating, dysuria, flank pain, frequency, hematuria, scrotal swelling, testicular pain and urgency.   Musculoskeletal: Negative for arthralgias, back pain and joint swelling.   Allergic/Immunologic: Negative.    Neurological: Negative for dizziness, seizures, syncope, weakness, light-headedness, numbness and headaches.   Hematological: Negative for adenopathy. Does not bruise/bleed easily.   Psychiatric/Behavioral: Negative for confusion and sleep disturbance. The patient is not nervous/anxious.    All other systems reviewed and are negative.  No change from prior except as noted    There were no vitals filed for this visit.  See vital signs from the office visit dated 8/19/2019.   Vitals reviewed.    Physical Exam   Constitutional: He is oriented to person, place, and time. He appears well-developed and well-nourished. No distress.   HENT:   Head: Normocephalic and atraumatic. Hair is normal.   Mouth/Throat: Oropharynx is clear and moist.   Eyes: Conjunctivae, EOM and lids are normal. Pupils are equal.   Neck: Neck supple. No JVD present. No thyroid mass and no thyromegaly present.   Cardiovascular: Normal rate and regular rhythm. Exam reveals no gallop and no friction rub.   Murmur heard.   Systolic murmur is present with a grade of 2/6.  Pulmonary/Chest: Effort normal and breath sounds normal. No respiratory distress. He has no wheezes. He has no rales.   Benign-appearing Mediport   Abdominal: Soft. He exhibits no distension and no mass. There is no splenomegaly or hepatomegaly. There is no tenderness. There is no guarding.   Musculoskeletal: Normal range of motion. He exhibits no edema, tenderness or deformity.   Lymphadenopathy:     He has no cervical adenopathy.     He has no axillary adenopathy.        Right: No inguinal and no supraclavicular adenopathy present.        Left: No inguinal and no supraclavicular adenopathy present.   Neurological: He is alert and oriented to person, place, and time. He has normal strength.   Skin: Skin is warm and dry. No rash noted.   Psychiatric: He has a normal mood and affect. His behavior is normal. Judgment and thought content normal. Cognition and memory are normal.   Nursing note and vitals reviewed.  No change from prior except as noted    DIAGNOSTIC DATA:  WBC   Date Value Ref Range Status   09/09/2019 18.83 (H) 3.40 - 10.80 10*3/mm3 Final     RBC   Date Value Ref Range Status   09/09/2019 3.17 (L) 4.14 - 5.80 10*6/mm3 Final     Hemoglobin   Date Value Ref Range Status   09/09/2019 10.2 (L) 13.0 - 17.7 g/dL Final     Hematocrit   Date Value Ref Range Status   09/09/2019 30.7 (L) 37.5 - 51.0 % Final     MCV   Date Value Ref Range Status    09/09/2019 96.8 79.0 - 97.0 fL Final     MCH   Date Value Ref Range Status   09/09/2019 32.2 26.6 - 33.0 pg Final     MCHC   Date Value Ref Range Status   09/09/2019 33.2 31.5 - 35.7 g/dL Final     RDW   Date Value Ref Range Status   09/09/2019 19.5 (H) 12.3 - 15.4 % Final     RDW-SD   Date Value Ref Range Status   09/09/2019 70.0 (H) 37.0 - 54.0 fl Final     MPV   Date Value Ref Range Status   09/09/2019 9.4 6.0 - 12.0 fL Final     Platelets   Date Value Ref Range Status   09/09/2019 194 140 - 450 10*3/mm3 Final     Neutrophil %   Date Value Ref Range Status   09/09/2019 82.2 (H) 42.7 - 76.0 % Final     Lymphocyte %   Date Value Ref Range Status   09/09/2019 3.8 (L) 19.6 - 45.3 % Final     Monocyte %   Date Value Ref Range Status   09/09/2019 8.4 5.0 - 12.0 % Final     Eosinophil %   Date Value Ref Range Status   09/09/2019 0.2 (L) 0.3 - 6.2 % Final     Basophil %   Date Value Ref Range Status   09/09/2019 0.4 0.0 - 1.5 % Final     Immature Grans %   Date Value Ref Range Status   09/09/2019 5.0 (H) 0.0 - 0.5 % Final     Neutrophils, Absolute   Date Value Ref Range Status   09/09/2019 15.48 (H) 1.70 - 7.00 10*3/mm3 Final     Lymphocytes, Absolute   Date Value Ref Range Status   09/09/2019 0.71 0.70 - 3.10 10*3/mm3 Final     Monocytes, Absolute   Date Value Ref Range Status   09/09/2019 1.59 (H) 0.10 - 0.90 10*3/mm3 Final     Eosinophils, Absolute   Date Value Ref Range Status   09/09/2019 0.04 0.00 - 0.40 10*3/mm3 Final     Basophils, Absolute   Date Value Ref Range Status   09/09/2019 0.07 0.00 - 0.20 10*3/mm3 Final     Immature Grans, Absolute   Date Value Ref Range Status   09/09/2019 0.94 (H) 0.00 - 0.05 10*3/mm3 Final     nRBC   Date Value Ref Range Status   09/09/2019 0.0 0.0 - 0.2 /100 WBC Final       Glucose   Date Value Ref Range Status   08/23/2019 198 (H) 65 - 99 mg/dL Final     Sodium   Date Value Ref Range Status   08/23/2019 138 136 - 145 mmol/L Final     Potassium   Date Value Ref Range Status    08/23/2019 4.1 3.5 - 5.2 mmol/L Final     CO2   Date Value Ref Range Status   08/23/2019 21.7 (L) 22.0 - 29.0 mmol/L Final     Chloride   Date Value Ref Range Status   08/23/2019 105 98 - 107 mmol/L Final     Anion Gap   Date Value Ref Range Status   08/23/2019 11.3 5.0 - 15.0 mmol/L Final     Creatinine   Date Value Ref Range Status   08/23/2019 0.79 0.76 - 1.27 mg/dL Final   07/22/2019 0.70 0.60 - 1.30 mg/dL Final     Comment:     Serial Number: [id number]:  [id number]     BUN   Date Value Ref Range Status   08/23/2019 25 (H) 8 - 23 mg/dL Final     BUN/Creatinine Ratio   Date Value Ref Range Status   08/23/2019 31.6 (H) 7.0 - 25.0 Final     Calcium   Date Value Ref Range Status   08/23/2019 8.6 8.6 - 10.5 mg/dL Final     eGFR Non  Amer   Date Value Ref Range Status   08/23/2019 95 >60 mL/min/1.73 Final     Alkaline Phosphatase   Date Value Ref Range Status   08/23/2019 57 39 - 117 U/L Final     Total Protein   Date Value Ref Range Status   08/23/2019 5.1 (L) 6.0 - 8.5 g/dL Final     ALT (SGPT)   Date Value Ref Range Status   08/23/2019 53 (H) 1 - 41 U/L Final     AST (SGOT)   Date Value Ref Range Status   08/23/2019 31 1 - 40 U/L Final     Total Bilirubin   Date Value Ref Range Status   08/23/2019 0.4 0.2 - 1.2 mg/dL Final     Albumin   Date Value Ref Range Status   08/23/2019 3.50 3.50 - 5.20 g/dL Final     Globulin   Date Value Ref Range Status   08/23/2019 1.6 gm/dL Final         IMAGING:  CT imaging of the neck chest abdomen and pelvis from 7/22/2019 shows a nice response to therapy.  Decreasing lymphadenopathy.     IMPRESSION:  1. Significant interval improvement. There has been near-complete  resolution of the perinephric lesions and significant decrease in the  left paraspinal mass at L5. The mesenteric lymphadenopathy has  decreased. The shotty mediastinal nodes are slightly smaller and there  is near-complete resolution of the right posterior chest wall mass. Also  resolution of a single enlarged  "left cervical node. There has also been  resolution of a 1.5 cm right upper lobe nodular opacity which was  hypermetabolic.  2. Significantly thickened gastric folds. The stomach was collapsed on  all of the previous CT examinations which limits comparison. Please  correlate clinically for gastritis.    ASSESSMENT:  This is a 79 y.o. male with:  1.  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  His LP and bone marrow biopsies are negative for involvement.  However, his PET scan indicates perinephric soft tissue lesions. hypermetabolic mesenteric lymphadenopathy, left L5 paraspinous mass, an intensely hypermetabolic 6 cm intramuscular mass in the upper right thigh, a right upper lobe nodular opacity, a hypermetabolic mass adjacent to the right scapula, and a lesion within the left calvicle.  Paraspinous involvement puts him at extremely high risk for CNS involvement.       Plan 6 cycles of inpatient treatment with dose adjusted R-EPOCH.  Plan CNS prophylaxis with IT methotrexate with each cycle of therapy.     Admission to [facility] on 6/19/2019 for cycle #1 of therapy.     He received Neulasta on discharge on 6/22/2019.     His lowest ANC was 0.04 on 6/27 with a platelet count of 47,000 that day.  He was only neutropenic on one day and his WBC and platelet count improved rapidly.     Admit 7/8 for cycle #2.  Per the protocol with neutropenia x1 day, we proceeded with the same doses as cycle #1.  Slight dose adjustment for his decreased weight.     Cycle #3 initiated 7/29/2019.  Lumbar puncture scheduled that day.  Labs for flow cytometry.   Per the protocol, his doses of Adriamycin, etoposide, and Cytoxan increased by 20%.    Admited 8/19/2019 for cycle #4 of therapy.  Lumbar puncture with intrathecal methotrexate on admission.  Proceed with cycle #4 of therapy.  20% dose increase.    Following " cycle #4, his sim platelet count was 21,000 with a sim neutrophil count of 1.78.  Therefore, for cycle #5, we will reduce his doses by 20% to the last prior tolerated dose (cycle #3 doses).     2.  Rheumatoid arthritis previously on Cimzia (certolizumab) injections by Dr. Velásquez. Plan to discontinue these during chemotherapy.  He was previously on Simponi Aria (golimumab) and Actemra (tocilizumab).  These certainly could contribute to the development of lymphoma.     3.  Coronary artery disease status post stenting as well as moderate aortic stenosis by echocardiogram.  His left ventricular ejection fraction is normal at 60% by echocardiogram on 5/14/2019.     4.  Anxiety:  I previously gave him a prescription for alprazolam 0.25 mg to take twice daily as needed.  Continue benzodiazepines as needed during his hospitalization.     5.  Hyponatremia: Continue to monitor.     6.  Volume overload with cycle #1: We will likely need to decrease IV fluids with this cycle.     7.  Constipation: Continue Senokot and MiraLAX daily while hospitalized.    8.  Type 2 diabetes: Insulin sliding scale during the admission.     PLAN:   1.  Inpatient [facility]  2.  Lumbar puncture on admission with a sample to be sent for flow cytometry.  Intrathecal methotrexate 12 mg administration for CNS prophylaxis.  3.  Cycle #5 of therapy with dose adjusted R-EPOCH to start following his LP.  20% dose decrease in Adriamycin, Cytoxan, etoposide per protocol.  4.  Benzodiazepines as needed for anxiety although he has not required any at this point.  5.  Lovenox 40 mg daily for DVT prophylaxis.  No allopurinol at this point  6.  IV fluid hydration.  We have decreased his maintenance fluids to 50 ml/hr due to volume overload with prior cycles.  7.  Continue Bactrim and acyclovir for prophylaxis.  8.  Moderate dose sliding scale insulin  9.  Neulasta next Monday.  Twice weekly CBC on Mondays and Thursdays for 2 weeks.   Plan  total of 6 cycles of therapy if tolerated with intrathecal methotrexate with each cycle for CNS prophylaxis.  10.  MD visit in three weeks for his sixth and final cycle of therapy.  Twice weekly CBCs thereafter.  Neulasta the Monday following chemotherapy.  PET scan in 5 weeks.  I will see him back in 6 weeks for follow-up to review the PET scan.  We anticipate that his therapy will be complete at that point assuming a complete response visualized on his PET scan.    Gabriele Garcia MD

## 2019-09-09 NOTE — H&P (VIEW-ONLY)
"[facility] CBC GROUP ADMISSION HISTORY AND PHYSICAL    REASON FOR ADMISSION / CHIEF COMPLAINT:    1.  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  CT imaging of the abdomen and pelvis on 5/8/2019 shows multiple small tissue nodules in the perirenal fat adjacent to the left kidney and a soft tissue mass interposed between the left psoas muscle and spine.  2.  RA previously on Cimzia (certollizumab) injections by Dr. Velásquez  3.  Lumbar puncture 5/31/2019 negative for involvement with lymphoma  4.  Bone marrow aspiration and biopsy 5/29/2018 negative for lymphoma  5.  PET scan on 5/30/2019 with hypermetabolic perinephric soft tissue lesions with mesenteric lymphadenopathy, left paraspinal mass at L5, 6 cm intramuscular mass in the right upper thigh, lesion at the left clavicle and a 5 cm soft tissue mass between the right scapula and right posterior fourth rib.  6.  Right subclavian Mediport placed by Dr. Herrera on 6/12/2019.  7.  Admission to [facility] for cycle #1 of dose adjusted R–EPOCH on 6/17/2019.  Plan for a lumbar puncture with intrathecal methotrexate with each cycle.  8.  Cycle #2 initiated on 7/8/2019.  Same dosees pursued per the protocol.  9.  Admission for cycle #3 on 7/29/2019.  20% dose increase per protocol.  10.  Admission for cycle #4 on 8/19/2019.  20% dose increase per protocol.    11.  Admission for cycle #5 on 9/9/2019.      HISTORY OF PRESENT ILLNESS:  Edward Srinivasan is a 79 y.o. male who is being admitted for cycle #5 of dose adjusted R–EPOCH.      With cycle #4, his sim platelet count was 21,000 with a sim neutrophil count of 1.78.  Therefore, for cycle #5, we will reduce his doses by 20% to the last prior tolerated dose.    He has required packed red blood cell transfusion for anemia.  Otherwise, following cycle #4 he has done very well.  Minimal constipation.  " Minimal nausea.  He does report fatigue which is been ongoing and a little bit worse compared to prior cycles.  No fevers or chills.  Decreased appetite and he has struggled to gain weight.  Otherwise, he has tolerated therapy extremely well.     ONCOLOGIC HISTORY:  He has a history of rheumatoid arthritis currently on injections by Dr. Velásquez, type 2 diabetes on oral medication, coronary artery disease status post stenting x2, aortic stenosis, and nonmelanoma skin cancer resected from his face.  Continues to smoke a little bit less than a half a pack per day and has done this for several decades.     He has a long history of chronic back pain.  In March 2019 he developed some low back pain on the left.  With some physical therapy this is actually improved significantly but over the past couple of weeks he has developed some right-sided back pain.  Actually over the past couple of days this has essentially resolved.  He denies any radiculopathy.  He has chronic vision changes but nothing acute.  He denies any headaches.  No other focal neurologic changes.  He has occasional lightheadedness but this is been a chronic problem for him.  No fevers.  He is cold a lot but no rigors.  No night sweats.  He has lost a little bit of weight which she relates to his rheumatoid arthritis and decreased appetite secondary to pain.     He had an MRI of his lumbar spine on 4/25/2019 showing some degenerative changes but also there was a soft tissue mass extending posteriorly from the left common iliac vessels hugging the lateral margin of the L5 vertebrae extending from the left lateral margin of L4-5 interspace to L5-S1 where it pressed on the left L5 nerve root.  The mass measures 4.4 x 2.1 x 3.5 cm.  CT imaging was suggested and performed on 5/8/2019.  The CT scan showed multiple soft tissue nodules in the perirenal fat on the left adjacent to the kidney and a soft tissue mass interposed between the left psoas muscle and spine.   The mass measures 4.1 x 2.1 cm.     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     He was seen initially on 5/28/2019.  PET scan, LP, and a bone marrow aspiration and biopsy were requested.     LDH was 341 with uric acid 4.4.        Bone marrow biopsy on 5/29/2019:  normocellular marrow with no evidence for lymphoma.  Flow cytometry negative.  Karyotype pending as of 6/7.       PET scan on 5/20/2019:  IMPRESSION:  1. There are hypermetabolic perinephric soft tissue lesions,  hypermetabolic mesenteric lymphadenopathy, left paraspinal mass at L5,  as well as an intensely hypermetabolic approximately 6 cm intramuscular  mass within the visualized right upper thigh. There is no hypermetabolic  lymphadenopathy within the abdomen or pelvis, but the shotty mediastinal  and hilar nodes are hypermetabolic and there is also a hypermetabolic  right upper lobe nodular opacity. In addition, there is an intensely  hypermetabolic approximately 5 cm intramuscular mass at the right upper  back and there is a tiny hypermetabolic lesion within the left clavicle.  2. Shotty cervical nodes have low level activity.  3. Uncertain significance of the more than typical metabolic activity at  dependent atelectatic change at both lower lobes.     LP on 5/31/2019:  Cytology and flow cytometry negative.     Mediport placed in the right subclavian area by Dr. Herrera on 6/12/2019.     Plan to start therapy with dose adjusted R–EPOCH with CNS prophylaxis with intrathecal methotrexate on 6/17/2019.     He previously had an echocardiogram on 5/15/2019 showing a normal left ventricular ejection fraction of 60%.     Admission to [facility] on 6/19/2019 for cycle #1 of therapy.     He received Neulasta on discharge on 6/22/2019.     His lowest ANC was 0.04 on 6/27 with a platelet count  of 47,000 that day.  He was only neutropenic on one day and his WBC and platelet count improved rapidly.     Admission for cycle #2 on 7/8/2019.  We proceeded with the same doses per the protocol.    Admission for cycle #3 on 7/29/2019.  20% increase in Adriamycin, etoposide, and Cytoxan doses per the protocol.    Sim platelet count 36,000 with a sim white blood cell count of 2.43 following cycle #3.    With cycle #4, dose increase by 20% for Adriamycin, etoposide, and Cytoxan per protocol.    Following cycle #4, his sim platelet count was 21,000 with a sim neutrophil count of 1.78.  Therefore, for cycle #5, we will reduce his doses by 20% to the last prior tolerated dose (cycle #3 doses).      Past Medical History:   Diagnosis Date   • Allergic rhinitis    • Aortic valve stenosis     Mild to moderate AS by echo on 8/8/17   • CAD (coronary artery disease)    • Cancer (CMS/HCC)     LYMPHOMA   • Carotid arterial disease (CMS/HCC)     Followed by Dr. Floyd.  Carotid Doppler on 9/21/17: 60-70% DENISE, 50-60% LICA.   • Chronic obstructive lung disease (CMS/HCC)    • Esophageal reflux     With a history of erosive gastritis   • Hyperlipidemia    • Hypertension    • Iron deficiency    • Legally blind     PT WEARS GLASSES    • Pulmonary fibrosis (CMS/HCC)     By CT scan on 8/12/15   • PVC (premature ventricular contraction)    • Rheumatoid arthritis (CMS/HCC)    • Skin cancer of face     15 year ago on left side of face   • Steroid-induced diabetes mellitus (CMS/HCC)    • Tobacco use        Past Surgical History:   Procedure Laterality Date   • BACK SURGERY  2010    L4-L5 discectomy   • CATARACT EXTRACTION Bilateral 1979   • CORONARY ANGIOPLASTY WITH STENT PLACEMENT  2015   • VASECTOMY  1979   • VENOUS ACCESS DEVICE (PORT) INSERTION N/A 6/12/2019    Procedure: INSERTION RIGHT VENOUS ACCESS DEVICE;  Surgeon: Philippe Herrera Jr., MD;  Location: [facility];  Service: General       SOCIAL HISTORY:   reports that he has  been smoking cigarettes.  He has a 25.00 pack-year smoking history. He has never used smokeless tobacco. He reports that he does not drink alcohol or use drugs.    FAMILY HISTORY:  family history includes Coronary artery disease in his other; Heart attack (age of onset: 47) in his brother; Heart attack (age of onset: 54) in his father; Heart disease in his brother and father; Heart failure in his mother.    ALLERGIES:  Allergies   Allergen Reactions   • Oxaprozin Hives       MEDICATIONS:  As listed in the electronic medical record.    Review of Systems   Constitutional: Positive for fatigue. Negative for appetite change, chills, fever and unexpected weight change.   HENT: Negative for congestion, dental problem, ear pain, hearing loss, mouth sores, nosebleeds, postnasal drip, rhinorrhea, tinnitus and trouble swallowing.    Eyes: Negative.    Respiratory: Negative for cough, chest tightness, shortness of breath, wheezing and stridor.    Cardiovascular: Negative for chest pain, palpitations and leg swelling.   Gastrointestinal: Positive for constipation. Negative for abdominal distention, abdominal pain, blood in stool, diarrhea, nausea and vomiting.   Endocrine: Negative.    Genitourinary: Negative for decreased urine volume, difficulty urinating, dysuria, flank pain, frequency, hematuria, scrotal swelling, testicular pain and urgency.   Musculoskeletal: Negative for arthralgias, back pain and joint swelling.   Allergic/Immunologic: Negative.    Neurological: Negative for dizziness, seizures, syncope, weakness, light-headedness, numbness and headaches.   Hematological: Negative for adenopathy. Does not bruise/bleed easily.   Psychiatric/Behavioral: Negative for confusion and sleep disturbance. The patient is not nervous/anxious.    All other systems reviewed and are negative.  No change from prior except as noted    There were no vitals filed for this visit.  See vital signs from the office visit dated 8/19/2019.   Vitals reviewed.    Physical Exam   Constitutional: He is oriented to person, place, and time. He appears well-developed and well-nourished. No distress.   HENT:   Head: Normocephalic and atraumatic. Hair is normal.   Mouth/Throat: Oropharynx is clear and moist.   Eyes: Conjunctivae, EOM and lids are normal. Pupils are equal.   Neck: Neck supple. No JVD present. No thyroid mass and no thyromegaly present.   Cardiovascular: Normal rate and regular rhythm. Exam reveals no gallop and no friction rub.   Murmur heard.   Systolic murmur is present with a grade of 2/6.  Pulmonary/Chest: Effort normal and breath sounds normal. No respiratory distress. He has no wheezes. He has no rales.   Benign-appearing Mediport   Abdominal: Soft. He exhibits no distension and no mass. There is no splenomegaly or hepatomegaly. There is no tenderness. There is no guarding.   Musculoskeletal: Normal range of motion. He exhibits no edema, tenderness or deformity.   Lymphadenopathy:     He has no cervical adenopathy.     He has no axillary adenopathy.        Right: No inguinal and no supraclavicular adenopathy present.        Left: No inguinal and no supraclavicular adenopathy present.   Neurological: He is alert and oriented to person, place, and time. He has normal strength.   Skin: Skin is warm and dry. No rash noted.   Psychiatric: He has a normal mood and affect. His behavior is normal. Judgment and thought content normal. Cognition and memory are normal.   Nursing note and vitals reviewed.  No change from prior except as noted    DIAGNOSTIC DATA:  WBC   Date Value Ref Range Status   09/09/2019 18.83 (H) 3.40 - 10.80 10*3/mm3 Final     RBC   Date Value Ref Range Status   09/09/2019 3.17 (L) 4.14 - 5.80 10*6/mm3 Final     Hemoglobin   Date Value Ref Range Status   09/09/2019 10.2 (L) 13.0 - 17.7 g/dL Final     Hematocrit   Date Value Ref Range Status   09/09/2019 30.7 (L) 37.5 - 51.0 % Final     MCV   Date Value Ref Range Status    09/09/2019 96.8 79.0 - 97.0 fL Final     MCH   Date Value Ref Range Status   09/09/2019 32.2 26.6 - 33.0 pg Final     MCHC   Date Value Ref Range Status   09/09/2019 33.2 31.5 - 35.7 g/dL Final     RDW   Date Value Ref Range Status   09/09/2019 19.5 (H) 12.3 - 15.4 % Final     RDW-SD   Date Value Ref Range Status   09/09/2019 70.0 (H) 37.0 - 54.0 fl Final     MPV   Date Value Ref Range Status   09/09/2019 9.4 6.0 - 12.0 fL Final     Platelets   Date Value Ref Range Status   09/09/2019 194 140 - 450 10*3/mm3 Final     Neutrophil %   Date Value Ref Range Status   09/09/2019 82.2 (H) 42.7 - 76.0 % Final     Lymphocyte %   Date Value Ref Range Status   09/09/2019 3.8 (L) 19.6 - 45.3 % Final     Monocyte %   Date Value Ref Range Status   09/09/2019 8.4 5.0 - 12.0 % Final     Eosinophil %   Date Value Ref Range Status   09/09/2019 0.2 (L) 0.3 - 6.2 % Final     Basophil %   Date Value Ref Range Status   09/09/2019 0.4 0.0 - 1.5 % Final     Immature Grans %   Date Value Ref Range Status   09/09/2019 5.0 (H) 0.0 - 0.5 % Final     Neutrophils, Absolute   Date Value Ref Range Status   09/09/2019 15.48 (H) 1.70 - 7.00 10*3/mm3 Final     Lymphocytes, Absolute   Date Value Ref Range Status   09/09/2019 0.71 0.70 - 3.10 10*3/mm3 Final     Monocytes, Absolute   Date Value Ref Range Status   09/09/2019 1.59 (H) 0.10 - 0.90 10*3/mm3 Final     Eosinophils, Absolute   Date Value Ref Range Status   09/09/2019 0.04 0.00 - 0.40 10*3/mm3 Final     Basophils, Absolute   Date Value Ref Range Status   09/09/2019 0.07 0.00 - 0.20 10*3/mm3 Final     Immature Grans, Absolute   Date Value Ref Range Status   09/09/2019 0.94 (H) 0.00 - 0.05 10*3/mm3 Final     nRBC   Date Value Ref Range Status   09/09/2019 0.0 0.0 - 0.2 /100 WBC Final       Glucose   Date Value Ref Range Status   08/23/2019 198 (H) 65 - 99 mg/dL Final     Sodium   Date Value Ref Range Status   08/23/2019 138 136 - 145 mmol/L Final     Potassium   Date Value Ref Range Status    08/23/2019 4.1 3.5 - 5.2 mmol/L Final     CO2   Date Value Ref Range Status   08/23/2019 21.7 (L) 22.0 - 29.0 mmol/L Final     Chloride   Date Value Ref Range Status   08/23/2019 105 98 - 107 mmol/L Final     Anion Gap   Date Value Ref Range Status   08/23/2019 11.3 5.0 - 15.0 mmol/L Final     Creatinine   Date Value Ref Range Status   08/23/2019 0.79 0.76 - 1.27 mg/dL Final   07/22/2019 0.70 0.60 - 1.30 mg/dL Final     Comment:     Serial Number: [id number]:  [id number]     BUN   Date Value Ref Range Status   08/23/2019 25 (H) 8 - 23 mg/dL Final     BUN/Creatinine Ratio   Date Value Ref Range Status   08/23/2019 31.6 (H) 7.0 - 25.0 Final     Calcium   Date Value Ref Range Status   08/23/2019 8.6 8.6 - 10.5 mg/dL Final     eGFR Non  Amer   Date Value Ref Range Status   08/23/2019 95 >60 mL/min/1.73 Final     Alkaline Phosphatase   Date Value Ref Range Status   08/23/2019 57 39 - 117 U/L Final     Total Protein   Date Value Ref Range Status   08/23/2019 5.1 (L) 6.0 - 8.5 g/dL Final     ALT (SGPT)   Date Value Ref Range Status   08/23/2019 53 (H) 1 - 41 U/L Final     AST (SGOT)   Date Value Ref Range Status   08/23/2019 31 1 - 40 U/L Final     Total Bilirubin   Date Value Ref Range Status   08/23/2019 0.4 0.2 - 1.2 mg/dL Final     Albumin   Date Value Ref Range Status   08/23/2019 3.50 3.50 - 5.20 g/dL Final     Globulin   Date Value Ref Range Status   08/23/2019 1.6 gm/dL Final         IMAGING:  CT imaging of the neck chest abdomen and pelvis from 7/22/2019 shows a nice response to therapy.  Decreasing lymphadenopathy.     IMPRESSION:  1. Significant interval improvement. There has been near-complete  resolution of the perinephric lesions and significant decrease in the  left paraspinal mass at L5. The mesenteric lymphadenopathy has  decreased. The shotty mediastinal nodes are slightly smaller and there  is near-complete resolution of the right posterior chest wall mass. Also  resolution of a single enlarged  "left cervical node. There has also been  resolution of a 1.5 cm right upper lobe nodular opacity which was  hypermetabolic.  2. Significantly thickened gastric folds. The stomach was collapsed on  all of the previous CT examinations which limits comparison. Please  correlate clinically for gastritis.    ASSESSMENT:  This is a 79 y.o. male with:  1.  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  His LP and bone marrow biopsies are negative for involvement.  However, his PET scan indicates perinephric soft tissue lesions. hypermetabolic mesenteric lymphadenopathy, left L5 paraspinous mass, an intensely hypermetabolic 6 cm intramuscular mass in the upper right thigh, a right upper lobe nodular opacity, a hypermetabolic mass adjacent to the right scapula, and a lesion within the left calvicle.  Paraspinous involvement puts him at extremely high risk for CNS involvement.       Plan 6 cycles of inpatient treatment with dose adjusted R–EPOCH.  Plan CNS prophylaxis with IT methotrexate with each cycle of therapy.     Admission to [facility] on 6/19/2019 for cycle #1 of therapy.     He received Neulasta on discharge on 6/22/2019.     His lowest ANC was 0.04 on 6/27 with a platelet count of 47,000 that day.  He was only neutropenic on one day and his WBC and platelet count improved rapidly.     Admit 7/8 for cycle #2.  Per the protocol with neutropenia x1 day, we proceeded with the same doses as cycle #1.  Slight dose adjustment for his decreased weight.     Cycle #3 initiated 7/29/2019.  Lumbar puncture scheduled that day.  Labs for flow cytometry.   Per the protocol, his doses of Adriamycin, etoposide, and Cytoxan increased by 20%.    Admited 8/19/2019 for cycle #4 of therapy.  Lumbar puncture with intrathecal methotrexate on admission.  Proceed with cycle #4 of therapy.  20% dose increase.    Following " cycle #4, his sim platelet count was 21,000 with a sim neutrophil count of 1.78.  Therefore, for cycle #5, we will reduce his doses by 20% to the last prior tolerated dose (cycle #3 doses).     2.  Rheumatoid arthritis previously on Cimzia (certolizumab) injections by Dr. Velásquez. Plan to discontinue these during chemotherapy.  He was previously on Simponi Aria (golimumab) and Actemra (tocilizumab).  These certainly could contribute to the development of lymphoma.     3.  Coronary artery disease status post stenting as well as moderate aortic stenosis by echocardiogram.  His left ventricular ejection fraction is normal at 60% by echocardiogram on 5/14/2019.     4.  Anxiety:  I previously gave him a prescription for alprazolam 0.25 mg to take twice daily as needed.  Continue benzodiazepines as needed during his hospitalization.     5.  Hyponatremia: Continue to monitor.     6.  Volume overload with cycle #1: We will likely need to decrease IV fluids with this cycle.     7.  Constipation: Continue Senokot and MiraLAX daily while hospitalized.    8.  Type 2 diabetes: Insulin sliding scale during the admission.     PLAN:   1.  Inpatient [facility]  2.  Lumbar puncture on admission with a sample to be sent for flow cytometry.  Intrathecal methotrexate 12 mg administration for CNS prophylaxis.  3.  Cycle #5 of therapy with dose adjusted R–EPOCH to start following his LP.  20% dose decrease in Adriamycin, Cytoxan, etoposide per protocol.  4.  Benzodiazepines as needed for anxiety although he has not required any at this point.  5.  Lovenox 40 mg daily for DVT prophylaxis.  No allopurinol at this point  6.  IV fluid hydration.  We have decreased his maintenance fluids to 50 ml/hr due to volume overload with prior cycles.  7.  Continue Bactrim and acyclovir for prophylaxis.  8.  Moderate dose sliding scale insulin  9.  Neulasta next Monday.  Twice weekly CBC on Mondays and Thursdays for 2 weeks.   Plan  total of 6 cycles of therapy if tolerated with intrathecal methotrexate with each cycle for CNS prophylaxis.  10.  MD visit in three weeks for his sixth and final cycle of therapy.  Twice weekly CBCs thereafter.  Neulasta the Monday following chemotherapy.  PET scan in 5 weeks.  I will see him back in 6 weeks for follow-up to review the PET scan.  We anticipate that his therapy will be complete at that point assuming a complete response visualized on his PET scan.    Gabriele Garcia MD

## 2019-09-09 NOTE — PROGRESS NOTES
Clinical Pharmacy Services: Medication History    Edward Srinivasan is a 79 y.o. male presenting to [facility] for Diffuse large B-cell lymphoma of lymph nodes of multiple regions (CMS/HCC) [C83.38]    He  has a past medical history of Allergic rhinitis, Aortic valve stenosis, CAD (coronary artery disease), Cancer (CMS/HCC), Carotid arterial disease (CMS/HCC), Chronic obstructive lung disease (CMS/HCC), Esophageal reflux, Hyperlipidemia, Hypertension, Iron deficiency, Legally blind, Pulmonary fibrosis (CMS/HCC), PVC (premature ventricular contraction), Rheumatoid arthritis (CMS/HCC), Skin cancer of face, Steroid-induced diabetes mellitus (CMS/HCC), and Tobacco use.    Allergies as of 08/19/2019 - Reviewed 08/19/2019   Allergen Reaction Noted   • Oxaprozin Hives 11/20/2015       Medication information was obtained from: Patient and spouse at bedside.       Prior to Admission Medications     Prescriptions Last Dose Informant Patient Reported? Taking?    acetaminophen (TYLENOL) 325 MG tablet Past Month  No Yes    Take 2 tablets by mouth Every 4 (Four) Hours As Needed for Mild Pain  or Fever (temperature greater than 101 F).    acyclovir (ZOVIRAX) 200 MG capsule 9/8/2019  Yes Yes    2 (Two) Times a Day.    aspirin 81 MG EC tablet 9/6/2019 Self Yes Yes    Take 81 mg by mouth Daily.    beta carotene 13791 UNIT capsule 9/8/2019  Yes Yes    Take 10,000 Units by mouth Daily.    cetirizine (ZyrTEC) 10 MG tablet 9/8/2019  Yes Yes    Take 10 mg by mouth daily.    Cholecalciferol (VITAMIN D-3 PO) 9/8/2019  Yes Yes    Take 2,000 Int'l Units/day by mouth Daily.    linagliptin (TRADJENTA) 5 MG tablet tablet 9/8/2019  Yes Yes    Take 5 mg by mouth Daily.    lisinopril (PRINIVIL,ZESTRIL) 10 MG tablet 9/9/2019  No Yes    TAKE 1 TABLET DAILY    MAGNESIUM PO 9/8/2019  Yes Yes    Take 500 mg by mouth Daily.    meclizine (ANTIVERT) 25 MG tablet 9/8/2019  No Yes    Take 1 tablet by mouth 3 (Three) Times a Day As Needed for  dizziness.    omeprazole (priLOSEC) 40 MG capsule 9/8/2019  No Yes    Take 1 capsule by mouth Daily.    pyridoxine (B-6) 100 MG tablet 9/8/2019  Yes Yes    Take 100 mg by mouth Daily.    sulfamethoxazole-trimethoprim (BACTRIM DS,SEPTRA DS) 800-160 MG per tablet 9/6/2019  No Yes    Take 1 tablet by mouth 3 (Three) Times a Week. On Monday, Wednesday and Friday.    vitamin C (ASCORBIC ACID) 500 MG tablet 9/8/2019  Yes Yes    Take 500 mg by mouth Daily.    vitamin E 400 UNIT capsule 9/6/2019  Yes Yes    Take 400 Units by mouth Daily.    ALPRAZolam (XANAX) 0.25 MG tablet  Spouse/Significant Other Yes No    Take 0.25 mg by mouth Daily As Needed for Anxiety.    polyethylene glycol (MIRALAX) packet   Yes No    Take 17 g by mouth Daily.    prochlorperazine (COMPAZINE) 10 MG tablet   No No    Take 1 tablet by mouth Every 6 (Six) Hours As Needed for Nausea or Vomiting.    sennosides-docusate sodium (SENOKOT-S) 8.6-50 MG tablet   Yes No    Take 1 tablet by mouth Daily.            Medication notes:     This medication list is complete to the best of my knowledge as of 9/9/2019    Please call if questions.    Calin Yoder, PharmD  9/9/2019 9:35 AM

## 2019-09-10 LAB
ALBUMIN SERPL-MCNC: 3.8 G/DL (ref 3.5–5.2)
ALBUMIN/GLOB SERPL: 2.4 G/DL
ALP SERPL-CCNC: 75 U/L (ref 39–117)
ALT SERPL W P-5'-P-CCNC: 13 U/L (ref 1–41)
ANION GAP SERPL CALCULATED.3IONS-SCNC: 8.9 MMOL/L (ref 5–15)
AST SERPL-CCNC: 14 U/L (ref 1–40)
BASOPHILS # BLD AUTO: 0.03 10*3/MM3 (ref 0–0.2)
BASOPHILS NFR BLD AUTO: 0.2 % (ref 0–1.5)
BILIRUB SERPL-MCNC: 0.2 MG/DL (ref 0.2–1.2)
BUN BLD-MCNC: 20 MG/DL (ref 8–23)
BUN/CREAT SERPL: 34.5 (ref 7–25)
CALCIUM SPEC-SCNC: 7.9 MG/DL (ref 8.6–10.5)
CHLORIDE SERPL-SCNC: 102 MMOL/L (ref 98–107)
CO2 SERPL-SCNC: 23.1 MMOL/L (ref 22–29)
CREAT BLD-MCNC: 0.58 MG/DL (ref 0.76–1.27)
DEPRECATED RDW RBC AUTO: 68.1 FL (ref 37–54)
EOSINOPHIL # BLD AUTO: 0 10*3/MM3 (ref 0–0.4)
EOSINOPHIL NFR BLD AUTO: 0 % (ref 0.3–6.2)
ERYTHROCYTE [DISTWIDTH] IN BLOOD BY AUTOMATED COUNT: 19 % (ref 12.3–15.4)
GFR SERPL CREATININE-BSD FRML MDRD: 136 ML/MIN/1.73
GLOBULIN UR ELPH-MCNC: 1.6 GM/DL
GLUCOSE BLD-MCNC: 171 MG/DL (ref 65–99)
GLUCOSE BLDC GLUCOMTR-MCNC: 166 MG/DL (ref 70–130)
GLUCOSE BLDC GLUCOMTR-MCNC: 194 MG/DL (ref 70–130)
GLUCOSE BLDC GLUCOMTR-MCNC: 217 MG/DL (ref 70–130)
GLUCOSE BLDC GLUCOMTR-MCNC: 226 MG/DL (ref 70–130)
HCT VFR BLD AUTO: 25.4 % (ref 37.5–51)
HGB BLD-MCNC: 8.1 G/DL (ref 13–17.7)
IMM GRANULOCYTES # BLD AUTO: 0.45 10*3/MM3 (ref 0–0.05)
IMM GRANULOCYTES NFR BLD AUTO: 2.6 % (ref 0–0.5)
LYMPHOCYTES # BLD AUTO: 0.35 10*3/MM3 (ref 0.7–3.1)
LYMPHOCYTES NFR BLD AUTO: 2 % (ref 19.6–45.3)
MCH RBC QN AUTO: 31.6 PG (ref 26.6–33)
MCHC RBC AUTO-ENTMCNC: 31.9 G/DL (ref 31.5–35.7)
MCV RBC AUTO: 99.2 FL (ref 79–97)
MONOCYTES # BLD AUTO: 0.48 10*3/MM3 (ref 0.1–0.9)
MONOCYTES NFR BLD AUTO: 2.7 % (ref 5–12)
NEUTROPHILS # BLD AUTO: 16.23 10*3/MM3 (ref 1.7–7)
NEUTROPHILS NFR BLD AUTO: 92.5 % (ref 42.7–76)
NRBC BLD AUTO-RTO: 0.1 /100 WBC (ref 0–0.2)
PLATELET # BLD AUTO: 156 10*3/MM3 (ref 140–450)
PMV BLD AUTO: 9.7 FL (ref 6–12)
POTASSIUM BLD-SCNC: 4.7 MMOL/L (ref 3.5–5.2)
PROT SERPL-MCNC: 5.4 G/DL (ref 6–8.5)
RBC # BLD AUTO: 2.56 10*6/MM3 (ref 4.14–5.8)
REF LAB TEST METHOD: NORMAL
SODIUM BLD-SCNC: 134 MMOL/L (ref 136–145)
WBC NRBC COR # BLD: 17.54 10*3/MM3 (ref 3.4–10.8)

## 2019-09-10 PROCEDURE — 82962 GLUCOSE BLOOD TEST: CPT

## 2019-09-10 PROCEDURE — 63710000001 PREDNISONE PER 5 MG: Performed by: INTERNAL MEDICINE

## 2019-09-10 PROCEDURE — 80053 COMPREHEN METABOLIC PANEL: CPT | Performed by: INTERNAL MEDICINE

## 2019-09-10 PROCEDURE — 25010000002 VINCRISTINE PER 1 MG: Performed by: INTERNAL MEDICINE

## 2019-09-10 PROCEDURE — 63710000001 INSULIN LISPRO (HUMAN) PER 5 UNITS: Performed by: INTERNAL MEDICINE

## 2019-09-10 PROCEDURE — 25010000002 ENOXAPARIN PER 10 MG: Performed by: INTERNAL MEDICINE

## 2019-09-10 PROCEDURE — 25010000002 ETOPOSIDE 100 MG/5ML SOLUTION 5 ML VIAL: Performed by: INTERNAL MEDICINE

## 2019-09-10 PROCEDURE — 25010000002 DOXORUBICIN PER 10 MG: Performed by: INTERNAL MEDICINE

## 2019-09-10 PROCEDURE — 25010000002 ONDANSETRON PER 1 MG: Performed by: INTERNAL MEDICINE

## 2019-09-10 PROCEDURE — 85025 COMPLETE CBC W/AUTO DIFF WBC: CPT | Performed by: INTERNAL MEDICINE

## 2019-09-10 PROCEDURE — 99233 SBSQ HOSP IP/OBS HIGH 50: CPT | Performed by: INTERNAL MEDICINE

## 2019-09-10 RX ADMIN — ONDANSETRON 16 MG: 2 INJECTION INTRAMUSCULAR; INTRAVENOUS at 14:02

## 2019-09-10 RX ADMIN — PREDNISONE 100 MG: 50 TABLET ORAL at 17:02

## 2019-09-10 RX ADMIN — ENOXAPARIN SODIUM 40 MG: 40 INJECTION SUBCUTANEOUS at 10:52

## 2019-09-10 RX ADMIN — INSULIN LISPRO 2 UNITS: 100 INJECTION, SOLUTION INTRAVENOUS; SUBCUTANEOUS at 08:11

## 2019-09-10 RX ADMIN — Medication 100 MG: at 08:12

## 2019-09-10 RX ADMIN — ACYCLOVIR 400 MG: 200 CAPSULE ORAL at 20:32

## 2019-09-10 RX ADMIN — OXYCODONE HYDROCHLORIDE AND ACETAMINOPHEN 500 MG: 500 TABLET ORAL at 08:12

## 2019-09-10 RX ADMIN — INSULIN LISPRO 4 UNITS: 100 INJECTION, SOLUTION INTRAVENOUS; SUBCUTANEOUS at 17:05

## 2019-09-10 RX ADMIN — SENNOSIDES AND DOCUSATE SODIUM 1 TABLET: 8.6; 5 TABLET ORAL at 20:34

## 2019-09-10 RX ADMIN — PANTOPRAZOLE SODIUM 40 MG: 40 TABLET, DELAYED RELEASE ORAL at 05:50

## 2019-09-10 RX ADMIN — LINAGLIPTIN 5 MG: 5 TABLET, FILM COATED ORAL at 08:11

## 2019-09-10 RX ADMIN — SODIUM CHLORIDE 75 ML/HR: 9 INJECTION, SOLUTION INTRAVENOUS at 01:52

## 2019-09-10 RX ADMIN — INSULIN LISPRO 2 UNITS: 100 INJECTION, SOLUTION INTRAVENOUS; SUBCUTANEOUS at 12:10

## 2019-09-10 RX ADMIN — INSULIN LISPRO 4 UNITS: 100 INJECTION, SOLUTION INTRAVENOUS; SUBCUTANEOUS at 20:32

## 2019-09-10 RX ADMIN — VITAMIN D, TAB 1000IU (100/BT) 2000 UNITS: 25 TAB at 08:11

## 2019-09-10 RX ADMIN — VINCRISTINE SULFATE: 1 INJECTION, SOLUTION INTRAVENOUS at 14:30

## 2019-09-10 RX ADMIN — LISINOPRIL 10 MG: 10 TABLET ORAL at 08:12

## 2019-09-10 RX ADMIN — PREDNISONE 100 MG: 50 TABLET ORAL at 08:11

## 2019-09-10 RX ADMIN — SODIUM CHLORIDE 75 ML/HR: 9 INJECTION, SOLUTION INTRAVENOUS at 14:02

## 2019-09-10 RX ADMIN — SENNOSIDES AND DOCUSATE SODIUM 1 TABLET: 8.6; 5 TABLET ORAL at 08:15

## 2019-09-10 RX ADMIN — ACYCLOVIR 400 MG: 200 CAPSULE ORAL at 08:12

## 2019-09-10 RX ADMIN — CETIRIZINE HYDROCHLORIDE 10 MG: 10 TABLET, FILM COATED ORAL at 08:11

## 2019-09-10 NOTE — PLAN OF CARE
Problem: Patient Care Overview  Goal: Plan of Care Review  Outcome: Ongoing (interventions implemented as appropriate)   09/10/19 [id number]   Coping/Psychosocial   Plan of Care Reviewed With patient;spouse   OTHER   Outcome Summary day 2 of EPOCH infusing at 23.7 cc/hr,pt has no c/o, ambulated in javier frequently, covered with insulin as needed     Goal: Individualization and Mutuality  Outcome: Ongoing (interventions implemented as appropriate)      Problem: Chemotherapy Effects (Adult)  Goal: Signs and Symptoms of Listed Potential Problems Will be Absent, Minimized or Managed (Chemotherapy Effects)  Outcome: Ongoing (interventions implemented as appropriate)

## 2019-09-10 NOTE — PLAN OF CARE
Problem: Patient Care Overview  Goal: Plan of Care Review  Outcome: Ongoing (interventions implemented as appropriate)   09/10/19 0359   Coping/Psychosocial   Plan of Care Reviewed With patient   OTHER   Outcome Summary VSS; Chemo continues without problem; pt ambulated in room; No complaints noted; will continue to monitor   Plan of Care Review   Progress no change       Problem: Chemotherapy Effects (Adult)  Goal: Signs and Symptoms of Listed Potential Problems Will be Absent, Minimized or Managed (Chemotherapy Effects)  Outcome: Ongoing (interventions implemented as appropriate)

## 2019-09-11 LAB
ABO GROUP BLD: NORMAL
ALBUMIN SERPL-MCNC: 3.7 G/DL (ref 3.5–5.2)
ALBUMIN/GLOB SERPL: 2.2 G/DL
ALP SERPL-CCNC: 75 U/L (ref 39–117)
ALT SERPL W P-5'-P-CCNC: 18 U/L (ref 1–41)
ANION GAP SERPL CALCULATED.3IONS-SCNC: 8.6 MMOL/L (ref 5–15)
AST SERPL-CCNC: 18 U/L (ref 1–40)
BASOPHILS # BLD AUTO: 0.03 10*3/MM3 (ref 0–0.2)
BASOPHILS NFR BLD AUTO: 0.2 % (ref 0–1.5)
BILIRUB SERPL-MCNC: <0.2 MG/DL (ref 0.2–1.2)
BLD GP AB SCN SERPL QL: NEGATIVE
BUN BLD-MCNC: 18 MG/DL (ref 8–23)
BUN/CREAT SERPL: 20.5 (ref 7–25)
CALCIUM SPEC-SCNC: 8.6 MG/DL (ref 8.6–10.5)
CHLORIDE SERPL-SCNC: 105 MMOL/L (ref 98–107)
CO2 SERPL-SCNC: 23.4 MMOL/L (ref 22–29)
CREAT BLD-MCNC: 0.88 MG/DL (ref 0.76–1.27)
DEPRECATED RDW RBC AUTO: 70.1 FL (ref 37–54)
EOSINOPHIL # BLD AUTO: 0 10*3/MM3 (ref 0–0.4)
EOSINOPHIL NFR BLD AUTO: 0 % (ref 0.3–6.2)
ERYTHROCYTE [DISTWIDTH] IN BLOOD BY AUTOMATED COUNT: 19.3 % (ref 12.3–15.4)
GFR SERPL CREATININE-BSD FRML MDRD: 84 ML/MIN/1.73
GLOBULIN UR ELPH-MCNC: 1.7 GM/DL
GLUCOSE BLD-MCNC: 197 MG/DL (ref 65–99)
GLUCOSE BLDC GLUCOMTR-MCNC: 214 MG/DL (ref 70–130)
GLUCOSE BLDC GLUCOMTR-MCNC: 261 MG/DL (ref 70–130)
GLUCOSE BLDC GLUCOMTR-MCNC: 266 MG/DL (ref 70–130)
GLUCOSE BLDC GLUCOMTR-MCNC: 327 MG/DL (ref 70–130)
HCT VFR BLD AUTO: 24.5 % (ref 37.5–51)
HGB BLD-MCNC: 7.9 G/DL (ref 13–17.7)
IMM GRANULOCYTES # BLD AUTO: 0.23 10*3/MM3 (ref 0–0.05)
IMM GRANULOCYTES NFR BLD AUTO: 1.4 % (ref 0–0.5)
LYMPHOCYTES # BLD AUTO: 0.31 10*3/MM3 (ref 0.7–3.1)
LYMPHOCYTES NFR BLD AUTO: 1.9 % (ref 19.6–45.3)
MCH RBC QN AUTO: 31.9 PG (ref 26.6–33)
MCHC RBC AUTO-ENTMCNC: 32.2 G/DL (ref 31.5–35.7)
MCV RBC AUTO: 98.8 FL (ref 79–97)
MONOCYTES # BLD AUTO: 0.59 10*3/MM3 (ref 0.1–0.9)
MONOCYTES NFR BLD AUTO: 3.6 % (ref 5–12)
NEUTROPHILS # BLD AUTO: 15.45 10*3/MM3 (ref 1.7–7)
NEUTROPHILS NFR BLD AUTO: 92.9 % (ref 42.7–76)
NRBC BLD AUTO-RTO: 0 /100 WBC (ref 0–0.2)
PLATELET # BLD AUTO: 152 10*3/MM3 (ref 140–450)
PMV BLD AUTO: 9.5 FL (ref 6–12)
POTASSIUM BLD-SCNC: 4.6 MMOL/L (ref 3.5–5.2)
PROT SERPL-MCNC: 5.4 G/DL (ref 6–8.5)
RBC # BLD AUTO: 2.48 10*6/MM3 (ref 4.14–5.8)
RH BLD: POSITIVE
SODIUM BLD-SCNC: 137 MMOL/L (ref 136–145)
T&S EXPIRATION DATE: NORMAL
WBC NRBC COR # BLD: 16.61 10*3/MM3 (ref 3.4–10.8)

## 2019-09-11 PROCEDURE — P9016 RBC LEUKOCYTES REDUCED: HCPCS

## 2019-09-11 PROCEDURE — 82962 GLUCOSE BLOOD TEST: CPT

## 2019-09-11 PROCEDURE — 63710000001 PREDNISONE PER 5 MG: Performed by: INTERNAL MEDICINE

## 2019-09-11 PROCEDURE — 25010000002 VINCRISTINE PER 1 MG: Performed by: INTERNAL MEDICINE

## 2019-09-11 PROCEDURE — 86850 RBC ANTIBODY SCREEN: CPT | Performed by: INTERNAL MEDICINE

## 2019-09-11 PROCEDURE — 80053 COMPREHEN METABOLIC PANEL: CPT | Performed by: INTERNAL MEDICINE

## 2019-09-11 PROCEDURE — 63710000001 PROMETHAZINE PER 25 MG: Performed by: INTERNAL MEDICINE

## 2019-09-11 PROCEDURE — 86901 BLOOD TYPING SEROLOGIC RH(D): CPT | Performed by: INTERNAL MEDICINE

## 2019-09-11 PROCEDURE — 25010000002 DOXORUBICIN PER 10 MG: Performed by: INTERNAL MEDICINE

## 2019-09-11 PROCEDURE — 25010000002 ENOXAPARIN PER 10 MG: Performed by: INTERNAL MEDICINE

## 2019-09-11 PROCEDURE — 86900 BLOOD TYPING SEROLOGIC ABO: CPT | Performed by: INTERNAL MEDICINE

## 2019-09-11 PROCEDURE — 85025 COMPLETE CBC W/AUTO DIFF WBC: CPT | Performed by: INTERNAL MEDICINE

## 2019-09-11 PROCEDURE — 25010000002 ETOPOSIDE 500 MG/25ML SOLUTION 25 ML VIAL: Performed by: INTERNAL MEDICINE

## 2019-09-11 PROCEDURE — 99233 SBSQ HOSP IP/OBS HIGH 50: CPT | Performed by: INTERNAL MEDICINE

## 2019-09-11 PROCEDURE — 25010000002 ONDANSETRON PER 1 MG: Performed by: INTERNAL MEDICINE

## 2019-09-11 PROCEDURE — 36430 TRANSFUSION BLD/BLD COMPNT: CPT

## 2019-09-11 PROCEDURE — 86900 BLOOD TYPING SEROLOGIC ABO: CPT

## 2019-09-11 PROCEDURE — 86923 COMPATIBILITY TEST ELECTRIC: CPT

## 2019-09-11 PROCEDURE — 63710000001 INSULIN LISPRO (HUMAN) PER 5 UNITS: Performed by: INTERNAL MEDICINE

## 2019-09-11 RX ORDER — PROMETHAZINE HYDROCHLORIDE 25 MG/1
25 TABLET ORAL ONCE
Status: COMPLETED | OUTPATIENT
Start: 2019-09-11 | End: 2019-09-11

## 2019-09-11 RX ORDER — ACETAMINOPHEN 325 MG/1
650 TABLET ORAL ONCE
Status: COMPLETED | OUTPATIENT
Start: 2019-09-11 | End: 2019-09-11

## 2019-09-11 RX ORDER — ACETAMINOPHEN 650 MG/1
650 SUPPOSITORY RECTAL ONCE
Status: COMPLETED | OUTPATIENT
Start: 2019-09-11 | End: 2019-09-11

## 2019-09-11 RX ADMIN — Medication 100 MG: at 08:04

## 2019-09-11 RX ADMIN — LINAGLIPTIN 5 MG: 5 TABLET, FILM COATED ORAL at 08:05

## 2019-09-11 RX ADMIN — PREDNISONE 100 MG: 50 TABLET ORAL at 17:13

## 2019-09-11 RX ADMIN — SODIUM CHLORIDE 75 ML/HR: 9 INJECTION, SOLUTION INTRAVENOUS at 17:10

## 2019-09-11 RX ADMIN — ONDANSETRON 16 MG: 2 INJECTION INTRAMUSCULAR; INTRAVENOUS at 14:02

## 2019-09-11 RX ADMIN — PANTOPRAZOLE SODIUM 40 MG: 40 TABLET, DELAYED RELEASE ORAL at 05:10

## 2019-09-11 RX ADMIN — INSULIN LISPRO 6 UNITS: 100 INJECTION, SOLUTION INTRAVENOUS; SUBCUTANEOUS at 21:04

## 2019-09-11 RX ADMIN — CETIRIZINE HYDROCHLORIDE 10 MG: 10 TABLET, FILM COATED ORAL at 08:05

## 2019-09-11 RX ADMIN — VINCRISTINE SULFATE: 1 INJECTION, SOLUTION INTRAVENOUS at 14:23

## 2019-09-11 RX ADMIN — SODIUM CHLORIDE, PRESERVATIVE FREE 10 ML: 5 INJECTION INTRAVENOUS at 20:11

## 2019-09-11 RX ADMIN — VITAMIN D, TAB 1000IU (100/BT) 2000 UNITS: 25 TAB at 08:05

## 2019-09-11 RX ADMIN — INSULIN LISPRO 6 UNITS: 100 INJECTION, SOLUTION INTRAVENOUS; SUBCUTANEOUS at 11:55

## 2019-09-11 RX ADMIN — ENOXAPARIN SODIUM 40 MG: 40 INJECTION SUBCUTANEOUS at 09:51

## 2019-09-11 RX ADMIN — ACYCLOVIR 400 MG: 200 CAPSULE ORAL at 08:05

## 2019-09-11 RX ADMIN — PREDNISONE 100 MG: 50 TABLET ORAL at 08:05

## 2019-09-11 RX ADMIN — PROMETHAZINE HYDROCHLORIDE 25 MG: 25 TABLET ORAL at 15:47

## 2019-09-11 RX ADMIN — LISINOPRIL 10 MG: 10 TABLET ORAL at 08:05

## 2019-09-11 RX ADMIN — SODIUM CHLORIDE, PRESERVATIVE FREE 10 ML: 5 INJECTION INTRAVENOUS at 08:06

## 2019-09-11 RX ADMIN — SENNOSIDES AND DOCUSATE SODIUM 1 TABLET: 8.6; 5 TABLET ORAL at 20:10

## 2019-09-11 RX ADMIN — OXYCODONE HYDROCHLORIDE AND ACETAMINOPHEN 500 MG: 500 TABLET ORAL at 08:05

## 2019-09-11 RX ADMIN — ACETAMINOPHEN 650 MG: 325 TABLET, FILM COATED ORAL at 15:46

## 2019-09-11 RX ADMIN — ACYCLOVIR 400 MG: 200 CAPSULE ORAL at 20:10

## 2019-09-11 RX ADMIN — SULFAMETHOXAZOLE AND TRIMETHOPRIM 160 MG: 800; 160 TABLET ORAL at 08:04

## 2019-09-11 RX ADMIN — INSULIN LISPRO 4 UNITS: 100 INJECTION, SOLUTION INTRAVENOUS; SUBCUTANEOUS at 08:05

## 2019-09-11 RX ADMIN — INSULIN LISPRO 7 UNITS: 100 INJECTION, SOLUTION INTRAVENOUS; SUBCUTANEOUS at 17:13

## 2019-09-11 NOTE — PROGRESS NOTES
Continued Stay Note  [facility]     Patient Name: Edward Srinivasan  MRN: [id number]  Today's Date: 9/11/2019    Admit Date: 9/9/2019    Discharge Plan     Row Name 09/11/19 1046       Plan    Plan  plans home; follow for needs    Patient/Family in Agreement with Plan  yes    Plan Comments  spoke with pt bedside. Confirmed plan is home at d/c. Denies current needs. CCP to follow...SAM Souza        Discharge Codes    No documentation.             PARTH Leigh

## 2019-09-11 NOTE — PLAN OF CARE
Problem: Patient Care Overview  Goal: Individualization and Mutuality  Pt getting cycle 3 of chemo today   09/09/19 1514   Individualization   Patient Specific Preferences goes by Aaron   Patient Specific Goals (Include Timeframe) complete chemo with side effects   Patient Specific Interventions premeds given prior to chemo, prn meds     Goal: Discharge Needs Assessment   09/09/19 1418   Discharge Needs Assessment   Concerns to be Addressed denies needs/concerns at this time   Patient/Family Anticipates Transition to home with family   Patient/Family Anticipated Services at Transition none   Transportation Concerns car, none   Transportation Anticipated family or friend will provide   Anticipated Changes Related to Illness none   Equipment Needed After Discharge none   Offered/Gave Vendor List yes   Current Discharge Risk physical impairment   Disability   Equipment Currently Used at Home cane, straight       Problem: Chemotherapy Effects (Adult)  Goal: Signs and Symptoms of Listed Potential Problems Will be Absent, Minimized or Managed (Chemotherapy Effects)  No complaints of nausea or pain today

## 2019-09-11 NOTE — PLAN OF CARE
Problem: Patient Care Overview  Goal: Plan of Care Review  Outcome: Ongoing (interventions implemented as appropriate)   09/10/19 0504 09/10/19 2032 09/11/19 1511   Coping/Psychosocial   Plan of Care Reviewed With --  patient --    OTHER   Outcome Summary --  --  day 3 of epoch infusing at 23.6cc/hr. HGB 7.9 scheduled for 2 units of PRBC today. up ad chris. Will continue to monitor   Plan of Care Review   Progress no change --  --

## 2019-09-11 NOTE — PROGRESS NOTES
"[facility] CBC GROUP ADMISSION HISTORY AND PHYSICAL   REASON FOR ADMISSION / CHIEF COMPLAINT:   1. 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 CT imaging of the abdomen and pelvis on 5/8/2019 shows multiple small tissue nodules in the perirenal fat adjacent to the left kidney and a soft tissue mass interposed between the left psoas muscle and spine.   2. RA previously on Cimzia (certollizumab) injections by Dr. Velásquez   3. Lumbar puncture 5/31/2019 negative for involvement with lymphoma   4. Bone marrow aspiration and biopsy 5/29/2018 negative for lymphoma   5. PET scan on 5/30/2019 with hypermetabolic perinephric soft tissue lesions with mesenteric lymphadenopathy, left paraspinal mass at L5, 6 cm intramuscular mass in the right upper thigh, lesion at the left clavicle and a 5 cm soft tissue mass between the right scapula and right posterior fourth rib.   6. Right subclavian Mediport placed by Dr. Herrera on 6/12/2019.   7. Admission to [facility] for cycle #1 of dose adjusted R-EPOCH on 6/17/2019. Plan for a lumbar puncture with intrathecal methotrexate with each cycle.   8. Cycle #2 initiated on 7/8/2019. Same dosees pursued per the protocol.   9. Admission for cycle #3 on 7/29/2019. 20% dose increase per protocol.   10. Admission for cycle #4 on 8/19/2019. 20% dose increase per protocol.   11. Admission for cycle #5 on 9/9/2019.       INTERVAL HISTORY:  Patient was admitted on 9/9/2019, and was started cycle # 5-day 1 chemotherapy R-EPOCH regimen.      On 9/10/2019, patient reports he tolerated chemotherapy well.  No nausea no vomiting.  Patient has been ambulating.  No dizziness or fainting.  Patient reports no bleeding.  His hemoglobin decreased at 8.1.  Has persistent leukocytosis WBC 17,500 and low normal platelets 156,000.    On 9/11/2019, patient reports he is tolerating chemotherapy " well, no nausea vomiting, no lower extremity edema or dyspnea.  He is being ambulating.  He does have some fatigue.  Denies bleeding however hemoglobin is further trending down and is 7.9.  WBC and platelets also marginally trending down.    HISTORY OF PRESENT ILLNESS: Edward Srinivasan is a 79 y.o. male who is being admitted for cycle #5 of dose adjusted R-EPOCH.   With cycle #4, his sim platelet count was 21,000 with a sim neutrophil count of 1.78. Therefore, for cycle #5, we will reduce his doses by 20% to the last prior tolerated dose.   He has required packed red blood cell transfusion for anemia. Otherwise, following cycle #4 he has done very well. Minimal constipation. Minimal nausea. He does report fatigue which is been ongoing and a little bit worse compared to prior cycles. No fevers or chills. Decreased appetite and he has struggled to gain weight. Otherwise, he has tolerated therapy extremely well.       ONCOLOGIC HISTORY:   He has a history of rheumatoid arthritis currently on injections by Dr. Velásquez, type 2 diabetes on oral medication, coronary artery disease status post stenting x2, aortic stenosis, and nonmelanoma skin cancer resected from his face. Continues to smoke a little bit less than a half a pack per day and has done this for several decades.   He has a long history of chronic back pain. In March 2019 he developed some low back pain on the left. With some physical therapy this is actually improved significantly but over the past couple of weeks he has developed some right-sided back pain. Actually over the past couple of days this has essentially resolved. He denies any radiculopathy. He has chronic vision changes but nothing acute. He denies any headaches. No other focal neurologic changes. He has occasional lightheadedness but this is been a chronic problem for him. No fevers. He is cold a lot but no rigors. No night sweats. He has lost a little bit of weight which she relates to his  rheumatoid arthritis and decreased appetite secondary to pain.   He had an MRI of his lumbar spine on 4/25/2019 showing some degenerative changes but also there was a soft tissue mass extending posteriorly from the left common iliac vessels hugging the lateral margin of the L5 vertebrae extending from the left lateral margin of L4-5 interspace to L5-S1 where it pressed on the left L5 nerve root. The mass measures 4.4 x 2.1 x 3.5 cm. CT imaging was suggested and performed on 5/8/2019. The CT scan showed multiple soft tissue nodules in the perirenal fat on the left adjacent to the kidney and a soft tissue mass interposed between the left psoas muscle and spine. The mass measures 4.1 x 2.1 cm.     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   He was seen initially on 5/28/2019. PET scan, LP, and a bone marrow aspiration and biopsy were requested.   LDH was 341 with uric acid 4.4.   Bone marrow biopsy on 5/29/2019: normocellular marrow with no evidence for lymphoma. Flow cytometry negative. Karyotype pending as of 6/7.     PET scan on 5/20/2019:   IMPRESSION:   1. There are hypermetabolic perinephric soft tissue lesions,   hypermetabolic mesenteric lymphadenopathy, left paraspinal mass at L5,   as well as an intensely hypermetabolic approximately 6 cm intramuscular   mass within the visualized right upper thigh. There is no hypermetabolic   lymphadenopathy within the abdomen or pelvis, but the shotty mediastinal   and hilar nodes are hypermetabolic and there is also a hypermetabolic   right upper lobe nodular opacity. In addition, there is an intensely   hypermetabolic approximately 5 cm intramuscular mass at the right upper   back and there is a tiny hypermetabolic lesion within the left clavicle.   2. Shotty cervical nodes have low level activity.   3. Uncertain  significance of the more than typical metabolic activity at   dependent atelectatic change at both lower lobes.   LP on 5/31/2019: Cytology and flow cytometry negative.   Mediport placed in the right subclavian area by Dr. Herrera on 6/12/2019.   Plan to start therapy with dose adjusted R-EPOCH with CNS prophylaxis with intrathecal methotrexate on 6/17/2019.   He previously had an echocardiogram on 5/15/2019 showing a normal left ventricular ejection fraction of 60%.   Admission to [facility] on 6/19/2019 for cycle #1 of therapy.   He received Neulasta on discharge on 6/22/2019.   His lowest ANC was 0.04 on 6/27 with a platelet count of 47,000 that day. He was only neutropenic on one day and his WBC and platelet count improved rapidly.   Admission for cycle #2 on 7/8/2019. We proceeded with the same doses per the protocol.   Admission for cycle #3 on 7/29/2019. 20% increase in Adriamycin, etoposide, and Cytoxan doses per the protocol.   Sim platelet count 36,000 with a sim white blood cell count of 2.43 following cycle #3.   With cycle #4, dose increase by 20% for Adriamycin, etoposide, and Cytoxan per protocol.   Following cycle #4, his sim platelet count was 21,000 with a sim neutrophil count of 1.78. Therefore, for cycle #5, we will reduce his doses by 20% to the last prior tolerated dose (cycle #3 doses).       SOCIAL HISTORY:   reports that he has been smoking cigarettes. He has a 25.00 pack-year smoking history. He has never used smokeless tobacco. He reports that he does not drink alcohol or use drugs.     FAMILY HISTORY:   family history includes Coronary artery disease in his other; Heart attack (age of onset: 47) in his brother; Heart attack (age of onset: 54) in his father; Heart disease in his brother and father; Heart failure in his mother.     ALLERGIES:        Allergies   Allergen Reactions   • Oxaprozin Hives     MEDICATIONS: As listed in the electronic medical record.        Review of Systems   Constitutional: Positive for fatigue. Negative for appetite change, chills, fever and unexpected weight change.   HENT: Negative for congestion, dental problem, ear pain, hearing loss, mouth sores, nosebleeds, postnasal drip, rhinorrhea, tinnitus and trouble swallowing.   Eyes: Negative.   Respiratory: Negative for cough, chest tightness, shortness of breath, wheezing and stridor.   Cardiovascular: Negative for chest pain, palpitations and leg swelling.   Gastrointestinal: Positive for constipation. Negative for abdominal distention, abdominal pain, blood in stool, diarrhea, nausea and vomiting.   Endocrine: Negative.   Genitourinary: Negative for decreased urine volume, difficulty urinating, dysuria, flank pain, frequency, hematuria, scrotal swelling, testicular pain and urgency.   Musculoskeletal: Negative for arthralgias, back pain and joint swelling.   Allergic/Immunologic: Negative.   Neurological: Negative for dizziness, seizures, syncope, weakness, light-headedness, numbness and headaches.   Hematological: Negative for adenopathy. Does not bruise/bleed easily.   Psychiatric/Behavioral: Negative for confusion and sleep disturbance. The patient is not nervous/anxious.   All other systems reviewed and are negative.   No change from prior except as noted         Vitals:    09/10/19 1603 09/10/19 1949 09/11/19 0423 09/11/19 0743   BP: 126/50 112/56 130/69 143/62   BP Location: Left arm Left arm Right arm Right arm   Patient Position: Sitting Sitting Lying Sitting   Pulse: 71 74 72 62   Resp: 18 18 18 18   Temp: 97.2 °F (36.2 °C) 97 °F (36.1 °C) 96.7 °F (35.9 °C) 97.1 °F (36.2 °C)   TempSrc: Oral Oral Oral Oral   SpO2: 96% 98% 95% 97%   Weight:       Height:       ECOG 1    Physical Exam   Constitutional: He is oriented to person, place, and time. He appears well-developed and well-nourished. No distress.  Patient is ambulating well.  HENT:   Head: Normocephalic and atraumatic. Thin hair.     Mouth/Throat: Oropharynx is clear and moist.   Eyes: Conjunctivae, left upper lid seems mildly swelling but no apparent redness. Patient is asymptomatic.     Neck: Neck supple. No thyroid mass and no thyromegaly present.   Cardiovascular: Normal rate and regular rhythm. Exam reveals no gallop and no friction rub.  Systolic murmur is present with grade of 2/6.   Pulmonary/Chest: Effort normal and breath sounds normal. No respiratory distress.   Benign-appearing Mediport   Abdominal: Soft.  No tenderness, no distension and no mass. There is no splenomegaly or hepatomegaly.  Bowel sounds normal.  Musculoskeletal: Normal range of motion. He exhibits no edema, tenderness or deformity.  There are trace edema on both lower legs below the knee level.  Lymphadenopathy:   He has no cervical adenopathy.   He has no axillary adenopathy.   Right: No inguinal and no supraclavicular adenopathy present.   Left: No inguinal and no supraclavicular adenopathy present.   Neurological: He is alert and oriented to person, place, and time. He has normal strength.   Skin: Skin is warm and dry. No rash noted.   Psychiatric: He has a normal mood and affect. His behavior is normal. Judgment and thought content normal. Cognition and memory are normal.         DIAGNOSTIC DATA:   Results from last 7 days   Lab Units 09/11/19  0510 09/10/19  0416 09/09/19  0735   WBC 10*3/mm3 16.61* 17.54* 18.83*   HEMOGLOBIN g/dL 7.9* 8.1* 10.2*   HEMATOCRIT % 24.5* 25.4* 30.7*   PLATELETS 10*3/mm3 152 156 194     Lab Results   Component Value Date    NEUTROABS 15.45 (H) 09/11/2019     Results from last 7 days   Lab Units 09/11/19  0510 09/10/19  0416   SODIUM mmol/L 137 134*   POTASSIUM mmol/L 4.6 4.7   CHLORIDE mmol/L 105 102   CO2 mmol/L 23.4 23.1   BUN mg/dL 18 20   CREATININE mg/dL 0.88 0.58*   CALCIUM mg/dL 8.6 7.9*   BILIRUBIN mg/dL <0.2* 0.2   ALK PHOS U/L 75 75   ALT (SGPT) U/L 18 13   AST (SGOT) U/L 18 14   GLUCOSE mg/dL 197* 171*         ASSESSMENT:  "  This is a 79 y.o. male with:     1. 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 His LP and bone marrow biopsies are negative for involvement.    This patient was admitted on 9/9/2019 for cycle #5 chemotherapy with R-EPOCH.   Following cycle #4, his sim platelet count was 21,000 with a sim neutrophil count of 1.78. Therefore, for cycle #5, we will reduce his doses by 20% to the last prior tolerated dose (cycle #3 doses).     Patient is tolerating chemotherapy well, with no specific side effects except decrease the hemoglobin by 2 g.  No bleeding.    · We will continue chemotherapy as written for day #3.   · Done Intrathecal methotrexate 12 mg administration for CNS prophylaxis.   · Lumbar puncture 9/9/2019 with CSF was negative for lymphoma involvement by flow cytometry study.    2. Rheumatoid arthritis previously on Cimzia (certolizumab) injections by Dr. Velásquez. Plan to discontinue these during chemotherapy. He was previously on Simponi Aria (golimumab) and Actemra (tocilizumab). These certainly could contribute to the development of lymphoma.     3. Coronary artery disease status post stenting as well as moderate aortic stenosis by echocardiogram. His left ventricular ejection fraction is normal at 60% by echocardiogram on 5/14/2019.   · Patient has no edema in lower extremity with intravenous hydration.  No dyspnea.  Continue to monitor.    4. Anxiety: I previously gave him a prescription for alprazolam 0.25 mg to take twice daily as needed. Continue benzodiazepines as needed during his hospitalization.     5. Hyponatremia: Sodium 134 today.  Continue to monitor.     6. Volume overload with cycle #1: We will likely need to decrease IV fluids with this cycle.     7. Constipation: Continue Senokot and MiraLAX daily while hospitalized.     8. Type 2 diabetes: Insulin sliding scale during the admission.  We " will continue home medication for diabetes management.     9.  Anemia secondary to chemotherapy and possibly contributed by IV hydration.   · Hemoglobin 8.1 on 9/10/2019.  Patient reports no evidence of bleeding.    · Hemoglobin further down at 7.9 on 9/11/2019.  This is secondary to his chemotherapy.  Will transfuse patient with 2 units PRBC.  Continue to monitor.        PLAN:   1.  Proceed ahead with C5-day #3 dose adjusted R- EPOCH chemotherapy.  2.   Transfused 2 units PRBC.  Premeds with Tylenol and Benadryl.    3. Benzodiazepines as needed for anxiety although he has not required any at this point.   4.  Continue Lovenox 40 mg daily for DVT prophylaxis. No allopurinol at this point.  5. IV fluid hydration. We have decreased his maintenance fluids to 50 ml/hr due to volume overload with prior cycles.   6. Continue Bactrim and acyclovir for prophylaxis.   7.  Continue home medication for diabetes management, and moderate dose sliding scale insulin.   8.  Continue GI prophylaxis with PPI.    9. Neulasta next Monday. Twice weekly CBC on Mondays and Thursdays for 2 weeks. Plan total of 6 cycles of therapy if tolerated with intrathecal methotrexate with each cycle for CNS prophylaxis.   10. MD visit in three weeks for his sixth and final cycle of therapy. Twice weekly CBCs thereafter. Neulasta the Monday following chemotherapy. PET scan in 5 weeks.  Dr. Garcia will see him back in 6 weeks for follow-up to review the PET scan. We anticipate that his therapy will be complete at that point assuming a complete response visualized on his PET scan.    I discussed with the patient and his wife today.  I also spoke with his nurse today.     RAJIV TIJERINA M.D., Ph.D.    9/11/2019.

## 2019-09-12 LAB
ABO + RH BLD: NORMAL
ABO + RH BLD: NORMAL
ALBUMIN SERPL-MCNC: 4.1 G/DL (ref 3.5–5.2)
ALBUMIN/GLOB SERPL: 2.7 G/DL
ALP SERPL-CCNC: 73 U/L (ref 39–117)
ALT SERPL W P-5'-P-CCNC: 48 U/L (ref 1–41)
ANION GAP SERPL CALCULATED.3IONS-SCNC: 8.5 MMOL/L (ref 5–15)
AST SERPL-CCNC: 36 U/L (ref 1–40)
BASOPHILS # BLD AUTO: 0.03 10*3/MM3 (ref 0–0.2)
BASOPHILS NFR BLD AUTO: 0.1 % (ref 0–1.5)
BH BB BLOOD EXPIRATION DATE: NORMAL
BH BB BLOOD EXPIRATION DATE: NORMAL
BH BB BLOOD TYPE BARCODE: 6200
BH BB BLOOD TYPE BARCODE: 6200
BH BB DISPENSE STATUS: NORMAL
BH BB DISPENSE STATUS: NORMAL
BH BB PRODUCT CODE: NORMAL
BH BB PRODUCT CODE: NORMAL
BH BB UNIT NUMBER: NORMAL
BH BB UNIT NUMBER: NORMAL
BILIRUB SERPL-MCNC: 0.3 MG/DL (ref 0.2–1.2)
BUN BLD-MCNC: 25 MG/DL (ref 8–23)
BUN/CREAT SERPL: 32.1 (ref 7–25)
CALCIUM SPEC-SCNC: 8.7 MG/DL (ref 8.6–10.5)
CHLORIDE SERPL-SCNC: 104 MMOL/L (ref 98–107)
CO2 SERPL-SCNC: 24.5 MMOL/L (ref 22–29)
CREAT BLD-MCNC: 0.78 MG/DL (ref 0.76–1.27)
DEPRECATED RDW RBC AUTO: 64.5 FL (ref 37–54)
EOSINOPHIL # BLD AUTO: 0 10*3/MM3 (ref 0–0.4)
EOSINOPHIL NFR BLD AUTO: 0 % (ref 0.3–6.2)
ERYTHROCYTE [DISTWIDTH] IN BLOOD BY AUTOMATED COUNT: 18.6 % (ref 12.3–15.4)
GFR SERPL CREATININE-BSD FRML MDRD: 97 ML/MIN/1.73
GLOBULIN UR ELPH-MCNC: 1.5 GM/DL
GLUCOSE BLD-MCNC: 190 MG/DL (ref 65–99)
GLUCOSE BLDC GLUCOMTR-MCNC: 202 MG/DL (ref 70–130)
GLUCOSE BLDC GLUCOMTR-MCNC: 206 MG/DL (ref 70–130)
GLUCOSE BLDC GLUCOMTR-MCNC: 252 MG/DL (ref 70–130)
GLUCOSE BLDC GLUCOMTR-MCNC: 264 MG/DL (ref 70–130)
HCT VFR BLD AUTO: 32.9 % (ref 37.5–51)
HGB BLD-MCNC: 10.7 G/DL (ref 13–17.7)
IMM GRANULOCYTES # BLD AUTO: 0.37 10*3/MM3 (ref 0–0.05)
IMM GRANULOCYTES NFR BLD AUTO: 1.7 % (ref 0–0.5)
LYMPHOCYTES # BLD AUTO: 0.24 10*3/MM3 (ref 0.7–3.1)
LYMPHOCYTES NFR BLD AUTO: 1.1 % (ref 19.6–45.3)
MCH RBC QN AUTO: 31.1 PG (ref 26.6–33)
MCHC RBC AUTO-ENTMCNC: 32.5 G/DL (ref 31.5–35.7)
MCV RBC AUTO: 95.6 FL (ref 79–97)
MONOCYTES # BLD AUTO: 0.4 10*3/MM3 (ref 0.1–0.9)
MONOCYTES NFR BLD AUTO: 1.8 % (ref 5–12)
NEUTROPHILS # BLD AUTO: 20.92 10*3/MM3 (ref 1.7–7)
NEUTROPHILS NFR BLD AUTO: 95.3 % (ref 42.7–76)
NRBC BLD AUTO-RTO: 0 /100 WBC (ref 0–0.2)
PLATELET # BLD AUTO: 135 10*3/MM3 (ref 140–450)
PMV BLD AUTO: 9.5 FL (ref 6–12)
POTASSIUM BLD-SCNC: 4.2 MMOL/L (ref 3.5–5.2)
PROT SERPL-MCNC: 5.6 G/DL (ref 6–8.5)
RBC # BLD AUTO: 3.44 10*6/MM3 (ref 4.14–5.8)
SODIUM BLD-SCNC: 137 MMOL/L (ref 136–145)
UNIT  ABO: NORMAL
UNIT  ABO: NORMAL
UNIT  RH: NORMAL
UNIT  RH: NORMAL
WBC NRBC COR # BLD: 21.96 10*3/MM3 (ref 3.4–10.8)

## 2019-09-12 PROCEDURE — 80053 COMPREHEN METABOLIC PANEL: CPT | Performed by: INTERNAL MEDICINE

## 2019-09-12 PROCEDURE — 82962 GLUCOSE BLOOD TEST: CPT

## 2019-09-12 PROCEDURE — 25010000002 ONDANSETRON PER 1 MG: Performed by: INTERNAL MEDICINE

## 2019-09-12 PROCEDURE — 25010000002 DOXORUBICIN PER 10 MG: Performed by: INTERNAL MEDICINE

## 2019-09-12 PROCEDURE — 25010000002 ENOXAPARIN PER 10 MG: Performed by: INTERNAL MEDICINE

## 2019-09-12 PROCEDURE — 99233 SBSQ HOSP IP/OBS HIGH 50: CPT | Performed by: INTERNAL MEDICINE

## 2019-09-12 PROCEDURE — 25010000002 VINCRISTINE PER 1 MG: Performed by: INTERNAL MEDICINE

## 2019-09-12 PROCEDURE — 63710000001 PREDNISONE PER 5 MG: Performed by: INTERNAL MEDICINE

## 2019-09-12 PROCEDURE — 63710000001 INSULIN LISPRO (HUMAN) PER 5 UNITS: Performed by: INTERNAL MEDICINE

## 2019-09-12 PROCEDURE — 85025 COMPLETE CBC W/AUTO DIFF WBC: CPT | Performed by: INTERNAL MEDICINE

## 2019-09-12 PROCEDURE — 25010000002 ETOPOSIDE 500 MG/25ML SOLUTION 25 ML VIAL: Performed by: INTERNAL MEDICINE

## 2019-09-12 RX ADMIN — INSULIN LISPRO 4 UNITS: 100 INJECTION, SOLUTION INTRAVENOUS; SUBCUTANEOUS at 08:29

## 2019-09-12 RX ADMIN — LINAGLIPTIN 5 MG: 5 TABLET, FILM COATED ORAL at 08:29

## 2019-09-12 RX ADMIN — VITAMIN D, TAB 1000IU (100/BT) 2000 UNITS: 25 TAB at 08:28

## 2019-09-12 RX ADMIN — INSULIN LISPRO 6 UNITS: 100 INJECTION, SOLUTION INTRAVENOUS; SUBCUTANEOUS at 17:43

## 2019-09-12 RX ADMIN — PREDNISONE 100 MG: 50 TABLET ORAL at 17:43

## 2019-09-12 RX ADMIN — Medication 100 MG: at 08:29

## 2019-09-12 RX ADMIN — CETIRIZINE HYDROCHLORIDE 10 MG: 10 TABLET, FILM COATED ORAL at 08:29

## 2019-09-12 RX ADMIN — PREDNISONE 100 MG: 50 TABLET ORAL at 08:29

## 2019-09-12 RX ADMIN — ACYCLOVIR 400 MG: 200 CAPSULE ORAL at 08:29

## 2019-09-12 RX ADMIN — ACYCLOVIR 400 MG: 200 CAPSULE ORAL at 21:14

## 2019-09-12 RX ADMIN — OXYCODONE HYDROCHLORIDE AND ACETAMINOPHEN 500 MG: 500 TABLET ORAL at 08:29

## 2019-09-12 RX ADMIN — ENOXAPARIN SODIUM 40 MG: 40 INJECTION SUBCUTANEOUS at 09:26

## 2019-09-12 RX ADMIN — SODIUM CHLORIDE, PRESERVATIVE FREE 10 ML: 5 INJECTION INTRAVENOUS at 08:29

## 2019-09-12 RX ADMIN — ONDANSETRON 16 MG: 2 INJECTION INTRAMUSCULAR; INTRAVENOUS at 13:38

## 2019-09-12 RX ADMIN — INSULIN LISPRO 4 UNITS: 100 INJECTION, SOLUTION INTRAVENOUS; SUBCUTANEOUS at 11:46

## 2019-09-12 RX ADMIN — PANTOPRAZOLE SODIUM 40 MG: 40 TABLET, DELAYED RELEASE ORAL at 05:01

## 2019-09-12 RX ADMIN — INSULIN LISPRO 6 UNITS: 100 INJECTION, SOLUTION INTRAVENOUS; SUBCUTANEOUS at 21:14

## 2019-09-12 RX ADMIN — LISINOPRIL 10 MG: 10 TABLET ORAL at 08:29

## 2019-09-12 RX ADMIN — SODIUM CHLORIDE, PRESERVATIVE FREE 10 ML: 5 INJECTION INTRAVENOUS at 21:14

## 2019-09-12 RX ADMIN — VINCRISTINE SULFATE: 1 INJECTION, SOLUTION INTRAVENOUS at 13:46

## 2019-09-12 NOTE — PROGRESS NOTES
"[facility] ADMISSION HISTORY AND PHYSICAL   REASON FOR ADMISSION / CHIEF COMPLAINT:   1. 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 CT imaging of the abdomen and pelvis on 5/8/2019 shows multiple small tissue nodules in the perirenal fat adjacent to the left kidney and a soft tissue mass interposed between the left psoas muscle and spine.   2. RA previously on Cimzia (certollizumab) injections by Dr. Velásquez   3. Lumbar puncture 5/31/2019 negative for involvement with lymphoma   4. Bone marrow aspiration and biopsy 5/29/2018 negative for lymphoma   5. PET scan on 5/30/2019 with hypermetabolic perinephric soft tissue lesions with mesenteric lymphadenopathy, left paraspinal mass at L5, 6 cm intramuscular mass in the right upper thigh, lesion at the left clavicle and a 5 cm soft tissue mass between the right scapula and right posterior fourth rib.   6. Right subclavian Mediport placed by Dr. Herrera on 6/12/2019.   7. Admission to [facility] for cycle #1 of dose adjusted R-EPOCH on 6/17/2019. Plan for a lumbar puncture with intrathecal methotrexate with each cycle.   8. Cycle #2 initiated on 7/8/2019. Same dosees pursued per the protocol.   9. Admission for cycle #3 on 7/29/2019. 20% dose increase per protocol.   10. Admission for cycle #4 on 8/19/2019. 20% dose increase per protocol.   11. Admission for cycle #5 on 9/9/2019.       INTERVAL HISTORY:  Patient was admitted on 9/9/2019, and was started cycle # 5-day 1 chemotherapy R-EPOCH regimen.      On 9/10/2019, patient reports he tolerated chemotherapy well.  No nausea no vomiting.  Patient has been ambulating.  No dizziness or fainting.  Patient reports no bleeding.  His hemoglobin decreased at 8.1.  Has persistent leukocytosis WBC 17,500 and low normal platelets 156,000.    On 9/11/2019, patient reports he is tolerating chemotherapy " well, no nausea vomiting, no lower extremity edema or dyspnea.  He is being ambulating.  He does have some fatigue.  Denies bleeding however hemoglobin is further trending down and is 7.9.  WBC and platelets also marginally trending down.     Patient was given 2 units PRBC transfusion on 9/11/2019.     On 9/12/2019, patient reports he has leg swelling and the abdomen last night, has already improved somewhat this morning.  He denies nausea, no dyspnea or chest pain.  He otherwise tolerates chemotherapy well.    HISTORY OF PRESENT ILLNESS: Edward Srinivasan is a 79 y.o. male who is being admitted for cycle #5 of dose adjusted R-EPOCH.   With cycle #4, his sim platelet count was 21,000 with a sim neutrophil count of 1.78. Therefore, for cycle #5, we will reduce his doses by 20% to the last prior tolerated dose.   He has required packed red blood cell transfusion for anemia. Otherwise, following cycle #4 he has done very well. Minimal constipation. Minimal nausea. He does report fatigue which is been ongoing and a little bit worse compared to prior cycles. No fevers or chills. Decreased appetite and he has struggled to gain weight. Otherwise, he has tolerated therapy extremely well.       ONCOLOGIC HISTORY:   He has a history of rheumatoid arthritis currently on injections by Dr. Velásquez, type 2 diabetes on oral medication, coronary artery disease status post stenting x2, aortic stenosis, and nonmelanoma skin cancer resected from his face. Continues to smoke a little bit less than a half a pack per day and has done this for several decades.   He has a long history of chronic back pain. In March 2019 he developed some low back pain on the left. With some physical therapy this is actually improved significantly but over the past couple of weeks he has developed some right-sided back pain. Actually over the past couple of days this has essentially resolved. He denies any radiculopathy. He has chronic vision changes but  nothing acute. He denies any headaches. No other focal neurologic changes. He has occasional lightheadedness but this is been a chronic problem for him. No fevers. He is cold a lot but no rigors. No night sweats. He has lost a little bit of weight which she relates to his rheumatoid arthritis and decreased appetite secondary to pain.   He had an MRI of his lumbar spine on 4/25/2019 showing some degenerative changes but also there was a soft tissue mass extending posteriorly from the left common iliac vessels hugging the lateral margin of the L5 vertebrae extending from the left lateral margin of L4-5 interspace to L5-S1 where it pressed on the left L5 nerve root. The mass measures 4.4 x 2.1 x 3.5 cm. CT imaging was suggested and performed on 5/8/2019. The CT scan showed multiple soft tissue nodules in the perirenal fat on the left adjacent to the kidney and a soft tissue mass interposed between the left psoas muscle and spine. The mass measures 4.1 x 2.1 cm.     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   He was seen initially on 5/28/2019. PET scan, LP, and a bone marrow aspiration and biopsy were requested.   LDH was 341 with uric acid 4.4.   Bone marrow biopsy on 5/29/2019: normocellular marrow with no evidence for lymphoma. Flow cytometry negative. Karyotype pending as of 6/7.     PET scan on 5/20/2019:   IMPRESSION:   1. There are hypermetabolic perinephric soft tissue lesions,   hypermetabolic mesenteric lymphadenopathy, left paraspinal mass at L5,   as well as an intensely hypermetabolic approximately 6 cm intramuscular   mass within the visualized right upper thigh. There is no hypermetabolic   lymphadenopathy within the abdomen or pelvis, but the shotty mediastinal   and hilar nodes are hypermetabolic and there is also a hypermetabolic   right upper lobe  nodular opacity. In addition, there is an intensely   hypermetabolic approximately 5 cm intramuscular mass at the right upper   back and there is a tiny hypermetabolic lesion within the left clavicle.   2. Shotty cervical nodes have low level activity.   3. Uncertain significance of the more than typical metabolic activity at   dependent atelectatic change at both lower lobes.   LP on 5/31/2019: Cytology and flow cytometry negative.   Mediport placed in the right subclavian area by Dr. Herrera on 6/12/2019.   Plan to start therapy with dose adjusted R-EPOCH with CNS prophylaxis with intrathecal methotrexate on 6/17/2019.   He previously had an echocardiogram on 5/15/2019 showing a normal left ventricular ejection fraction of 60%.   Admission to [facility] on 6/19/2019 for cycle #1 of therapy.   He received Neulasta on discharge on 6/22/2019.   His lowest ANC was 0.04 on 6/27 with a platelet count of 47,000 that day. He was only neutropenic on one day and his WBC and platelet count improved rapidly.   Admission for cycle #2 on 7/8/2019. We proceeded with the same doses per the protocol.   Admission for cycle #3 on 7/29/2019. 20% increase in Adriamycin, etoposide, and Cytoxan doses per the protocol.   Sim platelet count 36,000 with a sim white blood cell count of 2.43 following cycle #3.   With cycle #4, dose increase by 20% for Adriamycin, etoposide, and Cytoxan per protocol.   Following cycle #4, his sim platelet count was 21,000 with a sim neutrophil count of 1.78. Therefore, for cycle #5, we will reduce his doses by 20% to the last prior tolerated dose (cycle #3 doses).       SOCIAL HISTORY:   reports that he has been smoking cigarettes. He has a 25.00 pack-year smoking history. He has never used smokeless tobacco. He reports that he does not drink alcohol or use drugs.     FAMILY HISTORY:   family history includes Coronary artery disease in his other; Heart attack (age of onset: 47) in his  brother; Heart attack (age of onset: 54) in his father; Heart disease in his brother and father; Heart failure in his mother.     ALLERGIES:        Allergies   Allergen Reactions   • Oxaprozin Hives     MEDICATIONS: As listed in the electronic medical record.       Review of Systems   Constitutional: Positive for fatigue. Negative for appetite change, chills, fever and unexpected weight change.   HENT: Negative for congestion, dental problem, ear pain, hearing loss, mouth sores, nosebleeds, postnasal drip, rhinorrhea, tinnitus and trouble swallowing.   Eyes: Negative.   Respiratory: Negative for cough, chest tightness, shortness of breath, wheezing and stridor.   Cardiovascular: Negative for chest pain, palpitations and leg swelling.   Gastrointestinal: Positive for constipation.  Slight abdomen distention after transfusion PRBC on 9/11/2019, has already improved in the morning of 9/12/2019.  No abdominal pain, blood in stool, diarrhea, nausea and vomiting.   Endocrine: Negative.   Genitourinary: Negative for decreased urine volume, difficulty urinating, dysuria, flank pain, frequency, hematuria, scrotal swelling, testicular pain and urgency.   Musculoskeletal: Negative for arthralgias, back pain.  bilateral lower leg and ankle swelling after PRBC transfusion on 9/11/2019.   Allergic/Immunologic: Negative.   Neurological: Negative for dizziness, seizures, syncope, weakness, light-headedness, numbness and headaches.   Hematological: Negative for adenopathy. Does not bruise/bleed easily.   Psychiatric/Behavioral: Negative for confusion and sleep disturbance. The patient is not nervous/anxious.   All other systems reviewed and are negative.   No change from prior except as noted         Vitals:    09/11/19 2120 09/11/19 2342 09/12/19 0340 09/12/19 0739   BP: 153/68 167/73 160/76 162/75   BP Location:  Left arm Left arm Left arm   Patient Position:  Lying Sitting Sitting   Pulse: 58 63 65 67   Resp: 18 16 16 18   Temp:  96.8 °F (36 °C) 97.3 °F (36.3 °C) 96.3 °F (35.7 °C) 96.9 °F (36.1 °C)   TempSrc: Oral Oral Oral Oral   SpO2: 97% 97% 95% 96%   Weight:       Height:       ECOG 1    Physical Exam   Constitutional: He is oriented to person, place, and time. He appears well-developed and well-nourished. No distress.  Patient is ambulating well.  HENT:   Head: Normocephalic and atraumatic. Thin hair.    Mouth/Throat: Oropharynx is clear and moist.   Eyes: Conjunctivae, left upper lid seems mildly swelling but no apparent redness. Patient is asymptomatic.     Neck: Neck supple. No thyroid mass and no thyromegaly present.   Cardiovascular: Normal rate and regular rhythm. Exam reveals no gallop and no friction rub.  Systolic murmur is present with grade of 2/6.   Pulmonary/Chest: Effort normal and breath sounds normal. No respiratory distress.  No crackles no wheezing.    Benign-appearing Mediport   Abdominal: Soft.  No tenderness, no distension and no mass. There is no splenomegaly or hepatomegaly.  Bowel sounds normal.  Musculoskeletal: Normal range of motion. He exhibits no edema, tenderness or deformity.  There are 1+ edema on both lower legs above the ankles.  Lymphadenopathy:   He has no cervical adenopathy.   He has no axillary adenopathy.   Right: No inguinal and no supraclavicular adenopathy present.   Left: No inguinal and no supraclavicular adenopathy present.   Neurological: He is alert and oriented to person, place, and time. He has normal strength.   Skin: Skin is warm and dry. No rash noted.   Psychiatric: He has a normal mood and affect. His behavior is normal. Judgment and thought content normal. Cognition and memory are normal.         DIAGNOSTIC DATA:   Results from last 7 days   Lab Units 09/12/19  0409 09/11/19  0510 09/10/19  0416   WBC 10*3/mm3 21.96* 16.61* 17.54*   HEMOGLOBIN g/dL 10.7* 7.9* 8.1*   HEMATOCRIT % 32.9* 24.5* 25.4*   PLATELETS 10*3/mm3 135* 152 156     Lab Results   Component Value Date    NEUTROABS  "20.92 (H) 09/12/2019     Results from last 7 days   Lab Units 09/12/19  0409 09/11/19  0510 09/10/19  0416   SODIUM mmol/L 137 137 134*   POTASSIUM mmol/L 4.2 4.6 4.7   CHLORIDE mmol/L 104 105 102   CO2 mmol/L 24.5 23.4 23.1   BUN mg/dL 25* 18 20   CREATININE mg/dL 0.78 0.88 0.58*   CALCIUM mg/dL 8.7 8.6 7.9*   BILIRUBIN mg/dL 0.3 <0.2* 0.2   ALK PHOS U/L 73 75 75   ALT (SGPT) U/L 48* 18 13   AST (SGOT) U/L 36 18 14   GLUCOSE mg/dL 190* 197* 171*         ASSESSMENT:   This is a 79 y.o. male with:     1. 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 His LP and bone marrow biopsies are negative for involvement.    This patient was admitted on 9/9/2019 for cycle #5 chemotherapy with R-EPOCH.   Following cycle #4, his sim platelet count was 21,000 with a sim neutrophil count of 1.78. Therefore, for cycle #5, we will reduce his doses by 20% to the last prior tolerated dose (cycle #3 doses).     Patient is tolerating chemotherapy well, with no specific side effects except decrease the hemoglobin by 2 g.  No bleeding.    · We will continue chemotherapy as written for day #3.   · Done Intrathecal methotrexate 12 mg administration for CNS prophylaxis.   · Lumbar puncture 9/9/2019 with CSF was negative for lymphoma involvement by flow cytometry study.    2. Rheumatoid arthritis previously on Cimzia (certolizumab) injections by Dr. Velásquez. Plan to discontinue these during chemotherapy. He was previously on Simponi Aria (golimumab) and Actemra (tocilizumab). These certainly could contribute to the development of lymphoma.     3. Coronary artery disease status post stenting as well as moderate aortic stenosis by echocardiogram. His left ventricular ejection fraction is normal at 60% by echocardiogram on 5/14/2019.   · Patient has no edema in lower extremity with intravenous hydration.  No dyspnea.  Continue to " monitor.    4. Anxiety: I previously gave him a prescription for alprazolam 0.25 mg to take twice daily as needed. Continue benzodiazepines as needed during his hospitalization.     5. Hyponatremia: Sodium 134 today.  Continue to monitor.     6. Volume overload with cycle #1: We will likely need to decrease IV fluids with this cycle.     7. Constipation: Continue Senokot and MiraLAX daily while hospitalized.     8. Type 2 diabetes: Insulin sliding scale during the admission.  We will continue home medication for diabetes management.     9.  Anemia secondary to chemotherapy and possibly contributed by IV hydration.   · Hemoglobin 8.1 on 9/10/2019.  Patient reports no evidence of bleeding.    · Hemoglobin further down at 7.9 on 9/11/2019.  This is secondary to his chemotherapy.  Will transfuse patient with 2 units PRBC.  Continue to monitor.        PLAN:   1.  Proceed ahead with C5-day #4 dose adjusted R-EPOCH chemotherapy.  2.  Decrease IV hydration NS to 25 mL/h.  If continues to have lower extremity edema, will consider low-dose diuretics Lasix.   3.  Benzodiazepines as needed for anxiety although he has not required any at this point.   4.  Continue Lovenox 40 mg daily for DVT prophylaxis. No allopurinol at this point.  5. At admission, we have decreased his maintenance fluids to 50 ml/hr due to volume overload with prior cycles.   6. Continue Bactrim and acyclovir for prophylaxis.   7.  Continue home medication for diabetes management, and moderate dose sliding scale insulin.   8.  Continue GI prophylaxis with PPI.    9. Neulasta next Monday. Twice weekly CBC on Mondays and Thursdays for 2 weeks. Plan total of 6 cycles of therapy if tolerated with intrathecal methotrexate with each cycle for CNS prophylaxis.   10. MD visit in three weeks for his sixth and final cycle of therapy. Twice weekly CBCs thereafter. Neulasta the Monday following chemotherapy. PET scan in 5 weeks.  Dr. Garcia will see him back in 6 weeks  for follow-up to review the PET scan. We anticipate that his therapy will be complete at that point assuming a complete response visualized on his PET scan.    I discussed with the patient and his wife today.  I also spoke with his nurse today.     RAJIV TIJERINA M.D., Ph.D.    9/12/2019.

## 2019-09-12 NOTE — PLAN OF CARE
Problem: Patient Care Overview  Goal: Plan of Care Review  Outcome: Ongoing (interventions implemented as appropriate)   09/12/19 1504   Coping/Psychosocial   Plan of Care Reviewed With patient   OTHER   Outcome Summary Pt continues on EPOCH. Wolf well without c/o. HGB 10.7 this M following 2 units PRBC yesterday. Denies complaints. Up ad chris. Anxious to go home tomorrow   Plan of Care Review   Progress no change     Goal: Individualization and Mutuality  Outcome: Ongoing (interventions implemented as appropriate)      Problem: Chemotherapy Effects (Adult)  Goal: Signs and Symptoms of Listed Potential Problems Will be Absent, Minimized or Managed (Chemotherapy Effects)  Outcome: Ongoing (interventions implemented as appropriate)      Problem: Oncology Care (Adult)  Goal: Signs and Symptoms of Listed Potential Problems Will be Absent, Minimized or Managed (Oncology Care)  Outcome: Ongoing (interventions implemented as appropriate)

## 2019-09-12 NOTE — PLAN OF CARE
"Problem: Patient Care Overview  Goal: Plan of Care Review  Outcome: Ongoing (interventions implemented as appropriate)   09/10/19 0504 09/11/19 2010 09/12/19 0326   Coping/Psychosocial   Plan of Care Reviewed With --  patient --    OTHER   Outcome Summary --  --  Day 4 of Epoch to continue today. 2nd unit of Blood transfusion finished this evening.Patient states that he feels \"full\" since blood transfusion. Patient urinating well. Lung sounds clear. Blood pressure elevated this evening 160's/70's. Ad chris. Will continue to monitor.   Plan of Care Review   Progress no change --  --      Goal: Individualization and Mutuality  Outcome: Ongoing (interventions implemented as appropriate)    Goal: Discharge Needs Assessment  Outcome: Ongoing (interventions implemented as appropriate)    Goal: Interprofessional Rounds/Family Conf  Outcome: Ongoing (interventions implemented as appropriate)      Problem: Chemotherapy Effects (Adult)  Goal: Signs and Symptoms of Listed Potential Problems Will be Absent, Minimized or Managed (Chemotherapy Effects)  Outcome: Ongoing (interventions implemented as appropriate)      Problem: Oncology Care (Adult)  Goal: Signs and Symptoms of Listed Potential Problems Will be Absent, Minimized or Managed (Oncology Care)  Outcome: Ongoing (interventions implemented as appropriate)        "

## 2019-09-13 VITALS
SYSTOLIC BLOOD PRESSURE: 152 MMHG | RESPIRATION RATE: 16 BRPM | DIASTOLIC BLOOD PRESSURE: 73 MMHG | HEART RATE: 65 BPM | BODY MASS INDEX: 22.07 KG/M2 | WEIGHT: 140.6 LBS | OXYGEN SATURATION: 94 % | HEIGHT: 67 IN | TEMPERATURE: 97 F

## 2019-09-13 PROBLEM — D64.81 ANEMIA ASSOCIATED WITH CHEMOTHERAPY: Status: ACTIVE | Noted: 2019-09-13

## 2019-09-13 PROBLEM — T45.1X5A ANEMIA ASSOCIATED WITH CHEMOTHERAPY: Status: ACTIVE | Noted: 2019-09-13

## 2019-09-13 LAB
ALBUMIN SERPL-MCNC: 3.9 G/DL (ref 3.5–5.2)
ALBUMIN/GLOB SERPL: 2.8 G/DL
ALP SERPL-CCNC: 67 U/L (ref 39–117)
ALT SERPL W P-5'-P-CCNC: 33 U/L (ref 1–41)
ANION GAP SERPL CALCULATED.3IONS-SCNC: 10 MMOL/L (ref 5–15)
AST SERPL-CCNC: 17 U/L (ref 1–40)
BASOPHILS # BLD AUTO: 0.02 10*3/MM3 (ref 0–0.2)
BASOPHILS NFR BLD AUTO: 0.1 % (ref 0–1.5)
BILIRUB SERPL-MCNC: 0.4 MG/DL (ref 0.2–1.2)
BUN BLD-MCNC: 26 MG/DL (ref 8–23)
BUN/CREAT SERPL: 33.3 (ref 7–25)
CALCIUM SPEC-SCNC: 8.3 MG/DL (ref 8.6–10.5)
CHLORIDE SERPL-SCNC: 103 MMOL/L (ref 98–107)
CO2 SERPL-SCNC: 24 MMOL/L (ref 22–29)
CREAT BLD-MCNC: 0.78 MG/DL (ref 0.76–1.27)
DEPRECATED RDW RBC AUTO: 60.5 FL (ref 37–54)
EOSINOPHIL # BLD AUTO: 0 10*3/MM3 (ref 0–0.4)
EOSINOPHIL NFR BLD AUTO: 0 % (ref 0.3–6.2)
ERYTHROCYTE [DISTWIDTH] IN BLOOD BY AUTOMATED COUNT: 18 % (ref 12.3–15.4)
GFR SERPL CREATININE-BSD FRML MDRD: 97 ML/MIN/1.73
GLOBULIN UR ELPH-MCNC: 1.4 GM/DL
GLUCOSE BLD-MCNC: 211 MG/DL (ref 65–99)
GLUCOSE BLDC GLUCOMTR-MCNC: 191 MG/DL (ref 70–130)
GLUCOSE BLDC GLUCOMTR-MCNC: 235 MG/DL (ref 70–130)
HCT VFR BLD AUTO: 30.9 % (ref 37.5–51)
HGB BLD-MCNC: 10.3 G/DL (ref 13–17.7)
IMM GRANULOCYTES # BLD AUTO: 0.16 10*3/MM3 (ref 0–0.05)
IMM GRANULOCYTES NFR BLD AUTO: 1 % (ref 0–0.5)
LYMPHOCYTES # BLD AUTO: 0.13 10*3/MM3 (ref 0.7–3.1)
LYMPHOCYTES NFR BLD AUTO: 0.8 % (ref 19.6–45.3)
MCH RBC QN AUTO: 31.2 PG (ref 26.6–33)
MCHC RBC AUTO-ENTMCNC: 33.3 G/DL (ref 31.5–35.7)
MCV RBC AUTO: 93.6 FL (ref 79–97)
MONOCYTES # BLD AUTO: 0.13 10*3/MM3 (ref 0.1–0.9)
MONOCYTES NFR BLD AUTO: 0.8 % (ref 5–12)
NEUTROPHILS # BLD AUTO: 15.86 10*3/MM3 (ref 1.7–7)
NEUTROPHILS NFR BLD AUTO: 97.3 % (ref 42.7–76)
NRBC BLD AUTO-RTO: 0.1 /100 WBC (ref 0–0.2)
PLATELET # BLD AUTO: 116 10*3/MM3 (ref 140–450)
PMV BLD AUTO: 9.3 FL (ref 6–12)
POTASSIUM BLD-SCNC: 4.2 MMOL/L (ref 3.5–5.2)
PROT SERPL-MCNC: 5.3 G/DL (ref 6–8.5)
RBC # BLD AUTO: 3.3 10*6/MM3 (ref 4.14–5.8)
SODIUM BLD-SCNC: 137 MMOL/L (ref 136–145)
WBC NRBC COR # BLD: 16.3 10*3/MM3 (ref 3.4–10.8)

## 2019-09-13 PROCEDURE — 82962 GLUCOSE BLOOD TEST: CPT

## 2019-09-13 PROCEDURE — 85025 COMPLETE CBC W/AUTO DIFF WBC: CPT | Performed by: INTERNAL MEDICINE

## 2019-09-13 PROCEDURE — 63710000001 INSULIN LISPRO (HUMAN) PER 5 UNITS: Performed by: INTERNAL MEDICINE

## 2019-09-13 PROCEDURE — 25010000002 ENOXAPARIN PER 10 MG: Performed by: INTERNAL MEDICINE

## 2019-09-13 PROCEDURE — 25010000002 CYCLOPHOSPHAMIDE PER 100 MG: Performed by: INTERNAL MEDICINE

## 2019-09-13 PROCEDURE — 99239 HOSP IP/OBS DSCHRG MGMT >30: CPT | Performed by: INTERNAL MEDICINE

## 2019-09-13 PROCEDURE — 25010000002 ONDANSETRON PER 1 MG: Performed by: INTERNAL MEDICINE

## 2019-09-13 PROCEDURE — 80053 COMPREHEN METABOLIC PANEL: CPT | Performed by: INTERNAL MEDICINE

## 2019-09-13 PROCEDURE — 63710000001 PREDNISONE PER 5 MG: Performed by: INTERNAL MEDICINE

## 2019-09-13 RX ADMIN — LINAGLIPTIN 5 MG: 5 TABLET, FILM COATED ORAL at 08:50

## 2019-09-13 RX ADMIN — ACYCLOVIR 400 MG: 200 CAPSULE ORAL at 08:50

## 2019-09-13 RX ADMIN — ENOXAPARIN SODIUM 40 MG: 40 INJECTION SUBCUTANEOUS at 09:38

## 2019-09-13 RX ADMIN — PANTOPRAZOLE SODIUM 40 MG: 40 TABLET, DELAYED RELEASE ORAL at 05:24

## 2019-09-13 RX ADMIN — SULFAMETHOXAZOLE AND TRIMETHOPRIM 160 MG: 800; 160 TABLET ORAL at 08:50

## 2019-09-13 RX ADMIN — Medication 100 MG: at 08:50

## 2019-09-13 RX ADMIN — INSULIN LISPRO 4 UNITS: 100 INJECTION, SOLUTION INTRAVENOUS; SUBCUTANEOUS at 12:20

## 2019-09-13 RX ADMIN — PREDNISONE 100 MG: 50 TABLET ORAL at 08:50

## 2019-09-13 RX ADMIN — ONDANSETRON 16 MG: 2 INJECTION INTRAMUSCULAR; INTRAVENOUS at 14:54

## 2019-09-13 RX ADMIN — LISINOPRIL 10 MG: 10 TABLET ORAL at 08:50

## 2019-09-13 RX ADMIN — INSULIN LISPRO 2 UNITS: 100 INJECTION, SOLUTION INTRAVENOUS; SUBCUTANEOUS at 08:50

## 2019-09-13 RX ADMIN — VITAMIN D, TAB 1000IU (100/BT) 2000 UNITS: 25 TAB at 08:50

## 2019-09-13 RX ADMIN — CETIRIZINE HYDROCHLORIDE 10 MG: 10 TABLET, FILM COATED ORAL at 08:50

## 2019-09-13 RX ADMIN — OXYCODONE HYDROCHLORIDE AND ACETAMINOPHEN 500 MG: 500 TABLET ORAL at 08:50

## 2019-09-13 RX ADMIN — Medication 500 UNITS: at 16:12

## 2019-09-13 RX ADMIN — SODIUM CHLORIDE, PRESERVATIVE FREE 10 ML: 5 INJECTION INTRAVENOUS at 08:50

## 2019-09-13 RX ADMIN — CYCLOPHOSPHAMIDE 1500 MG: 1 INJECTION, POWDER, FOR SOLUTION INTRAVENOUS; ORAL at 15:28

## 2019-09-13 NOTE — DISCHARGE SUMMARY
Admission date: 9/9/2019  Discharge date: 09/13/19    Admission diagnosis:  Diffuse large B-cell lymphoma of lymph nodes of multiple regions (CMS/HCC) [C83.38]    Discharge diagnosis:  Diffuse large B-cell lymphoma   Anemia secondary to chemotherapy, transfused 2 units PRBC on 9/11/2019   Type 2 diabetes   Rheumatoid arthritis     Hospital course:  This patient was admitted for his cycle #5 chemotherapy R-EPOCH on 9/9/2019.  Patient has tolerated therapy very well, finished this afternoon 9/13/2019.    This patient had intrathecal methotrexate therapy on 9/9/2019.  CSF fluid was negative for lymphoma involvement by flow cytometry study.    Patient had worsening anemia second to chemotherapy, with Hb 7.9 on 9/11/2019.  He was given 2 units PRBC transfusion.  Posttransfusion hemoglobin up to 10.7 on 9/12/2019.  Posttransfusion patient did develop mild lower extremity edema in the ankle level.  Patient has had no dyspnea.  His leg edema has improved in the past 2 days.     Patient also had a mild thrombocytopenia secondary to chemotherapy with platelets 116,000 on day of discharge.  He has persistently elevated WBC due to previous Neulasta injection.    Patient will be discharged on 9/13/2019.  He already has appointment arranged by Dr. Garcia in office, to start Neulasta on 9/16/2019 in the future MD evaluation.      Vitals:    09/13/19 1218   BP: 152/73   Pulse: 65   Resp: 16   Temp: 97 °F (36.1 °C)   SpO2: 94%     GENERAL:  Well-developed, well-nourished in no acute distress.   SKIN:  Warm, dry without rashes, purpura or petechiae.  NECK:  Supple with good range of motion; no thyromegaly or masses, no JVD.  LYMPHATICS:  No cervical, supraclavicular, axillary or inguinal adenopathy.  CHEST:  Lungs clear to percussion and auscultation. Good airflow.  CARDIAC:  Regular rate and rhythm without murmurs, rubs or gallops.   EXTREMITIES:  No clubbing, cyanosis.  Trace edema in both ankle. .  PSYCHIATRIC:  Normal affect and  mood.      Discharge condition: stable  Discharge diet   Dietary Orders (From admission, onward)    Start     Ordered    09/09/19 0915  Diet Regular; Consistent Carbohydrate  Diet Effective Now     Question Answer Comment   Diet Texture / Consistency Regular    Common Modifiers Consistent Carbohydrate        09/09/19 0914        Additional Instructions: Call with fevers, uncontrolled nausea/vomiting/pain.  Medications:      Discharge Medications      Continue These Medications      Instructions Start Date   acetaminophen 325 MG tablet  Commonly known as:  TYLENOL   650 mg, Oral, Every 4 Hours PRN      acyclovir 200 MG capsule  Commonly known as:  ZOVIRAX   2 Times Daily      aspirin 81 MG EC tablet   81 mg, Oral, Daily      beta carotene 09679 UNIT capsule   10,000 Units, Oral, Daily      cetirizine 10 MG tablet  Commonly known as:  zyrTEC   10 mg, Oral, Daily      linagliptin 5 MG tablet tablet  Commonly known as:  TRADJENTA   5 mg, Oral, Daily      lisinopril 10 MG tablet  Commonly known as:  PRINIVIL,ZESTRIL   TAKE 1 TABLET DAILY      MAGNESIUM PO   500 mg, Oral, Daily      meclizine 25 MG tablet  Commonly known as:  ANTIVERT   25 mg, Oral, 3 Times Daily PRN      omeprazole 40 MG capsule  Commonly known as:  priLOSEC   40 mg, Oral, Daily      polyethylene glycol packet  Commonly known as:  MIRALAX   17 g, Oral, Daily      prochlorperazine 10 MG tablet  Commonly known as:  COMPAZINE   10 mg, Oral, Every 6 Hours PRN      pyridoxine 100 MG tablet  Commonly known as:  VITAMIN B-6   100 mg, Oral, Daily      sennosides-docusate sodium 8.6-50 MG tablet  Commonly known as:  SENOKOT-S   1 tablet, Oral, Daily      sulfamethoxazole-trimethoprim 800-160 MG per tablet  Commonly known as:  BACTRIM DS,SEPTRA DS   1 tablet, Oral, 3 Times Weekly, On Monday, Wednesday and Friday.      vitamin C 500 MG tablet  Commonly known as:  ASCORBIC ACID   500 mg, Oral, Daily      VITAMIN D-3 PO   2,000 Int'l Units/day, Oral, Daily       vitamin E 400 UNIT capsule   400 Units, Oral, Daily           Disposition:Home or Self Care  Follow up:   Future Appointments   Date Time Provider Department Center   9/16/2019  8:30 AM LAB CHAIR 4 [facility]  LAB [facility] L[facility]   9/16/2019  9:00 AM INJECTION CHAIR CBC [facility]  INFUS KRE LAG   9/19/2019  8:00 AM LAB CHAIR 4 [facility]  LAB [facility]   9/19/2019  8:30 AM RN REV 2 [facility]  INFUS KRE LAG   9/23/2019  9:20 AM LAB CHAIR 2 [facility]  LAB [facility]   9/23/2019 10:00 AM RN REV 1 [facility] IN[facility]   9/26/2019  9:00 AM [facility]IR [facility]   9/26/2019 10:00 AM RN REV 1 [facility] INFUS [facility]   9/30/2019  7:50 AM LAB CHAIR [facility]   9/30/2019  8:20 AM Stevie Talley MD [facility] CBC [facility]   10/7/2019  8:00 AM LAB CHAIR [facility]   10/7/2019  8:30 AM INJECTION CHAIR [facility] INFUS [facility]RE LAG   10/10/2019  8:30 AM LAB CHAIR 5 [facility] LAB KRES L[facility]   10/10/2019  9:00 AM RN REV 1 CBC Critical access hospital INFUS KRE LAG   10/14/2019  8:10 AM LAB CHAIR 5 [facility]   10/14/2019  8:30 AM RN REV 1 [facility] INFUS KRE LAG   10/15/2019  8:15 AM TERRIE PET ADMIN RM 1  TERRIE PET TERRIE   10/15/2019  9:45 AM TERRIE PET 1  TERRIE PET TERRIE   10/17/2019 11:00 AM LAB CHAIR 5 [facility] K[facility]  LAB KRES LAG   10/17/2019 11:30 AM RN REV 1 [facility] INFUS KRE LAG   10/21/2019  7:30 AM LAB CHAIR 5 [facility] K[facility]  LAB KRES LAG   10/21/2019  8:00 AM Gabriele Garcia MD [facility] CBC [facility]   11/26/2019 11:20 AM Brian Harden MD [facility] CD LCGKR None   3/9/2020  9:00 AM Freddie Cannon MD [facility] PC MDEST None       Over 30 minutes on discharge.  Counseling and coordinating care.

## 2019-09-13 NOTE — PROGRESS NOTES
"[facility] CBC GROUP ADMISSION HISTORY AND PHYSICAL   REASON FOR ADMISSION / CHIEF COMPLAINT:   1. 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 CT imaging of the abdomen and pelvis on 5/8/2019 shows multiple small tissue nodules in the perirenal fat adjacent to the left kidney and a soft tissue mass interposed between the left psoas muscle and spine.   2. RA previously on Cimzia (certollizumab) injections by Dr. Velásquez   3. Lumbar puncture 5/31/2019 negative for involvement with lymphoma   4. Bone marrow aspiration and biopsy 5/29/2018 negative for lymphoma   5. PET scan on 5/30/2019 with hypermetabolic perinephric soft tissue lesions with mesenteric lymphadenopathy, left paraspinal mass at L5, 6 cm intramuscular mass in the right upper thigh, lesion at the left clavicle and a 5 cm soft tissue mass between the right scapula and right posterior fourth rib.   6. Right subclavian Mediport placed by Dr. Herrera on 6/12/2019.   7. Admission to [facility] for cycle #1 of dose adjusted R-EPOCH on 6/17/2019. Plan for a lumbar puncture with intrathecal methotrexate with each cycle.   8. Cycle #2 initiated on 7/8/2019. Same dosees pursued per the protocol.   9. Admission for cycle #3 on 7/29/2019. 20% dose increase per protocol.   10. Admission for cycle #4 on 8/19/2019. 20% dose increase per protocol.   11. Admission for cycle #5 on 9/9/2019.       INTERVAL HISTORY:  Patient was admitted on 9/9/2019, and was started cycle # 5-day 1 chemotherapy R-EPOCH regimen.      On 9/10/2019, patient reports he tolerated chemotherapy well.  No nausea no vomiting.  Patient has been ambulating.  No dizziness or fainting.  Patient reports no bleeding.  His hemoglobin decreased at 8.1.  Has persistent leukocytosis WBC 17,500 and low normal platelets 156,000.    On 9/11/2019, patient reports he is tolerating chemotherapy " well, no nausea vomiting, no lower extremity edema or dyspnea.  He is being ambulating.  He does have some fatigue.  Denies bleeding however hemoglobin is further trending down and is 7.9.  WBC and platelets also marginally trending down.     Patient was given 2 units PRBC transfusion on 9/11/2019.     On 9/12/2019, patient reports he has leg swelling and the abdomen last night, has already improved somewhat this morning.  He denies nausea, no dyspnea or chest pain.  He otherwise tolerates chemotherapy well.     On 9/13/2019, patient reports improved leg edema and had a better sleep in the night.  No nausea vomiting or chest pain.  He tolerates chemotherapy very well.  We will proceed ahead to finish chemo today.    HISTORY OF PRESENT ILLNESS: Edward Srinivasan is a 79 y.o. male who is being admitted for cycle #5 of dose adjusted R-EPOCH.   With cycle #4, his sim platelet count was 21,000 with a sim neutrophil count of 1.78. Therefore, for cycle #5, we will reduce his doses by 20% to the last prior tolerated dose.   He has required packed red blood cell transfusion for anemia. Otherwise, following cycle #4 he has done very well. Minimal constipation. Minimal nausea. He does report fatigue which is been ongoing and a little bit worse compared to prior cycles. No fevers or chills. Decreased appetite and he has struggled to gain weight. Otherwise, he has tolerated therapy extremely well.       ONCOLOGIC HISTORY:   He has a history of rheumatoid arthritis currently on injections by Dr. Velásquez, type 2 diabetes on oral medication, coronary artery disease status post stenting x2, aortic stenosis, and nonmelanoma skin cancer resected from his face. Continues to smoke a little bit less than a half a pack per day and has done this for several decades.   He has a long history of chronic back pain. In March 2019 he developed some low back pain on the left. With some physical therapy this is actually improved significantly but  over the past couple of weeks he has developed some right-sided back pain. Actually over the past couple of days this has essentially resolved. He denies any radiculopathy. He has chronic vision changes but nothing acute. He denies any headaches. No other focal neurologic changes. He has occasional lightheadedness but this is been a chronic problem for him. No fevers. He is cold a lot but no rigors. No night sweats. He has lost a little bit of weight which she relates to his rheumatoid arthritis and decreased appetite secondary to pain.   He had an MRI of his lumbar spine on 4/25/2019 showing some degenerative changes but also there was a soft tissue mass extending posteriorly from the left common iliac vessels hugging the lateral margin of the L5 vertebrae extending from the left lateral margin of L4-5 interspace to L5-S1 where it pressed on the left L5 nerve root. The mass measures 4.4 x 2.1 x 3.5 cm. CT imaging was suggested and performed on 5/8/2019. The CT scan showed multiple soft tissue nodules in the perirenal fat on the left adjacent to the kidney and a soft tissue mass interposed between the left psoas muscle and spine. The mass measures 4.1 x 2.1 cm.     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   He was seen initially on 5/28/2019. PET scan, LP, and a bone marrow aspiration and biopsy were requested.   LDH was 341 with uric acid 4.4.   Bone marrow biopsy on 5/29/2019: normocellular marrow with no evidence for lymphoma. Flow cytometry negative. Karyotype pending as of 6/7.     PET scan on 5/20/2019:   IMPRESSION:   1. There are hypermetabolic perinephric soft tissue lesions,   hypermetabolic mesenteric lymphadenopathy, left paraspinal mass at L5,   as well as an intensely hypermetabolic approximately 6 cm intramuscular   mass within the visualized right  upper thigh. There is no hypermetabolic   lymphadenopathy within the abdomen or pelvis, but the shotty mediastinal   and hilar nodes are hypermetabolic and there is also a hypermetabolic   right upper lobe nodular opacity. In addition, there is an intensely   hypermetabolic approximately 5 cm intramuscular mass at the right upper   back and there is a tiny hypermetabolic lesion within the left clavicle.   2. Shotty cervical nodes have low level activity.   3. Uncertain significance of the more than typical metabolic activity at   dependent atelectatic change at both lower lobes.   LP on 5/31/2019: Cytology and flow cytometry negative.   Mediport placed in the right subclavian area by Dr. Herrera on 6/12/2019.   Plan to start therapy with dose adjusted R-EPOCH with CNS prophylaxis with intrathecal methotrexate on 6/17/2019.   He previously had an echocardiogram on 5/15/2019 showing a normal left ventricular ejection fraction of 60%.   Admission to [facility] on 6/19/2019 for cycle #1 of therapy.   He received Neulasta on discharge on 6/22/2019.   His lowest ANC was 0.04 on 6/27 with a platelet count of 47,000 that day. He was only neutropenic on one day and his WBC and platelet count improved rapidly.   Admission for cycle #2 on 7/8/2019. We proceeded with the same doses per the protocol.   Admission for cycle #3 on 7/29/2019. 20% increase in Adriamycin, etoposide, and Cytoxan doses per the protocol.   Sim platelet count 36,000 with a sim white blood cell count of 2.43 following cycle #3.   With cycle #4, dose increase by 20% for Adriamycin, etoposide, and Cytoxan per protocol.   Following cycle #4, his sim platelet count was 21,000 with a sim neutrophil count of 1.78. Therefore, for cycle #5, we will reduce his doses by 20% to the last prior tolerated dose (cycle #3 doses).       SOCIAL HISTORY:   reports that he has been smoking cigarettes. He has a 25.00 pack-year smoking history. He has  never used smokeless tobacco. He reports that he does not drink alcohol or use drugs.     FAMILY HISTORY:   family history includes Coronary artery disease in his other; Heart attack (age of onset: 47) in his brother; Heart attack (age of onset: 54) in his father; Heart disease in his brother and father; Heart failure in his mother.     ALLERGIES:        Allergies   Allergen Reactions   • Oxaprozin Hives     MEDICATIONS: As listed in the electronic medical record.       Review of Systems   Constitutional: Positive for fatigue. Negative for appetite change, chills, fever and unexpected weight change.   HENT: Negative for congestion, dental problem, ear pain, hearing loss, mouth sores, nosebleeds, postnasal drip, rhinorrhea, tinnitus and trouble swallowing.   Eyes: Negative.   Respiratory: Negative for cough, chest tightness, shortness of breath, wheezing and stridor.   Cardiovascular: Negative for chest pain, palpitations and leg swelling.   Gastrointestinal: Positive for constipation.  Slight abdomen distention after transfusion PRBC on 9/11/2019, has already improved in the morning of 9/12/2019.  No abdominal pain, blood in stool, diarrhea, nausea and vomiting.   Endocrine: Negative.   Genitourinary: Negative for decreased urine volume, difficulty urinating, dysuria, flank pain, frequency, hematuria, scrotal swelling, testicular pain and urgency.   Musculoskeletal: Negative for arthralgias, back pain.  bilateral lower leg and ankle swelling after PRBC transfusion on 9/11/2019.   Allergic/Immunologic: Negative.   Neurological: Negative for dizziness, seizures, syncope, weakness, light-headedness, numbness and headaches.   Hematological: Negative for adenopathy. Does not bruise/bleed easily.   Psychiatric/Behavioral: Negative for confusion and sleep disturbance. The patient is not nervous/anxious.   All other systems reviewed and are negative.   No change from prior except as noted         Vitals:    09/12/19 1952  09/12/19 2305 09/13/19 0347 09/13/19 0800   BP: 146/64 142/74 156/88 146/69   BP Location: Left arm Right arm Left arm Left arm   Patient Position: Sitting Lying Sitting Sitting   Pulse: 62 66 84 65   Resp: 18 16 16 16   Temp: 97.1 °F (36.2 °C) 97.3 °F (36.3 °C) 97.7 °F (36.5 °C) 97 °F (36.1 °C)   TempSrc: Oral Oral Oral Oral   SpO2: 92% 93% 93% 94%   Weight:       Height:       ECOG 1    Physical Exam   Constitutional: He is oriented to person, place, and time. He appears well-developed and well-nourished. No distress.  Patient is ambulating well.  HENT:   Head: Normocephalic and atraumatic. Thin hair.    Mouth/Throat: Oropharynx is clear and moist.   Eyes: Conjunctivae normal, left upper lid seems mildly swelling but no apparent redness. Patient is asymptomatic.     Neck: Neck supple. No thyroid mass and no thyromegaly present.   Cardiovascular: Normal rate and regular rhythm. Exam reveals no gallop and no friction rub.  Systolic murmur is present with grade of 2/6.   Pulmonary/Chest: Effort normal and breath sounds normal. No respiratory distress.  No crackles no wheezing.    Benign-appearing Mediport   Abdominal: Soft.  No tenderness, no distension and no mass. There is no splenomegaly or hepatomegaly.  Bowel sounds normal.  Musculoskeletal: Normal range of motion. He exhibits no edema, tenderness or deformity.  There are trace edema on both lower legs at the ankles.  Lymphadenopathy:   He has no cervical adenopathy.   He has no axillary adenopathy.   Right: No inguinal and no supraclavicular adenopathy present.   Left: No inguinal and no supraclavicular adenopathy present.   Neurological: He is alert and oriented to person, place, and time. He has normal strength.   Skin: Skin is warm and dry. No rash noted.   Psychiatric: He has a normal mood and affect. His behavior is normal. Judgment and thought content normal. Cognition and memory are normal.         DIAGNOSTIC DATA:   Results from last 7 days   Lab Units  "09/13/19  0350 09/12/19  0409 09/11/19  0510   WBC 10*3/mm3 16.30* 21.96* 16.61*   HEMOGLOBIN g/dL 10.3* 10.7* 7.9*   HEMATOCRIT % 30.9* 32.9* 24.5*   PLATELETS 10*3/mm3 116* 135* 152     Lab Results   Component Value Date    NEUTROABS 15.86 (H) 09/13/2019     Results from last 7 days   Lab Units 09/13/19  0350 09/12/19  0409 09/11/19  0510   SODIUM mmol/L 137 137 137   POTASSIUM mmol/L 4.2 4.2 4.6   CHLORIDE mmol/L 103 104 105   CO2 mmol/L 24.0 24.5 23.4   BUN mg/dL 26* 25* 18   CREATININE mg/dL 0.78 0.78 0.88   CALCIUM mg/dL 8.3* 8.7 8.6   BILIRUBIN mg/dL 0.4 0.3 <0.2*   ALK PHOS U/L 67 73 75   ALT (SGPT) U/L 33 48* 18   AST (SGOT) U/L 17 36 18   GLUCOSE mg/dL 211* 190* 197*         ASSESSMENT:   This is a 79 y.o. male with:     1. 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 His LP and bone marrow biopsies are negative for involvement.    This patient was admitted on 9/9/2019 for cycle #5 chemotherapy with R-EPOCH.   Following cycle #4, his sim platelet count was 21,000 with a sim neutrophil count of 1.78. Therefore, for cycle #5, we will reduce his doses by 20% to the last prior tolerated dose (cycle #3 doses).     Patient is tolerating chemotherapy well, with no specific side effects except decrease the hemoglobin by 2 g.  No bleeding.    · We will continue chemotherapy as written for day #3.   · Done Intrathecal methotrexate 12 mg administration for CNS prophylaxis.   · Lumbar puncture 9/9/2019 with CSF was negative for lymphoma involvement by flow cytometry study.    2. Rheumatoid arthritis previously on Cimzia (certolizumab) injections by Dr. Velásquez. Plan to discontinue these during chemotherapy. He was previously on Simponi Aria (golimumab) and Actemra (tocilizumab). These certainly could contribute to the development of lymphoma.     3. Coronary artery disease status post stenting as well as moderate " aortic stenosis by echocardiogram. His left ventricular ejection fraction is normal at 60% by echocardiogram on 5/14/2019.   · Patient has no edema in lower extremity with intravenous hydration.  No dyspnea.  Continue to monitor.    4. Anxiety: I previously gave him a prescription for alprazolam 0.25 mg to take twice daily as needed. Continue benzodiazepines as needed during his hospitalization.     5. Hyponatremia: Sodium 134 today.  Continue to monitor.     6. Volume overload with cycle #.  · This time patient developed mild edema in the ankles after 2 units PRBC transfusion.  No need for diuretics.  Edema gradually improved by itself after IV hydration was reduced at 25 mg/h.   · Further improved leg edema on 9/13/2019.  Patient has noted dyspnea.    7. Constipation: Continue Senokot and MiraLAX daily while hospitalized.  He has regular bowel movement during hospitalization.    8. Type 2 diabetes: Insulin sliding scale during the admission.  We will continue home medication for diabetes management.     9.  Anemia secondary to chemotherapy and possibly contributed by IV hydration.   · Hemoglobin 8.1 on 9/10/2019.  Patient reports no evidence of bleeding.    · Hemoglobin further down at 7.9 on 9/11/2019.  This is secondary to his chemotherapy.  Will transfuse patient with 2 units PRBC.    · On 9/12/2019 posttransfusion hemoglobin 10.7.    · Hemoglobin is trending down 8-10.3 on 9/13/2019.  Patient will be discharged today and will follow-up in the office next week..    10.  Mild thrombocytopenia.  This is secondary to chemotherapy.  Continue to monitor.  Patient has no bleeding.    PLAN:   1.  Proceed ahead with C5-day #5 dose adjusted R-EPOCH chemotherapy.  He will be finished in late this afternoon.  2.  Continue low-dose IV hydration NS to 25 mL/h.    3.  Benzodiazepines as needed for anxiety although he has not required any at this point.   4.  Continue Lovenox 40 mg daily for DVT prophylaxis. No allopurinol at  this point.  5. At admission, we have decreased his maintenance fluids to 50 ml/hr due to volume overload with prior cycles.   6. Continue Bactrim and acyclovir for prophylaxis.   7.  Continue home medication for diabetes management, and moderate dose sliding scale insulin.   8.  Continue GI prophylaxis with PPI.    9. Neulasta next Monday. Twice weekly CBC on Mondays and Thursdays for 2 weeks. Plan total of 6 cycles of therapy if tolerated with intrathecal methotrexate with each cycle for CNS prophylaxis.   10. MD visit in three weeks for his sixth and final cycle of therapy. Twice weekly CBCs thereafter. Neulasta the Monday following chemotherapy. PET scan in 5 weeks.  Dr. Garcia will see him back in 6 weeks for follow-up to review the PET scan. We anticipate that his therapy will be complete at that point assuming a complete response visualized on his PET scan.    I discussed with the patient and his wife today.      I also spoke with his nurse today.     RAJIV TIJERINA M.D., Ph.D.    9/13/2019.

## 2019-09-13 NOTE — PROGRESS NOTES
Case Management Discharge Note    Final Note: home; no needs. Janey Connerkert CSW     Destination      No service has been selected for the patient.      Durable Medical Equipment      No service has been selected for the patient.      Dialysis/Infusion      No service has been selected for the patient.      Home Medical Care      No service has been selected for the patient.      Therapy      No service has been selected for the patient.      Community Resources      No service has been selected for the patient.        Transportation Services  Private: Car    Final Discharge Disposition Code: 01 - home or self-care

## 2019-09-13 NOTE — PLAN OF CARE
Problem: Patient Care Overview  Goal: Plan of Care Review  Outcome: Ongoing (interventions implemented as appropriate)   09/12/19 1504 09/12/19 2114 09/13/19 0401   Coping/Psychosocial   Plan of Care Reviewed With --  patient --    OTHER   Outcome Summary --  --  Patient to begin day 5/5 of EPOCH today. Patient without any complaints this evevning. Ad chris. Plans to D/C home today. BP has remained elevated. Will continue to monitor.   Plan of Care Review   Progress no change --  --      Goal: Individualization and Mutuality  Outcome: Ongoing (interventions implemented as appropriate)    Goal: Discharge Needs Assessment  Outcome: Ongoing (interventions implemented as appropriate)    Goal: Interprofessional Rounds/Family Conf  Outcome: Ongoing (interventions implemented as appropriate)      Problem: Chemotherapy Effects (Adult)  Goal: Signs and Symptoms of Listed Potential Problems Will be Absent, Minimized or Managed (Chemotherapy Effects)  Outcome: Ongoing (interventions implemented as appropriate)      Problem: Oncology Care (Adult)  Goal: Signs and Symptoms of Listed Potential Problems Will be Absent, Minimized or Managed (Oncology Care)  Outcome: Ongoing (interventions implemented as appropriate)

## 2019-09-13 NOTE — PROGRESS NOTES
Continued Stay Note  [facility]     Patient Name: Edward Srinivasan  MRN: [id number]  Today's Date: 9/13/2019    Admit Date: 9/9/2019    Discharge Plan     Row Name 09/13/19 1204       Plan    Plan  Home with spouse     Patient/Family in Agreement with Plan  yes    Plan Comments  CCP met with patient and spouse at bedside. Patient and spouse confirmed plan is to return home at discharge. Patient states he has been ambulating without difficulty. Patient states he has a cane at home he uses occasionally and a walker. Patient denies any PT/HH needs. CCP will follow to see if patient is discharged on Lovenox. Janey VARGAS          Discharge Codes    No documentation.             SAM Dias

## 2019-09-14 ENCOUNTER — READMISSION MANAGEMENT (OUTPATIENT)
Dept: CALL CENTER | Facility: HOSPITAL | Age: 79
End: 2019-09-14

## 2019-09-14 NOTE — OUTREACH NOTE
Prep Survey      Responses   Facility patient discharged from?  [facility]   Is patient eligible?  Yes   Discharge diagnosis  Diffuse large B-cell lymphoma-admitted for chemotherapy   Does the patient have one of the following disease processes/diagnoses(primary or secondary)?  Other   Does the patient have Home health ordered?  No   Is there a DME ordered?  No   Prep survey completed?  Yes          Tawny Paredes RN

## 2019-09-16 ENCOUNTER — INFUSION (OUTPATIENT)
Dept: ONCOLOGY | Facility: HOSPITAL | Age: 79
End: 2019-09-16

## 2019-09-16 ENCOUNTER — EPISODE CHANGES (OUTPATIENT)
Dept: CASE MANAGEMENT | Facility: OTHER | Age: 79
End: 2019-09-16

## 2019-09-16 ENCOUNTER — LAB (OUTPATIENT)
Dept: LAB | Facility: HOSPITAL | Age: 79
End: 2019-09-16

## 2019-09-16 ENCOUNTER — READMISSION MANAGEMENT (OUTPATIENT)
Dept: CALL CENTER | Facility: HOSPITAL | Age: 79
End: 2019-09-16

## 2019-09-16 DIAGNOSIS — D84.9 IMMUNODEFICIENCY, UNSPECIFIED (HCC): Primary | ICD-10-CM

## 2019-09-16 DIAGNOSIS — C83.33 DIFFUSE LARGE B-CELL LYMPHOMA OF INTRA-ABDOMINAL LYMPH NODES (HCC): Primary | ICD-10-CM

## 2019-09-16 LAB
BASOPHILS # BLD AUTO: 0.03 10*3/MM3 (ref 0–0.2)
BASOPHILS NFR BLD AUTO: 0.3 % (ref 0–1.5)
DEPRECATED RDW RBC AUTO: 60 FL (ref 37–54)
EOSINOPHIL # BLD AUTO: 0.01 10*3/MM3 (ref 0–0.4)
EOSINOPHIL NFR BLD AUTO: 0.1 % (ref 0.3–6.2)
ERYTHROCYTE [DISTWIDTH] IN BLOOD BY AUTOMATED COUNT: 17.2 % (ref 12.3–15.4)
HCT VFR BLD AUTO: 34.1 % (ref 37.5–51)
HGB BLD-MCNC: 11.5 G/DL (ref 13–17.7)
IMM GRANULOCYTES # BLD AUTO: 0.53 10*3/MM3 (ref 0–0.05)
IMM GRANULOCYTES NFR BLD AUTO: 4.5 % (ref 0–0.5)
LYMPHOCYTES # BLD AUTO: 0.23 10*3/MM3 (ref 0.7–3.1)
LYMPHOCYTES NFR BLD AUTO: 1.9 % (ref 19.6–45.3)
MCH RBC QN AUTO: 31.9 PG (ref 26.6–33)
MCHC RBC AUTO-ENTMCNC: 33.7 G/DL (ref 31.5–35.7)
MCV RBC AUTO: 94.5 FL (ref 79–97)
MONOCYTES # BLD AUTO: 0.04 10*3/MM3 (ref 0.1–0.9)
MONOCYTES NFR BLD AUTO: 0.3 % (ref 5–12)
NEUTROPHILS # BLD AUTO: 11.05 10*3/MM3 (ref 1.7–7)
NEUTROPHILS NFR BLD AUTO: 92.9 % (ref 42.7–76)
NRBC BLD AUTO-RTO: 0 /100 WBC (ref 0–0.2)
PLATELET # BLD AUTO: 76 10*3/MM3 (ref 140–450)
PMV BLD AUTO: 9.2 FL (ref 6–12)
RBC # BLD AUTO: 3.61 10*6/MM3 (ref 4.14–5.8)
WBC NRBC COR # BLD: 11.89 10*3/MM3 (ref 3.4–10.8)

## 2019-09-16 PROCEDURE — 85025 COMPLETE CBC W/AUTO DIFF WBC: CPT | Performed by: INTERNAL MEDICINE

## 2019-09-16 PROCEDURE — 96372 THER/PROPH/DIAG INJ SC/IM: CPT | Performed by: INTERNAL MEDICINE

## 2019-09-16 PROCEDURE — 36415 COLL VENOUS BLD VENIPUNCTURE: CPT | Performed by: INTERNAL MEDICINE

## 2019-09-16 PROCEDURE — 25010000002 PEGFILGRASTIM 6 MG/0.6ML SOLUTION PREFILLED SYRINGE: Performed by: INTERNAL MEDICINE

## 2019-09-16 RX ADMIN — PEGFILGRASTIM 6 MG: 6 INJECTION SUBCUTANEOUS at 08:50

## 2019-09-16 NOTE — OUTREACH NOTE
Medical Week 1 Survey      Responses   Facility patient discharged from?  [facility]   Does the patient have one of the following disease processes/diagnoses(primary or secondary)?  Other   Is there a successful TCM telephone encounter documented?  No   Week 1 attempt successful?  Yes   Call start time  1630   Call end time  1631   Discharge diagnosis  Diffuse large B-cell lymphoma-admitted for chemotherapy   Meds reviewed with patient/caregiver?  Yes   Is the patient having any side effects they believe may be caused by any medication additions or changes?  No   Does the patient have all medications ordered at discharge?  Yes   Is the patient taking all medications as directed (includes completed medication regime)?  Yes   Does the patient have a primary care provider?   Yes   Does the patient have an appointment with their PCP within 7 days of discharge?  Yes   Has the patient kept scheduled appointments due by today?  N/A   Has home health visited the patient within 72 hours of discharge?  N/A   Psychosocial issues?  No   Did the patient receive a copy of their discharge instructions?  Yes   Nursing interventions  Reviewed instructions with patient   What is the patient's perception of their health status since discharge?  Improving   Is the patient/caregiver able to teach back signs and symptoms related to disease process for when to call PCP?  Yes   Is the patient/caregiver able to teach back signs and symptoms related to disease process for when to call 911?  Yes   Is the patient/caregiver able to teach back the hierarchy of who to call/visit for symptoms/problems? PCP, Specialist, Home health nurse, Urgent Care, ED, 911  Yes   Week 1 call completed?  Yes   Revoked  No further contact(revokes)-requires comment   Graduated/Revoked comments  Patient reports he has everything he needs and declines further calls.           Everton Barahona RN

## 2019-09-16 NOTE — PROGRESS NOTES
Reviewed labs with pt. Pt completed 5th round of EPOCH-R last week. States he feels fine other than just being tired. Eating and drinking well. Denies fever. neulasta given. Reviewed bleeding precautions. Asked pt or wife to call with any questions or concerns.       Lab Results   Component Value Date    WBC 11.89 (H) 09/16/2019    HGB 11.5 (L) 09/16/2019    HCT 34.1 (L) 09/16/2019    MCV 94.5 09/16/2019    PLT 76 (L) 09/16/2019

## 2019-09-19 ENCOUNTER — APPOINTMENT (OUTPATIENT)
Dept: LAB | Facility: HOSPITAL | Age: 79
End: 2019-09-19

## 2019-09-19 ENCOUNTER — OFFICE VISIT (OUTPATIENT)
Dept: ONCOLOGY | Facility: CLINIC | Age: 79
End: 2019-09-19

## 2019-09-19 ENCOUNTER — EPISODE CHANGES (OUTPATIENT)
Dept: CASE MANAGEMENT | Facility: OTHER | Age: 79
End: 2019-09-19

## 2019-09-19 VITALS — WEIGHT: 144.6 LBS | BODY MASS INDEX: 22.64 KG/M2

## 2019-09-19 DIAGNOSIS — C83.33 DIFFUSE LARGE B-CELL LYMPHOMA OF INTRA-ABDOMINAL LYMPH NODES (HCC): Primary | ICD-10-CM

## 2019-09-19 LAB
BASOPHILS # BLD AUTO: 0.02 10*3/MM3 (ref 0–0.2)
BASOPHILS NFR BLD AUTO: 0.6 % (ref 0–1.5)
DEPRECATED RDW RBC AUTO: 59 FL (ref 37–54)
EOSINOPHIL # BLD AUTO: 0.01 10*3/MM3 (ref 0–0.4)
EOSINOPHIL NFR BLD AUTO: 0.3 % (ref 0.3–6.2)
ERYTHROCYTE [DISTWIDTH] IN BLOOD BY AUTOMATED COUNT: 16.7 % (ref 12.3–15.4)
HCT VFR BLD AUTO: 32.6 % (ref 37.5–51)
HGB BLD-MCNC: 10.8 G/DL (ref 13–17.7)
IMM GRANULOCYTES # BLD AUTO: 0.43 10*3/MM3 (ref 0–0.05)
IMM GRANULOCYTES NFR BLD AUTO: 13.3 % (ref 0–0.5)
LYMPHOCYTES # BLD AUTO: 0.22 10*3/MM3 (ref 0.7–3.1)
LYMPHOCYTES NFR BLD AUTO: 6.8 % (ref 19.6–45.3)
MCH RBC QN AUTO: 31.9 PG (ref 26.6–33)
MCHC RBC AUTO-ENTMCNC: 33.1 G/DL (ref 31.5–35.7)
MCV RBC AUTO: 96.2 FL (ref 79–97)
MONOCYTES # BLD AUTO: 0.1 10*3/MM3 (ref 0.1–0.9)
MONOCYTES NFR BLD AUTO: 3.1 % (ref 5–12)
NEUTROPHILS # BLD AUTO: 2.46 10*3/MM3 (ref 1.7–7)
NEUTROPHILS NFR BLD AUTO: 75.9 % (ref 42.7–76)
NRBC BLD AUTO-RTO: 0 /100 WBC (ref 0–0.2)
PLATELET # BLD AUTO: 23 10*3/MM3 (ref 140–450)
PMV BLD AUTO: 11.5 FL (ref 6–12)
RBC # BLD AUTO: 3.39 10*6/MM3 (ref 4.14–5.8)
WBC NRBC COR # BLD: 3.24 10*3/MM3 (ref 3.4–10.8)

## 2019-09-19 PROCEDURE — 36416 COLLJ CAPILLARY BLOOD SPEC: CPT | Performed by: NURSE PRACTITIONER

## 2019-09-19 PROCEDURE — 99212-NC PR NO CHARGE CBC OFFICE OUTPATIENT VISIT 10 MINUTES: Performed by: NURSE PRACTITIONER

## 2019-09-19 PROCEDURE — 85025 COMPLETE CBC W/AUTO DIFF WBC: CPT | Performed by: NURSE PRACTITIONER

## 2019-09-19 NOTE — PROGRESS NOTES
Patient is here for lab review following cycle #5 of EPOCH-R.  He was discharged from the hospital last Friday and subsequently received Neulasta on September 16, 2019.  Today his total white blood cell count is 3.24, ANC is 2.46, hemoglobin slightly declined at 10.8, and platelets low at 23,000.    He denies any issues with excess bleeding or bruising.  No infectious symptoms including fevers or chills.  No evidence of monocytosis at this point.  Looking back, however; this is the patient's trend historically.  I am hopeful that his white blood cell count and platelets will improve over the next several days as it typically does.  We reviewed bleeding and infectious precautions with the patient today.  He is actually feeling quite well overall.    He will return on Monday, September 23, 2019 for repeat RN review.    I have asked him to call our office in the interim with any issues.  He has verbalized understanding.    Lab Results   Component Value Date    WBC 3.24 (L) 09/19/2019    HGB 10.8 (L) 09/19/2019    HCT 32.6 (L) 09/19/2019    MCV 96.2 09/19/2019    PLT 23 (L) 09/19/2019     FABIAN Ochoa

## 2019-09-20 RX ORDER — SULFAMETHOXAZOLE AND TRIMETHOPRIM 800; 160 MG/1; MG/1
TABLET ORAL
Qty: 12 TABLET | Refills: 0 | Status: SHIPPED | OUTPATIENT
Start: 2019-09-20 | End: 2019-10-21

## 2019-09-23 ENCOUNTER — LAB (OUTPATIENT)
Dept: LAB | Facility: HOSPITAL | Age: 79
End: 2019-09-23

## 2019-09-23 ENCOUNTER — CLINICAL SUPPORT (OUTPATIENT)
Dept: ONCOLOGY | Facility: HOSPITAL | Age: 79
End: 2019-09-23

## 2019-09-23 DIAGNOSIS — D84.9 IMMUNODEFICIENCY, UNSPECIFIED (HCC): Primary | ICD-10-CM

## 2019-09-23 LAB
BASOPHILS # BLD AUTO: 0.03 10*3/MM3 (ref 0–0.2)
BASOPHILS NFR BLD AUTO: 0.2 % (ref 0–1.5)
DEPRECATED RDW RBC AUTO: 58.8 FL (ref 37–54)
EOSINOPHIL # BLD AUTO: 0.03 10*3/MM3 (ref 0–0.4)
EOSINOPHIL NFR BLD AUTO: 0.2 % (ref 0.3–6.2)
ERYTHROCYTE [DISTWIDTH] IN BLOOD BY AUTOMATED COUNT: 17.3 % (ref 12.3–15.4)
HCT VFR BLD AUTO: 31.7 % (ref 37.5–51)
HGB BLD-MCNC: 10.5 G/DL (ref 13–17.7)
IMM GRANULOCYTES # BLD AUTO: 3.26 10*3/MM3 (ref 0–0.05)
IMM GRANULOCYTES NFR BLD AUTO: 19.7 % (ref 0–0.5)
LYMPHOCYTES # BLD AUTO: 0.54 10*3/MM3 (ref 0.7–3.1)
LYMPHOCYTES NFR BLD AUTO: 3.3 % (ref 19.6–45.3)
MCH RBC QN AUTO: 31.9 PG (ref 26.6–33)
MCHC RBC AUTO-ENTMCNC: 33.1 G/DL (ref 31.5–35.7)
MCV RBC AUTO: 96.4 FL (ref 79–97)
MONOCYTES # BLD AUTO: 1.91 10*3/MM3 (ref 0.1–0.9)
MONOCYTES NFR BLD AUTO: 11.6 % (ref 5–12)
NEUTROPHILS # BLD AUTO: 10.75 10*3/MM3 (ref 1.7–7)
NEUTROPHILS NFR BLD AUTO: 65 % (ref 42.7–76)
NRBC BLD AUTO-RTO: 0.2 /100 WBC (ref 0–0.2)
PLATELET # BLD AUTO: 55 10*3/MM3 (ref 140–450)
PMV BLD AUTO: 10.7 FL (ref 6–12)
RBC # BLD AUTO: 3.29 10*6/MM3 (ref 4.14–5.8)
WBC NRBC COR # BLD: 16.52 10*3/MM3 (ref 3.4–10.8)

## 2019-09-23 PROCEDURE — 36415 COLL VENOUS BLD VENIPUNCTURE: CPT | Performed by: INTERNAL MEDICINE

## 2019-09-23 PROCEDURE — 85025 COMPLETE CBC W/AUTO DIFF WBC: CPT | Performed by: INTERNAL MEDICINE

## 2019-09-23 NOTE — PROGRESS NOTES
Pt here for CBC review. Pt completed cycle 4 of EPOCH-R on 9/13. neulasta shot applied on 9/16. WBC and ANC increased from last week as expected. HGB stable. Platelets improving. Pt states overall he is doing well, he is aware he could drink more water and states he will work on doing better at that. Eating well. No other c/o. Pt will return next week for lab review. I asked pt and wife to call with any questions or concerns.       Lab Results   Component Value Date    WBC 16.52 (H) 09/23/2019    HGB 10.5 (L) 09/23/2019    HCT 31.7 (L) 09/23/2019    MCV 96.4 09/23/2019    PLT 55 (L) 09/23/2019

## 2019-09-24 ENCOUNTER — PATIENT OUTREACH (OUTPATIENT)
Dept: CASE MANAGEMENT | Facility: OTHER | Age: 79
End: 2019-09-24

## 2019-09-24 NOTE — OUTREACH NOTE
Care Plan Note      Responses   Lifestyle Goals  Avoid respiratory irritants, Decrease falls risk, Eat a healthy diet, Fewer exacerbations, Have more energy, Less sadness/anxiety, Medication management, Routine eye exam, Routine foot care, Self monitor blood sugar, Routine follow-up with doctor(s)   Barriers  Disease education   Self Management  Get flu/pneumonia shot, Home Glucose Monitoring, Increase Physical Activities, Medication Adherence   Annual Wellness Visit:   Patient Will Schedule   AWV Materials  Send Materials [Please send materials for patient to schedule]   Care Gaps Addressed  Flu Shot   Flu Shot Status  Patient will Complete   Specific Disease Process Teaching  -- [Lymphoma]   Does patient have depression diagnosis?  No   Advanced Directives:  Patient Has   Hospitalizations past 12 months  3 or more   Medication Adherence  Medications understood   Goal Progress  Making Progress Toward Goal(s)   Readiness Scale  9   Confidence Scale  8   Health Literacy  Moderate        The main concerns and/or symptoms the patient would like to address are: No issues or concerns voiced.  Adequate support from [facility] physicians and staff.    Education/instruction provided by Care Coordinator: Discussed utilizing the Cancer resource center as needed.  Review of recent labs and discussed avoiding large crowds due to compromised state. Reports fatigues easily and encouraged rest periods throughout the day and a healthy diet.  Walks occasionally for exercise.  Wife is supportive of ADL's and transportation. Discussed administration of flu shot, AWV due and materials being sent for patient's review and to schedule.  His Living Will is filed in EMR.  Aware to contact me as needed.    Follow Up Outreach Due: As needed.     Margot Salazar RN  Community Care Coordinator    9/24/2019, 3:11 PM

## 2019-09-26 ENCOUNTER — LAB (OUTPATIENT)
Dept: LAB | Facility: HOSPITAL | Age: 79
End: 2019-09-26

## 2019-09-26 ENCOUNTER — CLINICAL SUPPORT (OUTPATIENT)
Dept: ONCOLOGY | Facility: HOSPITAL | Age: 79
End: 2019-09-26

## 2019-09-26 DIAGNOSIS — D84.9 IMMUNODEFICIENCY, UNSPECIFIED (HCC): Primary | ICD-10-CM

## 2019-09-26 LAB
BASOPHILS # BLD AUTO: 0.12 10*3/MM3 (ref 0–0.2)
BASOPHILS NFR BLD AUTO: 0.7 % (ref 0–1.5)
DEPRECATED RDW RBC AUTO: 61.9 FL (ref 37–54)
EOSINOPHIL # BLD AUTO: 0.03 10*3/MM3 (ref 0–0.4)
EOSINOPHIL NFR BLD AUTO: 0.2 % (ref 0.3–6.2)
ERYTHROCYTE [DISTWIDTH] IN BLOOD BY AUTOMATED COUNT: 17.7 % (ref 12.3–15.4)
HCT VFR BLD AUTO: 34.3 % (ref 37.5–51)
HGB BLD-MCNC: 11.1 G/DL (ref 13–17.7)
IMM GRANULOCYTES # BLD AUTO: 2.23 10*3/MM3 (ref 0–0.05)
IMM GRANULOCYTES NFR BLD AUTO: 12.3 % (ref 0–0.5)
LYMPHOCYTES # BLD AUTO: 0.55 10*3/MM3 (ref 0.7–3.1)
LYMPHOCYTES NFR BLD AUTO: 3 % (ref 19.6–45.3)
MCH RBC QN AUTO: 31.4 PG (ref 26.6–33)
MCHC RBC AUTO-ENTMCNC: 32.4 G/DL (ref 31.5–35.7)
MCV RBC AUTO: 96.9 FL (ref 79–97)
MONOCYTES # BLD AUTO: 1.64 10*3/MM3 (ref 0.1–0.9)
MONOCYTES NFR BLD AUTO: 9.1 % (ref 5–12)
NEUTROPHILS # BLD AUTO: 13.52 10*3/MM3 (ref 1.7–7)
NEUTROPHILS NFR BLD AUTO: 74.7 % (ref 42.7–76)
NRBC BLD AUTO-RTO: 0.2 /100 WBC (ref 0–0.2)
PLATELET # BLD AUTO: 124 10*3/MM3 (ref 140–450)
PMV BLD AUTO: 10.1 FL (ref 6–12)
RBC # BLD AUTO: 3.54 10*6/MM3 (ref 4.14–5.8)
WBC NRBC COR # BLD: 18.09 10*3/MM3 (ref 3.4–10.8)

## 2019-09-26 PROCEDURE — 85025 COMPLETE CBC W/AUTO DIFF WBC: CPT | Performed by: INTERNAL MEDICINE

## 2019-09-26 PROCEDURE — 36416 COLLJ CAPILLARY BLOOD SPEC: CPT | Performed by: INTERNAL MEDICINE

## 2019-09-26 NOTE — PROGRESS NOTES
Pt is here for lab with RN review.  CBC reviewed with pt and spouse. WBC increased related to Neulasta. Others values with improvement noted. Pt denies fevers or signs of infection. Pt has no complaints and is eating and drinking well.  Copy of labs given to pt and f/u appt reviewed. Pt is instructed to call the office with any concerns or new symptoms prior to next visit. Pt vu  Lab Results   Component Value Date    WBC 18.09 (H) 09/26/2019    HGB 11.1 (L) 09/26/2019    HCT 34.3 (L) 09/26/2019    MCV 96.9 09/26/2019     (L) 09/26/2019

## 2019-09-30 ENCOUNTER — HOSPITAL ENCOUNTER (INPATIENT)
Facility: HOSPITAL | Age: 79
LOS: 4 days | Discharge: HOME | End: 2019-10-04
Attending: INTERNAL MEDICINE | Admitting: INTERNAL MEDICINE

## 2019-09-30 ENCOUNTER — HOSPITAL ENCOUNTER (OUTPATIENT)
Dept: GENERAL RADIOLOGY | Facility: HOSPITAL | Age: 79
Discharge: HOME | End: 2019-09-30

## 2019-09-30 ENCOUNTER — OFFICE VISIT (OUTPATIENT)
Dept: ONCOLOGY | Facility: CLINIC | Age: 79
End: 2019-09-30

## 2019-09-30 ENCOUNTER — LAB (OUTPATIENT)
Dept: LAB | Facility: HOSPITAL | Age: 79
End: 2019-09-30

## 2019-09-30 VITALS
WEIGHT: 140.6 LBS | OXYGEN SATURATION: 97 % | BODY MASS INDEX: 22.07 KG/M2 | RESPIRATION RATE: 16 BRPM | TEMPERATURE: 98.1 F | DIASTOLIC BLOOD PRESSURE: 69 MMHG | HEIGHT: 67 IN | SYSTOLIC BLOOD PRESSURE: 116 MMHG | HEART RATE: 100 BPM

## 2019-09-30 VITALS
SYSTOLIC BLOOD PRESSURE: 102 MMHG | DIASTOLIC BLOOD PRESSURE: 68 MMHG | WEIGHT: 140.38 LBS | TEMPERATURE: 97 F | HEART RATE: 94 BPM | HEIGHT: 67 IN | BODY MASS INDEX: 22.03 KG/M2 | OXYGEN SATURATION: 99 % | RESPIRATION RATE: 16 BRPM

## 2019-09-30 DIAGNOSIS — D84.9 IMMUNODEFICIENCY, UNSPECIFIED (HCC): Primary | ICD-10-CM

## 2019-09-30 DIAGNOSIS — T45.1X5A ANEMIA ASSOCIATED WITH CHEMOTHERAPY: ICD-10-CM

## 2019-09-30 DIAGNOSIS — C83.33 DIFFUSE LARGE B-CELL LYMPHOMA OF INTRA-ABDOMINAL LYMPH NODES (HCC): ICD-10-CM

## 2019-09-30 DIAGNOSIS — D64.81 ANEMIA ASSOCIATED WITH CHEMOTHERAPY: ICD-10-CM

## 2019-09-30 DIAGNOSIS — C83.33 DIFFUSE LARGE B-CELL LYMPHOMA OF INTRA-ABDOMINAL LYMPH NODES (HCC): Primary | ICD-10-CM

## 2019-09-30 DIAGNOSIS — C82.90 NHL (NODULAR HISTIOCYTIC LYMPHOMA) (HCC): ICD-10-CM

## 2019-09-30 PROBLEM — C83.30 DIFFUSE LARGE B CELL LYMPHOMA (HCC): Status: ACTIVE | Noted: 2019-09-30

## 2019-09-30 LAB
BASOPHILS # BLD AUTO: 0.08 10*3/MM3 (ref 0–0.2)
BASOPHILS NFR BLD AUTO: 0.5 % (ref 0–1.5)
DEPRECATED RDW RBC AUTO: 62.1 FL (ref 37–54)
EOSINOPHIL # BLD AUTO: 0.04 10*3/MM3 (ref 0–0.4)
EOSINOPHIL NFR BLD AUTO: 0.2 % (ref 0.3–6.2)
ERYTHROCYTE [DISTWIDTH] IN BLOOD BY AUTOMATED COUNT: 17.8 % (ref 12.3–15.4)
GLUCOSE BLDC GLUCOMTR-MCNC: 210 MG/DL (ref 70–130)
GLUCOSE BLDC GLUCOMTR-MCNC: 256 MG/DL (ref 70–130)
HCT VFR BLD AUTO: 32.6 % (ref 37.5–51)
HGB BLD-MCNC: 10.8 G/DL (ref 13–17.7)
IMM GRANULOCYTES # BLD AUTO: 0.49 10*3/MM3 (ref 0–0.05)
IMM GRANULOCYTES NFR BLD AUTO: 2.8 % (ref 0–0.5)
LYMPHOCYTES # BLD AUTO: 0.88 10*3/MM3 (ref 0.7–3.1)
LYMPHOCYTES NFR BLD AUTO: 5 % (ref 19.6–45.3)
MCH RBC QN AUTO: 31.8 PG (ref 26.6–33)
MCHC RBC AUTO-ENTMCNC: 33.1 G/DL (ref 31.5–35.7)
MCV RBC AUTO: 95.9 FL (ref 79–97)
MONOCYTES # BLD AUTO: 1.4 10*3/MM3 (ref 0.1–0.9)
MONOCYTES NFR BLD AUTO: 7.9 % (ref 5–12)
NEUTROPHILS # BLD AUTO: 14.85 10*3/MM3 (ref 1.7–7)
NEUTROPHILS NFR BLD AUTO: 83.6 % (ref 42.7–76)
NRBC BLD AUTO-RTO: 0 /100 WBC (ref 0–0.2)
PLATELET # BLD AUTO: 120 10*3/MM3 (ref 140–450)
PMV BLD AUTO: 9.6 FL (ref 6–12)
RBC # BLD AUTO: 3.4 10*6/MM3 (ref 4.14–5.8)
WBC NRBC COR # BLD: 17.74 10*3/MM3 (ref 3.4–10.8)

## 2019-09-30 PROCEDURE — 25010000002 DIPHENHYDRAMINE PER 50 MG: Performed by: INTERNAL MEDICINE

## 2019-09-30 PROCEDURE — 63710000001 INSULIN LISPRO (HUMAN) PER 5 UNITS: Performed by: INTERNAL MEDICINE

## 2019-09-30 PROCEDURE — 25010000002 METHOTREXATE PF 100 MG/4ML SOLUTION 2 ML VIAL: Performed by: INTERNAL MEDICINE

## 2019-09-30 PROCEDURE — 99223 1ST HOSP IP/OBS HIGH 75: CPT | Performed by: INTERNAL MEDICINE

## 2019-09-30 PROCEDURE — 25010000002 RITUXIMAB 10 MG/ML SOLUTION 50 ML VIAL: Performed by: INTERNAL MEDICINE

## 2019-09-30 PROCEDURE — 88184 FLOWCYTOMETRY/ TC 1 MARKER: CPT | Performed by: INTERNAL MEDICINE

## 2019-09-30 PROCEDURE — 36415 COLL VENOUS BLD VENIPUNCTURE: CPT

## 2019-09-30 PROCEDURE — 25010000002 DOXORUBICIN PER 10 MG: Performed by: INTERNAL MEDICINE

## 2019-09-30 PROCEDURE — 85025 COMPLETE CBC W/AUTO DIFF WBC: CPT

## 2019-09-30 PROCEDURE — 25010000002 RITUXIMAB 10 MG/ML SOLUTION 10 ML VIAL: Performed by: INTERNAL MEDICINE

## 2019-09-30 PROCEDURE — 25010000003 LIDOCAINE 1 % SOLUTION: Performed by: RADIOLOGY

## 2019-09-30 PROCEDURE — 77003 FLUOROGUIDE FOR SPINE INJECT: CPT

## 2019-09-30 PROCEDURE — 82962 GLUCOSE BLOOD TEST: CPT

## 2019-09-30 PROCEDURE — 88185 FLOWCYTOMETRY/TC ADD-ON: CPT

## 2019-09-30 PROCEDURE — 63710000001 PREDNISONE PER 5 MG: Performed by: INTERNAL MEDICINE

## 2019-09-30 PROCEDURE — 25010000002 VINCRISTINE PER 1 MG: Performed by: INTERNAL MEDICINE

## 2019-09-30 PROCEDURE — 25010000002 ONDANSETRON PER 1 MG: Performed by: INTERNAL MEDICINE

## 2019-09-30 PROCEDURE — 96450 CHEMOTHERAPY INTO CNS: CPT

## 2019-09-30 PROCEDURE — 25010000002 ETOPOSIDE 500 MG/25ML SOLUTION 25 ML VIAL: Performed by: INTERNAL MEDICINE

## 2019-09-30 RX ORDER — POLYETHYLENE GLYCOL 3350 17 G/17G
17 POWDER, FOR SOLUTION ORAL DAILY
Status: DISCONTINUED | OUTPATIENT
Start: 2019-09-30 | End: 2019-10-04 | Stop reason: HOSPADM

## 2019-09-30 RX ORDER — PANTOPRAZOLE SODIUM 40 MG/1
40 TABLET, DELAYED RELEASE ORAL
Status: COMPLETED | OUTPATIENT
Start: 2019-09-30 | End: 2019-10-04

## 2019-09-30 RX ORDER — FAMOTIDINE 10 MG/ML
20 INJECTION, SOLUTION INTRAVENOUS AS NEEDED
Status: DISCONTINUED | OUTPATIENT
Start: 2019-09-30 | End: 2019-10-04 | Stop reason: HOSPADM

## 2019-09-30 RX ORDER — ONDANSETRON 2 MG/ML
4 INJECTION INTRAMUSCULAR; INTRAVENOUS EVERY 6 HOURS PRN
Status: CANCELLED | OUTPATIENT
Start: 2019-09-30

## 2019-09-30 RX ORDER — SENNA AND DOCUSATE SODIUM 50; 8.6 MG/1; MG/1
1 TABLET, FILM COATED ORAL DAILY
Status: DISCONTINUED | OUTPATIENT
Start: 2019-09-30 | End: 2019-10-02

## 2019-09-30 RX ORDER — SULFAMETHOXAZOLE AND TRIMETHOPRIM 800; 160 MG/1; MG/1
1 TABLET ORAL 3 TIMES WEEKLY
Status: DISCONTINUED | OUTPATIENT
Start: 2019-09-30 | End: 2019-10-04 | Stop reason: HOSPADM

## 2019-09-30 RX ORDER — MEPERIDINE HYDROCHLORIDE 50 MG/ML
25 INJECTION INTRAMUSCULAR; INTRAVENOUS; SUBCUTANEOUS
Status: ACTIVE | OUTPATIENT
Start: 2019-09-30 | End: 2019-10-01

## 2019-09-30 RX ORDER — PREDNISONE 50 MG/1
100 TABLET ORAL
Status: COMPLETED | OUTPATIENT
Start: 2019-09-30 | End: 2019-09-30

## 2019-09-30 RX ORDER — CETIRIZINE HYDROCHLORIDE 10 MG/1
10 TABLET ORAL DAILY
Status: DISCONTINUED | OUTPATIENT
Start: 2019-09-30 | End: 2019-10-04 | Stop reason: HOSPADM

## 2019-09-30 RX ORDER — PROCHLORPERAZINE MALEATE 10 MG
10 TABLET ORAL EVERY 6 HOURS PRN
Status: DISCONTINUED | OUTPATIENT
Start: 2019-09-30 | End: 2019-10-04 | Stop reason: HOSPADM

## 2019-09-30 RX ORDER — DEXTROSE MONOHYDRATE 25 G/50ML
25 INJECTION, SOLUTION INTRAVENOUS
Status: DISCONTINUED | OUTPATIENT
Start: 2019-09-30 | End: 2019-10-04 | Stop reason: HOSPADM

## 2019-09-30 RX ORDER — PREDNISONE 50 MG/1
100 TABLET ORAL 2 TIMES DAILY WITH MEALS
Status: DISCONTINUED | OUTPATIENT
Start: 2019-09-30 | End: 2019-10-04 | Stop reason: HOSPADM

## 2019-09-30 RX ORDER — LIDOCAINE HYDROCHLORIDE 10 MG/ML
10 INJECTION, SOLUTION INFILTRATION; PERINEURAL ONCE
Status: COMPLETED | OUTPATIENT
Start: 2019-09-30 | End: 2019-09-30

## 2019-09-30 RX ORDER — SODIUM CHLORIDE 9 MG/ML
75 INJECTION, SOLUTION INTRAVENOUS CONTINUOUS
Status: CANCELLED | OUTPATIENT
Start: 2019-09-30

## 2019-09-30 RX ORDER — PROCHLORPERAZINE MALEATE 10 MG
10 TABLET ORAL EVERY 6 HOURS PRN
Status: CANCELLED | OUTPATIENT
Start: 2019-09-30

## 2019-09-30 RX ORDER — FAMOTIDINE 10 MG/ML
20 INJECTION, SOLUTION INTRAVENOUS AS NEEDED
Status: CANCELLED | OUTPATIENT
Start: 2019-09-30

## 2019-09-30 RX ORDER — LISINOPRIL 10 MG/1
10 TABLET ORAL DAILY
Status: DISCONTINUED | OUTPATIENT
Start: 2019-09-30 | End: 2019-10-04 | Stop reason: HOSPADM

## 2019-09-30 RX ORDER — SODIUM CHLORIDE 9 MG/ML
75 INJECTION, SOLUTION INTRAVENOUS CONTINUOUS
Status: DISCONTINUED | OUTPATIENT
Start: 2019-09-30 | End: 2019-10-04 | Stop reason: HOSPADM

## 2019-09-30 RX ORDER — MEPERIDINE HYDROCHLORIDE 50 MG/ML
25 INJECTION INTRAMUSCULAR; INTRAVENOUS; SUBCUTANEOUS
Status: CANCELLED | OUTPATIENT
Start: 2019-09-30 | End: 2019-10-01

## 2019-09-30 RX ORDER — PREDNISONE 20 MG/1
100 TABLET ORAL 2 TIMES DAILY WITH MEALS
Status: CANCELLED | OUTPATIENT
Start: 2019-09-30

## 2019-09-30 RX ORDER — ACETAMINOPHEN 325 MG/1
650 TABLET ORAL ONCE
Status: COMPLETED | OUTPATIENT
Start: 2019-09-30 | End: 2019-09-30

## 2019-09-30 RX ORDER — ACETAMINOPHEN 325 MG/1
650 TABLET ORAL ONCE
Status: CANCELLED | OUTPATIENT
Start: 2019-09-30

## 2019-09-30 RX ORDER — PANTOPRAZOLE SODIUM 40 MG/1
40 TABLET, DELAYED RELEASE ORAL
Status: CANCELLED | OUTPATIENT
Start: 2019-09-30

## 2019-09-30 RX ORDER — NICOTINE POLACRILEX 4 MG
15 LOZENGE BUCCAL
Status: DISCONTINUED | OUTPATIENT
Start: 2019-09-30 | End: 2019-10-04 | Stop reason: HOSPADM

## 2019-09-30 RX ORDER — ACETAMINOPHEN 325 MG/1
650 TABLET ORAL EVERY 4 HOURS PRN
Status: DISCONTINUED | OUTPATIENT
Start: 2019-09-30 | End: 2019-10-04 | Stop reason: HOSPADM

## 2019-09-30 RX ORDER — ACYCLOVIR 200 MG/1
400 CAPSULE ORAL 2 TIMES DAILY
Status: DISCONTINUED | OUTPATIENT
Start: 2019-09-30 | End: 2019-10-04 | Stop reason: HOSPADM

## 2019-09-30 RX ORDER — ONDANSETRON 2 MG/ML
4 INJECTION INTRAMUSCULAR; INTRAVENOUS EVERY 6 HOURS PRN
Status: DISCONTINUED | OUTPATIENT
Start: 2019-09-30 | End: 2019-10-04 | Stop reason: HOSPADM

## 2019-09-30 RX ORDER — DIPHENHYDRAMINE HYDROCHLORIDE 50 MG/ML
50 INJECTION INTRAMUSCULAR; INTRAVENOUS AS NEEDED
Status: CANCELLED | OUTPATIENT
Start: 2019-09-30

## 2019-09-30 RX ORDER — DIPHENHYDRAMINE HYDROCHLORIDE 50 MG/ML
50 INJECTION INTRAMUSCULAR; INTRAVENOUS AS NEEDED
Status: DISCONTINUED | OUTPATIENT
Start: 2019-09-30 | End: 2019-10-04 | Stop reason: HOSPADM

## 2019-09-30 RX ADMIN — ONDANSETRON 16 MG: 2 INJECTION INTRAMUSCULAR; INTRAVENOUS at 15:14

## 2019-09-30 RX ADMIN — ACETAMINOPHEN 650 MG: 325 TABLET, FILM COATED ORAL at 12:57

## 2019-09-30 RX ADMIN — PANTOPRAZOLE SODIUM 40 MG: 40 TABLET, DELAYED RELEASE ORAL at 13:48

## 2019-09-30 RX ADMIN — INSULIN LISPRO 4 UNITS: 100 INJECTION, SOLUTION INTRAVENOUS; SUBCUTANEOUS at 17:19

## 2019-09-30 RX ADMIN — INSULIN LISPRO 6 UNITS: 100 INJECTION, SOLUTION INTRAVENOUS; SUBCUTANEOUS at 21:58

## 2019-09-30 RX ADMIN — DIPHENHYDRAMINE HYDROCHLORIDE 25 MG: 50 INJECTION, SOLUTION INTRAMUSCULAR; INTRAVENOUS at 12:57

## 2019-09-30 RX ADMIN — PREDNISONE 100 MG: 50 TABLET ORAL at 18:57

## 2019-09-30 RX ADMIN — SULFAMETHOXAZOLE AND TRIMETHOPRIM 160 MG: 800; 160 TABLET ORAL at 13:45

## 2019-09-30 RX ADMIN — ACYCLOVIR 400 MG: 200 CAPSULE ORAL at 13:46

## 2019-09-30 RX ADMIN — Medication 400 MG: at 13:45

## 2019-09-30 RX ADMIN — SODIUM CHLORIDE 12 MG: 9 INJECTION, SOLUTION INTRAMUSCULAR; INTRAVENOUS; SUBCUTANEOUS at 11:04

## 2019-09-30 RX ADMIN — VINCRISTINE SULFATE: 1 INJECTION, SOLUTION INTRAVENOUS at 15:29

## 2019-09-30 RX ADMIN — LIDOCAINE HYDROCHLORIDE 6 ML: 10 INJECTION, SOLUTION INFILTRATION; PERINEURAL at 10:50

## 2019-09-30 RX ADMIN — CETIRIZINE HYDROCHLORIDE 10 MG: 10 TABLET, FILM COATED ORAL at 13:45

## 2019-09-30 RX ADMIN — PREDNISONE 100 MG: 50 TABLET ORAL at 13:44

## 2019-09-30 RX ADMIN — ACYCLOVIR 400 MG: 200 CAPSULE ORAL at 21:58

## 2019-09-30 RX ADMIN — SODIUM CHLORIDE 75 ML/HR: 9 INJECTION, SOLUTION INTRAVENOUS at 12:57

## 2019-09-30 RX ADMIN — RITUXIMAB 650 MG: 10 INJECTION, SOLUTION INTRAVENOUS at 13:35

## 2019-09-30 RX ADMIN — LINAGLIPTIN 5 MG: 5 TABLET, FILM COATED ORAL at 13:45

## 2019-10-01 LAB
ALBUMIN SERPL-MCNC: 3.8 G/DL (ref 3.5–5.2)
ALBUMIN/GLOB SERPL: 2.4 G/DL
ALP SERPL-CCNC: 77 U/L (ref 39–117)
ALT SERPL W P-5'-P-CCNC: 9 U/L (ref 1–41)
ANION GAP SERPL CALCULATED.3IONS-SCNC: 11.1 MMOL/L (ref 5–15)
AST SERPL-CCNC: 13 U/L (ref 1–40)
BASOPHILS # BLD AUTO: 0.02 10*3/MM3 (ref 0–0.2)
BASOPHILS NFR BLD AUTO: 0.2 % (ref 0–1.5)
BILIRUB SERPL-MCNC: 0.2 MG/DL (ref 0.2–1.2)
BUN BLD-MCNC: 20 MG/DL (ref 8–23)
BUN/CREAT SERPL: 35.1 (ref 7–25)
CALCIUM SPEC-SCNC: 8 MG/DL (ref 8.6–10.5)
CHLORIDE SERPL-SCNC: 102 MMOL/L (ref 98–107)
CO2 SERPL-SCNC: 24.9 MMOL/L (ref 22–29)
CREAT BLD-MCNC: 0.57 MG/DL (ref 0.76–1.27)
DEPRECATED RDW RBC AUTO: 59 FL (ref 37–54)
EOSINOPHIL # BLD AUTO: 0 10*3/MM3 (ref 0–0.4)
EOSINOPHIL NFR BLD AUTO: 0 % (ref 0.3–6.2)
ERYTHROCYTE [DISTWIDTH] IN BLOOD BY AUTOMATED COUNT: 17.1 % (ref 12.3–15.4)
GFR SERPL CREATININE-BSD FRML MDRD: 139 ML/MIN/1.73
GLOBULIN UR ELPH-MCNC: 1.6 GM/DL
GLUCOSE BLD-MCNC: 176 MG/DL (ref 65–99)
GLUCOSE BLDC GLUCOMTR-MCNC: 141 MG/DL (ref 70–130)
GLUCOSE BLDC GLUCOMTR-MCNC: 197 MG/DL (ref 70–130)
GLUCOSE BLDC GLUCOMTR-MCNC: 203 MG/DL (ref 70–130)
GLUCOSE BLDC GLUCOMTR-MCNC: 205 MG/DL (ref 70–130)
HCT VFR BLD AUTO: 26.6 % (ref 37.5–51)
HGB BLD-MCNC: 9 G/DL (ref 13–17.7)
IMM GRANULOCYTES # BLD AUTO: 0.2 10*3/MM3 (ref 0–0.05)
IMM GRANULOCYTES NFR BLD AUTO: 1.8 % (ref 0–0.5)
LYMPHOCYTES # BLD AUTO: 0.32 10*3/MM3 (ref 0.7–3.1)
LYMPHOCYTES NFR BLD AUTO: 2.8 % (ref 19.6–45.3)
MAGNESIUM SERPL-MCNC: 2.2 MG/DL (ref 1.6–2.4)
MCH RBC QN AUTO: 31.9 PG (ref 26.6–33)
MCHC RBC AUTO-ENTMCNC: 33.8 G/DL (ref 31.5–35.7)
MCV RBC AUTO: 94.3 FL (ref 79–97)
MONOCYTES # BLD AUTO: 0.13 10*3/MM3 (ref 0.1–0.9)
MONOCYTES NFR BLD AUTO: 1.1 % (ref 5–12)
NEUTROPHILS # BLD AUTO: 10.75 10*3/MM3 (ref 1.7–7)
NEUTROPHILS NFR BLD AUTO: 94.1 % (ref 42.7–76)
NRBC BLD AUTO-RTO: 0 /100 WBC (ref 0–0.2)
PLATELET # BLD AUTO: 104 10*3/MM3 (ref 140–450)
PMV BLD AUTO: 9.7 FL (ref 6–12)
POTASSIUM BLD-SCNC: 4.3 MMOL/L (ref 3.5–5.2)
PROT SERPL-MCNC: 5.4 G/DL (ref 6–8.5)
RBC # BLD AUTO: 2.82 10*6/MM3 (ref 4.14–5.8)
REF LAB TEST METHOD: NORMAL
SODIUM BLD-SCNC: 138 MMOL/L (ref 136–145)
WBC NRBC COR # BLD: 11.42 10*3/MM3 (ref 3.4–10.8)

## 2019-10-01 PROCEDURE — 25010000002 ENOXAPARIN PER 10 MG: Performed by: INTERNAL MEDICINE

## 2019-10-01 PROCEDURE — 82962 GLUCOSE BLOOD TEST: CPT

## 2019-10-01 PROCEDURE — 25010000002 ETOPOSIDE 500 MG/25ML SOLUTION 25 ML VIAL: Performed by: INTERNAL MEDICINE

## 2019-10-01 PROCEDURE — 25010000002 VINCRISTINE PER 1 MG: Performed by: INTERNAL MEDICINE

## 2019-10-01 PROCEDURE — 25010000002 DOXORUBICIN PER 10 MG: Performed by: INTERNAL MEDICINE

## 2019-10-01 PROCEDURE — 83735 ASSAY OF MAGNESIUM: CPT | Performed by: INTERNAL MEDICINE

## 2019-10-01 PROCEDURE — 85025 COMPLETE CBC W/AUTO DIFF WBC: CPT | Performed by: INTERNAL MEDICINE

## 2019-10-01 PROCEDURE — 99232 SBSQ HOSP IP/OBS MODERATE 35: CPT | Performed by: INTERNAL MEDICINE

## 2019-10-01 PROCEDURE — 63710000001 PREDNISONE PER 5 MG: Performed by: INTERNAL MEDICINE

## 2019-10-01 PROCEDURE — 63710000001 INSULIN LISPRO (HUMAN) PER 5 UNITS: Performed by: INTERNAL MEDICINE

## 2019-10-01 PROCEDURE — 80053 COMPREHEN METABOLIC PANEL: CPT | Performed by: INTERNAL MEDICINE

## 2019-10-01 PROCEDURE — 25010000002 ONDANSETRON PER 1 MG: Performed by: INTERNAL MEDICINE

## 2019-10-01 RX ADMIN — ACYCLOVIR 400 MG: 200 CAPSULE ORAL at 21:44

## 2019-10-01 RX ADMIN — LINAGLIPTIN 5 MG: 5 TABLET, FILM COATED ORAL at 08:43

## 2019-10-01 RX ADMIN — CETIRIZINE HYDROCHLORIDE 10 MG: 10 TABLET, FILM COATED ORAL at 08:43

## 2019-10-01 RX ADMIN — LISINOPRIL 10 MG: 10 TABLET ORAL at 08:43

## 2019-10-01 RX ADMIN — SENNOSIDES AND DOCUSATE SODIUM 1 TABLET: 8.6; 5 TABLET ORAL at 08:43

## 2019-10-01 RX ADMIN — PREDNISONE 100 MG: 50 TABLET ORAL at 18:54

## 2019-10-01 RX ADMIN — ENOXAPARIN SODIUM 40 MG: 40 INJECTION SUBCUTANEOUS at 15:16

## 2019-10-01 RX ADMIN — PANTOPRAZOLE SODIUM 40 MG: 40 TABLET, DELAYED RELEASE ORAL at 06:34

## 2019-10-01 RX ADMIN — INSULIN LISPRO 4 UNITS: 100 INJECTION, SOLUTION INTRAVENOUS; SUBCUTANEOUS at 21:44

## 2019-10-01 RX ADMIN — INSULIN LISPRO 4 UNITS: 100 INJECTION, SOLUTION INTRAVENOUS; SUBCUTANEOUS at 12:42

## 2019-10-01 RX ADMIN — VINCRISTINE SULFATE: 1 INJECTION, SOLUTION INTRAVENOUS at 14:40

## 2019-10-01 RX ADMIN — ACYCLOVIR 400 MG: 200 CAPSULE ORAL at 08:43

## 2019-10-01 RX ADMIN — SODIUM CHLORIDE 75 ML/HR: 9 INJECTION, SOLUTION INTRAVENOUS at 03:34

## 2019-10-01 RX ADMIN — Medication 400 MG: at 08:43

## 2019-10-01 RX ADMIN — ONDANSETRON 16 MG: 2 INJECTION INTRAMUSCULAR; INTRAVENOUS at 14:37

## 2019-10-01 RX ADMIN — INSULIN LISPRO 2 UNITS: 100 INJECTION, SOLUTION INTRAVENOUS; SUBCUTANEOUS at 08:42

## 2019-10-01 RX ADMIN — PREDNISONE 100 MG: 50 TABLET ORAL at 12:43

## 2019-10-01 NOTE — PROGRESS NOTES
"   LOS: 1 day       Chief Complaint:    1. 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 Chemotherapy-induced anemia  3. Diabetes mellitus      Interval History: The patient tolerated intrathecal methotrexate well yesterday, no complications.  He does report mild constipation today, otherwise has no significant complaints.  Denies nausea.        Review of Systems:    Review of systems was obtained with pertinent positive findings as noted in the interval history.  All other systems negative.    Objective     Vital Signs  Temp:  [96.9 °F (36.1 °C)-98.1 °F (36.7 °C)] 97 °F (36.1 °C)  Heart Rate:  [] 66  Resp:  [16] 16  BP: (102-125)/(65-74) 124/66        Physical Exam:     GENERAL:  Well-developed, well-nourished in no acute distress.   MOUTH:  Tongue is well-papillated; no stomatitis or ulcers.  Lips normal.  THROAT:  Oropharynx without lesions or exudates.  CHEST:  Lungs clear to percussion and auscultation. Good airflow.  CARDIAC:  Regular rate and rhythm with 2/6 murmur  ABDOMEN:  Soft, nontender with no organomegaly or masses.  EXTREMITIES:  No clubbing, cyanosis or edema.  NEUROLOGICAL:  Cranial Nerves II-XII grossly intact.  No focal neurological deficits.  PSYCHIATRIC:  Normal affect and mood.           Results Review:     I reviewed the patient's new clinical results.    Results from last 7 days   Lab Units 10/01/19  0448   WBC 10*3/mm3 11.42*   HEMOGLOBIN g/dL 9.0*   HEMATOCRIT % 26.6*   PLATELETS 10*3/mm3 104*     Lab Results   Component Value Date    NEUTROABS 10.75 (H) 10/01/2019     Results from last 7 days   Lab Units 10/01/19  0448   SODIUM mmol/L 138   POTASSIUM mmol/L 4.3   CHLORIDE mmol/L 102   CO2 mmol/L 24.9   BUN mg/dL 20   CREATININE mg/dL 0.57*   GLUCOSE mg/dL 176*   CALCIUM mg/dL 8.0*         Results from last 7 days   Lab Units 10/01/19  0448   MAGNESIUM mg/dL 2.2         Medication Review: Yes     Assessment/Plan     1. Stage Carlos Eduardo \"double hit\" high " grade B-cell non-Hodgkin's lymphoma:  · CD20 positive, Ki-67 98%, germinal center immunophenotype, MYC and BCL-2 rearrangements present.    · LP and bone marrow biopsies negative for involvement.    · Initial PET scan indicated perinephric soft tissue lesions. hypermetabolic mesenteric lymphadenopathy, left L5 paraspinous mass, an intensely hypermetabolic 6 cm intramuscular mass in the upper right thigh, a right upper lobe nodular opacity, a hypermetabolic mass adjacent to the right scapula, and a lesion within the left calvicle.  Paraspinous involvement puts him at extremely high risk for CNS involvement.    · Plan 6 cycles of inpatient treatment with dose adjusted R-EPOCH.  Plan CNS prophylaxis with IT methotrexate with each cycle of therapy.  · Admission to [facility] on 6/19/2019 for cycle #1 of therapy. Received Neulasta on discharge on 6/22/2019.  · Admit 7/8 for cycle #2.  Per the protocol with neutropenia x1 day, we proceeded with the same doses as cycle #1.  Slight dose adjustment for his decreased weight.  · CT scans 7/22/2019 with significant response, near complete resolution of perinephric lesions, decrease in left paraspinous mass at L5, decreased mesenteric lymphadenopathy, decreased mediastinal lymph nodes, near complete resolution right posterior chest wall mass and left cervical lymph node, resolution of right upper lobe pulmonary nodule.  · Cycle #3 initiated 7/29/2019.  Lumbar puncture scheduled that day, flow cytometry negative.   Per the protocol, his doses of Adriamycin, etoposide, and Cytoxan increased by 20%.  · Admit 8/19/2019 for cycle #4 of therapy, 20% dose increase per protocol.  · LP on admission 8/19/2019 with prophylactic methotrexate administered.  Flow cytometry negative.  · With previous volume overload, decreased IV fluid rate to 50 cc/h (currently 75 cc/h).  · With cycle 4, sim platelet count 21,000, therefore cycle 5 reduced by 20% in Adriamycin, etoposide,  Cytoxan per protocol.  · Patient admitted 9/30/2019 for cycle 6 DA-EPOCH-R.  Due to sim platelet count 23,000 on 9/19/2019, doses reduced for Adriamycin, etoposide, Cytoxan per protocol.  · Patient underwent LP with intrathecal methotrexate administered on 9/30/2019.  Flow cytometry pending on CSF.  · Today is cycle 6-day 2 DA-EPOCH-R.  Patient has tolerated treatment well with no significant side effects or complications.  Proceed with chemotherapy as planned with final dose of Cytoxan this evening prior to discharge. Scheduled for CBC and Neulasta on 10/7/19 (day 8) and he is scheduled for twice weekly CBCs with RN review x2 weeks, PET scan 10/15/19, and visit with Dr. Garcia on 10/21/19.  2. Chemotherapy-induced anemia:  · Anemia evaluation negative 4/10/2019   · Anemia evaluation 8/22/2019 with iron 223, ferritin 286, iron saturation 85%, TIBC 264, folate 13.6, B12 greater than 2000.  Consistent as expected with anemia secondary to chronic disease/chemotherapy.  · Patient has required intermittent transfusion support on chemotherapy.  · Hemoglobin today has declined to 9.0, continue to monitor daily with potential need for transfusion.  3. Diabetes mellitus:  · Currently receiving Tradjenta 5 mg daily, sliding scale insulin  · Exacerbated by steroids  · Glucose currently in 200 range  4. Rheumatoid arthritis:  · Patient previously on Cimzia, currently off treatment due to diagnosis of lymphoma.  No current symptoms.  6. Constipation:  · Currently continuing on Senokot 1 daily and MiraLAX daily  8. Antibiotic prophylaxis:  · Acyclovir 400 mg twice daily  · Bactrim DS 1 p.o. Monday Wednesday Friday   9. GI prophylaxis:  · Protonix 40 mg daily  10. DVT prophylaxis:  · Lovenox 40 mg daily    Plan:  1. Proceed with cycle 6-day 2 DA-EPOCH-R  2. CSF flow cytometry pending from 9/30/2019  3. Daily CBC, CMP, magnesium        Cristhian Silva MD  10/01/19  7:36 AM

## 2019-10-01 NOTE — PROGRESS NOTES
Discharge Planning Assessment  [facility]     Patient Name: Edward Srinivasan  MRN: [id number]  Today's Date: 10/1/2019    Admit Date: 9/30/2019    Discharge Needs Assessment     Row Name 10/01/19 7242       Living Environment    Lives With  spouse    Name(s) of Who Lives With Patient  wife of 58 years Radha Srinivasan [phone]     Current Living Arrangements  home/apartment/condo    Primary Care Provided by  self;spouse/significant other    Provides Primary Care For  no one, unable/limited ability to care for self    Family Caregiver if Needed  spouse    Family Caregiver Names  wife of 58 years Radha Srinivasan [phone]     Quality of Family Relationships  helpful;involved;supportive    Able to Return to Prior Arrangements  yes       Resource/Environmental Concerns    Resource/Environmental Concerns  home accessibility    Home Accessibility Concerns  stairs to enter home 2 steps to enter his single story home with a basement. The home has 13 steps with 1 handrail to get to the basement.     Transportation Concerns  car, none       Transition Planning    Patient/Family Anticipates Transition to  home with family    Patient/Family Anticipated Services at Transition  none    Transportation Anticipated  family or friend will provide       Discharge Needs Assessment    Concerns to be Addressed  denies needs/concerns at this time    Equipment Currently Used at Home  cane, straight    Anticipated Changes Related to Illness  none    Equipment Needed After Discharge  none    Offered/Gave Vendor List  yes    Patient's Choice of Community Agency(s)  The patient was provided with a HH/SNF list.     Current Discharge Risk  physical impairment        Discharge Plan     Row Name 10/01/19 8648       Plan    Plan  Home with spouse; denies any d/c needs.  Follow to see if the paitent will need Lovenox or Humalog upon d/c.     Patient/Family in Agreement with Plan  yes    Plan Comments  Met with the patient and his wife of 58 years Radha  Craig [phone] at bedside; explained role of CCP, verified facesheet, checked IMM and discussed discharge planning needs.  The patient plans to return home with assistance from his wife. The patient has a cane and has 2 steps to enter his single story home with a basement. The home has 13 steps with 1 handrail to get to the basement. The patient's PCP is Freddie Cannon, pharmacy is [facility] on [address] and he denies any trouble remembering to take his medication or with affording his medication. The patient denies any HH/SNF history, was provided with a HH/SNF list and CCP telephone contact number.  The patient's living will is in [place].  The patient's wife drives him to his appointments therefore she will also transport him home upon d/c.  The patient states that he was on Tradjenta prior to admission.  CCP will follow to see if the patient will need Lovenox or Humalog upon d/c and will assist as needed.  Chelly Lund, JEREMY        Destination      No service coordination in this encounter.      Durable Medical Equipment      No service coordination in this encounter.      Dialysis/Infusion      No service coordination in this encounter.      Home Medical Care      No service coordination in this encounter.      Therapy      No service coordination in this encounter.      Community Resources      No service coordination in this encounter.          Demographic Summary     Row Name 10/01/19 1204       General Information    Admission Type  inpatient    Arrived From  home    Referral Source  admission list    Reason for Consult  discharge planning    Preferred Language  English     Used During This Interaction  no        Functional Status     Row Name 10/01/19 1704       Functional Status    Usual Activity Tolerance  good    Current Activity Tolerance  moderate       Functional Status, IADL    Medications  independent    Meal Preparation  independent    Housekeeping  independent    Laundry  independent     Shopping  independent       Mental Status    General Appearance WDL  WDL       Mental Status Summary    Recent Changes in Mental Status/Cognitive Functioning  no changes        Psychosocial    No documentation.       Abuse/Neglect    No documentation.       Legal    No documentation.       Substance Abuse    No documentation.       Patient Forms     Row Name 10/01/19 1709       Patient Forms    Provider Choice List  Delivered    Delivered to  Patient The patient was provided with a HH/SNF list.     Method of delivery  In person            SAM Gandara

## 2019-10-01 NOTE — PLAN OF CARE
Problem: Patient Care Overview  Goal: Plan of Care Review  Outcome: Ongoing (interventions implemented as appropriate)   10/01/19 1718   Coping/Psychosocial   Plan of Care Reviewed With patient   OTHER   Outcome Summary A&Ox4. Up adlib, ambulating unit. Cycle 6 Day 2 EPOCH. VSS. No c/o pain, n/v/d. Family at bedside. Will continue to monitor.    Plan of Care Review   Progress no change     Goal: Individualization and Mutuality  Outcome: Ongoing (interventions implemented as appropriate)    Goal: Discharge Needs Assessment  Outcome: Ongoing (interventions implemented as appropriate)    Goal: Interprofessional Rounds/Family Conf  Outcome: Ongoing (interventions implemented as appropriate)      Problem: Chemotherapy Effects (Adult)  Goal: Signs and Symptoms of Listed Potential Problems Will be Absent, Minimized or Managed (Chemotherapy Effects)  Outcome: Ongoing (interventions implemented as appropriate)

## 2019-10-01 NOTE — PLAN OF CARE
Problem: Patient Care Overview  Goal: Plan of Care Review  Outcome: Ongoing (interventions implemented as appropriate)   10/01/19 7456   Coping/Psychosocial   Plan of Care Reviewed With patient   OTHER   Outcome Summary PATIENT HAD A GOOD NIGHT. VITAL SIGNS STABLE AND PATIENT VOICES NO COMPLAINTS OF ANYKIND. CHEMO INFUSING WITHOUT INCIDENT. WILL CONTINUE TO MONITOR. HEMOGLOBIN DROPPED TO 9.0 THIS AM FROM 10.8 YESTERDAY.

## 2019-10-01 NOTE — NURSING NOTE
"Nursing Chemotherapy Verification    Chemotherapy Regimen:   Treatment Plans     Name Type Plan dates Plan Provider         Active    IP LYMPHOMA R-EPOCH (Dose Adjusted) RiTUXimab / Etoposide / DOXOrubicin / VinCRIStine / Cyclophosphamide / PredniSONE ONCOLOGY TREATMENT  5/27/2019 - Present Gabriele Garcia MD                     Current height and weight: 170.2 cm (67.01\") 64 kg (141 lb 3.2 oz)  Calculated BSA from current height and weight (Jaz): 1.74    Relevant Labs  Results from last 7 days   Lab Units 10/01/19  0448 09/30/19  0743 09/26/19  0854   WBC 10*3/mm3 11.42* 17.74* 18.09*   HEMOGLOBIN g/dL 9.0* 10.8* 11.1*   HEMATOCRIT % 26.6* 32.6* 34.3*   PLATELETS 10*3/mm3 104* 120* 124*     Lab Results   Component Value Date    NEUTROABS 10.75 (H) 10/01/2019       Results from last 7 days   Lab Units 10/01/19  0448   CREATININE mg/dL 0.57*       Serum creatinine: 0.57 mg/dL (L) 10/01/19 0448  Estimated creatinine clearance: 70 mL/min (A)    Lab Results   Component Value Date    HEPBCAB Negative 06/17/2019       Verification attestation:  I have personally reviewed the planned regimen and its administration and dosing. I understand the potential side effects.The patient has been instructed on the regimen, potential side effects, and self care measures; the consent form has been completed. I have confirmed that the appropriate premedication, prehydration, post medication and/or emergency medications are ordered in addition to the chemotherapy.    I have independently verified that the height and weight is current and calculated the BSA. I have verified the doses with the planned regimen and have clarified any deviations with the physician (Dr. Silva). I have confirmed the route of administration and patient IV access.    Nurse: Clare Berry RN  2nd verification Nurse: Siri Cope RN     "

## 2019-10-02 DIAGNOSIS — C83.33 DIFFUSE LARGE B-CELL LYMPHOMA OF INTRA-ABDOMINAL LYMPH NODES (HCC): Primary | ICD-10-CM

## 2019-10-02 DIAGNOSIS — D64.81 ANEMIA ASSOCIATED WITH CHEMOTHERAPY: ICD-10-CM

## 2019-10-02 DIAGNOSIS — T45.1X5A ANEMIA ASSOCIATED WITH CHEMOTHERAPY: ICD-10-CM

## 2019-10-02 LAB
ALBUMIN SERPL-MCNC: 3.7 G/DL (ref 3.5–5.2)
ALBUMIN/GLOB SERPL: 1.9 G/DL
ALP SERPL-CCNC: 74 U/L (ref 39–117)
ALT SERPL W P-5'-P-CCNC: 13 U/L (ref 1–41)
ANION GAP SERPL CALCULATED.3IONS-SCNC: 8.2 MMOL/L (ref 5–15)
AST SERPL-CCNC: 12 U/L (ref 1–40)
BASOPHILS # BLD AUTO: 0.01 10*3/MM3 (ref 0–0.2)
BASOPHILS NFR BLD AUTO: 0.1 % (ref 0–1.5)
BILIRUB SERPL-MCNC: 0.2 MG/DL (ref 0.2–1.2)
BUN BLD-MCNC: 20 MG/DL (ref 8–23)
BUN/CREAT SERPL: 23.8 (ref 7–25)
CALCIUM SPEC-SCNC: 8.1 MG/DL (ref 8.6–10.5)
CHLORIDE SERPL-SCNC: 101 MMOL/L (ref 98–107)
CO2 SERPL-SCNC: 26.8 MMOL/L (ref 22–29)
CREAT BLD-MCNC: 0.84 MG/DL (ref 0.76–1.27)
DEPRECATED RDW RBC AUTO: 59.2 FL (ref 37–54)
EOSINOPHIL # BLD AUTO: 0 10*3/MM3 (ref 0–0.4)
EOSINOPHIL NFR BLD AUTO: 0 % (ref 0.3–6.2)
ERYTHROCYTE [DISTWIDTH] IN BLOOD BY AUTOMATED COUNT: 17.1 % (ref 12.3–15.4)
GFR SERPL CREATININE-BSD FRML MDRD: 89 ML/MIN/1.73
GLOBULIN UR ELPH-MCNC: 2 GM/DL
GLUCOSE BLD-MCNC: 233 MG/DL (ref 65–99)
GLUCOSE BLDC GLUCOMTR-MCNC: 239 MG/DL (ref 70–130)
GLUCOSE BLDC GLUCOMTR-MCNC: 267 MG/DL (ref 70–130)
GLUCOSE BLDC GLUCOMTR-MCNC: 270 MG/DL (ref 70–130)
GLUCOSE BLDC GLUCOMTR-MCNC: 287 MG/DL (ref 70–130)
HCT VFR BLD AUTO: 26.3 % (ref 37.5–51)
HGB BLD-MCNC: 8.7 G/DL (ref 13–17.7)
LYMPHOCYTES # BLD AUTO: 0.18 10*3/MM3 (ref 0.7–3.1)
LYMPHOCYTES NFR BLD AUTO: 1.6 % (ref 19.6–45.3)
MAGNESIUM SERPL-MCNC: 2.1 MG/DL (ref 1.6–2.4)
MCH RBC QN AUTO: 31.9 PG (ref 26.6–33)
MCHC RBC AUTO-ENTMCNC: 33.1 G/DL (ref 31.5–35.7)
MCV RBC AUTO: 96.3 FL (ref 79–97)
MONOCYTES # BLD AUTO: 0.11 10*3/MM3 (ref 0.1–0.9)
MONOCYTES NFR BLD AUTO: 1 % (ref 5–12)
NEUTROPHILS # BLD AUTO: 10.74 10*3/MM3 (ref 1.7–7)
NEUTROPHILS NFR BLD AUTO: 96.4 % (ref 42.7–76)
PLATELET # BLD AUTO: 104 10*3/MM3 (ref 140–450)
PMV BLD AUTO: 9.5 FL (ref 6–12)
POTASSIUM BLD-SCNC: 4.2 MMOL/L (ref 3.5–5.2)
PROT SERPL-MCNC: 5.7 G/DL (ref 6–8.5)
RBC # BLD AUTO: 2.73 10*6/MM3 (ref 4.14–5.8)
SODIUM BLD-SCNC: 136 MMOL/L (ref 136–145)
WBC NRBC COR # BLD: 11.14 10*3/MM3 (ref 3.4–10.8)

## 2019-10-02 PROCEDURE — 63710000001 PREDNISONE PER 5 MG: Performed by: INTERNAL MEDICINE

## 2019-10-02 PROCEDURE — 25010000002 VINCRISTINE PER 1 MG: Performed by: INTERNAL MEDICINE

## 2019-10-02 PROCEDURE — 82962 GLUCOSE BLOOD TEST: CPT

## 2019-10-02 PROCEDURE — 25010000002 ENOXAPARIN PER 10 MG: Performed by: INTERNAL MEDICINE

## 2019-10-02 PROCEDURE — 63710000001 INSULIN LISPRO (HUMAN) PER 5 UNITS: Performed by: INTERNAL MEDICINE

## 2019-10-02 PROCEDURE — 80053 COMPREHEN METABOLIC PANEL: CPT | Performed by: INTERNAL MEDICINE

## 2019-10-02 PROCEDURE — 25010000002 ETOPOSIDE 500 MG/25ML SOLUTION 25 ML VIAL: Performed by: INTERNAL MEDICINE

## 2019-10-02 PROCEDURE — 83735 ASSAY OF MAGNESIUM: CPT | Performed by: INTERNAL MEDICINE

## 2019-10-02 PROCEDURE — 85025 COMPLETE CBC W/AUTO DIFF WBC: CPT | Performed by: INTERNAL MEDICINE

## 2019-10-02 PROCEDURE — 25010000002 DOXORUBICIN PER 10 MG: Performed by: INTERNAL MEDICINE

## 2019-10-02 PROCEDURE — 99231 SBSQ HOSP IP/OBS SF/LOW 25: CPT | Performed by: INTERNAL MEDICINE

## 2019-10-02 PROCEDURE — 25010000002 ONDANSETRON PER 1 MG: Performed by: INTERNAL MEDICINE

## 2019-10-02 RX ORDER — SENNA AND DOCUSATE SODIUM 50; 8.6 MG/1; MG/1
1 TABLET, FILM COATED ORAL 2 TIMES DAILY
Status: DISCONTINUED | OUTPATIENT
Start: 2019-10-02 | End: 2019-10-04 | Stop reason: HOSPADM

## 2019-10-02 RX ADMIN — ENOXAPARIN SODIUM 40 MG: 40 INJECTION SUBCUTANEOUS at 12:21

## 2019-10-02 RX ADMIN — SODIUM CHLORIDE 75 ML/HR: 9 INJECTION, SOLUTION INTRAVENOUS at 20:22

## 2019-10-02 RX ADMIN — INSULIN LISPRO 6 UNITS: 100 INJECTION, SOLUTION INTRAVENOUS; SUBCUTANEOUS at 12:20

## 2019-10-02 RX ADMIN — LISINOPRIL 10 MG: 10 TABLET ORAL at 08:00

## 2019-10-02 RX ADMIN — ACYCLOVIR 400 MG: 200 CAPSULE ORAL at 20:53

## 2019-10-02 RX ADMIN — Medication 400 MG: at 08:00

## 2019-10-02 RX ADMIN — LINAGLIPTIN 5 MG: 5 TABLET, FILM COATED ORAL at 08:00

## 2019-10-02 RX ADMIN — ONDANSETRON 16 MG: 2 INJECTION INTRAMUSCULAR; INTRAVENOUS at 13:56

## 2019-10-02 RX ADMIN — PANTOPRAZOLE SODIUM 40 MG: 40 TABLET, DELAYED RELEASE ORAL at 06:09

## 2019-10-02 RX ADMIN — PREDNISONE 100 MG: 50 TABLET ORAL at 08:00

## 2019-10-02 RX ADMIN — SENNOSIDES AND DOCUSATE SODIUM 1 TABLET: 8.6; 5 TABLET ORAL at 08:00

## 2019-10-02 RX ADMIN — ACYCLOVIR 400 MG: 200 CAPSULE ORAL at 08:00

## 2019-10-02 RX ADMIN — INSULIN LISPRO 4 UNITS: 100 INJECTION, SOLUTION INTRAVENOUS; SUBCUTANEOUS at 07:57

## 2019-10-02 RX ADMIN — SULFAMETHOXAZOLE AND TRIMETHOPRIM 160 MG: 800; 160 TABLET ORAL at 08:00

## 2019-10-02 RX ADMIN — SENNOSIDES AND DOCUSATE SODIUM 1 TABLET: 8.6; 5 TABLET ORAL at 20:53

## 2019-10-02 RX ADMIN — PREDNISONE 100 MG: 50 TABLET ORAL at 17:33

## 2019-10-02 RX ADMIN — INSULIN LISPRO 6 UNITS: 100 INJECTION, SOLUTION INTRAVENOUS; SUBCUTANEOUS at 20:57

## 2019-10-02 RX ADMIN — INSULIN LISPRO 6 UNITS: 100 INJECTION, SOLUTION INTRAVENOUS; SUBCUTANEOUS at 17:33

## 2019-10-02 RX ADMIN — VINCRISTINE SULFATE: 1 INJECTION, SOLUTION INTRAVENOUS at 14:25

## 2019-10-02 RX ADMIN — CETIRIZINE HYDROCHLORIDE 10 MG: 10 TABLET, FILM COATED ORAL at 08:00

## 2019-10-02 NOTE — NURSING NOTE
EPOCH infusion completed earlier than 1530. Day 3 hung to infuse over 25hrs. Verified with 2nd RN Seth King

## 2019-10-02 NOTE — PLAN OF CARE
Problem: Patient Care Overview  Goal: Plan of Care Review  Outcome: Ongoing (interventions implemented as appropriate)   10/02/19 4471   Coping/Psychosocial   Plan of Care Reviewed With patient   OTHER   Outcome Summary LAST CYCLE OF EPOCH, DAY #2 DIFFUSE LARGE B CELL LYMPHOMA. PATIENT TOLERATING CHEMO VERY WELL. VITAL SIGNS WNL. PATIENT VOICES NO COMPLAINTS OF PAIN, NAUSEA OR SOA. WILL CONTINUE TO MONITOR.   Plan of Care Review   Progress no change

## 2019-10-02 NOTE — PROGRESS NOTES
"   LOS: 2 days       Chief Complaint:    1. 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 Chemotherapy-induced anemia  3. Diabetes mellitus      Interval History: The patient did well overnight, request that we increase his Senokot dosing to twice daily.  He does have a mild nonproductive cough which she reports occurs every time he is admitted for treatment and then resolves when he is discharged home.  He continues with a normal appetite.  He is ambulating on a regular basis.  He denies any nausea or vomiting.        Review of Systems:    Review of systems was obtained with pertinent positive findings as noted in the interval history.  All other systems negative.    Objective     Vital Signs  Temp:  [96.9 °F (36.1 °C)-97.1 °F (36.2 °C)] 97 °F (36.1 °C)  Heart Rate:  [67-87] 67  Resp:  [16] 16  BP: (122-137)/(59-68) 136/59        Physical Exam:     GENERAL:  Well-developed, well-nourished in no acute distress.   MOUTH:  Tongue is well-papillated; no stomatitis or ulcers.  Lips normal.  THROAT:  Oropharynx without lesions or exudates.  CHEST:  Lungs clear to percussion and auscultation. Good airflow.  CARDIAC:  Regular rate and rhythm with 2/6 murmur  ABDOMEN:  Soft, nontender with no organomegaly or masses.  EXTREMITIES:  No clubbing, cyanosis or edema.  NEUROLOGICAL:  Cranial Nerves II-XII grossly intact.  No focal neurological deficits.  PSYCHIATRIC:  Normal affect and mood.    The patient was examined today, unchanged from above       Results Review:     I reviewed the patient's new clinical results.    Results from last 7 days   Lab Units 10/02/19  0441   WBC 10*3/mm3 11.14*   HEMOGLOBIN g/dL 8.7*   HEMATOCRIT % 26.3*   PLATELETS 10*3/mm3 104*     Lab Results   Component Value Date    NEUTROABS 10.74 (H) 10/02/2019     Results from last 7 days   Lab Units 10/02/19  0440   SODIUM mmol/L 136   POTASSIUM mmol/L 4.2   CHLORIDE mmol/L 101   CO2 mmol/L 26.8   BUN mg/dL 20   CREATININE mg/dL 0.84 " "  GLUCOSE mg/dL 233*   CALCIUM mg/dL 8.1*         Results from last 7 days   Lab Units 10/02/19  0440   MAGNESIUM mg/dL 2.1         Medication Review: Yes     Assessment/Plan     1. 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    · LP and bone marrow biopsies negative for involvement.    · Initial PET scan indicated perinephric soft tissue lesions. hypermetabolic mesenteric lymphadenopathy, left L5 paraspinous mass, an intensely hypermetabolic 6 cm intramuscular mass in the upper right thigh, a right upper lobe nodular opacity, a hypermetabolic mass adjacent to the right scapula, and a lesion within the left calvicle.  Paraspinous involvement puts him at extremely high risk for CNS involvement.    · Plan 6 cycles of inpatient treatment with dose adjusted R-EPOCH.  Plan CNS prophylaxis with IT methotrexate with each cycle of therapy.  · Admission to [facility] on 6/19/2019 for cycle #1 of therapy. Received Neulasta on discharge on 6/22/2019.  · Admit 7/8 for cycle #2.  Per the protocol with neutropenia x1 day, we proceeded with the same doses as cycle #1.  Slight dose adjustment for his decreased weight.  · CT scans 7/22/2019 with significant response, near complete resolution of perinephric lesions, decrease in left paraspinous mass at L5, decreased mesenteric lymphadenopathy, decreased mediastinal lymph nodes, near complete resolution right posterior chest wall mass and left cervical lymph node, resolution of right upper lobe pulmonary nodule.  · Cycle #3 initiated 7/29/2019.  Lumbar puncture scheduled that day, flow cytometry negative.   Per the protocol, his doses of Adriamycin, etoposide, and Cytoxan increased by 20%.  · Admit 8/19/2019 for cycle #4 of therapy, 20% dose increase per protocol.  · LP on admission 8/19/2019 with prophylactic methotrexate administered.  Flow " cytometry negative.  · With previous volume overload, decreased IV fluid rate to 50 cc/h (currently 75 cc/h).  · With cycle 4, sim platelet count 21,000, therefore cycle 5 reduced by 20% in Adriamycin, etoposide, Cytoxan per protocol.  · Patient admitted 9/30/2019 for cycle 6 DA-EPOCH-R.  Due to sim platelet count 23,000 on 9/19/2019, doses reduced for Adriamycin, etoposide, Cytoxan per protocol.  · Patient underwent LP with intrathecal methotrexate administered on 9/30/2019.  Flow cytometry pending on CSF.  · Today is cycle 6-day 3 DA-EPOCH-R.  The patient continues to tolerate chemotherapy very well with no significant issues today.  Proceed with chemotherapy as planned. Scheduled for CBC and Neulasta on 10/7/19 (day 8) and he is scheduled for twice weekly CBCs with RN review x2 weeks, PET scan 10/15/19, and visit with Dr. Garcia on 10/21/19.  2. Chemotherapy-induced anemia:  · Anemia evaluation negative 4/10/2019   · Anemia evaluation 8/22/2019 with iron 223, ferritin 286, iron saturation 85%, TIBC 264, folate 13.6, B12 greater than 2000.  Consistent as expected with anemia secondary to chronic disease/chemotherapy.  · Patient has required intermittent transfusion support on chemotherapy.  · Hemoglobin today 8.7, no current need for transfusion.  3. Diabetes mellitus:  · Currently receiving Tradjenta 5 mg daily, sliding scale insulin  · Exacerbated by steroids  · Glucose currently in 200 range  4. Rheumatoid arthritis:  · Patient previously on Cimzia, currently off treatment due to diagnosis of lymphoma.  No current symptoms.  6. Constipation:  · Currently continuing on Senokot 1 daily and MiraLAX daily.  Patient requests that we increase his Senokot to twice daily dosing.  8. Antibiotic prophylaxis:  · Acyclovir 400 mg twice daily  · Bactrim DS 1 p.o. Monday Wednesday Friday   9. GI prophylaxis:  · Protonix 40 mg daily  10. DVT prophylaxis:  · Lovenox 40 mg daily  11. Recurrent nonproductive cough:  · Patient  reports that he develops a mild intermittent nonproductive cough with each admission for chemotherapy which usually resolves within a day or so after discharge.  Unclear whether this could be related to rituximab.    Plan:  1. Proceed with cycle 6-day 3 DA-EPOCH-R  2. CSF flow cytometry pending from 9/30/2019  3. Daily CBC, CMP, magnesium      Cristhian Silva MD  10/02/19  8:46 AM

## 2019-10-02 NOTE — PLAN OF CARE
Problem: Patient Care Overview  Goal: Plan of Care Review   10/02/19 1601   Coping/Psychosocial   Plan of Care Reviewed With patient   OTHER   Outcome Summary last cycle of EPOCH day 3. pt has been up ad chris walking halls with wife. VSS. no complaints of pain on room air. had a shower at the sink wife helped wash him up. will continue to monitor.    Plan of Care Review   Progress improving

## 2019-10-03 LAB
ABO GROUP BLD: NORMAL
ALBUMIN SERPL-MCNC: 3.6 G/DL (ref 3.5–5.2)
ALBUMIN/GLOB SERPL: 2.3 G/DL
ALP SERPL-CCNC: 62 U/L (ref 39–117)
ALT SERPL W P-5'-P-CCNC: 24 U/L (ref 1–41)
ANION GAP SERPL CALCULATED.3IONS-SCNC: 8.9 MMOL/L (ref 5–15)
AST SERPL-CCNC: 20 U/L (ref 1–40)
BASOPHILS # BLD AUTO: 0.01 10*3/MM3 (ref 0–0.2)
BASOPHILS NFR BLD AUTO: 0.1 % (ref 0–1.5)
BILIRUB SERPL-MCNC: 0.2 MG/DL (ref 0.2–1.2)
BLD GP AB SCN SERPL QL: NEGATIVE
BUN BLD-MCNC: 23 MG/DL (ref 8–23)
BUN/CREAT SERPL: 37.7 (ref 7–25)
CALCIUM SPEC-SCNC: 8.1 MG/DL (ref 8.6–10.5)
CHLORIDE SERPL-SCNC: 105 MMOL/L (ref 98–107)
CO2 SERPL-SCNC: 24.1 MMOL/L (ref 22–29)
CREAT BLD-MCNC: 0.61 MG/DL (ref 0.76–1.27)
DEPRECATED RDW RBC AUTO: 60 FL (ref 37–54)
EOSINOPHIL # BLD AUTO: 0 10*3/MM3 (ref 0–0.4)
EOSINOPHIL NFR BLD AUTO: 0 % (ref 0.3–6.2)
ERYTHROCYTE [DISTWIDTH] IN BLOOD BY AUTOMATED COUNT: 17 % (ref 12.3–15.4)
GFR SERPL CREATININE-BSD FRML MDRD: 128 ML/MIN/1.73
GLOBULIN UR ELPH-MCNC: 1.6 GM/DL
GLUCOSE BLD-MCNC: 229 MG/DL (ref 65–99)
GLUCOSE BLDC GLUCOMTR-MCNC: 183 MG/DL (ref 70–130)
GLUCOSE BLDC GLUCOMTR-MCNC: 218 MG/DL (ref 70–130)
GLUCOSE BLDC GLUCOMTR-MCNC: 298 MG/DL (ref 70–130)
GLUCOSE BLDC GLUCOMTR-MCNC: 333 MG/DL (ref 70–130)
HCT VFR BLD AUTO: 23.1 % (ref 37.5–51)
HGB BLD-MCNC: 7.6 G/DL (ref 13–17.7)
IMM GRANULOCYTES # BLD AUTO: 0.11 10*3/MM3 (ref 0–0.05)
IMM GRANULOCYTES NFR BLD AUTO: 1.3 % (ref 0–0.5)
LYMPHOCYTES # BLD AUTO: 0.15 10*3/MM3 (ref 0.7–3.1)
LYMPHOCYTES NFR BLD AUTO: 1.7 % (ref 19.6–45.3)
MAGNESIUM SERPL-MCNC: 2.1 MG/DL (ref 1.6–2.4)
MCH RBC QN AUTO: 31.8 PG (ref 26.6–33)
MCHC RBC AUTO-ENTMCNC: 32.9 G/DL (ref 31.5–35.7)
MCV RBC AUTO: 96.7 FL (ref 79–97)
MONOCYTES # BLD AUTO: 0.15 10*3/MM3 (ref 0.1–0.9)
MONOCYTES NFR BLD AUTO: 1.7 % (ref 5–12)
NEUTROPHILS # BLD AUTO: 8.32 10*3/MM3 (ref 1.7–7)
NEUTROPHILS NFR BLD AUTO: 95.2 % (ref 42.7–76)
NRBC BLD AUTO-RTO: 0 /100 WBC (ref 0–0.2)
PLATELET # BLD AUTO: 75 10*3/MM3 (ref 140–450)
PMV BLD AUTO: 9.4 FL (ref 6–12)
POTASSIUM BLD-SCNC: 4.2 MMOL/L (ref 3.5–5.2)
PROT SERPL-MCNC: 5.2 G/DL (ref 6–8.5)
RBC # BLD AUTO: 2.39 10*6/MM3 (ref 4.14–5.8)
RH BLD: POSITIVE
SODIUM BLD-SCNC: 138 MMOL/L (ref 136–145)
T&S EXPIRATION DATE: NORMAL
WBC NRBC COR # BLD: 8.74 10*3/MM3 (ref 3.4–10.8)

## 2019-10-03 PROCEDURE — 86901 BLOOD TYPING SEROLOGIC RH(D): CPT | Performed by: INTERNAL MEDICINE

## 2019-10-03 PROCEDURE — 82962 GLUCOSE BLOOD TEST: CPT

## 2019-10-03 PROCEDURE — 80053 COMPREHEN METABOLIC PANEL: CPT | Performed by: INTERNAL MEDICINE

## 2019-10-03 PROCEDURE — 86900 BLOOD TYPING SEROLOGIC ABO: CPT | Performed by: INTERNAL MEDICINE

## 2019-10-03 PROCEDURE — 86900 BLOOD TYPING SEROLOGIC ABO: CPT

## 2019-10-03 PROCEDURE — 25010000002 ETOPOSIDE 500 MG/25ML SOLUTION 25 ML VIAL: Performed by: INTERNAL MEDICINE

## 2019-10-03 PROCEDURE — 63710000001 PREDNISONE PER 5 MG: Performed by: INTERNAL MEDICINE

## 2019-10-03 PROCEDURE — 86923 COMPATIBILITY TEST ELECTRIC: CPT

## 2019-10-03 PROCEDURE — 85025 COMPLETE CBC W/AUTO DIFF WBC: CPT | Performed by: INTERNAL MEDICINE

## 2019-10-03 PROCEDURE — 63710000001 INSULIN LISPRO (HUMAN) PER 5 UNITS: Performed by: INTERNAL MEDICINE

## 2019-10-03 PROCEDURE — 99232 SBSQ HOSP IP/OBS MODERATE 35: CPT | Performed by: INTERNAL MEDICINE

## 2019-10-03 PROCEDURE — 25010000002 ONDANSETRON PER 1 MG: Performed by: INTERNAL MEDICINE

## 2019-10-03 PROCEDURE — 36430 TRANSFUSION BLD/BLD COMPNT: CPT

## 2019-10-03 PROCEDURE — 86850 RBC ANTIBODY SCREEN: CPT | Performed by: INTERNAL MEDICINE

## 2019-10-03 PROCEDURE — 83735 ASSAY OF MAGNESIUM: CPT | Performed by: INTERNAL MEDICINE

## 2019-10-03 PROCEDURE — P9016 RBC LEUKOCYTES REDUCED: HCPCS

## 2019-10-03 PROCEDURE — 25010000002 DOXORUBICIN PER 10 MG: Performed by: INTERNAL MEDICINE

## 2019-10-03 PROCEDURE — 25010000002 VINCRISTINE PER 1 MG: Performed by: INTERNAL MEDICINE

## 2019-10-03 RX ORDER — ACETAMINOPHEN 325 MG/1
650 TABLET ORAL ONCE
Status: DISCONTINUED | OUTPATIENT
Start: 2019-10-03 | End: 2019-10-04 | Stop reason: HOSPADM

## 2019-10-03 RX ADMIN — PANTOPRAZOLE SODIUM 40 MG: 40 TABLET, DELAYED RELEASE ORAL at 05:50

## 2019-10-03 RX ADMIN — PREDNISONE 100 MG: 50 TABLET ORAL at 08:50

## 2019-10-03 RX ADMIN — INSULIN LISPRO 4 UNITS: 100 INJECTION, SOLUTION INTRAVENOUS; SUBCUTANEOUS at 08:49

## 2019-10-03 RX ADMIN — Medication 400 MG: at 08:50

## 2019-10-03 RX ADMIN — ACETAMINOPHEN 650 MG: 325 TABLET, FILM COATED ORAL at 12:24

## 2019-10-03 RX ADMIN — PREDNISONE 100 MG: 50 TABLET ORAL at 17:32

## 2019-10-03 RX ADMIN — LINAGLIPTIN 5 MG: 5 TABLET, FILM COATED ORAL at 08:50

## 2019-10-03 RX ADMIN — VINCRISTINE SULFATE: 1 INJECTION, SOLUTION INTRAVENOUS at 15:35

## 2019-10-03 RX ADMIN — ACYCLOVIR 400 MG: 200 CAPSULE ORAL at 20:30

## 2019-10-03 RX ADMIN — CETIRIZINE HYDROCHLORIDE 10 MG: 10 TABLET, FILM COATED ORAL at 08:50

## 2019-10-03 RX ADMIN — INSULIN LISPRO 6 UNITS: 100 INJECTION, SOLUTION INTRAVENOUS; SUBCUTANEOUS at 20:30

## 2019-10-03 RX ADMIN — INSULIN LISPRO 7 UNITS: 100 INJECTION, SOLUTION INTRAVENOUS; SUBCUTANEOUS at 17:32

## 2019-10-03 RX ADMIN — LISINOPRIL 10 MG: 10 TABLET ORAL at 08:50

## 2019-10-03 RX ADMIN — ACYCLOVIR 400 MG: 200 CAPSULE ORAL at 08:50

## 2019-10-03 RX ADMIN — INSULIN LISPRO 2 UNITS: 100 INJECTION, SOLUTION INTRAVENOUS; SUBCUTANEOUS at 12:07

## 2019-10-03 RX ADMIN — SENNOSIDES AND DOCUSATE SODIUM 1 TABLET: 8.6; 5 TABLET ORAL at 20:30

## 2019-10-03 RX ADMIN — ONDANSETRON 16 MG: 2 INJECTION INTRAMUSCULAR; INTRAVENOUS at 15:16

## 2019-10-03 NOTE — PROGRESS NOTES
"   LOS: 3 days       Chief Complaint:    1. 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 Chemotherapy-induced anemia  3. Diabetes mellitus      Interval History: The patient continues to tolerate chemotherapy extremely well, no specific side effects today.  He does note proven constipation with a normal stool this morning and wishes to hold his Senokot dose this morning.  He reports a normal appetite, denies any nausea or vomiting.  He remains very active ambulating in the hallway.        Review of Systems:    Review of systems was obtained with pertinent positive findings as noted in the interval history.  All other systems negative.    Objective     Vital Signs  Temp:  [96.6 °F (35.9 °C)-97.6 °F (36.4 °C)] 96.6 °F (35.9 °C)  Heart Rate:  [61-73] 61  Resp:  [16-18] 18  BP: (125-129)/(59-66) 129/66        Physical Exam:     GENERAL:  Well-developed, well-nourished in no acute distress.   MOUTH:  Tongue is well-papillated; no stomatitis or ulcers.  Lips normal.  THROAT:  Oropharynx without lesions or exudates.  CHEST:  Lungs clear to percussion and auscultation. Good airflow.  CARDIAC:  Regular rate and rhythm with 2/6 murmur  ABDOMEN:  Soft, nontender with no organomegaly or masses.  EXTREMITIES:  No clubbing, cyanosis or edema.  NEUROLOGICAL:  Cranial Nerves II-XII grossly intact.  No focal neurological deficits.  PSYCHIATRIC:  Normal affect and mood.    The patient was examined today, unchanged from above       Results Review:     I reviewed the patient's new clinical results.    Results from last 7 days   Lab Units 10/03/19  0459   WBC 10*3/mm3 8.74   HEMOGLOBIN g/dL 7.6*   HEMATOCRIT % 23.1*   PLATELETS 10*3/mm3 75*     Lab Results   Component Value Date    NEUTROABS 8.32 (H) 10/03/2019     Results from last 7 days   Lab Units 10/03/19  0459   SODIUM mmol/L 138   POTASSIUM mmol/L 4.2   CHLORIDE mmol/L 105   CO2 mmol/L 24.1   BUN mg/dL 23   CREATININE mg/dL 0.61*   GLUCOSE mg/dL 229* " "  CALCIUM mg/dL 8.1*         Results from last 7 days   Lab Units 10/03/19  0459   MAGNESIUM mg/dL 2.1         Medication Review: Yes     Assessment/Plan     1. 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    · LP and bone marrow biopsies negative for involvement.    · Initial PET scan indicated perinephric soft tissue lesions. hypermetabolic mesenteric lymphadenopathy, left L5 paraspinous mass, an intensely hypermetabolic 6 cm intramuscular mass in the upper right thigh, a right upper lobe nodular opacity, a hypermetabolic mass adjacent to the right scapula, and a lesion within the left calvicle.  Paraspinous involvement puts him at extremely high risk for CNS involvement.    · Plan 6 cycles of inpatient treatment with dose adjusted R-EPOCH.  Plan CNS prophylaxis with IT methotrexate with each cycle of therapy.  · Admission to [facility] on 6/19/2019 for cycle #1 of therapy. Received Neulasta on discharge on 6/22/2019.  · Admit 7/8 for cycle #2.  Per the protocol with neutropenia x1 day, we proceeded with the same doses as cycle #1.  Slight dose adjustment for his decreased weight.  · CT scans 7/22/2019 with significant response, near complete resolution of perinephric lesions, decrease in left paraspinous mass at L5, decreased mesenteric lymphadenopathy, decreased mediastinal lymph nodes, near complete resolution right posterior chest wall mass and left cervical lymph node, resolution of right upper lobe pulmonary nodule.  · Cycle #3 initiated 7/29/2019.  Lumbar puncture scheduled that day, flow cytometry negative.   Per the protocol, his doses of Adriamycin, etoposide, and Cytoxan increased by 20%.  · Admit 8/19/2019 for cycle #4 of therapy, 20% dose increase per protocol.  · LP on admission 8/19/2019 with prophylactic methotrexate administered.  Flow cytometry negative.  · With " cycle 4, sim platelet count 21,000, therefore cycle 5 reduced by 20% in Adriamycin, etoposide, Cytoxan per protocol.  · Patient admitted 9/30/2019 for cycle 6 DA-EPOCH-R.  Due to sim platelet count 23,000 on 9/19/2019, doses reduced for Adriamycin, etoposide, Cytoxan per protocol.  · Patient underwent LP with intrathecal methotrexate administered on 9/30/2019.  Flow cytometry negative on CSF.  · Today is cycle 6-day 4 DA-EPOCH-R.  The patient continues to tolerate chemotherapy very well with no significant issues today.  Proceed with chemotherapy as planned. Scheduled for CBC and Neulasta on 10/7/19 (day 8) and he is scheduled for twice weekly CBCs with RN review x2 weeks, PET scan 10/15/19, and visit with Dr. Garcia on 10/21/19.  2. Chemotherapy-induced anemia:  · Anemia evaluation negative 4/10/2019   · Anemia evaluation 8/22/2019 with iron 223, ferritin 286, iron saturation 85%, TIBC 264, folate 13.6, B12 greater than 2000.  Consistent as expected with anemia secondary to chronic disease/chemotherapy.  · Patient has required intermittent transfusion support on chemotherapy.  · Hemoglobin today decreased to 7.6, will transfuse 2 units PRBC today.  3. Thrombocytopenia secondary to chemotherapy:  · Platelet count had recovered to 124,000 on 9/26/2019, now beginning to gradually decline after initiating current cycle of chemotherapy at 75,000 today.  4. Diabetes mellitus:  · Currently receiving Tradjenta 5 mg daily, sliding scale insulin  · Exacerbated by steroids  · Glucose currently in 200 range  5. Rheumatoid arthritis:  · Patient previously on Cimzia, currently off treatment due to diagnosis of lymphoma.  No current symptoms.  6. Constipation:  · Currently continuing on Senokot 1 daily and MiraLAX daily.  Patient requests that we increase his Senokot to twice daily dosing.  7. Antibiotic prophylaxis:  · Acyclovir 400 mg twice daily  · Bactrim DS 1 p.o. Monday Wednesday Friday   8. GI prophylaxis:  · Protonix 40  mg daily  9. DVT prophylaxis:  · Lovenox 40 mg daily.  Will discontinue today with platelet count of 75,000.  Patient is fully ambulatory.  10. Recurrent nonproductive cough:  · Patient reports that he develops a mild intermittent nonproductive cough with each admission for chemotherapy which usually resolves within a day or so after discharge.  Unclear whether this could be related to rituximab.    Plan:  1. Proceed with cycle 6-day 4 DA-EPOCH-R  2. Transfuse 2 units PRBC today  3. Discontinue prophylactic Lovenox as above  4. Daily CBC, CMP, magnesium      Cristhian Silva MD  10/03/19  8:28 AM

## 2019-10-03 NOTE — PLAN OF CARE
Problem: Patient Care Overview  Goal: Plan of Care Review  Outcome: Ongoing (interventions implemented as appropriate)   10/03/19 7102   Coping/Psychosocial   Plan of Care Reviewed With patient   OTHER   Outcome Summary last cycle of EPOCH admin this afternoon. up ad chris pt has been walking halls. achs and insulin coverage for all 3 meals. no compliants of pain, pt took sink bath this am. 2 units of blood finished today. will continue to monitor.    Plan of Care Review   Progress improving

## 2019-10-04 VITALS
HEIGHT: 67 IN | HEART RATE: 81 BPM | SYSTOLIC BLOOD PRESSURE: 147 MMHG | DIASTOLIC BLOOD PRESSURE: 79 MMHG | TEMPERATURE: 96.9 F | WEIGHT: 155 LBS | BODY MASS INDEX: 24.33 KG/M2 | OXYGEN SATURATION: 94 % | RESPIRATION RATE: 18 BRPM

## 2019-10-04 LAB
ABO + RH BLD: NORMAL
ABO + RH BLD: NORMAL
ALBUMIN SERPL-MCNC: 3.7 G/DL (ref 3.5–5.2)
ALBUMIN/GLOB SERPL: 2.3 G/DL
ALP SERPL-CCNC: 62 U/L (ref 39–117)
ALT SERPL W P-5'-P-CCNC: 45 U/L (ref 1–41)
ANION GAP SERPL CALCULATED.3IONS-SCNC: 11 MMOL/L (ref 5–15)
ANISOCYTOSIS BLD QL: ABNORMAL
AST SERPL-CCNC: 27 U/L (ref 1–40)
BH BB BLOOD EXPIRATION DATE: NORMAL
BH BB BLOOD EXPIRATION DATE: NORMAL
BH BB BLOOD TYPE BARCODE: 6200
BH BB BLOOD TYPE BARCODE: 6200
BH BB DISPENSE STATUS: NORMAL
BH BB DISPENSE STATUS: NORMAL
BH BB PRODUCT CODE: NORMAL
BH BB PRODUCT CODE: NORMAL
BH BB UNIT NUMBER: NORMAL
BH BB UNIT NUMBER: NORMAL
BILIRUB SERPL-MCNC: 0.5 MG/DL (ref 0.2–1.2)
BUN BLD-MCNC: 24 MG/DL (ref 8–23)
BUN/CREAT SERPL: 33.8 (ref 7–25)
BURR CELLS BLD QL SMEAR: ABNORMAL
CALCIUM SPEC-SCNC: 8.1 MG/DL (ref 8.6–10.5)
CHLORIDE SERPL-SCNC: 106 MMOL/L (ref 98–107)
CO2 SERPL-SCNC: 25 MMOL/L (ref 22–29)
CREAT BLD-MCNC: 0.71 MG/DL (ref 0.76–1.27)
DEPRECATED RDW RBC AUTO: 60.4 FL (ref 37–54)
ERYTHROCYTE [DISTWIDTH] IN BLOOD BY AUTOMATED COUNT: 17.7 % (ref 12.3–15.4)
GFR SERPL CREATININE-BSD FRML MDRD: 108 ML/MIN/1.73
GLOBULIN UR ELPH-MCNC: 1.6 GM/DL
GLUCOSE BLD-MCNC: 172 MG/DL (ref 65–99)
GLUCOSE BLDC GLUCOMTR-MCNC: 171 MG/DL (ref 70–130)
GLUCOSE BLDC GLUCOMTR-MCNC: 175 MG/DL (ref 70–130)
HCT VFR BLD AUTO: 31.6 % (ref 37.5–51)
HGB BLD-MCNC: 10.4 G/DL (ref 13–17.7)
LYMPHOCYTES # BLD MANUAL: 0.15 10*3/MM3 (ref 0.7–3.1)
LYMPHOCYTES NFR BLD MANUAL: 1 % (ref 19.6–45.3)
MAGNESIUM SERPL-MCNC: 2.2 MG/DL (ref 1.6–2.4)
MCH RBC QN AUTO: 30.9 PG (ref 26.6–33)
MCHC RBC AUTO-ENTMCNC: 32.9 G/DL (ref 31.5–35.7)
MCV RBC AUTO: 93.8 FL (ref 79–97)
NEUTROPHILS # BLD AUTO: 15 10*3/MM3 (ref 1.7–7)
NEUTROPHILS NFR BLD MANUAL: 99 % (ref 42.7–76)
OVALOCYTES BLD QL SMEAR: ABNORMAL
PLAT MORPH BLD: NORMAL
PLATELET # BLD AUTO: 83 10*3/MM3 (ref 140–450)
PMV BLD AUTO: 9.6 FL (ref 6–12)
POLYCHROMASIA BLD QL SMEAR: ABNORMAL
POTASSIUM BLD-SCNC: 4 MMOL/L (ref 3.5–5.2)
PROT SERPL-MCNC: 5.3 G/DL (ref 6–8.5)
RBC # BLD AUTO: 3.37 10*6/MM3 (ref 4.14–5.8)
SODIUM BLD-SCNC: 142 MMOL/L (ref 136–145)
UNIT  ABO: NORMAL
UNIT  ABO: NORMAL
UNIT  RH: NORMAL
UNIT  RH: NORMAL
WBC MORPH BLD: NORMAL
WBC NRBC COR # BLD: 15.15 10*3/MM3 (ref 3.4–10.8)

## 2019-10-04 PROCEDURE — 85025 COMPLETE CBC W/AUTO DIFF WBC: CPT | Performed by: INTERNAL MEDICINE

## 2019-10-04 PROCEDURE — 99239 HOSP IP/OBS DSCHRG MGMT >30: CPT | Performed by: INTERNAL MEDICINE

## 2019-10-04 PROCEDURE — 63710000001 INSULIN LISPRO (HUMAN) PER 5 UNITS: Performed by: INTERNAL MEDICINE

## 2019-10-04 PROCEDURE — 82962 GLUCOSE BLOOD TEST: CPT

## 2019-10-04 PROCEDURE — 25010000002 ONDANSETRON PER 1 MG: Performed by: INTERNAL MEDICINE

## 2019-10-04 PROCEDURE — 80053 COMPREHEN METABOLIC PANEL: CPT | Performed by: INTERNAL MEDICINE

## 2019-10-04 PROCEDURE — 25010000002 CYCLOPHOSPHAMIDE PER 100 MG: Performed by: INTERNAL MEDICINE

## 2019-10-04 PROCEDURE — 63710000001 PREDNISONE PER 5 MG: Performed by: INTERNAL MEDICINE

## 2019-10-04 PROCEDURE — 85007 BL SMEAR W/DIFF WBC COUNT: CPT | Performed by: INTERNAL MEDICINE

## 2019-10-04 PROCEDURE — 83735 ASSAY OF MAGNESIUM: CPT | Performed by: INTERNAL MEDICINE

## 2019-10-04 RX ADMIN — SULFAMETHOXAZOLE AND TRIMETHOPRIM 160 MG: 800; 160 TABLET ORAL at 08:46

## 2019-10-04 RX ADMIN — CYCLOPHOSPHAMIDE 1300 MG: 1 INJECTION, POWDER, FOR SOLUTION INTRAVENOUS; ORAL at 15:50

## 2019-10-04 RX ADMIN — CETIRIZINE HYDROCHLORIDE 10 MG: 10 TABLET, FILM COATED ORAL at 08:46

## 2019-10-04 RX ADMIN — LISINOPRIL 10 MG: 10 TABLET ORAL at 08:46

## 2019-10-04 RX ADMIN — PANTOPRAZOLE SODIUM 40 MG: 40 TABLET, DELAYED RELEASE ORAL at 05:18

## 2019-10-04 RX ADMIN — LINAGLIPTIN 5 MG: 5 TABLET, FILM COATED ORAL at 08:46

## 2019-10-04 RX ADMIN — INSULIN LISPRO 2 UNITS: 100 INJECTION, SOLUTION INTRAVENOUS; SUBCUTANEOUS at 12:27

## 2019-10-04 RX ADMIN — PREDNISONE 100 MG: 50 TABLET ORAL at 08:46

## 2019-10-04 RX ADMIN — ONDANSETRON 16 MG: 2 INJECTION INTRAMUSCULAR; INTRAVENOUS at 15:13

## 2019-10-04 RX ADMIN — ACYCLOVIR 400 MG: 200 CAPSULE ORAL at 08:46

## 2019-10-04 RX ADMIN — Medication 400 MG: at 08:46

## 2019-10-04 RX ADMIN — INSULIN LISPRO 2 UNITS: 100 INJECTION, SOLUTION INTRAVENOUS; SUBCUTANEOUS at 08:46

## 2019-10-04 NOTE — PLAN OF CARE
Problem: Patient Care Overview  Goal: Plan of Care Review  Outcome: Ongoing (interventions implemented as appropriate)   10/04/19 0454   Coping/Psychosocial   Plan of Care Reviewed With patient   OTHER   Outcome Summary EPOCH continued. No complaints. Port with positive blood return. 2 units of blood given yesterday. Likely discharge home today.   Plan of Care Review   Progress no change     Goal: Individualization and Mutuality  Outcome: Ongoing (interventions implemented as appropriate)   09/30/19 1642 10/02/19 1601   Individualization   Patient Specific Preferences goes by Aaron --    Patient Specific Goals (Include Timeframe) complete last treatment! --    Mutuality/Individual Preferences   What Information Would Help Us Give You More Personalized Care? --  likes to keep door closed    How Would You and/or Your Support Person Like to Participate in Your Care? --  keep pt and family informed        Problem: Chemotherapy Effects (Adult)  Goal: Signs and Symptoms of Listed Potential Problems Will be Absent, Minimized or Managed (Chemotherapy Effects)  Outcome: Ongoing (interventions implemented as appropriate)

## 2019-10-04 NOTE — PROGRESS NOTES
Case Management Discharge Note    Final Note: Home; no needs identified.  [facility]    Destination      No service has been selected for the patient.      Durable Medical Equipment      No service has been selected for the patient.      Dialysis/Infusion      No service has been selected for the patient.      Home Medical Care      No service has been selected for the patient.      Therapy      No service has been selected for the patient.      Community Resources      No service has been selected for the patient.        Transportation Services  Private: Car    Final Discharge Disposition Code: 01 - home or self-care

## 2019-10-04 NOTE — DISCHARGE SUMMARY
"  Date of Discharge:  10/4/2019    Discharge Diagnosis:   1. 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 Chemotherapy-induced anemia  3. 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          Hospital Course  1. 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    · LP and bone marrow biopsies negative for involvement.    · Initial PET scan indicated perinephric soft tissue lesions. hypermetabolic mesenteric lymphadenopathy, left L5 paraspinous mass, an intensely hypermetabolic 6 cm intramuscular mass in the upper right thigh, a right upper lobe nodular opacity, a hypermetabolic mass adjacent to the right scapula, and a lesion within the left calvicle.  Paraspinous involvement puts him at extremely high risk for CNS involvement.    · Plan 6 cycles of inpatient treatment with dose adjusted R-EPOCH.  Plan CNS prophylaxis with IT methotrexate with each cycle of therapy.  · Admission to [facility] on 6/19/2019 for cycle #1 of therapy. Received Neulasta on discharge on 6/22/2019.  · Admit 7/8 for cycle #2.  Per the protocol with neutropenia x1 day, we proceeded with the same doses as cycle #1.  Slight dose adjustment for his decreased weight.  · CT scans 7/22/2019 with significant response, near complete resolution of perinephric lesions, decrease in left paraspinous mass at L5, decreased mesenteric lymphadenopathy, decreased mediastinal lymph nodes, near complete resolution right posterior chest wall mass and left cervical lymph node, resolution of right upper lobe pulmonary nodule.  · Cycle #3 initiated 7/29/2019.  Lumbar puncture scheduled that day, flow cytometry negative.   Per " the protocol, his doses of Adriamycin, etoposide, and Cytoxan increased by 20%.  · Admit 8/19/2019 for cycle #4 of therapy, 20% dose increase per protocol.  · LP on admission 8/19/2019 with prophylactic methotrexate administered.  Flow cytometry negative.  · With cycle 4, sim platelet count 21,000, therefore cycle 5 reduced by 20% in Adriamycin, etoposide, Cytoxan per protocol.  · Patient admitted 9/30/2019 for cycle 6 DA-EPOCH-R.  Due to sim platelet count 23,000 on 9/19/2019, doses reduced for Adriamycin, etoposide, Cytoxan per protocol.  · Patient underwent LP with intrathecal methotrexate administered on 9/30/2019.  Flow cytometry negative on CSF.  · Today is cycle 6-day 5 DA-EPOCH-R.  The patient continues to tolerate chemotherapy very well with no significant issues today.  Proceed with chemotherapy as planned.  Patient will be discharged home following completion of chemotherapy later today.  Scheduled for CBC and Neulasta on 10/7/19 (day 8) and he is scheduled for twice weekly CBCs with RN review x2 weeks, PET scan 10/15/19, and visit with Dr. Garcia on 10/21/19.  2. Chemotherapy-induced anemia:  · Anemia evaluation negative 4/10/2019   · Anemia evaluation 8/22/2019 with iron 223, ferritin 286, iron saturation 85%, TIBC 264, folate 13.6, B12 greater than 2000.  Consistent as expected with anemia secondary to chronic disease/chemotherapy.  · Patient has required intermittent transfusion support on chemotherapy.  · Hemoglobin on 10/3/2019 decreased to 7.6, received 2 units PRBC.  · Today, hemoglobin up to 10.4  3. Thrombocytopenia secondary to chemotherapy:  · Platelet count had recovered to 124,000 on 9/26/2019, now beginning to gradually decline after initiating current cycle of chemotherapy at 75,000 today.  4. Diabetes mellitus:  · Currently receiving Tradjenta 5 mg daily, sliding scale insulin  · Exacerbated by steroids  · Glucose currently in 200 range, anticipate improvement after discharge as patient  discontinue steroids.  5. Rheumatoid arthritis:  · Patient previously on Cimzia, currently off treatment due to diagnosis of lymphoma.  No current symptoms.  6. Constipation:  · Currently continuing on Senokot and MiraLAX daily as needed.  7. Antibiotic prophylaxis:  · Acyclovir 400 mg twice daily  · Bactrim DS 1 p.o. Monday Wednesday Friday   8. GI prophylaxis:  · Protonix 40 mg daily    Procedures Performed  LP with intrathecal methotrexate as above on 9/30/2019.       Consults:   Consults     No orders found from 9/1/2019 to 10/1/2019.          Condition on Discharge: Good    Vital Signs  Temp:  [96.8 °F (36 °C)-97.3 °F (36.3 °C)] 96.9 °F (36.1 °C)  Heart Rate:  [62-81] 81  Resp:  [18] 18  BP: (140-174)/(59-79) 147/79    Physical Exam:  GENERAL:  Well-developed, well-nourished in no acute distress.   MOUTH:  Tongue is well-papillated; no stomatitis or ulcers.  Lips normal.  THROAT:  Oropharynx without lesions or exudates.  CHEST:  Lungs clear to percussion and auscultation. Good airflow.  CARDIAC:  Regular rate and rhythm with 2/6 murmur  ABDOMEN:  Soft, nontender with no organomegaly or masses.  EXTREMITIES:  No clubbing, cyanosis or edema.  NEUROLOGICAL:  Cranial Nerves II-XII grossly intact.  No focal neurological deficits.  PSYCHIATRIC:  Normal affect and mood.    Discharge Disposition  Home or Self Care    Discharge Medications     Discharge Medications      Continue These Medications      Instructions Start Date   acetaminophen 325 MG tablet  Commonly known as:  TYLENOL   650 mg, Oral, Every 4 Hours PRN      acyclovir 200 MG capsule  Commonly known as:  ZOVIRAX   2 Times Daily      aspirin 81 MG EC tablet   81 mg, Oral, Daily      beta carotene 84955 UNIT capsule   10,000 Units, Oral, Daily      cetirizine 10 MG tablet  Commonly known as:  zyrTEC   10 mg, Oral, Daily      linagliptin 5 MG tablet tablet  Commonly known as:  TRADJENTA   5 mg, Oral, Daily      lisinopril 10 MG tablet  Commonly known as:   PRINIVIL,ZESTRIL   TAKE 1 TABLET DAILY      MAGNESIUM PO   500 mg, Oral, Daily      meclizine 25 MG tablet  Commonly known as:  ANTIVERT   25 mg, Oral, 3 Times Daily PRN      omeprazole 40 MG capsule  Commonly known as:  priLOSEC   40 mg, Oral, Daily      polyethylene glycol packet  Commonly known as:  MIRALAX   17 g, Oral, Daily      prochlorperazine 10 MG tablet  Commonly known as:  COMPAZINE   10 mg, Oral, Every 6 Hours PRN      pyridoxine 100 MG tablet  Commonly known as:  VITAMIN B-6   100 mg, Oral, Daily      sennosides-docusate sodium 8.6-50 MG tablet  Commonly known as:  SENOKOT-S   1 tablet, Oral, Daily      sulfamethoxazole-trimethoprim 800-160 MG per tablet  Commonly known as:  BACTRIM DS,SEPTRA DS   TAKE ONE TABLET BY MOUTH THREE TIMES A DAY ON MONDAY, WEDNESDAY AND FRIDAY      vitamin C 500 MG tablet  Commonly known as:  ASCORBIC ACID   500 mg, Oral, Daily      VITAMIN D-3 PO   2,000 Int'l Units/day, Oral, Daily      vitamin E 400 UNIT capsule   400 Units, Oral, Daily             Discharge Diet: Regular as tolerated    Activity at Discharge: As tolerated    Follow-up Appointments  Future Appointments   Date Time Provider Department Center   10/7/2019  8:00 AM LAB CHAIR 5 [facility]   10/7/2019  8:30 AM INJECTION CHAIR [facility]   10/10/2019  8:30 AM LAB CHAIR 5 [facility]   10/10/2019  9:00 AM RN REV 1 [facility]   10/14/2019  8:10 AM LAB CHAIR 5 [facility]   10/14/2019  8:30 AM RN REV 1 [facility]   10/15/2019  8:15 AM TERRIE PET ADMIN RM 1  TERRIE PET TERRIE   10/15/2019  9:45 AM TERRIE PET 1  TERRIE PET TERRIE   10/17/2019 11:00 AM LAB CHAIR 5 [facility]   10/17/2019 11:30 [facility]   10/21/2019  7:30 AM LAB CHAIR 5 [facility]   10/21/2019  8:00 AM Gabriele Garcia MD [facility]   11/26/2019 11:20 AM Brian Harden MD [facility] None   3/9/2020   9:00 AM Freddie Cannon MD [facility] None                Cristhian Silva MD  10/04/19  11:50 AM

## 2019-10-04 NOTE — PLAN OF CARE
Problem: Patient Care Overview  Goal: Plan of Care Review  Outcome: Ongoing (interventions implemented as appropriate)   10/04/19 6715   Coping/Psychosocial   Plan of Care Reviewed With patient   OTHER   Outcome Summary Continue EPOCH, last day of Cycle 6. D/C home after chemo.    Plan of Care Review   Progress no change     Goal: Individualization and Mutuality  Outcome: Ongoing (interventions implemented as appropriate)    Goal: Discharge Needs Assessment  Outcome: Ongoing (interventions implemented as appropriate)    Goal: Interprofessional Rounds/Family Conf  Outcome: Ongoing (interventions implemented as appropriate)      Problem: Chemotherapy Effects (Adult)  Goal: Signs and Symptoms of Listed Potential Problems Will be Absent, Minimized or Managed (Chemotherapy Effects)  Outcome: Ongoing (interventions implemented as appropriate)

## 2019-10-05 ENCOUNTER — READMISSION MANAGEMENT (OUTPATIENT)
Dept: CALL CENTER | Facility: HOSPITAL | Age: 79
End: 2019-10-05

## 2019-10-05 NOTE — OUTREACH NOTE
Prep Survey      Responses   Facility patient discharged from?  [facility]   Is patient eligible?  Yes   Discharge diagnosis  Large B Cell lymphoma, chemotherapy   Does the patient have one of the following disease processes/diagnoses(primary or secondary)?  Other   Does the patient have Home health ordered?  No   Is there a DME ordered?  No   Prep survey completed?  Yes          Susan Chambers RN

## 2019-10-07 ENCOUNTER — INFUSION (OUTPATIENT)
Dept: ONCOLOGY | Facility: HOSPITAL | Age: 79
End: 2019-10-07

## 2019-10-07 ENCOUNTER — EPISODE CHANGES (OUTPATIENT)
Dept: CASE MANAGEMENT | Facility: OTHER | Age: 79
End: 2019-10-07

## 2019-10-07 ENCOUNTER — READMISSION MANAGEMENT (OUTPATIENT)
Dept: CALL CENTER | Facility: HOSPITAL | Age: 79
End: 2019-10-07

## 2019-10-07 ENCOUNTER — LAB (OUTPATIENT)
Dept: LAB | Facility: HOSPITAL | Age: 79
End: 2019-10-07

## 2019-10-07 VITALS
DIASTOLIC BLOOD PRESSURE: 70 MMHG | RESPIRATION RATE: 16 BRPM | HEART RATE: 82 BPM | TEMPERATURE: 97.2 F | SYSTOLIC BLOOD PRESSURE: 123 MMHG

## 2019-10-07 DIAGNOSIS — C83.33 DIFFUSE LARGE B-CELL LYMPHOMA OF INTRA-ABDOMINAL LYMPH NODES (HCC): ICD-10-CM

## 2019-10-07 DIAGNOSIS — D64.81 ANEMIA ASSOCIATED WITH CHEMOTHERAPY: ICD-10-CM

## 2019-10-07 DIAGNOSIS — T45.1X5A ANEMIA ASSOCIATED WITH CHEMOTHERAPY: ICD-10-CM

## 2019-10-07 DIAGNOSIS — C83.33 DIFFUSE LARGE B-CELL LYMPHOMA OF INTRA-ABDOMINAL LYMPH NODES (HCC): Primary | ICD-10-CM

## 2019-10-07 LAB
BASOPHILS # BLD AUTO: 0.02 10*3/MM3 (ref 0–0.2)
BASOPHILS NFR BLD AUTO: 0.2 % (ref 0–1.5)
DEPRECATED RDW RBC AUTO: 56.7 FL (ref 37–54)
EOSINOPHIL # BLD AUTO: 0.03 10*3/MM3 (ref 0–0.4)
EOSINOPHIL NFR BLD AUTO: 0.3 % (ref 0.3–6.2)
ERYTHROCYTE [DISTWIDTH] IN BLOOD BY AUTOMATED COUNT: 17.2 % (ref 12.3–15.4)
HCT VFR BLD AUTO: 34 % (ref 37.5–51)
HGB BLD-MCNC: 11.7 G/DL (ref 13–17.7)
IMM GRANULOCYTES # BLD AUTO: 0.28 10*3/MM3 (ref 0–0.05)
IMM GRANULOCYTES NFR BLD AUTO: 2.6 % (ref 0–0.5)
LYMPHOCYTES # BLD AUTO: 0.23 10*3/MM3 (ref 0.7–3.1)
LYMPHOCYTES NFR BLD AUTO: 2.1 % (ref 19.6–45.3)
MCH RBC QN AUTO: 31.3 PG (ref 26.6–33)
MCHC RBC AUTO-ENTMCNC: 34.4 G/DL (ref 31.5–35.7)
MCV RBC AUTO: 90.9 FL (ref 79–97)
MONOCYTES # BLD AUTO: 0.04 10*3/MM3 (ref 0.1–0.9)
MONOCYTES NFR BLD AUTO: 0.4 % (ref 5–12)
NEUTROPHILS # BLD AUTO: 10.18 10*3/MM3 (ref 1.7–7)
NEUTROPHILS NFR BLD AUTO: 94.4 % (ref 42.7–76)
NRBC BLD AUTO-RTO: 0 /100 WBC (ref 0–0.2)
PLATELET # BLD AUTO: 57 10*3/MM3 (ref 140–450)
PMV BLD AUTO: 11 FL (ref 6–12)
RBC # BLD AUTO: 3.74 10*6/MM3 (ref 4.14–5.8)
WBC NRBC COR # BLD: 10.78 10*3/MM3 (ref 3.4–10.8)

## 2019-10-07 PROCEDURE — 85025 COMPLETE CBC W/AUTO DIFF WBC: CPT

## 2019-10-07 PROCEDURE — 96372 THER/PROPH/DIAG INJ SC/IM: CPT | Performed by: INTERNAL MEDICINE

## 2019-10-07 PROCEDURE — 25010000002 PEGFILGRASTIM 6 MG/0.6ML SOLUTION PREFILLED SYRINGE: Performed by: INTERNAL MEDICINE

## 2019-10-07 PROCEDURE — 36415 COLL VENOUS BLD VENIPUNCTURE: CPT

## 2019-10-07 RX ADMIN — PEGFILGRASTIM 6 MG: 6 INJECTION SUBCUTANEOUS at 08:23

## 2019-10-07 NOTE — PROGRESS NOTES
Pt is here for lab with RN review and neulasta.  CBC reviewed with pt, counts are stable for this pt at this time. Pt has no complaints.  Copy of labs given to pt and f/u appt reviewed. Pt is instructed to call the office with any concerns or new symptoms prior to next visit. Pt vu

## 2019-10-07 NOTE — OUTREACH NOTE
Medical Week 1 Survey      Responses   Facility patient discharged from?  [facility]   Does the patient have one of the following disease processes/diagnoses(primary or secondary)?  Other   Is there a successful TCM telephone encounter documented?  No   Week 1 attempt successful?  Yes   Call start time  1006   Revoke  Decline to participate [Tired like everybody else, I am doing fine.]   Call end time  1007   Discharge diagnosis  Large B Cell lymphoma, chemotherapy   Is patient permission given to speak with other caregiver?  No   Meds reviewed with patient/caregiver?  Yes          Jyothi Machado RN

## 2019-10-08 ENCOUNTER — EPISODE CHANGES (OUTPATIENT)
Dept: CASE MANAGEMENT | Facility: OTHER | Age: 79
End: 2019-10-08

## 2019-10-10 ENCOUNTER — APPOINTMENT (OUTPATIENT)
Dept: LAB | Facility: HOSPITAL | Age: 79
End: 2019-10-10

## 2019-10-10 ENCOUNTER — CLINICAL SUPPORT (OUTPATIENT)
Dept: ONCOLOGY | Facility: HOSPITAL | Age: 79
End: 2019-10-10

## 2019-10-10 VITALS
TEMPERATURE: 98.2 F | SYSTOLIC BLOOD PRESSURE: 114 MMHG | DIASTOLIC BLOOD PRESSURE: 65 MMHG | HEART RATE: 94 BPM | OXYGEN SATURATION: 99 %

## 2019-10-10 DIAGNOSIS — C83.33 DIFFUSE LARGE B-CELL LYMPHOMA OF INTRA-ABDOMINAL LYMPH NODES (HCC): Primary | ICD-10-CM

## 2019-10-10 LAB
BASOPHILS # BLD AUTO: 0.02 10*3/MM3 (ref 0–0.2)
BASOPHILS NFR BLD AUTO: 0.6 % (ref 0–1.5)
DEPRECATED RDW RBC AUTO: 58.3 FL (ref 37–54)
EOSINOPHIL # BLD AUTO: 0.03 10*3/MM3 (ref 0–0.4)
EOSINOPHIL NFR BLD AUTO: 1 % (ref 0.3–6.2)
ERYTHROCYTE [DISTWIDTH] IN BLOOD BY AUTOMATED COUNT: 17.1 % (ref 12.3–15.4)
HCT VFR BLD AUTO: 32.8 % (ref 37.5–51)
HGB BLD-MCNC: 10.9 G/DL (ref 13–17.7)
IMM GRANULOCYTES # BLD AUTO: 0.23 10*3/MM3 (ref 0–0.05)
IMM GRANULOCYTES NFR BLD AUTO: 7.5 % (ref 0–0.5)
LYMPHOCYTES # BLD AUTO: 0.26 10*3/MM3 (ref 0.7–3.1)
LYMPHOCYTES NFR BLD AUTO: 8.4 % (ref 19.6–45.3)
MCH RBC QN AUTO: 31.1 PG (ref 26.6–33)
MCHC RBC AUTO-ENTMCNC: 33.2 G/DL (ref 31.5–35.7)
MCV RBC AUTO: 93.4 FL (ref 79–97)
MONOCYTES # BLD AUTO: 0.14 10*3/MM3 (ref 0.1–0.9)
MONOCYTES NFR BLD AUTO: 4.5 % (ref 5–12)
NEUTROPHILS # BLD AUTO: 2.4 10*3/MM3 (ref 1.7–7)
NEUTROPHILS NFR BLD AUTO: 78 % (ref 42.7–76)
NRBC BLD AUTO-RTO: 0 /100 WBC (ref 0–0.2)
PLATELET # BLD AUTO: 18 10*3/MM3 (ref 140–450)
PMV BLD AUTO: 10.4 FL (ref 6–12)
RBC # BLD AUTO: 3.51 10*6/MM3 (ref 4.14–5.8)
WBC NRBC COR # BLD: 3.08 10*3/MM3 (ref 3.4–10.8)

## 2019-10-10 PROCEDURE — 36416 COLLJ CAPILLARY BLOOD SPEC: CPT

## 2019-10-10 PROCEDURE — 85025 COMPLETE CBC W/AUTO DIFF WBC: CPT

## 2019-10-10 NOTE — PROGRESS NOTES
Pt is here for lab with RN review.  CBC reviewed with Raquel Berger NP. Platelets 18K. Per Raquel pt is to hold ASA and Vitamin E until he has his labs next week and hold of on getting the flu shot. Reviewed S/S of bleeding. Pt denies any bleeding today. He knows when to seek medical attention for bleeding.  Pt has no complaints and VSS.  Copy of labs given to pt and f/u appt reviewed. Pt is instructed to call the office with any concerns or new symptoms prior to next visit. Pt vu.   Lab Results   Component Value Date    WBC 3.08 (L) 10/10/2019    HGB 10.9 (L) 10/10/2019    HCT 32.8 (L) 10/10/2019    MCV 93.4 10/10/2019    PLT 18 (C) 10/10/2019

## 2019-10-14 ENCOUNTER — CLINICAL SUPPORT (OUTPATIENT)
Dept: ONCOLOGY | Facility: HOSPITAL | Age: 79
End: 2019-10-14

## 2019-10-14 ENCOUNTER — LAB (OUTPATIENT)
Dept: LAB | Facility: HOSPITAL | Age: 79
End: 2019-10-14

## 2019-10-14 VITALS
OXYGEN SATURATION: 98 % | DIASTOLIC BLOOD PRESSURE: 60 MMHG | TEMPERATURE: 97.9 F | SYSTOLIC BLOOD PRESSURE: 115 MMHG | HEART RATE: 111 BPM

## 2019-10-14 DIAGNOSIS — D69.6 THROMBOCYTOPENIA (HCC): Primary | ICD-10-CM

## 2019-10-14 LAB
BASOPHILS # BLD AUTO: 0.03 10*3/MM3 (ref 0–0.2)
BASOPHILS NFR BLD AUTO: 0.3 % (ref 0–1.5)
DEPRECATED RDW RBC AUTO: 62.3 FL (ref 37–54)
EOSINOPHIL # BLD AUTO: 0.06 10*3/MM3 (ref 0–0.4)
EOSINOPHIL NFR BLD AUTO: 0.5 % (ref 0.3–6.2)
ERYTHROCYTE [DISTWIDTH] IN BLOOD BY AUTOMATED COUNT: 18.1 % (ref 12.3–15.4)
HCT VFR BLD AUTO: 33.6 % (ref 37.5–51)
HGB BLD-MCNC: 11 G/DL (ref 13–17.7)
IMM GRANULOCYTES # BLD AUTO: 2.68 10*3/MM3 (ref 0–0.05)
IMM GRANULOCYTES NFR BLD AUTO: 22.5 % (ref 0–0.5)
LYMPHOCYTES # BLD AUTO: 0.33 10*3/MM3 (ref 0.7–3.1)
LYMPHOCYTES NFR BLD AUTO: 2.8 % (ref 19.6–45.3)
MCH RBC QN AUTO: 31.4 PG (ref 26.6–33)
MCHC RBC AUTO-ENTMCNC: 32.7 G/DL (ref 31.5–35.7)
MCV RBC AUTO: 96 FL (ref 79–97)
MONOCYTES # BLD AUTO: 1.72 10*3/MM3 (ref 0.1–0.9)
MONOCYTES NFR BLD AUTO: 14.4 % (ref 5–12)
NEUTROPHILS # BLD AUTO: 7.1 10*3/MM3 (ref 1.7–7)
NEUTROPHILS NFR BLD AUTO: 59.5 % (ref 42.7–76)
NRBC BLD AUTO-RTO: 0 /100 WBC (ref 0–0.2)
PLATELET # BLD AUTO: 60 10*3/MM3 (ref 140–450)
PMV BLD AUTO: 10.2 FL (ref 6–12)
RBC # BLD AUTO: 3.5 10*6/MM3 (ref 4.14–5.8)
WBC NRBC COR # BLD: 11.92 10*3/MM3 (ref 3.4–10.8)

## 2019-10-14 PROCEDURE — 36415 COLL VENOUS BLD VENIPUNCTURE: CPT

## 2019-10-14 PROCEDURE — 85025 COMPLETE CBC W/AUTO DIFF WBC: CPT

## 2019-10-14 NOTE — PROGRESS NOTES
Pt is here for lab with RN review.  CBC reviewed with June Owens NP. Platelets 60K and . Per June He will continue to hold ASA and Vitamin E. We will re-assess HR on Thursday.  Pt has no complaints and all other VSS.  Copy of labs given to pt and f/u appt reviewed. Pt is instructed to call the office with any concerns or new symptoms prior to next visit. Pt vu.   Lab Results   Component Value Date    WBC 11.92 (H) 10/14/2019    HGB 11.0 (L) 10/14/2019    HCT 33.6 (L) 10/14/2019    MCV 96.0 10/14/2019    PLT 60 (L) 10/14/2019   \

## 2019-10-15 ENCOUNTER — HOSPITAL ENCOUNTER (OUTPATIENT)
Dept: PET IMAGING | Facility: HOSPITAL | Age: 79
Discharge: HOME | End: 2019-10-15
Admitting: INTERNAL MEDICINE

## 2019-10-15 ENCOUNTER — HOSPITAL ENCOUNTER (OUTPATIENT)
Dept: PET IMAGING | Facility: HOSPITAL | Age: 79
Discharge: HOME | End: 2019-10-15

## 2019-10-15 DIAGNOSIS — C83.38 DIFFUSE LARGE B-CELL LYMPHOMA OF LYMPH NODES OF MULTIPLE REGIONS (HCC): ICD-10-CM

## 2019-10-15 LAB — GLUCOSE BLDC GLUCOMTR-MCNC: 148 MG/DL (ref 70–130)

## 2019-10-15 PROCEDURE — 78815 PET IMAGE W/CT SKULL-THIGH: CPT

## 2019-10-15 PROCEDURE — 82962 GLUCOSE BLOOD TEST: CPT

## 2019-10-15 PROCEDURE — A9552 F18 FDG: HCPCS | Performed by: INTERNAL MEDICINE

## 2019-10-15 PROCEDURE — 0 FLUDEOXYGLUCOSE F18 SOLUTION: Performed by: INTERNAL MEDICINE

## 2019-10-15 RX ADMIN — FLUDEOXYGLUCOSE F18 1 DOSE: 300 INJECTION INTRAVENOUS at 08:20

## 2019-10-17 ENCOUNTER — CLINICAL SUPPORT (OUTPATIENT)
Dept: ONCOLOGY | Facility: HOSPITAL | Age: 79
End: 2019-10-17

## 2019-10-17 ENCOUNTER — LAB (OUTPATIENT)
Dept: LAB | Facility: HOSPITAL | Age: 79
End: 2019-10-17

## 2019-10-17 VITALS
HEART RATE: 86 BPM | SYSTOLIC BLOOD PRESSURE: 112 MMHG | TEMPERATURE: 97.6 F | DIASTOLIC BLOOD PRESSURE: 64 MMHG | OXYGEN SATURATION: 98 %

## 2019-10-17 DIAGNOSIS — D69.6 THROMBOCYTOPENIA (HCC): Primary | ICD-10-CM

## 2019-10-17 LAB
BASOPHILS # BLD AUTO: 0.09 10*3/MM3 (ref 0–0.2)
BASOPHILS NFR BLD AUTO: 0.6 % (ref 0–1.5)
DEPRECATED RDW RBC AUTO: 61.5 FL (ref 37–54)
EOSINOPHIL # BLD AUTO: 0.07 10*3/MM3 (ref 0–0.4)
EOSINOPHIL NFR BLD AUTO: 0.4 % (ref 0.3–6.2)
ERYTHROCYTE [DISTWIDTH] IN BLOOD BY AUTOMATED COUNT: 18.1 % (ref 12.3–15.4)
HCT VFR BLD AUTO: 35.9 % (ref 37.5–51)
HGB BLD-MCNC: 11.9 G/DL (ref 13–17.7)
IMM GRANULOCYTES # BLD AUTO: 0.91 10*3/MM3 (ref 0–0.05)
IMM GRANULOCYTES NFR BLD AUTO: 5.8 % (ref 0–0.5)
LYMPHOCYTES # BLD AUTO: 1.62 10*3/MM3 (ref 0.7–3.1)
LYMPHOCYTES NFR BLD AUTO: 10.4 % (ref 19.6–45.3)
MCH RBC QN AUTO: 31.2 PG (ref 26.6–33)
MCHC RBC AUTO-ENTMCNC: 33.1 G/DL (ref 31.5–35.7)
MCV RBC AUTO: 94.2 FL (ref 79–97)
MONOCYTES # BLD AUTO: 1.64 10*3/MM3 (ref 0.1–0.9)
MONOCYTES NFR BLD AUTO: 10.5 % (ref 5–12)
NEUTROPHILS # BLD AUTO: 11.26 10*3/MM3 (ref 1.7–7)
NEUTROPHILS NFR BLD AUTO: 72.3 % (ref 42.7–76)
NRBC BLD AUTO-RTO: 0 /100 WBC (ref 0–0.2)
PLATELET # BLD AUTO: 101 10*3/MM3 (ref 140–450)
PMV BLD AUTO: 9.9 FL (ref 6–12)
RBC # BLD AUTO: 3.81 10*6/MM3 (ref 4.14–5.8)
WBC NRBC COR # BLD: 15.59 10*3/MM3 (ref 3.4–10.8)

## 2019-10-17 PROCEDURE — 85025 COMPLETE CBC W/AUTO DIFF WBC: CPT

## 2019-10-17 PROCEDURE — 36415 COLL VENOUS BLD VENIPUNCTURE: CPT

## 2019-10-17 NOTE — PROGRESS NOTES
Pt is here for lab with RN review.  CBC reviewed with pt, counts are stable for this pt at this time. Pt has no complaints and VSS. Reviewed CBC with Raquel Berger NP. Per Raquel pt can restart ASA 81 mg and Vitamin E. Pet Scan results reviewed with Dr. Hooks . Per Dr. Hooks all looks good. Pt informed. Pt will follow up with Cardiology regarding his increased HR and irregularity.   Copy of labs given to pt and f/u appt reviewed. Pt is instructed to call the office with any concerns or new symptoms prior to next visit. Pt vu.   Lab Results   Component Value Date    WBC 15.59 (H) 10/17/2019    HGB 11.9 (L) 10/17/2019    HCT 35.9 (L) 10/17/2019    MCV 94.2 10/17/2019     (L) 10/17/2019

## 2019-10-21 ENCOUNTER — LAB (OUTPATIENT)
Dept: LAB | Facility: HOSPITAL | Age: 79
End: 2019-10-21

## 2019-10-21 ENCOUNTER — OFFICE VISIT (OUTPATIENT)
Dept: ONCOLOGY | Facility: CLINIC | Age: 79
End: 2019-10-21

## 2019-10-21 ENCOUNTER — CLINICAL SUPPORT (OUTPATIENT)
Dept: INTERNAL MEDICINE | Facility: CLINIC | Age: 79
End: 2019-10-21

## 2019-10-21 VITALS
HEIGHT: 67 IN | OXYGEN SATURATION: 98 % | HEART RATE: 108 BPM | WEIGHT: 139.8 LBS | SYSTOLIC BLOOD PRESSURE: 121 MMHG | TEMPERATURE: 98.1 F | BODY MASS INDEX: 21.94 KG/M2 | RESPIRATION RATE: 16 BRPM | DIASTOLIC BLOOD PRESSURE: 60 MMHG

## 2019-10-21 DIAGNOSIS — C83.33 DIFFUSE LARGE B-CELL LYMPHOMA OF INTRA-ABDOMINAL LYMPH NODES (HCC): Primary | ICD-10-CM

## 2019-10-21 DIAGNOSIS — Z23 NEED FOR IMMUNIZATION AGAINST INFLUENZA: Primary | ICD-10-CM

## 2019-10-21 DIAGNOSIS — D69.6 THROMBOCYTOPENIA (HCC): Primary | ICD-10-CM

## 2019-10-21 LAB
BASOPHILS # BLD AUTO: 0.08 10*3/MM3 (ref 0–0.2)
BASOPHILS NFR BLD AUTO: 0.5 % (ref 0–1.5)
DEPRECATED RDW RBC AUTO: 60.2 FL (ref 37–54)
EOSINOPHIL # BLD AUTO: 0.09 10*3/MM3 (ref 0–0.4)
EOSINOPHIL NFR BLD AUTO: 0.6 % (ref 0.3–6.2)
ERYTHROCYTE [DISTWIDTH] IN BLOOD BY AUTOMATED COUNT: 17.8 % (ref 12.3–15.4)
HCT VFR BLD AUTO: 33.1 % (ref 37.5–51)
HGB BLD-MCNC: 11.4 G/DL (ref 13–17.7)
IMM GRANULOCYTES # BLD AUTO: 0.15 10*3/MM3 (ref 0–0.05)
IMM GRANULOCYTES NFR BLD AUTO: 1 % (ref 0–0.5)
LYMPHOCYTES # BLD AUTO: 1.7 10*3/MM3 (ref 0.7–3.1)
LYMPHOCYTES NFR BLD AUTO: 11.1 % (ref 19.6–45.3)
MCH RBC QN AUTO: 32.1 PG (ref 26.6–33)
MCHC RBC AUTO-ENTMCNC: 34.4 G/DL (ref 31.5–35.7)
MCV RBC AUTO: 93.2 FL (ref 79–97)
MONOCYTES # BLD AUTO: 1.41 10*3/MM3 (ref 0.1–0.9)
MONOCYTES NFR BLD AUTO: 9.2 % (ref 5–12)
NEUTROPHILS # BLD AUTO: 11.85 10*3/MM3 (ref 1.7–7)
NEUTROPHILS NFR BLD AUTO: 77.6 % (ref 42.7–76)
NRBC BLD AUTO-RTO: 0 /100 WBC (ref 0–0.2)
PLATELET # BLD AUTO: 150 10*3/MM3 (ref 140–450)
PMV BLD AUTO: 9.5 FL (ref 6–12)
RBC # BLD AUTO: 3.55 10*6/MM3 (ref 4.14–5.8)
WBC NRBC COR # BLD: 15.28 10*3/MM3 (ref 3.4–10.8)

## 2019-10-21 PROCEDURE — 90653 IIV ADJUVANT VACCINE IM: CPT | Performed by: INTERNAL MEDICINE

## 2019-10-21 PROCEDURE — 36415 COLL VENOUS BLD VENIPUNCTURE: CPT

## 2019-10-21 PROCEDURE — G0463 HOSPITAL OUTPT CLINIC VISIT: HCPCS | Performed by: INTERNAL MEDICINE

## 2019-10-21 PROCEDURE — G0008 ADMIN INFLUENZA VIRUS VAC: HCPCS | Performed by: INTERNAL MEDICINE

## 2019-10-21 PROCEDURE — 99215 OFFICE O/P EST HI 40 MIN: CPT | Performed by: INTERNAL MEDICINE

## 2019-10-21 PROCEDURE — 85025 COMPLETE CBC W/AUTO DIFF WBC: CPT

## 2019-10-21 NOTE — PROGRESS NOTES
"[facility] CBC GROUP OUTPATIENT FOLLOW UP CLINIC VISIT    REASON FOR FOLLOW-UP:    1.  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  CT imaging of the abdomen and pelvis on 5/8/2019 shows multiple small tissue nodules in the perirenal fat adjacent to the left kidney and a soft tissue mass interposed between the left psoas muscle and spine.  2.  RA previously on Cimzia (certollizumab) injections by Dr. Velásquez  3.  Lumbar puncture 5/31/2019 negative for involvement with lymphoma  4.  Bone marrow aspiration and biopsy 5/29/2018 negative for lymphoma  5.  PET scan on 5/30/2019 with hypermetabolic perinephric soft tissue lesions with mesenteric lymphadenopathy, left paraspinal mass at L5, 6 cm intramuscular mass in the right upper thigh, lesion at the left clavicle and a 5 cm soft tissue mass between the right scapula and right posterior fourth rib.  6.  Right subclavian Mediport placed by Dr. Herrera on 6/12/2019.  7.  Admission to [facility] for cycle #1 of dose adjusted R-EPOCH on 6/17/2019.  Plan for a lumbar puncture with intrathecal methotrexate with each cycle.  8.  6 cycles of therapy with lumbar punctures with intrathecal methotrexate administered with each cycle complete as of 10/4/2019.  9.  PET scan on 10/15/2019 negative aside from hypermetabolic activity in the bone marrow and in the spleen.    HISTORY OF PRESENT ILLNESS:  Edward Srinivasan is a 79 y.o. male who returns today for follow up of the above issue.      He has now completed 6 cycles of dose adjusted R-EPOCH which she tolerated extremely well and he returns today for his posttreatment PET scan results.  He remains weak and fatigued.  His appetite remains low but adequate.  No lymphadenopathy.  No fevers or chills.  No mucositis.  No nausea or vomiting.         HEMATOLOGIC HISTORY:  See above    ALLERGIES:  Allergies   Allergen " "Reactions   • Oxaprozin Hives       MEDICATIONS:  The medication list has been reviewed with the patient by the medical assistant, and the list has been updated in the electronic medical record, which I reviewed.  Medication dosages and frequencies were confirmed to be accurate.    REVIEW OF SYSTEMS:  PAIN:  See Vital Signs below.  GENERAL:  No fevers, chills, night sweats.  Fatigue  SKIN:  No rashes or non-healing lesions.  HEME/LYMPH:  No abnormal bleeding.  No palpable lymphadenopathy.  EYES:  No vision changes or diplopia.  ENT:  No sore throat or difficulty swallowing.  RESPIRATORY:  No cough, shortness of breath, hemoptysis, or wheezing.  CARDIOVASCULAR:  No chest pain, palpitations, orthopnea, or dyspnea on exertion.  Tachycardia, asymptomatic  GASTROINTESTINAL:  No abdominal pain, nausea, vomiting, constipation, diarrhea, melena, or hematochezia.  GENITOURINARY:  No dysuria or hematuria.  MUSCULOSKELETAL:  No joint pain, swelling, or erythema.  NEUROLOGIC:  No dizziness, loss of consciousness, or seizures.  PSYCHIATRIC:  No depression, anxiety, or mood changes.    Vitals:    10/21/19 0742   BP: 121/60   Pulse: 108   Resp: 16   Temp: 98.1 °F (36.7 °C)   TempSrc: Oral   SpO2: 98%   Weight: 63.4 kg (139 lb 12.8 oz)   Height: 170.2 cm (67.01\")   PainSc: 0-No pain  Comment: lymphoma       PHYSICAL EXAMINATION:  GENERAL:  Well-developed well-nourished male; awake, alert and oriented, in no acute distress.  SKIN:  Warm and dry, without rashes, purpura, or petechiae.  HEAD:  Normocephalic, atraumatic.  EYES:  Pupils equal, round and reactive to light.  Extraocular movements intact.  Conjunctivae normal.  EARS:  Hearing intact.  NOSE:  Septum midline.  No excoriations or nasal discharge.  MOUTH:  No stomatitis or ulcers.  Lips are normal.  THROAT:  Oropharynx without lesions or exudates.  NECK:  Supple with good range of motion; no thyromegaly or masses; no JVD or bruits.  LYMPHATICS:  No cervical, supraclavicular, " or axillary lymphadenopathy.  CHEST:  Lungs are clear to auscultation bilaterally.  No wheezes, rales, or rhonchi.  HEART: Mildly tachycardic with a grade 2 out of 6 systolic murmur.  No gallops or rubs.  ABDOMEN:  Soft, non-tender, non-distended.  Normal active bowel sounds.  No organomegaly.  EXTREMITIES:  No clubbing, cyanosis or edema.  NEUROLOGICAL:  No focal neurologic deficits.    DIAGNOSTIC DATA:  Results for orders placed or performed in visit on 10/21/19   CBC Auto Differential   Result Value Ref Range    WBC 15.28 (H) 3.40 - 10.80 10*3/mm3    RBC 3.55 (L) 4.14 - 5.80 10*6/mm3    Hemoglobin 11.4 (L) 13.0 - 17.7 g/dL    Hematocrit 33.1 (L) 37.5 - 51.0 %    MCV 93.2 79.0 - 97.0 fL    MCH 32.1 26.6 - 33.0 pg    MCHC 34.4 31.5 - 35.7 g/dL    RDW 17.8 (H) 12.3 - 15.4 %    RDW-SD 60.2 (H) 37.0 - 54.0 fl    MPV 9.5 6.0 - 12.0 fL    Platelets 150 140 - 450 10*3/mm3    Neutrophil % 77.6 (H) 42.7 - 76.0 %    Lymphocyte % 11.1 (L) 19.6 - 45.3 %    Monocyte % 9.2 5.0 - 12.0 %    Eosinophil % 0.6 0.3 - 6.2 %    Basophil % 0.5 0.0 - 1.5 %    Immature Grans % 1.0 (H) 0.0 - 0.5 %    Neutrophils, Absolute 11.85 (H) 1.70 - 7.00 10*3/mm3    Lymphocytes, Absolute 1.70 0.70 - 3.10 10*3/mm3    Monocytes, Absolute 1.41 (H) 0.10 - 0.90 10*3/mm3    Eosinophils, Absolute 0.09 0.00 - 0.40 10*3/mm3    Basophils, Absolute 0.08 0.00 - 0.20 10*3/mm3    Immature Grans, Absolute 0.15 (H) 0.00 - 0.05 10*3/mm3    nRBC 0.0 0.0 - 0.2 /100 WBC       IMAGING: I personally reviewed PET scan images from 10/15/2019.  No evidence for hypermetabolic lymphoma.  Mild hypermetabolism throughout the bone marrow and spleen likely reactive and post chemotherapy recovery.    1. Interval resolution of all of the hypermetabolic lymphadenopathy,  soft tissue lesions involving the right upper back, right thigh, and  bilateral perinephric regions. The hypermetabolic right upper lobe  pulmonary nodule has resolved as well.  2. Interval increase in splenic size  "and in the intensity of the splenic  metabolic activity. Also development of hypermetabolic activity  throughout the marrow space.    ASSESSMENT:  This is a 79 y.o. male with:  1.  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  CT imaging of the abdomen and pelvis on 5/8/2019 shows multiple small tissue nodules in the perirenal fat adjacent to the left kidney and a soft tissue mass interposed between the left psoas muscle and spine.  Baseline PET scan on 5/30/2019 with hypermetabolic perinephric soft tissue lesions with mesenteric lymphadenopathy, left paraspinal mass at L5, 6 cm intramuscular mass in the right upper thigh, lesion at the left clavicle and a 5 cm soft tissue mass between the right scapula and right posterior fourth rib.  Negative lumbar puncture.  Negative bone marrow biopsy.  He has now completed 6 cycles of dose adjusted R-EPO CH with discharge following cycle #6 on 10/4/2019.  He received lumbar punctures with intrathecal methotrexate with each cycle.  His end of treatment PET scan on 10/15/2019 is negative for lymphoma.  There is some mild hypermetabolism throughout the marrow space and throughout the spleen which is likely reactive and related to posttreatment recovery.  No evidence of disease at this point.  Proceed with surveillance.  2.  RA previously on Cimzia (certollizumab) injections by Dr. Velásquez  3.  Mild tachycardia: We will monitor this.  He is asymptomatic.    PLAN:  1.  Discontinue acyclovir and Bactrim which she was taking for prophylaxis  2.  He may have his flu shot  3.  He will maintain his Mediport for now with port flushes every 4 to 6 weeks.  His next port flush will be due in about 3 weeks.  4.  I will see him back in 3 months for follow-up with labs and a port flush.  5.  Per NCCN guidelines, imaging no more often than every 6 months for the first 2 years, then as " indicated.

## 2019-10-23 ENCOUNTER — EPISODE CHANGES (OUTPATIENT)
Dept: CASE MANAGEMENT | Facility: OTHER | Age: 79
End: 2019-10-23

## 2019-10-23 ENCOUNTER — PATIENT OUTREACH (OUTPATIENT)
Dept: CASE MANAGEMENT | Facility: OTHER | Age: 79
End: 2019-10-23

## 2019-10-23 NOTE — OUTREACH NOTE
Patient Outreach Note    Patient has met care plan goals, all care gaps have been addressed, and patient has a strong sense of health self-management. The patient's annual wellness visit is scheduled for 3/9/20 at 9AM.  Update to care plan today.  Mychart activation has been discussed and patient is not active.  Has completed chemotherapy and PET scans reviewed during Appt. With Dr. Garcia on Monday. Flu shot was administered at [facility] office.  . Patient has received advanced care planning materials or has an active advanced care plan in place and filed in EMR.  Denies further needs or concerns at this time.      Margot Salazar RN  Community Care Coordinator    10/23/2019, 12:03 PM

## 2019-10-24 ENCOUNTER — EPISODE CHANGES (OUTPATIENT)
Dept: CASE MANAGEMENT | Facility: OTHER | Age: 79
End: 2019-10-24

## 2019-11-04 ENCOUNTER — TELEPHONE (OUTPATIENT)
Dept: FAMILY MEDICINE CLINIC | Facility: CLINIC | Age: 79
End: 2019-11-04

## 2019-11-11 ENCOUNTER — TELEPHONE (OUTPATIENT)
Dept: INTERNAL MEDICINE | Facility: CLINIC | Age: 79
End: 2019-11-11

## 2019-11-11 ENCOUNTER — INFUSION (OUTPATIENT)
Dept: ONCOLOGY | Facility: HOSPITAL | Age: 79
End: 2019-11-11

## 2019-11-11 DIAGNOSIS — E11.9 DIABETES MELLITUS WITHOUT COMPLICATION (HCC): Primary | ICD-10-CM

## 2019-11-11 DIAGNOSIS — C83.33 DIFFUSE LARGE B-CELL LYMPHOMA OF INTRA-ABDOMINAL LYMPH NODES (HCC): ICD-10-CM

## 2019-11-11 LAB
ALBUMIN SERPL-MCNC: 3.9 G/DL (ref 3.5–5.2)
ALBUMIN/GLOB SERPL: 1.7 G/DL (ref 1.1–2.4)
ALP SERPL-CCNC: 66 U/L (ref 38–116)
ALT SERPL W P-5'-P-CCNC: 12 U/L (ref 0–41)
ANION GAP SERPL CALCULATED.3IONS-SCNC: 11.2 MMOL/L (ref 5–15)
AST SERPL-CCNC: 18 U/L (ref 0–40)
BASOPHILS # BLD AUTO: 0.05 10*3/MM3 (ref 0–0.2)
BASOPHILS NFR BLD AUTO: 0.5 % (ref 0–1.5)
BILIRUB SERPL-MCNC: 0.4 MG/DL (ref 0.2–1.2)
BUN BLD-MCNC: 20 MG/DL (ref 6–20)
BUN/CREAT SERPL: 23 (ref 7.3–30)
CALCIUM SPEC-SCNC: 8.6 MG/DL (ref 8.5–10.2)
CHLORIDE SERPL-SCNC: 98 MMOL/L (ref 98–107)
CO2 SERPL-SCNC: 27.8 MMOL/L (ref 22–29)
CREAT BLD-MCNC: 0.87 MG/DL (ref 0.7–1.3)
DEPRECATED RDW RBC AUTO: 59.7 FL (ref 37–54)
EOSINOPHIL # BLD AUTO: 0.26 10*3/MM3 (ref 0–0.4)
EOSINOPHIL NFR BLD AUTO: 2.8 % (ref 0.3–6.2)
ERYTHROCYTE [DISTWIDTH] IN BLOOD BY AUTOMATED COUNT: 16.8 % (ref 12.3–15.4)
GFR SERPL CREATININE-BSD FRML MDRD: 85 ML/MIN/1.73
GLOBULIN UR ELPH-MCNC: 2.3 GM/DL (ref 1.8–3.5)
GLUCOSE BLD-MCNC: 130 MG/DL (ref 74–124)
HCT VFR BLD AUTO: 30.6 % (ref 37.5–51)
HGB BLD-MCNC: 10.4 G/DL (ref 13–17.7)
IMM GRANULOCYTES # BLD AUTO: 0.05 10*3/MM3 (ref 0–0.05)
IMM GRANULOCYTES NFR BLD AUTO: 0.5 % (ref 0–0.5)
LYMPHOCYTES # BLD AUTO: 1.01 10*3/MM3 (ref 0.7–3.1)
LYMPHOCYTES NFR BLD AUTO: 10.8 % (ref 19.6–45.3)
MCH RBC QN AUTO: 33.1 PG (ref 26.6–33)
MCHC RBC AUTO-ENTMCNC: 34 G/DL (ref 31.5–35.7)
MCV RBC AUTO: 97.5 FL (ref 79–97)
MONOCYTES # BLD AUTO: 0.85 10*3/MM3 (ref 0.1–0.9)
MONOCYTES NFR BLD AUTO: 9.1 % (ref 5–12)
NEUTROPHILS # BLD AUTO: 7.17 10*3/MM3 (ref 1.7–7)
NEUTROPHILS NFR BLD AUTO: 76.3 % (ref 42.7–76)
NRBC BLD AUTO-RTO: 0 /100 WBC (ref 0–0.2)
PLATELET # BLD AUTO: 123 10*3/MM3 (ref 140–450)
PMV BLD AUTO: 8.2 FL (ref 6–12)
POTASSIUM BLD-SCNC: 4.4 MMOL/L (ref 3.5–4.7)
PROT SERPL-MCNC: 6.2 G/DL (ref 6.3–8)
RBC # BLD AUTO: 3.14 10*6/MM3 (ref 4.14–5.8)
SODIUM BLD-SCNC: 137 MMOL/L (ref 134–145)
WBC NRBC COR # BLD: 9.39 10*3/MM3 (ref 3.4–10.8)

## 2019-11-11 PROCEDURE — 96523 IRRIG DRUG DELIVERY DEVICE: CPT | Performed by: INTERNAL MEDICINE

## 2019-11-11 PROCEDURE — 80053 COMPREHEN METABOLIC PANEL: CPT | Performed by: INTERNAL MEDICINE

## 2019-11-11 PROCEDURE — 85025 COMPLETE CBC W/AUTO DIFF WBC: CPT | Performed by: INTERNAL MEDICINE

## 2019-11-11 NOTE — TELEPHONE ENCOUNTER
Please call pt regarding his A1C, wants to have labs early in the morning of Tues 11/019/19 as his spouse is having labs early as well.  Please advise

## 2019-11-26 ENCOUNTER — OFFICE VISIT (OUTPATIENT)
Dept: CARDIOLOGY | Facility: CLINIC | Age: 79
End: 2019-11-26

## 2019-11-26 ENCOUNTER — LAB (OUTPATIENT)
Dept: INTERNAL MEDICINE | Facility: CLINIC | Age: 79
End: 2019-11-26

## 2019-11-26 VITALS
BODY MASS INDEX: 22.85 KG/M2 | DIASTOLIC BLOOD PRESSURE: 74 MMHG | OXYGEN SATURATION: 96 % | SYSTOLIC BLOOD PRESSURE: 124 MMHG | WEIGHT: 145.6 LBS | HEART RATE: 115 BPM | HEIGHT: 67 IN

## 2019-11-26 DIAGNOSIS — I77.9 CAROTID ARTERY DISEASE, UNSPECIFIED LATERALITY, UNSPECIFIED TYPE (HCC): ICD-10-CM

## 2019-11-26 DIAGNOSIS — E11.9 DIABETES MELLITUS WITHOUT COMPLICATION (HCC): ICD-10-CM

## 2019-11-26 DIAGNOSIS — I10 ESSENTIAL HYPERTENSION: ICD-10-CM

## 2019-11-26 DIAGNOSIS — I35.0 NONRHEUMATIC AORTIC VALVE STENOSIS: ICD-10-CM

## 2019-11-26 DIAGNOSIS — I25.10 CORONARY ARTERY DISEASE INVOLVING NATIVE CORONARY ARTERY OF NATIVE HEART WITHOUT ANGINA PECTORIS: Primary | ICD-10-CM

## 2019-11-26 LAB — HBA1C MFR BLD: 5.6 % (ref 4.8–5.6)

## 2019-11-26 PROCEDURE — 99213 OFFICE O/P EST LOW 20 MIN: CPT | Performed by: INTERNAL MEDICINE

## 2019-12-01 DIAGNOSIS — E11.9 DIABETES MELLITUS WITHOUT COMPLICATION (HCC): ICD-10-CM

## 2019-12-03 RX ORDER — BLOOD-GLUCOSE METER
KIT MISCELLANEOUS
Qty: 100 EACH | Refills: 3 | Status: SHIPPED | OUTPATIENT
Start: 2019-12-03 | End: 2020-07-20

## 2019-12-15 NOTE — PROGRESS NOTES
Date of Office Visit: 2019  Encounter Provider: Brian Harden MD  Place of Service: [facility] CARDIOLOGY  Patient Name: Edward Srinivasan  :1940    Chief complaint: Follow-up for coronary artery disease, aortic stenosis, and   carotid artery disease.    History of Present Illness:    Dear Dr. Cannon:       I again had the pleasure of seeing your patient in cardiology office on 2019.  As you   well know, he is a very pleasant, 79 year-old, white male with a past medical history  significant for coronary artery disease, aortic stenosis, hyperlipidemia, and rheumatoid  arthritis who presents for follow-up. The patient was admitted to the hospital on  2015, with chest discomfort. He had been watching his grandson on the lawn   when he developed sudden chest discomfort, which radiated across his precordium   and was described as a pressure sensation. It then started to radiate into his neck   bilaterally, as well as his jaws. His initial electrocardiogram showed no ischemic   changes and his troponin was negative. He also had been recently diagnosed with   rheumatoid arthritis and was on prednisone. 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 A CT angiogram of the chest on admission in   the emergency room showed chronic obstructive pulmonary disease with bullae   formation; however, he also had an interstitial pattern with early honeycombing   consistent with pulmonary fibrosis. A subsequent CT on 2015, confirmed this. He   was seen by pulmonology as well.      He did undergo a Lexiscan Cardiolite stress test, which showed lateral ischemia. A   subsequent cardiac catheterization on 2015, by Dr. Stearns showed a 90%   ulcerated lesion in the mid left circumflex. He subsequently underwent placement of a   3.0 mm x 18 mm Xience drug-eluting stent, although there was a proximal edge   dissection necessitating  placement of a second 3.0 mm x 12 mm Xience drug-eluting   stent proximal to the first stent.       The patient presents today for follow-up.  Unfortunately, since his last visit, he was   diagnosed with aggressive B-cell lymphoma.  He is currently on chemotherapy.  He   has lost weight, although he has been told that he is likely cancer free by Dr. Garcia.  He   has not had any chest pain.  He has his baseline shortness of breath, although he   again started smoking unfortunately.  He has mainly been fatigued from chemotherapy  in general.    Past Medical History:   Diagnosis Date   • Allergic rhinitis    • Aortic valve stenosis     Mild to moderate AS by echo on 8/8/17   • CAD (coronary artery disease)    • Cancer (CMS/HCC)     LYMPHOMA   • Carotid arterial disease (CMS/HCC)     Followed by Dr. Floyd.  Carotid Doppler on 9/21/17: 60-70% DENISE, 50-60% LICA.   • Chronic obstructive lung disease (CMS/HCC)    • Esophageal reflux     With a history of erosive gastritis   • Hyperlipidemia    • Hypertension    • Iron deficiency    • Legally blind     PT WEARS GLASSES    • Pulmonary fibrosis (CMS/HCC)     By CT scan on 8/12/15   • PVC (premature ventricular contraction)    • Rheumatoid arthritis (CMS/HCC)    • Skin cancer of face     15 year ago on left side of face   • Steroid-induced diabetes mellitus (CMS/HCC)    • Tobacco use        Past Surgical History:   Procedure Laterality Date   • BACK SURGERY  2010    L4-L5 discectomy   • CATARACT EXTRACTION Bilateral 1979   • CORONARY ANGIOPLASTY WITH STENT PLACEMENT  2015   • VASECTOMY  1979   • VENOUS ACCESS DEVICE (PORT) INSERTION N/A 6/12/2019    Procedure: INSERTION RIGHT VENOUS ACCESS DEVICE;  Surgeon: Philippe Herrera Jr., MD;  Location: [facility];  Service: General       Current Outpatient Medications on File Prior to Visit   Medication Sig Dispense Refill   • aspirin 81 MG EC tablet Take 81 mg by mouth Daily.     • beta carotene 07801 UNIT capsule Take 10,000 Units by  mouth Daily.     • cetirizine (ZyrTEC) 10 MG tablet Take 10 mg by mouth daily.     • Cholecalciferol (VITAMIN D-3 PO) Take 2,000 Int'l Units/day by mouth Daily.     • linagliptin (TRADJENTA) 5 MG tablet tablet Take 5 mg by mouth Daily.     • lisinopril (PRINIVIL,ZESTRIL) 10 MG tablet TAKE 1 TABLET DAILY 90 tablet 3   • MAGNESIUM PO Take 500 mg by mouth Daily.     • meclizine (ANTIVERT) 25 MG tablet Take 1 tablet by mouth 3 (Three) Times a Day As Needed for dizziness. 180 tablet 1   • omeprazole (priLOSEC) 40 MG capsule Take 1 capsule by mouth Daily. 90 capsule 1   • polyethylene glycol (MIRALAX) packet Take 17 g by mouth Daily.     • pyridoxine (B-6) 100 MG tablet Take 100 mg by mouth Daily.     • sennosides-docusate sodium (SENOKOT-S) 8.6-50 MG tablet Take 1 tablet by mouth Daily.     • vitamin C (ASCORBIC ACID) 500 MG tablet Take 500 mg by mouth Daily.     • vitamin E 400 UNIT capsule Take 400 Units by mouth Daily.       No current facility-administered medications on file prior to visit.      Allergies as of 11/26/2019 - Reviewed 11/26/2019   Allergen Reaction Noted   • Oxaprozin Hives 11/20/2015     Social History     Socioeconomic History   • Marital status:      Spouse name: Radha   • Number of children: Not on file   • Years of education: High school   • Highest education level: Not on file   Occupational History   • Occupation:      Employer: RETIRED     Comment: [employer]   Tobacco Use   • Smoking status: Current Every Day Smoker     Packs/day: 0.50     Years: 50.00     Pack years: 25.00     Types: Cigarettes   • Smokeless tobacco: Never Used   • Tobacco comment: 1/2 ppd, discussed benefits of cessation   Substance and Sexual Activity   • Alcohol use: No   • Drug use: No   • Sexual activity: Defer     Family History   Problem Relation Age of Onset   • Heart failure Mother         CHF   • Heart attack Father 54   • Heart disease Father    • Heart attack Brother 47   • Heart disease  "Brother         another brother with 4 vessel CABG in his 50s   • Coronary artery disease Other         multiple family members   • Malig Hyperthermia Neg Hx        Review of Systems   Constitution: Positive for malaise/fatigue and weight gain.   Respiratory: Positive for cough.    Skin: Positive for itching.   Neurological: Positive for numbness.   All other systems reviewed and are negative.     Objective:     Vitals:    11/26/19 1121   BP: 124/74   Pulse: 115   SpO2: 96%   Weight: 66 kg (145 lb 9.6 oz)   Height: 170.2 cm (67.01\")     Body mass index is 22.8 kg/m².    Physical Exam   Constitutional: He is oriented to person, place, and time. He appears well-developed.   Appears thin   HENT:   Head: Normocephalic and atraumatic.   Eyes: Conjunctivae are normal.   Neck: Neck supple.   Cardiovascular: Normal rate and regular rhythm. Exam reveals no gallop and no friction rub.   Murmur heard.   Systolic murmur is present with a grade of 3/6 at the upper right sternal border, upper left sternal border and lower left sternal border.  Pulmonary/Chest: Effort normal and breath sounds normal.   Abdominal: Soft. There is no tenderness.   Musculoskeletal: He exhibits no edema.   Neurological: He is alert and oriented to person, place, and time.   Skin: Skin is warm.   Psychiatric: He has a normal mood and affect. His behavior is normal.     Lab Review:   Procedures    Cardiac Procedures:  1. Left heart catheterization on 8/14/2015, by Dr. Stearns showed the following. The  left main was normal. The left anterior descending had luminal irregularities. The first  diagonal branch had a 60% proximal stenosis. The left circumflex had a 90% ulcerated  stenosis in the mid portion. There was 20% to 30% distal disease in the left circumflex  coronary artery. The left posterior descending artery had a 60% ostial lesion. The right  coronary artery was small, nondominant, and had a 30% lesion at the origin.  He then  underwent placement " of a 3.0 mm x 18 mm Xience drug-eluting stent to the proximal   left circumflex; however, there was a proximal edge dissection necessitating  placement of a 3.0 mm x 12 mm Xience drug-eluting stent proximal to the first stent.   2. Echocardiogram on 8/8/2017: Ejection fraction was 60%.  There was grade 1   diastolic dysfunction.  There was mild to moderate aortic stenosis with a peak   gradient of 33 mmHg and a mean gradient of 18 mmHg.  3. Carotid artery Doppler ultrasound on 9/21/2017: There was a 60-70% stenosis in   the right internal carotid artery.  There was a 50-60% stenosis in the left internal   carotid artery.  4. Echocardiogram on 5/14/2019: The ejection fraction was 60%.  There was grade 1  diastolic dysfunction.  There was moderate aortic stenosis with a mean gradient of 18   mmHg.  There was mild tricuspid regurgitation.    Assessment:       Diagnosis Plan   1. Coronary artery disease involving native coronary artery of native heart without angina pectoris     2. Essential hypertension     3. Nonrheumatic aortic valve stenosis     4. Carotid artery disease, unspecified laterality, unspecified type (CMS/HCC)       Plan:       From a cardiac perspective, he seems stable.  He will need intermittent evaluation of the aortic stenosis with an echocardiogram.  His last echo was on 5/14/2019, and the aortic stenosis was moderate.  His shortness of breath is at baseline.  He has not had any chest pain or pressure.  Unfortunately, he did begin smoking again.  We did go over smoking cessation again today.  His main issue recently has been the aggressive B-cell lymphoma, and he has been on chemotherapy.  He is following with Dr. Garcia in the CBC group.  He is not taking aspirin currently because of intermittent thrombocytopenia from the chemotherapy.  He will continue on Lipitor.  He did briefly stopped this secondary to elevated liver function tests.  I will plan on seeing him back in the office in 6  months.

## 2019-12-16 ENCOUNTER — INFUSION (OUTPATIENT)
Dept: ONCOLOGY | Facility: HOSPITAL | Age: 79
End: 2019-12-16

## 2019-12-16 DIAGNOSIS — C83.30 DIFFUSE LARGE B-CELL LYMPHOMA, UNSPECIFIED BODY REGION (HCC): ICD-10-CM

## 2019-12-16 DIAGNOSIS — C83.33 DIFFUSE LARGE B-CELL LYMPHOMA OF INTRA-ABDOMINAL LYMPH NODES (HCC): Primary | ICD-10-CM

## 2019-12-16 LAB
ALBUMIN SERPL-MCNC: 3.8 G/DL (ref 3.5–5.2)
ALBUMIN/GLOB SERPL: 1.6 G/DL (ref 1.1–2.4)
ALP SERPL-CCNC: 71 U/L (ref 38–116)
ALT SERPL W P-5'-P-CCNC: 19 U/L (ref 0–41)
ANION GAP SERPL CALCULATED.3IONS-SCNC: 12.8 MMOL/L (ref 5–15)
AST SERPL-CCNC: 23 U/L (ref 0–40)
BASOPHILS # BLD AUTO: 0.03 10*3/MM3 (ref 0–0.2)
BASOPHILS NFR BLD AUTO: 1 % (ref 0–1.5)
BILIRUB SERPL-MCNC: 0.4 MG/DL (ref 0.2–1.2)
BUN BLD-MCNC: 19 MG/DL (ref 6–20)
BUN/CREAT SERPL: 21.6 (ref 7.3–30)
CALCIUM SPEC-SCNC: 8.6 MG/DL (ref 8.5–10.2)
CHLORIDE SERPL-SCNC: 98 MMOL/L (ref 98–107)
CO2 SERPL-SCNC: 26.2 MMOL/L (ref 22–29)
CREAT BLD-MCNC: 0.88 MG/DL (ref 0.7–1.3)
DEPRECATED RDW RBC AUTO: 47.7 FL (ref 37–54)
EOSINOPHIL # BLD AUTO: 0.28 10*3/MM3 (ref 0–0.4)
EOSINOPHIL NFR BLD AUTO: 9.7 % (ref 0.3–6.2)
ERYTHROCYTE [DISTWIDTH] IN BLOOD BY AUTOMATED COUNT: 13.4 % (ref 12.3–15.4)
GFR SERPL CREATININE-BSD FRML MDRD: 84 ML/MIN/1.73
GLOBULIN UR ELPH-MCNC: 2.4 GM/DL (ref 1.8–3.5)
GLUCOSE BLD-MCNC: 137 MG/DL (ref 74–124)
HCT VFR BLD AUTO: 33 % (ref 37.5–51)
HGB BLD-MCNC: 11 G/DL (ref 13–17.7)
IMM GRANULOCYTES # BLD AUTO: 0.02 10*3/MM3 (ref 0–0.05)
IMM GRANULOCYTES NFR BLD AUTO: 0.7 % (ref 0–0.5)
LYMPHOCYTES # BLD AUTO: 1.03 10*3/MM3 (ref 0.7–3.1)
LYMPHOCYTES NFR BLD AUTO: 35.5 % (ref 19.6–45.3)
MCH RBC QN AUTO: 32.5 PG (ref 26.6–33)
MCHC RBC AUTO-ENTMCNC: 33.3 G/DL (ref 31.5–35.7)
MCV RBC AUTO: 97.6 FL (ref 79–97)
MONOCYTES # BLD AUTO: 0.73 10*3/MM3 (ref 0.1–0.9)
MONOCYTES NFR BLD AUTO: 25.2 % (ref 5–12)
NEUTROPHILS # BLD AUTO: 0.81 10*3/MM3 (ref 1.7–7)
NEUTROPHILS NFR BLD AUTO: 27.9 % (ref 42.7–76)
NRBC BLD AUTO-RTO: 0.7 /100 WBC (ref 0–0.2)
PLATELET # BLD AUTO: 130 10*3/MM3 (ref 140–450)
PMV BLD AUTO: 9.2 FL (ref 6–12)
POTASSIUM BLD-SCNC: 4.8 MMOL/L (ref 3.5–4.7)
PROT SERPL-MCNC: 6.2 G/DL (ref 6.3–8)
RBC # BLD AUTO: 3.38 10*6/MM3 (ref 4.14–5.8)
SODIUM BLD-SCNC: 137 MMOL/L (ref 134–145)
WBC NRBC COR # BLD: 2.9 10*3/MM3 (ref 3.4–10.8)

## 2019-12-16 PROCEDURE — 96523 IRRIG DRUG DELIVERY DEVICE: CPT | Performed by: INTERNAL MEDICINE

## 2019-12-16 PROCEDURE — 80053 COMPREHEN METABOLIC PANEL: CPT | Performed by: INTERNAL MEDICINE

## 2019-12-16 PROCEDURE — 85025 COMPLETE CBC W/AUTO DIFF WBC: CPT | Performed by: INTERNAL MEDICINE

## 2019-12-16 RX ORDER — SODIUM CHLORIDE 0.9 % (FLUSH) 0.9 %
10 SYRINGE (ML) INJECTION AS NEEDED
Status: DISCONTINUED | OUTPATIENT
Start: 2019-12-16 | End: 2019-12-16 | Stop reason: HOSPADM

## 2019-12-16 RX ORDER — SODIUM CHLORIDE 0.9 % (FLUSH) 0.9 %
10 SYRINGE (ML) INJECTION AS NEEDED
Status: CANCELLED | OUTPATIENT
Start: 2019-12-16

## 2019-12-16 RX ADMIN — Medication 10 ML: at 14:02

## 2019-12-16 RX ADMIN — SODIUM CHLORIDE, PRESERVATIVE FREE 500 UNITS: 5 INJECTION INTRAVENOUS at 14:02

## 2020-01-02 RX ORDER — NITROGLYCERIN 0.4 MG/1
0.4 TABLET SUBLINGUAL
COMMUNITY
End: 2020-01-02 | Stop reason: SDUPTHER

## 2020-01-02 RX ORDER — NITROGLYCERIN 0.4 MG/1
0.4 TABLET SUBLINGUAL
Qty: 25 TABLET | Refills: 4 | Status: SHIPPED | OUTPATIENT
Start: 2020-01-02 | End: 2022-01-01 | Stop reason: SDUPTHER

## 2020-01-13 ENCOUNTER — INFUSION (OUTPATIENT)
Dept: ONCOLOGY | Facility: HOSPITAL | Age: 80
End: 2020-01-13

## 2020-01-13 ENCOUNTER — OFFICE VISIT (OUTPATIENT)
Dept: ONCOLOGY | Facility: CLINIC | Age: 80
End: 2020-01-13

## 2020-01-13 VITALS
TEMPERATURE: 98.2 F | SYSTOLIC BLOOD PRESSURE: 107 MMHG | RESPIRATION RATE: 16 BRPM | OXYGEN SATURATION: 98 % | DIASTOLIC BLOOD PRESSURE: 70 MMHG | WEIGHT: 146.5 LBS | HEIGHT: 67 IN | HEART RATE: 80 BPM | BODY MASS INDEX: 22.99 KG/M2

## 2020-01-13 DIAGNOSIS — C83.30 DIFFUSE LARGE B-CELL LYMPHOMA, UNSPECIFIED BODY REGION (HCC): Primary | ICD-10-CM

## 2020-01-13 DIAGNOSIS — C83.33 DIFFUSE LARGE B-CELL LYMPHOMA OF INTRA-ABDOMINAL LYMPH NODES (HCC): Primary | ICD-10-CM

## 2020-01-13 DIAGNOSIS — C83.33 DIFFUSE LARGE B-CELL LYMPHOMA OF INTRA-ABDOMINAL LYMPH NODES (HCC): ICD-10-CM

## 2020-01-13 LAB
ALBUMIN SERPL-MCNC: 3.8 G/DL (ref 3.5–5.2)
ALBUMIN/GLOB SERPL: 1.6 G/DL (ref 1.1–2.4)
ALP SERPL-CCNC: 73 U/L (ref 38–116)
ALT SERPL W P-5'-P-CCNC: 14 U/L (ref 0–41)
ANION GAP SERPL CALCULATED.3IONS-SCNC: 11 MMOL/L (ref 5–15)
AST SERPL-CCNC: 17 U/L (ref 0–40)
BASOPHILS # BLD AUTO: 0.03 10*3/MM3 (ref 0–0.2)
BASOPHILS NFR BLD AUTO: 0.3 % (ref 0–1.5)
BILIRUB SERPL-MCNC: 0.3 MG/DL (ref 0.2–1.2)
BUN BLD-MCNC: 19 MG/DL (ref 6–20)
BUN/CREAT SERPL: 29.2 (ref 7.3–30)
CALCIUM SPEC-SCNC: 8.6 MG/DL (ref 8.5–10.2)
CHLORIDE SERPL-SCNC: 98 MMOL/L (ref 98–107)
CO2 SERPL-SCNC: 26 MMOL/L (ref 22–29)
CREAT BLD-MCNC: 0.65 MG/DL (ref 0.7–1.3)
DEPRECATED RDW RBC AUTO: 49.9 FL (ref 37–54)
EOSINOPHIL # BLD AUTO: 0.31 10*3/MM3 (ref 0–0.4)
EOSINOPHIL NFR BLD AUTO: 3.1 % (ref 0.3–6.2)
ERYTHROCYTE [DISTWIDTH] IN BLOOD BY AUTOMATED COUNT: 14.2 % (ref 12.3–15.4)
GFR SERPL CREATININE-BSD FRML MDRD: 119 ML/MIN/1.73
GLOBULIN UR ELPH-MCNC: 2.4 GM/DL (ref 1.8–3.5)
GLUCOSE BLD-MCNC: 248 MG/DL (ref 74–124)
HCT VFR BLD AUTO: 33.4 % (ref 37.5–51)
HGB BLD-MCNC: 11.1 G/DL (ref 13–17.7)
IMM GRANULOCYTES # BLD AUTO: 0.05 10*3/MM3 (ref 0–0.05)
IMM GRANULOCYTES NFR BLD AUTO: 0.5 % (ref 0–0.5)
LDH SERPL-CCNC: 170 U/L (ref 99–259)
LYMPHOCYTES # BLD AUTO: 0.78 10*3/MM3 (ref 0.7–3.1)
LYMPHOCYTES NFR BLD AUTO: 7.7 % (ref 19.6–45.3)
MCH RBC QN AUTO: 31.6 PG (ref 26.6–33)
MCHC RBC AUTO-ENTMCNC: 33.2 G/DL (ref 31.5–35.7)
MCV RBC AUTO: 95.2 FL (ref 79–97)
MONOCYTES # BLD AUTO: 0.83 10*3/MM3 (ref 0.1–0.9)
MONOCYTES NFR BLD AUTO: 8.2 % (ref 5–12)
NEUTROPHILS # BLD AUTO: 8.12 10*3/MM3 (ref 1.7–7)
NEUTROPHILS NFR BLD AUTO: 80.2 % (ref 42.7–76)
NRBC BLD AUTO-RTO: 0 /100 WBC (ref 0–0.2)
PLATELET # BLD AUTO: 141 10*3/MM3 (ref 140–450)
PMV BLD AUTO: 8.1 FL (ref 6–12)
POTASSIUM BLD-SCNC: 4.6 MMOL/L (ref 3.5–4.7)
PROT SERPL-MCNC: 6.2 G/DL (ref 6.3–8)
RBC # BLD AUTO: 3.51 10*6/MM3 (ref 4.14–5.8)
SODIUM BLD-SCNC: 135 MMOL/L (ref 134–145)
URATE SERPL-MCNC: 3.9 MG/DL (ref 2.8–7.4)
WBC NRBC COR # BLD: 10.12 10*3/MM3 (ref 3.4–10.8)

## 2020-01-13 PROCEDURE — 96523 IRRIG DRUG DELIVERY DEVICE: CPT

## 2020-01-13 PROCEDURE — 36591 DRAW BLOOD OFF VENOUS DEVICE: CPT

## 2020-01-13 PROCEDURE — 99215 OFFICE O/P EST HI 40 MIN: CPT | Performed by: INTERNAL MEDICINE

## 2020-01-13 PROCEDURE — 85025 COMPLETE CBC W/AUTO DIFF WBC: CPT

## 2020-01-13 PROCEDURE — 80053 COMPREHEN METABOLIC PANEL: CPT

## 2020-01-13 PROCEDURE — 83615 LACTATE (LD) (LDH) ENZYME: CPT

## 2020-01-13 PROCEDURE — 84550 ASSAY OF BLOOD/URIC ACID: CPT

## 2020-01-13 RX ORDER — SODIUM CHLORIDE 0.9 % (FLUSH) 0.9 %
10 SYRINGE (ML) INJECTION AS NEEDED
Status: ACTIVE | OUTPATIENT
Start: 2020-01-13

## 2020-01-13 RX ORDER — SODIUM CHLORIDE 0.9 % (FLUSH) 0.9 %
10 SYRINGE (ML) INJECTION AS NEEDED
Status: CANCELLED | OUTPATIENT
Start: 2020-01-13

## 2020-01-13 RX ORDER — ATORVASTATIN CALCIUM 40 MG/1
40 TABLET, FILM COATED ORAL DAILY
Start: 2020-01-13 | End: 2020-07-15 | Stop reason: SDUPTHER

## 2020-01-13 RX ORDER — HEPARIN SODIUM (PORCINE) LOCK FLUSH IV SOLN 100 UNIT/ML 100 UNIT/ML
500 SOLUTION INTRAVENOUS AS NEEDED
Status: CANCELLED | OUTPATIENT
Start: 2020-01-13

## 2020-01-13 RX ADMIN — Medication 500 UNITS: at 09:54

## 2020-01-13 RX ADMIN — SODIUM CHLORIDE, PRESERVATIVE FREE 10 ML: 5 INJECTION INTRAVENOUS at 09:53

## 2020-01-13 NOTE — PROGRESS NOTES
"[facility] CBC GROUP OUTPATIENT FOLLOW UP CLINIC VISIT    REASON FOR FOLLOW-UP:    1.  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  CT imaging of the abdomen and pelvis on 5/8/2019 shows multiple small tissue nodules in the perirenal fat adjacent to the left kidney and a soft tissue mass interposed between the left psoas muscle and spine.  2.  RA previously on Cimzia (certollizumab) injections by Dr. Velásquez  3.  Lumbar puncture 5/31/2019 negative for involvement with lymphoma  4.  Bone marrow aspiration and biopsy 5/29/2018 negative for lymphoma  5.  PET scan on 5/30/2019 with hypermetabolic perinephric soft tissue lesions with mesenteric lymphadenopathy, left paraspinal mass at L5, 6 cm intramuscular mass in the right upper thigh, lesion at the left clavicle and a 5 cm soft tissue mass between the right scapula and right posterior fourth rib.  6.  Right subclavian Mediport placed by Dr. Herrera on 6/12/2019.  7.  Admission to [facility] for cycle #1 of dose adjusted R-EPOCH on 6/17/2019.  Plan for a lumbar puncture with intrathecal methotrexate with each cycle.  8.  6 cycles of therapy with lumbar punctures with intrathecal methotrexate administered with each cycle complete as of 10/4/2019.  9.  PET scan on 10/15/2019 negative aside from hypermetabolic activity in the bone marrow and in the spleen.    HISTORY OF PRESENT ILLNESS:  Edward Srinivasan is a 79 y.o. male who returns today for follow up of the above issue.      He returns today for surveillance.  He is generally doing well.  Unfortunately he continues to smoke.  He denies any lymphadenopathy.  He complains of some dystrophic changes to his fingernails.  He did lose 1 of those great toenails.  He is sleeping about 10 hours per night whereas previously he was sleeping about 8 hours per night.  If he sleeps less he is fatigued during the " "day.  Otherwise his energy level is reasonable.      ALLERGIES:  Allergies   Allergen Reactions   • Oxaprozin Hives       MEDICATIONS:  The medication list has been reviewed with the patient by the medical assistant, and the list has been updated in the electronic medical record, which I reviewed.  Medication dosages and frequencies were confirmed to be accurate.    REVIEW OF SYSTEMS:  PAIN:  See Vital Signs below.  GENERAL:  No fevers, chills, night sweats.  Fatigue, improving  SKIN:  No rashes or non-healing lesions.  Fingernail and toenail changes.  HEME/LYMPH:  No abnormal bleeding.  No palpable lymphadenopathy.  EYES:  No vision changes or diplopia.  ENT:  No sore throat or difficulty swallowing.  RESPIRATORY:  No cough, shortness of breath, hemoptysis, or wheezing.  CARDIOVASCULAR:  No chest pain, palpitations, orthopnea, or dyspnea on exertion.  Tachycardia, asymptomatic  GASTROINTESTINAL:  No abdominal pain, nausea, vomiting, constipation, diarrhea, melena, or hematochezia.  GENITOURINARY:  No dysuria or hematuria.  MUSCULOSKELETAL:  No joint pain, swelling, or erythema.  NEUROLOGIC:  No dizziness, loss of consciousness, or seizures.  PSYCHIATRIC:  No depression, anxiety, or mood changes.    Vitals:    01/13/20 1002   BP: 107/70   Pulse: 80   Resp: 16   Temp: 98.2 °F (36.8 °C)   TempSrc: Oral   SpO2: 98%   Weight: 66.5 kg (146 lb 8 oz)   Height: 170.2 cm (67.01\")   PainSc: 0-No pain  Comment: lymphoma       PHYSICAL EXAMINATION:  GENERAL:  Well-developed well-nourished male; awake, alert and oriented, in no acute distress.  SKIN:  Warm and dry, without rashes, purpura, or petechiae.  Dystrophic changes to his fingernails which is growing out  HEAD:  Normocephalic, atraumatic.  EYES:  Pupils equal, round and reactive to light.  Extraocular movements intact.  Conjunctivae normal.  EARS:  Hearing intact.  NOSE:  Septum midline.  No excoriations or nasal discharge.  MOUTH:  No stomatitis or ulcers.  Lips are " normal.  THROAT:  Oropharynx without lesions or exudates.  NECK:  Supple with good range of motion; no thyromegaly or masses; no JVD or bruits.  LYMPHATICS:  No cervical, supraclavicular, or axillary lymphadenopathy.  CHEST:  Lungs are clear to auscultation bilaterally.  No wheezes, rales, or rhonchi.  HEART: Regular rate and rhythm.  Grade 3 out of 6 systolic murmur.  No gallops or rubs.  ABDOMEN: Not examined today  EXTREMITIES:  No clubbing, cyanosis or edema.  NEUROLOGICAL:  No focal neurologic deficits.    DIAGNOSTIC DATA:  Results for orders placed or performed in visit on 01/13/20   CBC Auto Differential   Result Value Ref Range    WBC 10.12 3.40 - 10.80 10*3/mm3    RBC 3.51 (L) 4.14 - 5.80 10*6/mm3    Hemoglobin 11.1 (L) 13.0 - 17.7 g/dL    Hematocrit 33.4 (L) 37.5 - 51.0 %    MCV 95.2 79.0 - 97.0 fL    MCH 31.6 26.6 - 33.0 pg    MCHC 33.2 31.5 - 35.7 g/dL    RDW 14.2 12.3 - 15.4 %    RDW-SD 49.9 37.0 - 54.0 fl    MPV 8.1 6.0 - 12.0 fL    Platelets 141 140 - 450 10*3/mm3    Neutrophil % 80.2 (H) 42.7 - 76.0 %    Lymphocyte % 7.7 (L) 19.6 - 45.3 %    Monocyte % 8.2 5.0 - 12.0 %    Eosinophil % 3.1 0.3 - 6.2 %    Basophil % 0.3 0.0 - 1.5 %    Immature Grans % 0.5 0.0 - 0.5 %    Neutrophils, Absolute 8.12 (H) 1.70 - 7.00 10*3/mm3    Lymphocytes, Absolute 0.78 0.70 - 3.10 10*3/mm3    Monocytes, Absolute 0.83 0.10 - 0.90 10*3/mm3    Eosinophils, Absolute 0.31 0.00 - 0.40 10*3/mm3    Basophils, Absolute 0.03 0.00 - 0.20 10*3/mm3    Immature Grans, Absolute 0.05 0.00 - 0.05 10*3/mm3    nRBC 0.0 0.0 - 0.2 /100 WBC       IMAGING: PET scan images from 10/15/2019.  No evidence for hypermetabolic lymphoma.  Mild hypermetabolism throughout the bone marrow and spleen likely reactive and post chemotherapy recovery.    1. Interval resolution of all of the hypermetabolic lymphadenopathy,  soft tissue lesions involving the right upper back, right thigh, and  bilateral perinephric regions. The hypermetabolic right upper  "lobe  pulmonary nodule has resolved as well.  2. Interval increase in splenic size and in the intensity of the splenic  metabolic activity. Also development of hypermetabolic activity  throughout the marrow space.    ASSESSMENT:  This is a 79 y.o. male with:  1.  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  CT imaging of the abdomen and pelvis on 5/8/2019 shows multiple small tissue nodules in the perirenal fat adjacent to the left kidney and a soft tissue mass interposed between the left psoas muscle and spine.  Baseline PET scan on 5/30/2019 with hypermetabolic perinephric soft tissue lesions with mesenteric lymphadenopathy, left paraspinal mass at L5, 6 cm intramuscular mass in the right upper thigh, lesion at the left clavicle and a 5 cm soft tissue mass between the right scapula and right posterior fourth rib.  Negative lumbar puncture.  Negative bone marrow biopsy.  He has now completed 6 cycles of dose adjusted R-EPO CH with discharge following cycle #6 on 10/4/2019.  He received lumbar punctures with intrathecal methotrexate with each cycle.  His end of treatment PET scan on 10/15/2019 is negative for lymphoma.  There is some mild hypermetabolism throughout the marrow space and throughout the spleen which is likely reactive and related to posttreatment recovery.  No evidence of disease at this point.  Proceed with surveillance.  2.  RA previously on Cimzia (certollizumab) injections by Dr. Velásquez.  He did not complain of any issues regarding this today.    3.  Mild tachycardia: Resolved  4.  Dystrophic changes to his fingernails: Should improve over time.  From chemotherapy.  New issue discussed today.  5.  Tobacco use: I advised him to quit smoking today.  6.  Fatigue from chemotherapy: Should improve over time.  7.  Cytopenias from chemotherapy: Improving.  He is mildly anemic today but his white blood " cell count and platelet count are normal.    PLAN:  1.  He will maintain his Mediport for now with port flushes every 4 to 6 weeks.  He had his port flush today.  2. I will see him back in 3 months for follow-up with labs, a port flush, and CT imaging of the neck chest abdomen pelvis done about a week prior to that.  3.  Per NCCN guidelines, imaging no more often than every 6 months for the first 2 years, then as indicated.

## 2020-02-10 ENCOUNTER — INFUSION (OUTPATIENT)
Dept: ONCOLOGY | Facility: HOSPITAL | Age: 80
End: 2020-02-10

## 2020-02-10 DIAGNOSIS — C83.33 DIFFUSE LARGE B-CELL LYMPHOMA OF INTRA-ABDOMINAL LYMPH NODES (HCC): Primary | ICD-10-CM

## 2020-02-10 DIAGNOSIS — C83.30 DIFFUSE LARGE B-CELL LYMPHOMA, UNSPECIFIED BODY REGION (HCC): ICD-10-CM

## 2020-02-10 LAB
BASOPHILS # BLD AUTO: 0.04 10*3/MM3 (ref 0–0.2)
BASOPHILS NFR BLD AUTO: 0.7 % (ref 0–1.5)
DEPRECATED RDW RBC AUTO: 49.7 FL (ref 37–54)
EOSINOPHIL # BLD AUTO: 0.19 10*3/MM3 (ref 0–0.4)
EOSINOPHIL NFR BLD AUTO: 3.3 % (ref 0.3–6.2)
ERYTHROCYTE [DISTWIDTH] IN BLOOD BY AUTOMATED COUNT: 14.6 % (ref 12.3–15.4)
HCT VFR BLD AUTO: 32.4 % (ref 37.5–51)
HGB BLD-MCNC: 10.8 G/DL (ref 13–17.7)
IMM GRANULOCYTES # BLD AUTO: 0.03 10*3/MM3 (ref 0–0.05)
IMM GRANULOCYTES NFR BLD AUTO: 0.5 % (ref 0–0.5)
LYMPHOCYTES # BLD AUTO: 0.74 10*3/MM3 (ref 0.7–3.1)
LYMPHOCYTES NFR BLD AUTO: 12.7 % (ref 19.6–45.3)
MCH RBC QN AUTO: 31.1 PG (ref 26.6–33)
MCHC RBC AUTO-ENTMCNC: 33.3 G/DL (ref 31.5–35.7)
MCV RBC AUTO: 93.4 FL (ref 79–97)
MONOCYTES # BLD AUTO: 0.82 10*3/MM3 (ref 0.1–0.9)
MONOCYTES NFR BLD AUTO: 14 % (ref 5–12)
NEUTROPHILS # BLD AUTO: 4.02 10*3/MM3 (ref 1.7–7)
NEUTROPHILS NFR BLD AUTO: 68.8 % (ref 42.7–76)
NRBC BLD AUTO-RTO: 0 /100 WBC (ref 0–0.2)
PLATELET # BLD AUTO: 148 10*3/MM3 (ref 140–450)
PMV BLD AUTO: 8.5 FL (ref 6–12)
RBC # BLD AUTO: 3.47 10*6/MM3 (ref 4.14–5.8)
WBC NRBC COR # BLD: 5.84 10*3/MM3 (ref 3.4–10.8)

## 2020-02-10 PROCEDURE — 85025 COMPLETE CBC W/AUTO DIFF WBC: CPT

## 2020-02-10 PROCEDURE — 25010000003 HEPARIN LOCK FLUCH PER 10 UNITS: Performed by: INTERNAL MEDICINE

## 2020-02-10 PROCEDURE — 36592 COLLECT BLOOD FROM PICC: CPT

## 2020-02-10 PROCEDURE — G0463 HOSPITAL OUTPT CLINIC VISIT: HCPCS

## 2020-02-10 RX ORDER — HEPARIN SODIUM (PORCINE) LOCK FLUSH IV SOLN 100 UNIT/ML 100 UNIT/ML
500 SOLUTION INTRAVENOUS AS NEEDED
Status: DISCONTINUED | OUTPATIENT
Start: 2020-02-10 | End: 2020-02-10 | Stop reason: HOSPADM

## 2020-02-10 RX ORDER — HEPARIN SODIUM (PORCINE) LOCK FLUSH IV SOLN 100 UNIT/ML 100 UNIT/ML
500 SOLUTION INTRAVENOUS AS NEEDED
Status: CANCELLED | OUTPATIENT
Start: 2020-02-10

## 2020-02-10 RX ORDER — SODIUM CHLORIDE 0.9 % (FLUSH) 0.9 %
10 SYRINGE (ML) INJECTION AS NEEDED
Status: DISCONTINUED | OUTPATIENT
Start: 2020-02-10 | End: 2020-02-10 | Stop reason: HOSPADM

## 2020-02-10 RX ORDER — SODIUM CHLORIDE 0.9 % (FLUSH) 0.9 %
10 SYRINGE (ML) INJECTION AS NEEDED
Status: CANCELLED | OUTPATIENT
Start: 2020-02-10

## 2020-02-10 RX ADMIN — Medication 500 UNITS: at 09:26

## 2020-02-10 RX ADMIN — SODIUM CHLORIDE, PRESERVATIVE FREE 10 ML: 5 INJECTION INTRAVENOUS at 09:26

## 2020-03-09 ENCOUNTER — OFFICE VISIT (OUTPATIENT)
Dept: INTERNAL MEDICINE | Facility: CLINIC | Age: 80
End: 2020-03-09

## 2020-03-09 VITALS
OXYGEN SATURATION: 97 % | WEIGHT: 150 LBS | HEART RATE: 104 BPM | HEIGHT: 67 IN | BODY MASS INDEX: 23.54 KG/M2 | SYSTOLIC BLOOD PRESSURE: 92 MMHG | DIASTOLIC BLOOD PRESSURE: 54 MMHG

## 2020-03-09 DIAGNOSIS — E11.42 DIABETIC PERIPHERAL NEUROPATHY (HCC): ICD-10-CM

## 2020-03-09 DIAGNOSIS — Z92.25 PERSONAL HISTORY OF IMMUNOSUPRESSION THERAPY: ICD-10-CM

## 2020-03-09 DIAGNOSIS — I10 ESSENTIAL HYPERTENSION, BENIGN: Primary | ICD-10-CM

## 2020-03-09 DIAGNOSIS — E78.00 PURE HYPERCHOLESTEROLEMIA: ICD-10-CM

## 2020-03-09 DIAGNOSIS — C83.33 DIFFUSE LARGE B-CELL LYMPHOMA OF INTRA-ABDOMINAL LYMPH NODES (HCC): ICD-10-CM

## 2020-03-09 DIAGNOSIS — E11.9 DIABETES MELLITUS WITHOUT COMPLICATION (HCC): ICD-10-CM

## 2020-03-09 LAB — HBA1C MFR BLD: 6.2 % (ref 4.8–5.6)

## 2020-03-09 PROCEDURE — 99214 OFFICE O/P EST MOD 30 MIN: CPT | Performed by: INTERNAL MEDICINE

## 2020-03-09 NOTE — PROGRESS NOTES
"Subjective   Edward Srinivasan is a 79 y.o. male.  The patient presents with chief complaint of essential hypertension, hyperlipidemia, type 2 diabetes mellitus without complication, diabetic peripheral neuropathy that is stable, diffuse large cell lymphoma which currently is in remission and status post immunosuppressive therapy for his cancer.  He is doing remarkably well overall although he has strayed from a decent diet regarding his diabetes and I expect his A1c will be elevated on this particular visit.  Otherwise the patient is doing remarkably well considering everything that has been doing the last year and has an excellent attitude.      BP 92/54   Pulse 104   Ht 170.2 cm (67.01\")   Wt 68 kg (150 lb)   SpO2 97%   BMI 23.49 kg/m²     Body mass index is 23.49 kg/m².    History of Present Illness recently undergone immunosuppressive therapy for lymphoma treatment    The following portions of the patient's history were reviewed and updated as appropriate: allergies, current medications, past family history, past medical history, past social history, past surgical history and problem list.    Review of Systems   Constitutional: Positive for fatigue.   Respiratory: Negative.    Cardiovascular: Negative.    Gastrointestinal: Negative.    Musculoskeletal: Positive for arthralgias.   Neurological: Negative.    Psychiatric/Behavioral: Negative.        Objective   Physical Exam   Constitutional: He is oriented to person, place, and time. He appears well-developed and well-nourished.   HENT:   Head: Normocephalic and atraumatic.   Cardiovascular: Normal rate, regular rhythm and normal heart sounds.   Pulmonary/Chest: Effort normal and breath sounds normal.   Abdominal: Soft. Bowel sounds are normal.   Neurological: He is alert and oriented to person, place, and time.   Skin: Skin is warm.   Psychiatric: He has a normal mood and affect. His behavior is normal.   Nursing note and vitals reviewed.        Assessment/Plan "    was seen today for hypertension and hyperlipidemia.    Diagnoses and all orders for this visit:    Essential hypertension, benign  Comments:  well controlled    Pure hypercholesterolemia  Comments:  check a lipid panel    Diabetes mellitus without complication (CMS/HCC)  Comments:  will check an A1c  Orders:  -     Hemoglobin A1c; Future  -     Hemoglobin A1c    Diabetic peripheral neuropathy (CMS/HCC)  Comments:  stable for now    Diffuse large B-cell lymphoma of intra-abdominal lymph nodes (CMS/HCC)  Comments:  in remission currently    Personal history of immunosupression therapy  Comments:  stable for now

## 2020-03-10 ENCOUNTER — INFUSION (OUTPATIENT)
Dept: ONCOLOGY | Facility: HOSPITAL | Age: 80
End: 2020-03-10

## 2020-03-10 DIAGNOSIS — C83.30 DIFFUSE LARGE B-CELL LYMPHOMA, UNSPECIFIED BODY REGION (HCC): Primary | ICD-10-CM

## 2020-03-10 DIAGNOSIS — C83.33 DIFFUSE LARGE B-CELL LYMPHOMA OF INTRA-ABDOMINAL LYMPH NODES (HCC): ICD-10-CM

## 2020-03-10 LAB
BASOPHILS # BLD AUTO: 0.03 10*3/MM3 (ref 0–0.2)
BASOPHILS NFR BLD AUTO: 0.3 % (ref 0–1.5)
DEPRECATED RDW RBC AUTO: 53.5 FL (ref 37–54)
EOSINOPHIL # BLD AUTO: 0.25 10*3/MM3 (ref 0–0.4)
EOSINOPHIL NFR BLD AUTO: 2.1 % (ref 0.3–6.2)
ERYTHROCYTE [DISTWIDTH] IN BLOOD BY AUTOMATED COUNT: 15.4 % (ref 12.3–15.4)
HCT VFR BLD AUTO: 29.6 % (ref 37.5–51)
HGB BLD-MCNC: 9.8 G/DL (ref 13–17.7)
IMM GRANULOCYTES # BLD AUTO: 0.05 10*3/MM3 (ref 0–0.05)
IMM GRANULOCYTES NFR BLD AUTO: 0.4 % (ref 0–0.5)
LYMPHOCYTES # BLD AUTO: 0.6 10*3/MM3 (ref 0.7–3.1)
LYMPHOCYTES NFR BLD AUTO: 5.1 % (ref 19.6–45.3)
MCH RBC QN AUTO: 31.3 PG (ref 26.6–33)
MCHC RBC AUTO-ENTMCNC: 33.1 G/DL (ref 31.5–35.7)
MCV RBC AUTO: 94.6 FL (ref 79–97)
MONOCYTES # BLD AUTO: 0.92 10*3/MM3 (ref 0.1–0.9)
MONOCYTES NFR BLD AUTO: 7.8 % (ref 5–12)
NEUTROPHILS # BLD AUTO: 9.94 10*3/MM3 (ref 1.7–7)
NEUTROPHILS NFR BLD AUTO: 84.3 % (ref 42.7–76)
NRBC BLD AUTO-RTO: 0 /100 WBC (ref 0–0.2)
PLATELET # BLD AUTO: 136 10*3/MM3 (ref 140–450)
PMV BLD AUTO: 8.6 FL (ref 6–12)
RBC # BLD AUTO: 3.13 10*6/MM3 (ref 4.14–5.8)
WBC NRBC COR # BLD: 11.79 10*3/MM3 (ref 3.4–10.8)

## 2020-03-10 PROCEDURE — 25010000003 HEPARIN LOCK FLUCH PER 10 UNITS: Performed by: INTERNAL MEDICINE

## 2020-03-10 PROCEDURE — 96523 IRRIG DRUG DELIVERY DEVICE: CPT

## 2020-03-10 PROCEDURE — 85025 COMPLETE CBC W/AUTO DIFF WBC: CPT

## 2020-03-10 RX ORDER — SODIUM CHLORIDE 0.9 % (FLUSH) 0.9 %
10 SYRINGE (ML) INJECTION AS NEEDED
Status: DISCONTINUED | OUTPATIENT
Start: 2020-03-10 | End: 2020-03-10 | Stop reason: HOSPADM

## 2020-03-10 RX ORDER — SODIUM CHLORIDE 0.9 % (FLUSH) 0.9 %
10 SYRINGE (ML) INJECTION AS NEEDED
Status: CANCELLED | OUTPATIENT
Start: 2020-03-10

## 2020-03-10 RX ORDER — HEPARIN SODIUM (PORCINE) LOCK FLUSH IV SOLN 100 UNIT/ML 100 UNIT/ML
500 SOLUTION INTRAVENOUS AS NEEDED
Status: CANCELLED | OUTPATIENT
Start: 2020-03-10

## 2020-03-10 RX ORDER — HEPARIN SODIUM (PORCINE) LOCK FLUSH IV SOLN 100 UNIT/ML 100 UNIT/ML
500 SOLUTION INTRAVENOUS AS NEEDED
Status: DISCONTINUED | OUTPATIENT
Start: 2020-03-10 | End: 2020-03-10 | Stop reason: HOSPADM

## 2020-03-10 RX ADMIN — Medication 10 ML: at 10:38

## 2020-03-10 RX ADMIN — Medication 500 UNITS: at 10:38

## 2020-03-25 DIAGNOSIS — R42 DIZZINESS: ICD-10-CM

## 2020-03-25 RX ORDER — MECLIZINE HYDROCHLORIDE 25 MG/1
25 TABLET ORAL 3 TIMES DAILY PRN
Qty: 180 TABLET | Refills: 1 | Status: SHIPPED | OUTPATIENT
Start: 2020-03-25 | End: 2021-03-18

## 2020-04-06 ENCOUNTER — APPOINTMENT (OUTPATIENT)
Dept: ONCOLOGY | Facility: HOSPITAL | Age: 80
End: 2020-04-06

## 2020-04-06 ENCOUNTER — APPOINTMENT (OUTPATIENT)
Dept: PET IMAGING | Facility: HOSPITAL | Age: 80
End: 2020-04-06

## 2020-04-30 NOTE — PROGRESS NOTES
Pt wanted to report progress.   Walking with walker, doing great, doing exercises with care giver.  Tried using a cane but is NOT ready for that yet.      Has appointment today.   Subjective   Edward Srinivasan is a 78 y.o. male.     He started PT about three days ago.       Back Pain   This is a chronic problem. The current episode started more than 1 month ago. The problem has been gradually worsening since onset. The pain is present in the lumbar spine. Quality: aching  The pain radiates to the left knee and right knee (bilateral knee worst left knee ). The pain is at a severity of 3/10 (worst 10/10). The pain is mild. The symptoms are aggravated by standing (standing for 2 minutes, walking ). Associated symptoms include leg pain and tingling. Pertinent negatives include no abdominal pain, bladder incontinence, bowel incontinence, chest pain, fever, headaches or numbness. Treatments tried: sitting, cane., PT  The treatment provided mild relief.   Fatigue   Associated symptoms include fatigue. Pertinent negatives include no abdominal pain, chest pain, coughing, fever, headaches or numbness.        The following portions of the patient's history were reviewed and updated as appropriate: allergies, current medications, past family history, past medical history, past social history, past surgical history and problem list.    Review of Systems   Constitutional: Positive for fatigue. Negative for activity change, appetite change (decreased) and fever.   Respiratory: Negative for cough, shortness of breath and wheezing.    Cardiovascular: Negative for chest pain, palpitations and leg swelling.   Gastrointestinal: Negative for abdominal pain, blood in stool and bowel incontinence.   Genitourinary: Negative for bladder incontinence and hematuria.   Musculoskeletal: Positive for back pain.   Neurological: Positive for tingling. Negative for dizziness, numbness and headaches.       Objective   Physical Exam   Constitutional: He is oriented to person, place, and time. He appears well-developed and well-nourished.   HENT:   Head: Normocephalic.   Nose: Nose normal.   Cardiovascular: Regular rhythm. Exam  reveals no S3 and no S4.   Murmur heard.   Systolic murmur is present.  Trace edema in left leg   Pulmonary/Chest: Effort normal and breath sounds normal. He has no decreased breath sounds. He has no wheezes. He has no rhonchi. He has no rales.   Musculoskeletal: He exhibits no edema.        Right hip: He exhibits normal range of motion and normal strength.        Left hip: He exhibits normal range of motion and normal strength.        Lumbar back: He exhibits decreased range of motion and tenderness.   (-)SLR    Neurological: He is alert and oriented to person, place, and time. Gait normal.   Reflex Scores:       Patellar reflexes are 2+ on the right side and 2+ on the left side.  Skin: Skin is warm and dry.   Psychiatric: He has a normal mood and affect.       Assessment/Plan    was seen today for back pain and fatigue.    Diagnoses and all orders for this visit:    Intervertebral lumbar disc disorder with myelopathy, lumbar region  Comments:  continue PT;   Orders:  -     MRI Lumbar Spine Without Contrast; Future    Fatigue, unspecified type  Comments:  will check labs  Orders:  -     CBC (No Diff); Future  -     Basic Metabolic Panel; Future  -     TSH Rfx On Abnormal To Free T4; Future  -     CBC (No Diff)  -     Basic Metabolic Panel  -     TSH Rfx On Abnormal To Free T4    Cough  Comments:  needs to restart anoro  Orders:  -     XR Chest 2 View    Shortness of breath  Comments:  intermittent; will check CXR  Orders:  -     BNP; Future  -     BNP    CXR with no acute findings. Sent for final review.

## 2020-05-04 ENCOUNTER — HOSPITAL ENCOUNTER (OUTPATIENT)
Dept: PET IMAGING | Facility: HOSPITAL | Age: 80
Discharge: HOME | End: 2020-05-04
Admitting: INTERNAL MEDICINE

## 2020-05-04 ENCOUNTER — INFUSION (OUTPATIENT)
Dept: ONCOLOGY | Facility: HOSPITAL | Age: 80
End: 2020-05-04

## 2020-05-04 DIAGNOSIS — C83.33 DIFFUSE LARGE B-CELL LYMPHOMA OF INTRA-ABDOMINAL LYMPH NODES (HCC): ICD-10-CM

## 2020-05-04 LAB
ALBUMIN SERPL-MCNC: 4.3 G/DL (ref 3.5–5.2)
ALBUMIN/GLOB SERPL: 1.8 G/DL (ref 1.1–2.4)
ALP SERPL-CCNC: 72 U/L (ref 38–116)
ALT SERPL W P-5'-P-CCNC: 18 U/L (ref 0–41)
ANION GAP SERPL CALCULATED.3IONS-SCNC: 11.8 MMOL/L (ref 5–15)
AST SERPL-CCNC: 20 U/L (ref 0–40)
BASOPHILS # BLD AUTO: 0.06 10*3/MM3 (ref 0–0.2)
BASOPHILS NFR BLD AUTO: 0.7 % (ref 0–1.5)
BILIRUB SERPL-MCNC: 0.3 MG/DL (ref 0.2–1.2)
BUN BLD-MCNC: 19 MG/DL (ref 6–20)
BUN/CREAT SERPL: 26.4 (ref 7.3–30)
CALCIUM SPEC-SCNC: 8.9 MG/DL (ref 8.5–10.2)
CHLORIDE SERPL-SCNC: 96 MMOL/L (ref 98–107)
CO2 SERPL-SCNC: 26.2 MMOL/L (ref 22–29)
CREAT BLD-MCNC: 0.72 MG/DL (ref 0.7–1.3)
CREAT BLDA-MCNC: 0.8 MG/DL (ref 0.6–1.3)
DEPRECATED RDW RBC AUTO: 53 FL (ref 37–54)
EOSINOPHIL # BLD AUTO: 0.28 10*3/MM3 (ref 0–0.4)
EOSINOPHIL NFR BLD AUTO: 3.2 % (ref 0.3–6.2)
ERYTHROCYTE [DISTWIDTH] IN BLOOD BY AUTOMATED COUNT: 15.3 % (ref 12.3–15.4)
GFR SERPL CREATININE-BSD FRML MDRD: 106 ML/MIN/1.73
GLOBULIN UR ELPH-MCNC: 2.4 GM/DL (ref 1.8–3.5)
GLUCOSE BLD-MCNC: 111 MG/DL (ref 74–124)
HCT VFR BLD AUTO: 35.8 % (ref 37.5–51)
HGB BLD-MCNC: 11.7 G/DL (ref 13–17.7)
IMM GRANULOCYTES # BLD AUTO: 0.04 10*3/MM3 (ref 0–0.05)
IMM GRANULOCYTES NFR BLD AUTO: 0.5 % (ref 0–0.5)
LDH SERPL-CCNC: 186 U/L (ref 99–259)
LYMPHOCYTES # BLD AUTO: 1.22 10*3/MM3 (ref 0.7–3.1)
LYMPHOCYTES NFR BLD AUTO: 14 % (ref 19.6–45.3)
MCH RBC QN AUTO: 30.8 PG (ref 26.6–33)
MCHC RBC AUTO-ENTMCNC: 32.7 G/DL (ref 31.5–35.7)
MCV RBC AUTO: 94.2 FL (ref 79–97)
MONOCYTES # BLD AUTO: 0.81 10*3/MM3 (ref 0.1–0.9)
MONOCYTES NFR BLD AUTO: 9.3 % (ref 5–12)
NEUTROPHILS # BLD AUTO: 6.29 10*3/MM3 (ref 1.7–7)
NEUTROPHILS NFR BLD AUTO: 72.3 % (ref 42.7–76)
NRBC BLD AUTO-RTO: 0 /100 WBC (ref 0–0.2)
PLATELET # BLD AUTO: 167 10*3/MM3 (ref 140–450)
PMV BLD AUTO: 8.3 FL (ref 6–12)
POTASSIUM BLD-SCNC: 4.2 MMOL/L (ref 3.5–4.7)
PROT SERPL-MCNC: 6.7 G/DL (ref 6.3–8)
RBC # BLD AUTO: 3.8 10*6/MM3 (ref 4.14–5.8)
SODIUM BLD-SCNC: 134 MMOL/L (ref 134–145)
WBC NRBC COR # BLD: 8.7 10*3/MM3 (ref 3.4–10.8)

## 2020-05-04 PROCEDURE — 80053 COMPREHEN METABOLIC PANEL: CPT

## 2020-05-04 PROCEDURE — 74177 CT ABD & PELVIS W/CONTRAST: CPT

## 2020-05-04 PROCEDURE — 83615 LACTATE (LD) (LDH) ENZYME: CPT

## 2020-05-04 PROCEDURE — 85025 COMPLETE CBC W/AUTO DIFF WBC: CPT

## 2020-05-04 PROCEDURE — 25010000002 IOPAMIDOL 61 % SOLUTION: Performed by: INTERNAL MEDICINE

## 2020-05-04 PROCEDURE — 82565 ASSAY OF CREATININE: CPT

## 2020-05-04 PROCEDURE — 70491 CT SOFT TISSUE NECK W/DYE: CPT

## 2020-05-04 PROCEDURE — 0 DIATRIZOATE MEGLUMINE & SODIUM PER 1 ML: Performed by: INTERNAL MEDICINE

## 2020-05-04 PROCEDURE — 36591 DRAW BLOOD OFF VENOUS DEVICE: CPT

## 2020-05-04 PROCEDURE — 71260 CT THORAX DX C+: CPT

## 2020-05-04 RX ADMIN — IOPAMIDOL 85 ML: 612 INJECTION, SOLUTION INTRAVENOUS at 13:25

## 2020-05-04 RX ADMIN — DIATRIZOATE MEGLUMINE AND DIATRIZOATE SODIUM 30 ML: 660; 100 LIQUID ORAL; RECTAL at 12:45

## 2020-05-06 ENCOUNTER — TELEPHONE (OUTPATIENT)
Dept: ONCOLOGY | Facility: CLINIC | Age: 80
End: 2020-05-06

## 2020-05-06 NOTE — TELEPHONE ENCOUNTER
----- Message from Gabriele Garcia MD sent at 5/6/2020  8:14 AM EDT -----  Regarding: Monday appointment  Assuming he had his port flushed with his labs/scans a couple of days ago (please make sure), I am happy to have a video or telephone visit with him.  Not sure why he was scheduled on my rounding week on Monday next week.  I'm glad have this visit before the end of this week if he's agreeable.  MAGI

## 2020-05-06 NOTE — TELEPHONE ENCOUNTER
CALLED PT ABOUT HIS APPT AND HE IS AGREEABLE TO A TELEPHONE VISIT AS HE DOES NOT HAVE A CELL PHONE. AND DOES NOT HAVE ACCESS TO MY CHART NOR DOES HE WANT ONE AT THIS TIME. ADVISED PT THAT WE WOULD CALL THE PT AT 9:20

## 2020-05-07 ENCOUNTER — OFFICE VISIT (OUTPATIENT)
Dept: ONCOLOGY | Facility: CLINIC | Age: 80
End: 2020-05-07

## 2020-05-07 DIAGNOSIS — C83.33 DIFFUSE LARGE B-CELL LYMPHOMA OF INTRA-ABDOMINAL LYMPH NODES (HCC): Primary | ICD-10-CM

## 2020-05-07 PROCEDURE — 99443 PR PHYS/QHP TELEPHONE EVALUATION 21-30 MIN: CPT | Performed by: INTERNAL MEDICINE

## 2020-05-07 NOTE — PROGRESS NOTES
"[facility] CBC GROUP OUTPATIENT FOLLOW UP CLINIC VISIT    REASON FOR FOLLOW-UP:    1.  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  CT imaging of the abdomen and pelvis on 5/8/2019 shows multiple small tissue nodules in the perirenal fat adjacent to the left kidney and a soft tissue mass interposed between the left psoas muscle and spine.  2.  RA previously on Cimzia (certollizumab) injections by Dr. Velásquez  3.  Lumbar puncture 5/31/2019 negative for involvement with lymphoma  4.  Bone marrow aspiration and biopsy 5/29/2018 negative for lymphoma  5.  PET scan on 5/30/2019 with hypermetabolic perinephric soft tissue lesions with mesenteric lymphadenopathy, left paraspinal mass at L5, 6 cm intramuscular mass in the right upper thigh, lesion at the left clavicle and a 5 cm soft tissue mass between the right scapula and right posterior fourth rib.  6.  Right subclavian Mediport placed by Dr. Herrera on 6/12/2019.  7.  Admission to [facility] for cycle #1 of dose adjusted R-EPOCH on 6/17/2019.  Plan for a lumbar puncture with intrathecal methotrexate with each cycle.  8.  6 cycles of therapy with lumbar punctures with intrathecal methotrexate administered with each cycle complete as of 10/4/2019.  9.  PET scan on 10/15/2019 negative aside from hypermetabolic activity in the bone marrow and in the spleen.    HISTORY OF PRESENT ILLNESS:  Edward Srinivasan is a 80 y.o. male who returns today for follow up of the above issue.      Doing well.  Staying home and taking appropriate precautions with the pandemic.  Some days he's more fatigued than others and requires a nap.  Denies lymphadenopathy.  No fevers/chills.  Feels well.        ALLERGIES:  Allergies   Allergen Reactions   • Oxaprozin Hives       MEDICATIONS:  The medication list has been reviewed with the patient by the medical assistant, and the list " "has been updated in the electronic medical record, which I reviewed.  Medication dosages and frequencies were confirmed to be accurate.    REVIEW OF SYSTEMS:  PAIN:  See Vital Signs below.  GENERAL:  No fevers, chills, night sweats.  Fatigue which is mild at this point.  SKIN:  No rashes or non-healing lesions. .  HEME/LYMPH:  No abnormal bleeding.  No palpable lymphadenopathy.  EYES:  No vision changes or diplopia.  ENT:  No sore throat or difficulty swallowing.  RESPIRATORY:  No cough, shortness of breath, hemoptysis, or wheezing.  CARDIOVASCULAR:  No chest pain, palpitations, orthopnea, or dyspnea on exertion.    GASTROINTESTINAL:  No abdominal pain, nausea, vomiting, constipation, diarrhea, melena, or hematochezia.  GENITOURINARY:  No dysuria or hematuria.  MUSCULOSKELETAL:  No joint pain, swelling, or erythema.  NEUROLOGIC:  No dizziness, loss of consciousness, or seizures.  PSYCHIATRIC:  No depression, anxiety, or mood changes.    There were no vitals filed for this visit.    PHYSICAL EXAMINATION:  Telephone only visit today    DIAGNOSTIC DATA:  Results for orders placed or performed during the hospital encounter of 05/04/20   POC Creatinine   Result Value Ref Range    Creatinine 0.80 0.60 - 1.30 mg/dL       IMAGING:  CT N/C/A/P 5/4/2020:  No evidence for recurrence.  Images personally reviewed.      ASSESSMENT:  This is a 80 y.o. male with:  1.  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  CT imaging of the abdomen and pelvis on 5/8/2019 shows multiple small tissue nodules in the perirenal fat adjacent to the left kidney and a soft tissue mass interposed between the left psoas muscle and spine.  Baseline PET scan on 5/30/2019 with hypermetabolic perinephric soft tissue lesions with mesenteric lymphadenopathy, left paraspinal mass at L5, 6 cm intramuscular mass in the right upper thigh, lesion at the left " clavicle and a 5 cm soft tissue mass between the right scapula and right posterior fourth rib.  Negative lumbar puncture.  Negative bone marrow biopsy.  He has now completed 6 cycles of dose adjusted R-EPO CH with discharge following cycle #6 on 10/4/2019.  He received lumbar punctures with intrathecal methotrexate with each cycle.  His end of treatment PET scan on 10/15/2019 is negative for lymphoma.  There is some mild hypermetabolism throughout the marrow space and throughout the spleen which is likely reactive and related to posttreatment recovery.  No evidence of disease at this point.  Proceed with surveillance.      CT 5/4 negative    2.  RA previously on Cimzia (certollizumab) injections by Dr. Velásquez.  He did not complain of any issues regarding this today.    3.  Mild tachycardia: Resolved  4.  Dystrophic changes to his fingernails: Should improve over time.  From chemotherapy.   5.  Tobacco use: Not discussed today  6.  Fatigue from chemotherapy: Improving  7.  Cytopenias from chemotherapy: Improving.  He remains mildly anemic but his white blood cell count and platelet count are normal.    PLAN:  1.  He will maintain his Mediport for now with port flushes every 8 weeks.  He had his port flushed 5/4.  2. Visit with me with labs and a port flush in about 8 weeks.  3.  Per NCCN guidelines, imaging no more often than every 6 months for the first 2 years, then as indicated.  4.  Discussed pandemic precautions, which he already is doing.  He is only going out of the house to take his wife to the grocery store, and he stays in the car.    21 minutes    You have chosen to receive care through a telephone visit. Do you consent to use a telephone visit for your medical care today? Yes

## 2020-05-11 ENCOUNTER — APPOINTMENT (OUTPATIENT)
Dept: LAB | Facility: HOSPITAL | Age: 80
End: 2020-05-11

## 2020-06-22 ENCOUNTER — OFFICE VISIT (OUTPATIENT)
Dept: CARDIOLOGY | Facility: CLINIC | Age: 80
End: 2020-06-22

## 2020-06-22 VITALS
BODY MASS INDEX: 22.91 KG/M2 | HEART RATE: 85 BPM | HEIGHT: 67 IN | DIASTOLIC BLOOD PRESSURE: 66 MMHG | WEIGHT: 146 LBS | SYSTOLIC BLOOD PRESSURE: 104 MMHG

## 2020-06-22 DIAGNOSIS — I77.9 CAROTID ARTERY DISEASE, UNSPECIFIED LATERALITY, UNSPECIFIED TYPE (HCC): ICD-10-CM

## 2020-06-22 DIAGNOSIS — I35.0 NONRHEUMATIC AORTIC VALVE STENOSIS: ICD-10-CM

## 2020-06-22 DIAGNOSIS — I25.10 CORONARY ARTERY DISEASE INVOLVING NATIVE CORONARY ARTERY OF NATIVE HEART WITHOUT ANGINA PECTORIS: Primary | ICD-10-CM

## 2020-06-22 DIAGNOSIS — I10 ESSENTIAL HYPERTENSION: ICD-10-CM

## 2020-06-22 PROCEDURE — 99442 PR PHYS/QHP TELEPHONE EVALUATION 11-20 MIN: CPT | Performed by: NURSE PRACTITIONER

## 2020-06-22 NOTE — PROGRESS NOTES
"    [facility] CARDIOLOGY  [address]  [facility] [address]  Phone: [phone]      Patient Name: Edward Srinivasan  :1940  Age: 80 y.o.  Primary Cardiologist: Nathaniel Harden MD  Encounter Provider:  FABIAN Anthony      Chief Complaint:   Chief Complaint   Patient presents with   • Follow-up     telephone         HPI  Edward Srinivasan is a 80 y.o. male who presents today via telephone visit secondary to COVID pandemic. Patient presents today for semiannual evaluation.     Pt has a  history significant for CAD, aortic stenosis, carotid artery disease, lymphoma.    Patient reports that he has done well over the past 6 months.  States that he has not exercising as much as he probably should, but admits that he feels well.  He states that his blood pressure has been controlled.  He reports that he is asymptomatic and denies any episodes of chest pain, shortness of breath, palpitations, lightheadedness, swelling or fatigue.        The following portions of the patient's history were reviewed and updated as appropriate: allergies, current medications, past family history, past medical history, past social history, past surgical history and problem list.      Review of Systems   Constitution: Negative for malaise/fatigue.   Cardiovascular: Negative for chest pain and leg swelling.   Respiratory: Negative for shortness of breath.    Neurological: Negative for light-headedness.   All other systems reviewed and are negative.      OBJECTIVE:     Vitals:    20 1059   BP: 104/66   Pulse: 85   Weight: 66.2 kg (146 lb)   Height: 170.2 cm (67\")     Body mass index is 22.87 kg/m².    Physical Exam  Physical exam not performed secondary to telephone visit.    Procedures    Cardiac Procedures:  1. Left heart catheterization on 2015, by Dr. Stearns showed the following. The left main was normal. The left anterior descending had luminal irregularities. The first iagonal " branch had a 60% proximal stenosis. The left circumflex had a 90% ulcerated stenosis in the mid portion. There was 20% to 30% distal disease in the left circumflex coronary artery. The left posterior descending artery had a 60% ostial lesion. The right  coronary artery was small, nondominant, and had a 30% lesion at the origin.  He then underwent placement of a 3.0 mm x 18 mm Xience drug-eluting stent to the proximal left circumflex; however, there was a proximal edge dissection necessitating placement of a 3.0 mm x 12 mm Xience drug-eluting stent proximal to the first stent.   2. Echocardiogram on 8/8/2017: Ejection fraction was 60%.  There was grade 1 diastolic dysfunction.  There was mild to moderate aortic stenosis with a peak gradient of 33 mmHg and a mean gradient of 18 mmHg.  3. Carotid artery Doppler ultrasound on 9/21/2017: There was a 60-70% stenosis in  the right internal carotid artery.  There was a 50-60% stenosis in the left internal carotid artery.  4. Echocardiogram on 5/14/2019: The ejection fraction was 60%.  There was grade 1diastolic dysfunction.  There was moderate aortic stenosis with a mean gradient of 18 mmHg.  There was mild tricuspid regurgitation.    ASSESSMENT:      Diagnosis Plan   1. Coronary artery disease involving native coronary artery of native heart without angina pectoris     2. Essential hypertension     3. Nonrheumatic aortic valve stenosis     4. Carotid artery disease, unspecified laterality, unspecified type (CMS/[facility])           PLAN OF CARE:     1. CAD.  Patient denies any angina or dyspnea.  He will continue with guideline directed therapy with aspirin, lisinopril, atorvastatin.  2. Hypertension.  Blood pressure has been very well controlled.  Continue lisinopril 10 mg/day.  3. Aortic valve stenosis.  Patient currently is asymptomatic.  Moderate aortic valve stenosis with mean gradient 18 mmHg on echocardiogram May 2019.  4. History of coronary artery stenosis.  On aspirin  and statin therapy.  5. Follow-up with Dr. Harden in 6 months.  Sooner for problems or complications.    This patient has consented to a telehealth visit via telephone. I spent 11 minutes in total including but not limited to the direct conversation with this patient. All vitals recorded within this visit are reported by the patient.         Discharge Medications           Accurate as of June 22, 2020 12:01 PM. If you have any questions, ask your nurse or doctor.               Continue These Medications      Instructions Start Date   aspirin 81 MG EC tablet   81 mg, Oral, Daily      atorvastatin 40 MG tablet  Commonly known as:  LIPITOR   40 mg, Oral, Daily      beta carotene 19347 UNIT capsule   10,000 Units, Oral, Daily      cetirizine 10 MG tablet  Commonly known as:  zyrTEC   10 mg, Oral, Daily      FREESTYLE LITE test strip  Generic drug:  glucose blood   USE ONE STRIP TO TEST TWO TIMES A DAY      linagliptin 5 MG tablet tablet  Commonly known as:  TRADJENTA   5 mg, Oral, Daily      lisinopril 10 MG tablet  Commonly known as:  PRINIVIL,ZESTRIL   TAKE 1 TABLET DAILY      MAGNESIUM PO   500 mg, Oral, Daily      meclizine 25 MG tablet  Commonly known as:  ANTIVERT   25 mg, Oral, 3 Times Daily PRN      nitroglycerin 0.4 MG SL tablet  Commonly known as:  NITROSTAT   0.4 mg, Sublingual, Every 5 Minutes PRN, Take no more than 3 doses in 15 minutes.      omeprazole 40 MG capsule  Commonly known as:  priLOSEC   40 mg, Oral, Daily      polyethylene glycol packet  Commonly known as:  MIRALAX   17 g, Oral, Daily      pyridoxine 100 MG tablet  Commonly known as:  VITAMIN B-6   100 mg, Oral, Daily      sennosides-docusate 8.6-50 MG per tablet  Commonly known as:  PERICOLACE   1 tablet, Oral, Daily      vitamin C 500 MG tablet  Commonly known as:  ASCORBIC ACID   500 mg, Oral, Daily      VITAMIN D-3 PO   2,000 Int'l Units/day, Oral, Daily      vitamin E 400 UNIT capsule   400 Units, Oral, Daily             Thank you for  allowing me to participate in the care of your patient,         FABIAN Anthony  [facility]  06/22/20  11:01 AM      **Adela Disclaimer:**  Much of this encounter note is an electronic transcription/translation of spoken language to printed text. The electronic translation of spoken language may permit erroneous, or at times, nonsensical words or phrases to be inadvertently transcribed. Although I have reviewed the note for such errors, some may still exist.

## 2020-06-28 NOTE — PROGRESS NOTES
"[facility] OUTPATIENT FOLLOW UP CLINIC VISIT    REASON FOR FOLLOW-UP:    1.  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  CT imaging of the abdomen and pelvis on 5/8/2019 shows multiple small tissue nodules in the perirenal fat adjacent to the left kidney and a soft tissue mass interposed between the left psoas muscle and spine.  2.  RA previously on Cimzia (certollizumab) injections by Dr. Velásquez  3.  Lumbar puncture 5/31/2019 negative for involvement with lymphoma  4.  Bone marrow aspiration and biopsy 5/29/2018 negative for lymphoma  5.  PET scan on 5/30/2019 with hypermetabolic perinephric soft tissue lesions with mesenteric lymphadenopathy, left paraspinal mass at L5, 6 cm intramuscular mass in the right upper thigh, lesion at the left clavicle and a 5 cm soft tissue mass between the right scapula and right posterior fourth rib.  6.  Right subclavian Mediport placed by Dr. Herrera on 6/12/2019.  7.  Admission to  for cycle #1 of dose adjusted R-EPOCH on 6/17/2019.  Plan for a lumbar puncture with intrathecal methotrexate with each cycle.  8.  6 cycles of therapy with lumbar punctures with intrathecal methotrexate administered with each cycle complete as of 10/4/2019.  9.  PET scan on 10/15/2019 negative aside from hypermetabolic activity in the bone marrow and in the spleen.    HISTORY OF PRESENT ILLNESS:  Edward Srinivasan is a 80 y.o. male who returns today for follow up of the above issue.      He feels well.  He has been strictly social distancing and and quarantine.  He notes some exercise intolerance as he has been getting outside and walking a little bit more with less endurance.  He otherwise remains active around the house.  No abdominal pain.  No fevers chills night sweats or unintended weight loss.  His appetite is adequate.  He did not complain of any " "significant arthralgias today.      ALLERGIES:  Allergies   Allergen Reactions   • Oxaprozin Hives       MEDICATIONS:  The medication list has been reviewed with the patient by the medical assistant, and the list has been updated in the electronic medical record, which I reviewed.  Medication dosages and frequencies were confirmed to be accurate.    REVIEW OF SYSTEMS:  PAIN:  See Vital Signs below.  GENERAL:  No fevers, chills, night sweats.  Fatigue which is mild at this point.  SKIN:  No rashes or non-healing lesions. .  HEME/LYMPH:  No abnormal bleeding.  No palpable lymphadenopathy.  EYES:  No vision changes or diplopia.  ENT:  No sore throat or difficulty swallowing.  RESPIRATORY:  No cough, shortness of breath, hemoptysis, or wheezing.  CARDIOVASCULAR:  No chest pain, palpitations, orthopnea, or dyspnea on exertion.    GASTROINTESTINAL:  No abdominal pain, nausea, vomiting, constipation, diarrhea, melena, or hematochezia.  GENITOURINARY:  No dysuria or hematuria.  MUSCULOSKELETAL:  No joint pain, swelling, or erythema.  NEUROLOGIC:  No dizziness, loss of consciousness, or seizures.  PSYCHIATRIC:  No depression, anxiety, or mood changes.  No change from prior except as noted    Vitals:    06/29/20 0915   BP: 107/56   Pulse: 81   Resp: 16   Temp: 97.7 °F (36.5 °C)   TempSrc: Temporal   SpO2: 97%   Weight: 66.8 kg (147 lb 3.2 oz)   Height: 170.2 cm (67.01\")   PainSc: 0-No pain  Comment: lymphoma       PHYSICAL EXAMINATION:  GENERAL:  Well-developed well-nourished male; awake, alert and oriented, in no acute distress.  Wearing a mask  SKIN:  Warm and dry, without rashes, purpura, or petechiae.  HEAD:  Normocephalic, atraumatic.  EARS:  Hearing intact.  LYMPHATICS:  No cervical, supraclavicular, or axillary lymphadenopathy.  CHEST:  Lungs are clear to auscultation bilaterally.  No wheezes, rales, or rhonchi.  HEART:  Regular rate; normal rhythm.  Grade 2 out of 6 systolic murmur  EXTREMITIES: No " "edema.  NEUROLOGICAL:  No focal neurologic deficits.      DIAGNOSTIC DATA:  Results for orders placed or performed in visit on 06/29/20   CBC Auto Differential   Result Value Ref Range    WBC 10.71 3.40 - 10.80 10*3/mm3    RBC 3.66 (L) 4.14 - 5.80 10*6/mm3    Hemoglobin 11.5 (L) 13.0 - 17.7 g/dL    Hematocrit 34.4 (L) 37.5 - 51.0 %    MCV 94.0 79.0 - 97.0 fL    MCH 31.4 26.6 - 33.0 pg    MCHC 33.4 31.5 - 35.7 g/dL    RDW 14.6 12.3 - 15.4 %    RDW-SD 50.6 37.0 - 54.0 fl    MPV 8.4 6.0 - 12.0 fL    Platelets 155 140 - 450 10*3/mm3    Neutrophil % 81.2 (H) 42.7 - 76.0 %    Lymphocyte % 7.3 (L) 19.6 - 45.3 %    Monocyte % 7.1 5.0 - 12.0 %    Eosinophil % 3.2 0.3 - 6.2 %    Basophil % 0.5 0.0 - 1.5 %    Immature Grans % 0.7 (H) 0.0 - 0.5 %    Neutrophils, Absolute 8.71 (H) 1.70 - 7.00 10*3/mm3    Lymphocytes, Absolute 0.78 0.70 - 3.10 10*3/mm3    Monocytes, Absolute 0.76 0.10 - 0.90 10*3/mm3    Eosinophils, Absolute 0.34 0.00 - 0.40 10*3/mm3    Basophils, Absolute 0.05 0.00 - 0.20 10*3/mm3    Immature Grans, Absolute 0.07 (H) 0.00 - 0.05 10*3/mm3    nRBC 0.0 0.0 - 0.2 /100 WBC       IMAGING:  CT N/C/A/P 5/4/2020:  No evidence for recurrence.  Images personally reviewed.      ASSESSMENT:  This is a 80 y.o. male with:  1.  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  CT imaging of the abdomen and pelvis on 5/8/2019 shows multiple small tissue nodules in the perirenal fat adjacent to the left kidney and a soft tissue mass interposed between the left psoas muscle and spine.  Baseline PET scan on 5/30/2019 with hypermetabolic perinephric soft tissue lesions with mesenteric lymphadenopathy, left paraspinal mass at L5, 6 cm intramuscular mass in the right upper thigh, lesion at the left clavicle and a 5 cm soft tissue mass between the right scapula and right posterior fourth rib.  Negative lumbar puncture.  Negative bone " marrow biopsy.  He has now completed 6 cycles of dose adjusted R-EPO CH with discharge following cycle #6 on 10/4/2019.  He received lumbar punctures with intrathecal methotrexate with each cycle.  His end of treatment PET scan on 10/15/2019 is negative for lymphoma.  There is some mild hypermetabolism throughout the marrow space and throughout the spleen which is likely reactive and related to posttreatment recovery.  No evidence of disease at this point.  Proceed with surveillance.      CT 5/4 negative    Plan CT imaging in November.    2.  RA previously on Cimzia (certollizumab) injections by Dr. Velásquez.  He did not complain of any issues regarding this today.    3.  Mild tachycardia: Resolved  4.  Dystrophic changes to his fingernails: Should improve over time.  From chemotherapy.   5.  Tobacco use: Not discussed today  6.  Fatigue from chemotherapy: Improving  7.  Cytopenias from chemotherapy: Improving.  He remains mildly anemic but his white blood cell count and platelet count are normal.  Hemoglobin stable.    PLAN:  1.  He will maintain his Mediport for now with port flushes every 8 weeks.   2. Visit with me with labs and a port flush in about 8 weeks.  3.  Per NCCN guidelines, imaging no more often than every 6 months for the first 2 years, then as indicated.  Last CT imaging 5/4/2020.  4.  Discussed pandemic precautions, which he already is doing.   5.  CT imaging around October or November.

## 2020-06-29 ENCOUNTER — INFUSION (OUTPATIENT)
Dept: ONCOLOGY | Facility: HOSPITAL | Age: 80
End: 2020-06-29

## 2020-06-29 ENCOUNTER — OFFICE VISIT (OUTPATIENT)
Dept: ONCOLOGY | Facility: CLINIC | Age: 80
End: 2020-06-29

## 2020-06-29 VITALS
TEMPERATURE: 97.7 F | DIASTOLIC BLOOD PRESSURE: 56 MMHG | HEIGHT: 67 IN | HEART RATE: 81 BPM | RESPIRATION RATE: 16 BRPM | BODY MASS INDEX: 23.1 KG/M2 | SYSTOLIC BLOOD PRESSURE: 107 MMHG | WEIGHT: 147.2 LBS | OXYGEN SATURATION: 97 %

## 2020-06-29 DIAGNOSIS — C83.33 DIFFUSE LARGE B-CELL LYMPHOMA OF INTRA-ABDOMINAL LYMPH NODES (HCC): Primary | ICD-10-CM

## 2020-06-29 DIAGNOSIS — C83.30 DIFFUSE LARGE B-CELL LYMPHOMA, UNSPECIFIED BODY REGION (HCC): Primary | ICD-10-CM

## 2020-06-29 DIAGNOSIS — C83.33 DIFFUSE LARGE B-CELL LYMPHOMA OF INTRA-ABDOMINAL LYMPH NODES (HCC): ICD-10-CM

## 2020-06-29 LAB
ALBUMIN SERPL-MCNC: 3.9 G/DL (ref 3.5–5.2)
ALBUMIN/GLOB SERPL: 1.6 G/DL
ALP SERPL-CCNC: 70 U/L (ref 39–117)
ALT SERPL W P-5'-P-CCNC: 20 U/L (ref 1–41)
ANION GAP SERPL CALCULATED.3IONS-SCNC: 10.3 MMOL/L (ref 5–15)
AST SERPL-CCNC: 17 U/L (ref 1–40)
BASOPHILS # BLD AUTO: 0.05 10*3/MM3 (ref 0–0.2)
BASOPHILS NFR BLD AUTO: 0.5 % (ref 0–1.5)
BILIRUB SERPL-MCNC: 0.3 MG/DL (ref 0.2–1.2)
BUN BLD-MCNC: 18 MG/DL (ref 8–23)
BUN/CREAT SERPL: 22 (ref 7–25)
CALCIUM SPEC-SCNC: 8.5 MG/DL (ref 8.6–10.5)
CHLORIDE SERPL-SCNC: 97 MMOL/L (ref 98–107)
CO2 SERPL-SCNC: 24.7 MMOL/L (ref 22–29)
CREAT BLD-MCNC: 0.82 MG/DL (ref 0.76–1.27)
DEPRECATED RDW RBC AUTO: 50.6 FL (ref 37–54)
EOSINOPHIL # BLD AUTO: 0.34 10*3/MM3 (ref 0–0.4)
EOSINOPHIL NFR BLD AUTO: 3.2 % (ref 0.3–6.2)
ERYTHROCYTE [DISTWIDTH] IN BLOOD BY AUTOMATED COUNT: 14.6 % (ref 12.3–15.4)
GFR SERPL CREATININE-BSD FRML MDRD: 91 ML/MIN/1.73
GLOBULIN UR ELPH-MCNC: 2.4 GM/DL
GLUCOSE BLD-MCNC: 309 MG/DL (ref 65–99)
HCT VFR BLD AUTO: 34.4 % (ref 37.5–51)
HGB BLD-MCNC: 11.5 G/DL (ref 13–17.7)
IMM GRANULOCYTES # BLD AUTO: 0.07 10*3/MM3 (ref 0–0.05)
IMM GRANULOCYTES NFR BLD AUTO: 0.7 % (ref 0–0.5)
LDH SERPL-CCNC: 194 U/L (ref 135–225)
LYMPHOCYTES # BLD AUTO: 0.78 10*3/MM3 (ref 0.7–3.1)
LYMPHOCYTES NFR BLD AUTO: 7.3 % (ref 19.6–45.3)
MCH RBC QN AUTO: 31.4 PG (ref 26.6–33)
MCHC RBC AUTO-ENTMCNC: 33.4 G/DL (ref 31.5–35.7)
MCV RBC AUTO: 94 FL (ref 79–97)
MONOCYTES # BLD AUTO: 0.76 10*3/MM3 (ref 0.1–0.9)
MONOCYTES NFR BLD AUTO: 7.1 % (ref 5–12)
NEUTROPHILS # BLD AUTO: 8.71 10*3/MM3 (ref 1.7–7)
NEUTROPHILS NFR BLD AUTO: 81.2 % (ref 42.7–76)
NRBC BLD AUTO-RTO: 0 /100 WBC (ref 0–0.2)
PLATELET # BLD AUTO: 155 10*3/MM3 (ref 140–450)
PMV BLD AUTO: 8.4 FL (ref 6–12)
POTASSIUM BLD-SCNC: 4.6 MMOL/L (ref 3.5–5.2)
PROT SERPL-MCNC: 6.3 G/DL (ref 6–8.5)
RBC # BLD AUTO: 3.66 10*6/MM3 (ref 4.14–5.8)
SODIUM BLD-SCNC: 132 MMOL/L (ref 136–145)
WBC NRBC COR # BLD: 10.71 10*3/MM3 (ref 3.4–10.8)

## 2020-06-29 PROCEDURE — 80053 COMPREHEN METABOLIC PANEL: CPT | Performed by: INTERNAL MEDICINE

## 2020-06-29 PROCEDURE — 36591 DRAW BLOOD OFF VENOUS DEVICE: CPT

## 2020-06-29 PROCEDURE — 85025 COMPLETE CBC W/AUTO DIFF WBC: CPT

## 2020-06-29 PROCEDURE — 83615 LACTATE (LD) (LDH) ENZYME: CPT | Performed by: INTERNAL MEDICINE

## 2020-06-29 PROCEDURE — 99214 OFFICE O/P EST MOD 30 MIN: CPT | Performed by: INTERNAL MEDICINE

## 2020-06-29 PROCEDURE — 25010000003 HEPARIN LOCK FLUSH PER 10 UNITS: Performed by: INTERNAL MEDICINE

## 2020-06-29 RX ORDER — HEPARIN SODIUM (PORCINE) LOCK FLUSH IV SOLN 100 UNIT/ML 100 UNIT/ML
500 SOLUTION INTRAVENOUS AS NEEDED
Status: CANCELLED | OUTPATIENT
Start: 2020-06-29

## 2020-06-29 RX ORDER — SODIUM CHLORIDE 0.9 % (FLUSH) 0.9 %
10 SYRINGE (ML) INJECTION AS NEEDED
Status: CANCELLED | OUTPATIENT
Start: 2020-06-29

## 2020-06-29 RX ORDER — HEPARIN SODIUM (PORCINE) LOCK FLUSH IV SOLN 100 UNIT/ML 100 UNIT/ML
500 SOLUTION INTRAVENOUS AS NEEDED
Status: DISCONTINUED | OUTPATIENT
Start: 2020-06-29 | End: 2020-06-29 | Stop reason: HOSPADM

## 2020-06-29 RX ORDER — SODIUM CHLORIDE 0.9 % (FLUSH) 0.9 %
10 SYRINGE (ML) INJECTION AS NEEDED
Status: DISCONTINUED | OUTPATIENT
Start: 2020-06-29 | End: 2020-06-29 | Stop reason: HOSPADM

## 2020-06-29 RX ADMIN — SODIUM CHLORIDE, PRESERVATIVE FREE 500 UNITS: 5 INJECTION INTRAVENOUS at 08:53

## 2020-06-29 RX ADMIN — Medication 10 ML: at 08:53

## 2020-07-10 DIAGNOSIS — K21.9 GASTROESOPHAGEAL REFLUX DISEASE, ESOPHAGITIS PRESENCE NOT SPECIFIED: ICD-10-CM

## 2020-07-10 RX ORDER — OMEPRAZOLE 40 MG/1
40 CAPSULE, DELAYED RELEASE ORAL DAILY
Qty: 90 CAPSULE | Refills: 1 | Status: SHIPPED | OUTPATIENT
Start: 2020-07-10 | End: 2020-12-16

## 2020-07-15 DIAGNOSIS — I10 ESSENTIAL HYPERTENSION, BENIGN: ICD-10-CM

## 2020-07-15 RX ORDER — ATORVASTATIN CALCIUM 40 MG/1
40 TABLET, FILM COATED ORAL DAILY
Qty: 90 TABLET | Refills: 3
Start: 2020-07-15 | End: 2020-07-22 | Stop reason: SDUPTHER

## 2020-07-15 RX ORDER — LISINOPRIL 10 MG/1
10 TABLET ORAL DAILY
Qty: 90 TABLET | Refills: 3 | Status: SHIPPED | OUTPATIENT
Start: 2020-07-15 | End: 2020-07-15 | Stop reason: SDUPTHER

## 2020-07-15 RX ORDER — LISINOPRIL 10 MG/1
10 TABLET ORAL DAILY
Qty: 90 TABLET | Refills: 3 | Status: SHIPPED | OUTPATIENT
Start: 2020-07-15 | End: 2021-05-03 | Stop reason: SDUPTHER

## 2020-07-15 RX ORDER — ATORVASTATIN CALCIUM 40 MG/1
40 TABLET, FILM COATED ORAL DAILY
Qty: 90 TABLET | Refills: 3
Start: 2020-07-15 | End: 2020-07-15 | Stop reason: SDUPTHER

## 2020-07-15 NOTE — TELEPHONE ENCOUNTER
Pt states he needs a 90-day supply refill on these medications so his insurance will cover it. Thanks!

## 2020-07-20 DIAGNOSIS — E11.9 DIABETES MELLITUS WITHOUT COMPLICATION (HCC): ICD-10-CM

## 2020-07-20 RX ORDER — BLOOD-GLUCOSE METER
KIT MISCELLANEOUS
Qty: 100 EACH | Refills: 2 | Status: SHIPPED | OUTPATIENT
Start: 2020-07-20 | End: 2021-02-05 | Stop reason: SDUPTHER

## 2020-07-22 RX ORDER — ATORVASTATIN CALCIUM 40 MG/1
40 TABLET, FILM COATED ORAL DAILY
Qty: 90 TABLET | Refills: 3
Start: 2020-07-22 | End: 2020-07-24 | Stop reason: SDUPTHER

## 2020-07-22 NOTE — TELEPHONE ENCOUNTER
Caller: Edward Srinivasan    Relationship: Self    Best call back number:[phone]    Medication needed:   Requested Prescriptions     Pending Prescriptions Disp Refills   • atorvastatin (LIPITOR) 40 MG tablet 90 tablet 3     Sig: Take 1 tablet by mouth Daily.       Does the patient have less than a 3 day supply:  [x] Yes  [] No    What is the patient's preferred pharmacy[facility] [place] [address] [address] [facility] - [phone] [facility] [phone] FX       Patient stated [place] did not have Rx

## 2020-07-24 ENCOUNTER — TELEPHONE (OUTPATIENT)
Dept: INTERNAL MEDICINE | Facility: CLINIC | Age: 80
End: 2020-07-24

## 2020-07-24 RX ORDER — ATORVASTATIN CALCIUM 40 MG/1
40 TABLET, FILM COATED ORAL DAILY
Qty: 90 TABLET | Refills: 3 | Status: SHIPPED | OUTPATIENT
Start: 2020-07-24 | End: 2021-05-04 | Stop reason: SDUPTHER

## 2020-07-24 NOTE — TELEPHONE ENCOUNTER
PATIENT CALLED AND STATED THE PHARMACY HE USES HAS NOT RECEIVED THE MEDICATION atorvastatin (LIPITOR) 40 MG     PATIENT STATES HE IS OUT OF THE MEDICATION.  PLEASE ADVISE    PATIENT CALL BACK [phone]     PHARMACY: [facility] - [place] [address] [address] [facility] - [phone]  - [phone] FX

## 2020-08-07 ENCOUNTER — OFFICE VISIT (OUTPATIENT)
Dept: INTERNAL MEDICINE | Facility: CLINIC | Age: 80
End: 2020-08-07

## 2020-08-07 VITALS
SYSTOLIC BLOOD PRESSURE: 112 MMHG | OXYGEN SATURATION: 98 % | HEIGHT: 67 IN | HEART RATE: 89 BPM | BODY MASS INDEX: 23.23 KG/M2 | DIASTOLIC BLOOD PRESSURE: 64 MMHG | WEIGHT: 148 LBS

## 2020-08-07 DIAGNOSIS — K21.9 GASTROESOPHAGEAL REFLUX DISEASE, ESOPHAGITIS PRESENCE NOT SPECIFIED: ICD-10-CM

## 2020-08-07 DIAGNOSIS — E11.42 DIABETIC PERIPHERAL NEUROPATHY (HCC): ICD-10-CM

## 2020-08-07 DIAGNOSIS — E11.9 DIABETES MELLITUS WITHOUT COMPLICATION (HCC): Primary | ICD-10-CM

## 2020-08-07 DIAGNOSIS — C83.33 DIFFUSE LARGE B-CELL LYMPHOMA OF INTRA-ABDOMINAL LYMPH NODES (HCC): ICD-10-CM

## 2020-08-07 DIAGNOSIS — I10 ESSENTIAL HYPERTENSION, BENIGN: ICD-10-CM

## 2020-08-07 DIAGNOSIS — E78.00 PURE HYPERCHOLESTEROLEMIA: ICD-10-CM

## 2020-08-07 LAB — HBA1C MFR BLD: 6.1 % (ref 4.8–5.6)

## 2020-08-07 PROCEDURE — 99214 OFFICE O/P EST MOD 30 MIN: CPT | Performed by: INTERNAL MEDICINE

## 2020-08-07 NOTE — PROGRESS NOTES
"Subjective   Edward Srinivasan is a 80 y.o. male.  Presents with a chief complaint of hypertension, hyperlipidemia, gastroesophageal reflux disease is well controlled, diabetes with diabetic peripheral neuropathy, status post treatment for diffuse large B-cell lymphoma currently doing very well here for follow-up evaluation and treatment.  The patient is doing remarkably well considering everything he has been through and I am going to do an A1c today.      /64   Pulse 89   Ht 170.2 cm (67.01\")   Wt 67.1 kg (148 lb)   SpO2 98%   BMI 23.17 kg/m²     Body mass index is 23.17 kg/m².    History of Present Illness doing well except for issues with diabetic neuropathy that are fairly well-controlled at this point    The following portions of the patient's history were reviewed and updated as appropriate: allergies, current medications, past family history, past medical history, past social history, past surgical history and problem list.    Review of Systems   Constitutional: Negative.    HENT: Negative.    Respiratory: Negative.    Cardiovascular: Negative.    Gastrointestinal: Negative.    Musculoskeletal: Positive for arthralgias.   Neurological: Positive for numbness.       Objective   Physical Exam   Constitutional: He is oriented to person, place, and time. He appears well-developed and well-nourished.   HENT:   Head: Normocephalic and atraumatic.   Cardiovascular: Normal rate, regular rhythm and normal heart sounds.   Pulmonary/Chest: Effort normal and breath sounds normal.   Abdominal: Soft. Bowel sounds are normal.   Musculoskeletal: Normal range of motion.   Neurological: He is alert and oriented to person, place, and time.   Diabetic polyneuropathy affecting the patient's feet   Psychiatric: He has a normal mood and affect. His behavior is normal.   Nursing note and vitals reviewed.        Assessment/Plan   Diagnoses and all orders for this visit:    Diabetes mellitus without complication " (CMS/[facility])  Comments:  Check an A1c today  Orders:  -     Hemoglobin A1c; Future    Pure hypercholesterolemia  Comments:  No change in therapy at this time    Essential hypertension, benign  Comments:  Well-controlled no changes at this time    Gastroesophageal reflux disease, esophagitis presence not specified  Comments:  Well-controlled no changes needed at this time    Diabetic peripheral neuropathy (CMS/[facility])  Comments:  No change in therapy at this time    Diffuse large B-cell lymphoma of intra-abdominal lymph nodes (CMS/[facility])  Comments:  Continue oncology follow-up

## 2020-08-31 ENCOUNTER — OFFICE VISIT (OUTPATIENT)
Dept: ONCOLOGY | Facility: CLINIC | Age: 80
End: 2020-08-31

## 2020-08-31 ENCOUNTER — INFUSION (OUTPATIENT)
Dept: ONCOLOGY | Facility: HOSPITAL | Age: 80
End: 2020-08-31

## 2020-08-31 VITALS
HEIGHT: 67 IN | SYSTOLIC BLOOD PRESSURE: 98 MMHG | RESPIRATION RATE: 16 BRPM | OXYGEN SATURATION: 96 % | BODY MASS INDEX: 23.4 KG/M2 | TEMPERATURE: 97.5 F | WEIGHT: 149.1 LBS | HEART RATE: 87 BPM | DIASTOLIC BLOOD PRESSURE: 56 MMHG

## 2020-08-31 DIAGNOSIS — C83.33 DIFFUSE LARGE B-CELL LYMPHOMA OF INTRA-ABDOMINAL LYMPH NODES (HCC): ICD-10-CM

## 2020-08-31 DIAGNOSIS — C83.30 DIFFUSE LARGE B-CELL LYMPHOMA, UNSPECIFIED BODY REGION (HCC): Primary | ICD-10-CM

## 2020-08-31 DIAGNOSIS — D64.9 NORMOCYTIC ANEMIA: ICD-10-CM

## 2020-08-31 DIAGNOSIS — C83.33 DIFFUSE LARGE B-CELL LYMPHOMA OF INTRA-ABDOMINAL LYMPH NODES (HCC): Primary | ICD-10-CM

## 2020-08-31 LAB
ALBUMIN SERPL-MCNC: 3.9 G/DL (ref 3.5–5.2)
ALBUMIN/GLOB SERPL: 1.7 G/DL (ref 1.1–2.4)
ALP SERPL-CCNC: 69 U/L (ref 38–116)
ALT SERPL W P-5'-P-CCNC: 14 U/L (ref 0–41)
ANION GAP SERPL CALCULATED.3IONS-SCNC: 8.5 MMOL/L (ref 5–15)
AST SERPL-CCNC: 18 U/L (ref 0–40)
BASOPHILS # BLD AUTO: 0.05 10*3/MM3 (ref 0–0.2)
BASOPHILS NFR BLD AUTO: 0.5 % (ref 0–1.5)
BILIRUB SERPL-MCNC: 0.3 MG/DL (ref 0.2–1.2)
BUN SERPL-MCNC: 19 MG/DL (ref 6–20)
BUN/CREAT SERPL: 26.4 (ref 7.3–30)
CALCIUM SPEC-SCNC: 8.7 MG/DL (ref 8.5–10.2)
CHLORIDE SERPL-SCNC: 101 MMOL/L (ref 98–107)
CO2 SERPL-SCNC: 26.5 MMOL/L (ref 22–29)
CREAT SERPL-MCNC: 0.72 MG/DL (ref 0.7–1.3)
DEPRECATED RDW RBC AUTO: 48.7 FL (ref 37–54)
EOSINOPHIL # BLD AUTO: 0.31 10*3/MM3 (ref 0–0.4)
EOSINOPHIL NFR BLD AUTO: 3.3 % (ref 0.3–6.2)
ERYTHROCYTE [DISTWIDTH] IN BLOOD BY AUTOMATED COUNT: 14 % (ref 12.3–15.4)
GFR SERPL CREATININE-BSD FRML MDRD: 106 ML/MIN/1.73
GLOBULIN UR ELPH-MCNC: 2.3 GM/DL (ref 1.8–3.5)
GLUCOSE SERPL-MCNC: 232 MG/DL (ref 74–124)
HCT VFR BLD AUTO: 33.4 % (ref 37.5–51)
HGB BLD-MCNC: 11.3 G/DL (ref 13–17.7)
IMM GRANULOCYTES # BLD AUTO: 0.03 10*3/MM3 (ref 0–0.05)
IMM GRANULOCYTES NFR BLD AUTO: 0.3 % (ref 0–0.5)
LDH SERPL-CCNC: 163 U/L (ref 99–259)
LYMPHOCYTES # BLD AUTO: 1.06 10*3/MM3 (ref 0.7–3.1)
LYMPHOCYTES NFR BLD AUTO: 11.3 % (ref 19.6–45.3)
MCH RBC QN AUTO: 32.3 PG (ref 26.6–33)
MCHC RBC AUTO-ENTMCNC: 33.8 G/DL (ref 31.5–35.7)
MCV RBC AUTO: 95.4 FL (ref 79–97)
MONOCYTES # BLD AUTO: 0.8 10*3/MM3 (ref 0.1–0.9)
MONOCYTES NFR BLD AUTO: 8.5 % (ref 5–12)
NEUTROPHILS NFR BLD AUTO: 7.14 10*3/MM3 (ref 1.7–7)
NEUTROPHILS NFR BLD AUTO: 76.1 % (ref 42.7–76)
NRBC BLD AUTO-RTO: 0 /100 WBC (ref 0–0.2)
PLATELET # BLD AUTO: 152 10*3/MM3 (ref 140–450)
PMV BLD AUTO: 8.4 FL (ref 6–12)
POTASSIUM SERPL-SCNC: 4.4 MMOL/L (ref 3.5–4.7)
PROT SERPL-MCNC: 6.2 G/DL (ref 6.3–8)
RBC # BLD AUTO: 3.5 10*6/MM3 (ref 4.14–5.8)
SODIUM SERPL-SCNC: 136 MMOL/L (ref 134–145)
WBC # BLD AUTO: 9.39 10*3/MM3 (ref 3.4–10.8)

## 2020-08-31 PROCEDURE — 85025 COMPLETE CBC W/AUTO DIFF WBC: CPT

## 2020-08-31 PROCEDURE — 25010000003 HEPARIN LOCK FLUSH PER 10 UNITS: Performed by: INTERNAL MEDICINE

## 2020-08-31 PROCEDURE — 83615 LACTATE (LD) (LDH) ENZYME: CPT

## 2020-08-31 PROCEDURE — 36591 DRAW BLOOD OFF VENOUS DEVICE: CPT

## 2020-08-31 PROCEDURE — 80053 COMPREHEN METABOLIC PANEL: CPT

## 2020-08-31 PROCEDURE — 99213 OFFICE O/P EST LOW 20 MIN: CPT | Performed by: INTERNAL MEDICINE

## 2020-08-31 RX ORDER — HEPARIN SODIUM (PORCINE) LOCK FLUSH IV SOLN 100 UNIT/ML 100 UNIT/ML
500 SOLUTION INTRAVENOUS AS NEEDED
Status: CANCELLED | OUTPATIENT
Start: 2020-08-31

## 2020-08-31 RX ORDER — SODIUM CHLORIDE 0.9 % (FLUSH) 0.9 %
10 SYRINGE (ML) INJECTION AS NEEDED
Status: DISCONTINUED | OUTPATIENT
Start: 2020-08-31 | End: 2020-08-31 | Stop reason: HOSPADM

## 2020-08-31 RX ORDER — SODIUM CHLORIDE 0.9 % (FLUSH) 0.9 %
10 SYRINGE (ML) INJECTION AS NEEDED
Status: CANCELLED | OUTPATIENT
Start: 2020-08-31

## 2020-08-31 RX ORDER — HEPARIN SODIUM (PORCINE) LOCK FLUSH IV SOLN 100 UNIT/ML 100 UNIT/ML
500 SOLUTION INTRAVENOUS AS NEEDED
Status: DISCONTINUED | OUTPATIENT
Start: 2020-08-31 | End: 2020-08-31 | Stop reason: HOSPADM

## 2020-08-31 RX ADMIN — SODIUM CHLORIDE, PRESERVATIVE FREE 500 UNITS: 5 INJECTION INTRAVENOUS at 10:30

## 2020-08-31 RX ADMIN — Medication 10 ML: at 10:30

## 2020-08-31 NOTE — PROGRESS NOTES
"[facility] GROUP OUTPATIENT FOLLOW UP CLINIC VISIT    REASON FOR FOLLOW-UP:    1.  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  CT imaging of the abdomen and pelvis on 5/8/2019 shows multiple small tissue nodules in the perirenal fat adjacent to the left kidney and a soft tissue mass interposed between the left psoas muscle and spine.  2.  RA previously on Cimzia (certollizumab) injections by Dr. Velásquez  3.  Lumbar puncture 5/31/2019 negative for involvement with lymphoma  4.  Bone marrow aspiration and biopsy 5/29/2018 negative for lymphoma  5.  PET scan on 5/30/2019 with hypermetabolic perinephric soft tissue lesions with mesenteric lymphadenopathy, left paraspinal mass at L5, 6 cm intramuscular mass in the right upper thigh, lesion at the left clavicle and a 5 cm soft tissue mass between the right scapula and right posterior fourth rib.  6.  Right subclavian Mediport placed by Dr. Herrera on 6/12/2019.  7.  Admission to [facility] for cycle #1 of dose adjusted R-EPOCH on 6/17/2019.  Plan for a lumbar puncture with intrathecal methotrexate with each cycle.  8.  6 cycles of therapy with lumbar punctures with intrathecal methotrexate administered with each cycle complete as of 10/4/2019.  9.  PET scan on 10/15/2019 negative aside from hypermetabolic activity in the bone marrow and in the spleen.    HISTORY OF PRESENT ILLNESS:  Edward Srinivasan is a 80 y.o. male who returns today for follow up of the above issue.     He generally feels okay.  Exercise tolerance is slowly improving.  No fevers or chills.  No lymphadenopathy.  He does inquire today as to when he should get a flu shot.      ALLERGIES:  Allergies   Allergen Reactions   • Oxaprozin Hives       MEDICATIONS:  The medication list has been reviewed with the patient by the medical assistant, and the list has been updated in the " "electronic medical record, which I reviewed.  Medication dosages and frequencies were confirmed to be accurate.    REVIEW OF SYSTEMS:  PAIN:  See Vital Signs below.  GENERAL:  No fevers, chills, night sweats.  Fatigue which has slowly improved  SKIN:  No rashes or non-healing lesions. .  HEME/LYMPH:  No abnormal bleeding.  No palpable lymphadenopathy.  EYES:  No vision changes or diplopia.  ENT:  No sore throat or difficulty swallowing.  RESPIRATORY:  No cough, shortness of breath, hemoptysis, or wheezing.  CARDIOVASCULAR:  No chest pain, palpitations, orthopnea, or dyspnea on exertion.    GASTROINTESTINAL:  No abdominal pain, nausea, vomiting, constipation, diarrhea, melena, or hematochezia.  GENITOURINARY:  No dysuria or hematuria.  MUSCULOSKELETAL:  No joint pain, swelling, or erythema.  NEUROLOGIC:  No dizziness, loss of consciousness, or seizures.  PSYCHIATRIC:  No depression, anxiety, or mood changes.  Reviewed 8/31/2020    Vitals:    08/31/20 1030   BP: 98/56   Pulse: 87   Resp: 16   Temp: 97.5 °F (36.4 °C)   TempSrc: Temporal   SpO2: 96%   Weight: 67.6 kg (149 lb 1.6 oz)   Height: 170.2 cm (67.01\")   PainSc: 0-No pain  Comment: lymphoma       PHYSICAL EXAMINATION:  GENERAL:  Well-developed well-nourished male; awake, alert and oriented, in no acute distress.  Wearing a mask  SKIN:  Warm and dry, without rashes, purpura, or petechiae.  HEAD:  Normocephalic, atraumatic.  EARS:  Hearing intact.  LYMPHATICS:  No cervical, supraclavicular, or axillary lymphadenopathy.  CHEST:  Lungs are clear to auscultation bilaterally.  No wheezes, rales, or rhonchi.  HEART:  Regular rate; normal rhythm.  Grade 2 out of 6 systolic murmur  EXTREMITIES: No edema.  NEUROLOGICAL:  No focal neurologic deficits.  Reviewed 8/31/2020.  Changes noted.    DIAGNOSTIC DATA:  Results for orders placed or performed in visit on 08/31/20   Comprehensive Metabolic Panel   Result Value Ref Range    Glucose 232 (H) 74 - 124 mg/dL    BUN 19 6 - 20 " "mg/dL    Creatinine 0.72 0.70 - 1.30 mg/dL    Sodium 136 134 - 145 mmol/L    Potassium 4.4 3.5 - 4.7 mmol/L    Chloride 101 98 - 107 mmol/L    CO2 26.5 22.0 - 29.0 mmol/L    Calcium 8.7 8.5 - 10.2 mg/dL    Total Protein 6.2 (L) 6.3 - 8.0 g/dL    Albumin 3.90 3.50 - 5.20 g/dL    ALT (SGPT) 14 0 - 41 U/L    AST (SGOT) 18 0 - 40 U/L    Alkaline Phosphatase 69 38 - 116 U/L    Total Bilirubin 0.3 0.2 - 1.2 mg/dL    eGFR Non African Amer 106 >60 mL/min/1.73    Globulin 2.3 1.8 - 3.5 gm/dL    A/G Ratio 1.7 1.1 - 2.4 g/dL    BUN/Creatinine Ratio 26.4 7.3 - 30.0    Anion Gap 8.5 5.0 - 15.0 mmol/L   Lactate Dehydrogenase   Result Value Ref Range     99 - 259 U/L   CBC Auto Differential   Result Value Ref Range    WBC 9.39 3.40 - 10.80 10*3/mm3    RBC 3.50 (L) 4.14 - 5.80 10*6/mm3    Hemoglobin 11.3 (L) 13.0 - 17.7 g/dL    Hematocrit 33.4 (L) 37.5 - 51.0 %    MCV 95.4 79.0 - 97.0 fL    MCH 32.3 26.6 - 33.0 pg    MCHC 33.8 31.5 - 35.7 g/dL    RDW 14.0 12.3 - 15.4 %    RDW-SD 48.7 37.0 - 54.0 fl    MPV 8.4 6.0 - 12.0 fL    Platelets 152 140 - 450 10*3/mm3    Neutrophil % 76.1 (H) 42.7 - 76.0 %    Lymphocyte % 11.3 (L) 19.6 - 45.3 %    Monocyte % 8.5 5.0 - 12.0 %    Eosinophil % 3.3 0.3 - 6.2 %    Basophil % 0.5 0.0 - 1.5 %    Immature Grans % 0.3 0.0 - 0.5 %    Neutrophils, Absolute 7.14 (H) 1.70 - 7.00 10*3/mm3    Lymphocytes, Absolute 1.06 0.70 - 3.10 10*3/mm3    Monocytes, Absolute 0.80 0.10 - 0.90 10*3/mm3    Eosinophils, Absolute 0.31 0.00 - 0.40 10*3/mm3    Basophils, Absolute 0.05 0.00 - 0.20 10*3/mm3    Immature Grans, Absolute 0.03 0.00 - 0.05 10*3/mm3    nRBC 0.0 0.0 - 0.2 /100 WBC       IMAGING:  CT N/C/A/P 5/4/2020:  No evidence for recurrence.    ASSESSMENT:  This is a 80 y.o. male with:  1.  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  CT imaging of the abdomen and pelvis on 5/8/2019 shows multiple small " tissue nodules in the perirenal fat adjacent to the left kidney and a soft tissue mass interposed between the left psoas muscle and spine.  Baseline PET scan on 5/30/2019 with hypermetabolic perinephric soft tissue lesions with mesenteric lymphadenopathy, left paraspinal mass at L5, 6 cm intramuscular mass in the right upper thigh, lesion at the left clavicle and a 5 cm soft tissue mass between the right scapula and right posterior fourth rib.  Negative lumbar puncture.  Negative bone marrow biopsy.  He has now completed 6 cycles of dose adjusted R-EPO CH with discharge following cycle #6 on 10/4/2019.  He received lumbar punctures with intrathecal methotrexate with each cycle.  His end of treatment PET scan on 10/15/2019 is negative for lymphoma.  There is some mild hypermetabolism throughout the marrow space and throughout the spleen which is likely reactive and related to posttreatment recovery.  No evidence of disease at this point.  Proceed with surveillance.      CT 5/4 negative    2.  RA previously on Cimzia (certollizumab) injections by Dr. Velásquez.  He did not complain of any issues regarding this today.    3.  Mild tachycardia: Resolved  4.  Dystrophic changes to his fingernails: Improving over time.  From chemotherapy.   5.  Tobacco use: Not discussed today  6.  Fatigue from chemotherapy: Improving  7.  Cytopenias from chemotherapy: Overall improved.  However, he remains mildly anemic and his hemoglobin has dropped a little bit.  Further evaluation at follow-up.    PLAN:  1.  I encouraged him to get his flu shot in the near future.  2.  Port flush, labs, CT chest abdomen pelvis in about 2 months  3.  I will see him back after that for follow-up.  4.  Per NCCN guidelines, imaging no more often than every 6 months for the first 2 years, then as indicated.  Last CT imaging 5/4/2020.

## 2020-09-28 DIAGNOSIS — E11.9 DIABETES MELLITUS WITHOUT COMPLICATION (HCC): ICD-10-CM

## 2020-09-28 RX ORDER — LANCETS 28 GAUGE
EACH MISCELLANEOUS
Qty: 100 EACH | Refills: 2 | Status: SHIPPED | OUTPATIENT
Start: 2020-09-28 | End: 2021-08-24 | Stop reason: SDUPTHER

## 2020-10-27 ENCOUNTER — INFUSION (OUTPATIENT)
Dept: ONCOLOGY | Facility: HOSPITAL | Age: 80
End: 2020-10-27

## 2020-10-27 ENCOUNTER — HOSPITAL ENCOUNTER (OUTPATIENT)
Dept: PET IMAGING | Facility: HOSPITAL | Age: 80
Discharge: HOME | End: 2020-10-27
Admitting: INTERNAL MEDICINE

## 2020-10-27 DIAGNOSIS — C83.33 DIFFUSE LARGE B-CELL LYMPHOMA OF INTRA-ABDOMINAL LYMPH NODES (HCC): ICD-10-CM

## 2020-10-27 DIAGNOSIS — D64.9 NORMOCYTIC ANEMIA: ICD-10-CM

## 2020-10-27 LAB
ALBUMIN SERPL-MCNC: 4.1 G/DL (ref 3.5–5.2)
ALBUMIN/GLOB SERPL: 1.9 G/DL (ref 1.1–2.4)
ALP SERPL-CCNC: 72 U/L (ref 38–116)
ALT SERPL W P-5'-P-CCNC: 17 U/L (ref 0–41)
ANION GAP SERPL CALCULATED.3IONS-SCNC: 9.4 MMOL/L (ref 5–15)
AST SERPL-CCNC: 19 U/L (ref 0–40)
BASOPHILS # BLD AUTO: 0.04 10*3/MM3 (ref 0–0.2)
BASOPHILS NFR BLD AUTO: 0.5 % (ref 0–1.5)
BILIRUB SERPL-MCNC: 0.4 MG/DL (ref 0.2–1.2)
BUN SERPL-MCNC: 17 MG/DL (ref 6–20)
BUN/CREAT SERPL: 23.6 (ref 7.3–30)
CALCIUM SPEC-SCNC: 8.8 MG/DL (ref 8.5–10.2)
CHLORIDE SERPL-SCNC: 98 MMOL/L (ref 98–107)
CO2 SERPL-SCNC: 26.6 MMOL/L (ref 22–29)
CREAT BLDA-MCNC: 0.8 MG/DL (ref 0.6–1.3)
CREAT SERPL-MCNC: 0.72 MG/DL (ref 0.7–1.3)
DEPRECATED RDW RBC AUTO: 48.2 FL (ref 37–54)
EOSINOPHIL # BLD AUTO: 0.34 10*3/MM3 (ref 0–0.4)
EOSINOPHIL NFR BLD AUTO: 4.3 % (ref 0.3–6.2)
ERYTHROCYTE [DISTWIDTH] IN BLOOD BY AUTOMATED COUNT: 14 % (ref 12.3–15.4)
FERRITIN SERPL-MCNC: 494.6 NG/ML (ref 30–400)
GFR SERPL CREATININE-BSD FRML MDRD: 106 ML/MIN/1.73
GLOBULIN UR ELPH-MCNC: 2.2 GM/DL (ref 1.8–3.5)
GLUCOSE SERPL-MCNC: 118 MG/DL (ref 74–124)
HCT VFR BLD AUTO: 33.4 % (ref 37.5–51)
HGB BLD-MCNC: 11.4 G/DL (ref 13–17.7)
HGB RETIC QN AUTO: 37.2 PG (ref 29.8–36.1)
IMM GRANULOCYTES # BLD AUTO: 0.03 10*3/MM3 (ref 0–0.05)
IMM GRANULOCYTES NFR BLD AUTO: 0.4 % (ref 0–0.5)
IMM RETICS NFR: 9.7 % (ref 3–15.8)
IRON 24H UR-MRATE: 46 MCG/DL (ref 59–158)
IRON SATN MFR SERPL: 19 % (ref 14–48)
LDH SERPL-CCNC: 176 U/L (ref 99–259)
LYMPHOCYTES # BLD AUTO: 1.06 10*3/MM3 (ref 0.7–3.1)
LYMPHOCYTES NFR BLD AUTO: 13.4 % (ref 19.6–45.3)
MCH RBC QN AUTO: 31.9 PG (ref 26.6–33)
MCHC RBC AUTO-ENTMCNC: 34.1 G/DL (ref 31.5–35.7)
MCV RBC AUTO: 93.6 FL (ref 79–97)
MONOCYTES # BLD AUTO: 0.7 10*3/MM3 (ref 0.1–0.9)
MONOCYTES NFR BLD AUTO: 8.8 % (ref 5–12)
NEUTROPHILS NFR BLD AUTO: 5.75 10*3/MM3 (ref 1.7–7)
NEUTROPHILS NFR BLD AUTO: 72.6 % (ref 42.7–76)
NRBC BLD AUTO-RTO: 0 /100 WBC (ref 0–0.2)
PLATELET # BLD AUTO: 153 10*3/MM3 (ref 140–450)
PMV BLD AUTO: 8.2 FL (ref 6–12)
POTASSIUM SERPL-SCNC: 4.3 MMOL/L (ref 3.5–4.7)
PROT SERPL-MCNC: 6.3 G/DL (ref 6.3–8)
RBC # BLD AUTO: 3.57 10*6/MM3 (ref 4.14–5.8)
RETICS # AUTO: 0.07 10*6/MM3 (ref 0.02–0.13)
RETICS/RBC NFR AUTO: 1.95 % (ref 0.7–1.9)
SODIUM SERPL-SCNC: 134 MMOL/L (ref 134–145)
TIBC SERPL-MCNC: 245 MCG/DL (ref 249–505)
TRANSFERRIN SERPL-MCNC: 175 MG/DL (ref 200–360)
VIT B12 BLD-MCNC: 453 PG/ML (ref 211–946)
WBC # BLD AUTO: 7.92 10*3/MM3 (ref 3.4–10.8)

## 2020-10-27 PROCEDURE — 85046 RETICYTE/HGB CONCENTRATE: CPT

## 2020-10-27 PROCEDURE — 82565 ASSAY OF CREATININE: CPT

## 2020-10-27 PROCEDURE — 36591 DRAW BLOOD OFF VENOUS DEVICE: CPT

## 2020-10-27 PROCEDURE — 25010000002 IOPAMIDOL 61 % SOLUTION: Performed by: INTERNAL MEDICINE

## 2020-10-27 PROCEDURE — 80053 COMPREHEN METABOLIC PANEL: CPT

## 2020-10-27 PROCEDURE — 83615 LACTATE (LD) (LDH) ENZYME: CPT

## 2020-10-27 PROCEDURE — 71260 CT THORAX DX C+: CPT

## 2020-10-27 PROCEDURE — 0 DIATRIZOATE MEGLUMINE & SODIUM PER 1 ML: Performed by: INTERNAL MEDICINE

## 2020-10-27 PROCEDURE — 82728 ASSAY OF FERRITIN: CPT

## 2020-10-27 PROCEDURE — 83540 ASSAY OF IRON: CPT

## 2020-10-27 PROCEDURE — 74177 CT ABD & PELVIS W/CONTRAST: CPT

## 2020-10-27 PROCEDURE — 85025 COMPLETE CBC W/AUTO DIFF WBC: CPT

## 2020-10-27 PROCEDURE — 82607 VITAMIN B-12: CPT | Performed by: INTERNAL MEDICINE

## 2020-10-27 PROCEDURE — 84466 ASSAY OF TRANSFERRIN: CPT

## 2020-10-27 RX ADMIN — IOPAMIDOL 85 ML: 612 INJECTION, SOLUTION INTRAVENOUS at 12:27

## 2020-10-27 RX ADMIN — DIATRIZOATE MEGLUMINE AND DIATRIZOATE SODIUM 30 ML: 660; 100 LIQUID ORAL; RECTAL at 11:38

## 2020-10-28 LAB
FOLATE BLD-MCNC: 470 NG/ML
FOLATE RBC-MCNC: 1437 NG/ML
HCT VFR BLD AUTO: 32.7 % (ref 37.5–51)

## 2020-11-02 NOTE — PROGRESS NOTES
"[facility] GROUP OUTPATIENT FOLLOW UP CLINIC VISIT    REASON FOR FOLLOW-UP:    1.  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  CT imaging of the abdomen and pelvis on 5/8/2019 shows multiple small tissue nodules in the perirenal fat adjacent to the left kidney and a soft tissue mass interposed between the left psoas muscle and spine.  2.  RA previously on Cimzia (certollizumab) injections by Dr. Velásquez  3.  Lumbar puncture 5/31/2019 negative for involvement with lymphoma  4.  Bone marrow aspiration and biopsy 5/29/2018 negative for lymphoma  5.  PET scan on 5/30/2019 with hypermetabolic perinephric soft tissue lesions with mesenteric lymphadenopathy, left paraspinal mass at L5, 6 cm intramuscular mass in the right upper thigh, lesion at the left clavicle and a 5 cm soft tissue mass between the right scapula and right posterior fourth rib.  6.  Right subclavian Mediport placed by Dr. Herrera on 6/12/2019.  7.  Admission to [facility] for cycle #1 of dose adjusted R-EPOCH on 6/17/2019.  Plan for a lumbar puncture with intrathecal methotrexate with each cycle.  8.  6 cycles of therapy with lumbar punctures with intrathecal methotrexate administered with each cycle complete as of 10/4/2019.  9.  PET scan on 10/15/2019 negative aside from hypermetabolic activity in the bone marrow and in the spleen.    HISTORY OF PRESENT ILLNESS:  Edward Srinivasan is a 80 y.o. male who returns today for follow up of the above issue.     He generally feels well.  Exercise tolerance is gradually improving.  His only complaint today is some pain and cramping in his hands that occurred after raking some leaves.  He also had some right wrist pain and swelling.  This pain lasted for about 36 hours.  He took nonsteroidal anti-inflammatory medication with some improvement.      ALLERGIES:  Allergies   Allergen Reactions " "  • Oxaprozin Hives       MEDICATIONS:  The medication list has been reviewed with the patient by the medical assistant, and the list has been updated in the electronic medical record, which I reviewed.  Medication dosages and frequencies were confirmed to be accurate.    REVIEW OF SYSTEMS:  PAIN:  See Vital Signs below.  GENERAL:  No fevers, chills, night sweats.  Fatigue, slowly improving.  Exercise tolerance improving.  SKIN:  No rashes or non-healing lesions. .  HEME/LYMPH:  No abnormal bleeding.  No palpable lymphadenopathy.  EYES:  No vision changes or diplopia.  ENT:  No sore throat or difficulty swallowing.  RESPIRATORY:  No cough, shortness of breath, hemoptysis, or wheezing.  CARDIOVASCULAR:  No chest pain, palpitations, orthopnea, or dyspnea on exertion.    GASTROINTESTINAL:  No abdominal pain, nausea, vomiting, constipation, diarrhea, melena, or hematochezia.  GENITOURINARY:  No dysuria or hematuria.  MUSCULOSKELETAL: Right wrist pain, hand pain which has improved  NEUROLOGIC:  No dizziness, loss of consciousness, or seizures.  PSYCHIATRIC:  No depression, anxiety, or mood changes.  Reviewed 11/3/2020    Vitals:    11/03/20 0934   BP: 117/71   Pulse: 92   Resp: 16   Temp: 97.1 °F (36.2 °C)   TempSrc: Temporal   SpO2: 99%   Weight: 68.5 kg (151 lb)   Height: 170.2 cm (67.01\")   PainSc: 0-No pain  Comment: lymphoma       PHYSICAL EXAMINATION:  GENERAL:  Well-developed well-nourished male; awake, alert and oriented, in no acute distress.  Wearing a face mask   SKIN:  Warm and dry, without rashes, purpura, or petechiae.  HEAD:  Normocephalic, atraumatic.  EARS:  Hearing intact.  LYMPHATICS:  No cervical, supraclavicular, or axillary lymphadenopathy.  CHEST:  Lungs are clear to auscultation bilaterally.  No wheezes, rales, or rhonchi.  HEART:  Regular rate; normal rhythm.  Grade 2 out of 6 systolic murmur  EXTREMITIES: No edema.  NEUROLOGICAL:  No focal neurologic deficits.  Reviewed 11/3/2020.  Changes " "noted.    DIAGNOSTIC DATA:  Results for orders placed or performed during the hospital encounter of 10/27/20   POC Creatinine    Specimen: Blood   Result Value Ref Range    Creatinine 0.80 0.60 - 1.30 mg/dL       IMAGING: CT imaging 10/27/2020 without evidence for recurrence.  Images personally reviewed.    ASSESSMENT:  This is a 80 y.o. male with:  1.  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  CT imaging of the abdomen and pelvis on 5/8/2019 shows multiple small tissue nodules in the perirenal fat adjacent to the left kidney and a soft tissue mass interposed between the left psoas muscle and spine.  Baseline PET scan on 5/30/2019 with hypermetabolic perinephric soft tissue lesions with mesenteric lymphadenopathy, left paraspinal mass at L5, 6 cm intramuscular mass in the right upper thigh, lesion at the left clavicle and a 5 cm soft tissue mass between the right scapula and right posterior fourth rib.  Negative lumbar puncture.  Negative bone marrow biopsy.  He has now completed 6 cycles of dose adjusted R-EPO CH with discharge following cycle #6 on 10/4/2019.  He received lumbar punctures with intrathecal methotrexate with each cycle.  His end of treatment PET scan on 10/15/2019 is negative for lymphoma.  There is some mild hypermetabolism throughout the marrow space and throughout the spleen which is likely reactive and related to posttreatment recovery.  No evidence of disease at this point.  Proceed with surveillance.      CT 5/4/2020 negative    CT imaging 10/27/2020 with no evidence for recurrence.  He is now 1 year from completing therapy.    Plan follow-up imaging in 6 months    2.  RA previously on Cimzia (certollizumab) injections by Dr. Velásquez.  He did have some worsening pain in his right wrist and hands.  He will follow-up with Dr. Velásquez regarding this.  He should not be on any medication that " could potentially lead to malignancy.    3.  Mild tachycardia: Resolved  4.  Dystrophic changes to his fingernails: Improving over time.  From chemotherapy.     5.  Tobacco use: Not discussed today    6.  Fatigue from chemotherapy: Improving    7.  Cytopenias from chemotherapy: Overall improved.  However, he remains mildly anemic.  Labs on 10/27/2020 with hemoglobin 11.4, reticulocyte count 1.95%, normal LDH, normal vitamin B12, normal RBC folate, high ferritin of 494, iron 46 with 19% iron saturation.  Consistent with anemia of chronic disease.      PLAN:  1.  Port flush in 2 in 4 months with a CBC CMP and LDH  2.  See me in 6 months with labs and CT imaging done prior  3.  Per NCCN guidelines, imaging no more often than every 6 months for the first 2 years, then as indicated.  Last CT imaging 10/27/2020 (1 year after therapy).

## 2020-11-03 ENCOUNTER — APPOINTMENT (OUTPATIENT)
Dept: LAB | Facility: HOSPITAL | Age: 80
End: 2020-11-03

## 2020-11-03 ENCOUNTER — OFFICE VISIT (OUTPATIENT)
Dept: ONCOLOGY | Facility: CLINIC | Age: 80
End: 2020-11-03

## 2020-11-03 VITALS
WEIGHT: 151 LBS | SYSTOLIC BLOOD PRESSURE: 117 MMHG | RESPIRATION RATE: 16 BRPM | HEART RATE: 92 BPM | BODY MASS INDEX: 23.7 KG/M2 | DIASTOLIC BLOOD PRESSURE: 71 MMHG | HEIGHT: 67 IN | OXYGEN SATURATION: 99 % | TEMPERATURE: 97.1 F

## 2020-11-03 DIAGNOSIS — C83.33 DIFFUSE LARGE B-CELL LYMPHOMA OF INTRA-ABDOMINAL LYMPH NODES (HCC): Primary | ICD-10-CM

## 2020-11-03 PROCEDURE — 99214 OFFICE O/P EST MOD 30 MIN: CPT | Performed by: INTERNAL MEDICINE

## 2020-11-25 ENCOUNTER — OFFICE VISIT (OUTPATIENT)
Dept: CARDIOLOGY | Facility: CLINIC | Age: 80
End: 2020-11-25

## 2020-11-25 VITALS
DIASTOLIC BLOOD PRESSURE: 72 MMHG | WEIGHT: 150 LBS | SYSTOLIC BLOOD PRESSURE: 118 MMHG | BODY MASS INDEX: 23.54 KG/M2 | OXYGEN SATURATION: 98 % | HEART RATE: 93 BPM | HEIGHT: 67 IN

## 2020-11-25 DIAGNOSIS — I35.0 NONRHEUMATIC AORTIC VALVE STENOSIS: ICD-10-CM

## 2020-11-25 DIAGNOSIS — I77.9 CAROTID ARTERY DISEASE, UNSPECIFIED LATERALITY, UNSPECIFIED TYPE (HCC): ICD-10-CM

## 2020-11-25 DIAGNOSIS — I79.8 OTHER DISORDERS OF ARTERIES, ARTERIOLES AND CAPILLARIES IN DISEASES CLASSIFIED ELSEWHERE (HCC): ICD-10-CM

## 2020-11-25 DIAGNOSIS — I25.10 CORONARY ARTERY DISEASE INVOLVING NATIVE CORONARY ARTERY OF NATIVE HEART WITHOUT ANGINA PECTORIS: Primary | ICD-10-CM

## 2020-11-25 PROCEDURE — 99214 OFFICE O/P EST MOD 30 MIN: CPT | Performed by: INTERNAL MEDICINE

## 2020-11-25 PROCEDURE — 93000 ELECTROCARDIOGRAM COMPLETE: CPT | Performed by: INTERNAL MEDICINE

## 2020-11-27 NOTE — PROGRESS NOTES
Date of Office Visit: 2020  Encounter Provider: Brian Harden MD  Place of Service: [facility]  Patient Name: Edawrd Srinivasan  :1940    Chief complaint: Follow-up for coronary artery disease, aortic stenosis, and   carotid artery disease.    History of Present Illness:    I again had the pleasure of seeing your patient in cardiology office on 2020.  As you   well know, he is a very pleasant, 80 year-old, white male with a past medical history  significant for coronary artery disease, aortic stenosis, hyperlipidemia, and rheumatoid  arthritis who presents for follow-up. The patient was admitted to the hospital on  2015, with chest discomfort. He had been watching his grandson on the lawn   when he developed sudden chest discomfort, which radiated across his precordium   and was described as a pressure sensation. It then started to radiate into his neck   bilaterally, as well as his jaws. His initial electrocardiogram showed no ischemic   changes and his troponin was negative. He also had been recently diagnosed with   rheumatoid arthritis and was on prednisone. 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 A CT angiogram of the chest on admission in   the emergency room showed chronic obstructive pulmonary disease with bullae   formation; however, he also had an interstitial pattern with early honeycombing   consistent with pulmonary fibrosis. A subsequent CT on 2015, confirmed this. He   was seen by pulmonology as well.      He did undergo a Lexiscan Cardiolite stress test, which showed lateral ischemia. A   subsequent cardiac catheterization on 2015, by Dr. Stearns showed a 90%   ulcerated lesion in the mid left circumflex. He subsequently underwent placement of a   3.0 mm x 18 mm Xience drug-eluting stent, although there was a proximal edge   dissection necessitating placement of a second 3.0 mm x  12 mm Xience drug-eluting   stent proximal to the first stent.  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  He is followed by Dr. Garcia in oncology.      The patient presents today for follow-up.  Overall, he has been doing well.  He denied   any chest pain or shortness of breath.  He is still smoking, unfortunately.  He states that   his cancer is in remission.  He has not had any issues with his blood pressure since his   last visit.      Past Medical History:   Diagnosis Date   • Allergic rhinitis    • Aortic valve stenosis    • CAD (coronary artery disease)    • Cancer (CMS/HCC)     LYMPHOMA   • Carotid arterial disease (CMS/HCC)     Followed by Dr. Floyd.  Carotid Doppler on 9/21/17: 60-70% DENISE, 50-60% LICA.   • Chronic obstructive lung disease (CMS/HCC)    • Esophageal reflux     With a history of erosive gastritis   • Hyperlipidemia    • Hypertension    • Iron deficiency    • Legally blind     PT WEARS GLASSES    • Pulmonary fibrosis (CMS/HCC)     By CT scan on 8/12/15   • PVC (premature ventricular contraction)    • Rheumatoid arthritis (CMS/HCC)    • Skin cancer of face     15 year ago on left side of face   • Steroid-induced diabetes mellitus (CMS/HCC)    • Tobacco use        Past Surgical History:   Procedure Laterality Date   • BACK SURGERY  2010    L4-L5 discectomy   • CATARACT EXTRACTION Bilateral 1979   • CORONARY ANGIOPLASTY WITH STENT PLACEMENT  2015   • VASECTOMY  1979   • VENOUS ACCESS DEVICE (PORT) INSERTION N/A 6/12/2019    Procedure: INSERTION RIGHT VENOUS ACCESS DEVICE;  Surgeon: Philippe Herrera Jr., MD;  Location: [facility];  Service: General       Current Outpatient Medications on File Prior to Visit   Medication Sig Dispense Refill   • aspirin 81 MG EC tablet Take 81 mg by mouth Daily.     • atorvastatin (LIPITOR) 40 MG tablet Take 1 tablet by mouth Daily. 90 tablet 3   • beta carotene 68138 UNIT capsule Take 10,000 Units by mouth Daily.      • cetirizine (ZyrTEC) 10 MG tablet Take 10 mg by mouth daily.     • Cholecalciferol (VITAMIN D-3 PO) Take 2,000 Int'l Units/day by mouth Daily.     • FREESTYLE LITE test strip USE ONE STRIP TO TEST TWICE A  each 2   • Lancets (freestyle) lancets USE ONE LANCET TO TEST TWO TIMES A DAY AS INSTRUCTED 100 each 2   • linagliptin (TRADJENTA) 5 MG tablet tablet Take 1 tablet by mouth Daily. 90 tablet 3   • lisinopril (PRINIVIL,ZESTRIL) 10 MG tablet Take 1 tablet by mouth Daily. 90 tablet 3   • MAGNESIUM PO Take 500 mg by mouth Daily.     • meclizine (ANTIVERT) 25 MG tablet Take 1 tablet by mouth 3 (Three) Times a Day As Needed for Dizziness. 180 tablet 1   • nitroglycerin (NITROSTAT) 0.4 MG SL tablet Place 1 tablet under the tongue Every 5 (Five) Minutes As Needed (CHEST PAIN). Take no more than 3 doses in 15 minutes. 25 tablet 4   • omeprazole (priLOSEC) 40 MG capsule Take 1 capsule by mouth Daily. 90 capsule 1   • polyethylene glycol (MIRALAX) packet Take 17 g by mouth Daily.     • pyridoxine (B-6) 100 MG tablet Take 100 mg by mouth Daily.     • sennosides-docusate sodium (SENOKOT-S) 8.6-50 MG tablet Take 1 tablet by mouth Daily.     • vitamin C (ASCORBIC ACID) 500 MG tablet Take 500 mg by mouth Daily.     • vitamin E 400 UNIT capsule Take 400 Units by mouth Daily.       Current Facility-Administered Medications on File Prior to Visit   Medication Dose Route Frequency Provider Last Rate Last Admin   • heparin flush (porcine) 100 UNIT/ML injection 500 Units  500 Units Intravenous PRN Gabriele Garcia MD   500 Units at 01/13/20 0954   • sodium chloride 0.9 % flush 10 mL  10 mL Intravenous PRN Gabriele Garcia MD   10 mL at 01/13/20 0953     Allergies as of 11/25/2020 - Reviewed 11/25/2020   Allergen Reaction Noted   • Oxaprozin Hives 11/20/2015     Social History     Socioeconomic History   • Marital status:      Spouse name: Radha   • Number of children: Not on file   • Years of education: High school   •  "Highest education level: Not on file   Occupational History   • Occupation:      Employer: RETIRED     Comment: [employer]   Tobacco Use   • Smoking status: Current Every Day Smoker     Packs/day: 0.50     Years: 50.00     Pack years: 25.00     Types: Cigarettes   • Smokeless tobacco: Never Used   • Tobacco comment: 1/2 ppd, discussed benefits of cessation   Substance and Sexual Activity   • Alcohol use: No   • Drug use: No   • Sexual activity: Defer     Family History   Problem Relation Age of Onset   • Heart failure Mother         CHF   • Heart attack Father 54   • Heart disease Father    • Heart attack Brother 47   • Heart disease Brother         another brother with 4 vessel CABG in his 50s   • Coronary artery disease Other         multiple family members   • Malig Hyperthermia Neg Hx        Review of Systems   Constitution: Positive for malaise/fatigue.   Cardiovascular: Positive for palpitations.   Skin: Positive for itching.   Neurological: Positive for numbness.   All other systems reviewed and are negative.     Objective:     Vitals:    11/25/20 1042   BP: 118/72   Pulse: 93   SpO2: 98%   Weight: 68 kg (150 lb)   Height: 170.2 cm (67\")     Body mass index is 23.49 kg/m².    Physical Exam   Constitutional: He is oriented to person, place, and time. He appears well-developed.   Appears thin   HENT:   Head: Normocephalic and atraumatic.   Eyes: Conjunctivae are normal.   Neck: Neck supple.   Cardiovascular: Normal rate and regular rhythm. Exam reveals no gallop and no friction rub.   Murmur heard.   Systolic murmur is present with a grade of 3/6 at the upper right sternal border, upper left sternal border and lower left sternal border.  Pulmonary/Chest: Effort normal and breath sounds normal.   Abdominal: Soft. There is no abdominal tenderness.   Musculoskeletal:         General: No edema.   Neurological: He is alert and oriented to person, place, and time.   Skin: Skin is warm.   Psychiatric: He " has a normal mood and affect. His behavior is normal.     Lab Review:     ECG 12 Lead    Date/Time: 11/25/2020 10:43 AM  Performed by: Brian Harden MD  Authorized by: Brian Harden MD   Comparison: compared with previous ECG from 5/9/2019  Similar to previous ECG  Rhythm: sinus rhythm  Rate: normal  BPM: 85  Other findings: left atrial abnormality    Clinical impression: non-specific ECG  Comments: Lead V6 is missing            Cardiac Procedures:  1. Left heart catheterization on 8/14/2015, by Dr. Stearns showed the following. The  left main was normal. The left anterior descending had luminal irregularities. The first  diagonal branch had a 60% proximal stenosis. The left circumflex had a 90% ulcerated  stenosis in the mid portion. There was 20% to 30% distal disease in the left circumflex  coronary artery. The left posterior descending artery had a 60% ostial lesion. The right  coronary artery was small, nondominant, and had a 30% lesion at the origin.  He then  underwent placement of a 3.0 mm x 18 mm Xience drug-eluting stent to the proximal   left circumflex; however, there was a proximal edge dissection necessitating  placement of a 3.0 mm x 12 mm Xience drug-eluting stent proximal to the first stent.   2. Echocardiogram on 8/8/2017: Ejection fraction was 60%.  There was grade 1   diastolic dysfunction.  There was mild to moderate aortic stenosis with a peak   gradient of 33 mmHg and a mean gradient of 18 mmHg.  3. Carotid artery Doppler ultrasound on 9/21/2017: There was a 60-70% stenosis in   the right internal carotid artery.  There was a 50-60% stenosis in the left internal   carotid artery.  4. Echocardiogram on 5/14/2019: The ejection fraction was 60%.  There was grade 1  diastolic dysfunction.  There was moderate aortic stenosis with a mean gradient of 18   mmHg.  There was mild tricuspid regurgitation.    Assessment:       Diagnosis Plan   1. Coronary artery disease involving native  coronary artery of native heart without angina pectoris     2. Nonrheumatic aortic valve stenosis  Adult Transthoracic Echo Complete W/ Cont if Necessary Per Protocol   3. Carotid artery disease, unspecified laterality, unspecified type (CMS/[facility])  Duplex Carotid Ultrasound CAR   4. Other disorders of arteries, arterioles and capillaries in diseases classified elsewhere (CMS/[facility])   Duplex Carotid Ultrasound CAR     Plan:       From a cardiac perspective, he seems stable.  He has not had any symptoms.  We again discussed smoking cessation, and I strongly encouraged him to quit smoking given his cardiovascular disease.  He needs evaluation of his carotid arteries as it has been quite some time, and he had up to 60 to 70% stenosis of the right internal carotid artery and 50 to 60% in the left internal carotid artery.  I am also going to check an echocardiogram at this point to reevaluate the aortic stenosis.  It did sound slightly worse today on exam, although he is not having any symptoms.  He will continue on the aspirin and Lipitor for his coronary artery disease.  He is not having any angina.  His blood pressure is excellent on the lisinopril.  His lymphoma seems to be stable, and is being followed by Dr. Garcia.  I will have him follow-up with me in 6 months unless other issues arise.

## 2020-12-15 DIAGNOSIS — K21.9 GASTROESOPHAGEAL REFLUX DISEASE: ICD-10-CM

## 2020-12-16 RX ORDER — OMEPRAZOLE 40 MG/1
CAPSULE, DELAYED RELEASE ORAL
Qty: 90 CAPSULE | Refills: 1 | Status: SHIPPED | OUTPATIENT
Start: 2020-12-16 | End: 2021-05-04 | Stop reason: SDUPTHER

## 2020-12-29 ENCOUNTER — INFUSION (OUTPATIENT)
Dept: ONCOLOGY | Facility: HOSPITAL | Age: 80
End: 2020-12-29

## 2020-12-29 DIAGNOSIS — C83.33 DIFFUSE LARGE B-CELL LYMPHOMA OF INTRA-ABDOMINAL LYMPH NODES (HCC): ICD-10-CM

## 2020-12-29 LAB
ALBUMIN SERPL-MCNC: 3.8 G/DL (ref 3.5–5.2)
ALBUMIN/GLOB SERPL: 1.6 G/DL (ref 1.1–2.4)
ALP SERPL-CCNC: 68 U/L (ref 38–116)
ALT SERPL W P-5'-P-CCNC: 15 U/L (ref 0–41)
ANION GAP SERPL CALCULATED.3IONS-SCNC: 8.8 MMOL/L (ref 5–15)
AST SERPL-CCNC: 19 U/L (ref 0–40)
BASOPHILS # BLD AUTO: 0.05 10*3/MM3 (ref 0–0.2)
BASOPHILS NFR BLD AUTO: 0.6 % (ref 0–1.5)
BILIRUB SERPL-MCNC: 0.3 MG/DL (ref 0.2–1.2)
BUN SERPL-MCNC: 21 MG/DL (ref 6–20)
BUN/CREAT SERPL: 28.4 (ref 7.3–30)
CALCIUM SPEC-SCNC: 8.7 MG/DL (ref 8.5–10.2)
CHLORIDE SERPL-SCNC: 100 MMOL/L (ref 98–107)
CO2 SERPL-SCNC: 26.2 MMOL/L (ref 22–29)
CREAT SERPL-MCNC: 0.74 MG/DL (ref 0.7–1.3)
DEPRECATED RDW RBC AUTO: 48.9 FL (ref 37–54)
EOSINOPHIL # BLD AUTO: 0.23 10*3/MM3 (ref 0–0.4)
EOSINOPHIL NFR BLD AUTO: 2.7 % (ref 0.3–6.2)
ERYTHROCYTE [DISTWIDTH] IN BLOOD BY AUTOMATED COUNT: 14.3 % (ref 12.3–15.4)
GFR SERPL CREATININE-BSD FRML MDRD: 102 ML/MIN/1.73
GLOBULIN UR ELPH-MCNC: 2.4 GM/DL (ref 1.8–3.5)
GLUCOSE SERPL-MCNC: 238 MG/DL (ref 74–124)
HCT VFR BLD AUTO: 32.2 % (ref 37.5–51)
HGB BLD-MCNC: 11 G/DL (ref 13–17.7)
IMM GRANULOCYTES # BLD AUTO: 0.03 10*3/MM3 (ref 0–0.05)
IMM GRANULOCYTES NFR BLD AUTO: 0.3 % (ref 0–0.5)
LDH SERPL-CCNC: 168 U/L (ref 99–259)
LYMPHOCYTES # BLD AUTO: 0.84 10*3/MM3 (ref 0.7–3.1)
LYMPHOCYTES NFR BLD AUTO: 9.8 % (ref 19.6–45.3)
MCH RBC QN AUTO: 32.1 PG (ref 26.6–33)
MCHC RBC AUTO-ENTMCNC: 34.2 G/DL (ref 31.5–35.7)
MCV RBC AUTO: 93.9 FL (ref 79–97)
MONOCYTES # BLD AUTO: 0.76 10*3/MM3 (ref 0.1–0.9)
MONOCYTES NFR BLD AUTO: 8.9 % (ref 5–12)
NEUTROPHILS NFR BLD AUTO: 6.67 10*3/MM3 (ref 1.7–7)
NEUTROPHILS NFR BLD AUTO: 77.7 % (ref 42.7–76)
NRBC BLD AUTO-RTO: 0 /100 WBC (ref 0–0.2)
PLATELET # BLD AUTO: 152 10*3/MM3 (ref 140–450)
PMV BLD AUTO: 8.3 FL (ref 6–12)
POTASSIUM SERPL-SCNC: 4.6 MMOL/L (ref 3.5–4.7)
PROT SERPL-MCNC: 6.2 G/DL (ref 6.3–8)
RBC # BLD AUTO: 3.43 10*6/MM3 (ref 4.14–5.8)
SODIUM SERPL-SCNC: 135 MMOL/L (ref 134–145)
WBC # BLD AUTO: 8.58 10*3/MM3 (ref 3.4–10.8)

## 2020-12-29 PROCEDURE — 83615 LACTATE (LD) (LDH) ENZYME: CPT

## 2020-12-29 PROCEDURE — 80053 COMPREHEN METABOLIC PANEL: CPT

## 2020-12-29 PROCEDURE — 36591 DRAW BLOOD OFF VENOUS DEVICE: CPT

## 2020-12-29 PROCEDURE — 85025 COMPLETE CBC W/AUTO DIFF WBC: CPT

## 2021-02-05 ENCOUNTER — TELEPHONE (OUTPATIENT)
Dept: INTERNAL MEDICINE | Facility: CLINIC | Age: 81
End: 2021-02-05

## 2021-02-05 DIAGNOSIS — E11.9 DIABETES MELLITUS WITHOUT COMPLICATION (HCC): ICD-10-CM

## 2021-02-05 RX ORDER — BLOOD-GLUCOSE METER
1 KIT MISCELLANEOUS 2 TIMES DAILY
Qty: 100 EACH | Refills: 2 | Status: SHIPPED | OUTPATIENT
Start: 2021-02-05 | End: 2021-07-16 | Stop reason: SDUPTHER

## 2021-02-05 NOTE — TELEPHONE ENCOUNTER
Caller: Craig  SIDDHARTH    Relationship: Self    Best call back number: [phone]    Medication needed:   Requested Prescriptions     Pending Prescriptions Disp Refills   • glucose blood (FREESTYLE LITE) test strip 100 each 2     Si each by Other route 2 (Two) Times a Day. use 1 strip to test twice a day       When do you need the refill by: 2021    Does the patient have less than a 3 day supply:  [] Yes  [x] No    What is the patient's preferred pharmacy: [facility] [address] AT [facility] & ([facility]) - [phone] [facility] [phone] FX

## 2021-02-09 ENCOUNTER — TELEPHONE (OUTPATIENT)
Dept: INTERNAL MEDICINE | Facility: CLINIC | Age: 81
End: 2021-02-09

## 2021-02-09 NOTE — TELEPHONE ENCOUNTER
Caller: Edward Srinivasan    Relationship to patient: Self    Best call back number: [phone]     Patient is needing: Patient had an appointment tomorrow with Martha Butt, but canceled due to the weather. Was a former patient of Dr. Cannon. Would like to see if he can do a Telephone encounter tomorrow morning,  in place of the in person visit  Since he has seen Martha Butt before, and this was also a 6 month follow up appointment for him. Please call and advise.

## 2021-02-12 ENCOUNTER — OFFICE VISIT (OUTPATIENT)
Dept: INTERNAL MEDICINE | Facility: CLINIC | Age: 81
End: 2021-02-12

## 2021-02-12 VITALS — HEIGHT: 67 IN | BODY MASS INDEX: 23.49 KG/M2

## 2021-02-12 DIAGNOSIS — K21.9 GASTROESOPHAGEAL REFLUX DISEASE WITHOUT ESOPHAGITIS: Chronic | ICD-10-CM

## 2021-02-12 DIAGNOSIS — M05.772 RHEUMATOID ARTHRITIS INVOLVING BOTH FEET WITH POSITIVE RHEUMATOID FACTOR (HCC): Chronic | ICD-10-CM

## 2021-02-12 DIAGNOSIS — I25.10 CORONARY ARTERY DISEASE INVOLVING NATIVE CORONARY ARTERY OF NATIVE HEART WITHOUT ANGINA PECTORIS: Chronic | ICD-10-CM

## 2021-02-12 DIAGNOSIS — E11.9 DIABETES MELLITUS WITHOUT COMPLICATION (HCC): Primary | Chronic | ICD-10-CM

## 2021-02-12 DIAGNOSIS — M05.771 RHEUMATOID ARTHRITIS INVOLVING BOTH FEET WITH POSITIVE RHEUMATOID FACTOR (HCC): Chronic | ICD-10-CM

## 2021-02-12 DIAGNOSIS — Z72.0 TOBACCO USE: Chronic | ICD-10-CM

## 2021-02-12 DIAGNOSIS — E78.00 PURE HYPERCHOLESTEROLEMIA: Chronic | ICD-10-CM

## 2021-02-12 DIAGNOSIS — I10 ESSENTIAL HYPERTENSION, BENIGN: Chronic | ICD-10-CM

## 2021-02-12 PROCEDURE — 99443 PR PHYS/QHP TELEPHONE EVALUATION 21-30 MIN: CPT | Performed by: NURSE PRACTITIONER

## 2021-02-12 NOTE — PROGRESS NOTES
Subjective   Edward Srinivasan is a 80 y.o. male who presents to establish care and to f/u on DM2, HTN and RA.     He presents to establish care and is currently followed for:  1. DM2-managed on Tradjenta, A1c 6.1 with 8/2020 labs.  2. HTN-he is unsure what home BP readings have been, currently managed on Lisinopril. He denies chest pain and/or shortness of breath.  3. Hyperlipidemia-denies myalgias with Atorvastatin. He tries to follow a low-fat, low-cholesterol diet.  4. GERD-sx managed on Omeprazole.  5. Hx B cell lymphoma-last treatment 10/2019, CT abd/pelvis and CT chest no acute abnl 10/2020.  6. Hx RA-d/c treatment when dx with B cell lymphoma, states pain in bilateral wrists and shoulders improved. She takes Aleve intermittently as needed.  7. Hx CAD-8/14/2015: stent placement to mid left circumflex, managed on ASA  8. Hx carotid stenosis-followed by Dr. Floyd  9. COPD-unfortunately he continues to smoke  10. Pulmonary fibrosis-noted on CT scan 8/12/2015       The following portions of the patient's history were reviewed and updated as appropriate: allergies, current medications, past social history and problem list.    Past Medical History:   Diagnosis Date   • Allergic rhinitis    • Aortic valve stenosis    • CAD (coronary artery disease)    • Cancer (CMS/HCC)     LYMPHOMA   • Carotid arterial disease (CMS/HCC)     Followed by Dr. Floyd.  Carotid Doppler on 9/21/17: 60-70% DENISE, 50-60% LICA.   • Chronic obstructive lung disease (CMS/HCC)    • Esophageal reflux     With a history of erosive gastritis   • Hyperlipidemia    • Hypertension    • Iron deficiency    • Legally blind     PT WEARS GLASSES    • Pulmonary fibrosis (CMS/HCC)     By CT scan on 8/12/15   • PVC (premature ventricular contraction)    • Rheumatoid arthritis (CMS/HCC)    • Skin cancer of face     15 year ago on left side of face   • Steroid-induced diabetes mellitus (CMS/HCC)    • Tobacco use          Current Outpatient Medications:   •  aspirin  81 MG EC tablet, Take 81 mg by mouth Daily., Disp: , Rfl:   •  atorvastatin (LIPITOR) 40 MG tablet, Take 1 tablet by mouth Daily., Disp: 90 tablet, Rfl: 3  •  beta carotene 66583 UNIT capsule, Take 10,000 Units by mouth Daily., Disp: , Rfl:   •  cetirizine (ZyrTEC) 10 MG tablet, Take 10 mg by mouth daily., Disp: , Rfl:   •  Cholecalciferol (VITAMIN D-3 PO), Take 2,000 Int'l Units/day by mouth Daily., Disp: , Rfl:   •  glucose blood (FREESTYLE LITE) test strip, 1 each by Other route 2 (Two) Times a Day. use 1 strip to test twice a day, Disp: 100 each, Rfl: 2  •  Lancets (freestyle) lancets, USE ONE LANCET TO TEST TWO TIMES A DAY AS INSTRUCTED, Disp: 100 each, Rfl: 2  •  linagliptin (TRADJENTA) 5 MG tablet tablet, Take 1 tablet by mouth Daily., Disp: 90 tablet, Rfl: 3  •  lisinopril (PRINIVIL,ZESTRIL) 10 MG tablet, Take 1 tablet by mouth Daily., Disp: 90 tablet, Rfl: 3  •  MAGNESIUM PO, Take 500 mg by mouth Daily., Disp: , Rfl:   •  meclizine (ANTIVERT) 25 MG tablet, Take 1 tablet by mouth 3 (Three) Times a Day As Needed for Dizziness., Disp: 180 tablet, Rfl: 1  •  nitroglycerin (NITROSTAT) 0.4 MG SL tablet, Place 1 tablet under the tongue Every 5 (Five) Minutes As Needed (CHEST PAIN). Take no more than 3 doses in 15 minutes., Disp: 25 tablet, Rfl: 4  •  omeprazole (priLOSEC) 40 MG capsule, TAKE 1 CAPSULE DAILY, Disp: 90 capsule, Rfl: 1  •  pyridoxine (B-6) 100 MG tablet, Take 100 mg by mouth Daily., Disp: , Rfl:   •  vitamin C (ASCORBIC ACID) 500 MG tablet, Take 500 mg by mouth Daily., Disp: , Rfl:   •  vitamin E 400 UNIT capsule, Take 400 Units by mouth Daily., Disp: , Rfl:   No current facility-administered medications for this visit.     Facility-Administered Medications Ordered in Other Visits:   •  heparin flush (porcine) 100 UNIT/ML injection 500 Units, 500 Units, Intravenous, PRN, Gabriele Garcia MD, 500 Units at 01/13/20 0954  •  sodium chloride 0.9 % flush 10 mL, 10 mL, Intravenous, PRN, Gabriele Garcia MD, 10  "mL at 01/13/20 0953    Allergies   Allergen Reactions   • Oxaprozin Hives       Review of Systems   Constitutional: Positive for fatigue.   Gastrointestinal: Negative for abdominal pain, diarrhea and vomiting.   Musculoskeletal: Positive for arthralgias and back pain.       Objective   Vitals:    02/12/21 0951   Height: 170.2 cm (67\")     Body mass index is 23.49 kg/m².  Physical Exam  Psychiatric:         Behavior: Behavior normal.         Thought Content: Thought content normal.         Judgment: Judgment normal.       Assessment/Plan   Diagnoses and all orders for this visit:    1. Diabetes mellitus without complication (CMS/[facility]) (Primary)  Assessment & Plan:  A1c 6.1 with 8/2020 labs, additional labs currently on hold due to pandemic      2. Gastroesophageal reflux disease without esophagitis  Assessment & Plan:  Sx managed on Protonix      3. Rheumatoid arthritis involving both feet with positive rheumatoid factor (CMS/[facility])  Assessment & Plan:  Bilateral wrists, hands and shoulders; takes Aleve as needed for pain      4. Essential hypertension, benign  Assessment & Plan:  He is unsure what home BP medications have been, continue current medications along with a low sodium diet.      5. Pure hypercholesterolemia  Assessment & Plan:  Denies myalgias with Atorvastatin, will obtain lipid panel with next labs      6. Tobacco use  Assessment & Plan:  Unfortunately he continues to smoke      7. Coronary artery disease involving native coronary artery of native heart without angina pectoris  Assessment & Plan:  8/14/2015: stent placement to mid left circumflex, managed on ASA & statin therapy           History and medical judgement obtained from phone conversation with patient. No physical examination was performed. Approximately 25 minutes spent in phone conversation with patient.    You have chosen to receive care through a telephone visit. Do you consent to use a telephone visit for your medical care today? Yes    "

## 2021-02-14 PROBLEM — C83.30 DIFFUSE LARGE B CELL LYMPHOMA (HCC): Chronic | Status: ACTIVE | Noted: 2019-09-30

## 2021-02-14 PROBLEM — I25.10 CORONARY ARTERY DISEASE INVOLVING NATIVE CORONARY ARTERY WITHOUT ANGINA PECTORIS: Status: ACTIVE | Noted: 2021-02-14

## 2021-02-14 PROBLEM — Z72.0 TOBACCO USE: Chronic | Status: ACTIVE | Noted: 2018-01-10

## 2021-02-14 PROBLEM — I25.10 CORONARY ARTERY DISEASE INVOLVING NATIVE CORONARY ARTERY WITHOUT ANGINA PECTORIS: Chronic | Status: ACTIVE | Noted: 2021-02-14

## 2021-02-14 PROBLEM — I87.8 POOR VENOUS ACCESS: Status: RESOLVED | Noted: 2019-06-10 | Resolved: 2021-02-14

## 2021-02-14 PROBLEM — E11.42 DIABETIC PERIPHERAL NEUROPATHY: Chronic | Status: ACTIVE | Noted: 2019-05-28

## 2021-02-14 NOTE — ASSESSMENT & PLAN NOTE
He is unsure what home BP medications have been, continue current medications along with a low sodium diet.

## 2021-02-23 ENCOUNTER — INFUSION (OUTPATIENT)
Dept: ONCOLOGY | Facility: HOSPITAL | Age: 81
End: 2021-02-23

## 2021-02-23 DIAGNOSIS — C83.30 DIFFUSE LARGE B-CELL LYMPHOMA, UNSPECIFIED BODY REGION (HCC): ICD-10-CM

## 2021-02-23 DIAGNOSIS — C83.33 DIFFUSE LARGE B-CELL LYMPHOMA OF INTRA-ABDOMINAL LYMPH NODES (HCC): Primary | ICD-10-CM

## 2021-02-23 LAB
ALBUMIN SERPL-MCNC: 3.6 G/DL (ref 3.5–5.2)
ALBUMIN/GLOB SERPL: 1.4 G/DL (ref 1.1–2.4)
ALP SERPL-CCNC: 77 U/L (ref 38–116)
ALT SERPL W P-5'-P-CCNC: 12 U/L (ref 0–41)
ANION GAP SERPL CALCULATED.3IONS-SCNC: 9.4 MMOL/L (ref 5–15)
AST SERPL-CCNC: 18 U/L (ref 0–40)
BASOPHILS # BLD AUTO: 0.04 10*3/MM3 (ref 0–0.2)
BASOPHILS NFR BLD AUTO: 0.5 % (ref 0–1.5)
BILIRUB SERPL-MCNC: 0.3 MG/DL (ref 0.2–1.2)
BUN SERPL-MCNC: 19 MG/DL (ref 6–20)
BUN/CREAT SERPL: 25.7 (ref 7.3–30)
CALCIUM SPEC-SCNC: 8.8 MG/DL (ref 8.5–10.2)
CHLORIDE SERPL-SCNC: 99 MMOL/L (ref 98–107)
CO2 SERPL-SCNC: 26.6 MMOL/L (ref 22–29)
CREAT SERPL-MCNC: 0.74 MG/DL (ref 0.7–1.3)
DEPRECATED RDW RBC AUTO: 49.7 FL (ref 37–54)
EOSINOPHIL # BLD AUTO: 0.25 10*3/MM3 (ref 0–0.4)
EOSINOPHIL NFR BLD AUTO: 3.1 % (ref 0.3–6.2)
ERYTHROCYTE [DISTWIDTH] IN BLOOD BY AUTOMATED COUNT: 14.6 % (ref 12.3–15.4)
GFR SERPL CREATININE-BSD FRML MDRD: 102 ML/MIN/1.73
GLOBULIN UR ELPH-MCNC: 2.6 GM/DL (ref 1.8–3.5)
GLUCOSE SERPL-MCNC: 157 MG/DL (ref 74–124)
HCT VFR BLD AUTO: 29.3 % (ref 37.5–51)
HGB BLD-MCNC: 9.8 G/DL (ref 13–17.7)
IMM GRANULOCYTES # BLD AUTO: 0.03 10*3/MM3 (ref 0–0.05)
IMM GRANULOCYTES NFR BLD AUTO: 0.4 % (ref 0–0.5)
LYMPHOCYTES # BLD AUTO: 0.97 10*3/MM3 (ref 0.7–3.1)
LYMPHOCYTES NFR BLD AUTO: 12 % (ref 19.6–45.3)
MCH RBC QN AUTO: 31.1 PG (ref 26.6–33)
MCHC RBC AUTO-ENTMCNC: 33.4 G/DL (ref 31.5–35.7)
MCV RBC AUTO: 93 FL (ref 79–97)
MONOCYTES # BLD AUTO: 0.66 10*3/MM3 (ref 0.1–0.9)
MONOCYTES NFR BLD AUTO: 8.2 % (ref 5–12)
NEUTROPHILS NFR BLD AUTO: 6.11 10*3/MM3 (ref 1.7–7)
NEUTROPHILS NFR BLD AUTO: 75.8 % (ref 42.7–76)
NRBC BLD AUTO-RTO: 0 /100 WBC (ref 0–0.2)
PLATELET # BLD AUTO: 184 10*3/MM3 (ref 140–450)
PMV BLD AUTO: 7.6 FL (ref 6–12)
POTASSIUM SERPL-SCNC: 4.5 MMOL/L (ref 3.5–4.7)
PROT SERPL-MCNC: 6.2 G/DL (ref 6.3–8)
RBC # BLD AUTO: 3.15 10*6/MM3 (ref 4.14–5.8)
SODIUM SERPL-SCNC: 135 MMOL/L (ref 134–145)
WBC # BLD AUTO: 8.06 10*3/MM3 (ref 3.4–10.8)

## 2021-02-23 PROCEDURE — 36591 DRAW BLOOD OFF VENOUS DEVICE: CPT

## 2021-02-23 PROCEDURE — 85025 COMPLETE CBC W/AUTO DIFF WBC: CPT

## 2021-02-23 PROCEDURE — 25010000003 HEPARIN LOCK FLUSH PER 10 UNITS: Performed by: INTERNAL MEDICINE

## 2021-02-23 PROCEDURE — 80053 COMPREHEN METABOLIC PANEL: CPT

## 2021-02-23 RX ORDER — SODIUM CHLORIDE 0.9 % (FLUSH) 0.9 %
10 SYRINGE (ML) INJECTION AS NEEDED
Status: DISCONTINUED | OUTPATIENT
Start: 2021-02-23 | End: 2021-02-23 | Stop reason: HOSPADM

## 2021-02-23 RX ORDER — HEPARIN SODIUM (PORCINE) LOCK FLUSH IV SOLN 100 UNIT/ML 100 UNIT/ML
500 SOLUTION INTRAVENOUS AS NEEDED
Status: CANCELLED | OUTPATIENT
Start: 2021-02-23

## 2021-02-23 RX ORDER — SODIUM CHLORIDE 0.9 % (FLUSH) 0.9 %
10 SYRINGE (ML) INJECTION AS NEEDED
Status: CANCELLED | OUTPATIENT
Start: 2021-02-23

## 2021-02-23 RX ORDER — HEPARIN SODIUM (PORCINE) LOCK FLUSH IV SOLN 100 UNIT/ML 100 UNIT/ML
500 SOLUTION INTRAVENOUS AS NEEDED
Status: DISCONTINUED | OUTPATIENT
Start: 2021-02-23 | End: 2021-02-23 | Stop reason: HOSPADM

## 2021-02-23 RX ADMIN — Medication 500 UNITS: at 10:12

## 2021-02-23 RX ADMIN — SODIUM CHLORIDE, PRESERVATIVE FREE 10 ML: 5 INJECTION INTRAVENOUS at 10:12

## 2021-03-18 ENCOUNTER — OFFICE VISIT (OUTPATIENT)
Dept: INTERNAL MEDICINE | Facility: CLINIC | Age: 81
End: 2021-03-18

## 2021-03-18 VITALS
SYSTOLIC BLOOD PRESSURE: 112 MMHG | HEIGHT: 67 IN | TEMPERATURE: 97.7 F | OXYGEN SATURATION: 100 % | BODY MASS INDEX: 22.6 KG/M2 | DIASTOLIC BLOOD PRESSURE: 72 MMHG | WEIGHT: 144 LBS | HEART RATE: 90 BPM

## 2021-03-18 DIAGNOSIS — E78.00 PURE HYPERCHOLESTEROLEMIA: Chronic | ICD-10-CM

## 2021-03-18 DIAGNOSIS — Z11.59 SCREENING FOR VIRAL DISEASE: ICD-10-CM

## 2021-03-18 DIAGNOSIS — I10 ESSENTIAL HYPERTENSION, BENIGN: Chronic | ICD-10-CM

## 2021-03-18 DIAGNOSIS — M05.771 RHEUMATOID ARTHRITIS INVOLVING BOTH FEET WITH POSITIVE RHEUMATOID FACTOR (HCC): Chronic | ICD-10-CM

## 2021-03-18 DIAGNOSIS — E11.42 DIABETIC PERIPHERAL NEUROPATHY (HCC): ICD-10-CM

## 2021-03-18 DIAGNOSIS — I65.23 BILATERAL CAROTID ARTERY STENOSIS: ICD-10-CM

## 2021-03-18 DIAGNOSIS — M05.772 RHEUMATOID ARTHRITIS INVOLVING BOTH FEET WITH POSITIVE RHEUMATOID FACTOR (HCC): Chronic | ICD-10-CM

## 2021-03-18 DIAGNOSIS — E11.42 TYPE 2 DIABETES MELLITUS WITH DIABETIC POLYNEUROPATHY, WITHOUT LONG-TERM CURRENT USE OF INSULIN (HCC): Primary | ICD-10-CM

## 2021-03-18 DIAGNOSIS — J43.9 PULMONARY EMPHYSEMA, UNSPECIFIED EMPHYSEMA TYPE (HCC): ICD-10-CM

## 2021-03-18 PROCEDURE — G0439 PPPS, SUBSEQ VISIT: HCPCS | Performed by: NURSE PRACTITIONER

## 2021-03-18 PROCEDURE — 99214 OFFICE O/P EST MOD 30 MIN: CPT | Performed by: NURSE PRACTITIONER

## 2021-03-18 NOTE — PROGRESS NOTES
"Chief Complaint  Medicare Wellness-subsequent, Diabetes, Hyperlipidemia, and Hypertension    Subjective          Edward Srinivasan presents to [facility] due to elevated glucose.    He presents today due to elevated glucose readings, last A1c 8/2020 was 6.1.    Diabetes    Hyperlipidemia    Hypertension        Objective   Vital Signs:   /72 (BP Location: Left arm, Patient Position: Sitting, Cuff Size: Adult)   Pulse 90   Temp 97.7 °F (36.5 °C) (Temporal)   Ht 170.2 cm (67\")   Wt 65.3 kg (144 lb)   SpO2 100%   BMI 22.55 kg/m²     Physical Exam   Result Review :                 Assessment and Plan    Diagnoses and all orders for this visit:    1. Type 2 diabetes mellitus with diabetic polyneuropathy, without long-term current use of insulin (CMS/[facility]) (Primary)  Assessment & Plan:  Reviewed home readings which do show a slight increase in readings over the past few days, check A1c today    Orders:  -     Hemoglobin A1c  -     Microalbumin / Creatinine Urine Ratio - Urine, Clean Catch  -     Lipid Panel    2. Pulmonary emphysema, unspecified emphysema type (CMS/[facility])  Assessment & Plan:  Has been evaluated by pulm in past but did not continue inhalers (Anoro)        3. Diabetic peripheral neuropathy (CMS/[facility])  Assessment & Plan:  C/o burning sensation in bilateral feet, has declined meds for now      4. Bilateral carotid artery stenosis  Assessment & Plan:  9/21/17: 60-70% DENISE, 50-60% LICA, needs repeat carotid ultrasound      5. Essential hypertension, benign  Assessment & Plan:  Hypertension is unchanged.  Continue current treatment regimen.  Dietary sodium restriction.  Blood pressure will be reassessed at the next regular appointment.      6. Pure hypercholesterolemia  Assessment & Plan:  Lipid abnormalities are unchanged.  Pharmacotherapy as ordered.  Lipids will be reassessed in 6 months.      7. Rheumatoid arthritis involving both feet with positive rheumatoid factor " (CMS/HCC)  Assessment & Plan:  C/o bilateral wrist, hands and shoulder stiffness; no longer on medication, sx stable      8. Screening for viral disease  -     Hepatitis C Antibody      Follow Up   Return if symptoms worsen or fail to improve, for Next scheduled follow up.  Patient was given instructions and counseling regarding his condition or for health maintenance advice. Please see specific information pulled into the AVS if appropriate.

## 2021-03-18 NOTE — PROGRESS NOTES
The ABCs of the Annual Wellness Visit  Subsequent Medicare Wellness Visit    Chief Complaint   Patient presents with   • Medicare Wellness-subsequent   • Diabetes   • Hyperlipidemia   • Hypertension       Subjective   History of Present Illness:  Edward Srinivasan is a 80 y.o. male who presents for a Subsequent Medicare Wellness Visit.    HEALTH RISK ASSESSMENT    Recent Hospitalizations:  No hospitalization(s) within the last year.    Current Medical Providers:  Patient Care Team:  Martha Butt APRN as PCP - General (Internal Medicine)  Gabriele Garcia MD as Consulting Physician (Hematology and Oncology)    Smoking Status:  Social History     Tobacco Use   Smoking Status Current Every Day Smoker   • Packs/day: 0.50   • Years: 50.00   • Pack years: 25.00   • Types: Cigarettes   Smokeless Tobacco Never Used   Tobacco Comment    1/2 ppd, discussed benefits of cessation       Alcohol Consumption:  Social History     Substance and Sexual Activity   Alcohol Use No       Depression Screen:   PHQ-2/PHQ-9 Depression Screening 3/18/2021   Little interest or pleasure in doing things 0   Feeling down, depressed, or hopeless 0   Trouble falling or staying asleep, or sleeping too much 0   Feeling tired or having little energy 0   Poor appetite or overeating 0   Feeling bad about yourself - or that you are a failure or have let yourself or your family down 0   Trouble concentrating on things, such as reading the newspaper or watching television 0   Moving or speaking so slowly that other people could have noticed. Or the opposite - being so fidgety or restless that you have been moving around a lot more than usual 0   Thoughts that you would be better off dead, or of hurting yourself in some way 0   Total Score 0   If you checked off any problems, how difficult have these problems made it for you to do your work, take care of things at home, or get along with other people? Not difficult at all       Fall Risk Screen:  MICKEY Fall  Risk Assessment was completed, and patient is at LOW risk for falls.Assessment completed on:3/18/2021    Health Habits and Functional and Cognitive Screening:  Functional & Cognitive Status 3/18/2021   Do you have difficulty preparing food and eating? No   Do you have difficulty bathing yourself, getting dressed or grooming yourself? No   Do you have difficulty using the toilet? No   Do you have difficulty moving around from place to place? No   Do you have trouble with steps or getting out of a bed or a chair? No   Current Diet Well Balanced Diet   Dental Exam Not up to date   Eye Exam Not up to date   Exercise (times per week) 3 times per week   Current Exercises Include Walking   Do you need help using the phone?  No   Are you deaf or do you have serious difficulty hearing?  No   Do you need help with transportation? No   Do you need help shopping? No   Do you need help preparing meals?  No   Do you need help with housework?  No   Do you need help with laundry? No   Do you need help taking your medications? No   Do you need help managing money? No   Do you ever drive or ride in a car without wearing a seat belt? No   Have you felt unusual stress, anger or loneliness in the last month? No   Who do you live with? Spouse   If you need help, do you have trouble finding someone available to you? No   Have you been bothered in the last four weeks by sexual problems? No   Do you have difficulty concentrating, remembering or making decisions? No         Does the patient have evidence of cognitive impairment? No    Asprin use counseling:Does not need ASA (and currently is not on it)    Age-appropriate Screening Schedule:  Refer to the list below for future screening recommendations based on patient's age, sex and/or medical conditions. Orders for these recommended tests are listed in the plan section. The patient has been provided with a written plan.    Health Maintenance   Topic Date Due   • TDAP/TD VACCINES (1 - Tdap)  Never done   • ZOSTER VACCINE (2 of 2) 02/26/2010   • DIABETIC EYE EXAM  02/05/2021   • HEMOGLOBIN A1C  09/18/2021   • LIPID PANEL  03/18/2022   • URINE MICROALBUMIN  03/18/2022   • COLONOSCOPY  05/08/2025   • INFLUENZA VACCINE  Completed          The following portions of the patient's history were reviewed and updated as appropriate: allergies, current medications, past family history, past medical history, past social history, past surgical history and problem list.    Outpatient Medications Prior to Visit   Medication Sig Dispense Refill   • aspirin 81 MG EC tablet Take 81 mg by mouth Daily.     • atorvastatin (LIPITOR) 40 MG tablet Take 1 tablet by mouth Daily. 90 tablet 3   • beta carotene 41017 UNIT capsule Take 10,000 Units by mouth Daily.     • cetirizine (ZyrTEC) 10 MG tablet Take 10 mg by mouth daily.     • Cholecalciferol (VITAMIN D-3 PO) Take 2,000 Int'l Units/day by mouth Daily.     • glucose blood (FREESTYLE LITE) test strip 1 each by Other route 2 (Two) Times a Day. use 1 strip to test twice a day 100 each 2   • Lancets (freestyle) lancets USE ONE LANCET TO TEST TWO TIMES A DAY AS INSTRUCTED 100 each 2   • linagliptin (TRADJENTA) 5 MG tablet tablet Take 1 tablet by mouth Daily. 90 tablet 3   • lisinopril (PRINIVIL,ZESTRIL) 10 MG tablet Take 1 tablet by mouth Daily. 90 tablet 3   • MAGNESIUM PO Take 500 mg by mouth Daily.     • nitroglycerin (NITROSTAT) 0.4 MG SL tablet Place 1 tablet under the tongue Every 5 (Five) Minutes As Needed (CHEST PAIN). Take no more than 3 doses in 15 minutes. 25 tablet 4   • omeprazole (priLOSEC) 40 MG capsule TAKE 1 CAPSULE DAILY 90 capsule 1   • pyridoxine (B-6) 100 MG tablet Take 100 mg by mouth Daily.     • vitamin C (ASCORBIC ACID) 500 MG tablet Take 500 mg by mouth Daily.     • vitamin E 400 UNIT capsule Take 400 Units by mouth Daily.     • meclizine (ANTIVERT) 25 MG tablet Take 1 tablet by mouth 3 (Three) Times a Day As Needed for Dizziness. 180 tablet 1  "    Facility-Administered Medications Prior to Visit   Medication Dose Route Frequency Provider Last Rate Last Admin   • heparin flush (porcine) 100 UNIT/ML injection 500 Units  500 Units Intravenous PRN Gabriele Garcia MD   500 Units at 01/13/20 0954   • sodium chloride 0.9 % flush 10 mL  10 mL Intravenous PRN Gabriele Garcia MD   10 mL at 01/13/20 0953       Patient Active Problem List   Diagnosis   • Essential hypertension, benign   • Diabetes mellitus without complication (CMS/HCC)   • Pure hypercholesterolemia   • Gastroesophageal reflux disease   • Intervertebral lumbar disc disorder with myelopathy, lumbar region   • Rheumatoid arthritis with positive rheumatoid factor (CMS/HCC)   • Tobacco use   • Personal history of immunosupression therapy   • Primary generalized (osteo)arthritis   • Thrombocytopenia (CMS/HCC)   • Diffuse large B-cell lymphoma of intra-abdominal lymph nodes (CMS/HCC)   • Diabetic peripheral neuropathy (CMS/HCC)   • Diffuse large B cell lymphoma (CMS/HCC)   • Coronary artery disease involving native coronary artery without angina pectoris       Advanced Care Planning:  ACP discussion was held with the patient during this visit. Patient has an advance directive in EMR which is still valid.     Review of Systems    Compared to one year ago, the patient feels his physical health is better. (has increased activity, improved strength)  Compared to one year ago, the patient feels his mental health is the same.    Reviewed chart for potential of high risk medication in the elderly: yes  Reviewed chart for potential of harmful drug interactions in the elderly:yes    Objective         Vitals:    03/18/21 0818   BP: 112/72   BP Location: Left arm   Patient Position: Sitting   Cuff Size: Adult   Pulse: 90   Temp: 97.7 °F (36.5 °C)   TempSrc: Temporal   SpO2: 100%   Weight: 65.3 kg (144 lb)   Height: 170.2 cm (67\")   PainSc:   2   PainLoc: Generalized       Body mass index is 22.55 kg/m².  Discussed the " patient's BMI with him. The BMI is in the acceptable range.    Physical Exam    Lab Results   Component Value Date    CHLPL 81 03/18/2021    TRIG 45 03/18/2021    HDL 40 03/18/2021    LDL 29 03/18/2021    VLDL 12 03/18/2021    HGBA1C 5.70 (H) 03/18/2021        Assessment/Plan   Medicare Risks and Personalized Health Plan  CMS Preventative Services Quick Reference  Immunizations Discussed/Encouraged (specific immunizations; adacel Tdap and Shingrix )    The above risks/problems have been discussed with the patient.  Pertinent information has been shared with the patient in the After Visit Summary.  Follow up plans and orders are seen below in the Assessment/Plan Section.    Diagnoses and all orders for this visit:    1. Diabetes mellitus without complication (CMS/HCC) (Primary)  -     Hemoglobin A1c  -     Microalbumin / Creatinine Urine Ratio - Urine, Clean Catch  -     Lipid Panel    2. Screening for viral disease  -     Hepatitis C Antibody      Follow Up:  Return if symptoms worsen or fail to improve, for Next scheduled follow up.     An After Visit Summary and PPPS were given to the patient.

## 2021-03-19 LAB
ALBUMIN/CREAT UR: 5 MG/G CREAT (ref 0–29)
CHOLEST SERPL-MCNC: 81 MG/DL (ref 0–200)
CREAT UR-MCNC: 112.8 MG/DL
HBA1C MFR BLD: 5.7 % (ref 4.8–5.6)
HCV AB S/CO SERPL IA: <0.1 S/CO RATIO (ref 0–0.9)
HDLC SERPL-MCNC: 40 MG/DL (ref 40–60)
LDLC SERPL CALC-MCNC: 29 MG/DL (ref 0–100)
MICROALBUMIN UR-MCNC: 6 UG/ML
TRIGL SERPL-MCNC: 45 MG/DL (ref 0–150)
VLDLC SERPL CALC-MCNC: 12 MG/DL (ref 5–40)

## 2021-03-28 PROBLEM — M15.0 PRIMARY GENERALIZED (OSTEO)ARTHRITIS: Chronic | Status: ACTIVE | Noted: 2019-02-21

## 2021-03-28 PROBLEM — I65.23 BILATERAL CAROTID ARTERY STENOSIS: Status: ACTIVE | Noted: 2021-03-28

## 2021-03-28 PROBLEM — J43.9 PULMONARY EMPHYSEMA (HCC): Chronic | Status: ACTIVE | Noted: 2021-03-28

## 2021-03-28 PROBLEM — I65.23 BILATERAL CAROTID ARTERY STENOSIS: Chronic | Status: ACTIVE | Noted: 2021-03-28

## 2021-03-28 PROBLEM — I79.8 OTHER DISORDERS OF ARTERIES, ARTERIOLES AND CAPILLARIES IN DISEASES CLASSIFIED ELSEWHERE: Status: ACTIVE | Noted: 2021-03-28

## 2021-03-28 PROBLEM — D69.6 THROMBOCYTOPENIA (HCC): Status: RESOLVED | Noted: 2019-02-21 | Resolved: 2021-03-28

## 2021-03-28 PROBLEM — J43.9 PULMONARY EMPHYSEMA (HCC): Status: ACTIVE | Noted: 2021-03-28

## 2021-03-28 NOTE — PROGRESS NOTES
"Chief Complaint  Medicare Wellness-subsequent, Diabetes, Hyperlipidemia, and Hypertension    Subjective          Edward Srinivasan presents to [facility] for f/u regarding DM2, HTN and hyperlipidemia.    He has brought home glucose readings which show am readings 100-120s, 2 hour pp 130-180. He has had elevated readings over the past few weeks, denies change in diet. He continues to walk [place] 30 minutes, 3 days per week. He denies chest pain. He does c/o intermittent shortness of breath, hx emphysema.      Hyperlipidemia  This is a chronic problem. The current episode started more than 1 year ago. The problem is controlled. Recent lipid tests were reviewed and are low. He has no history of diabetes or hypothyroidism. Current antihyperlipidemic treatment includes statins. The current treatment provides significant improvement of lipids. There are no compliance problems.    Hypertension  This is a chronic problem. The current episode started more than 1 year ago. The problem is unchanged. The problem is controlled. Current antihypertension treatment includes ACE inhibitors. The current treatment provides significant improvement. There are no compliance problems.        Objective   Vital Signs:   /72 (BP Location: Left arm, Patient Position: Sitting, Cuff Size: Adult)   Pulse 90   Temp 97.7 °F (36.5 °C) (Temporal)   Ht 170.2 cm (67\")   Wt 65.3 kg (144 lb)   SpO2 100%   BMI 22.55 kg/m²     Physical Exam  Constitutional:       Appearance: He is well-developed. He is not ill-appearing.   HENT:      Head: Normocephalic.      Right Ear: Hearing, tympanic membrane and external ear normal.      Left Ear: Hearing, tympanic membrane and external ear normal.      Nose: Nose normal. No nasal deformity, mucosal edema or rhinorrhea.      Right Sinus: No maxillary sinus tenderness or frontal sinus tenderness.      Left Sinus: No maxillary sinus tenderness or frontal sinus tenderness.      " Mouth/Throat:      Dentition: Normal dentition.   Eyes:      General: Lids are normal.         Right eye: No discharge.         Left eye: No discharge.      Conjunctiva/sclera: Conjunctivae normal.      Right eye: No exudate.     Left eye: No exudate.  Neck:      Thyroid: No thyroid mass or thyromegaly.      Vascular: No carotid bruit.      Trachea: Trachea normal.   Cardiovascular:      Rate and Rhythm: Regular rhythm.      Pulses: Normal pulses.      Heart sounds: Murmur heard.   Systolic murmur is present with a grade of 3/6.     Pulmonary:      Effort: No respiratory distress.      Breath sounds: Normal breath sounds. No decreased breath sounds, wheezing, rhonchi or rales.   Abdominal:      General: Bowel sounds are normal.      Palpations: Abdomen is soft.      Tenderness: There is no abdominal tenderness.   Musculoskeletal:      Cervical back: Normal range of motion. No edema.   Lymphadenopathy:      Head:      Right side of head: No submental, submandibular, tonsillar, preauricular, posterior auricular or occipital adenopathy.      Left side of head: No submental, submandibular, tonsillar, preauricular, posterior auricular or occipital adenopathy.   Skin:     General: Skin is warm and dry.      Nails: There is no clubbing.   Neurological:      Mental Status: He is alert.   Psychiatric:         Behavior: Behavior is cooperative.        Result Review :   The following data was reviewed by: FABIAN Arnold on 03/18/2021:  Common labs    Common Labsle 12/29/20 12/29/20 2/23/21 2/23/21 3/18/21 3/18/21 3/18/21    1006 1006 1009 1009 0910 0910 0910   Glucose  238 (A)  157 (A)      BUN  21 (A)  19      Creatinine  0.74  0.74      eGFR Non African Am  102  102      Sodium  135  135      Potassium  4.6  4.5      Chloride  100  99      Calcium  8.7  8.8      Albumin  3.80  3.60      Total Bilirubin  0.3  0.3      Alkaline Phosphatase  68  77      AST (SGOT)  19  18      ALT (SGPT)  15  12      WBC 8.58  8.06        Hemoglobin 11.0 (A)  9.8 (A)       Hematocrit 32.2 (A)  29.3 (A)       Platelets 152  184       Total Cholesterol       81   Triglycerides       45   HDL Cholesterol       40   LDL Cholesterol        29   Hemoglobin A1C     5.70 (A)     Microalbumin, Urine      6.0    (A) Abnormal value       Comments are available for some flowsheets but are not being displayed.                     Assessment and Plan    Diagnoses and all orders for this visit:    1. Type 2 diabetes mellitus with diabetic polyneuropathy, without long-term current use of insulin (CMS/[facility]) (Primary)  Assessment & Plan:  Reviewed home readings which do show a slight increase in readings over the past few days, check A1c today    Orders:  -     Hemoglobin A1c  -     Microalbumin / Creatinine Urine Ratio - Urine, Clean Catch  -     Lipid Panel    2. Pulmonary emphysema, unspecified emphysema type (CMS/[facility])  Assessment & Plan:  Has been evaluated by pulm in past but did not continue inhalers ([place])        3. Diabetic peripheral neuropathy (CMS/[facility])  Assessment & Plan:  C/o burning sensation in bilateral feet, has declined meds for now      4. Bilateral carotid artery stenosis  Assessment & Plan:  9/21/17: 60-70% DENISE, 50-60% LICA, needs repeat carotid ultrasound      5. Essential hypertension, benign  Assessment & Plan:  Hypertension is unchanged.  Continue current treatment regimen.  Dietary sodium restriction.  Blood pressure will be reassessed at the next regular appointment.      6. Pure hypercholesterolemia  Assessment & Plan:  Lipid abnormalities are unchanged.  Pharmacotherapy as ordered.  Lipids will be reassessed in 6 months.      7. 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 Screening for viral disease  -     Hepatitis C Antibody      Follow Up   Return if symptoms worsen or fail to  improve, for Next scheduled follow up.  Patient was given instructions and counseling regarding his condition or for health maintenance advice. Please see specific information pulled into the AVS if appropriate.

## 2021-03-28 NOTE — ASSESSMENT & PLAN NOTE
Reviewed home readings which do show a slight increase in readings over the past few days, check A1c today

## 2021-03-28 NOTE — PATIENT INSTRUCTIONS
Medicare Wellness  Personal Prevention Plan of Service     Date of Office Visit:  2021  Encounter Provider:  FABIAN Arnold  Place of Service:  [facility]  Patient Name: Edward Srinivasan  :  1940    As part of the Medicare Wellness portion of your visit today, we are providing you with this personalized preventive plan of services (PPPS). This plan is based upon recommendations of the [place] Preventive Services Task Force (USPSTF) and the Advisory Committee on Immunization Practices (ACIP).    This lists the preventive care services that should be considered, and provides dates of when you are due. Items listed as completed are up-to-date and do not require any further intervention.    Health Maintenance   Topic Date Due   • TDAP/TD VACCINES (1 - Tdap) Never done   • ZOSTER VACCINE (2 of 2) 2010   • ANNUAL WELLNESS VISIT  Never done   • DIABETIC EYE EXAM  2021   • COVID-19 Vaccine (2 - Pfizer 2-dose series) 2021   • HEMOGLOBIN A1C  2021   • LIPID PANEL  2022   • URINE MICROALBUMIN  2022   • COLONOSCOPY  2025   • HEPATITIS C SCREENING  Completed   • INFLUENZA VACCINE  Completed   • Pneumococcal Vaccine 65+  Completed   • MENINGOCOCCAL VACCINE  Aged Out       Orders Placed This Encounter   Procedures   • Hemoglobin A1c     Order Specific Question:   [facility] Has the patient fasted?     Answer:   Yes   • Microalbumin / Creatinine Urine Ratio - Urine, Clean Catch     Order Specific Question:   [facility] Has the patient fasted?     Answer:   Yes   • Lipid Panel     Order Specific Question:   [facility] Has the patient fasted?     Answer:   Yes   • Hepatitis C Antibody     Order Specific Question:   [facility] Has the patient fasted?     Answer:   Yes       Return if symptoms worsen or fail to improve, for Next scheduled follow up.

## 2021-04-15 ENCOUNTER — INFUSION (OUTPATIENT)
Dept: ONCOLOGY | Facility: HOSPITAL | Age: 81
End: 2021-04-15

## 2021-04-15 ENCOUNTER — HOSPITAL ENCOUNTER (OUTPATIENT)
Dept: PET IMAGING | Facility: HOSPITAL | Age: 81
Discharge: HOME | End: 2021-04-15
Admitting: INTERNAL MEDICINE

## 2021-04-15 DIAGNOSIS — C83.33 DIFFUSE LARGE B-CELL LYMPHOMA OF INTRA-ABDOMINAL LYMPH NODES (HCC): ICD-10-CM

## 2021-04-15 LAB
ALBUMIN SERPL-MCNC: 4 G/DL (ref 3.5–5.2)
ALBUMIN/GLOB SERPL: 1.5 G/DL (ref 1.1–2.4)
ALP SERPL-CCNC: 78 U/L (ref 38–116)
ALT SERPL W P-5'-P-CCNC: 17 U/L (ref 0–41)
ANION GAP SERPL CALCULATED.3IONS-SCNC: 6.6 MMOL/L (ref 5–15)
AST SERPL-CCNC: 22 U/L (ref 0–40)
BASOPHILS # BLD AUTO: 0.05 10*3/MM3 (ref 0–0.2)
BASOPHILS NFR BLD AUTO: 0.7 % (ref 0–1.5)
BILIRUB SERPL-MCNC: 0.3 MG/DL (ref 0.2–1.2)
BUN SERPL-MCNC: 16 MG/DL (ref 6–20)
BUN/CREAT SERPL: 21.9 (ref 7.3–30)
CALCIUM SPEC-SCNC: 8.9 MG/DL (ref 8.5–10.2)
CHLORIDE SERPL-SCNC: 100 MMOL/L (ref 98–107)
CO2 SERPL-SCNC: 27.4 MMOL/L (ref 22–29)
CREAT BLDA-MCNC: 0.7 MG/DL (ref 0.6–1.3)
CREAT SERPL-MCNC: 0.73 MG/DL (ref 0.7–1.3)
DEPRECATED RDW RBC AUTO: 51 FL (ref 37–54)
EOSINOPHIL # BLD AUTO: 0.29 10*3/MM3 (ref 0–0.4)
EOSINOPHIL NFR BLD AUTO: 3.9 % (ref 0.3–6.2)
ERYTHROCYTE [DISTWIDTH] IN BLOOD BY AUTOMATED COUNT: 14.7 % (ref 12.3–15.4)
GFR SERPL CREATININE-BSD FRML MDRD: 104 ML/MIN/1.73
GLOBULIN UR ELPH-MCNC: 2.7 GM/DL (ref 1.8–3.5)
GLUCOSE SERPL-MCNC: 95 MG/DL (ref 74–124)
HCT VFR BLD AUTO: 33.4 % (ref 37.5–51)
HGB BLD-MCNC: 11.1 G/DL (ref 13–17.7)
IMM GRANULOCYTES # BLD AUTO: 0.03 10*3/MM3 (ref 0–0.05)
IMM GRANULOCYTES NFR BLD AUTO: 0.4 % (ref 0–0.5)
LDH SERPL-CCNC: 162 U/L (ref 99–259)
LYMPHOCYTES # BLD AUTO: 0.98 10*3/MM3 (ref 0.7–3.1)
LYMPHOCYTES NFR BLD AUTO: 13.2 % (ref 19.6–45.3)
MCH RBC QN AUTO: 30.9 PG (ref 26.6–33)
MCHC RBC AUTO-ENTMCNC: 33.2 G/DL (ref 31.5–35.7)
MCV RBC AUTO: 93 FL (ref 79–97)
MONOCYTES # BLD AUTO: 0.64 10*3/MM3 (ref 0.1–0.9)
MONOCYTES NFR BLD AUTO: 8.6 % (ref 5–12)
NEUTROPHILS NFR BLD AUTO: 5.46 10*3/MM3 (ref 1.7–7)
NEUTROPHILS NFR BLD AUTO: 73.2 % (ref 42.7–76)
NRBC BLD AUTO-RTO: 0 /100 WBC (ref 0–0.2)
PLATELET # BLD AUTO: 177 10*3/MM3 (ref 140–450)
PMV BLD AUTO: 7.8 FL (ref 6–12)
POTASSIUM SERPL-SCNC: 4.4 MMOL/L (ref 3.5–4.7)
PROT SERPL-MCNC: 6.7 G/DL (ref 6.3–8)
RBC # BLD AUTO: 3.59 10*6/MM3 (ref 4.14–5.8)
SODIUM SERPL-SCNC: 134 MMOL/L (ref 134–145)
WBC # BLD AUTO: 7.45 10*3/MM3 (ref 3.4–10.8)

## 2021-04-15 PROCEDURE — 83615 LACTATE (LD) (LDH) ENZYME: CPT

## 2021-04-15 PROCEDURE — 36591 DRAW BLOOD OFF VENOUS DEVICE: CPT

## 2021-04-15 PROCEDURE — 85025 COMPLETE CBC W/AUTO DIFF WBC: CPT

## 2021-04-15 PROCEDURE — 74177 CT ABD & PELVIS W/CONTRAST: CPT

## 2021-04-15 PROCEDURE — 82565 ASSAY OF CREATININE: CPT

## 2021-04-15 PROCEDURE — 80053 COMPREHEN METABOLIC PANEL: CPT

## 2021-04-15 PROCEDURE — 71260 CT THORAX DX C+: CPT

## 2021-04-15 PROCEDURE — 25010000002 IOPAMIDOL 61 % SOLUTION: Performed by: INTERNAL MEDICINE

## 2021-04-15 PROCEDURE — 0 DIATRIZOATE MEGLUMINE & SODIUM PER 1 ML: Performed by: INTERNAL MEDICINE

## 2021-04-15 RX ADMIN — IOPAMIDOL 85 ML: 612 INJECTION, SOLUTION INTRAVENOUS at 12:00

## 2021-04-15 RX ADMIN — DIATRIZOATE MEGLUMINE AND DIATRIZOATE SODIUM 30 ML: 660; 100 LIQUID ORAL; RECTAL at 11:15

## 2021-04-19 NOTE — PROGRESS NOTES
"[facility] OUTPATIENT FOLLOW UP CLINIC VISIT    REASON FOR FOLLOW-UP:    1.  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  CT imaging of the abdomen and pelvis on 5/8/2019 shows multiple small tissue nodules in the perirenal fat adjacent to the left kidney and a soft tissue mass interposed between the left psoas muscle and spine.  2.  RA previously on Cimzia (certollizumab) injections by Dr. Velásquez  3.  Lumbar puncture 5/31/2019 negative for involvement with lymphoma  4.  Bone marrow aspiration and biopsy 5/29/2018 negative for lymphoma  5.  PET scan on 5/30/2019 with hypermetabolic perinephric soft tissue lesions with mesenteric lymphadenopathy, left paraspinal mass at L5, 6 cm intramuscular mass in the right upper thigh, lesion at the left clavicle and a 5 cm soft tissue mass between the right scapula and right posterior fourth rib.  6.  Right subclavian Mediport placed by Dr. Herrera on 6/12/2019.  7.  Admission to  for cycle #1 of dose adjusted R-EPOCH on 6/17/2019.  Plan for a lumbar puncture with intrathecal methotrexate with each cycle.  8.  6 cycles of therapy with lumbar punctures with intrathecal methotrexate administered with each cycle complete as of 10/4/2019.  9.  PET scan on 10/15/2019 negative aside from hypermetabolic activity in the bone marrow and in the spleen.    HISTORY OF PRESENT ILLNESS:  Edward Srinivasan is a 81 y.o. male who returns today for follow up of the above issue.     He is doing well.  A couple of months ago he had some right shoulder pain but this has resolved.  No lymphadenopathy.  No fevers chills night sweats or unintended weight loss.  He remains active.      REVIEW OF SYSTEMS:  PAIN:  See Vital Signs below.  GENERAL:  No fevers, chills, night sweats.  Fatigue much improved.  SKIN:  No rashes or non-healing lesions. .  HEME/LYMPH:  No " "abnormal bleeding.  No palpable lymphadenopathy.  EYES:  No vision changes or diplopia.  ENT:  No sore throat or difficulty swallowing.  RESPIRATORY:  No cough, shortness of breath, hemoptysis, or wheezing.  CARDIOVASCULAR:  No chest pain, palpitations, orthopnea, or dyspnea on exertion.    GASTROINTESTINAL:  No abdominal pain, nausea, vomiting, constipation, diarrhea, melena, or hematochezia.  GENITOURINARY:  No dysuria or hematuria.  MUSCULOSKELETAL: Right shoulder pain has resolved  NEUROLOGIC:  No dizziness, loss of consciousness, or seizures.  PSYCHIATRIC:  No depression, anxiety, or mood changes.      Vitals:    04/20/21 0958   BP: 94/59   Pulse: 86   Resp: 16   Temp: 97.1 °F (36.2 °C)   TempSrc: Temporal   SpO2: 97%   Weight: 66.3 kg (146 lb 3.2 oz)   Height: 170.2 cm (67.01\")   PainSc: 0-No pain  Comment: lymphoma       PHYSICAL EXAMINATION:  GENERAL:  Well-developed well-nourished male; awake, alert and oriented, in no acute distress.  Wearing a face mask   SKIN:  Warm and dry, without rashes, purpura, or petechiae.  Normal fingernails.  HEAD:  Normocephalic, atraumatic.  EARS:  Hearing intact.  LYMPHATICS:  No cervical, supraclavicular, or axillary lymphadenopathy.  CHEST:  Lungs are clear to auscultation bilaterally.  No wheezes, rales, or rhonchi.  HEART:  Regular rate; normal rhythm.  Grade 2 out of 6 systolic murmur  EXTREMITIES: No clubbing cyanosis or edema.  NEUROLOGICAL:  No focal neurologic deficits.    DIAGNOSTIC DATA:  Lactate Dehydrogenase (04/15/2021 11:29)  Comprehensive Metabolic Panel (04/15/2021 11:29)  CBC & Differential (04/15/2021 11:29)    IMAGING:   CT Abdomen Pelvis With Contrast (04/15/2021 12:07)  CT Chest With Contrast Diagnostic (04/15/2021 12:07)    CT images of the chest abdomen and pelvis from 4/15/2021 images personally reviewed and show no evidence for recurrent disease.      ASSESSMENT:  This is a 81 y.o. 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    · CT imaging of the abdomen and pelvis on 5/8/2019 shows multiple small tissue nodules in the perirenal fat adjacent to the left kidney and a soft tissue mass interposed between the left psoas muscle and spine.    · Baseline PET scan on 5/30/2019 with hypermetabolic perinephric soft tissue lesions with mesenteric lymphadenopathy, left paraspinal mass at L5, 6 cm intramuscular mass in the right upper thigh, lesion at the left clavicle and a 5 cm soft tissue mass between the right scapula and right posterior fourth rib.  · Negative lumbar puncture.  Negative bone marrow biopsy.    · He has now completed 6 cycles of dose adjusted R-EPOCH with discharge following cycle #6 on 10/4/2019.    · He received lumbar punctures with intrathecal methotrexate with each cycle.    · His end of treatment PET scan on 10/15/2019 is negative for lymphoma.    · CT 5/4/2020 negative  · CT imaging 10/27/2020 with no evidence for recurrence.  He is now 1 year from completing therapy.  · CT imaging 4/15/2021 negative for disease      *RA previously on Cimzia (certollizumab) injections by Dr. Velásquez.  He did have some worsening pain in his right wrist and hands.  He will follow-up with Dr. Velásquez regarding this.  He should not be on any medication that could potentially lead to malignancy.  He did not complain of arthritis pain today.     *Tobacco use: Not discussed today    *Fatigue from chemotherapy: Essentially resolved    *Cytopenias from chemotherapy: Overall improved.  However, he remains mildly anemic.  Hemoglobin 11.1.  MCV normal.  Continue monitoring.  Previous labs consistent with anemia of chronic disease.        PLAN:  1.  Port flush in 2 in 4 months for maintenance  2.  See me in 6 months with labs and CT imaging done prior.  Ordered today.  3.  Per NCCN guidelines, imaging no more often than every 6 months for the first 2  years, then as indicated.  Last CT imaging 4/15/2021 (1.5 years after therapy).  Follow-up imaging in 6 months.  No planned imaging after that.  Consider Mediport removal after that as well if he desires.

## 2021-04-20 ENCOUNTER — OFFICE VISIT (OUTPATIENT)
Dept: ONCOLOGY | Facility: CLINIC | Age: 81
End: 2021-04-20

## 2021-04-20 ENCOUNTER — APPOINTMENT (OUTPATIENT)
Dept: LAB | Facility: HOSPITAL | Age: 81
End: 2021-04-20

## 2021-04-20 VITALS
OXYGEN SATURATION: 97 % | BODY MASS INDEX: 22.95 KG/M2 | TEMPERATURE: 97.1 F | SYSTOLIC BLOOD PRESSURE: 94 MMHG | HEIGHT: 67 IN | HEART RATE: 86 BPM | WEIGHT: 146.2 LBS | RESPIRATION RATE: 16 BRPM | DIASTOLIC BLOOD PRESSURE: 59 MMHG

## 2021-04-20 DIAGNOSIS — C83.33 DIFFUSE LARGE B-CELL LYMPHOMA OF INTRA-ABDOMINAL LYMPH NODES (HCC): Primary | ICD-10-CM

## 2021-04-20 PROCEDURE — 99214 OFFICE O/P EST MOD 30 MIN: CPT | Performed by: INTERNAL MEDICINE

## 2021-05-03 DIAGNOSIS — I10 ESSENTIAL HYPERTENSION, BENIGN: ICD-10-CM

## 2021-05-03 RX ORDER — LISINOPRIL 10 MG/1
10 TABLET ORAL DAILY
Qty: 90 TABLET | Refills: 1 | Status: SHIPPED | OUTPATIENT
Start: 2021-05-03 | End: 2021-08-24 | Stop reason: SDUPTHER

## 2021-05-03 NOTE — TELEPHONE ENCOUNTER
[facility] - [address] - [phone] [facility] [phone] FX    Relationship: Pharmacy    Best call back number: [phone]    Medication needed:   Requested Prescriptions     Pending Prescriptions Disp Refills   • lisinopril (PRINIVIL,ZESTRIL) 10 MG tablet 90 tablet 3     Sig: Take 1 tablet by mouth Daily.       When do you need the refill by: ASAP    What additional details did the patient provide when requesting the medication: N/A    Does the patient have less than a 3 day supply:  [] Yes  [x] No    What is the patient's preferred pharmacy: [facility] - [place], [place] - [address] [place] [address] COURT - [phone] [facility] [phone] FX

## 2021-05-04 DIAGNOSIS — K21.9 GASTROESOPHAGEAL REFLUX DISEASE: ICD-10-CM

## 2021-05-04 RX ORDER — ATORVASTATIN CALCIUM 40 MG/1
40 TABLET, FILM COATED ORAL DAILY
Qty: 90 TABLET | Refills: 1 | Status: SHIPPED | OUTPATIENT
Start: 2021-05-04 | End: 2021-08-24 | Stop reason: SDUPTHER

## 2021-05-04 RX ORDER — OMEPRAZOLE 40 MG/1
40 CAPSULE, DELAYED RELEASE ORAL DAILY
Qty: 90 CAPSULE | Refills: 1 | Status: SHIPPED | OUTPATIENT
Start: 2021-05-04 | End: 2021-05-04 | Stop reason: SDUPTHER

## 2021-05-04 RX ORDER — OMEPRAZOLE 40 MG/1
40 CAPSULE, DELAYED RELEASE ORAL DAILY
Qty: 90 CAPSULE | Refills: 1 | Status: SHIPPED | OUTPATIENT
Start: 2021-05-04 | End: 2021-08-24 | Stop reason: SDUPTHER

## 2021-05-04 RX ORDER — ATORVASTATIN CALCIUM 40 MG/1
40 TABLET, FILM COATED ORAL DAILY
Qty: 90 TABLET | Refills: 1 | Status: SHIPPED | OUTPATIENT
Start: 2021-05-04 | End: 2021-05-04 | Stop reason: SDUPTHER

## 2021-05-04 NOTE — TELEPHONE ENCOUNTER
Caller:     Relationship:     Best call back number:    Medication needed:   Requested Prescriptions     Pending Prescriptions Disp Refills   • atorvastatin (LIPITOR) 40 MG tablet 90 tablet 3     Sig: Take 1 tablet by mouth Daily.   • omeprazole (priLOSEC) 40 MG capsule 90 capsule 1     Sig: Take 1 capsule by mouth Daily.           Does the patient have less than a 3 day supply:  [x] Yes  [] No    What is the patient's preferred pharmacy: [facility] - [place] [address] [address] [facility] - [phone] [facility] [phone] FX

## 2021-05-27 ENCOUNTER — HOSPITAL ENCOUNTER (OUTPATIENT)
Dept: CARDIOLOGY | Facility: HOSPITAL | Age: 81
Discharge: HOME | End: 2021-05-27
Admitting: INTERNAL MEDICINE

## 2021-05-27 ENCOUNTER — OFFICE VISIT (OUTPATIENT)
Dept: CARDIOLOGY | Facility: CLINIC | Age: 81
End: 2021-05-27

## 2021-05-27 VITALS
OXYGEN SATURATION: 98 % | WEIGHT: 146 LBS | SYSTOLIC BLOOD PRESSURE: 130 MMHG | HEIGHT: 67 IN | BODY MASS INDEX: 22.91 KG/M2 | HEART RATE: 79 BPM | DIASTOLIC BLOOD PRESSURE: 70 MMHG

## 2021-05-27 VITALS
BODY MASS INDEX: 22.76 KG/M2 | HEART RATE: 76 BPM | DIASTOLIC BLOOD PRESSURE: 60 MMHG | OXYGEN SATURATION: 99 % | SYSTOLIC BLOOD PRESSURE: 114 MMHG | RESPIRATION RATE: 16 BRPM | WEIGHT: 145 LBS | HEIGHT: 67 IN

## 2021-05-27 DIAGNOSIS — I35.0 NONRHEUMATIC AORTIC VALVE STENOSIS: ICD-10-CM

## 2021-05-27 DIAGNOSIS — I25.10 CORONARY ARTERY DISEASE INVOLVING NATIVE CORONARY ARTERY OF NATIVE HEART WITHOUT ANGINA PECTORIS: Primary | ICD-10-CM

## 2021-05-27 DIAGNOSIS — I65.29 STENOSIS OF CAROTID ARTERY, UNSPECIFIED LATERALITY: ICD-10-CM

## 2021-05-27 LAB
AORTIC DIMENSIONLESS INDEX: 0.2 (DI)
BH CV ECHO MEAS - ACS: 1.4 CM
BH CV ECHO MEAS - AO ARCH DIAM (PROXIMAL TRANS.): 2.6 CM
BH CV ECHO MEAS - AO MAX PG (FULL): 49.5 MMHG
BH CV ECHO MEAS - AO MAX PG: 52 MMHG
BH CV ECHO MEAS - AO MEAN PG (FULL): 27.7 MMHG
BH CV ECHO MEAS - AO MEAN PG: 29 MMHG
BH CV ECHO MEAS - AO ROOT AREA (BSA CORRECTED): 2.1
BH CV ECHO MEAS - AO ROOT AREA: 10.5 CM^2
BH CV ECHO MEAS - AO ROOT DIAM: 3.6 CM
BH CV ECHO MEAS - AO V2 MAX: 362 CM/SEC
BH CV ECHO MEAS - AO V2 MEAN: 251.4 CM/SEC
BH CV ECHO MEAS - AO V2 VTI: 84.1 CM
BH CV ECHO MEAS - AVA(I,A): 1 CM^2
BH CV ECHO MEAS - AVA(I,D): 1 CM^2
BH CV ECHO MEAS - AVA(V,A): 0.97 CM^2
BH CV ECHO MEAS - AVA(V,D): 0.97 CM^2
BH CV ECHO MEAS - BSA(HAYCOCK): 1.8 M^2
BH CV ECHO MEAS - BSA: 1.8 M^2
BH CV ECHO MEAS - BZI_BMI: 22.9 KILOGRAMS/M^2
BH CV ECHO MEAS - BZI_METRIC_HEIGHT: 170.2 CM
BH CV ECHO MEAS - BZI_METRIC_WEIGHT: 66.2 KG
BH CV ECHO MEAS - EDV(MOD-SP2): 55 ML
BH CV ECHO MEAS - EDV(MOD-SP4): 53 ML
BH CV ECHO MEAS - EDV(TEICH): 85.2 ML
BH CV ECHO MEAS - EF(CUBED): 64.4 %
BH CV ECHO MEAS - EF(MOD-BP): 64 %
BH CV ECHO MEAS - EF(MOD-SP2): 61.8 %
BH CV ECHO MEAS - EF(MOD-SP4): 66 %
BH CV ECHO MEAS - EF(TEICH): 56.2 %
BH CV ECHO MEAS - ESV(MOD-SP2): 21 ML
BH CV ECHO MEAS - ESV(MOD-SP4): 18 ML
BH CV ECHO MEAS - ESV(TEICH): 37.3 ML
BH CV ECHO MEAS - FS: 29.2 %
BH CV ECHO MEAS - IVS/LVPW: 0.82
BH CV ECHO MEAS - IVSD: 0.89 CM
BH CV ECHO MEAS - LAT PEAK E' VEL: 9.7 CM/SEC
BH CV ECHO MEAS - LV DIASTOLIC VOL/BSA (35-75): 30 ML/M^2
BH CV ECHO MEAS - LV MASS(C)D: 142.9 GRAMS
BH CV ECHO MEAS - LV MASS(C)DI: 80.8 GRAMS/M^2
BH CV ECHO MEAS - LV MAX PG: 2.9 MMHG
BH CV ECHO MEAS - LV MEAN PG: 1.6 MMHG
BH CV ECHO MEAS - LV SYSTOLIC VOL/BSA (12-30): 10.2 ML/M^2
BH CV ECHO MEAS - LV V1 MAX: 85.3 CM/SEC
BH CV ECHO MEAS - LV V1 MEAN: 59.6 CM/SEC
BH CV ECHO MEAS - LV V1 VTI: 20.6 CM
BH CV ECHO MEAS - LVIDD: 4.3 CM
BH CV ECHO MEAS - LVIDS: 3.1 CM
BH CV ECHO MEAS - LVLD AP2: 7.2 CM
BH CV ECHO MEAS - LVLD AP4: 5.9 CM
BH CV ECHO MEAS - LVLS AP2: 5.7 CM
BH CV ECHO MEAS - LVLS AP4: 4.6 CM
BH CV ECHO MEAS - LVOT AREA (M): 4.2 CM^2
BH CV ECHO MEAS - LVOT AREA: 4.1 CM^2
BH CV ECHO MEAS - LVOT DIAM: 2.3 CM
BH CV ECHO MEAS - LVPWD: 1.1 CM
BH CV ECHO MEAS - MED PEAK E' VEL: 8.8 CM/SEC
BH CV ECHO MEAS - MV A DUR: 0.09 SEC
BH CV ECHO MEAS - MV A MAX VEL: 119.8 CM/SEC
BH CV ECHO MEAS - MV DEC SLOPE: 648.3 CM/SEC^2
BH CV ECHO MEAS - MV DEC TIME: 0.15 SEC
BH CV ECHO MEAS - MV E MAX VEL: 88.6 CM/SEC
BH CV ECHO MEAS - MV E/A: 0.74
BH CV ECHO MEAS - MV MAX PG: 7.1 MMHG
BH CV ECHO MEAS - MV MEAN PG: 3.8 MMHG
BH CV ECHO MEAS - MV P1/2T MAX VEL: 101.8 CM/SEC
BH CV ECHO MEAS - MV P1/2T: 46 MSEC
BH CV ECHO MEAS - MV V2 MAX: 133 CM/SEC
BH CV ECHO MEAS - MV V2 MEAN: 92.7 CM/SEC
BH CV ECHO MEAS - MV V2 VTI: 33.9 CM
BH CV ECHO MEAS - MVA P1/2T LCG: 2.2 CM^2
BH CV ECHO MEAS - MVA(P1/2T): 4.8 CM^2
BH CV ECHO MEAS - MVA(VTI): 2.5 CM^2
BH CV ECHO MEAS - PA ACC TIME: 0.08 SEC
BH CV ECHO MEAS - PA MAX PG (FULL): 5.7 MMHG
BH CV ECHO MEAS - PA MAX PG: 7.7 MMHG
BH CV ECHO MEAS - PA PR(ACCEL): 42.2 MMHG
BH CV ECHO MEAS - PA V2 MAX: 138.5 CM/SEC
BH CV ECHO MEAS - PULM A REVS DUR: 0.1 SEC
BH CV ECHO MEAS - PULM A REVS VEL: 39.8 CM/SEC
BH CV ECHO MEAS - PULM DIAS VEL: 36.4 CM/SEC
BH CV ECHO MEAS - PULM S/D: 1.8
BH CV ECHO MEAS - PULM SYS VEL: 64.3 CM/SEC
BH CV ECHO MEAS - PVA(V,A): 1.8 CM^2
BH CV ECHO MEAS - PVA(V,D): 1.8 CM^2
BH CV ECHO MEAS - QP/QS: 0.57
BH CV ECHO MEAS - RAP SYSTOLE: 3 MMHG
BH CV ECHO MEAS - RV MAX PG: 1.9 MMHG
BH CV ECHO MEAS - RV MEAN PG: 1.2 MMHG
BH CV ECHO MEAS - RV V1 MAX: 69.4 CM/SEC
BH CV ECHO MEAS - RV V1 MEAN: 52.4 CM/SEC
BH CV ECHO MEAS - RV V1 VTI: 13.4 CM
BH CV ECHO MEAS - RVOT AREA: 3.6 CM^2
BH CV ECHO MEAS - RVOT DIAM: 2.1 CM
BH CV ECHO MEAS - RVSP: 27 MMHG
BH CV ECHO MEAS - SI(AO): 497 ML/M^2
BH CV ECHO MEAS - SI(CUBED): 29.9 ML/M^2
BH CV ECHO MEAS - SI(LVOT): 48.1 ML/M^2
BH CV ECHO MEAS - SI(MOD-SP2): 19.2 ML/M^2
BH CV ECHO MEAS - SI(MOD-SP4): 19.8 ML/M^2
BH CV ECHO MEAS - SI(TEICH): 27.1 ML/M^2
BH CV ECHO MEAS - SV(AO): 879.1 ML
BH CV ECHO MEAS - SV(CUBED): 52.9 ML
BH CV ECHO MEAS - SV(LVOT): 85 ML
BH CV ECHO MEAS - SV(MOD-SP2): 34 ML
BH CV ECHO MEAS - SV(MOD-SP4): 35 ML
BH CV ECHO MEAS - SV(RVOT): 48.1 ML
BH CV ECHO MEAS - SV(TEICH): 47.9 ML
BH CV ECHO MEAS - TAPSE (>1.6): 2.6 CM
BH CV ECHO MEAS - TR MAX VEL: 243 CM/SEC
BH CV ECHO MEASUREMENTS AVERAGE E/E' RATIO: 9.58
BH CV VAS BP RIGHT ARM: NORMAL MMHG
BH CV XLRA - RV BASE: 2.8 CM
BH CV XLRA - RV LENGTH: 7.2 CM
BH CV XLRA - RV MID: 2.5 CM
BH CV XLRA - TDI S': 10.7 CM/SEC
LEFT ATRIUM VOLUME INDEX: 25 ML/M2
MAXIMAL PREDICTED HEART RATE: 141 BPM
SINUS: 3.5 CM
STJ: 3.4 CM
STRESS TARGET HR: 120 BPM

## 2021-05-27 PROCEDURE — 99213 OFFICE O/P EST LOW 20 MIN: CPT | Performed by: INTERNAL MEDICINE

## 2021-05-27 PROCEDURE — 93306 TTE W/DOPPLER COMPLETE: CPT

## 2021-05-27 PROCEDURE — 93306 TTE W/DOPPLER COMPLETE: CPT | Performed by: INTERNAL MEDICINE

## 2021-05-27 NOTE — PROGRESS NOTES
Date of Office Visit:  2021  Encounter Provider: Brian Harden MD  Place of Service: [facility] CARDIOLOGY  Patient Name: Edward Srinivasan  :1940    Chief complaint: Follow-up for coronary artery disease, aortic stenosis, and   carotid artery disease.    History of Present Illness:    I again had the pleasure of seeing your patient in cardiology office on 2021.  As you   well know, he is a very pleasant, 81 year-old, white male with a past medical history  significant for coronary artery disease, aortic stenosis, hyperlipidemia, and rheumatoid  arthritis who presents for follow-up. The patient was admitted to the hospital on  2015, with chest discomfort. He had been watching his grandson on the lawn   when he developed sudden chest discomfort, which radiated across his precordium   and was described as a pressure sensation. It then started to radiate into his neck   bilaterally, as well as his jaws. His initial electrocardiogram showed no ischemic   changes and his troponin was negative. He also had been recently diagnosed with   rheumatoid arthritis and was on prednisone. 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 A CT angiogram of the chest on admission in   the emergency room showed chronic obstructive pulmonary disease with bullae   formation; however, he also had an interstitial pattern with early honeycombing   consistent with pulmonary fibrosis. A subsequent CT on 2015, confirmed this. He   was seen by pulmonology as well.      He did undergo a Lexiscan Cardiolite stress test, which showed lateral ischemia. A   subsequent cardiac catheterization on 2015, by Dr. Stearns showed a 90%   ulcerated lesion in the mid left circumflex. He subsequently underwent placement of a   3.0 mm x 18 mm Xience drug-eluting stent, although there was a proximal edge   dissection necessitating placement of a second 3.0 mm x  12 mm Xience drug-eluting   stent proximal to the first stent.  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  He is followed by Dr. Garcia in oncology.      The patient presents today for follow-up.  Overall, he has been doing well.  His cancer   is still in remission.  He is still smoking about 7 cigarettes/day.  He has baseline   dyspnea, but nothing new.  He denied any chest pain.  His echocardiogram from today   did show progression of the aortic stenosis, although it is still moderate.      Past Medical History:   Diagnosis Date   • Allergic rhinitis    • Aortic valve stenosis    • CAD (coronary artery disease)    • Cancer (CMS/HCC)     LYMPHOMA   • Carotid arterial disease (CMS/HCC)     Followed by Dr. Floyd.  Carotid Doppler on 9/21/17: 60-70% DENISE, 50-60% LICA.   • Chronic obstructive lung disease (CMS/HCC)    • Esophageal reflux     With a history of erosive gastritis   • Hyperlipidemia    • Hypertension    • Iron deficiency    • Legally blind     PT WEARS GLASSES    • Pulmonary fibrosis (CMS/HCC)     By CT scan on 8/12/15   • PVC (premature ventricular contraction)    • Rheumatoid arthritis (CMS/HCC)    • Skin cancer of face     15 year ago on left side of face   • Steroid-induced diabetes mellitus (CMS/HCC)    • Tobacco use        Past Surgical History:   Procedure Laterality Date   • BACK SURGERY  2010    L4-L5 discectomy   • CATARACT EXTRACTION Bilateral 1979   • CORONARY ANGIOPLASTY WITH STENT PLACEMENT  2015   • VASECTOMY  1979   • VENOUS ACCESS DEVICE (PORT) INSERTION N/A 6/12/2019    Procedure: INSERTION RIGHT VENOUS ACCESS DEVICE;  Surgeon: Philippe Herrera Jr., MD;  Location: [facility];  Service: General       Current Outpatient Medications on File Prior to Visit   Medication Sig Dispense Refill   • aspirin 81 MG EC tablet Take 81 mg by mouth Daily.     • atorvastatin (LIPITOR) 40 MG tablet Take 1 tablet by mouth Daily. 90 tablet 1   • beta carotene  33829 UNIT capsule Take 10,000 Units by mouth Daily.     • cetirizine (ZyrTEC) 10 MG tablet Take 10 mg by mouth daily.     • Cholecalciferol (VITAMIN D-3 PO) Take 2,000 Int'l Units/day by mouth Daily.     • glucose blood (FREESTYLE LITE) test strip 1 each by Other route 2 (Two) Times a Day. use 1 strip to test twice a day 100 each 2   • Lancets (freestyle) lancets USE ONE LANCET TO TEST TWO TIMES A DAY AS INSTRUCTED 100 each 2   • linagliptin (TRADJENTA) 5 MG tablet tablet Take 1 tablet by mouth Daily. 90 tablet 3   • lisinopril (PRINIVIL,ZESTRIL) 10 MG tablet Take 1 tablet by mouth Daily. 90 tablet 1   • MAGNESIUM PO Take 500 mg by mouth Daily.     • nitroglycerin (NITROSTAT) 0.4 MG SL tablet Place 1 tablet under the tongue Every 5 (Five) Minutes As Needed (CHEST PAIN). Take no more than 3 doses in 15 minutes. 25 tablet 4   • omeprazole (priLOSEC) 40 MG capsule Take 1 capsule by mouth Daily. 90 capsule 1   • pyridoxine (B-6) 100 MG tablet Take 100 mg by mouth Daily.     • vitamin C (ASCORBIC ACID) 500 MG tablet Take 500 mg by mouth Daily.     • vitamin E 400 UNIT capsule Take 400 Units by mouth Daily.       Current Facility-Administered Medications on File Prior to Visit   Medication Dose Route Frequency Provider Last Rate Last Admin   • heparin flush (porcine) 100 UNIT/ML injection 500 Units  500 Units Intravenous PRN Gabriele Garcia MD   500 Units at 01/13/20 0954   • sodium chloride 0.9 % flush 10 mL  10 mL Intravenous PRN Gabriele Garcia MD   10 mL at 01/13/20 0953     Allergies as of 05/27/2021 - Reviewed 05/27/2021   Allergen Reaction Noted   • Oxaprozin Hives 11/20/2015     Social History     Socioeconomic History   • Marital status:      Spouse name: Rahda   • Number of children: Not on file   • Years of education: High school   • Highest education level: Not on file   Tobacco Use   • Smoking status: Current Every Day Smoker     Packs/day: 0.50     Years: 50.00     Pack years: 25.00     Types: Cigarettes  "  • Smokeless tobacco: Never Used   • Tobacco comment: 1/2 ppd, discussed benefits of cessation   Substance and Sexual Activity   • Alcohol use: No   • Drug use: No   • Sexual activity: Defer     Family History   Problem Relation Age of Onset   • Heart failure Mother         CHF   • Heart attack Father 54   • Heart disease Father    • Heart attack Brother 47   • Heart disease Brother         another brother with 4 vessel CABG in his 50s   • Coronary artery disease Other         multiple family members   • Malig Hyperthermia Neg Hx        Review of Systems   Constitutional: Positive for malaise/fatigue.   Neurological: Positive for numbness.   All other systems reviewed and are negative.     Objective:     Vitals:    05/27/21 0852   BP: 114/60   Pulse: 76   Resp: 16   SpO2: 99%   Weight: 65.8 kg (145 lb)   Height: 170.2 cm (67\")     Body mass index is 22.71 kg/m².    Physical Exam  Constitutional:       Appearance: He is well-developed.      Comments: Appears thin   HENT:      Head: Normocephalic and atraumatic.   Eyes:      Conjunctiva/sclera: Conjunctivae normal.   Cardiovascular:      Rate and Rhythm: Normal rate and regular rhythm.      Heart sounds: Murmur heard.   Systolic murmur is present with a grade of 3/6 at the upper right sternal border, upper left sternal border and lower left sternal border.   No friction rub. No gallop.    Pulmonary:      Effort: Pulmonary effort is normal.      Breath sounds: Normal breath sounds.   Abdominal:      Palpations: Abdomen is soft.      Tenderness: There is no abdominal tenderness.   Musculoskeletal:      Cervical back: Neck supple.   Skin:     General: Skin is warm.   Neurological:      Mental Status: He is alert and oriented to person, place, and time.   Psychiatric:         Behavior: Behavior normal.       Lab Review:   Procedures    Cardiac Procedures:  1. Left heart catheterization on 8/14/2015, by Dr. Stearns showed the following. The  left main was normal. The left " anterior descending had luminal irregularities. The first  diagonal branch had a 60% proximal stenosis. The left circumflex had a 90% ulcerated  stenosis in the mid portion. There was 20% to 30% distal disease in the left circumflex  coronary artery. The left posterior descending artery had a 60% ostial lesion. The right  coronary artery was small, nondominant, and had a 30% lesion at the origin.  He then  underwent placement of a 3.0 mm x 18 mm Xience drug-eluting stent to the proximal   left circumflex; however, there was a proximal edge dissection necessitating  placement of a 3.0 mm x 12 mm Xience drug-eluting stent proximal to the first stent.   2. Echocardiogram on 8/8/2017: Ejection fraction was 60%.  There was grade 1   diastolic dysfunction.  There was mild to moderate aortic stenosis with a peak   gradient of 33 mmHg and a mean gradient of 18 mmHg.  3. Carotid artery Doppler ultrasound on 9/21/2017: There was a 60-70% stenosis in   the right internal carotid artery.  There was a 50-60% stenosis in the left internal   carotid artery.  4. Echocardiogram on 5/14/2019: The ejection fraction was 60%.  There was grade 1  diastolic dysfunction.  There was moderate aortic stenosis with a mean gradient of 18   mmHg.  There was mild tricuspid regurgitation.   5.  Echocardiogram on 5/27/2021: The ejection fraction was 60 to 65%.  There was mild   LVH.  There was grade 1 diastolic dysfunction.  The right ventricle was normal.  The left   atrium was mildly enlarged.  There was moderate aortic stenosis with a peak gradient   of 52 mmHg and a mean gradient of 29 mmHg.  There was mild tricuspid regurgitation.    Assessment:       Diagnosis Plan   1. Coronary artery disease involving native coronary artery of native heart without angina pectoris     2. Nonrheumatic aortic valve stenosis     3. Stenosis of carotid artery, unspecified laterality       Plan:       Again, his echocardiogram from today showed moderate aortic  stenosis, although the mean gradient has increased from 18 mmHg to 29 mmHg.  I feel he needs another echocardiogram in the next 8 months to 1 year.  He is not having any symptoms from the aortic stenosis currently.  He denied any chest pain.  He is still smoking 7 cigarettes/day, and he told me that he does not have any intention of quitting.  I did urge him to do so.  Dr. Floyd is following him on a yearly basis for his carotid artery disease.  It has not changed in quite some time.  He will continue on the aspirin and Lipitor for both the carotid artery disease and coronary artery disease.  His blood pressure is excellent today at 114/60.  His lymphoma is evidently stable, and he follows up with Dr. Garcia for this.  I will have him follow-up with me in 6 months unless other issues arise.

## 2021-06-15 ENCOUNTER — INFUSION (OUTPATIENT)
Dept: ONCOLOGY | Facility: HOSPITAL | Age: 81
End: 2021-06-15

## 2021-06-15 DIAGNOSIS — C83.30 DIFFUSE LARGE B-CELL LYMPHOMA, UNSPECIFIED BODY REGION (HCC): Primary | ICD-10-CM

## 2021-06-15 PROCEDURE — 96523 IRRIG DRUG DELIVERY DEVICE: CPT

## 2021-06-15 PROCEDURE — 25010000002 HEPARIN LOCK FLUSH PER 10 UNITS: Performed by: INTERNAL MEDICINE

## 2021-06-15 RX ORDER — HEPARIN SODIUM (PORCINE) LOCK FLUSH IV SOLN 100 UNIT/ML 100 UNIT/ML
500 SOLUTION INTRAVENOUS AS NEEDED
Status: DISCONTINUED | OUTPATIENT
Start: 2021-06-15 | End: 2021-06-15 | Stop reason: HOSPADM

## 2021-06-15 RX ORDER — HEPARIN SODIUM (PORCINE) LOCK FLUSH IV SOLN 100 UNIT/ML 100 UNIT/ML
500 SOLUTION INTRAVENOUS AS NEEDED
Status: CANCELLED | OUTPATIENT
Start: 2021-06-15

## 2021-06-15 RX ORDER — SODIUM CHLORIDE 0.9 % (FLUSH) 0.9 %
10 SYRINGE (ML) INJECTION AS NEEDED
Status: CANCELLED | OUTPATIENT
Start: 2021-06-15

## 2021-06-15 RX ORDER — SODIUM CHLORIDE 0.9 % (FLUSH) 0.9 %
10 SYRINGE (ML) INJECTION AS NEEDED
Status: DISCONTINUED | OUTPATIENT
Start: 2021-06-15 | End: 2021-06-15 | Stop reason: HOSPADM

## 2021-06-15 RX ADMIN — SODIUM CHLORIDE, PRESERVATIVE FREE 10 ML: 5 INJECTION INTRAVENOUS at 08:44

## 2021-06-15 RX ADMIN — Medication 500 UNITS: at 08:44

## 2021-07-16 DIAGNOSIS — E11.9 DIABETES MELLITUS WITHOUT COMPLICATION (HCC): ICD-10-CM

## 2021-07-16 RX ORDER — BLOOD-GLUCOSE METER
1 KIT MISCELLANEOUS 2 TIMES DAILY
Qty: 100 EACH | Refills: 2 | Status: SHIPPED | OUTPATIENT
Start: 2021-07-16 | End: 2021-08-24 | Stop reason: SDUPTHER

## 2021-07-16 NOTE — TELEPHONE ENCOUNTER
Caller: Edward Srinivasan    Relationship: Self    Best call back number:     Medication needed:   Requested Prescriptions     Pending Prescriptions Disp Refills   • glucose blood (FREESTYLE LITE) test strip 100 each 2     Si each by Other route 2 (Two) Times a Day. use 1 strip to test twice a day       When do you need the refill by:     What additional details did the patient provide when requesting the medication:     Does the patient have less than a 3 day supply:  [] Yes  [x] No    What is the patient's preferred pharmacy:   [facility]  [address] [place] [address]  [phone]

## 2021-08-10 ENCOUNTER — INFUSION (OUTPATIENT)
Dept: ONCOLOGY | Facility: HOSPITAL | Age: 81
End: 2021-08-10

## 2021-08-10 DIAGNOSIS — C83.30 DIFFUSE LARGE B-CELL LYMPHOMA, UNSPECIFIED BODY REGION (HCC): Primary | ICD-10-CM

## 2021-08-10 PROCEDURE — 25010000002 HEPARIN LOCK FLUSH PER 10 UNITS: Performed by: INTERNAL MEDICINE

## 2021-08-10 PROCEDURE — 96523 IRRIG DRUG DELIVERY DEVICE: CPT

## 2021-08-10 RX ORDER — SODIUM CHLORIDE 0.9 % (FLUSH) 0.9 %
10 SYRINGE (ML) INJECTION AS NEEDED
Status: CANCELLED | OUTPATIENT
Start: 2021-08-10

## 2021-08-10 RX ORDER — HEPARIN SODIUM (PORCINE) LOCK FLUSH IV SOLN 100 UNIT/ML 100 UNIT/ML
500 SOLUTION INTRAVENOUS AS NEEDED
Status: CANCELLED | OUTPATIENT
Start: 2021-08-10

## 2021-08-10 RX ORDER — SODIUM CHLORIDE 0.9 % (FLUSH) 0.9 %
10 SYRINGE (ML) INJECTION AS NEEDED
Status: DISCONTINUED | OUTPATIENT
Start: 2021-08-10 | End: 2021-08-10 | Stop reason: HOSPADM

## 2021-08-10 RX ORDER — HEPARIN SODIUM (PORCINE) LOCK FLUSH IV SOLN 100 UNIT/ML 100 UNIT/ML
500 SOLUTION INTRAVENOUS AS NEEDED
Status: DISCONTINUED | OUTPATIENT
Start: 2021-08-10 | End: 2021-08-10 | Stop reason: HOSPADM

## 2021-08-10 RX ADMIN — Medication 10 ML: at 09:02

## 2021-08-10 RX ADMIN — Medication 500 UNITS: at 09:02

## 2021-08-24 ENCOUNTER — OFFICE VISIT (OUTPATIENT)
Dept: INTERNAL MEDICINE | Facility: CLINIC | Age: 81
End: 2021-08-24

## 2021-08-24 VITALS
HEIGHT: 67 IN | BODY MASS INDEX: 23.07 KG/M2 | TEMPERATURE: 98.6 F | HEART RATE: 78 BPM | SYSTOLIC BLOOD PRESSURE: 128 MMHG | DIASTOLIC BLOOD PRESSURE: 65 MMHG | WEIGHT: 147 LBS | OXYGEN SATURATION: 98 %

## 2021-08-24 DIAGNOSIS — K21.9 GASTROESOPHAGEAL REFLUX DISEASE: ICD-10-CM

## 2021-08-24 DIAGNOSIS — E11.42 TYPE 2 DIABETES MELLITUS WITH DIABETIC POLYNEUROPATHY, WITHOUT LONG-TERM CURRENT USE OF INSULIN (HCC): Primary | Chronic | ICD-10-CM

## 2021-08-24 DIAGNOSIS — I65.23 BILATERAL CAROTID ARTERY STENOSIS: Chronic | ICD-10-CM

## 2021-08-24 DIAGNOSIS — I10 ESSENTIAL HYPERTENSION, BENIGN: Chronic | ICD-10-CM

## 2021-08-24 DIAGNOSIS — E78.00 PURE HYPERCHOLESTEROLEMIA: Chronic | ICD-10-CM

## 2021-08-24 DIAGNOSIS — M05.772 RHEUMATOID ARTHRITIS INVOLVING BOTH FEET WITH POSITIVE RHEUMATOID FACTOR (HCC): Chronic | ICD-10-CM

## 2021-08-24 DIAGNOSIS — M05.771 RHEUMATOID ARTHRITIS INVOLVING BOTH FEET WITH POSITIVE RHEUMATOID FACTOR (HCC): Chronic | ICD-10-CM

## 2021-08-24 DIAGNOSIS — I25.10 CORONARY ARTERY DISEASE INVOLVING NATIVE CORONARY ARTERY OF NATIVE HEART WITHOUT ANGINA PECTORIS: Chronic | ICD-10-CM

## 2021-08-24 DIAGNOSIS — I35.0 AORTIC VALVE STENOSIS, ETIOLOGY OF CARDIAC VALVE DISEASE UNSPECIFIED: ICD-10-CM

## 2021-08-24 LAB
ALBUMIN SERPL-MCNC: 3.9 G/DL (ref 3.5–5.2)
ALBUMIN/GLOB SERPL: 1.6 G/DL
ALP SERPL-CCNC: 73 U/L (ref 39–117)
ALT SERPL-CCNC: 14 U/L (ref 1–41)
AST SERPL-CCNC: 18 U/L (ref 1–40)
BASOPHILS # BLD AUTO: 0.04 10*3/MM3 (ref 0–0.2)
BASOPHILS NFR BLD AUTO: 0.5 % (ref 0–1.5)
BILIRUB SERPL-MCNC: 0.4 MG/DL (ref 0–1.2)
BUN SERPL-MCNC: 16 MG/DL (ref 8–23)
BUN/CREAT SERPL: 20.5 (ref 7–25)
CALCIUM SERPL-MCNC: 8.6 MG/DL (ref 8.6–10.5)
CHLORIDE SERPL-SCNC: 98 MMOL/L (ref 98–107)
CHOLEST SERPL-MCNC: 83 MG/DL (ref 0–200)
CO2 SERPL-SCNC: 28.8 MMOL/L (ref 22–29)
CREAT SERPL-MCNC: 0.78 MG/DL (ref 0.76–1.27)
EOSINOPHIL # BLD AUTO: 0.29 10*3/MM3 (ref 0–0.4)
EOSINOPHIL NFR BLD AUTO: 3.7 % (ref 0.3–6.2)
ERYTHROCYTE [DISTWIDTH] IN BLOOD BY AUTOMATED COUNT: 13.8 % (ref 12.3–15.4)
GLOBULIN SER CALC-MCNC: 2.5 GM/DL
GLUCOSE SERPL-MCNC: 136 MG/DL (ref 65–99)
HBA1C MFR BLD: 6.1 % (ref 4.8–5.6)
HCT VFR BLD AUTO: 34.6 % (ref 37.5–51)
HDLC SERPL-MCNC: 37 MG/DL (ref 40–60)
HGB BLD-MCNC: 11.4 G/DL (ref 13–17.7)
IMM GRANULOCYTES # BLD AUTO: 0.04 10*3/MM3 (ref 0–0.05)
IMM GRANULOCYTES NFR BLD AUTO: 0.5 % (ref 0–0.5)
LDLC SERPL CALC-MCNC: 34 MG/DL (ref 0–100)
LYMPHOCYTES # BLD AUTO: 0.89 10*3/MM3 (ref 0.7–3.1)
LYMPHOCYTES NFR BLD AUTO: 11.4 % (ref 19.6–45.3)
MCH RBC QN AUTO: 30.1 PG (ref 26.6–33)
MCHC RBC AUTO-ENTMCNC: 32.9 G/DL (ref 31.5–35.7)
MCV RBC AUTO: 91.3 FL (ref 79–97)
MONOCYTES # BLD AUTO: 0.61 10*3/MM3 (ref 0.1–0.9)
MONOCYTES NFR BLD AUTO: 7.8 % (ref 5–12)
NEUTROPHILS # BLD AUTO: 5.93 10*3/MM3 (ref 1.7–7)
NEUTROPHILS NFR BLD AUTO: 76.1 % (ref 42.7–76)
NRBC BLD AUTO-RTO: 0 /100 WBC (ref 0–0.2)
PLATELET # BLD AUTO: 180 10*3/MM3 (ref 140–450)
POTASSIUM SERPL-SCNC: 4.9 MMOL/L (ref 3.5–5.2)
PROT SERPL-MCNC: 6.4 G/DL (ref 6–8.5)
RBC # BLD AUTO: 3.79 10*6/MM3 (ref 4.14–5.8)
SODIUM SERPL-SCNC: 135 MMOL/L (ref 136–145)
TRIGL SERPL-MCNC: 42 MG/DL (ref 0–150)
VLDLC SERPL CALC-MCNC: 12 MG/DL (ref 5–40)
WBC # BLD AUTO: 7.8 10*3/MM3 (ref 3.4–10.8)

## 2021-08-24 PROCEDURE — 99214 OFFICE O/P EST MOD 30 MIN: CPT | Performed by: NURSE PRACTITIONER

## 2021-08-24 RX ORDER — BLOOD-GLUCOSE METER
1 KIT MISCELLANEOUS 2 TIMES DAILY
Qty: 200 EACH | Refills: 1 | Status: SHIPPED | OUTPATIENT
Start: 2021-08-24 | End: 2021-08-26 | Stop reason: SDUPTHER

## 2021-08-24 RX ORDER — OMEPRAZOLE 40 MG/1
40 CAPSULE, DELAYED RELEASE ORAL DAILY
Qty: 90 CAPSULE | Refills: 1 | Status: SHIPPED | OUTPATIENT
Start: 2021-08-24 | End: 2022-02-28

## 2021-08-24 RX ORDER — ATORVASTATIN CALCIUM 40 MG/1
40 TABLET, FILM COATED ORAL DAILY
Qty: 90 TABLET | Refills: 1 | Status: SHIPPED | OUTPATIENT
Start: 2021-08-24 | End: 2022-02-04

## 2021-08-24 RX ORDER — LANCETS 28 GAUGE
EACH MISCELLANEOUS
Qty: 200 EACH | Refills: 1 | Status: SHIPPED | OUTPATIENT
Start: 2021-08-24 | End: 2021-08-26 | Stop reason: SDUPTHER

## 2021-08-24 RX ORDER — LISINOPRIL 10 MG/1
10 TABLET ORAL DAILY
Qty: 90 TABLET | Refills: 1 | Status: SHIPPED | OUTPATIENT
Start: 2021-08-24 | End: 2022-03-01 | Stop reason: SDUPTHER

## 2021-08-24 NOTE — PROGRESS NOTES
"Chief Complaint  Follow-up (6 month follow up ), Diabetes, Hypertension, and Coronary Artery Disease    Subjective          Edward Srinivasan presents to [facility] for f/u regarding DM2, HTN and CAD.    He has brought his home glucose readings which show glucose 105-115 in the mornings. He is tolerating Tradjenta well and without side effects; he is also consistent with following a low-carb, low-sugar diet.  He did stop Meclizine as advised 2/2021 and has done well without additional episodes of dizziness.  He has known CAD and is followed by cardiology, echo 5/2021 showed moderate aortic stenosis which is being followed. He denies edema of lower extremities and/or dyspnea with exertion.  He is followed by Dr. Floyd for carotid stenosis, due for repeat ultrasound 12/2021.      Objective   Vital Signs:   /65 (BP Location: Left arm, Patient Position: Sitting, Cuff Size: Adult)   Pulse 78   Temp 98.6 °F (37 °C)   Ht 170.2 cm (67.01\")   Wt 66.7 kg (147 lb)   SpO2 98%   BMI 23.02 kg/m²     Physical Exam  Constitutional:       Appearance: He is well-developed. He is not ill-appearing.   HENT:      Head: Normocephalic.      Right Ear: Hearing, tympanic membrane and external ear normal.      Left Ear: Hearing, tympanic membrane and external ear normal.      Nose: Nose normal. No nasal deformity, mucosal edema or rhinorrhea.      Right Sinus: No maxillary sinus tenderness or frontal sinus tenderness.      Left Sinus: No maxillary sinus tenderness or frontal sinus tenderness.      Mouth/Throat:      Dentition: Normal dentition.   Eyes:      General: Lids are normal.         Right eye: No discharge.         Left eye: No discharge.      Conjunctiva/sclera: Conjunctivae normal.      Right eye: No exudate.     Left eye: No exudate.  Neck:      Thyroid: No thyroid mass or thyromegaly.      Vascular: No carotid bruit.      Trachea: Trachea normal.   Cardiovascular:      Rate and Rhythm: " Regular rhythm.      Pulses: Normal pulses.      Heart sounds: Murmur heard.   Systolic murmur is present with a grade of 3/6.     Pulmonary:      Effort: No respiratory distress.      Breath sounds: Normal breath sounds. No decreased breath sounds, wheezing, rhonchi or rales.   Abdominal:      General: Bowel sounds are normal.      Palpations: Abdomen is soft.      Tenderness: There is no abdominal tenderness.   Musculoskeletal:      Cervical back: Normal range of motion. No edema.   Lymphadenopathy:      Head:      Right side of head: No submental, submandibular, tonsillar, preauricular, posterior auricular or occipital adenopathy.      Left side of head: No submental, submandibular, tonsillar, preauricular, posterior auricular or occipital adenopathy.   Skin:     General: Skin is warm and dry.      Nails: There is no clubbing.   Neurological:      Mental Status: He is alert.   Psychiatric:         Behavior: Behavior is cooperative.        Result Review :   The following data was reviewed by: FABIAN Arnold on 08/24/2021:  Common labs    Common Labsle 3/18/21 3/18/21 3/18/21 4/15/21 4/15/21 4/15/21 8/24/21 8/24/21 8/24/21 8/24/21    0910 0910 0910 1129 1129 1152 0936 0936 0936 0936   Glucose     95        Glucose        136 (A)     BUN     16   16     Creatinine     0.73 0.70  0.78     eGFR Non African Am     104   96     eGFR  Am        116     Sodium     134   135 (A)     Potassium     4.4   4.9     Chloride     100   98     Calcium     8.9   8.6     Total Protein        6.4     Albumin     4.00   3.90     Total Bilirubin     0.3   0.4     Alkaline Phosphatase     78   73     AST (SGOT)     22   18     ALT (SGPT)     17   14     WBC    7.45      7.80   Hemoglobin    11.1 (A)      11.4 (A)   Hematocrit    33.4 (A)      34.6 (A)   Platelets    177      180   Total Cholesterol   81      83    Triglycerides   45      42    HDL Cholesterol   40      37 (A)    LDL Cholesterol    29      34    Hemoglobin  A1C 5.70 (A)      6.10 (A)      Microalbumin, Urine  6.0           (A) Abnormal value       Comments are available for some flowsheets but are not being displayed.           Data reviewed: Consultant notes cardiology 11/2020          Assessment and Plan    Diagnoses and all orders for this visit:    1. Type 2 diabetes mellitus with diabetic polyneuropathy, without long-term current use of insulin (CMS/[facility]) (Primary)  Assessment & Plan:  He is tolerating Tradjenta, home glucose readings are good. Recheck A1c today.    Orders:  -     linagliptin (TRADJENTA) 5 MG tablet tablet; Take 1 tablet by mouth Daily.  Dispense: 90 tablet; Refill: 3  -     Lancets (freestyle) lancets; USE ONE LANCET TO TEST TWO TIMES A DAY AS INSTRUCTED  Dispense: 200 each; Refill: 1  -     glucose blood (FREESTYLE LITE) test strip; 1 each by Other route 2 (Two) Times a Day. use 1 strip to test twice a day  Dispense: 200 each; Refill: 1  -     Hemoglobin A1c  -     Comprehensive Metabolic Panel    2. Essential hypertension, benign  Assessment & Plan:  Hypertension is unchanged.  Continue current treatment regimen.  Dietary sodium restriction.  Stop smoking.  Blood pressure will be reassessed at the next regular appointment.    Orders:  -     lisinopril (PRINIVIL,ZESTRIL) 10 MG tablet; Take 1 tablet by mouth Daily.  Dispense: 90 tablet; Refill: 1  -     CBC & Differential    3. Coronary artery disease involving native coronary artery of native heart without angina pectoris  Assessment & Plan:  8/14/2015: stent placement to mid left circumflex, managed on ASA & statin therapy        4. Pure hypercholesterolemia  Assessment & Plan:  He denies myalgias with Atorvastatin, recheck lipid panel today.    Orders:  -     atorvastatin (LIPITOR) 40 MG tablet; Take 1 tablet by mouth Daily.  Dispense: 90 tablet; Refill: 1  -     Lipid Panel    5. Gastroesophageal reflux disease  Assessment & Plan:  Sx stable with daily Omeprazole    Orders:  -     omeprazole  (priLOSEC) 40 MG capsule; Take 1 capsule by mouth Daily.  Dispense: 90 capsule; Refill: 1    6. Aortic valve stenosis, etiology of cardiac valve disease unspecified  Assessment & Plan:  Echo 5/2021: EF 61/65%, moderate aortic stenosis      7. Bilateral carotid artery stenosis  Assessment & Plan:  He is followed by Dr. Floyd and states his next carotid doppler is due 12/2021 (previous ultrasounds not available in chart)      8. Rheumatoid arthritis involving both feet with positive rheumatoid factor (CMS/[facility])  Assessment & Plan:  Bilateral wrists, hands and shoulders  He was previously managed on Cimzia injections, discontinued 5/2019 when dx with Diffuse large B-cell lymphoma.  He reports pain is stable, no current medications.          Follow Up   Return in about 6 months (around 2/24/2022).  Patient was given instructions and counseling regarding his condition or for health maintenance advice. Please see specific information pulled into the AVS if appropriate.

## 2021-08-25 PROBLEM — I35.0 AORTIC VALVE STENOSIS: Status: ACTIVE | Noted: 2021-08-25

## 2021-08-25 PROBLEM — I35.0 AORTIC VALVE STENOSIS: Chronic | Status: ACTIVE | Noted: 2021-08-25

## 2021-08-25 NOTE — ASSESSMENT & PLAN NOTE
Hypertension is unchanged.  Continue current treatment regimen.  Dietary sodium restriction.  Stop smoking.  Blood pressure will be reassessed at the next regular appointment.

## 2021-08-25 NOTE — ASSESSMENT & PLAN NOTE
He is followed by Dr. Floyd and states his next carotid doppler is due 12/2021 (previous ultrasounds not available in chart)

## 2021-08-25 NOTE — ASSESSMENT & PLAN NOTE
Bilateral wrists, hands and shoulders  He was previously managed on Cimzia injections, discontinued 5/2019 when dx with Diffuse large B-cell lymphoma.  He reports pain is stable, no current medications.

## 2021-08-25 NOTE — ASSESSMENT & PLAN NOTE
A&OX3 c/o rash to entire body that began six days ago, pt recently had a spinal fusion on dec 18, pt was seen by dermatologist and placed on Prednisone and Benadryl, pt was restarted on Gabapentin three days and rash became worst  denies sob cp fever chills Echo 5/2021: EF 61/65%, moderate aortic stenosis   How Severe Are Your Spot(S)?: moderate Have Your Spot(S) Been Treated In The Past?: has not been treated Hpi Title: Evaluation of Skin Lesions

## 2021-08-26 DIAGNOSIS — E11.42 TYPE 2 DIABETES MELLITUS WITH DIABETIC POLYNEUROPATHY, WITHOUT LONG-TERM CURRENT USE OF INSULIN (HCC): Chronic | ICD-10-CM

## 2021-08-26 RX ORDER — BLOOD-GLUCOSE METER
1 KIT MISCELLANEOUS 2 TIMES DAILY
Qty: 200 EACH | Refills: 1 | Status: SHIPPED | OUTPATIENT
Start: 2021-08-26 | End: 2022-01-20

## 2021-08-26 RX ORDER — LANCETS 28 GAUGE
EACH MISCELLANEOUS
Qty: 200 EACH | Refills: 1 | Status: SHIPPED | OUTPATIENT
Start: 2021-08-26 | End: 2021-09-20 | Stop reason: SDUPTHER

## 2021-08-26 NOTE — TELEPHONE ENCOUNTER
Caller: Edward Srinivasan    Relationship: Self    Medication needed:   Requested Prescriptions     Pending Prescriptions Disp Refills   • Lancets (freestyle) lancets 200 each 1     Sig: USE ONE LANCET TO TEST TWO TIMES A DAY AS INSTRUCTED   • glucose blood (FREESTYLE LITE) test strip 200 each 1     Si each by Other route 2 (Two) Times a Day. use 1 strip to test twice a day     What is the patient's preferred pharmacy: [facility] - [place] [address] [address] [facility] - [phone] [facility] [phone] FX

## 2021-09-20 DIAGNOSIS — E11.42 TYPE 2 DIABETES MELLITUS WITH DIABETIC POLYNEUROPATHY, WITHOUT LONG-TERM CURRENT USE OF INSULIN (HCC): Chronic | ICD-10-CM

## 2021-09-20 RX ORDER — LANCETS 28 GAUGE
EACH MISCELLANEOUS
Qty: 200 EACH | Refills: 1 | Status: SHIPPED | OUTPATIENT
Start: 2021-09-20 | End: 2022-01-01

## 2021-09-20 NOTE — TELEPHONE ENCOUNTER
Caller: Edward Srinivasan    Relationship: Self    Best call back number: [phone]    Medication needed:   Requested Prescriptions     Pending Prescriptions Disp Refills   • Lancets (freestyle) lancets 200 each 1     Sig: USE ONE LANCET TO TEST TWO TIMES A DAY AS INSTRUCTED       When do you need the refill by: ASAP    What additional details did the patient provide when requesting the medication: PATIENT CALLING IN REGARDS TO REQUEST REFILL. PLEASE ADVISE THANK YOU!    Does the patient have less than a 3 day supply:  [] Yes  [x] No    What is the patient's preferred pharmacy: [facility] [address] [place] [address] BYPASS AT [facility] & (NESHA PIEDRA) - [phone] [facility] [phone] FX

## 2021-09-28 ENCOUNTER — INFUSION (OUTPATIENT)
Dept: ONCOLOGY | Facility: HOSPITAL | Age: 81
End: 2021-09-28

## 2021-09-28 ENCOUNTER — HOSPITAL ENCOUNTER (OUTPATIENT)
Dept: PET IMAGING | Facility: HOSPITAL | Age: 81
Discharge: HOME | End: 2021-09-28
Admitting: INTERNAL MEDICINE

## 2021-09-28 DIAGNOSIS — C83.33 DIFFUSE LARGE B-CELL LYMPHOMA OF INTRA-ABDOMINAL LYMPH NODES (HCC): ICD-10-CM

## 2021-09-28 LAB
ALBUMIN SERPL-MCNC: 3.8 G/DL (ref 3.5–5.2)
ALBUMIN/GLOB SERPL: 1.4 G/DL (ref 1.1–2.4)
ALP SERPL-CCNC: 71 U/L (ref 38–116)
ALT SERPL W P-5'-P-CCNC: 13 U/L (ref 0–41)
ANION GAP SERPL CALCULATED.3IONS-SCNC: 8.1 MMOL/L (ref 5–15)
AST SERPL-CCNC: 18 U/L (ref 0–40)
BASOPHILS # BLD AUTO: 0.03 10*3/MM3 (ref 0–0.2)
BASOPHILS NFR BLD AUTO: 0.4 % (ref 0–1.5)
BILIRUB SERPL-MCNC: 0.5 MG/DL (ref 0.2–1.2)
BUN SERPL-MCNC: 20 MG/DL (ref 6–20)
BUN/CREAT SERPL: 26 (ref 7.3–30)
CALCIUM SPEC-SCNC: 8.5 MG/DL (ref 8.5–10.2)
CHLORIDE SERPL-SCNC: 99 MMOL/L (ref 98–107)
CO2 SERPL-SCNC: 25.9 MMOL/L (ref 22–29)
CREAT BLDA-MCNC: 0.8 MG/DL (ref 0.6–1.3)
CREAT SERPL-MCNC: 0.77 MG/DL (ref 0.7–1.3)
DEPRECATED RDW RBC AUTO: 50.9 FL (ref 37–54)
EOSINOPHIL # BLD AUTO: 0.33 10*3/MM3 (ref 0–0.4)
EOSINOPHIL NFR BLD AUTO: 4.1 % (ref 0.3–6.2)
ERYTHROCYTE [DISTWIDTH] IN BLOOD BY AUTOMATED COUNT: 15.2 % (ref 12.3–15.4)
GFR SERPL CREATININE-BSD FRML MDRD: 97 ML/MIN/1.73
GLOBULIN UR ELPH-MCNC: 2.8 GM/DL (ref 1.8–3.5)
GLUCOSE SERPL-MCNC: 100 MG/DL (ref 74–124)
HCT VFR BLD AUTO: 31.2 % (ref 37.5–51)
HGB BLD-MCNC: 10.1 G/DL (ref 13–17.7)
IMM GRANULOCYTES # BLD AUTO: 0.03 10*3/MM3 (ref 0–0.05)
IMM GRANULOCYTES NFR BLD AUTO: 0.4 % (ref 0–0.5)
LDH SERPL-CCNC: 170 U/L (ref 99–259)
LYMPHOCYTES # BLD AUTO: 1.23 10*3/MM3 (ref 0.7–3.1)
LYMPHOCYTES NFR BLD AUTO: 15.4 % (ref 19.6–45.3)
MCH RBC QN AUTO: 29.7 PG (ref 26.6–33)
MCHC RBC AUTO-ENTMCNC: 32.4 G/DL (ref 31.5–35.7)
MCV RBC AUTO: 91.8 FL (ref 79–97)
MONOCYTES # BLD AUTO: 0.76 10*3/MM3 (ref 0.1–0.9)
MONOCYTES NFR BLD AUTO: 9.5 % (ref 5–12)
NEUTROPHILS NFR BLD AUTO: 5.6 10*3/MM3 (ref 1.7–7)
NEUTROPHILS NFR BLD AUTO: 70.2 % (ref 42.7–76)
NRBC BLD AUTO-RTO: 0 /100 WBC (ref 0–0.2)
PLATELET # BLD AUTO: 144 10*3/MM3 (ref 140–450)
PMV BLD AUTO: 8.3 FL (ref 6–12)
POTASSIUM SERPL-SCNC: 4.3 MMOL/L (ref 3.5–4.7)
PROT SERPL-MCNC: 6.6 G/DL (ref 6.3–8)
RBC # BLD AUTO: 3.4 10*6/MM3 (ref 4.14–5.8)
SODIUM SERPL-SCNC: 133 MMOL/L (ref 134–145)
WBC # BLD AUTO: 7.98 10*3/MM3 (ref 3.4–10.8)

## 2021-09-28 PROCEDURE — 82565 ASSAY OF CREATININE: CPT

## 2021-09-28 PROCEDURE — 25010000002 IOPAMIDOL 61 % SOLUTION: Performed by: INTERNAL MEDICINE

## 2021-09-28 PROCEDURE — 74177 CT ABD & PELVIS W/CONTRAST: CPT

## 2021-09-28 PROCEDURE — 71260 CT THORAX DX C+: CPT

## 2021-09-28 PROCEDURE — 85025 COMPLETE CBC W/AUTO DIFF WBC: CPT

## 2021-09-28 PROCEDURE — 80053 COMPREHEN METABOLIC PANEL: CPT

## 2021-09-28 PROCEDURE — 36591 DRAW BLOOD OFF VENOUS DEVICE: CPT

## 2021-09-28 PROCEDURE — 83615 LACTATE (LD) (LDH) ENZYME: CPT

## 2021-09-28 PROCEDURE — 0 DIATRIZOATE MEGLUMINE & SODIUM PER 1 ML: Performed by: INTERNAL MEDICINE

## 2021-09-28 RX ADMIN — IOPAMIDOL 85 ML: 612 INJECTION, SOLUTION INTRAVENOUS at 11:59

## 2021-09-28 RX ADMIN — DIATRIZOATE MEGLUMINE AND DIATRIZOATE SODIUM 30 ML: 660; 100 LIQUID ORAL; RECTAL at 11:20

## 2021-10-04 NOTE — PROGRESS NOTES
"[facility] OUTPATIENT FOLLOW UP CLINIC VISIT    REASON FOR FOLLOW-UP:    1.  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  CT imaging of the abdomen and pelvis on 5/8/2019 shows multiple small tissue nodules in the perirenal fat adjacent to the left kidney and a soft tissue mass interposed between the left psoas muscle and spine.  2.  RA previously on Cimzia (certollizumab) injections by Dr. Velásquez  3.  Lumbar puncture 5/31/2019 negative for involvement with lymphoma  4.  Bone marrow aspiration and biopsy 5/29/2018 negative for lymphoma  5.  PET scan on 5/30/2019 with hypermetabolic perinephric soft tissue lesions with mesenteric lymphadenopathy, left paraspinal mass at L5, 6 cm intramuscular mass in the right upper thigh, lesion at the left clavicle and a 5 cm soft tissue mass between the right scapula and right posterior fourth rib.  6.  Right subclavian Mediport placed by Dr. Herrera on 6/12/2019.  7.  Admission to [facility] for cycle #1 of dose adjusted R-EPOCH on 6/17/2019.  Plan for a lumbar puncture with intrathecal methotrexate with each cycle.  8.  6 cycles of therapy with lumbar punctures with intrathecal methotrexate administered with each cycle complete as of 10/4/2019.  9.  PET scan on 10/15/2019 negative aside from hypermetabolic activity in the bone marrow and in the spleen.    HISTORY OF PRESENT ILLNESS:  Edward Srinivasan is a 81 y.o. male who returns today for follow up of the above issue.     He continues to do well.  No fevers chills night sweats or unintended weight loss.  No palpable lymphadenopathy.  He denies significant fatigue.      REVIEW OF SYSTEMS:  As per the HPI      Vitals:    10/05/21 1028   BP: 110/49   Pulse: 85   Resp: 16   Temp: 96.9 °F (36.1 °C)   TempSrc: Temporal   SpO2: 98%   Weight: 66.5 kg (146 lb 9.6 oz)   Height: 170.2 cm (67.01\")   PainSc: " "0-No pain  Comment: lymphojma       PHYSICAL EXAMINATION:  GENERAL:  Well-developed well-nourished male; awake, alert and oriented, in no acute distress.  Wearing a face mask   SKIN:  Warm and dry, without rashes, purpura, or petechiae.    HEAD:  Normocephalic, atraumatic.  EARS:  Hearing intact.  LYMPHATICS:  No palpable cervical, supraclavicular, or axillary lymphadenopathy.  CHEST: Mild wheeze at the left base.  Otherwise clear to auscultation bilaterally.  HEART:  Regular rate; normal rhythm.  Grade 2 out of 6 systolic murmur  EXTREMITIES: No clubbing cyanosis or edema.  NEUROLOGICAL:  No focal neurologic deficits.    DIAGNOSTIC DATA:  Lactate Dehydrogenase (09/28/2021 11:29)  Comprehensive Metabolic Panel (09/28/2021 11:29)  CBC & Differential (09/28/2021 11:29)      IMAGING:   CT Abdomen Pelvis With Contrast (09/28/2021 12:21)  CT Chest With Contrast Diagnostic (09/28/2021 12:21)    CT images 9/28/2021 images personally reviewed.  No new or enlarging adenopathy.    ASSESSMENT:  This is a 81 y.o. 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    · CT imaging of the abdomen and pelvis on 5/8/2019 shows multiple small tissue nodules in the perirenal fat adjacent to the left kidney and a soft tissue mass interposed between the left psoas muscle and spine.    · Baseline PET scan on 5/30/2019 with hypermetabolic perinephric soft tissue lesions with mesenteric lymphadenopathy, left paraspinal mass at L5, 6 cm intramuscular mass in the right upper thigh, lesion at the left clavicle and a 5 cm soft tissue mass between the right scapula and right posterior fourth rib.  · Negative lumbar puncture.  Negative bone marrow biopsy.    · He has now completed 6 cycles of dose adjusted R-EPOCH with discharge following cycle #6 on 10/4/2019.    · He received lumbar punctures with intrathecal methotrexate with each " cycle.    · His end of treatment PET scan on 10/15/2019 is negative for lymphoma.    · CT 5/4/2020 negative  · CT imaging 10/27/2020 with no evidence for recurrence.  He is now 1 year from completing therapy.  · CT imaging 4/15/2021 negative for disease  · CT imaging 9/28/2021 with no evidence for lymphoma    *RA previously on Cimzia (certollizumab) injections by Dr. Velásquez.  He did have some worsening pain in his right wrist and hands.  He will follow-up with Dr. Velásquez regarding this.  He should not be on any medication that could potentially lead to malignancy.  He did not complain of arthritis pain today.    *Fatigue from chemotherapy: Resolved    *Cytopenias from chemotherapy:   · White blood cell count has normalized  · Platelets normal  · Hemoglobin low at 10.1 with a normal MCV of 91.8    *Normocytic anemia  · Hemoglobin is lower at 10.1 , down from 11.4.  Monitor closely.    PLAN:  1. No further imaging is necessary at this time.  Plan imaging as needed.  2. Port flush in 2 and 4 months with a CBC.    3. I will see him back in 6 months with labs and a port flush. He desires to keep his port for now.  4. He should get his third Pfizer COVID-19 vaccine if he has not already.

## 2021-10-05 ENCOUNTER — APPOINTMENT (OUTPATIENT)
Dept: LAB | Facility: HOSPITAL | Age: 81
End: 2021-10-05

## 2021-10-05 ENCOUNTER — OFFICE VISIT (OUTPATIENT)
Dept: ONCOLOGY | Facility: CLINIC | Age: 81
End: 2021-10-05

## 2021-10-05 VITALS
WEIGHT: 146.6 LBS | HEIGHT: 67 IN | DIASTOLIC BLOOD PRESSURE: 49 MMHG | TEMPERATURE: 96.9 F | OXYGEN SATURATION: 98 % | HEART RATE: 85 BPM | BODY MASS INDEX: 23.01 KG/M2 | SYSTOLIC BLOOD PRESSURE: 110 MMHG | RESPIRATION RATE: 16 BRPM

## 2021-10-05 DIAGNOSIS — C83.30 DIFFUSE LARGE B-CELL LYMPHOMA, UNSPECIFIED BODY REGION (HCC): Primary | ICD-10-CM

## 2021-10-05 DIAGNOSIS — D64.9 NORMOCYTIC ANEMIA: ICD-10-CM

## 2021-10-05 PROCEDURE — 99214 OFFICE O/P EST MOD 30 MIN: CPT | Performed by: INTERNAL MEDICINE

## 2021-12-04 NOTE — NURSING NOTE
"Nursing Chemotherapy Verification    Chemotherapy Regimen:   Treatment Plans     Name Type Plan dates Plan Provider         Active    IP LYMPHOMA R-EPOCH (Dose Adjusted) RiTUXimab / Etoposide / DOXOrubicin / VinCRIStine / Cyclophosphamide / PredniSONE ONCOLOGY TREATMENT  5/27/2019 - Present Gabriele Garcia MD                     Current height and weight: 170.2 cm (67.01\") 63.8 kg (140 lb 9.6 oz)  Calculated BSA from current height and weight (Jaz):   1.74  Relevant Labs  Results from last 7 days   Lab Units 09/13/19  0350 09/12/19  0409 09/11/19  0510   WBC 10*3/mm3 16.30* 21.96* 16.61*   HEMOGLOBIN g/dL 10.3* 10.7* 7.9*   HEMATOCRIT % 30.9* 32.9* 24.5*   PLATELETS 10*3/mm3 116* 135* 152     Lab Results   Component Value Date    NEUTROABS 15.86 (H) 09/13/2019       Results from last 7 days   Lab Units 09/13/19  0350 09/12/19 0409 09/11/19  0510   CREATININE mg/dL 0.78 0.78 0.88       Serum creatinine: 0.78 mg/dL 09/13/19 0350  Estimated creatinine clearance: 69.8 mL/min    Lab Results   Component Value Date    HEPBCAB Negative 06/17/2019       Verification attestation:  I have personally reviewed the planned regimen and its administration and dosing. I understand the potential side effects.The patient has been instructed on the regimen, potential side effects, and self care measures; the consent form has been completed. I have confirmed that the appropriate premedication, prehydration, post medication and/or emergency medications are ordered in addition to the chemotherapy.    I have independently verified that the height and weight is current and calculated the BSA. I have verified the doses with the planned regimen and have clarified any deviations with the physician (Dr. Bocanegra). I have confirmed the route of administration and patient IV access.    Nurse: Gabriele Cheung RN  2nd verification Nurse: Elisha Lomeli RN  "
"Nursing Chemotherapy Verification    Chemotherapy Regimen:   Treatment Plans     Name Type Plan dates Plan Provider         Active    IP LYMPHOMA R-EPOCH (Dose Adjusted) RiTUXimab / Etoposide / DOXOrubicin / VinCRIStine / Cyclophosphamide / PredniSONE ONCOLOGY TREATMENT  5/27/2019 - Present Gabriele Garcia MD                     Current height and weight: 170.2 cm (67.01\") 63.8 kg (140 lb 9.6 oz)  Calculated BSA from current height and weight (Jaz): 1.74  Relevant Labs  Results from last 7 days   Lab Units 09/12/19  0409 09/11/19  0510 09/10/19  0416   WBC 10*3/mm3 21.96* 16.61* 17.54*   HEMOGLOBIN g/dL 10.7* 7.9* 8.1*   HEMATOCRIT % 32.9* 24.5* 25.4*   PLATELETS 10*3/mm3 135* 152 156     Lab Results   Component Value Date    NEUTROABS 20.92 (H) 09/12/2019       Results from last 7 days   Lab Units 09/12/19  0409 09/11/19  0510 09/10/19  0416   CREATININE mg/dL 0.78 0.88 0.58*       Serum creatinine: 0.78 mg/dL 09/12/19 0409  Estimated creatinine clearance: 69.8 mL/min    Lab Results   Component Value Date    HEPBCAB Negative 06/17/2019       Verification attestation:  I have personally reviewed the planned regimen and its administration and dosing. I understand the potential side effects.The patient has been instructed on the regimen, potential side effects, and self care measures; the consent form has been completed. I have confirmed that the appropriate premedication, prehydration, post medication and/or emergency medications are ordered in addition to the chemotherapy.    I have independently verified that the height and weight is current and calculated the BSA. I have verified the doses with the planned regimen and have clarified any deviations with the physician (Dr. Bocanegra). I have confirmed the route of administration and patient IV access.    Nurse: Anita Kaufman RN  2nd verification Nurse: Adelaida Thompson  "
"Patient called out and stated that his chemo line came unhooked. Patient states that he rolled over in bed and heard a \"pop\" There was no spill, no leak in the line. Chemo paused. Called pharmacy and asked for new chemo connectors, old connecters were sent. [facility] EDDIE TURNER Replaced connectors. Chemo restarted.  "
"Unable to do pre-call under \"Pre-call\" tab, not accessible, but called patient to review date/time/location of procedure on 9/9/19:  Voiced understanding. Stated no changes to allergies or medications.  "
Patient here for a LP with MTX injection.  
Dinh Vieyra

## 2021-12-15 ENCOUNTER — INFUSION (OUTPATIENT)
Dept: ONCOLOGY | Facility: HOSPITAL | Age: 81
End: 2021-12-15

## 2021-12-15 DIAGNOSIS — C83.30 DIFFUSE LARGE B-CELL LYMPHOMA, UNSPECIFIED BODY REGION (HCC): Primary | ICD-10-CM

## 2021-12-15 LAB
BASOPHILS # BLD AUTO: 0.04 10*3/MM3 (ref 0–0.2)
BASOPHILS NFR BLD AUTO: 0.6 % (ref 0–1.5)
DEPRECATED RDW RBC AUTO: 52 FL (ref 37–54)
EOSINOPHIL # BLD AUTO: 0.25 10*3/MM3 (ref 0–0.4)
EOSINOPHIL NFR BLD AUTO: 3.7 % (ref 0.3–6.2)
ERYTHROCYTE [DISTWIDTH] IN BLOOD BY AUTOMATED COUNT: 15.6 % (ref 12.3–15.4)
HCT VFR BLD AUTO: 32.2 % (ref 37.5–51)
HGB BLD-MCNC: 10.4 G/DL (ref 13–17.7)
IMM GRANULOCYTES # BLD AUTO: 0.02 10*3/MM3 (ref 0–0.05)
IMM GRANULOCYTES NFR BLD AUTO: 0.3 % (ref 0–0.5)
LYMPHOCYTES # BLD AUTO: 0.77 10*3/MM3 (ref 0.7–3.1)
LYMPHOCYTES NFR BLD AUTO: 11.5 % (ref 19.6–45.3)
MCH RBC QN AUTO: 29.7 PG (ref 26.6–33)
MCHC RBC AUTO-ENTMCNC: 32.3 G/DL (ref 31.5–35.7)
MCV RBC AUTO: 92 FL (ref 79–97)
MONOCYTES # BLD AUTO: 0.57 10*3/MM3 (ref 0.1–0.9)
MONOCYTES NFR BLD AUTO: 8.5 % (ref 5–12)
NEUTROPHILS NFR BLD AUTO: 5.07 10*3/MM3 (ref 1.7–7)
NEUTROPHILS NFR BLD AUTO: 75.4 % (ref 42.7–76)
NRBC BLD AUTO-RTO: 0 /100 WBC (ref 0–0.2)
PLATELET # BLD AUTO: 178 10*3/MM3 (ref 140–450)
PMV BLD AUTO: 8.1 FL (ref 6–12)
RBC # BLD AUTO: 3.5 10*6/MM3 (ref 4.14–5.8)
WBC NRBC COR # BLD: 6.72 10*3/MM3 (ref 3.4–10.8)

## 2021-12-15 PROCEDURE — 25010000002 HEPARIN LOCK FLUSH PER 10 UNITS: Performed by: INTERNAL MEDICINE

## 2021-12-15 PROCEDURE — 85025 COMPLETE CBC W/AUTO DIFF WBC: CPT

## 2021-12-15 PROCEDURE — 36591 DRAW BLOOD OFF VENOUS DEVICE: CPT

## 2021-12-15 RX ORDER — HEPARIN SODIUM (PORCINE) LOCK FLUSH IV SOLN 100 UNIT/ML 100 UNIT/ML
500 SOLUTION INTRAVENOUS AS NEEDED
Status: DISCONTINUED | OUTPATIENT
Start: 2021-12-15 | End: 2021-12-15 | Stop reason: HOSPADM

## 2021-12-15 RX ORDER — SODIUM CHLORIDE 0.9 % (FLUSH) 0.9 %
10 SYRINGE (ML) INJECTION AS NEEDED
Status: CANCELLED | OUTPATIENT
Start: 2021-12-15

## 2021-12-15 RX ORDER — SODIUM CHLORIDE 0.9 % (FLUSH) 0.9 %
10 SYRINGE (ML) INJECTION AS NEEDED
Status: DISCONTINUED | OUTPATIENT
Start: 2021-12-15 | End: 2021-12-15 | Stop reason: HOSPADM

## 2021-12-15 RX ORDER — HEPARIN SODIUM (PORCINE) LOCK FLUSH IV SOLN 100 UNIT/ML 100 UNIT/ML
500 SOLUTION INTRAVENOUS AS NEEDED
Status: CANCELLED | OUTPATIENT
Start: 2021-12-15

## 2021-12-15 RX ADMIN — Medication 500 UNITS: at 09:00

## 2021-12-15 RX ADMIN — SODIUM CHLORIDE, PRESERVATIVE FREE 10 ML: 5 INJECTION INTRAVENOUS at 09:00

## 2022-01-01 ENCOUNTER — INFUSION (OUTPATIENT)
Dept: ONCOLOGY | Facility: HOSPITAL | Age: 82
End: 2022-01-01

## 2022-01-01 ENCOUNTER — HOSPITAL ENCOUNTER (OUTPATIENT)
Dept: CARDIOLOGY | Facility: HOSPITAL | Age: 82
Discharge: HOME | End: 2022-08-17
Admitting: INTERNAL MEDICINE

## 2022-01-01 ENCOUNTER — OFFICE VISIT (OUTPATIENT)
Dept: ONCOLOGY | Facility: CLINIC | Age: 82
End: 2022-01-01

## 2022-01-01 ENCOUNTER — LAB (OUTPATIENT)
Dept: LAB | Facility: HOSPITAL | Age: 82
End: 2022-01-01

## 2022-01-01 ENCOUNTER — OFFICE VISIT (OUTPATIENT)
Dept: INTERNAL MEDICINE | Facility: CLINIC | Age: 82
End: 2022-01-01

## 2022-01-01 ENCOUNTER — OFFICE VISIT (OUTPATIENT)
Dept: CARDIOLOGY | Facility: CLINIC | Age: 82
End: 2022-01-01

## 2022-01-01 ENCOUNTER — TELEPHONE (OUTPATIENT)
Dept: ONCOLOGY | Facility: CLINIC | Age: 82
End: 2022-01-01

## 2022-01-01 ENCOUNTER — HOSPITAL ENCOUNTER (OUTPATIENT)
Dept: GENERAL RADIOLOGY | Facility: HOSPITAL | Age: 82
Discharge: HOME | End: 2022-12-02

## 2022-01-01 VITALS
WEIGHT: 142.5 LBS | HEIGHT: 67 IN | TEMPERATURE: 97.8 F | SYSTOLIC BLOOD PRESSURE: 115 MMHG | HEART RATE: 92 BPM | OXYGEN SATURATION: 98 % | RESPIRATION RATE: 18 BRPM | BODY MASS INDEX: 22.37 KG/M2 | DIASTOLIC BLOOD PRESSURE: 73 MMHG

## 2022-01-01 VITALS
SYSTOLIC BLOOD PRESSURE: 120 MMHG | HEIGHT: 66 IN | BODY MASS INDEX: 22.82 KG/M2 | DIASTOLIC BLOOD PRESSURE: 76 MMHG | HEART RATE: 84 BPM | TEMPERATURE: 97.8 F | WEIGHT: 142 LBS | OXYGEN SATURATION: 96 %

## 2022-01-01 VITALS
HEART RATE: 73 BPM | RESPIRATION RATE: 18 BRPM | SYSTOLIC BLOOD PRESSURE: 113 MMHG | OXYGEN SATURATION: 98 % | TEMPERATURE: 97.3 F | HEIGHT: 66 IN | WEIGHT: 142 LBS | DIASTOLIC BLOOD PRESSURE: 68 MMHG | BODY MASS INDEX: 22.82 KG/M2

## 2022-01-01 VITALS
DIASTOLIC BLOOD PRESSURE: 72 MMHG | HEIGHT: 66 IN | SYSTOLIC BLOOD PRESSURE: 118 MMHG | BODY MASS INDEX: 22.73 KG/M2 | OXYGEN SATURATION: 98 % | HEART RATE: 88 BPM | WEIGHT: 141.4 LBS

## 2022-01-01 VITALS
SYSTOLIC BLOOD PRESSURE: 118 MMHG | DIASTOLIC BLOOD PRESSURE: 60 MMHG | BODY MASS INDEX: 22.82 KG/M2 | WEIGHT: 142 LBS | OXYGEN SATURATION: 98 % | HEIGHT: 66 IN | HEART RATE: 70 BPM

## 2022-01-01 VITALS
WEIGHT: 141.4 LBS | TEMPERATURE: 97.7 F | DIASTOLIC BLOOD PRESSURE: 62 MMHG | HEIGHT: 65 IN | BODY MASS INDEX: 23.56 KG/M2 | HEART RATE: 90 BPM | OXYGEN SATURATION: 99 % | SYSTOLIC BLOOD PRESSURE: 130 MMHG

## 2022-01-01 VITALS
BODY MASS INDEX: 23.66 KG/M2 | WEIGHT: 142 LBS | OXYGEN SATURATION: 94 % | SYSTOLIC BLOOD PRESSURE: 118 MMHG | HEIGHT: 65 IN | DIASTOLIC BLOOD PRESSURE: 60 MMHG | TEMPERATURE: 98.1 F | HEART RATE: 98 BPM

## 2022-01-01 DIAGNOSIS — D64.9 NORMOCYTIC ANEMIA: Primary | ICD-10-CM

## 2022-01-01 DIAGNOSIS — R53.83 OTHER FATIGUE: Primary | ICD-10-CM

## 2022-01-01 DIAGNOSIS — E11.42 TYPE 2 DIABETES MELLITUS WITH DIABETIC POLYNEUROPATHY, WITHOUT LONG-TERM CURRENT USE OF INSULIN: Primary | Chronic | ICD-10-CM

## 2022-01-01 DIAGNOSIS — I35.0 AORTIC VALVE STENOSIS, ETIOLOGY OF CARDIAC VALVE DISEASE UNSPECIFIED: ICD-10-CM

## 2022-01-01 DIAGNOSIS — I35.0 NONRHEUMATIC AORTIC VALVE STENOSIS: ICD-10-CM

## 2022-01-01 DIAGNOSIS — C83.30 DIFFUSE LARGE B-CELL LYMPHOMA, UNSPECIFIED BODY REGION: ICD-10-CM

## 2022-01-01 DIAGNOSIS — E78.00 PURE HYPERCHOLESTEROLEMIA: Chronic | ICD-10-CM

## 2022-01-01 DIAGNOSIS — R63.0 DECREASED APPETITE: ICD-10-CM

## 2022-01-01 DIAGNOSIS — D64.9 NORMOCYTIC ANEMIA: ICD-10-CM

## 2022-01-01 DIAGNOSIS — C83.30 DIFFUSE LARGE B-CELL LYMPHOMA, UNSPECIFIED BODY REGION: Primary | ICD-10-CM

## 2022-01-01 DIAGNOSIS — R60.0 PEDAL EDEMA: ICD-10-CM

## 2022-01-01 DIAGNOSIS — R05.1 ACUTE COUGH: Primary | ICD-10-CM

## 2022-01-01 DIAGNOSIS — J40 BRONCHITIS: ICD-10-CM

## 2022-01-01 DIAGNOSIS — Z85.72 HISTORY OF DIFFUSE LARGE B-CELL LYMPHOMA: Primary | ICD-10-CM

## 2022-01-01 DIAGNOSIS — K21.9 GASTROESOPHAGEAL REFLUX DISEASE WITHOUT ESOPHAGITIS: Chronic | ICD-10-CM

## 2022-01-01 DIAGNOSIS — I10 ESSENTIAL HYPERTENSION, BENIGN: Chronic | ICD-10-CM

## 2022-01-01 DIAGNOSIS — Z85.72 HISTORY OF DIFFUSE LARGE B-CELL LYMPHOMA: ICD-10-CM

## 2022-01-01 DIAGNOSIS — I65.23 BILATERAL CAROTID ARTERY STENOSIS: Chronic | ICD-10-CM

## 2022-01-01 DIAGNOSIS — E11.42 TYPE 2 DIABETES MELLITUS WITH DIABETIC POLYNEUROPATHY, WITHOUT LONG-TERM CURRENT USE OF INSULIN: Chronic | ICD-10-CM

## 2022-01-01 DIAGNOSIS — I25.10 CORONARY ARTERY DISEASE INVOLVING NATIVE CORONARY ARTERY OF NATIVE HEART WITHOUT ANGINA PECTORIS: Primary | ICD-10-CM

## 2022-01-01 DIAGNOSIS — I77.9 CAROTID ARTERY DISEASE, UNSPECIFIED LATERALITY, UNSPECIFIED TYPE: ICD-10-CM

## 2022-01-01 LAB
ALBUMIN SERPL-MCNC: 3.3 G/DL (ref 3.5–5.2)
ALBUMIN SERPL-MCNC: 3.3 G/DL (ref 3.5–5.2)
ALBUMIN SERPL-MCNC: 3.4 G/DL (ref 3.5–5.2)
ALBUMIN/CREAT UR: <5 MG/G CREAT (ref 0–29)
ALBUMIN/GLOB SERPL: 0.9 G/DL (ref 1.1–2.4)
ALBUMIN/GLOB SERPL: 0.9 G/DL (ref 1.1–2.4)
ALBUMIN/GLOB SERPL: 1 G/DL (ref 1.1–2.4)
ALP SERPL-CCNC: 71 U/L (ref 38–116)
ALP SERPL-CCNC: 73 U/L (ref 38–116)
ALP SERPL-CCNC: 85 U/L (ref 38–116)
ALT SERPL W P-5'-P-CCNC: 10 U/L (ref 0–41)
ALT SERPL W P-5'-P-CCNC: 11 U/L (ref 0–41)
ALT SERPL W P-5'-P-CCNC: 18 U/L (ref 0–41)
ANION GAP SERPL CALCULATED.3IONS-SCNC: 12 MMOL/L (ref 5–15)
ANION GAP SERPL CALCULATED.3IONS-SCNC: 8.4 MMOL/L (ref 5–15)
ANION GAP SERPL CALCULATED.3IONS-SCNC: 8.8 MMOL/L (ref 5–15)
ANION GAP SERPL CALCULATED.3IONS-SCNC: 9.6 MMOL/L (ref 5–15)
AORTIC DIMENSIONLESS INDEX: 0.2 (DI)
AST SERPL-CCNC: 18 U/L (ref 0–40)
AST SERPL-CCNC: 20 U/L (ref 0–40)
AST SERPL-CCNC: 25 U/L (ref 0–40)
BASOPHILS # BLD AUTO: 0.02 10*3/MM3 (ref 0–0.2)
BASOPHILS # BLD AUTO: 0.03 10*3/MM3 (ref 0–0.2)
BASOPHILS # BLD AUTO: 0.03 10*3/MM3 (ref 0–0.2)
BASOPHILS # BLD AUTO: 0.05 10*3/MM3 (ref 0–0.2)
BASOPHILS NFR BLD AUTO: 0.4 % (ref 0–1.5)
BASOPHILS NFR BLD AUTO: 0.6 % (ref 0–1.5)
BASOPHILS NFR BLD AUTO: 0.7 % (ref 0–1.5)
BASOPHILS NFR BLD AUTO: 0.7 % (ref 0–1.5)
BH CV ECHO MEAS - ACS: 1.11 CM
BH CV ECHO MEAS - AI P1/2T: 1065 MSEC
BH CV ECHO MEAS - AO MAX PG: 49.2 MMHG
BH CV ECHO MEAS - AO MEAN PG: 30.1 MMHG
BH CV ECHO MEAS - AO ROOT DIAM: 3.7 CM
BH CV ECHO MEAS - AO V2 MAX: 350.5 CM/SEC
BH CV ECHO MEAS - AO V2 VTI: 90.4 CM
BH CV ECHO MEAS - AVA(I,D): 0.69 CM2
BH CV ECHO MEAS - EDV(CUBED): 88 ML
BH CV ECHO MEAS - EDV(MOD-SP2): 77 ML
BH CV ECHO MEAS - EDV(MOD-SP4): 62 ML
BH CV ECHO MEAS - EF(MOD-BP): 57 %
BH CV ECHO MEAS - EF(MOD-SP2): 57.1 %
BH CV ECHO MEAS - EF(MOD-SP4): 59.7 %
BH CV ECHO MEAS - EF_3D-VOL: 56 %
BH CV ECHO MEAS - ESV(MOD-SP2): 33 ML
BH CV ECHO MEAS - ESV(MOD-SP4): 25 ML
BH CV ECHO MEAS - FS: 26.9 %
BH CV ECHO MEAS - IVS/LVPW: 1.02 CM
BH CV ECHO MEAS - IVSD: 0.96 CM
BH CV ECHO MEAS - LAT PEAK E' VEL: 8.7 CM/SEC
BH CV ECHO MEAS - LV DIASTOLIC VOL/BSA (35-75): 35.9 CM2
BH CV ECHO MEAS - LV MASS(C)D: 139.1 GRAMS
BH CV ECHO MEAS - LV MAX PG: 2.9 MMHG
BH CV ECHO MEAS - LV MEAN PG: 1.26 MMHG
BH CV ECHO MEAS - LV SYSTOLIC VOL/BSA (12-30): 14.5 CM2
BH CV ECHO MEAS - LV V1 MAX: 85.1 CM/SEC
BH CV ECHO MEAS - LV V1 VTI: 19.2 CM
BH CV ECHO MEAS - LVIDD: 4.4 CM
BH CV ECHO MEAS - LVIDS: 3.3 CM
BH CV ECHO MEAS - LVOT AREA: 3.2 CM2
BH CV ECHO MEAS - LVOT DIAM: 2.03 CM
BH CV ECHO MEAS - LVPWD: 0.93 CM
BH CV ECHO MEAS - MED PEAK E' VEL: 8.5 CM/SEC
BH CV ECHO MEAS - MR MAX PG: 112.1 MMHG
BH CV ECHO MEAS - MR MAX VEL: 529.4 CM/SEC
BH CV ECHO MEAS - MV A DUR: 0.13 SEC
BH CV ECHO MEAS - MV A MAX VEL: 127.3 CM/SEC
BH CV ECHO MEAS - MV DEC SLOPE: 627.1 CM/SEC2
BH CV ECHO MEAS - MV DEC TIME: 0.22 MSEC
BH CV ECHO MEAS - MV E MAX VEL: 86.3 CM/SEC
BH CV ECHO MEAS - MV E/A: 0.68
BH CV ECHO MEAS - MV MAX PG: 6.8 MMHG
BH CV ECHO MEAS - MV MEAN PG: 2.6 MMHG
BH CV ECHO MEAS - MV P1/2T: 49.1 MSEC
BH CV ECHO MEAS - MV V2 VTI: 25.4 CM
BH CV ECHO MEAS - MVA(P1/2T): 4.5 CM2
BH CV ECHO MEAS - MVA(VTI): 2.44 CM2
BH CV ECHO MEAS - PA ACC TIME: 0.13 SEC
BH CV ECHO MEAS - PA PR(ACCEL): 18.8 MMHG
BH CV ECHO MEAS - PA V2 MAX: 121.9 CM/SEC
BH CV ECHO MEAS - PULM A REVS DUR: 0.11 SEC
BH CV ECHO MEAS - PULM A REVS VEL: 36 CM/SEC
BH CV ECHO MEAS - PULM DIAS VEL: 47.3 CM/SEC
BH CV ECHO MEAS - PULM S/D: 1.36
BH CV ECHO MEAS - PULM SYS VEL: 64.3 CM/SEC
BH CV ECHO MEAS - QP/QS: 0.49
BH CV ECHO MEAS - RAP SYSTOLE: 3 MMHG
BH CV ECHO MEAS - RV MAX PG: 2.49 MMHG
BH CV ECHO MEAS - RV V1 MAX: 79 CM/SEC
BH CV ECHO MEAS - RV V1 VTI: 17.7 CM
BH CV ECHO MEAS - RVOT DIAM: 1.48 CM
BH CV ECHO MEAS - RVSP: 33 MMHG
BH CV ECHO MEAS - SI(MOD-SP2): 25.4 ML/M2
BH CV ECHO MEAS - SI(MOD-SP4): 21.4 ML/M2
BH CV ECHO MEAS - SV(LVOT): 62 ML
BH CV ECHO MEAS - SV(MOD-SP2): 44 ML
BH CV ECHO MEAS - SV(MOD-SP4): 37 ML
BH CV ECHO MEAS - SV(RVOT): 30.4 ML
BH CV ECHO MEAS - TAPSE (>1.6): 2.14 CM
BH CV ECHO MEAS - TR MAX PG: 31.4 MMHG
BH CV ECHO MEAS - TR MAX VEL: 280 CM/SEC
BH CV ECHO MEASUREMENTS AVERAGE E/E' RATIO: 10.03
BH CV XLRA - RV BASE: 3.1 CM
BH CV XLRA - RV LENGTH: 6.1 CM
BH CV XLRA - RV MID: 2.43 CM
BH CV XLRA - TDI S': 10 CM/SEC
BILIRUB SERPL-MCNC: 0.3 MG/DL (ref 0.2–1.2)
BILIRUB SERPL-MCNC: 0.4 MG/DL (ref 0.2–1.2)
BILIRUB SERPL-MCNC: 0.4 MG/DL (ref 0.2–1.2)
BUN SERPL-MCNC: 21 MG/DL (ref 6–20)
BUN SERPL-MCNC: 22 MG/DL (ref 8–23)
BUN/CREAT SERPL: 18.5 (ref 7–25)
BUN/CREAT SERPL: 25.6 (ref 7.3–30)
BUN/CREAT SERPL: 26.9 (ref 7.3–30)
BUN/CREAT SERPL: 27.3 (ref 7.3–30)
CALCIUM SPEC-SCNC: 8.1 MG/DL (ref 8.5–10.2)
CALCIUM SPEC-SCNC: 8.4 MG/DL (ref 8.5–10.2)
CALCIUM SPEC-SCNC: 8.5 MG/DL (ref 8.5–10.2)
CALCIUM SPEC-SCNC: 8.5 MG/DL (ref 8.6–10.5)
CHLORIDE SERPL-SCNC: 98 MMOL/L (ref 98–107)
CHLORIDE SERPL-SCNC: 99 MMOL/L (ref 98–107)
CHOLEST SERPL-MCNC: 75 MG/DL (ref 0–200)
CO2 SERPL-SCNC: 24.2 MMOL/L (ref 22–29)
CO2 SERPL-SCNC: 24.4 MMOL/L (ref 22–29)
CO2 SERPL-SCNC: 24.6 MMOL/L (ref 22–29)
CO2 SERPL-SCNC: 25 MMOL/L (ref 22–29)
CREAT SERPL-MCNC: 0.77 MG/DL (ref 0.7–1.3)
CREAT SERPL-MCNC: 0.78 MG/DL (ref 0.7–1.3)
CREAT SERPL-MCNC: 0.82 MG/DL (ref 0.7–1.3)
CREAT SERPL-MCNC: 1.19 MG/DL (ref 0.76–1.27)
CREAT UR-MCNC: 57.7 MG/DL
DEPRECATED RDW RBC AUTO: 53.4 FL (ref 37–54)
DEPRECATED RDW RBC AUTO: 53.8 FL (ref 37–54)
DEPRECATED RDW RBC AUTO: 53.9 FL (ref 37–54)
DEPRECATED RDW RBC AUTO: 54 FL (ref 37–54)
EGFRCR SERPLBLD CKD-EPI 2021: 61.8 ML/MIN/1.73
EGFRCR SERPLBLD CKD-EPI 2021: 88.8 ML/MIN/1.73
EGFRCR SERPLBLD CKD-EPI 2021: 90.5 ML/MIN/1.73
EGFRCR SERPLBLD CKD-EPI 2021: 90.7 ML/MIN/1.73
EOSINOPHIL # BLD AUTO: 0.19 10*3/MM3 (ref 0–0.4)
EOSINOPHIL # BLD AUTO: 0.2 10*3/MM3 (ref 0–0.4)
EOSINOPHIL # BLD AUTO: 0.21 10*3/MM3 (ref 0–0.4)
EOSINOPHIL # BLD AUTO: 0.3 10*3/MM3 (ref 0–0.4)
EOSINOPHIL NFR BLD AUTO: 2.9 % (ref 0.3–6.2)
EOSINOPHIL NFR BLD AUTO: 4.1 % (ref 0.3–6.2)
EOSINOPHIL NFR BLD AUTO: 4.8 % (ref 0.3–6.2)
EOSINOPHIL NFR BLD AUTO: 5.5 % (ref 0.3–6.2)
ERYTHROCYTE [DISTWIDTH] IN BLOOD BY AUTOMATED COUNT: 16.2 % (ref 12.3–15.4)
ERYTHROCYTE [DISTWIDTH] IN BLOOD BY AUTOMATED COUNT: 16.3 % (ref 12.3–15.4)
EXPIRATION DATE: NORMAL
FLUAV AG UPPER RESP QL IA.RAPID: NOT DETECTED
FLUBV AG UPPER RESP QL IA.RAPID: NOT DETECTED
GLOBULIN UR ELPH-MCNC: 3.4 GM/DL (ref 1.8–3.5)
GLOBULIN UR ELPH-MCNC: 3.6 GM/DL (ref 1.8–3.5)
GLOBULIN UR ELPH-MCNC: 3.7 GM/DL (ref 1.8–3.5)
GLUCOSE SERPL-MCNC: 157 MG/DL (ref 65–99)
GLUCOSE SERPL-MCNC: 235 MG/DL (ref 74–124)
GLUCOSE SERPL-MCNC: 246 MG/DL (ref 74–124)
GLUCOSE SERPL-MCNC: 308 MG/DL (ref 74–124)
HBA1C MFR BLD: 6.1 % (ref 4.8–5.6)
HCT VFR BLD AUTO: 28.9 % (ref 37.5–51)
HCT VFR BLD AUTO: 30.1 % (ref 37.5–51)
HCT VFR BLD AUTO: 31.7 % (ref 37.5–51)
HCT VFR BLD AUTO: 31.8 % (ref 37.5–51)
HDLC SERPL-MCNC: 33 MG/DL (ref 40–60)
HGB BLD-MCNC: 10 G/DL (ref 13–17.7)
HGB BLD-MCNC: 10.2 G/DL (ref 13–17.7)
HGB BLD-MCNC: 9.4 G/DL (ref 13–17.7)
HGB BLD-MCNC: 9.6 G/DL (ref 13–17.7)
HGB RETIC QN AUTO: 33.3 PG (ref 29.8–36.1)
HGB RETIC QN AUTO: 34.8 PG (ref 29.8–36.1)
IMM GRANULOCYTES # BLD AUTO: 0.01 10*3/MM3 (ref 0–0.05)
IMM GRANULOCYTES # BLD AUTO: 0.02 10*3/MM3 (ref 0–0.05)
IMM GRANULOCYTES NFR BLD AUTO: 0.1 % (ref 0–0.5)
IMM GRANULOCYTES NFR BLD AUTO: 0.2 % (ref 0–0.5)
IMM GRANULOCYTES NFR BLD AUTO: 0.2 % (ref 0–0.5)
IMM GRANULOCYTES NFR BLD AUTO: 0.4 % (ref 0–0.5)
IMM RETICS NFR: 11.9 % (ref 3–15.8)
IMM RETICS NFR: 9.3 % (ref 3–15.8)
INTERNAL CONTROL: NORMAL
LDH SERPL-CCNC: 152 U/L (ref 99–259)
LDH SERPL-CCNC: 152 U/L (ref 99–259)
LDH SERPL-CCNC: 155 U/L (ref 99–259)
LDLC SERPL CALC-MCNC: 30 MG/DL (ref 0–100)
LEFT ATRIUM VOLUME INDEX: 23.2 ML/M2
LYMPHOCYTES # BLD AUTO: 0.77 10*3/MM3 (ref 0.7–3.1)
LYMPHOCYTES # BLD AUTO: 0.83 10*3/MM3 (ref 0.7–3.1)
LYMPHOCYTES # BLD AUTO: 0.86 10*3/MM3 (ref 0.7–3.1)
LYMPHOCYTES # BLD AUTO: 1.7 10*3/MM3 (ref 0.7–3.1)
LYMPHOCYTES NFR BLD AUTO: 12 % (ref 19.6–45.3)
LYMPHOCYTES NFR BLD AUTO: 15.8 % (ref 19.6–45.3)
LYMPHOCYTES NFR BLD AUTO: 17.7 % (ref 19.6–45.3)
LYMPHOCYTES NFR BLD AUTO: 36.6 % (ref 19.6–45.3)
Lab: NORMAL
MAXIMAL PREDICTED HEART RATE: 140 BPM
MCH RBC QN AUTO: 28.3 PG (ref 26.6–33)
MCH RBC QN AUTO: 29 PG (ref 26.6–33)
MCH RBC QN AUTO: 29.1 PG (ref 26.6–33)
MCH RBC QN AUTO: 29.3 PG (ref 26.6–33)
MCHC RBC AUTO-ENTMCNC: 31.5 G/DL (ref 31.5–35.7)
MCHC RBC AUTO-ENTMCNC: 31.9 G/DL (ref 31.5–35.7)
MCHC RBC AUTO-ENTMCNC: 32.1 G/DL (ref 31.5–35.7)
MCHC RBC AUTO-ENTMCNC: 32.5 G/DL (ref 31.5–35.7)
MCV RBC AUTO: 89.8 FL (ref 79–97)
MCV RBC AUTO: 90 FL (ref 79–97)
MCV RBC AUTO: 90.9 FL (ref 79–97)
MCV RBC AUTO: 90.9 FL (ref 79–97)
MICROALBUMIN UR-MCNC: <3 UG/ML
MONOCYTES # BLD AUTO: 0.55 10*3/MM3 (ref 0.1–0.9)
MONOCYTES # BLD AUTO: 0.66 10*3/MM3 (ref 0.1–0.9)
MONOCYTES # BLD AUTO: 0.67 10*3/MM3 (ref 0.1–0.9)
MONOCYTES # BLD AUTO: 0.71 10*3/MM3 (ref 0.1–0.9)
MONOCYTES NFR BLD AUTO: 12.3 % (ref 5–12)
MONOCYTES NFR BLD AUTO: 12.7 % (ref 5–12)
MONOCYTES NFR BLD AUTO: 15.3 % (ref 5–12)
MONOCYTES NFR BLD AUTO: 9.6 % (ref 5–12)
NEUTROPHILS NFR BLD AUTO: 2 10*3/MM3 (ref 1.7–7)
NEUTROPHILS NFR BLD AUTO: 2.77 10*3/MM3 (ref 1.7–7)
NEUTROPHILS NFR BLD AUTO: 3.56 10*3/MM3 (ref 1.7–7)
NEUTROPHILS NFR BLD AUTO: 43.2 % (ref 42.7–76)
NEUTROPHILS NFR BLD AUTO: 5.14 10*3/MM3 (ref 1.7–7)
NEUTROPHILS NFR BLD AUTO: 63.9 % (ref 42.7–76)
NEUTROPHILS NFR BLD AUTO: 65.6 % (ref 42.7–76)
NEUTROPHILS NFR BLD AUTO: 74.7 % (ref 42.7–76)
NRBC BLD AUTO-RTO: 0 /100 WBC (ref 0–0.2)
NRBC BLD AUTO-RTO: 0.5 /100 WBC (ref 0–0.2)
PLATELET # BLD AUTO: 179 10*3/MM3 (ref 140–450)
PLATELET # BLD AUTO: 180 10*3/MM3 (ref 140–450)
PLATELET # BLD AUTO: 208 10*3/MM3 (ref 140–450)
PLATELET # BLD AUTO: 209 10*3/MM3 (ref 140–450)
PMV BLD AUTO: 8.1 FL (ref 6–12)
PMV BLD AUTO: 8.1 FL (ref 6–12)
PMV BLD AUTO: 8.2 FL (ref 6–12)
PMV BLD AUTO: 9.2 FL (ref 6–12)
POTASSIUM SERPL-SCNC: 4.5 MMOL/L (ref 3.5–4.7)
POTASSIUM SERPL-SCNC: 4.5 MMOL/L (ref 3.5–4.7)
POTASSIUM SERPL-SCNC: 4.7 MMOL/L (ref 3.5–4.7)
POTASSIUM SERPL-SCNC: 4.9 MMOL/L (ref 3.5–5.2)
PROT SERPL-MCNC: 6.8 G/DL (ref 6.3–8)
PROT SERPL-MCNC: 6.9 G/DL (ref 6.3–8)
PROT SERPL-MCNC: 7 G/DL (ref 6.3–8)
RBC # BLD AUTO: 3.21 10*6/MM3 (ref 4.14–5.8)
RBC # BLD AUTO: 3.31 10*6/MM3 (ref 4.14–5.8)
RBC # BLD AUTO: 3.5 10*6/MM3 (ref 4.14–5.8)
RBC # BLD AUTO: 3.53 10*6/MM3 (ref 4.14–5.8)
RETICS # AUTO: 0.06 10*6/MM3 (ref 0.02–0.13)
RETICS # AUTO: 0.07 10*6/MM3 (ref 0.02–0.13)
RETICS/RBC NFR AUTO: 1.67 % (ref 0.7–1.9)
RETICS/RBC NFR AUTO: 1.99 % (ref 0.7–1.9)
SARS-COV-2 AG UPPER RESP QL IA.RAPID: NOT DETECTED
SINUS: 2.9 CM
SODIUM SERPL-SCNC: 131 MMOL/L (ref 134–145)
SODIUM SERPL-SCNC: 131 MMOL/L (ref 134–145)
SODIUM SERPL-SCNC: 133 MMOL/L (ref 134–145)
SODIUM SERPL-SCNC: 135 MMOL/L (ref 136–145)
STJ: 2.7 CM
STRESS TARGET HR: 119 BPM
TRIGL SERPL-MCNC: 46 MG/DL (ref 0–150)
TSH SERPL DL<=0.005 MIU/L-ACNC: 2.24 UIU/ML (ref 0.27–4.2)
VLDLC SERPL CALC-MCNC: 12 MG/DL (ref 5–40)
WBC NRBC COR # BLD: 4.34 10*3/MM3 (ref 3.4–10.8)
WBC NRBC COR # BLD: 4.64 10*3/MM3 (ref 3.4–10.8)
WBC NRBC COR # BLD: 5.43 10*3/MM3 (ref 3.4–10.8)
WBC NRBC COR # BLD: 6.89 10*3/MM3 (ref 3.4–10.8)

## 2022-01-01 PROCEDURE — 80053 COMPREHEN METABOLIC PANEL: CPT

## 2022-01-01 PROCEDURE — 99214 OFFICE O/P EST MOD 30 MIN: CPT | Performed by: NURSE PRACTITIONER

## 2022-01-01 PROCEDURE — 1159F MED LIST DOCD IN RCRD: CPT | Performed by: NURSE PRACTITIONER

## 2022-01-01 PROCEDURE — 80048 BASIC METABOLIC PNL TOTAL CA: CPT | Performed by: NURSE PRACTITIONER

## 2022-01-01 PROCEDURE — 36591 DRAW BLOOD OFF VENOUS DEVICE: CPT

## 2022-01-01 PROCEDURE — 1126F AMNT PAIN NOTED NONE PRSNT: CPT | Performed by: NURSE PRACTITIONER

## 2022-01-01 PROCEDURE — 87428 SARSCOV & INF VIR A&B AG IA: CPT | Performed by: NURSE PRACTITIONER

## 2022-01-01 PROCEDURE — 83615 LACTATE (LD) (LDH) ENZYME: CPT

## 2022-01-01 PROCEDURE — 99214 OFFICE O/P EST MOD 30 MIN: CPT | Performed by: INTERNAL MEDICINE

## 2022-01-01 PROCEDURE — 93306 TTE W/DOPPLER COMPLETE: CPT

## 2022-01-01 PROCEDURE — 99213 OFFICE O/P EST LOW 20 MIN: CPT | Performed by: NURSE PRACTITIONER

## 2022-01-01 PROCEDURE — G0439 PPPS, SUBSEQ VISIT: HCPCS | Performed by: NURSE PRACTITIONER

## 2022-01-01 PROCEDURE — 25010000002 HEPARIN LOCK FLUSH PER 10 UNITS: Performed by: NURSE PRACTITIONER

## 2022-01-01 PROCEDURE — 96523 IRRIG DRUG DELIVERY DEVICE: CPT

## 2022-01-01 PROCEDURE — 71046 X-RAY EXAM CHEST 2 VIEWS: CPT

## 2022-01-01 PROCEDURE — 85025 COMPLETE CBC W/AUTO DIFF WBC: CPT

## 2022-01-01 PROCEDURE — 85046 RETICYTE/HGB CONCENTRATE: CPT

## 2022-01-01 PROCEDURE — 93306 TTE W/DOPPLER COMPLETE: CPT | Performed by: INTERNAL MEDICINE

## 2022-01-01 PROCEDURE — 85025 COMPLETE CBC W/AUTO DIFF WBC: CPT | Performed by: NURSE PRACTITIONER

## 2022-01-01 PROCEDURE — 1170F FXNL STATUS ASSESSED: CPT | Performed by: NURSE PRACTITIONER

## 2022-01-01 PROCEDURE — 36415 COLL VENOUS BLD VENIPUNCTURE: CPT | Performed by: NURSE PRACTITIONER

## 2022-01-01 PROCEDURE — 99213 OFFICE O/P EST LOW 20 MIN: CPT | Performed by: INTERNAL MEDICINE

## 2022-01-01 RX ORDER — HEPARIN SODIUM (PORCINE) LOCK FLUSH IV SOLN 100 UNIT/ML 100 UNIT/ML
500 SOLUTION INTRAVENOUS AS NEEDED
Status: DISCONTINUED | OUTPATIENT
Start: 2022-01-01 | End: 2022-01-01 | Stop reason: HOSPADM

## 2022-01-01 RX ORDER — SODIUM CHLORIDE 0.9 % (FLUSH) 0.9 %
10 SYRINGE (ML) INJECTION AS NEEDED
Status: DISCONTINUED | OUTPATIENT
Start: 2022-01-01 | End: 2022-01-01 | Stop reason: HOSPADM

## 2022-01-01 RX ORDER — HEPARIN SODIUM (PORCINE) LOCK FLUSH IV SOLN 100 UNIT/ML 100 UNIT/ML
500 SOLUTION INTRAVENOUS AS NEEDED
Status: CANCELLED | OUTPATIENT
Start: 2022-01-01

## 2022-01-01 RX ORDER — BENZONATATE 100 MG/1
100 CAPSULE ORAL 3 TIMES DAILY PRN
Qty: 30 CAPSULE | Refills: 0 | Status: SHIPPED | OUTPATIENT
Start: 2022-01-01 | End: 2022-01-01

## 2022-01-01 RX ORDER — LANCETS 28 GAUGE
EACH MISCELLANEOUS
Qty: 100 EACH | Refills: 0 | Status: SHIPPED | OUTPATIENT
Start: 2022-01-01

## 2022-01-01 RX ORDER — AMOXICILLIN 500 MG/1
1000 CAPSULE ORAL 2 TIMES DAILY
Qty: 28 CAPSULE | Refills: 0 | Status: SHIPPED | OUTPATIENT
Start: 2022-01-01 | End: 2022-01-01

## 2022-01-01 RX ORDER — SODIUM CHLORIDE 0.9 % (FLUSH) 0.9 %
10 SYRINGE (ML) INJECTION AS NEEDED
Status: CANCELLED | OUTPATIENT
Start: 2022-01-01

## 2022-01-01 RX ORDER — BLOOD-GLUCOSE METER
KIT MISCELLANEOUS
Qty: 100 EACH | Refills: 0 | Status: SHIPPED | OUTPATIENT
Start: 2022-01-01

## 2022-01-01 RX ORDER — NITROGLYCERIN 0.4 MG/1
0.4 TABLET SUBLINGUAL
Qty: 25 TABLET | Refills: 4 | Status: SHIPPED | OUTPATIENT
Start: 2022-01-01 | End: 2022-01-01 | Stop reason: SDUPTHER

## 2022-01-01 RX ORDER — NITROGLYCERIN 0.4 MG/1
0.4 TABLET SUBLINGUAL
Qty: 25 TABLET | Refills: 4 | Status: SHIPPED | OUTPATIENT
Start: 2022-01-01

## 2022-01-01 RX ORDER — ATORVASTATIN CALCIUM 40 MG/1
TABLET, FILM COATED ORAL
Qty: 90 TABLET | Refills: 1 | Status: SHIPPED | OUTPATIENT
Start: 2022-01-01 | End: 2023-01-01

## 2022-01-01 RX ADMIN — Medication 500 UNITS: at 09:35

## 2022-01-01 RX ADMIN — Medication 10 ML: at 08:56

## 2022-01-01 RX ADMIN — Medication 10 ML: at 08:40

## 2022-01-01 RX ADMIN — Medication 10 ML: at 09:00

## 2022-01-01 RX ADMIN — Medication 500 UNITS: at 08:40

## 2022-01-01 RX ADMIN — Medication 500 UNITS: at 09:00

## 2022-01-01 RX ADMIN — Medication 10 ML: at 09:35

## 2022-01-01 RX ADMIN — Medication 500 UNITS: at 08:56

## 2022-01-18 ENCOUNTER — OFFICE VISIT (OUTPATIENT)
Dept: CARDIOLOGY | Facility: CLINIC | Age: 82
End: 2022-01-18

## 2022-01-18 VITALS
WEIGHT: 149 LBS | HEIGHT: 67 IN | DIASTOLIC BLOOD PRESSURE: 64 MMHG | BODY MASS INDEX: 23.39 KG/M2 | HEART RATE: 84 BPM | OXYGEN SATURATION: 99 % | SYSTOLIC BLOOD PRESSURE: 110 MMHG

## 2022-01-18 DIAGNOSIS — I25.10 CORONARY ARTERY DISEASE INVOLVING NATIVE CORONARY ARTERY OF NATIVE HEART WITHOUT ANGINA PECTORIS: Primary | ICD-10-CM

## 2022-01-18 DIAGNOSIS — I35.0 NONRHEUMATIC AORTIC VALVE STENOSIS: ICD-10-CM

## 2022-01-18 DIAGNOSIS — I77.9 CAROTID ARTERY DISEASE, UNSPECIFIED LATERALITY, UNSPECIFIED TYPE: ICD-10-CM

## 2022-01-18 PROCEDURE — 99213 OFFICE O/P EST LOW 20 MIN: CPT | Performed by: INTERNAL MEDICINE

## 2022-01-18 NOTE — PROGRESS NOTES
Date of Office Visit:  2022  Encounter Provider: Brian Harden MD  Place of Service: [facility] CARDIOLOGY  Patient Name: Edward Srinivasan  :1940    Chief complaint: Follow-up for coronary artery disease, aortic stenosis, and   carotid artery disease.    History of Present Illness:    I again had the pleasure of seeing your patient in cardiology office on 2022.  As you   well know, he is a very pleasant, 81 year-old, white male with a past medical history  significant for coronary artery disease, aortic stenosis, hyperlipidemia, and rheumatoid  arthritis who presents for follow-up. The patient was admitted to the hospital on  2015, with chest discomfort. He had been watching his grandson on the lawn   when he developed sudden chest discomfort, which radiated across his precordium   and was described as a pressure sensation. It then started to radiate into his neck   bilaterally, as well as his jaws. His initial electrocardiogram showed no ischemic   changes and his troponin was negative. He also had been recently diagnosed with   rheumatoid arthritis and was on prednisone. 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 A CT angiogram of the chest on admission in   the emergency room showed chronic obstructive pulmonary disease with bullae   formation; however, he also had an interstitial pattern with early honeycombing   consistent with pulmonary fibrosis. A subsequent CT on 2015, confirmed this. He   was seen by pulmonology as well.      He did undergo a Lexiscan Cardiolite stress test, which showed lateral ischemia. A   subsequent cardiac catheterization on 2015, by Dr. Stearns showed a 90%   ulcerated lesion in the mid left circumflex. He subsequently underwent placement of a   3.0 mm x 18 mm Xience drug-eluting stent, although there was a proximal edge   dissection necessitating placement of a second 3.0 mm x  12 mm Xience drug-eluting   stent proximal to the first stent.  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  He is followed by Dr. Garcia in oncology.      The patient presents today for follow-up.  He tells me that his cancer is still in remission,   and he still sees Dr. Garcia.  He is still smoking about 7 cigarettes/day.  He has baseline   dyspnea is the same, it is unchanged.  He has not had any chest pain or lightheaded   episodes.        Past Medical History:   Diagnosis Date   • Allergic rhinitis    • Aortic valve stenosis    • CAD (coronary artery disease)    • Cancer (HCC)     LYMPHOMA   • Carotid arterial disease (HCC)     Followed by Dr. Floyd.  Carotid Doppler on 9/21/17: 60-70% DENISE, 50-60% LICA.   • Chronic obstructive lung disease (HCC)    • Esophageal reflux     With a history of erosive gastritis   • Hyperlipidemia    • Hypertension    • Iron deficiency    • Legally blind     PT WEARS GLASSES    • Pulmonary fibrosis (HCC)     By CT scan on 8/12/15   • PVC (premature ventricular contraction)    • Rheumatoid arthritis (HCC)    • Skin cancer of face     15 year ago on left side of face   • Steroid-induced diabetes mellitus (HCC)    • Tobacco use        Past Surgical History:   Procedure Laterality Date   • BACK SURGERY  2010    L4-L5 discectomy   • CATARACT EXTRACTION Bilateral 1979   • CORONARY ANGIOPLASTY WITH STENT PLACEMENT  2015   • VASECTOMY  1979   • VENOUS ACCESS DEVICE (PORT) INSERTION N/A 6/12/2019    Procedure: INSERTION RIGHT VENOUS ACCESS DEVICE;  Surgeon: Philippe Herrera Jr., MD;  Location: [facility];  Service: General       Current Outpatient Medications on File Prior to Visit   Medication Sig Dispense Refill   • aspirin 81 MG EC tablet Take 81 mg by mouth Daily.     • atorvastatin (LIPITOR) 40 MG tablet Take 1 tablet by mouth Daily. 90 tablet 1   • beta carotene 59719 UNIT capsule Take 10,000 Units by mouth Daily.     • cetirizine  (ZyrTEC) 10 MG tablet Take 10 mg by mouth daily.     • Cholecalciferol (VITAMIN D-3 PO) Take 2,000 Int'l Units/day by mouth Daily.     • glucose blood (FREESTYLE LITE) test strip 1 each by Other route 2 (Two) Times a Day. use 1 strip to test twice a day 200 each 1   • Lancets (freestyle) lancets USE ONE LANCET TO TEST TWO TIMES A DAY AS INSTRUCTED 200 each 1   • linagliptin (TRADJENTA) 5 MG tablet tablet Take 1 tablet by mouth Daily. 90 tablet 3   • lisinopril (PRINIVIL,ZESTRIL) 10 MG tablet Take 1 tablet by mouth Daily. 90 tablet 1   • MAGNESIUM PO Take 500 mg by mouth Daily.     • nitroglycerin (NITROSTAT) 0.4 MG SL tablet Place 1 tablet under the tongue Every 5 (Five) Minutes As Needed (CHEST PAIN). Take no more than 3 doses in 15 minutes. 25 tablet 4   • omeprazole (priLOSEC) 40 MG capsule Take 1 capsule by mouth Daily. 90 capsule 1   • pyridoxine (B-6) 100 MG tablet Take 100 mg by mouth Daily.     • vitamin C (ASCORBIC ACID) 500 MG tablet Take 500 mg by mouth Daily.     • vitamin E 400 UNIT capsule Take 400 Units by mouth Daily.       Current Facility-Administered Medications on File Prior to Visit   Medication Dose Route Frequency Provider Last Rate Last Admin   • heparin flush (porcine) 100 UNIT/ML injection 500 Units  500 Units Intravenous PRN Gabriele Gracia MD   500 Units at 01/13/20 0954   • sodium chloride 0.9 % flush 10 mL  10 mL Intravenous PRN Gabriele Garcia MD   10 mL at 01/13/20 0953     Allergies as of 01/18/2022 - Reviewed 10/05/2021   Allergen Reaction Noted   • Oxaprozin Hives 11/20/2015     Social History     Socioeconomic History   • Marital status:      Spouse name: Radha   • Years of education: High school   Tobacco Use   • Smoking status: Current Every Day Smoker     Packs/day: 0.50     Years: 50.00     Pack years: 25.00     Types: Cigarettes   • Smokeless tobacco: Never Used   • Tobacco comment: 1/2 ppd, discussed benefits of cessation   Substance and Sexual Activity   • Alcohol use:  "No   • Drug use: No   • Sexual activity: Defer     Family History   Problem Relation Age of Onset   • Heart failure Mother         CHF   • Heart attack Father 54   • Heart disease Father    • Heart attack Brother 47   • Heart disease Brother         another brother with 4 vessel CABG in his 50s   • Coronary artery disease Other         multiple family members   • Malig Hyperthermia Neg Hx        Review of Systems   Constitutional: Positive for malaise/fatigue.   Neurological: Positive for numbness.   All other systems reviewed and are negative.     Objective:     Vitals:    01/18/22 0906   BP: 110/64   Pulse: 84   SpO2: 99%   Weight: 67.6 kg (149 lb)   Height: 170.2 cm (67.01\")     Body mass index is 23.33 kg/m².    Physical Exam  Constitutional:       Appearance: He is well-developed.      Comments: Appears thin   HENT:      Head: Normocephalic and atraumatic.   Eyes:      Conjunctiva/sclera: Conjunctivae normal.   Cardiovascular:      Rate and Rhythm: Normal rate and regular rhythm.      Heart sounds: Murmur heard.    Systolic murmur is present with a grade of 3/6 at the upper right sternal border, upper left sternal border and lower left sternal border.  No friction rub. No gallop.    Pulmonary:      Effort: Pulmonary effort is normal.      Breath sounds: Normal breath sounds.   Abdominal:      Palpations: Abdomen is soft.      Tenderness: There is no abdominal tenderness.   Musculoskeletal:      Cervical back: Neck supple.   Skin:     General: Skin is warm.   Neurological:      Mental Status: He is alert and oriented to person, place, and time.   Psychiatric:         Behavior: Behavior normal.       Lab Review:   Procedures    Cardiac Procedures:  1. Left heart catheterization on 8/14/2015, by Dr. Stearns showed the following. The  left main was normal. The left anterior descending had luminal irregularities. The first  diagonal branch had a 60% proximal stenosis. The left circumflex had a 90% ulcerated  stenosis " in the mid portion. There was 20% to 30% distal disease in the left circumflex  coronary artery. The left posterior descending artery had a 60% ostial lesion. The right  coronary artery was small, nondominant, and had a 30% lesion at the origin.  He then  underwent placement of a 3.0 mm x 18 mm Xience drug-eluting stent to the proximal   left circumflex; however, there was a proximal edge dissection necessitating  placement of a 3.0 mm x 12 mm Xience drug-eluting stent proximal to the first stent.   2. Echocardiogram on 8/8/2017: Ejection fraction was 60%.  There was grade 1   diastolic dysfunction.  There was mild to moderate aortic stenosis with a peak   gradient of 33 mmHg and a mean gradient of 18 mmHg.  3. Carotid artery Doppler ultrasound on 9/21/2017: There was a 60-70% stenosis in   the right internal carotid artery.  There was a 50-60% stenosis in the left internal   carotid artery.  4. Echocardiogram on 5/14/2019: The ejection fraction was 60%.  There was grade 1  diastolic dysfunction.  There was moderate aortic stenosis with a mean gradient of 18   mmHg.  There was mild tricuspid regurgitation.   5.  Echocardiogram on 5/27/2021: The ejection fraction was 60 to 65%.  There was mild   LVH.  There was grade 1 diastolic dysfunction.  The right ventricle was normal.  The left   atrium was mildly enlarged.  There was moderate aortic stenosis with a peak gradient   of 52 mmHg and a mean gradient of 29 mmHg.  There was mild tricuspid regurgitation.    Assessment:       Diagnosis Plan   1. Coronary artery disease involving native coronary artery of native heart without angina pectoris     2. Nonrheumatic aortic valve stenosis  Adult Transthoracic Echo Complete w/ Color, Spectral and Contrast if Necessary Per Protocol   3. Carotid artery disease, unspecified laterality, unspecified type (HCC)       Plan:       Again, the patient seems to be fairly asymptomatic.  He still smokes 7 cigarettes/day.  We have discussed  smoking cessation multiple times in the past.  He really does not want to quit, although I continue to encourage this.  He is not having angina or symptoms from the aortic stenosis.  He will continue on the aspirin and Lipitor.  His blood pressure is excellent on the lisinopril.  He will follow-up with Dr. Floyd for the carotid artery disease, and he is still getting serial imaging from this.  I will plan on checking another echocardiogram in 6 months when he returns to the office to reevaluate the aortic stenosis.  His lymphoma is evidently stable, and he will follow-up with Dr. Garcia for this.  I will see him back in the office in 6 months with the echocardiogram at that time.

## 2022-01-20 DIAGNOSIS — E11.42 TYPE 2 DIABETES MELLITUS WITH DIABETIC POLYNEUROPATHY, WITHOUT LONG-TERM CURRENT USE OF INSULIN: Chronic | ICD-10-CM

## 2022-01-20 RX ORDER — BLOOD-GLUCOSE METER
KIT MISCELLANEOUS
Qty: 100 EACH | Refills: 3 | Status: SHIPPED | OUTPATIENT
Start: 2022-01-20 | End: 2022-01-01

## 2022-02-04 DIAGNOSIS — E78.00 PURE HYPERCHOLESTEROLEMIA: Chronic | ICD-10-CM

## 2022-02-04 RX ORDER — ATORVASTATIN CALCIUM 40 MG/1
TABLET, FILM COATED ORAL
Qty: 90 TABLET | Refills: 1 | Status: SHIPPED | OUTPATIENT
Start: 2022-02-04 | End: 2022-01-01

## 2022-02-08 ENCOUNTER — INFUSION (OUTPATIENT)
Dept: ONCOLOGY | Facility: HOSPITAL | Age: 82
End: 2022-02-08

## 2022-02-08 DIAGNOSIS — C83.30 DIFFUSE LARGE B-CELL LYMPHOMA, UNSPECIFIED BODY REGION: Primary | ICD-10-CM

## 2022-02-08 LAB
BASOPHILS # BLD AUTO: 0.04 10*3/MM3 (ref 0–0.2)
BASOPHILS NFR BLD AUTO: 0.7 % (ref 0–1.5)
DEPRECATED RDW RBC AUTO: 52.4 FL (ref 37–54)
EOSINOPHIL # BLD AUTO: 0.22 10*3/MM3 (ref 0–0.4)
EOSINOPHIL NFR BLD AUTO: 3.6 % (ref 0.3–6.2)
ERYTHROCYTE [DISTWIDTH] IN BLOOD BY AUTOMATED COUNT: 15.6 % (ref 12.3–15.4)
HCT VFR BLD AUTO: 30.5 % (ref 37.5–51)
HGB BLD-MCNC: 9.8 G/DL (ref 13–17.7)
IMM GRANULOCYTES # BLD AUTO: 0.02 10*3/MM3 (ref 0–0.05)
IMM GRANULOCYTES NFR BLD AUTO: 0.3 % (ref 0–0.5)
LYMPHOCYTES # BLD AUTO: 0.75 10*3/MM3 (ref 0.7–3.1)
LYMPHOCYTES NFR BLD AUTO: 12.3 % (ref 19.6–45.3)
MCH RBC QN AUTO: 29.6 PG (ref 26.6–33)
MCHC RBC AUTO-ENTMCNC: 32.1 G/DL (ref 31.5–35.7)
MCV RBC AUTO: 92.1 FL (ref 79–97)
MONOCYTES # BLD AUTO: 0.53 10*3/MM3 (ref 0.1–0.9)
MONOCYTES NFR BLD AUTO: 8.7 % (ref 5–12)
NEUTROPHILS NFR BLD AUTO: 4.54 10*3/MM3 (ref 1.7–7)
NEUTROPHILS NFR BLD AUTO: 74.4 % (ref 42.7–76)
NRBC BLD AUTO-RTO: 0 /100 WBC (ref 0–0.2)
PLATELET # BLD AUTO: 205 10*3/MM3 (ref 140–450)
PMV BLD AUTO: 8.2 FL (ref 6–12)
RBC # BLD AUTO: 3.31 10*6/MM3 (ref 4.14–5.8)
WBC NRBC COR # BLD: 6.1 10*3/MM3 (ref 3.4–10.8)

## 2022-02-08 PROCEDURE — 36591 DRAW BLOOD OFF VENOUS DEVICE: CPT

## 2022-02-08 PROCEDURE — 25010000002 HEPARIN LOCK FLUSH PER 10 UNITS: Performed by: INTERNAL MEDICINE

## 2022-02-08 PROCEDURE — 85025 COMPLETE CBC W/AUTO DIFF WBC: CPT

## 2022-02-08 RX ORDER — SODIUM CHLORIDE 0.9 % (FLUSH) 0.9 %
10 SYRINGE (ML) INJECTION AS NEEDED
Status: DISCONTINUED | OUTPATIENT
Start: 2022-02-08 | End: 2022-02-08 | Stop reason: HOSPADM

## 2022-02-08 RX ORDER — SODIUM CHLORIDE 0.9 % (FLUSH) 0.9 %
10 SYRINGE (ML) INJECTION AS NEEDED
Status: CANCELLED | OUTPATIENT
Start: 2022-02-08

## 2022-02-08 RX ORDER — HEPARIN SODIUM (PORCINE) LOCK FLUSH IV SOLN 100 UNIT/ML 100 UNIT/ML
500 SOLUTION INTRAVENOUS AS NEEDED
Status: CANCELLED | OUTPATIENT
Start: 2022-02-08

## 2022-02-08 RX ORDER — HEPARIN SODIUM (PORCINE) LOCK FLUSH IV SOLN 100 UNIT/ML 100 UNIT/ML
500 SOLUTION INTRAVENOUS AS NEEDED
Status: DISCONTINUED | OUTPATIENT
Start: 2022-02-08 | End: 2022-02-08 | Stop reason: HOSPADM

## 2022-02-08 RX ADMIN — Medication 10 ML: at 09:18

## 2022-02-08 RX ADMIN — Medication 500 UNITS: at 09:18

## 2022-02-26 DIAGNOSIS — K21.9 GASTROESOPHAGEAL REFLUX DISEASE: ICD-10-CM

## 2022-02-28 RX ORDER — OMEPRAZOLE 40 MG/1
CAPSULE, DELAYED RELEASE ORAL
Qty: 90 CAPSULE | Refills: 1 | Status: SHIPPED | OUTPATIENT
Start: 2022-02-28 | End: 2022-03-01 | Stop reason: SDUPTHER

## 2022-03-01 ENCOUNTER — OFFICE VISIT (OUTPATIENT)
Dept: INTERNAL MEDICINE | Facility: CLINIC | Age: 82
End: 2022-03-01

## 2022-03-01 VITALS
TEMPERATURE: 97.5 F | SYSTOLIC BLOOD PRESSURE: 140 MMHG | WEIGHT: 147 LBS | HEIGHT: 67 IN | DIASTOLIC BLOOD PRESSURE: 71 MMHG | BODY MASS INDEX: 23.07 KG/M2

## 2022-03-01 DIAGNOSIS — I10 ESSENTIAL HYPERTENSION, BENIGN: Chronic | ICD-10-CM

## 2022-03-01 DIAGNOSIS — K21.9 GASTROESOPHAGEAL REFLUX DISEASE: ICD-10-CM

## 2022-03-01 DIAGNOSIS — M05.9 RHEUMATOID ARTHRITIS WITH POSITIVE RHEUMATOID FACTOR, INVOLVING UNSPECIFIED SITE: ICD-10-CM

## 2022-03-01 DIAGNOSIS — E11.42 TYPE 2 DIABETES MELLITUS WITH DIABETIC POLYNEUROPATHY, WITHOUT LONG-TERM CURRENT USE OF INSULIN: Primary | Chronic | ICD-10-CM

## 2022-03-01 DIAGNOSIS — C83.30 DIFFUSE LARGE B-CELL LYMPHOMA, UNSPECIFIED BODY REGION: ICD-10-CM

## 2022-03-01 DIAGNOSIS — J43.9 PULMONARY EMPHYSEMA, UNSPECIFIED EMPHYSEMA TYPE: ICD-10-CM

## 2022-03-01 DIAGNOSIS — R42 DIZZINESS: ICD-10-CM

## 2022-03-01 PROBLEM — C83.33 DIFFUSE LARGE B-CELL LYMPHOMA OF INTRA-ABDOMINAL LYMPH NODES (HCC): Chronic | Status: ACTIVE | Noted: 2019-05-28

## 2022-03-01 PROCEDURE — 99214 OFFICE O/P EST MOD 30 MIN: CPT | Performed by: NURSE PRACTITIONER

## 2022-03-01 RX ORDER — OMEPRAZOLE 40 MG/1
40 CAPSULE, DELAYED RELEASE ORAL DAILY
Qty: 90 CAPSULE | Refills: 3 | Status: SHIPPED | OUTPATIENT
Start: 2022-03-01 | End: 2023-01-01

## 2022-03-01 RX ORDER — LISINOPRIL 10 MG/1
10 TABLET ORAL DAILY
Qty: 90 TABLET | Refills: 3 | Status: SHIPPED | OUTPATIENT
Start: 2022-03-01

## 2022-03-01 RX ORDER — MECLIZINE HYDROCHLORIDE 25 MG/1
25 TABLET ORAL 3 TIMES DAILY PRN
Qty: 180 TABLET | Refills: 1 | Status: SHIPPED | OUTPATIENT
Start: 2022-03-01

## 2022-03-01 NOTE — PROGRESS NOTES
"Chief Complaint  Diabetes (6 month follow up ), Hypertension, Dizziness, and Hyperlipidemia    Subjective          Edward Srinivasan presents to [facility] for f/u regarding DM2, HTN and hyperlipidemia.    Meclizine was stopped at his last visit which he was taking daily.  He was doing well initially but did experience difficulty with his balance.  He has since restarted medication and is feeling better.  He is not currently on any medications for his rheumatoid arthritis and states he has some stiffness in his hands but is overall doing well.  Unfortunately he continues to smoke 7 cigarettes a day and is not interested in smoking cessation.  He has decreased this from 13 cigarettes in the past.      Objective   Vital Signs:   /71 (BP Location: Left arm, Patient Position: Sitting, Cuff Size: Adult)   Temp 97.5 °F (36.4 °C) (Temporal)   Ht 170.2 cm (67\")   Wt 66.7 kg (147 lb)   BMI 23.02 kg/m²     Physical Exam  Constitutional:       Appearance: He is well-developed. He is not ill-appearing.   HENT:      Head: Normocephalic.      Right Ear: Hearing, tympanic membrane and external ear normal.      Left Ear: Hearing, tympanic membrane and external ear normal.      Nose: Nose normal. No nasal deformity, mucosal edema or rhinorrhea.      Right Sinus: No maxillary sinus tenderness or frontal sinus tenderness.      Left Sinus: No maxillary sinus tenderness or frontal sinus tenderness.      Mouth/Throat:      Dentition: Normal dentition.   Eyes:      General: Lids are normal.         Right eye: No discharge.         Left eye: No discharge.      Conjunctiva/sclera: Conjunctivae normal.      Right eye: No exudate.     Left eye: No exudate.  Neck:      Thyroid: No thyroid mass or thyromegaly.      Vascular: No carotid bruit.      Trachea: Trachea normal.   Cardiovascular:      Rate and Rhythm: Regular rhythm.      Pulses: Normal pulses.      Heart sounds: Murmur heard.    Systolic murmur is " present with a grade of 3/6.      Pulmonary:      Effort: No respiratory distress.      Breath sounds: Normal breath sounds. No decreased breath sounds, wheezing, rhonchi or rales.   Abdominal:      General: Bowel sounds are normal.      Palpations: Abdomen is soft.      Tenderness: There is no abdominal tenderness.   Musculoskeletal:      Cervical back: Normal range of motion. No edema.   Lymphadenopathy:      Head:      Right side of head: No submental, submandibular, tonsillar, preauricular, posterior auricular or occipital adenopathy.      Left side of head: No submental, submandibular, tonsillar, preauricular, posterior auricular or occipital adenopathy.   Skin:     General: Skin is warm and dry.      Nails: There is no clubbing.   Neurological:      Mental Status: He is alert.   Psychiatric:         Behavior: Behavior is cooperative.        Result Review :   The following data was reviewed by: FABIAN Arnold on 03/01/2022:  Common labs    Common Labsle 9/28/21 9/28/21 9/28/21 12/15/21 2/8/22    1129 1129 1200     Glucose  100      BUN  20      Creatinine  0.77 0.80     eGFR Non African Am  97      Sodium  133 (A)      Potassium  4.3      Chloride  99      Calcium  8.5      Albumin  3.80      Total Bilirubin  0.5      Alkaline Phosphatase  71      AST (SGOT)  18      ALT (SGPT)  13      WBC 7.98   6.72 6.10   Hemoglobin 10.1 (A)   10.4 (A) 9.8 (A)   Hematocrit 31.2 (A)   32.2 (A) 30.5 (A)   Platelets 144   178 205   (A) Abnormal value       Comments are available for some flowsheets but are not being displayed.           Data reviewed: Consultant notes oncology          Assessment and Plan    Diagnoses and all orders for this visit:    1. Type 2 diabetes mellitus with diabetic polyneuropathy, without long-term current use of insulin (HCC) (Primary)  Assessment & Plan:  He is currently managed on Tradjenta along with a low-carb, low sugar diet; recheck A1c today.  He is brought his home glucose readings  which show a fasting glucose of 110-130 and a postprandial of 130-160.    Orders:  -     Hemoglobin A1c    2. Gastroesophageal reflux disease  Assessment & Plan:  Symptoms managed with daily omeprazole    Orders:  -     omeprazole (priLOSEC) 40 MG capsule; Take 1 capsule by mouth Daily.  Dispense: 90 capsule; Refill: 3    3. Essential hypertension, benign  Assessment & Plan:  Hypertension is unchanged.  Continue current treatment regimen.  Dietary sodium restriction.  Blood pressure will be reassessed at the next regular appointment.  He will continue lisinopril which she is tolerating well.    Orders:  -     lisinopril (PRINIVIL,ZESTRIL) 10 MG tablet; Take 1 tablet by mouth Daily.  Dispense: 90 tablet; Refill: 3  -     Comprehensive Metabolic Panel  -     Lipid Panel    4. Dizziness  Assessment & Plan:  He restarted meclizine due to worsening dizziness and symptoms have improved.  He tolerates medication well and without side effects.    Orders:  -     meclizine (ANTIVERT) 25 MG tablet; Take 1 tablet by mouth 3 (Three) Times a Day As Needed for Dizziness.  Dispense: 180 tablet; Refill: 1    5. Diffuse large B-cell lymphoma, unspecified body region (HCC)  Assessment & Plan:  He is followed by oncology with CT scans every 6 months.      6. Rheumatoid arthritis with positive rheumatoid factor, involving unspecified site (HCC)  Assessment & Plan:  Treatment was discontinued in 2019 when he was diagnosed with diffuse large B-cell lymphoma.  He does note stiffness in his wrist and hands but states pain is tolerable.      7. Pulmonary emphysema, unspecified emphysema type (HCC)  Assessment & Plan:  He unfortunately continues to smoke daily is not interested in smoking cessation.        Follow Up   Return in about 6 months (around 9/1/2022).  Patient was given instructions and counseling regarding his condition or for health maintenance advice. Please see specific information pulled into the AVS if appropriate.

## 2022-03-01 NOTE — ASSESSMENT & PLAN NOTE
Hypertension is unchanged.  Continue current treatment regimen.  Dietary sodium restriction.  Blood pressure will be reassessed at the next regular appointment.  He will continue lisinopril which she is tolerating well.

## 2022-03-01 NOTE — ASSESSMENT & PLAN NOTE
He restarted meclizine due to worsening dizziness and symptoms have improved.  He tolerates medication well and without side effects.

## 2022-03-01 NOTE — PROGRESS NOTES
Subjective   Edward Srinivasan is a 81 y.o. male.         History of Present Illness     The following portions of the patient's history were reviewed and updated as appropriate: allergies, current medications, past social history and problem list.    Past Medical History:   Diagnosis Date   • Allergic rhinitis    • Aortic valve stenosis    • CAD (coronary artery disease)    • Cancer (HCC)     LYMPHOMA   • Carotid arterial disease (HCC)     Followed by Dr. Floyd.  Carotid Doppler on 9/21/17: 60-70% DENISE, 50-60% LICA.   • Chronic obstructive lung disease (HCC)    • Esophageal reflux     With a history of erosive gastritis   • Hyperlipidemia    • Hypertension    • Iron deficiency    • Legally blind     PT WEARS GLASSES    • Pulmonary fibrosis (HCC)     By CT scan on 8/12/15   • PVC (premature ventricular contraction)    • Rheumatoid arthritis (HCC)    • Skin cancer of face     15 year ago on left side of face   • Steroid-induced diabetes mellitus (HCC)    • Tobacco use          Current Outpatient Medications:   •  aspirin 81 MG EC tablet, Take 81 mg by mouth Daily., Disp: , Rfl:   •  atorvastatin (LIPITOR) 40 MG tablet, TAKE 1 TABLET DAILY, Disp: 90 tablet, Rfl: 1  •  beta carotene 07260 UNIT capsule, Take 10,000 Units by mouth Daily., Disp: , Rfl:   •  cetirizine (ZyrTEC) 10 MG tablet, Take 10 mg by mouth daily., Disp: , Rfl:   •  Cholecalciferol (VITAMIN D-3 PO), Take 2,000 Int'l Units/day by mouth Daily., Disp: , Rfl:   •  FREESTYLE LITE test strip, TEST BLOOD SUGAR TWO TIMES A DAY. USE NEW STRIP EACH TIME, Disp: 100 each, Rfl: 3  •  Lancets (freestyle) lancets, USE ONE LANCET TO TEST TWO TIMES A DAY AS INSTRUCTED, Disp: 200 each, Rfl: 1  •  linagliptin (TRADJENTA) 5 MG tablet tablet, Take 1 tablet by mouth Daily., Disp: 90 tablet, Rfl: 3  •  lisinopril (PRINIVIL,ZESTRIL) 10 MG tablet, Take 1 tablet by mouth Daily., Disp: 90 tablet, Rfl: 3  •  MAGNESIUM PO, Take 500 mg by mouth Daily., Disp: , Rfl:   •  nitroglycerin  "(NITROSTAT) 0.4 MG SL tablet, Place 1 tablet under the tongue Every 5 (Five) Minutes As Needed (CHEST PAIN). Take no more than 3 doses in 15 minutes., Disp: 25 tablet, Rfl: 4  •  omeprazole (priLOSEC) 40 MG capsule, Take 1 capsule by mouth Daily., Disp: 90 capsule, Rfl: 3  •  pyridoxine (B-6) 100 MG tablet, Take 100 mg by mouth Daily., Disp: , Rfl:   •  vitamin C (ASCORBIC ACID) 500 MG tablet, Take 500 mg by mouth Daily., Disp: , Rfl:   •  vitamin E 400 UNIT capsule, Take 400 Units by mouth Daily., Disp: , Rfl:   •  meclizine (ANTIVERT) 25 MG tablet, Take 1 tablet by mouth 3 (Three) Times a Day As Needed for Dizziness., Disp: 180 tablet, Rfl: 1  No current facility-administered medications for this visit.    Facility-Administered Medications Ordered in Other Visits:   •  heparin flush (porcine) 100 UNIT/ML injection 500 Units, 500 Units, Intravenous, PRN, Gabriele Garcia MD, 500 Units at 01/13/20 0954  •  sodium chloride 0.9 % flush 10 mL, 10 mL, Intravenous, PRN, Gabriele Garcia MD, 10 mL at 01/13/20 0953    Allergies   Allergen Reactions   • Oxaprozin Hives       Review of Systems    Objective   Vitals:    03/01/22 0813   BP: 140/71   BP Location: Left arm   Patient Position: Sitting   Cuff Size: Adult   Temp: 97.5 °F (36.4 °C)   TempSrc: Temporal   Weight: 66.7 kg (147 lb)   Height: 170.2 cm (67\")     Body mass index is 23.02 kg/m².  Physical Exam    Assessment/Plan   Diagnoses and all orders for this visit:    1. Type 2 diabetes mellitus with diabetic polyneuropathy, without long-term current use of insulin (HCC) (Primary)  -     Hemoglobin A1c    2. Gastroesophageal reflux disease  -     omeprazole (priLOSEC) 40 MG capsule; Take 1 capsule by mouth Daily.  Dispense: 90 capsule; Refill: 3    3. Essential hypertension, benign  -     lisinopril (PRINIVIL,ZESTRIL) 10 MG tablet; Take 1 tablet by mouth Daily.  Dispense: 90 tablet; Refill: 3  -     Comprehensive Metabolic Panel  -     Lipid Panel    4. Dizziness  -     " meclizine (ANTIVERT) 25 MG tablet; Take 1 tablet by mouth 3 (Three) Times a Day As Needed for Dizziness.  Dispense: 180 tablet; Refill: 1

## 2022-03-01 NOTE — ASSESSMENT & PLAN NOTE
He is currently managed on Tradjenta along with a low-carb, low sugar diet; recheck A1c today.  He is brought his home glucose readings which show a fasting glucose of 110-130 and a postprandial of 130-160.

## 2022-03-01 NOTE — ASSESSMENT & PLAN NOTE
Treatment was discontinued in 2019 when he was diagnosed with diffuse large B-cell lymphoma.  He does note stiffness in his wrist and hands but states pain is tolerable.   2020

## 2022-03-02 LAB
ALBUMIN SERPL-MCNC: 3.7 G/DL (ref 3.7–4.7)
ALBUMIN/GLOB SERPL: 1.2 {RATIO} (ref 1.2–2.2)
ALP SERPL-CCNC: 80 IU/L (ref 44–121)
ALT SERPL-CCNC: 15 IU/L (ref 0–44)
AST SERPL-CCNC: 24 IU/L (ref 0–40)
BILIRUB SERPL-MCNC: 0.4 MG/DL (ref 0–1.2)
BUN SERPL-MCNC: 18 MG/DL (ref 8–27)
BUN/CREAT SERPL: 23 (ref 10–24)
CALCIUM SERPL-MCNC: 8.5 MG/DL (ref 8.6–10.2)
CHLORIDE SERPL-SCNC: 97 MMOL/L (ref 96–106)
CHOLEST SERPL-MCNC: 82 MG/DL (ref 100–199)
CO2 SERPL-SCNC: 25 MMOL/L (ref 20–29)
CREAT SERPL-MCNC: 0.78 MG/DL (ref 0.76–1.27)
EGFR GENE MUT ANL BLD/T: 91 ML/MIN/1.73
GLOBULIN SER CALC-MCNC: 3.1 G/DL (ref 1.5–4.5)
GLUCOSE SERPL-MCNC: 137 MG/DL (ref 65–99)
HBA1C MFR BLD: 6.2 % (ref 4.8–5.6)
HDLC SERPL-MCNC: 35 MG/DL
LDLC SERPL CALC-MCNC: 34 MG/DL (ref 0–99)
POTASSIUM SERPL-SCNC: 4.8 MMOL/L (ref 3.5–5.2)
PROT SERPL-MCNC: 6.8 G/DL (ref 6–8.5)
SODIUM SERPL-SCNC: 134 MMOL/L (ref 134–144)
TRIGL SERPL-MCNC: 50 MG/DL (ref 0–149)
VLDLC SERPL CALC-MCNC: 13 MG/DL (ref 5–40)

## 2022-04-11 NOTE — PROGRESS NOTES
"[facility] OUTPATIENT FOLLOW UP CLINIC VISIT    REASON FOR FOLLOW-UP:    1.  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  CT imaging of the abdomen and pelvis on 5/8/2019 shows multiple small tissue nodules in the perirenal fat adjacent to the left kidney and a soft tissue mass interposed between the left psoas muscle and spine.  2.  RA previously on Cimzia (certollizumab) injections by Dr. Velásquez  3.  Lumbar puncture 5/31/2019 negative for involvement with lymphoma  4.  Bone marrow aspiration and biopsy 5/29/2018 negative for lymphoma  5.  PET scan on 5/30/2019 with hypermetabolic perinephric soft tissue lesions with mesenteric lymphadenopathy, left paraspinal mass at L5, 6 cm intramuscular mass in the right upper thigh, lesion at the left clavicle and a 5 cm soft tissue mass between the right scapula and right posterior fourth rib.  6.  Right subclavian Mediport placed by Dr. Herrera on 6/12/2019.  7.  Admission to [facility] for cycle #1 of dose adjusted R-EPOCH on 6/17/2019.  Plan for a lumbar puncture with intrathecal methotrexate with each cycle.  8.  6 cycles of therapy with lumbar punctures with intrathecal methotrexate administered with each cycle complete as of 10/4/2019.  9.  PET scan on 10/15/2019 negative aside from hypermetabolic activity in the bone marrow and in the spleen.    HISTORY OF PRESENT ILLNESS:  Edward Srinivasan is a 81 y.o. male who returns today for follow up of the above issue.     He continues to do well overall.  Some limitations with his activity but overall he performs all of his activities of daily living.  No fevers chills night sweats or unintended weight loss.  He denies any bleeding.  No palpable lymphadenopathy.      REVIEW OF SYSTEMS:  As per the HPI      Vitals:    04/12/22 0939   BP: 115/73   Pulse: 92   Resp: 18   Temp: 97.8 °F (36.6 °C) " "  TempSrc: Temporal   SpO2: 98%   Weight: 64.6 kg (142 lb 8 oz)   Height: 170 cm (66.93\")   PainSc: 0-No pain       PHYSICAL EXAMINATION:  GENERAL:  Well-developed well-nourished male; awake, alert and oriented, in no acute distress.  Wearing a face mask   SKIN:  Warm and dry, without rashes, purpura, or petechiae.    HEAD:  Normocephalic, atraumatic.  EARS:  Hearing intact.  LYMPHATICS:  No palpable cervical, supraclavicular, or axillary lymphadenopathy today.  CHEST: Lungs clear to auscultation bilaterally today.  Benign-appearing Mediport present.    HEART:  Regular rate; normal rhythm.  Grade 3 out of 6 systolic murmur  EXTREMITIES: No clubbing cyanosis or edema.  NEUROLOGICAL:  No focal neurologic deficits.    DIAGNOSTIC DATA:  CBC & Differential (04/12/2022 09:27)      IMAGING:   None reviewed    ASSESSMENT:  This is a 81 y.o. 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    · CT imaging of the abdomen and pelvis on 5/8/2019 shows multiple small tissue nodules in the perirenal fat adjacent to the left kidney and a soft tissue mass interposed between the left psoas muscle and spine.    · Baseline PET scan on 5/30/2019 with hypermetabolic perinephric soft tissue lesions with mesenteric lymphadenopathy, left paraspinal mass at L5, 6 cm intramuscular mass in the right upper thigh, lesion at the left clavicle and a 5 cm soft tissue mass between the right scapula and right posterior fourth rib.  · Negative lumbar puncture.  Negative bone marrow biopsy.    · He has now completed 6 cycles of dose adjusted R-EPOCH with discharge following cycle #6 on 10/4/2019.    · He received lumbar punctures with intrathecal methotrexate with each cycle.    · His end of treatment PET scan on 10/15/2019 is negative for lymphoma.    · CT 5/4/2020 negative  · CT imaging 10/27/2020 with no evidence for recurrence.  He is " now 1 year from completing therapy.  · CT imaging 4/15/2021 negative for disease  · CT imaging 9/28/2021 with no evidence for lymphoma  · No further imaging planned    *RA previously on Cimzia (certollizumab) injections by Dr. Velásquez.  He did have some worsening pain in his right wrist and hands.  He will follow-up with Dr. Velásquez regarding this.  He should not be on any medication that could potentially lead to malignancy.  He did not complain of arthritis pain today.    *Cytopenias from chemotherapy:   · White blood cell count has normalized  · Platelets normalized and remain normal at 209,000    *Normocytic anemia  · Labs on 10/27/2020 were unremarkable  · Hemoglobin remains low but stable at 10.0    PLAN:  1. Imaging is needed at this point  2. Port flush in 2 and 4 months with labs.    3. I will see him back in 6 months with a port flush and labs  4. I advised him to get his fourth COVID-19 vaccine

## 2022-04-12 PROBLEM — Z85.72 HISTORY OF DIFFUSE LARGE B-CELL LYMPHOMA: Status: ACTIVE | Noted: 2022-01-01

## 2022-04-12 NOTE — TELEPHONE ENCOUNTER
Pt informed of his elevated glucose of 308. Pt will re-check at home and address accoding to Martha [place]ALTA.

## 2022-04-12 NOTE — TELEPHONE ENCOUNTER
----- Message from Gabriele Garcia MD sent at 4/12/2022 11:29 AM EDT -----  Please let him know his blood glucose level is elevated today at 308.  Thanks, MAGI

## 2022-08-18 NOTE — PROGRESS NOTES
Date of Office Visit:  2022  Encounter Provider: Brian Harden MD  Place of Service: [facility] CARDIOLOGY  Patient Name: Edward Srinivasan  :1940    Chief complaint: Follow-up for coronary artery disease, aortic stenosis, and   carotid artery disease.    History of Present Illness:    I again had the pleasure of seeing your patient in cardiology office on 2022.  As you   well know, he is a very pleasant, 82 year-old, white male with a past medical history  significant for coronary artery disease, aortic stenosis, hyperlipidemia, and rheumatoid  arthritis who presents for follow-up. The patient was admitted to the hospital on  2015, with chest discomfort. He had been watching his grandson on the lawn   when he developed sudden chest discomfort, which radiated across his precordium   and was described as a pressure sensation. It then started to radiate into his neck   bilaterally, as well as his jaws. His initial electrocardiogram showed no ischemic   changes and his troponin was negative. He also had been recently diagnosed with   rheumatoid arthritis and was on prednisone. 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 A CT angiogram of the chest on admission in   the emergency room showed chronic obstructive pulmonary disease with bullae   formation; however, he also had an interstitial pattern with early honeycombing   consistent with pulmonary fibrosis. A subsequent CT on 2015, confirmed this. He   was seen by pulmonology as well.      He did undergo a Lexiscan Cardiolite stress test, which showed lateral ischemia. A   subsequent cardiac catheterization on 2015, by Dr. Stearns showed a 90%   ulcerated lesion in the mid left circumflex. He subsequently underwent placement of a   3.0 mm x 18 mm Xience drug-eluting stent, although there was a proximal edge   dissection necessitating placement of a second 3.0 mm x  12 mm Xience drug-eluting   stent proximal to the first stent.  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  He is followed by Dr. Garcia in oncology.      The patient presents today for follow-up.  He tells me that his cancer is still in remission,   and he still sees Dr. Garcia.  He is still smoking about 7 cigarettes/day.  He has baseline   dyspnea is the same, it is unchanged.  He has not had any chest pain or near syncopal   episodes.  He did have an echocardiogram today, which I reviewed, and still appears to   show moderate aortic stenosis.  This is discussed below.      Past Medical History:   Diagnosis Date   • Allergic rhinitis    • Aortic valve stenosis    • CAD (coronary artery disease)    • Cancer (HCC)     LYMPHOMA   • Carotid arterial disease (HCC)     Followed by Dr. Floyd.  Carotid Doppler on 9/21/17: 60-70% DENISE, 50-60% LICA.   • Chronic obstructive lung disease (HCC)    • Esophageal reflux     With a history of erosive gastritis   • Hyperlipidemia    • Hypertension    • Iron deficiency    • Legally blind     PT WEARS GLASSES    • Pulmonary fibrosis (HCC)     By CT scan on 8/12/15   • PVC (premature ventricular contraction)    • Rheumatoid arthritis (HCC)    • Skin cancer of face     15 year ago on left side of face   • Steroid-induced diabetes mellitus (HCC)    • Tobacco use        Past Surgical History:   Procedure Laterality Date   • BACK SURGERY  2010    L4-L5 discectomy   • CATARACT EXTRACTION Bilateral 1979   • CORONARY ANGIOPLASTY WITH STENT PLACEMENT  2015   • VASECTOMY  1979   • VENOUS ACCESS DEVICE (PORT) INSERTION N/A 6/12/2019    Procedure: INSERTION RIGHT VENOUS ACCESS DEVICE;  Surgeon: Philippe Herrera Jr., MD;  Location: [facility];  Service: General       Current Outpatient Medications on File Prior to Visit   Medication Sig Dispense Refill   • aspirin 81 MG EC tablet Take 81 mg by mouth Daily.     • atorvastatin (LIPITOR) 40 MG tablet  TAKE 1 TABLET DAILY 90 tablet 1   • beta carotene 38872 UNIT capsule Take 10,000 Units by mouth Daily.     • cetirizine (ZyrTEC) 10 MG tablet Take 10 mg by mouth daily.     • Cholecalciferol (VITAMIN D-3 PO) Take 2,000 Int'l Units/day by mouth Daily.     • FREESTYLE LITE test strip TEST BLOOD SUGAR TWO TIMES A DAY. USE NEW STRIP EACH TIME 100 each 3   • Lancets (freestyle) lancets USE ONE LANCET TO TEST TWO TIMES A DAY AS INSTRUCTED 200 each 1   • linagliptin (TRADJENTA) 5 MG tablet tablet Take 1 tablet by mouth Daily. 90 tablet 3   • lisinopril (PRINIVIL,ZESTRIL) 10 MG tablet Take 1 tablet by mouth Daily. 90 tablet 3   • MAGNESIUM PO Take 500 mg by mouth Daily.     • meclizine (ANTIVERT) 25 MG tablet Take 1 tablet by mouth 3 (Three) Times a Day As Needed for Dizziness. 180 tablet 1   • omeprazole (priLOSEC) 40 MG capsule Take 1 capsule by mouth Daily. 90 capsule 3   • pyridoxine (B-6) 100 MG tablet Take 100 mg by mouth Daily.     • vitamin C (ASCORBIC ACID) 500 MG tablet Take 500 mg by mouth Daily.     • vitamin E 400 UNIT capsule Take 400 Units by mouth Daily.     • [DISCONTINUED] nitroglycerin (NITROSTAT) 0.4 MG SL tablet Place 1 tablet under the tongue Every 5 (Five) Minutes As Needed (CHEST PAIN). Take no more than 3 doses in 15 minutes. 25 tablet 4     Current Facility-Administered Medications on File Prior to Visit   Medication Dose Route Frequency Provider Last Rate Last Admin   • heparin flush (porcine) 100 UNIT/ML injection 500 Units  500 Units Intravenous PRN Gabriele Garcia MD   500 Units at 01/13/20 0954   • sodium chloride 0.9 % flush 10 mL  10 mL Intravenous PRN Gabriele Garcia MD   10 mL at 01/13/20 0953     Allergies as of 08/17/2022 - Reviewed 08/17/2022   Allergen Reaction Noted   • Oxaprozin Hives 11/20/2015     Social History     Socioeconomic History   • Marital status:      Spouse name: Radha   • Years of education: High school   Tobacco Use   • Smoking status: Current Every Day Smoker      "Packs/day: 0.50     Years: 50.00     Pack years: 25.00     Types: Cigarettes   • Smokeless tobacco: Never Used   • Tobacco comment: 1/2 ppd, discussed benefits of cessation   Substance and Sexual Activity   • Alcohol use: No   • Drug use: No   • Sexual activity: Defer     Family History   Problem Relation Age of Onset   • Heart failure Mother         CHF   • Heart attack Father 54   • Heart disease Father    • Heart attack Brother 47   • Heart disease Brother         another brother with 4 vessel CABG in his 50s   • Coronary artery disease Other         multiple family members   • Malig Hyperthermia Neg Hx        Review of Systems   Constitutional: Positive for malaise/fatigue and weight loss.   Cardiovascular: Positive for dyspnea on exertion.   Neurological: Positive for numbness.   All other systems reviewed and are negative.     Objective:     Vitals:    08/17/22 0858   BP: 118/72   Pulse: 88   SpO2: 98%   Weight: 64.1 kg (141 lb 6.4 oz)   Height: 167.6 cm (65.98\")     Body mass index is 22.83 kg/m².    Physical Exam  Constitutional:       Appearance: He is well-developed.      Comments: Appears thin   HENT:      Head: Normocephalic and atraumatic.   Eyes:      Conjunctiva/sclera: Conjunctivae normal.   Cardiovascular:      Rate and Rhythm: Normal rate and regular rhythm.      Heart sounds: Murmur heard.    Systolic murmur is present with a grade of 3/6 at the upper right sternal border, upper left sternal border and lower left sternal border.    No friction rub. No gallop.   Pulmonary:      Effort: Pulmonary effort is normal.      Breath sounds: Normal breath sounds.   Abdominal:      Palpations: Abdomen is soft.      Tenderness: There is no abdominal tenderness.   Musculoskeletal:      Cervical back: Neck supple.   Skin:     General: Skin is warm.   Neurological:      Mental Status: He is alert and oriented to person, place, and time.   Psychiatric:         Behavior: Behavior normal.       Lab Review: "   Procedures    Cardiac Procedures:  1. Left heart catheterization on 8/14/2015, by Dr. Stearns showed the following. The  left main was normal. The left anterior descending had luminal irregularities. The first  diagonal branch had a 60% proximal stenosis. The left circumflex had a 90% ulcerated  stenosis in the mid portion. There was 20% to 30% distal disease in the left circumflex  coronary artery. The left posterior descending artery had a 60% ostial lesion. The right  coronary artery was small, nondominant, and had a 30% lesion at the origin.  He then  underwent placement of a 3.0 mm x 18 mm Xience drug-eluting stent to the proximal   left circumflex; however, there was a proximal edge dissection necessitating  placement of a 3.0 mm x 12 mm Xience drug-eluting stent proximal to the first stent.   2. Echocardiogram on 8/8/2017: Ejection fraction was 60%.  There was grade 1   diastolic dysfunction.  There was mild to moderate aortic stenosis with a peak   gradient of 33 mmHg and a mean gradient of 18 mmHg.  3. Carotid artery Doppler ultrasound on 9/21/2017: There was a 60-70% stenosis in   the right internal carotid artery.  There was a 50-60% stenosis in the left internal   carotid artery.  4. Echocardiogram on 5/14/2019: The ejection fraction was 60%.  There was grade 1  diastolic dysfunction.  There was moderate aortic stenosis with a mean gradient of 18   mmHg.  There was mild tricuspid regurgitation.   5.  Echocardiogram on 5/27/2021: The ejection fraction was 60 to 65%.  There was mild   LVH.  There was grade 1 diastolic dysfunction.  The right ventricle was normal.  The left   atrium was mildly enlarged.  There was moderate aortic stenosis with a peak gradient   of 52 mmHg and a mean gradient of 29 mmHg.  There was mild tricuspid regurgitation.  6.  Echocardiogram on 8/17/2022: The ejection fraction was 55 to 60%.  There was   grade 1 diastolic dysfunction.  The left atrium was mildly enlarged.  There was  moderate   aortic stenosis with a peak gradient of 49 mmHg and a mean gradient of 30 mmHg.    There was mild aortic insufficiency.  There was mild mitral regurgitation.  There was   mild tricuspid regurgitation.  The calculated RVSP was 33 mmHg.    Assessment:       Diagnosis Plan   1. Coronary artery disease involving native coronary artery of native heart without angina pectoris     2. Nonrheumatic aortic valve stenosis     3. Carotid artery disease, unspecified laterality, unspecified type (HCC)       Plan:       I reviewed his echocardiogram personally with him in the room today.  The gradients still suggest more of a moderate aortic stenosis, and are actually very similar to the previous echocardiogram in May 2021.  He needs periodic assessment of his aortic valve, and likely needs another echo in 1 year.  He is not having any symptoms consistent with significant aortic stenosis.  If he ever does develop severe symptomatic aortic stenosis, I think he would very likely be a poor candidate for surgical aortic valve replacement given his multiple comorbidities and body habitus.  He would likely need to be evaluated for a potential TAVR.    He is not having any anginal episodes.  He still smokes 7 cigarettes/day, and we have discussed smoking cessation multiple times in the past.  He really does not want to quit smoking at this point.  He will continue on the aspirin and Lipitor for his coronary artery disease.  His blood pressure is 118/72, and he will continue on the lisinopril at 10 mg/day.  He follows with Dr. Floyd in vascular surgery for the carotid artery disease.  His lymphoma evidently is stable, and he follows with Dr. Garcia at the [facility] for this.    I did not change any medications today.  I will have him follow-up in 6 months.    I spent 30 minutes in the care of the patient, including personally reviewing the echocardiogram images with the patient in the room.  We discussed the treatment of aortic  stenosis and coronary artery disease in the room together.

## 2022-09-02 NOTE — PROGRESS NOTES
The ABCs of the Annual Wellness Visit  Subsequent Medicare Wellness Visit    Chief Complaint   Patient presents with   • Medicare Wellness-subsequent   • Diabetes   • Hypertension   • Peripheral Neuropathy      Subjective    History of Present Illness:  Edward Srinivasan is a 82 y.o. male who presents for a Subsequent Medicare Wellness Visit and to f/u on DM2, HTN and peripheral neuropathy.    He has lost mayank 3# on his scales which he attributes to decreased food intake. He c/o feeling fatigued with eating so often does not finish complete meal. Denies abdominal pain. Denies dyspnea. He is trying to be more mindful and complete meal.    He c/o a lesion on his right shoulder that has been chronic but has increased in size. His wife has also noticed a lesion his posterior scalp.    The following portions of the patient's history were reviewed and   updated as appropriate: allergies, current medications, past family history, past medical history, past social history, past surgical history and problem list.    Compared to one year ago, the patient feels his physical   health is the same.    Compared to one year ago, the patient feels his mental   health is the same.    Recent Hospitalizations:  He was not admitted to the hospital during the last year.       Current Medical Providers:  Patient Care Team:  Martha Butt APRN as PCP - General (Internal Medicine)  Raven Brown MD as Referring Physician (Family Medicine)  Gabriele Garcia MD as Consulting Physician (Hematology and Oncology)    Outpatient Medications Prior to Visit   Medication Sig Dispense Refill   • aspirin 81 MG EC tablet Take 81 mg by mouth Daily.     • atorvastatin (LIPITOR) 40 MG tablet TAKE 1 TABLET DAILY 90 tablet 1   • beta carotene 12519 UNIT capsule Take 10,000 Units by mouth Daily.     • cetirizine (ZyrTEC) 10 MG tablet Take 10 mg by mouth daily.     • Cholecalciferol (VITAMIN D-3 PO) Take 2,000 Int'l Units/day by mouth Daily.     •  FREESTYLE LITE test strip TEST BLOOD SUGAR TWO TIMES A DAY. USE NEW STRIP EACH TIME 100 each 3   • Lancets (freestyle) lancets USE ONE LANCET TO TEST TWO TIMES A DAY AS INSTRUCTED 200 each 1   • linagliptin (TRADJENTA) 5 MG tablet tablet Take 1 tablet by mouth Daily. 90 tablet 3   • lisinopril (PRINIVIL,ZESTRIL) 10 MG tablet Take 1 tablet by mouth Daily. 90 tablet 3   • MAGNESIUM PO Take 500 mg by mouth Daily.     • meclizine (ANTIVERT) 25 MG tablet Take 1 tablet by mouth 3 (Three) Times a Day As Needed for Dizziness. 180 tablet 1   • nitroglycerin (NITROSTAT) 0.4 MG SL tablet Place 1 tablet under the tongue Every 5 (Five) Minutes As Needed (CHEST PAIN). Take no more than 3 doses in 15 minutes. 25 tablet 4   • omeprazole (priLOSEC) 40 MG capsule Take 1 capsule by mouth Daily. 90 capsule 3   • pyridoxine (B-6) 100 MG tablet Take 100 mg by mouth Daily.     • vitamin C (ASCORBIC ACID) 500 MG tablet Take 500 mg by mouth Daily.     • vitamin E 400 UNIT capsule Take 400 Units by mouth Daily.       Facility-Administered Medications Prior to Visit   Medication Dose Route Frequency Provider Last Rate Last Admin   • heparin flush (porcine) 100 UNIT/ML injection 500 Units  500 Units Intravenous PRN Gabriele Garcia MD   500 Units at 01/13/20 0954   • sodium chloride 0.9 % flush 10 mL  10 mL Intravenous PRN Gabriele Garcia MD   10 mL at 01/13/20 0953       No opioid medication identified on active medication list. I have reviewed chart for other potential  high risk medication/s and harmful drug interactions in the elderly.          Aspirin is on active medication list. Aspirin use is indicated based on review of current medical condition/s. Pros and cons of this therapy have been discussed today. Benefits of this medication outweigh potential harm.  Patient has been encouraged to continue taking this medication.  .      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  ACP discussion was held with the patient during this visit. Patient has an advance directive in EMR which is still valid.     Review of Systems   Constitutional: Positive for fatigue. Negative for fever.   HENT: Positive for congestion and postnasal drip.    Respiratory: Negative for cough, chest tightness and shortness of breath.    Cardiovascular: Negative for chest pain, palpitations and leg swelling.   Gastrointestinal: Negative for abdominal pain, blood in stool, constipation and diarrhea.   Musculoskeletal: Positive for arthralgias.        Objective    Vitals:    09/02/22 0746   BP: 120/76   BP Location: Left arm   Patient Position: Sitting   Cuff Size: Adult   Pulse: 84   Temp: 97.8 °F (36.6 °C)   TempSrc: Temporal   SpO2: 96%   Weight: 64.4 kg (142 lb)   Height: 167.6 cm (65.98\")   PainSc: 0-No pain     Estimated body mass index is 22.93 kg/m² as calculated from the following:    Height as of this encounter: 167.6 cm (65.98\").    Weight as of this encounter: 64.4 kg (142 lb).    BMI is within normal parameters. No other follow-up for BMI required.      Does the patient have " evidence of cognitive impairment? No    Physical Exam  Constitutional:       Appearance: He is well-developed. He is not ill-appearing.   HENT:      Head: Normocephalic.      Right Ear: Hearing, tympanic membrane and external ear normal.      Left Ear: Hearing, tympanic membrane and external ear normal.      Nose: Nose normal. No nasal deformity, mucosal edema or rhinorrhea.      Right Sinus: No maxillary sinus tenderness or frontal sinus tenderness.      Left Sinus: No maxillary sinus tenderness or frontal sinus tenderness.      Mouth/Throat:      Dentition: Normal dentition.   Eyes:      General: Lids are normal.         Right eye: No discharge.         Left eye: No discharge.      Conjunctiva/sclera: Conjunctivae normal.      Right eye: No exudate.     Left eye: No exudate.  Neck:      Thyroid: No thyroid mass or thyromegaly.      Vascular: No carotid bruit.      Trachea: Trachea normal.   Cardiovascular:      Rate and Rhythm: Regular rhythm.      Pulses: Normal pulses.      Heart sounds: Murmur heard.    Systolic murmur is present with a grade of 3/6.  Pulmonary:      Effort: No respiratory distress.      Breath sounds: Normal breath sounds. No decreased breath sounds, wheezing, rhonchi or rales.   Abdominal:      General: Bowel sounds are normal.      Palpations: Abdomen is soft.      Tenderness: There is no abdominal tenderness.   Musculoskeletal:      Cervical back: Normal range of motion. No edema.   Lymphadenopathy:      Head:      Right side of head: No submental, submandibular, tonsillar, preauricular, posterior auricular or occipital adenopathy.      Left side of head: No submental, submandibular, tonsillar, preauricular, posterior auricular or occipital adenopathy.   Skin:     General: Skin is warm and dry.      Nails: There is no clubbing.      Comments: Brown papule right shoulder with uneven shoulder.  There is also a hyperkeratotic lesion on left occipital scalp.   Neurological:      Mental Status:  He is alert.   Psychiatric:         Behavior: Behavior is cooperative.       Lab Results   Component Value Date    CHLPL 75 09/02/2022    TRIG 46 09/02/2022    HDL 33 (L) 09/02/2022    LDL 30 09/02/2022    VLDL 12 09/02/2022    HGBA1C 6.10 (H) 09/02/2022            HEALTH RISK ASSESSMENT    Smoking Status:  Social History     Tobacco Use   Smoking Status Current Every Day Smoker   • Packs/day: 0.50   • Years: 50.00   • Pack years: 25.00   • Types: Cigarettes   Smokeless Tobacco Never Used   Tobacco Comment    1/2 ppd, discussed benefits of cessation     Alcohol Consumption:  Social History     Substance and Sexual Activity   Alcohol Use No     Fall Risk Screen:    STEADI Fall Risk Assessment was completed, and patient is at LOW risk for falls.Assessment completed on:9/2/2022    Depression Screening:  PHQ-2/PHQ-9 Depression Screening 9/2/2022   Retired Total Score -   Little Interest or Pleasure in Doing Things 0-->not at all   Feeling Down, Depressed or Hopeless 0-->not at all   PHQ-9: Brief Depression Severity Measure Score 0       Health Habits and Functional and Cognitive Screening:  Functional & Cognitive Status 9/2/2022   Do you have difficulty preparing food and eating? No   Do you have difficulty bathing yourself, getting dressed or grooming yourself? No   Do you have difficulty using the toilet? No   Do you have difficulty moving around from place to place? No   Do you have trouble with steps or getting out of a bed or a chair? Yes   Current Diet Unhealthy Diet   Dental Exam Not up to date   Eye Exam Up to date   Exercise (times per week) 0 times per week   Current Exercises Include No Regular Exercise   Do you need help using the phone?  No   Are you deaf or do you have serious difficulty hearing?  No   Do you need help with transportation? No   Do you need help shopping? No   Do you need help preparing meals?  No   Do you need help with housework?  No   Do you need help with laundry? No   Do you need help  taking your medications? No   Do you need help managing money? No   Do you ever drive or ride in a car without wearing a seat belt? No   Have you felt unusual stress, anger or loneliness in the last month? Yes   Who do you live with? Spouse   If you need help, do you have trouble finding someone available to you? No   Have you been bothered in the last four weeks by sexual problems? No   Do you have difficulty concentrating, remembering or making decisions? No       Age-appropriate Screening Schedule:  Refer to the list below for future screening recommendations based on patient's age, sex and/or medical conditions. Orders for these recommended tests are listed in the plan section. The patient has been provided with a written plan.    Health Maintenance   Topic Date Due   • TDAP/TD VACCINES (1 - Tdap) Never done   • INFLUENZA VACCINE  10/01/2022   • HEMOGLOBIN A1C  03/02/2023   • DIABETIC EYE EXAM  06/20/2023   • LIPID PANEL  09/02/2023   • URINE MICROALBUMIN  09/02/2023   • ZOSTER VACCINE  Completed              Assessment & Plan   CMS Preventative Services Quick Reference  Risk Factors Identified During Encounter  Cardiovascular Disease  Immunizations Discussed/Encouraged (specific Immunizations; Tdap  Tobacco Use/Dependance (use dotphrase .tobaccocessation for documentation)  The above risks/problems have been discussed with the patient.  Follow up actions/plans if indicated are seen below in the Assessment/Plan Section.  Pertinent information has been shared with the patient in the After Visit Summary.    Diagnoses and all orders for this visit:    1. Type 2 diabetes mellitus with diabetic polyneuropathy, without long-term current use of insulin (HCC) (Primary)  Assessment & Plan:  He is currently managed on Tradjenta daily; continue medication along with a low-carb, low-sugar diet. Recheck A1c.    Orders:  -     Hemoglobin A1c  -     Microalbumin / Creatinine Urine Ratio - Urine, Clean Catch    2. Essential  hypertension, benign  Assessment & Plan:  Hypertension is unchanged.  Continue current treatment regimen.  Dietary sodium restriction.  Blood pressure will be reassessed at the next regular appointment.  He will continue Lisinopril which he is tolerating well.    Orders:  -     TSH    3. Pure hypercholesterolemia  Assessment & Plan:  He denies myalgias with atorvastatin which he will continue along with a low-fat, low-cholesterol diet.      Orders:  -     Lipid Panel    4. Aortic valve stenosis, etiology of cardiac valve disease unspecified  Assessment & Plan:  Echo 8/2022: moderate aortic stenosis, followed by cardiology      5. Gastroesophageal reflux disease without esophagitis  Assessment & Plan:  Sx managed with Omeprazole, denies abdominal pain.      6. Bilateral carotid artery stenosis  Assessment & Plan:  Repeat carotid doppler planned for 12/2022, managed on statin therapy.      Follow Up:   Return in about 6 months (around 3/2/2023).     An After Visit Summary and PPPS were made available to the patient.

## 2022-09-03 NOTE — ASSESSMENT & PLAN NOTE
Echo 8/2022: moderate aortic stenosis, followed by cardiology  
He denies myalgias with atorvastatin which he will continue along with a low-fat, low-cholesterol diet.    
He is currently managed on Tradjenta daily; continue medication along with a low-carb, low-sugar diet. Recheck A1c.  
Hypertension is unchanged.  Continue current treatment regimen.  Dietary sodium restriction.  Blood pressure will be reassessed at the next regular appointment.  He will continue Lisinopril which he is tolerating well.  
Repeat carotid doppler planned for 12/2022, managed on statin therapy.  
Sx managed with Omeprazole, denies abdominal pain.  
2 cm underside of chin laceration, cleaned with 30 cc normal saline under pressure, no FBs. Anesthesia of 5 cc lido with epi applied. 6 interrupted nylon sutures placed with good wound approximation, 3 steristrips places over wound. Patient tolerated well, no complications, verbal and written instructions given.

## 2022-09-03 NOTE — PATIENT INSTRUCTIONS
Medicare Wellness  Personal Prevention Plan of Service     Date of Office Visit:    Encounter Provider:  FABIAN Arnold  Place of Service:  [facility]  Patient Name: Edward Srinivasan  :  1940    As part of the Medicare Wellness portion of your visit today, we are providing you with this personalized preventive plan of services (PPPS). This plan is based upon recommendations of the [place] Preventive Services Task Force (USPSTF) and the Advisory Committee on Immunization Practices (ACIP).    This lists the preventive care services that should be considered, and provides dates of when you are due. Items listed as completed are up-to-date and do not require any further intervention.    Health Maintenance   Topic Date Due    TDAP/TD VACCINES (1 - Tdap) Never done    ANNUAL WELLNESS VISIT  2022    COVID-19 Vaccine (5 - Booster for Pfizer series) 09/10/2022    Pneumococcal Vaccine 65+ (3 - PPSV23 or PCV20) 2026 (Originally 2018)    LUNG CANCER SCREENING  2022    INFLUENZA VACCINE  10/01/2022    HEMOGLOBIN A1C  2023    DIABETIC EYE EXAM  2023    LIPID PANEL  2023    URINE MICROALBUMIN  2023    COLORECTAL CANCER SCREENING  2025    ZOSTER VACCINE  Completed       Orders Placed This Encounter   Procedures    Hemoglobin A1c     Order Specific Question:   Release to patient     Answer:   Routine Release     Order Specific Question:   [facility] Has the patient fasted?     Answer:   Yes    Lipid Panel     Order Specific Question:   [facility] Has the patient fasted?     Answer:   Yes    Microalbumin / Creatinine Urine Ratio - Urine, Clean Catch     Order Specific Question:   Release to patient     Answer:   Routine Release     Order Specific Question:   [facility] Has the patient fasted?     Answer:   Yes    TSH     Order Specific Question:   Release to patient     Answer:   Routine Release     Order Specific Question:   [facility] Has  the patient fasted?     Answer:   Yes       Return in about 6 months (around 3/2/2023).

## 2022-09-23 NOTE — PROGRESS NOTES
"[facility] CBC GROUP OUTPATIENT FOLLOW UP CLINIC VISIT    REASON FOR FOLLOW-UP:    1.  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  CT imaging of the abdomen and pelvis on 5/8/2019 shows multiple small tissue nodules in the perirenal fat adjacent to the left kidney and a soft tissue mass interposed between the left psoas muscle and spine.  2.  RA previously on Cimzia (certollizumab) injections by Dr. Velásquez  3.  Lumbar puncture 5/31/2019 negative for involvement with lymphoma  4.  Bone marrow aspiration and biopsy 5/29/2018 negative for lymphoma  5.  PET scan on 5/30/2019 with hypermetabolic perinephric soft tissue lesions with mesenteric lymphadenopathy, left paraspinal mass at L5, 6 cm intramuscular mass in the right upper thigh, lesion at the left clavicle and a 5 cm soft tissue mass between the right scapula and right posterior fourth rib.  6.  Right subclavian Mediport placed by Dr. Herrera on 6/12/2019.  7.  Admission to [facility] for cycle #1 of dose adjusted R-EPOCH on 6/17/2019.  Plan for a lumbar puncture with intrathecal methotrexate with each cycle.  8.  6 cycles of therapy with lumbar punctures with intrathecal methotrexate administered with each cycle complete as of 10/4/2019.  9.  PET scan on 10/15/2019 negative aside from hypermetabolic activity in the bone marrow and in the spleen.    HISTORY OF PRESENT ILLNESS:  Edward Srinivasan is a 82 y.o. male who returns today for follow up of the above issue.     He feels well.  Energy level remains adequate.  He continues to smoke a little bit.  1 episode of scant hemoptysis with deep coughing a couple of weeks ago but he denies any other bleeding.  No fevers chills night sweats or unintended weight loss.  No palpable lymphadenopathy..      REVIEW OF SYSTEMS:  As per the HPI      Vitals:    09/27/22 0948   BP: 113/68   Pulse: 73   Resp: 18 " "  Temp: 97.3 °F (36.3 °C)   TempSrc: Temporal   SpO2: 98%   Weight: 64.4 kg (142 lb)   Height: 167.6 cm (65.98\")   PainSc: 0-No pain       PHYSICAL EXAMINATION:  GENERAL:  Well-developed well-nourished male; awake, alert and oriented, in no acute distress.  Wearing a face mask   SKIN:  Warm and dry, without rashes, purpura, or petechiae.    HEAD:  Normocephalic, atraumatic.  EARS:  Hearing intact.  LYMPHATICS:  No palpable cervical, supraclavicular, or axillary lymphadenopathy today.  CHEST: Lungs clear to auscultation bilaterally today.  Benign-appearing Mediport present in the right subclavian area.    HEART:  Regular rate; normal rhythm.  Grade 3 out of 6 harsh systolic murmur  EXTREMITIES: No clubbing cyanosis or edema.  NEUROLOGICAL:  No focal neurologic deficits.    DIAGNOSTIC DATA:  Retic With IRF & RET-He (09/27/2022 09:34)  CBC & Differential (09/27/2022 09:34)      IMAGING:  None reviewed    ASSESSMENT:  This is a 82 y.o. 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    · CT imaging of the abdomen and pelvis on 5/8/2019 shows multiple small tissue nodules in the perirenal fat adjacent to the left kidney and a soft tissue mass interposed between the left psoas muscle and spine.    · Baseline PET scan on 5/30/2019 with hypermetabolic perinephric soft tissue lesions with mesenteric lymphadenopathy, left paraspinal mass at L5, 6 cm intramuscular mass in the right upper thigh, lesion at the left clavicle and a 5 cm soft tissue mass between the right scapula and right posterior fourth rib.  · Negative lumbar puncture.  Negative bone marrow biopsy.    · He has now completed 6 cycles of dose adjusted R-EPOCH with discharge following cycle #6 on 10/4/2019.    · He received lumbar punctures with intrathecal methotrexate with each cycle.    · His end of treatment PET scan on 10/15/2019 is negative for " lymphoma.    · CT 5/4/2020 negative  · CT imaging 10/27/2020 with no evidence for recurrence.  He is now 1 year from completing therapy.  · CT imaging 4/15/2021 negative for disease  · CT imaging 9/28/2021 with no evidence for lymphoma  · No further imaging planned  · 9/27/2022: Clinically, no evidence of disease    *RA previously on Cimzia (certollizumab) injections by Dr. Velásquez.  He did have some worsening pain in his right wrist and hands.  He will follow-up with Dr. Velásquez regarding this.  He should not be on any medication that could potentially lead to malignancy.  No arthritis pain at this time and he is not medicated    *Cytopenias from chemotherapy:   · White blood cell count has normalized  · Platelets normalized and remain normal at 179,000    *Normocytic anemia  · Labs on 10/27/2020 were unremarkable  · Hemoglobin is a little bit lower at 9.6 with a normal MCV of 90.9    PLAN:  1. Imaging is needed at this point  2. Port flush in 2 and 4 months  3. I will see him back in 6 months with labs  4. Anemia evaluation if his hemoglobin worsens  5. He has had all of his COVID-19 vaccines

## 2022-11-08 NOTE — PROGRESS NOTES
Chief Complaint  Cough, Sore Throat, and Fatigue     Subjective:      History of Present Illness {CC  Problem List  Visit  Diagnosis   Encounters  Notes  Medications  Labs  Result Review Imaging  Media :23}     Edward Srinivasan is a patient of Martha Butt APRN and presents to [facility] for       For about one week, he has had cough, sore throat.   Feels like it is moving into his chest.   Denies HA, N/V/D.   He states he normally gets bronchitis around this time of year.   Denies wheeze.       Wife had same symptoms 6 days before.     No other known sick contacts.     I have reviewed patient's medical history, any new submitted information provided by patient or medical assistant and updated medical record.     Reviewed:   Office Visit with Gabriele Garcia MD (09/27/2022)       Objective:      Physical Exam  Vitals reviewed.   Constitutional:       Appearance: Normal appearance. He is well-developed.   HENT:      Head:      Comments: Wearing mask due to COVID      Mouth/Throat:      Pharynx: Posterior oropharyngeal erythema present. No oropharyngeal exudate.   Eyes:      Conjunctiva/sclera: Conjunctivae normal.   Neck:      Thyroid: No thyromegaly.   Cardiovascular:      Rate and Rhythm: Normal rate and regular rhythm.      Pulses: Normal pulses.      Heart sounds: Normal heart sounds.   Pulmonary:      Effort: Pulmonary effort is normal.      Comments: E/U   + tussive fremitus.   - W/C  Musculoskeletal:      Cervical back: Normal range of motion and neck supple.   Lymphadenopathy:      Cervical: No cervical adenopathy.   Skin:     General: Skin is warm and dry.   Neurological:      Mental Status: He is alert and oriented to person, place, and time.   Psychiatric:         Mood and Affect: Mood normal.         Behavior: Behavior normal. Behavior is cooperative.         Thought Content: Thought content normal.         Judgment: Judgment normal.        Result Review   "Data Reviewed:{ Labs  Result Review  Imaging  Med Tab  Media :23}                POCT SARS-CoV-2 Antigen GINNY + Flu (11/08/2022 10:14 AM)    Negative flu and COVID    Vital Signs:   /60 (BP Location: Left arm, Patient Position: Sitting, Cuff Size: Adult)   Pulse 98   Temp 98.1 °F (36.7 °C) (Temporal)   Ht 165.1 cm (65\")   Wt 64.4 kg (142 lb)   SpO2 94%   BMI 23.63 kg/m²         Requested Prescriptions     Signed Prescriptions Disp Refills   • amoxicillin (AMOXIL) 500 MG capsule 28 capsule 0     Sig: Take 2 capsules by mouth 2 (Two) Times a Day for 7 days.   • benzonatate (Tessalon Perles) 100 MG capsule 30 capsule 0     Sig: Take 1 capsule by mouth 3 (Three) Times a Day As Needed for Cough.       Routine medications provided by this office will also be refilled via pharmacy request.       Current Outpatient Medications:   •  aspirin 81 MG EC tablet, Take 81 mg by mouth Daily., Disp: , Rfl:   •  atorvastatin (LIPITOR) 40 MG tablet, TAKE 1 TABLET DAILY, Disp: 90 tablet, Rfl: 1  •  beta carotene 38361 UNIT capsule, Take 10,000 Units by mouth Daily., Disp: , Rfl:   •  cetirizine (ZyrTEC) 10 MG tablet, Take 10 mg by mouth daily., Disp: , Rfl:   •  Cholecalciferol (VITAMIN D-3 PO), Take 2,000 Int'l Units/day by mouth Daily., Disp: , Rfl:   •  FREESTYLE LITE test strip, USE ONE STRIP TO TEST TWICE A DAY (USE NEW STRIP FOR EACH USE), Disp: 100 each, Rfl: 0  •  Lancets (freestyle) lancets, USE ONE LANCET TO TEST BLOOD SUGAR TWO TIMES A DAY AS INSTRUCTED, Disp: 100 each, Rfl: 0  •  linagliptin (TRADJENTA) 5 MG tablet tablet, Take 1 tablet by mouth Daily., Disp: 90 tablet, Rfl: 3  •  lisinopril (PRINIVIL,ZESTRIL) 10 MG tablet, Take 1 tablet by mouth Daily., Disp: 90 tablet, Rfl: 3  •  MAGNESIUM PO, Take 500 mg by mouth Daily., Disp: , Rfl:   •  meclizine (ANTIVERT) 25 MG tablet, Take 1 tablet by mouth 3 (Three) Times a Day As Needed for Dizziness., Disp: 180 tablet, Rfl: 1  •  nitroglycerin (NITROSTAT) 0.4 MG SL " tablet, Place 1 tablet under the tongue Every 5 (Five) Minutes As Needed (CHEST PAIN). Take no more than 3 doses in 15 minutes., Disp: 25 tablet, Rfl: 4  •  omeprazole (priLOSEC) 40 MG capsule, Take 1 capsule by mouth Daily., Disp: 90 capsule, Rfl: 3  •  pyridoxine (B-6) 100 MG tablet, Take 100 mg by mouth Daily., Disp: , Rfl:   •  vitamin C (ASCORBIC ACID) 500 MG tablet, Take 500 mg by mouth Daily., Disp: , Rfl:   •  vitamin E 400 UNIT capsule, Take 400 Units by mouth Daily., Disp: , Rfl:   •  amoxicillin (AMOXIL) 500 MG capsule, Take 2 capsules by mouth 2 (Two) Times a Day for 7 days., Disp: 28 capsule, Rfl: 0  •  benzonatate (Tessalon Perles) 100 MG capsule, Take 1 capsule by mouth 3 (Three) Times a Day As Needed for Cough., Disp: 30 capsule, Rfl: 0  No current facility-administered medications for this visit.    Facility-Administered Medications Ordered in Other Visits:   •  heparin flush (porcine) 100 UNIT/ML injection 500 Units, 500 Units, Intravenous, PRN, Gabriele Garcia MD, 500 Units at 01/13/20 0954  •  sodium chloride 0.9 % flush 10 mL, 10 mL, Intravenous, PRN, Gabriele Garcia MD, 10 mL at 01/13/20 0953     Assessment and Plan:      Assessment and Plan {CC Problem List  Visit Diagnosis  ROS  Review (Popup)  Health Maintenance  Quality  BestPractice  Medications  SmartSets  SnapShot Encounters  Media :23}     Problem List Items Addressed This Visit    None  Visit Diagnoses     Acute cough    -  Primary    Relevant Medications    benzonatate (Tessalon Perles) 100 MG capsule    Bronchitis        Relevant Medications    amoxicillin (AMOXIL) 500 MG capsule    benzonatate (Tessalon Perles) 100 MG capsule          Follow Up {Instructions Charge Capture  Follow-up Communications :23}     Return if symptoms worsen or fail to improve.    Follow up with PCP as scheduled.     Patient was given instructions and counseling regarding his condition or for health maintenance advice. Please see specific  information pulled into the AVS if appropriate.    Adela disclaimer:   Much of this encounter note is an electronic transcription/translation of spoken language to printed text. The electronic translation of spoken language may permit erroneous, or at times, nonsensical words or phrases to be inadvertently transcribed; Although I have reviewed the note for such errors, some may still exist.     Additional Patient Counseling:       Patient Instructions   Warm salt water gargle / rinses 2-3 times a day.     ACUTE BRONCHITIS    Bronchitis is inflammation of the airways that extend from the windpipe into the lungs (bronchi). The inflammation often causes mucus to develop. This leads to a cough, which is the most common symptom of bronchitis.    In acute bronchitis, the condition usually develops suddenly and goes away over time, usually in a couple weeks. Smoking, allergies, and asthma can make bronchitis worse.     CAUSES  Acute bronchitis is most often caused by the same virus that causes a cold. The virus can spread from person to person (contagious) through coughing, sneezing, and touching contaminated objects.Some atypical bacteria may also cause bronchitis.  SIGNS AND SYMPTOMS    · Cough.    · Fever.    · Coughing up mucus.    · Body aches.    · Chest congestion.    · Chills.    · Shortness of breath.    · Sore throat.      TREATMENT    Medicines are usually given for relief of fever or cough. Antibiotics may be prescribed in certain situations. In some cases, an inhaler may be recommended to help reduce shortness of breath and control the cough.  HOME CARE INSTRUCTIONS  · Get plenty of rest.    · Drink enough fluids to keep your urine clear or pale yellow. Increasing fluids may help thin your respiratory secretions and reduce chest congestion, and it will prevent dehydration.    · If you were prescribed an antibiotic, finish it all even if you start to feel better.  · Avoid smoking and secondhand smoke..    ·  Keep all follow-up visits as directed by your doctor.    SEEK MEDICAL CARE IF:  Your symptoms do not improve after 1 week of treatment.    SEEK IMMEDIATE MEDICAL CARE IF:  · You develop an increased fever or chills.    · You have chest pain.    · You have severe shortness of breath.  · You have bloody sputum.    · You develop dehydration.  · You faint or repeatedly feel like you are going to pass out.  · You develop repeated vomiting.  · You develop a severe headache.

## 2022-11-08 NOTE — PATIENT INSTRUCTIONS
Warm salt water gargle / rinses 2-3 times a day.     ACUTE BRONCHITIS    Bronchitis is inflammation of the airways that extend from the windpipe into the lungs (bronchi). The inflammation often causes mucus to develop. This leads to a cough, which is the most common symptom of bronchitis.    In acute bronchitis, the condition usually develops suddenly and goes away over time, usually in a couple weeks. Smoking, allergies, and asthma can make bronchitis worse.     CAUSES  Acute bronchitis is most often caused by the same virus that causes a cold. The virus can spread from person to person (contagious) through coughing, sneezing, and touching contaminated objects.Some atypical bacteria may also cause bronchitis.  SIGNS AND SYMPTOMS    · Cough.    · Fever.    · Coughing up mucus.    · Body aches.    · Chest congestion.    · Chills.    · Shortness of breath.    · Sore throat.      TREATMENT    Medicines are usually given for relief of fever or cough. Antibiotics may be prescribed in certain situations. In some cases, an inhaler may be recommended to help reduce shortness of breath and control the cough.  HOME CARE INSTRUCTIONS  · Get plenty of rest.    · Drink enough fluids to keep your urine clear or pale yellow. Increasing fluids may help thin your respiratory secretions and reduce chest congestion, and it will prevent dehydration.    · If you were prescribed an antibiotic, finish it all even if you start to feel better.  · Avoid smoking and secondhand smoke..    · Keep all follow-up visits as directed by your doctor.    SEEK MEDICAL CARE IF:  Your symptoms do not improve after 1 week of treatment.    SEEK IMMEDIATE MEDICAL CARE IF:  · You develop an increased fever or chills.    · You have chest pain.    · You have severe shortness of breath.  · You have bloody sputum.    · You develop dehydration.  · You faint or repeatedly feel like you are going to pass out.  · You develop repeated vomiting.  · You develop a  severe headache.

## 2022-12-02 NOTE — PROGRESS NOTES
Chief Complaint  Fatigue and Lower Extremity Issue (Ankle swelling )     Subjective:      History of Present Illness {CC  Problem List  Visit  Diagnosis   Encounters  Notes  Medications  Labs  Result Review Imaging  Media :23}     Edward Srinivasan is a patient of Martha Butt APRN and presents to [facility] for     11/8: seen by me: Bronchitis and treated with amoxicillin.  His wife had been ill for several weeks prior and then he became ill.   He was negative for flu/ COVID.  ? RSV     11/22: port was flushed     States cough/URI is much better.   But he has had fatigue. States his wife recovered faster than he did.  No fever, chills. Low energy and has been in bed more.     Still has appetite but when he eats, he feels full. BM about 5 times a week (last was today).  Voiding well.     Fatigue: sleeping more.   Swelling to feet that improves in am and with elevation.   He states he had cardiac workup recently and states overall was told he is doing well.     Continues to smoke.     His wife is here with him today and she is worried about anemia.  He denies dark or bloody stools.       I have reviewed patient's medical history, any new submitted information provided by patient or medical assistant and updated medical record.      Objective:      Physical Exam  Vitals reviewed.   Constitutional:       Appearance: Normal appearance. He is well-developed.   HENT:      Head:      Comments: Wearing mask due to COVID   Neck:      Thyroid: No thyromegaly.   Cardiovascular:      Rate and Rhythm: Normal rate and regular rhythm.      Pulses: Normal pulses.      Heart sounds: Normal heart sounds.   Pulmonary:      Effort: Pulmonary effort is normal.      Breath sounds: Normal breath sounds. No wheezing.      Comments: E/U   Mild inspir crackle RUE   Musculoskeletal:         General: Normal range of motion.      Cervical back: Normal range of motion and neck supple.       "Comments: Trace swelling around ankles: no redness, nontender, equal    Lymphadenopathy:      Cervical: No cervical adenopathy.   Skin:     General: Skin is warm and dry.      Capillary Refill: Capillary refill takes 2 to 3 seconds.   Neurological:      Mental Status: He is alert and oriented to person, place, and time.   Psychiatric:         Mood and Affect: Mood normal.         Behavior: Behavior normal. Behavior is cooperative.         Thought Content: Thought content normal.         Judgment: Judgment normal.        Result Review  Data Reviewed:{ Labs  Result Review  Imaging  Med Tab  Media :23}     The following data was reviewed by: Angie Acevedo III, NP-C on 12/02/2022  Common labs    Common Labs 9/2/22 9/2/22 9/2/22 9/27/22 9/27/22 12/2/22 12/2/22    [id number] [id number] 1619 1619   Glucose     246 (A)  157 (A)   BUN     21 (A)  22   Creatinine     0.77  1.19   Sodium     133 (A)  135 (A)   Potassium     4.5  4.9   Chloride     99  98   Calcium     8.5  8.5 (A)   Albumin     3.30 (A)     Total Bilirubin     0.3     Alkaline Phosphatase     85     AST (SGOT)     25     ALT (SGPT)     18     WBC    4.34  4.64    Hemoglobin    9.6 (A)  9.4 (A)    Hematocrit    30.1 (A)  28.9 (A)    Platelets    179  208    Total Cholesterol  75        Triglycerides  46        HDL Cholesterol  33 (A)        LDL Cholesterol   30        Hemoglobin A1C 6.10 (A)         Microalbumin, Urine   <3.0       (A) Abnormal value       Comments are available for some flowsheets but are not being displayed.                  Vital Signs:   /62 (BP Location: Left arm, Patient Position: Sitting, Cuff Size: Adult)   Pulse 90   Temp 97.7 °F (36.5 °C) (Temporal)   Ht 165.1 cm (65\")   Wt 64.1 kg (141 lb 6.4 oz)   SpO2 99%   BMI 23.53 kg/m²         Requested Prescriptions      No prescriptions requested or ordered in this encounter       Routine medications provided by this office will also be refilled via pharmacy " request.       Current Outpatient Medications:   •  aspirin 81 MG EC tablet, Take 81 mg by mouth Daily., Disp: , Rfl:   •  atorvastatin (LIPITOR) 40 MG tablet, TAKE 1 TABLET DAILY, Disp: 90 tablet, Rfl: 1  •  beta carotene 38577 UNIT capsule, Take 10,000 Units by mouth Daily., Disp: , Rfl:   •  cetirizine (ZyrTEC) 10 MG tablet, Take 10 mg by mouth daily., Disp: , Rfl:   •  Cholecalciferol (VITAMIN D-3 PO), Take 2,000 Int'l Units/day by mouth Daily., Disp: , Rfl:   •  FREESTYLE LITE test strip, USE ONE STRIP TO TEST TWICE A DAY (USE NEW STRIP FOR EACH USE), Disp: 100 each, Rfl: 0  •  Lancets (freestyle) lancets, USE ONE LANCET TO TEST BLOOD SUGAR TWO TIMES A DAY AS INSTRUCTED, Disp: 100 each, Rfl: 0  •  linagliptin (TRADJENTA) 5 MG tablet tablet, Take 1 tablet by mouth Daily., Disp: 90 tablet, Rfl: 3  •  lisinopril (PRINIVIL,ZESTRIL) 10 MG tablet, Take 1 tablet by mouth Daily., Disp: 90 tablet, Rfl: 3  •  MAGNESIUM PO, Take 500 mg by mouth Daily., Disp: , Rfl:   •  meclizine (ANTIVERT) 25 MG tablet, Take 1 tablet by mouth 3 (Three) Times a Day As Needed for Dizziness., Disp: 180 tablet, Rfl: 1  •  nitroglycerin (NITROSTAT) 0.4 MG SL tablet, Place 1 tablet under the tongue Every 5 (Five) Minutes As Needed (CHEST PAIN). Take no more than 3 doses in 15 minutes., Disp: 25 tablet, Rfl: 4  •  omeprazole (priLOSEC) 40 MG capsule, Take 1 capsule by mouth Daily., Disp: 90 capsule, Rfl: 3  •  pyridoxine (B-6) 100 MG tablet, Take 100 mg by mouth Daily., Disp: , Rfl:   •  vitamin C (ASCORBIC ACID) 500 MG tablet, Take 500 mg by mouth Daily., Disp: , Rfl:   •  vitamin E 400 UNIT capsule, Take 400 Units by mouth Daily., Disp: , Rfl:   No current facility-administered medications for this visit.    Facility-Administered Medications Ordered in Other Visits:   •  heparin flush (porcine) 100 UNIT/ML injection 500 Units, 500 Units, Intravenous, PRN, Gabriele Garcia MD, 500 Units at 01/13/20 0960  •  sodium chloride 0.9 % flush 10 mL, 10  mL, Intravenous, PRN, Gabriele Garcia MD, 10 mL at 01/13/20 0953     Assessment and Plan:      Assessment and Plan {CC Problem List  Visit Diagnosis  ROS  Review (Popup)  [facility] Maintenance  Quality  BestPractice  Medications  SmartSets  SnapShot Encounters  Media :23}     Problem List Items Addressed This Visit    None  Visit Diagnoses     Other fatigue    -  Primary    Pedal edema        Decreased appetite            He was sent for stat CXR and labs.   Hgb 9.4 (prior 9.6)   WBC 4.64, [place] Abs 2    CXR: Heart, mediastinum and pulmonary vasculature are unremarkable.  Likely scarring in left lower lung, and right upper lung. No focal pulmonary consolidation. Likely pleural thickening or loculated pleural effusion peripherally at the left base. No pneumothorax. No acute osseous process.    I called and discussed with patient with his wife on the phone as well.     Labs no significant change from before.  After discussing edema more - he states he has consumed more salt in the last few days.  Advised to decrease.     He has some deconditioning from URI: increase exercise at home as tolerated.     If worsens or does not improve: notify office.   JONNIE ENGLAND: go to ER    Follow Up {Instructions Charge Capture  Follow-up Communications :23}     Return if symptoms worsen or fail to improve.    I spent 30 minutes caring for Edward on this date of service. This time includes time spent by me in the following activities: preparing for the visit, reviewing tests, obtaining and/or reviewing a separately obtained history, performing a medically appropriate examination and/or evaluation, counseling and educating the patient/family/caregiver, ordering medications, tests, or procedures, referring and communicating with other health care professionals, documenting information in the medical record and independently interpreting results and communicating that information with the patient/family/caregiver    Follow up with  PCP as scheduled.     Patient was given instructions and counseling regarding his condition or for health maintenance advice. Please see specific information pulled into the AVS if appropriate.    Dragon disclaimer:   Much of this encounter note is an electronic transcription/translation of spoken language to printed text. The electronic translation of spoken language may permit erroneous, or at times, nonsensical words or phrases to be inadvertently transcribed; Although I have reviewed the note for such errors, some may still exist.     Additional Patient Counseling:       There are no Patient Instructions on file for this visit.

## 2023-01-01 ENCOUNTER — APPOINTMENT (OUTPATIENT)
Dept: GENERAL RADIOLOGY | Facility: HOSPITAL | Age: 83
End: 2023-01-01
Payer: MEDICARE

## 2023-01-01 ENCOUNTER — LAB (OUTPATIENT)
Dept: LAB | Facility: HOSPITAL | Age: 83
End: 2023-01-01
Payer: MEDICARE

## 2023-01-01 ENCOUNTER — TELEPHONE (OUTPATIENT)
Dept: INTERNAL MEDICINE | Facility: CLINIC | Age: 83
End: 2023-01-01
Payer: MEDICARE

## 2023-01-01 ENCOUNTER — OFFICE VISIT (OUTPATIENT)
Dept: ONCOLOGY | Facility: CLINIC | Age: 83
End: 2023-01-01
Payer: MEDICARE

## 2023-01-01 ENCOUNTER — OFFICE VISIT (OUTPATIENT)
Dept: INTERNAL MEDICINE | Facility: CLINIC | Age: 83
End: 2023-01-01
Payer: MEDICARE

## 2023-01-01 ENCOUNTER — INFUSION (OUTPATIENT)
Dept: ONCOLOGY | Facility: HOSPITAL | Age: 83
End: 2023-01-01
Payer: MEDICARE

## 2023-01-01 ENCOUNTER — HOSPITAL ENCOUNTER (OUTPATIENT)
Dept: PET IMAGING | Facility: HOSPITAL | Age: 83
Discharge: HOME | End: 2023-01-13
Payer: MEDICARE

## 2023-01-01 ENCOUNTER — TELEPHONE (OUTPATIENT)
Dept: ONCOLOGY | Facility: CLINIC | Age: 83
End: 2023-01-01
Payer: MEDICARE

## 2023-01-01 ENCOUNTER — HOSPITAL ENCOUNTER (EMERGENCY)
Facility: HOSPITAL | Age: 83
Discharge: HOME | End: 2023-01-17
Attending: EMERGENCY MEDICINE | Admitting: EMERGENCY MEDICINE
Payer: MEDICARE

## 2023-01-01 ENCOUNTER — DOCUMENTATION (OUTPATIENT)
Dept: ONCOLOGY | Facility: CLINIC | Age: 83
End: 2023-01-01
Payer: MEDICARE

## 2023-01-01 ENCOUNTER — HOSPITAL ENCOUNTER (OUTPATIENT)
Dept: CT IMAGING | Facility: HOSPITAL | Age: 83
Discharge: HOME | End: 2023-01-24
Admitting: INTERNAL MEDICINE
Payer: MEDICARE

## 2023-01-01 ENCOUNTER — HOSPITAL ENCOUNTER (OUTPATIENT)
Dept: CT IMAGING | Facility: HOSPITAL | Age: 83
Discharge: HOME | End: 2023-01-03
Admitting: NURSE PRACTITIONER
Payer: MEDICARE

## 2023-01-01 VITALS
SYSTOLIC BLOOD PRESSURE: 100 MMHG | RESPIRATION RATE: 18 BRPM | WEIGHT: 130 LBS | DIASTOLIC BLOOD PRESSURE: 60 MMHG | OXYGEN SATURATION: 96 % | HEIGHT: 67 IN | TEMPERATURE: 97.4 F | BODY MASS INDEX: 20.4 KG/M2 | HEART RATE: 91 BPM

## 2023-01-01 VITALS
DIASTOLIC BLOOD PRESSURE: 48 MMHG | TEMPERATURE: 98 F | SYSTOLIC BLOOD PRESSURE: 92 MMHG | WEIGHT: 121.9 LBS | HEART RATE: 117 BPM | BODY MASS INDEX: 19.13 KG/M2 | RESPIRATION RATE: 18 BRPM | HEIGHT: 67 IN | OXYGEN SATURATION: 96 %

## 2023-01-01 VITALS
RESPIRATION RATE: 18 BRPM | TEMPERATURE: 98 F | HEART RATE: 88 BPM | HEIGHT: 67 IN | WEIGHT: 130 LBS | OXYGEN SATURATION: 97 % | DIASTOLIC BLOOD PRESSURE: 61 MMHG | BODY MASS INDEX: 20.4 KG/M2 | SYSTOLIC BLOOD PRESSURE: 116 MMHG

## 2023-01-01 VITALS
TEMPERATURE: 97.7 F | HEART RATE: 131 BPM | WEIGHT: 133.6 LBS | SYSTOLIC BLOOD PRESSURE: 118 MMHG | OXYGEN SATURATION: 99 % | DIASTOLIC BLOOD PRESSURE: 68 MMHG | BODY MASS INDEX: 22.26 KG/M2 | HEIGHT: 65 IN

## 2023-01-01 DIAGNOSIS — E11.42 TYPE 2 DIABETES MELLITUS WITH DIABETIC POLYNEUROPATHY, WITHOUT LONG-TERM CURRENT USE OF INSULIN: Chronic | ICD-10-CM

## 2023-01-01 DIAGNOSIS — D64.9 NORMOCYTIC ANEMIA: ICD-10-CM

## 2023-01-01 DIAGNOSIS — R63.4 WEIGHT LOSS: ICD-10-CM

## 2023-01-01 DIAGNOSIS — I10 ESSENTIAL HYPERTENSION, BENIGN: Primary | Chronic | ICD-10-CM

## 2023-01-01 DIAGNOSIS — R06.02 SHORTNESS OF BREATH: ICD-10-CM

## 2023-01-01 DIAGNOSIS — C83.30 DIFFUSE LARGE B-CELL LYMPHOMA, UNSPECIFIED BODY REGION: ICD-10-CM

## 2023-01-01 DIAGNOSIS — C83.33 DIFFUSE LARGE B-CELL LYMPHOMA OF INTRA-ABDOMINAL LYMPH NODES: Primary | ICD-10-CM

## 2023-01-01 DIAGNOSIS — C83.33 DIFFUSE LARGE B-CELL LYMPHOMA OF INTRA-ABDOMINAL LYMPH NODES: Primary | Chronic | ICD-10-CM

## 2023-01-01 DIAGNOSIS — C83.33 DIFFUSE LARGE B-CELL LYMPHOMA OF INTRA-ABDOMINAL LYMPH NODES: Chronic | ICD-10-CM

## 2023-01-01 DIAGNOSIS — Z85.72 HISTORY OF DIFFUSE LARGE B-CELL LYMPHOMA: ICD-10-CM

## 2023-01-01 DIAGNOSIS — R53.1 GENERALIZED WEAKNESS: Primary | ICD-10-CM

## 2023-01-01 DIAGNOSIS — K21.9 GASTROESOPHAGEAL REFLUX DISEASE: ICD-10-CM

## 2023-01-01 DIAGNOSIS — C83.30 DIFFUSE LARGE B-CELL LYMPHOMA, UNSPECIFIED BODY REGION: Primary | ICD-10-CM

## 2023-01-01 DIAGNOSIS — E78.00 PURE HYPERCHOLESTEROLEMIA: Chronic | ICD-10-CM

## 2023-01-01 LAB
ALBUMIN SERPL-MCNC: 3 G/DL (ref 3.5–5.2)
ALBUMIN SERPL-MCNC: 3.1 G/DL (ref 3.5–5.2)
ALBUMIN SERPL-MCNC: 3.1 G/DL (ref 3.5–5.2)
ALBUMIN/GLOB SERPL: 0.8 G/DL
ALBUMIN/GLOB SERPL: 0.8 G/DL
ALBUMIN/GLOB SERPL: 0.8 G/DL (ref 1.1–2.4)
ALP SERPL-CCNC: 280 U/L (ref 39–117)
ALP SERPL-CCNC: 288 U/L (ref 39–117)
ALP SERPL-CCNC: 311 U/L (ref 38–116)
ALT SERPL W P-5'-P-CCNC: 42 U/L (ref 1–41)
ALT SERPL W P-5'-P-CCNC: 46 U/L (ref 0–41)
ALT SERPL-CCNC: 49 U/L (ref 1–41)
ANION GAP SERPL CALCULATED.3IONS-SCNC: 10.8 MMOL/L (ref 5–15)
ANION GAP SERPL CALCULATED.3IONS-SCNC: 12.8 MMOL/L (ref 5–15)
AST SERPL-CCNC: 106 U/L (ref 1–40)
AST SERPL-CCNC: 130 U/L (ref 0–40)
AST SERPL-CCNC: 99 U/L (ref 1–40)
BACTERIA UR QL AUTO: NORMAL /HPF
BASOPHILS # BLD AUTO: 0.03 10*3/MM3 (ref 0–0.2)
BASOPHILS # BLD AUTO: 0.05 10*3/MM3 (ref 0–0.2)
BASOPHILS # BLD AUTO: 0.06 10*3/MM3 (ref 0–0.2)
BASOPHILS # BLD MANUAL: 0.05 10*3/MM3 (ref 0–0.2)
BASOPHILS NFR BLD AUTO: 1.1 % (ref 0–1.5)
BASOPHILS NFR BLD AUTO: 1.8 % (ref 0–1.5)
BASOPHILS NFR BLD AUTO: 1.8 % (ref 0–1.5)
BASOPHILS NFR BLD MANUAL: 2.1 % (ref 0–1.5)
BILIRUB SERPL-MCNC: 0.9 MG/DL (ref 0–1.2)
BILIRUB SERPL-MCNC: 1.3 MG/DL (ref 0–1.2)
BILIRUB SERPL-MCNC: 1.7 MG/DL (ref 0.2–1.2)
BILIRUB UR QL STRIP: NEGATIVE
BUN SERPL-MCNC: 23 MG/DL (ref 8–23)
BUN SERPL-MCNC: 26 MG/DL (ref 6–20)
BUN SERPL-MCNC: 29 MG/DL (ref 8–23)
BUN/CREAT SERPL: 27.1 (ref 7–25)
BUN/CREAT SERPL: 31.2 (ref 7–25)
BUN/CREAT SERPL: 34.7 (ref 7.3–30)
CALCIUM SERPL-MCNC: 8.7 MG/DL (ref 8.6–10.5)
CALCIUM SPEC-SCNC: 8.5 MG/DL (ref 8.6–10.5)
CALCIUM SPEC-SCNC: 9 MG/DL (ref 8.5–10.2)
CHLORIDE SERPL-SCNC: 102 MMOL/L (ref 98–107)
CHLORIDE SERPL-SCNC: 99 MMOL/L (ref 98–107)
CHLORIDE SERPL-SCNC: 99 MMOL/L (ref 98–107)
CLARITY UR: CLEAR
CO2 SERPL-SCNC: 23.2 MMOL/L (ref 22–29)
CO2 SERPL-SCNC: 23.2 MMOL/L (ref 22–29)
CO2 SERPL-SCNC: 23.6 MMOL/L (ref 22–29)
COLOR UR: ABNORMAL
CREAT BLDA-MCNC: 0.9 MG/DL (ref 0.6–1.3)
CREAT SERPL-MCNC: 0.75 MG/DL (ref 0.7–1.3)
CREAT SERPL-MCNC: 0.85 MG/DL (ref 0.76–1.27)
CREAT SERPL-MCNC: 0.93 MG/DL (ref 0.76–1.27)
DEPRECATED RDW RBC AUTO: 55.7 FL (ref 37–54)
DEPRECATED RDW RBC AUTO: 57.2 FL (ref 37–54)
DEPRECATED RDW RBC AUTO: 58.4 FL (ref 37–54)
DEPRECATED RDW RBC AUTO: 70 FL (ref 37–54)
EGFRCR SERPLBLD CKD-EPI 2021: 83 ML/MIN/1.73
EGFRCR SERPLBLD CKD-EPI 2021: 87.8 ML/MIN/1.73
EGFRCR SERPLBLD CKD-EPI 2021: 91.2 ML/MIN/1.73
EOSINOPHIL # BLD AUTO: 0.09 10*3/MM3 (ref 0–0.4)
EOSINOPHIL # BLD AUTO: 0.09 10*3/MM3 (ref 0–0.4)
EOSINOPHIL # BLD AUTO: 0.11 10*3/MM3 (ref 0–0.4)
EOSINOPHIL # BLD MANUAL: 0.13 10*3/MM3 (ref 0–0.4)
EOSINOPHIL NFR BLD AUTO: 3.2 % (ref 0.3–6.2)
EOSINOPHIL NFR BLD AUTO: 3.3 % (ref 0.3–6.2)
EOSINOPHIL NFR BLD AUTO: 3.4 % (ref 0.3–6.2)
EOSINOPHIL NFR BLD MANUAL: 5.3 % (ref 0.3–6.2)
ERYTHROCYTE [DISTWIDTH] IN BLOOD BY AUTOMATED COUNT: 16.5 % (ref 12.3–15.4)
ERYTHROCYTE [DISTWIDTH] IN BLOOD BY AUTOMATED COUNT: 16.5 % (ref 12.3–15.4)
ERYTHROCYTE [DISTWIDTH] IN BLOOD BY AUTOMATED COUNT: 16.8 % (ref 12.3–15.4)
ERYTHROCYTE [DISTWIDTH] IN BLOOD BY AUTOMATED COUNT: 19.1 % (ref 12.3–15.4)
GLOBULIN SER CALC-MCNC: 3.7 GM/DL
GLOBULIN UR ELPH-MCNC: 3.8 GM/DL (ref 1.8–3.5)
GLOBULIN UR ELPH-MCNC: 3.9 GM/DL
GLUCOSE BLDC GLUCOMTR-MCNC: 146 MG/DL (ref 70–130)
GLUCOSE BLDC GLUCOMTR-MCNC: 162 MG/DL (ref 70–130)
GLUCOSE SERPL-MCNC: 164 MG/DL (ref 74–124)
GLUCOSE SERPL-MCNC: 195 MG/DL (ref 65–99)
GLUCOSE SERPL-MCNC: 253 MG/DL (ref 65–99)
GLUCOSE UR STRIP-MCNC: NEGATIVE MG/DL
HBA1C MFR BLD: 6.1 % (ref 4.8–5.6)
HCT VFR BLD AUTO: 30.4 % (ref 37.5–51)
HCT VFR BLD AUTO: 30.5 % (ref 37.5–51)
HCT VFR BLD AUTO: 32.2 % (ref 37.5–51)
HCT VFR BLD AUTO: 35.4 % (ref 37.5–51)
HGB BLD-MCNC: 10 G/DL (ref 13–17.7)
HGB BLD-MCNC: 10.1 G/DL (ref 13–17.7)
HGB BLD-MCNC: 10.8 G/DL (ref 13–17.7)
HGB BLD-MCNC: 11.3 G/DL (ref 13–17.7)
HGB RETIC QN AUTO: 35.4 PG (ref 29.8–36.1)
HGB RETIC QN AUTO: 37.2 PG (ref 29.8–36.1)
HGB UR QL STRIP.AUTO: NEGATIVE
HOLD SPECIMEN: NORMAL
HOLD SPECIMEN: NORMAL
HYALINE CASTS UR QL AUTO: NORMAL /LPF
IMM GRANULOCYTES # BLD AUTO: 0.01 10*3/MM3 (ref 0–0.05)
IMM GRANULOCYTES # BLD AUTO: 0.01 10*3/MM3 (ref 0–0.05)
IMM GRANULOCYTES # BLD AUTO: 0.02 10*3/MM3 (ref 0–0.05)
IMM GRANULOCYTES NFR BLD AUTO: 0.3 % (ref 0–0.5)
IMM GRANULOCYTES NFR BLD AUTO: 0.4 % (ref 0–0.5)
IMM GRANULOCYTES NFR BLD AUTO: 0.7 % (ref 0–0.5)
IMM RETICS NFR: 11 % (ref 3–15.8)
IMM RETICS NFR: 12.9 % (ref 3–15.8)
INR PPP: 1.2 (ref 0.8–1.2)
KETONES UR QL STRIP: ABNORMAL
LAB AP CASE REPORT: NORMAL
LAB AP CLINICAL INFORMATION: NORMAL
LAB AP DIAGNOSIS COMMENT: NORMAL
LAB AP FLOW CYTOMETRY SUMMARY: NORMAL
LAB AP SPECIAL STAINS: NORMAL
LDH SERPL L TO P-CCNC: 257 U/L (ref 135–225)
LDH SERPL-CCNC: 290 U/L (ref 99–259)
LEUKOCYTE ESTERASE UR QL STRIP.AUTO: NEGATIVE
LYMPHOCYTES # BLD AUTO: 1.05 10*3/MM3 (ref 0.7–3.1)
LYMPHOCYTES # BLD AUTO: 1.09 10*3/MM3 (ref 0.7–3.1)
LYMPHOCYTES # BLD AUTO: 1.17 10*3/MM3 (ref 0.7–3.1)
LYMPHOCYTES # BLD MANUAL: 0.69 10*3/MM3 (ref 0.7–3.1)
LYMPHOCYTES NFR BLD AUTO: 32.3 % (ref 19.6–45.3)
LYMPHOCYTES NFR BLD AUTO: 39.9 % (ref 19.6–45.3)
LYMPHOCYTES NFR BLD AUTO: 41.6 % (ref 19.6–45.3)
LYMPHOCYTES NFR BLD MANUAL: 11.7 % (ref 5–12)
MAGNESIUM SERPL-MCNC: 2.2 MG/DL (ref 1.6–2.4)
MCH RBC QN AUTO: 30.4 PG (ref 26.6–33)
MCH RBC QN AUTO: 31.4 PG (ref 26.6–33)
MCH RBC QN AUTO: 31.6 PG (ref 26.6–33)
MCH RBC QN AUTO: 32 PG (ref 26.6–33)
MCHC RBC AUTO-ENTMCNC: 31.9 G/DL (ref 31.5–35.7)
MCHC RBC AUTO-ENTMCNC: 32.8 G/DL (ref 31.5–35.7)
MCHC RBC AUTO-ENTMCNC: 33.2 G/DL (ref 31.5–35.7)
MCHC RBC AUTO-ENTMCNC: 33.5 G/DL (ref 31.5–35.7)
MCV RBC AUTO: 92.7 FL (ref 79–97)
MCV RBC AUTO: 94.4 FL (ref 79–97)
MCV RBC AUTO: 95.3 FL (ref 79–97)
MCV RBC AUTO: 98.9 FL (ref 79–97)
MONOCYTES # BLD AUTO: 0.51 10*3/MM3 (ref 0.1–0.9)
MONOCYTES # BLD AUTO: 0.56 10*3/MM3 (ref 0.1–0.9)
MONOCYTES # BLD AUTO: 0.56 10*3/MM3 (ref 0.1–0.9)
MONOCYTES # BLD: 0.28 10*3/MM3 (ref 0.1–0.9)
MONOCYTES NFR BLD AUTO: 17.2 % (ref 5–12)
MONOCYTES NFR BLD AUTO: 18.7 % (ref 5–12)
MONOCYTES NFR BLD AUTO: 19.9 % (ref 5–12)
NEUTROPHILS # BLD AUTO: 1.26 10*3/MM3 (ref 1.7–7)
NEUTROPHILS NFR BLD AUTO: 0.93 10*3/MM3 (ref 1.7–7)
NEUTROPHILS NFR BLD AUTO: 0.99 10*3/MM3 (ref 1.7–7)
NEUTROPHILS NFR BLD AUTO: 1.46 10*3/MM3 (ref 1.7–7)
NEUTROPHILS NFR BLD AUTO: 33.1 % (ref 42.7–76)
NEUTROPHILS NFR BLD AUTO: 36.3 % (ref 42.7–76)
NEUTROPHILS NFR BLD AUTO: 45 % (ref 42.7–76)
NEUTROPHILS NFR BLD MANUAL: 52.1 % (ref 42.7–76)
NITRITE UR QL STRIP: NEGATIVE
NRBC BLD AUTO-RTO: 0 /100 WBC (ref 0–0.2)
NT-PROBNP SERPL-MCNC: 1072 PG/ML (ref 0–486)
NT-PROBNP SERPL-MCNC: 821 PG/ML (ref 0–1800)
PATH REPORT.FINAL DX SPEC: NORMAL
PATH REPORT.GROSS SPEC: NORMAL
PH UR STRIP.AUTO: 5.5 [PH] (ref 5–8)
PLAT MORPH BLD: NORMAL
PLATELET # BLD AUTO: 123 10*3/MM3 (ref 140–450)
PLATELET # BLD AUTO: 157 10*3/MM3 (ref 140–450)
PLATELET # BLD AUTO: 174 10*3/MM3 (ref 140–450)
PLATELET # BLD AUTO: 194 10*3/MM3 (ref 140–450)
PMV BLD AUTO: 8.8 FL (ref 6–12)
PMV BLD AUTO: 8.8 FL (ref 6–12)
PMV BLD AUTO: 9.1 FL (ref 6–12)
PMV BLD AUTO: 9.2 FL (ref 6–12)
POIKILOCYTOSIS BLD QL SMEAR: ABNORMAL
POTASSIUM SERPL-SCNC: 4.8 MMOL/L (ref 3.5–4.7)
POTASSIUM SERPL-SCNC: 5 MMOL/L (ref 3.5–5.2)
POTASSIUM SERPL-SCNC: 5.3 MMOL/L (ref 3.5–5.2)
PROT SERPL-MCNC: 6.7 G/DL (ref 6–8.5)
PROT SERPL-MCNC: 6.9 G/DL (ref 6.3–8)
PROT SERPL-MCNC: 7 G/DL (ref 6–8.5)
PROT UR QL STRIP: ABNORMAL
PROTHROMBIN TIME: 14.1 SECONDS (ref 12.8–15.2)
QT INTERVAL: 311 MS
RBC # BLD AUTO: 3.22 10*6/MM3 (ref 4.14–5.8)
RBC # BLD AUTO: 3.29 10*6/MM3 (ref 4.14–5.8)
RBC # BLD AUTO: 3.38 10*6/MM3 (ref 4.14–5.8)
RBC # BLD AUTO: 3.58 10*6/MM3 (ref 4.14–5.8)
RBC # UR STRIP: NORMAL /HPF
REF LAB TEST METHOD: NORMAL
RETICS # AUTO: 0.07 10*6/MM3 (ref 0.02–0.13)
RETICS # AUTO: 0.07 10*6/MM3 (ref 0.02–0.13)
RETICS/RBC NFR AUTO: 2.05 % (ref 0.7–1.9)
RETICS/RBC NFR AUTO: 2.12 % (ref 0.7–1.9)
SMUDGE CELLS BLD QL SMEAR: ABNORMAL
SODIUM SERPL-SCNC: 133 MMOL/L (ref 136–145)
SODIUM SERPL-SCNC: 135 MMOL/L (ref 136–145)
SODIUM SERPL-SCNC: 138 MMOL/L (ref 134–145)
SP GR UR STRIP: 1.02 (ref 1–1.03)
SQUAMOUS #/AREA URNS HPF: NORMAL /HPF
TROPONIN T SERPL-MCNC: <0.01 NG/ML (ref 0–0.03)
UROBILINOGEN UR QL STRIP: ABNORMAL
VARIANT LYMPHS NFR BLD MANUAL: 28.7 % (ref 19.6–45.3)
WBC # UR STRIP: NORMAL /HPF
WBC NRBC COR # BLD: 2.41 10*3/MM3 (ref 3.4–10.8)
WBC NRBC COR # BLD: 2.73 10*3/MM3 (ref 3.4–10.8)
WBC NRBC COR # BLD: 2.81 10*3/MM3 (ref 3.4–10.8)
WBC NRBC COR # BLD: 3.25 10*3/MM3 (ref 3.4–10.8)
WHOLE BLOOD HOLD COAG: NORMAL
WHOLE BLOOD HOLD SPECIMEN: NORMAL

## 2023-01-01 PROCEDURE — 96523 IRRIG DRUG DELIVERY DEVICE: CPT

## 2023-01-01 PROCEDURE — 78815 PET IMAGE W/CT SKULL-THIGH: CPT

## 2023-01-01 PROCEDURE — 74177 CT ABD & PELVIS W/CONTRAST: CPT

## 2023-01-01 PROCEDURE — 71045 X-RAY EXAM CHEST 1 VIEW: CPT

## 2023-01-01 PROCEDURE — 99153 MOD SED SAME PHYS/QHP EA: CPT

## 2023-01-01 PROCEDURE — 85025 COMPLETE CBC W/AUTO DIFF WBC: CPT

## 2023-01-01 PROCEDURE — 80053 COMPREHEN METABOLIC PANEL: CPT

## 2023-01-01 PROCEDURE — 99152 MOD SED SAME PHYS/QHP 5/>YRS: CPT

## 2023-01-01 PROCEDURE — 85046 RETICYTE/HGB CONCENTRATE: CPT

## 2023-01-01 PROCEDURE — 83880 ASSAY OF NATRIURETIC PEPTIDE: CPT

## 2023-01-01 PROCEDURE — 82962 GLUCOSE BLOOD TEST: CPT

## 2023-01-01 PROCEDURE — A9552 F18 FDG: HCPCS | Performed by: INTERNAL MEDICINE

## 2023-01-01 PROCEDURE — 85046 RETICYTE/HGB CONCENTRATE: CPT | Performed by: NURSE PRACTITIONER

## 2023-01-01 PROCEDURE — 84484 ASSAY OF TROPONIN QUANT: CPT

## 2023-01-01 PROCEDURE — 0 LIDOCAINE 1 % SOLUTION: Performed by: INTERNAL MEDICINE

## 2023-01-01 PROCEDURE — 25010000002 HEPARIN LOCK FLUSH PER 10 UNITS: Performed by: NURSE PRACTITIONER

## 2023-01-01 PROCEDURE — 0 FLUDEOXYGLUCOSE F18 SOLUTION: Performed by: INTERNAL MEDICINE

## 2023-01-01 PROCEDURE — 99214 OFFICE O/P EST MOD 30 MIN: CPT | Performed by: NURSE PRACTITIONER

## 2023-01-01 PROCEDURE — 88360 TUMOR IMMUNOHISTOCHEM/MANUAL: CPT | Performed by: INTERNAL MEDICINE

## 2023-01-01 PROCEDURE — 85610 PROTHROMBIN TIME: CPT

## 2023-01-01 PROCEDURE — 88184 FLOWCYTOMETRY/ TC 1 MARKER: CPT

## 2023-01-01 PROCEDURE — 83615 LACTATE (LD) (LDH) ENZYME: CPT

## 2023-01-01 PROCEDURE — 88185 FLOWCYTOMETRY/TC ADD-ON: CPT

## 2023-01-01 PROCEDURE — 25010000002 IOPAMIDOL 61 % SOLUTION: Performed by: NURSE PRACTITIONER

## 2023-01-01 PROCEDURE — 25010000002 MIDAZOLAM PER 1 MG: Performed by: RADIOLOGY

## 2023-01-01 PROCEDURE — 36415 COLL VENOUS BLD VENIPUNCTURE: CPT | Performed by: NURSE PRACTITIONER

## 2023-01-01 PROCEDURE — 88341 IMHCHEM/IMCYTCHM EA ADD ANTB: CPT | Performed by: INTERNAL MEDICINE

## 2023-01-01 PROCEDURE — 83735 ASSAY OF MAGNESIUM: CPT

## 2023-01-01 PROCEDURE — 93005 ELECTROCARDIOGRAM TRACING: CPT

## 2023-01-01 PROCEDURE — 99215 OFFICE O/P EST HI 40 MIN: CPT | Performed by: INTERNAL MEDICINE

## 2023-01-01 PROCEDURE — 93010 ELECTROCARDIOGRAM REPORT: CPT | Performed by: INTERNAL MEDICINE

## 2023-01-01 PROCEDURE — 82565 ASSAY OF CREATININE: CPT

## 2023-01-01 PROCEDURE — 88342 IMHCHEM/IMCYTCHM 1ST ANTB: CPT | Performed by: INTERNAL MEDICINE

## 2023-01-01 PROCEDURE — 77012 CT SCAN FOR NEEDLE BIOPSY: CPT

## 2023-01-01 PROCEDURE — 85025 COMPLETE CBC W/AUTO DIFF WBC: CPT | Performed by: NURSE PRACTITIONER

## 2023-01-01 PROCEDURE — 99999 PR OFFICE/OUTPT VISIT,PROCEDURE ONLY: CPT | Performed by: NURSE PRACTITIONER

## 2023-01-01 PROCEDURE — 85025 COMPLETE CBC W/AUTO DIFF WBC: CPT | Performed by: RADIOLOGY

## 2023-01-01 PROCEDURE — 88300 SURGICAL PATH GROSS: CPT | Performed by: INTERNAL MEDICINE

## 2023-01-01 PROCEDURE — 36415 COLL VENOUS BLD VENIPUNCTURE: CPT

## 2023-01-01 PROCEDURE — 85007 BL SMEAR W/DIFF WBC COUNT: CPT | Performed by: RADIOLOGY

## 2023-01-01 PROCEDURE — 71260 CT THORAX DX C+: CPT

## 2023-01-01 PROCEDURE — 99284 EMERGENCY DEPT VISIT MOD MDM: CPT

## 2023-01-01 PROCEDURE — 81001 URINALYSIS AUTO W/SCOPE: CPT

## 2023-01-01 PROCEDURE — 88307 TISSUE EXAM BY PATHOLOGIST: CPT | Performed by: INTERNAL MEDICINE

## 2023-01-01 PROCEDURE — 83036 HEMOGLOBIN GLYCOSYLATED A1C: CPT | Performed by: NURSE PRACTITIONER

## 2023-01-01 RX ORDER — SODIUM CHLORIDE 0.9 % (FLUSH) 0.9 %
10 SYRINGE (ML) INJECTION AS NEEDED
Status: DISCONTINUED | OUTPATIENT
Start: 2023-01-01 | End: 2023-01-01 | Stop reason: HOSPADM

## 2023-01-01 RX ORDER — LIDOCAINE HYDROCHLORIDE 10 MG/ML
20 INJECTION, SOLUTION INFILTRATION; PERINEURAL ONCE
Status: COMPLETED | OUTPATIENT
Start: 2023-01-01 | End: 2023-01-01

## 2023-01-01 RX ORDER — SODIUM CHLORIDE 0.9 % (FLUSH) 0.9 %
10 SYRINGE (ML) INJECTION AS NEEDED
Status: CANCELLED | OUTPATIENT
Start: 2023-01-01

## 2023-01-01 RX ORDER — HEPARIN SODIUM (PORCINE) LOCK FLUSH IV SOLN 100 UNIT/ML 100 UNIT/ML
500 SOLUTION INTRAVENOUS AS NEEDED
Status: DISCONTINUED | OUTPATIENT
Start: 2023-01-01 | End: 2023-01-01 | Stop reason: HOSPADM

## 2023-01-01 RX ORDER — HEPARIN SODIUM (PORCINE) LOCK FLUSH IV SOLN 100 UNIT/ML 100 UNIT/ML
500 SOLUTION INTRAVENOUS AS NEEDED
Status: CANCELLED | OUTPATIENT
Start: 2023-01-01

## 2023-01-01 RX ORDER — SODIUM CHLORIDE 9 MG/ML
25 INJECTION, SOLUTION INTRAVENOUS ONCE
Status: DISCONTINUED | OUTPATIENT
Start: 2023-01-01 | End: 2023-01-01 | Stop reason: HOSPADM

## 2023-01-01 RX ORDER — OMEPRAZOLE 40 MG/1
CAPSULE, DELAYED RELEASE ORAL
Qty: 90 CAPSULE | Refills: 3 | Status: SHIPPED | OUTPATIENT
Start: 2023-01-01

## 2023-01-01 RX ORDER — MIDAZOLAM HYDROCHLORIDE 1 MG/ML
INJECTION INTRAMUSCULAR; INTRAVENOUS AS NEEDED
Status: COMPLETED | OUTPATIENT
Start: 2023-01-01 | End: 2023-01-01

## 2023-01-01 RX ORDER — SODIUM CHLORIDE 9 MG/ML
40 INJECTION, SOLUTION INTRAVENOUS AS NEEDED
Status: DISCONTINUED | OUTPATIENT
Start: 2023-01-01 | End: 2023-01-01 | Stop reason: HOSPADM

## 2023-01-01 RX ORDER — SODIUM CHLORIDE 0.9 % (FLUSH) 0.9 %
3 SYRINGE (ML) INJECTION EVERY 12 HOURS SCHEDULED
Status: DISCONTINUED | OUTPATIENT
Start: 2023-01-01 | End: 2023-01-01 | Stop reason: HOSPADM

## 2023-01-01 RX ORDER — ATORVASTATIN CALCIUM 40 MG/1
TABLET, FILM COATED ORAL
Qty: 90 TABLET | Refills: 1 | Status: SHIPPED | OUTPATIENT
Start: 2023-01-01

## 2023-01-01 RX ADMIN — SODIUM CHLORIDE 1000 ML: 9 INJECTION, SOLUTION INTRAVENOUS at 20:06

## 2023-01-01 RX ADMIN — Medication 10 ML: at 13:13

## 2023-01-01 RX ADMIN — Medication 500 UNITS: at 08:48

## 2023-01-01 RX ADMIN — SODIUM CHLORIDE 250 ML: 0.9 INJECTION, SOLUTION INTRAVENOUS at 09:38

## 2023-01-01 RX ADMIN — LIDOCAINE HYDROCHLORIDE 20 ML: 10 INJECTION, SOLUTION INFILTRATION; PERINEURAL at 10:54

## 2023-01-01 RX ADMIN — MIDAZOLAM 0.5 MG: 1 INJECTION INTRAMUSCULAR; INTRAVENOUS at 10:48

## 2023-01-01 RX ADMIN — SODIUM CHLORIDE 40 ML: 9 INJECTION, SOLUTION INTRAVENOUS at 10:38

## 2023-01-01 RX ADMIN — IOPAMIDOL 100 ML: 612 INJECTION, SOLUTION INTRAVENOUS at 12:56

## 2023-01-01 RX ADMIN — SODIUM CHLORIDE 40 ML: 9 INJECTION, SOLUTION INTRAVENOUS at 11:12

## 2023-01-01 RX ADMIN — FLUDEOXYGLUCOSE F18 1 DOSE: 300 INJECTION INTRAVENOUS at 07:50

## 2023-01-01 RX ADMIN — HEPARIN 500 UNITS: 100 SYRINGE at 13:14

## 2023-01-01 RX ADMIN — Medication 10 ML: at 08:48

## 2023-01-04 NOTE — H&P (VIEW-ONLY)
"Chief Complaint  Shortness of Breath and Fatigue    Subjective        Edward Srinivasan presents to [facility] due to generalized weakness with shortness of breath.    History of Present Illness  He presents with his wife today due to generalized weakness and fatigue for the past 6-7 weeks.  He notes most of his weakness is in his bilateral legs without specific pain, notes difficulty walking long distances.  He has lost 9 pounds since November 8 which she attributes to decreased intake.  He complains of epigastric discomfort with eating a small amount.  He states if he drinks water with his meal he is unable to eat at all due to abdominal discomfort.  He was initially treated in November for an upper respiratory infection which improved but shortness of breath lingered.  His chest x-ray 12/2/2022 showed pleural thickening versus pleural effusion at the left base.  He has a history of B-cell lymphoma which was diagnosed in May 2019.  He completed 6 cycles and his PET scan in October 2019 was negative for lymphoma.  He has been followed by annual CT imaging since then without recurrence, next CT planned for March.      Objective   Vital Signs:  /68 (BP Location: Left arm, Patient Position: Sitting, Cuff Size: Adult)   Pulse (!) 131   Temp 97.7 °F (36.5 °C) (Temporal)   Ht 165.1 cm (65\")   Wt 60.6 kg (133 lb 9.6 oz)   SpO2 99%   BMI 22.23 kg/m²   Estimated body mass index is 22.23 kg/m² as calculated from the following:    Height as of this encounter: 165.1 cm (65\").    Weight as of this encounter: 60.6 kg (133 lb 9.6 oz).    BMI is within normal parameters. No other follow-up for BMI required.      Physical Exam  Constitutional:       Appearance: He is well-developed. He is not ill-appearing.   HENT:      Head: Normocephalic.      Right Ear: Hearing, tympanic membrane and external ear normal.      Left Ear: Hearing, tympanic membrane and external ear normal.      Nose: Nose " normal. No nasal deformity, mucosal edema or rhinorrhea.      Right Sinus: No maxillary sinus tenderness or frontal sinus tenderness.      Left Sinus: No maxillary sinus tenderness or frontal sinus tenderness.      Mouth/Throat:      Dentition: Normal dentition.   Eyes:      General: Lids are normal.         Right eye: No discharge.         Left eye: No discharge.      Conjunctiva/sclera: Conjunctivae normal.      Right eye: No exudate.     Left eye: No exudate.  Neck:      Thyroid: No thyroid mass or thyromegaly.      Vascular: No carotid bruit.      Trachea: Trachea normal.   Cardiovascular:      Rate and Rhythm: Regular rhythm. Tachycardia present.      Pulses: Normal pulses.      Heart sounds: Normal heart sounds. No murmur heard.  Pulmonary:      Effort: No respiratory distress.      Breath sounds: Normal breath sounds. No decreased breath sounds, wheezing, rhonchi or rales.   Abdominal:      General: Bowel sounds are normal.      Palpations: Abdomen is soft.      Tenderness: There is no abdominal tenderness.   Musculoskeletal:      Cervical back: Normal range of motion. No edema.   Lymphadenopathy:      Head:      Right side of head: No submental, submandibular, tonsillar, preauricular, posterior auricular or occipital adenopathy.      Left side of head: No submental, submandibular, tonsillar, preauricular, posterior auricular or occipital adenopathy.   Skin:     General: Skin is warm and dry.      Nails: There is no clubbing.   Neurological:      Mental Status: He is alert.   Psychiatric:         Behavior: Behavior is cooperative.        Result Review :  The following data was reviewed by: FABIAN Arnold on 01/03/2023:  Common labs    Common Labs 9/27/22 9/27/22 12/2/22 12/2/22 1/3/23 1/3/23 1/3/23    0934 0934 1619 1619 1111 1111 1221   Glucose  246 (A)  157 (A)      BUN  21 (A)  22      Creatinine  0.77  1.19   0.90   Sodium  133 (A)  135 (A)      Potassium  4.5  4.9      Chloride  99  98       Calcium  8.5  8.5 (A)      Albumin  3.30 (A)        Total Bilirubin  0.3        Alkaline Phosphatase  85        AST (SGOT)  25        ALT (SGPT)  18        WBC 4.34  4.64  2.73 (A)     Hemoglobin 9.6 (A)  9.4 (A)  10.0 (A)     Hematocrit 30.1 (A)  28.9 (A)  30.5 (A)     Platelets 179  208  194     Hemoglobin A1C      6.10 (A)    (A) Abnormal value       Comments are available for some flowsheets but are not being displayed.           Data reviewed: Radiologic studies CT abd/pelvis, CT chest 9/2021 and Consultant notes oncology 9/2021          Assessment and Plan   Diagnoses and all orders for this visit:    1. Essential hypertension, benign (Primary)    2. Type 2 diabetes mellitus with diabetic polyneuropathy, without long-term current use of insulin (HCC)  -     linagliptin (TRADJENTA) 5 MG tablet tablet; Take 1 tablet by mouth Daily.  Dispense: 90 tablet; Refill: 3  -     Cancel: Hemoglobin A1c  -     Hemoglobin A1c; Future  -     Hemoglobin A1c    3. Weight loss  -     CT Abdomen Pelvis With Contrast; Future  -     CT Chest With Contrast Diagnostic; Future    4. Shortness of breath  -     CT Chest With Contrast Diagnostic; Future  -     Cancel: CBC & Differential  -     Comprehensive Metabolic Panel  -     proBNP  -     CBC & Differential; Future  -     CBC & Differential    5. History of diffuse large B-cell lymphoma  -     CT Abdomen Pelvis With Contrast; Future  -     CT Chest With Contrast Diagnostic; Future    6. Normocytic anemia  -     Lactate Dehydrogenase; Future  -     Cancel: Retic With IRF & RET-He  -     Lactate Dehydrogenase  -     Retic With IRF & RET-He; Future  -     Retic With IRF & RET-He    He complains of generalized weakness with epigastric discomfort and shortness of breath for the past 6 weeks with an approximate 10 pound weight loss.  We will check labs but will also obtain CT scans to further evaluate.       Follow Up   Return in about 2 weeks (around 1/17/2023).  Patient was given  instructions and counseling regarding his condition or for health maintenance advice. Please see specific information pulled into the AVS if appropriate.

## 2023-01-04 NOTE — TELEPHONE ENCOUNTER
Caller: Cumberland HeadEdward saleh    Relationship: Self    Best call back number: [phone] (Home)    Requested Prescriptions:   linagliptin (TRADJENTA) 5 MG tablet tablet    Pharmacy where request should be sent:  [facility] - [place], [place] - [facility] [address] - [phone] [facility] [phone] FX        Additional details provided by patient: PATIENT CALLED TO ADVISE THAT WE SENT THIS MEDICATION TO THE WRONG PHARMACY. PLEASE SEND TO PHARMACY LISTED ABOVE.    Does the patient have less than a 3 day supply:  [] Yes  [] No    Would you like a call back once the refill request has been completed: [] Yes [] No    If the office needs to give you a call back, can they leave a voicemail: [] Yes [] No    Antony Pulido Rep   01/04/23 09:52 EST         THANKS

## 2023-01-04 NOTE — PROGRESS NOTES
Chief Complaint  Shortness of Breath and Fatigue    Subjective        Edward Srinivasan presents to [facility] due to generalized weakness with shortness of breath.    History of Present Illness  He presents with his wife today due to generalized weakness and fatigue for the past 6-7 weeks.  He notes most of his weakness is in his bilateral legs without specific pain, notes difficulty walking long distances.  He has lost 9 pounds since November 8 which she attributes to decreased intake.  He complains of epigastric discomfort with eating a small amount.  He states if he drinks water with his meal he is unable to eat at all due to abdominal discomfort.  He was initially treated in November for an upper respiratory infection which improved but shortness of breath lingered.  His chest x-ray 12/2/2022 showed pleural thickening versus pleural effusion at the left base.  He has a history of B-cell lymphoma which was diagnosed in May 2019.  He completed 6 cycles and his PET scan in October 2019 was negative for lymphoma.  He has been followed by annual CT imaging since then without recurrence, next CT planned for March.      Objective   Vital Signs:  /68 (BP Location: Left arm, Patient Position: Sitting, Cuff Size: Adult)   Pulse (!) 131   Temp 97.7 °F (36.5 °C) (Temporal)   Ht 165.1 cm (65\")   Wt 60.6 kg (133 lb 9.6 oz)   SpO2 99%   BMI 22.23 kg/m²   Estimated body mass index is 22.23 kg/m² as calculated from the following:    Height as of this encounter: 165.1 cm (65\").    Weight as of this encounter: 60.6 kg (133 lb 9.6 oz).    BMI is within normal parameters. No other follow-up for BMI required.      Physical Exam  Constitutional:       Appearance: He is well-developed. He is not ill-appearing.   HENT:      Head: Normocephalic.      Right Ear: Hearing, tympanic membrane and external ear normal.      Left Ear: Hearing, tympanic membrane and external ear normal.      Nose: Nose  normal. No nasal deformity, mucosal edema or rhinorrhea.      Right Sinus: No maxillary sinus tenderness or frontal sinus tenderness.      Left Sinus: No maxillary sinus tenderness or frontal sinus tenderness.      Mouth/Throat:      Dentition: Normal dentition.   Eyes:      General: Lids are normal.         Right eye: No discharge.         Left eye: No discharge.      Conjunctiva/sclera: Conjunctivae normal.      Right eye: No exudate.     Left eye: No exudate.  Neck:      Thyroid: No thyroid mass or thyromegaly.      Vascular: No carotid bruit.      Trachea: Trachea normal.   Cardiovascular:      Rate and Rhythm: Regular rhythm. Tachycardia present.      Pulses: Normal pulses.      Heart sounds: Normal heart sounds. No murmur heard.  Pulmonary:      Effort: No respiratory distress.      Breath sounds: Normal breath sounds. No decreased breath sounds, wheezing, rhonchi or rales.   Abdominal:      General: Bowel sounds are normal.      Palpations: Abdomen is soft.      Tenderness: There is no abdominal tenderness.   Musculoskeletal:      Cervical back: Normal range of motion. No edema.   Lymphadenopathy:      Head:      Right side of head: No submental, submandibular, tonsillar, preauricular, posterior auricular or occipital adenopathy.      Left side of head: No submental, submandibular, tonsillar, preauricular, posterior auricular or occipital adenopathy.   Skin:     General: Skin is warm and dry.      Nails: There is no clubbing.   Neurological:      Mental Status: He is alert.   Psychiatric:         Behavior: Behavior is cooperative.        Result Review :  The following data was reviewed by: FABIAN Arnold on 01/03/2023:  Common labs    Common Labs 9/27/22 9/27/22 12/2/22 12/2/22 1/3/23 1/3/23 1/3/23    0934 0934 1619 1619 1111 1111 1221   Glucose  246 (A)  157 (A)      BUN  21 (A)  22      Creatinine  0.77  1.19   0.90   Sodium  133 (A)  135 (A)      Potassium  4.5  4.9      Chloride  99  98       Calcium  8.5  8.5 (A)      Albumin  3.30 (A)        Total Bilirubin  0.3        Alkaline Phosphatase  85        AST (SGOT)  25        ALT (SGPT)  18        WBC 4.34  4.64  2.73 (A)     Hemoglobin 9.6 (A)  9.4 (A)  10.0 (A)     Hematocrit 30.1 (A)  28.9 (A)  30.5 (A)     Platelets 179  208  194     Hemoglobin A1C      6.10 (A)    (A) Abnormal value       Comments are available for some flowsheets but are not being displayed.           Data reviewed: Radiologic studies CT abd/pelvis, CT chest 9/2021 and Consultant notes oncology 9/2021          Assessment and Plan   Diagnoses and all orders for this visit:    1. Essential hypertension, benign (Primary)    2. Type 2 diabetes mellitus with diabetic polyneuropathy, without long-term current use of insulin (HCC)  -     linagliptin (TRADJENTA) 5 MG tablet tablet; Take 1 tablet by mouth Daily.  Dispense: 90 tablet; Refill: 3  -     Cancel: Hemoglobin A1c  -     Hemoglobin A1c; Future  -     Hemoglobin A1c    3. Weight loss  -     CT Abdomen Pelvis With Contrast; Future  -     CT Chest With Contrast Diagnostic; Future    4. Shortness of breath  -     CT Chest With Contrast Diagnostic; Future  -     Cancel: CBC & Differential  -     Comprehensive Metabolic Panel  -     proBNP  -     CBC & Differential; Future  -     CBC & Differential    5. History of diffuse large B-cell lymphoma  -     CT Abdomen Pelvis With Contrast; Future  -     CT Chest With Contrast Diagnostic; Future    6. Normocytic anemia  -     Lactate Dehydrogenase; Future  -     Cancel: Retic With IRF & RET-He  -     Lactate Dehydrogenase  -     Retic With IRF & RET-He; Future  -     Retic With IRF & RET-He    He complains of generalized weakness with epigastric discomfort and shortness of breath for the past 6 weeks with an approximate 10 pound weight loss.  We will check labs but will also obtain CT scans to further evaluate.       Follow Up   Return in about 2 weeks (around 1/17/2023).  Patient was given  instructions and counseling regarding his condition or for health maintenance advice. Please see specific information pulled into the AVS if appropriate.

## 2023-01-05 NOTE — PROGRESS NOTES
Notified of abnormal CT imaging.    He needs a PET scan and CT guided liver biopsy.    Will order the PET and biopsy and see him back after that for review.

## 2023-01-17 NOTE — ED TRIAGE NOTES
Pt c/o generalized weakness that started couple months ago that became worse over the last couple weeks. soa with exertion. Denies fever    This RN wore mask and goggles during time of contact

## 2023-01-17 NOTE — TELEPHONE ENCOUNTER
Pt is having difficulty with ADL's. He can only be up for about 15 minutes at a time. This has been going on for 2 weeks. He eats very full after a few bites of food. He is drinking 2 12 oz glasses of water a day. He states his stomach feels full constantly. He also complains of constant lower back pain. Pt wont take anything for pain. Pt is not doing well and is not scheduled schedule for his biopsy until 1/24/2023. Pt needs Biopsy urgently.  D/W Dr. Garcia. Per Dr. Garcia pt will report to ED. Pt informed and V/U.

## 2023-01-18 NOTE — DISCHARGE INSTRUCTIONS
You have been given an emergency department evaluation.  This evaluation is intended to rule out life-threatening conditions.  You could require further testing as determined by your primary care physician or any referred specialist.  Take your prescribed medications  Follow-up with your primary care physician within 48 hours.  Please have your blood pressure checked by your primary care provider.  Return to the emergency department if you experience chest pain, shortness of breath, abdominal pain, fever greater than 102, intractable vomiting, or any new or worsening symptoms.

## 2023-01-18 NOTE — ED PROVIDER NOTES
EMERGENCY DEPARTMENT ENCOUNTER    Room Number:  07/07  Date of encounter:  1/18/2023  PCP: Martha Butt APRN  Patient Care Team:  Martha Butt APRN as PCP - General (Internal Medicine)  Raven Brown MD as Referring Physician (Family Medicine)  Gabriele Garcia MD as Consulting Physician (Hematology and Oncology)   Independent Historians: Patient and family        PPE: Patient was placed in face mask in first look. Patient was wearing facemask when I entered the room and throughout our encounter. I wore full protective equipment throughout this patient encounter including a face mask, and gloves. Hand hygiene was performed before donning protective equipment and after removal when leaving the room.      HPI:  Chief Complaint: Generalized weakness  A complete HPI/ROS/PMH/PSH/SH/FH are unobtainable due to: Nothing    Chronic or social conditions impacting patient care (social determinants of health): None    Context: Edward Srinivasan is a 82 y.o. male with a history of CAD, RA, lymphoma, pulmonary fibrosis, HTN who arrives to the ED via private vehicle from home.  Patient presents with c/o generalized weakness that has been progressively worsening since November.  Family states that in November he had a URI, even after that was resolved he still continued to be weak.  Over the past 2 weeks this is increased.  Patient was seen by his PMD in December and this month.  He had outpatient imaging of his chest and abdomen and is scheduled to see Dr. Garcia, oncologist January 31.  Patient is scheduled for liver biopsy next Tuesday.   Patient also complains of approximately 13 pound weight loss in the past 6 to 8 weeks, worsening shortness of breath.  Patient states that about 3 weeks ago he was in bed and had no strength to get up or do anything.  Patient also states that he never feels hungry and has had decreased appetite.  Patient denies fever, chills, nausea, vomiting, diarrhea, chest pain, dizziness.  Patient  states that nothing makes the symptoms better and exertion worsens symptoms.      Review of prior external notes (non-ED): Medical records reviewed in [facility], patient last saw PMD 1/30/2023 for the generalized weakness and shortness of breath.  Patient has history of B-cell lymphoma which was diagnosed in 2019.  He had a PET scan in October 2019 was negative for lymphoma after completing 6 cycles.    Review of prior external test results outside of this encounter: CT abdomen and pelvis performed 1/3/2023 showed irregular stellate mass in the right upper lobe measuring approximately 1.1 x 1.8 cm that is a new finding and raising concern for primary lung carcinoma.  He also has 2 lobulated masses 1 in the left hilum and the second more peripherally in the lower left hilum.  There was extremely diffuse abnormality throughout the liver, ill-defined lesions highly suspicious for extensive metastatic disease that is new from 9/28/2021.    PAST MEDICAL HISTORY  Active Ambulatory Problems     Diagnosis Date Noted   • Essential hypertension, benign 04/19/2016   • Type 2 diabetes mellitus with diabetic polyneuropathy, without long-term current use of insulin ([facility]) 04/19/2016   • Pure hypercholesterolemia 04/19/2016   • Gastroesophageal reflux disease 04/19/2016   • Intervertebral lumbar disc disorder with myelopathy, lumbar region 04/19/2016   • Rheumatoid arthritis with positive rheumatoid factor ([facility]) 04/19/2016   • Tobacco use 01/10/2018   • Personal history of immunosupression therapy 02/21/2019   • Primary generalized (osteo)arthritis 02/21/2019   • Diffuse large B-cell lymphoma of intra-abdominal lymph nodes ([facility]) 05/28/2019   • Diabetic peripheral neuropathy ([facility]) 05/28/2019   • Diffuse large B cell lymphoma ([facility]) 09/30/2019   • Coronary artery disease involving native coronary artery without angina pectoris 02/14/2021   • Pulmonary emphysema ([facility]) 03/28/2021   • Bilateral carotid artery stenosis 03/28/2021   • Aortic  valve stenosis 08/25/2021   • Normocytic anemia 10/05/2021   • Dizziness 03/01/2022   • History of diffuse large B-cell lymphoma 04/12/2022     Resolved Ambulatory Problems     Diagnosis Date Noted   • Bronchitis 04/19/2016   • Thrombocytopenia (HCC) 02/21/2019   • Poor venous access 06/10/2019     Past Medical History:   Diagnosis Date   • Allergic rhinitis    • CAD (coronary artery disease)    • Cancer (HCC)    • Carotid arterial disease (HCC)    • Chronic obstructive lung disease (HCC)    • Esophageal reflux    • Hyperlipidemia    • Hypertension    • Iron deficiency    • Legally blind    • Pulmonary fibrosis (HCC)    • PVC (premature ventricular contraction)    • Rheumatoid arthritis (HCC)    • Skin cancer of face    • Steroid-induced diabetes mellitus (HCC)        The patient has a COVID HM Topic on their chart, and they are fully vaccinated.    PAST SURGICAL HISTORY  Past Surgical History:   Procedure Laterality Date   • BACK SURGERY  2010    L4-L5 discectomy   • CATARACT EXTRACTION Bilateral 1979   • CORONARY ANGIOPLASTY WITH STENT PLACEMENT  2015   • VASECTOMY  1979   • VENOUS ACCESS DEVICE (PORT) INSERTION N/A 6/12/2019    Procedure: INSERTION RIGHT VENOUS ACCESS DEVICE;  Surgeon: Philippe Herrera Jr., MD;  [facility];  Service: General         FAMILY HISTORY  Family History   Problem Relation Age of Onset   • Heart failure Mother         CHF   • Heart attack Father 54   • Heart disease Father    • Heart attack Brother 47   • Heart disease Brother         another brother with 4 vessel CABG in his 50s   • Coronary artery disease Other         multiple family members   • Malig Hyperthermia Neg Hx          SOCIAL HISTORY  Social History     Socioeconomic History   • Marital status:      Spouse name: Radha   • Years of education: High school   Tobacco Use   • Smoking status: Every Day     Packs/day: 0.25     Years: 50.00     Pack years: 12.50     Types: Cigarettes   • Smokeless tobacco: Never    • Tobacco comments:     1/2 ppd, discussed benefits of cessation   Substance and Sexual Activity   • Alcohol use: No   • Drug use: No   • Sexual activity: Defer         ALLERGIES  Oxaprozin        REVIEW OF SYSTEMS  Review of Systems     All systems reviewed and negative except for those discussed in HPI.       PHYSICAL EXAM    I have reviewed the triage vital signs and nursing notes.    ED Triage Vitals   Temp Heart Rate Resp BP SpO2   01/17/23 1551 01/17/23 1551 01/17/23 1551 01/17/23 1555 01/17/23 1551   97.4 °F (36.3 °C) 116 20 94/57 96 %       Physical Exam  GENERAL: Frail-appearing, nontoxic appearing, not distressed  HENT: normocephalic, atraumatic, dry mucous membranes  EYES: no scleral icterus, PERRL  CV: regular rhythm, regular rate, no murmur  RESPIRATORY: normal effort, CTAB  ABDOMEN: soft, normal bowel sounds, nontender  MUSCULOSKELETAL: no deformity  NEURO: alert, moves all extremities, follows commands, mental status normal/baseline  SKIN: warm, dry, no rash   Psych: Appropriate mood and affect  Nursing notes and vital signs reviewed          LAB RESULTS  Recent Results (from the past 24 hour(s))   Comprehensive Metabolic Panel    Collection Time: 01/17/23  4:04 PM    Specimen: Blood   Result Value Ref Range    Glucose 195 (H) 65 - 99 mg/dL    BUN 29 (H) 8 - 23 mg/dL    Creatinine 0.93 0.76 - 1.27 mg/dL    Sodium 133 (L) 136 - 145 mmol/L    Potassium 5.3 (H) 3.5 - 5.2 mmol/L    Chloride 99 98 - 107 mmol/L    CO2 23.2 22.0 - 29.0 mmol/L    Calcium 8.5 (L) 8.6 - 10.5 mg/dL    Total Protein 7.0 6.0 - 8.5 g/dL    Albumin 3.1 (L) 3.5 - 5.2 g/dL    ALT (SGPT) 42 (H) 1 - 41 U/L    AST (SGOT) 106 (H) 1 - 40 U/L    Alkaline Phosphatase 288 (H) 39 - 117 U/L    Total Bilirubin 1.3 (H) 0.0 - 1.2 mg/dL    Globulin 3.9 gm/dL    A/G Ratio 0.8 g/dL    BUN/Creatinine Ratio 31.2 (H) 7.0 - 25.0    Anion Gap 10.8 5.0 - 15.0 mmol/L    eGFR 83.0 >60.0 mL/min/1.73   Troponin    Collection Time: 01/17/23  4:04 PM     Specimen: Blood   Result Value Ref Range    Troponin T <0.010 0.000 - 0.030 ng/mL   Magnesium    Collection Time: 01/17/23  4:04 PM    Specimen: Blood   Result Value Ref Range    Magnesium 2.2 1.6 - 2.4 mg/dL   Green Top (Gel)    Collection Time: 01/17/23  4:04 PM   Result Value Ref Range    Extra Tube Hold for add-ons.    Lavender Top    Collection Time: 01/17/23  4:04 PM   Result Value Ref Range    Extra Tube hold for add-on    Gold Top - SST    Collection Time: 01/17/23  4:04 PM   Result Value Ref Range    Extra Tube Hold for add-ons.    Light Blue Top    Collection Time: 01/17/23  4:04 PM   Result Value Ref Range    Extra Tube Hold for add-ons.    CBC Auto Differential    Collection Time: 01/17/23  4:04 PM    Specimen: Blood   Result Value Ref Range    WBC 2.81 (L) 3.40 - 10.80 10*3/mm3    RBC 3.38 (L) 4.14 - 5.80 10*6/mm3    Hemoglobin 10.8 (L) 13.0 - 17.7 g/dL    Hematocrit 32.2 (L) 37.5 - 51.0 %    MCV 95.3 79.0 - 97.0 fL    MCH 32.0 26.6 - 33.0 pg    MCHC 33.5 31.5 - 35.7 g/dL    RDW 16.8 (H) 12.3 - 15.4 %    RDW-SD 58.4 (H) 37.0 - 54.0 fl    MPV 9.1 6.0 - 12.0 fL    Platelets 157 140 - 450 10*3/mm3    Neutrophil % 33.1 (L) 42.7 - 76.0 %    Lymphocyte % 41.6 19.6 - 45.3 %    Monocyte % 19.9 (H) 5.0 - 12.0 %    Eosinophil % 3.2 0.3 - 6.2 %    Basophil % 1.8 (H) 0.0 - 1.5 %    Immature Grans % 0.4 0.0 - 0.5 %    Neutrophils, Absolute 0.93 (L) 1.70 - 7.00 10*3/mm3    Lymphocytes, Absolute 1.17 0.70 - 3.10 10*3/mm3    Monocytes, Absolute 0.56 0.10 - 0.90 10*3/mm3    Eosinophils, Absolute 0.09 0.00 - 0.40 10*3/mm3    Basophils, Absolute 0.05 0.00 - 0.20 10*3/mm3    Immature Grans, Absolute 0.01 0.00 - 0.05 10*3/mm3    nRBC 0.0 0.0 - 0.2 /100 WBC   BNP    Collection Time: 01/17/23  4:04 PM    Specimen: Blood   Result Value Ref Range    proBNP 821.0 0.0 - 1,800.0 pg/mL   ECG 12 Lead ED Triage Standing Order; Weak / Dizzy / AMS    Collection Time: 01/17/23  6:36 PM   Result Value Ref Range    QT Interval 311 ms    POC Glucose Once    Collection Time: 01/17/23  6:44 PM    Specimen: Blood   Result Value Ref Range    Glucose 162 (H) 70 - 130 mg/dL   Urinalysis With Microscopic If Indicated (No Culture) - Urine, Clean Catch    Collection Time: 01/17/23  7:34 PM    Specimen: Urine, Clean Catch   Result Value Ref Range    Color, UA Dark Yellow (A) Yellow, Straw    Appearance, UA Clear Clear    pH, UA 5.5 5.0 - 8.0    Specific Gravity, UA 1.021 1.005 - 1.030    Glucose, UA Negative Negative    Ketones, UA Trace (A) Negative    Bilirubin, UA Negative Negative    Blood, UA Negative Negative    Protein, UA 30 mg/dL (1+) (A) Negative    Leuk Esterase, UA Negative Negative    Nitrite, UA Negative Negative    Urobilinogen, UA 1.0 E.U./dL 0.2 - 1.0 E.U./dL   Urinalysis, Microscopic Only - Urine, Clean Catch    Collection Time: 01/17/23  7:34 PM    Specimen: Urine, Clean Catch   Result Value Ref Range    RBC, UA None Seen None Seen, 0-2 /HPF    WBC, UA None Seen None Seen, 0-2 /HPF    Bacteria, UA None Seen None Seen /HPF    Squamous Epithelial Cells, UA 0-2 None Seen, 0-2 /HPF    Hyaline Casts, UA 3-6 None Seen /LPF    Methodology Manual Light Microscopy        Ordered the above labs and independently reviewed the results.        RADIOLOGY  XR Chest 1 View    Result Date: 1/17/2023  XR CHEST 1 VW-  01/17/2023  HISTORY: Weakness, dizziness.  The heart size is within normal limits. There are some minimal linear probable scarring in the left upper lobe. There are some chronic parenchymal changes of the left lower lung and this appears stable since the 12th 222 study. No new infiltrates are seen. There is mild thoracic scoliosis. Mediport catheter is seen in good position.  There are some ill-defined areas of increased density in the lung bases on recent CT scans of the chest dated 9/28/2021 and 1/3/2023.      1. Linear band of probable scarring in the right upper lung and some chronic parenchymal change in the left lower lung. 2. Chest  appears unchanged from the 12/02/2022 study. 3. Please see additional dictation for the CT of the chest from 01/03/2023.  This report was finalized on 1/17/2023 4:42 PM by Dr. Arden Nassar M.D.        I ordered the above noted radiological studies. Reviewed by me and discussed with radiologist.  See dictation for official radiology interpretation.      PROCEDURES    Procedures    DIFFERENTIAL DIAGNOSIS:  Differential Diagnosis for Weakness include but not limited to the following:  Generalized weakness, CVA, TIA, Acute MI, GI Bleed, UTI, Sepsis, ETOH use, Drug use( prescription and street/recreational)      PROGRESS, DATA ANALYSIS, CONSULTS, AND MEDICAL DECISION MAKING    All labs have been independently reviewed by me.  All radiology studies have been reviewed by me and discussed with radiologist dictating the report.   EKG's independently viewed and interpreted by me.  Discussion below represents my analysis of pertinent findings related to patient's condition, differential diagnosis, treatment plan and final disposition.        ED Course as of 01/18/23 0003   Tue Jan 17, 2023 1915 Patient is a frail-appearing 82-year-old who presents today with worsening generalized weakness for the past 2 weeks.  He did have an outpatient CT chest and abdomen which is concerning for lung cancer with mets to the liver.  Work-up here today shows a white count of 2.81, hemoglobin 10.8, BUN of 29 sodium 133. [MS]   2104 Reviewed pt's history and workup with Dr. Freeman.        [MS]   2105 I viewed the patient's chest imaging in PACS.  My interpretation is no infiltrate, normal heart size.  See dictation for official radiology interpretation.     [MS]   2105 Patient and wife updated on work-up done here today.  Discussed with mildly elevated BUN, white count of 2.81, hemoglobin of 10.8.  Discussed not seeing a clear-cut cause on labs and chest x-ray today for patient's weakness.  Did discuss the outpatient CT scans which they  were aware of the lung nodule and liver lesion.  I did offer admission for generalized weakness however wife states that she is able to care for the patient at home and would prefer to go home at this time.  She will keep his appointment for the liver biopsy next week.  Strict return to ER precautions were discussed.  Patient and wife verbalized understanding. [MS]      ED Course User Index  [MS] Krystina Jasso ELÍAS, APRN         DISPOSITION  ED Disposition     ED Disposition   Discharge    Condition   Stable    Comment   --           Discussed plan for discharge, as there is no emergent indication for admission. Pt/family is agreeable and understands need for follow up and repeat testing.  Pt is aware that discharge does not mean that nothing is wrong but it indicates no emergency is present that requires admission and they must continue care with follow-up as given below or physician of their choice.   Patient/Family voiced understanding of above instructions.  Patient discharged in stable condition.    DIAGNOSIS  Final diagnoses:   Generalized weakness       FOLLOW UP   Martha Butt, APRN  [address] [facility] [facility] [address] [place] [address]  [phone]    Call in 1 day        RX     Medication List      No changes were made to your prescriptions during this visit.             AS OF 00:03 EST VITALS:    BP - 100/60  HR - 91  TEMP - 97.4 °F (36.3 °C) (Tympanic)  O2 SATS - 96%      MEDICATIONS GIVEN IN ER    Medications   sodium chloride 0.9 % bolus 1,000 mL (0 mL Intravenous Stopped 1/17/23 8915)                Note Disclaimer: At [facility], we believe that sharing information builds trust and better relationships. You are receiving this note because you recently visited [facility]. It is possible you will see health information before a provider has talked with you about it. This kind of information can be easy to misunderstand. To help you fully understand what it means for your health, we urge you  to discuss this note with your provider.       Krystina Jasso, APRN  01/18/23 0003

## 2023-01-24 NOTE — POST-PROCEDURE NOTE
POST PROCEDURE NOTE    Procedure: liver bx    Pre-Procedure Diagnosis: lesions    Post-procedure Diagnosis: same    Findings: successful bx, gelfoam used    Complications: none    Blood loss: min    Specimen Removed: multiple cores    Disposition:   Discharge home

## 2023-01-24 NOTE — NURSING NOTE
Pt tolerated procedure well with no c/o.  Post instructions reviewed with all questions and concerns addressed to pt's satisfaction and wife, Radha's, satisfaction.  Transported pt to discharge area, via w/c, where wife was waiting to take pt home.

## 2023-01-24 NOTE — NURSING NOTE
Pt. Arrived to Radiology Triage for CT Guided Liver Biopsy.      Protective goggles and mask in place with all patient interactions today.

## 2023-01-24 NOTE — DISCHARGE INSTRUCTIONS
EDUCATION / DISCHARGE INSTRUCTIONS  Aspiration:  An aspiration is a procedure used to take a sample of cells or tissue from a lump, mass or other area of concern.   Within a week the radiologist will send a report to your physician.  A pathologist will also examine the tissue and send a report.  THIS EDUCATION INFORMATION WAS REVIEWED PRIOR TO THE PROCEDURE AND CONSENT.    Patient initials_________________Time________________      Post Procedure:    *  Rest today (no pushing pulling or straining).   *  Slowly increase activity tomorow.    *  If you received sedation do not drive for 24 hours.   *  Keep dressing clean and dry.   *  Leave dressing on puncture site for 24 hours.    *  You may shower when dressing removed.   *  If needed, use an ice pack at the site for up to 20 minutes at a time for pain.    Call your doctor if experiencing:   *  Signs of infection such as redness, swelling, excessive pain and / or foul smelling drainage from the puncture site.   *  Chills or fever over 101 degrees (by mouth).   *  Unrelieved pain.   *  Any new or severe symptoms.      Following the procedure:     Follow-up with the ordering physician as directed.    Continue to take other medications as directed by your physician unless  otherwise instructed.   Resume taking your Aspirin on 01/25/2023.     If you have any concerns please call the Radiology Nurses Desk at [phone].  You are the most important factor in your recovery.  Follow the above instructions carefully.                Discharge Instructions after receiving Moderate Sedation  These instructions provide you with information about caring for yourself after your procedure. Your health care provider may also give you more specific instructions. Your treatment has been planned according to current medical practices, but problems sometimes occur. Call your physican or the Radiology Nurses ([phone]) if you have any problems or questions after your  procedure.    What can I expect after the procedure?  After your procedure, you may:  Feel sleepy or light headed for several hours.  Feel clumsy, dizzy or have poor balance for several hours.  Feel forgetful about what happened after the procedure.  Have poor judgment for several hours.  Feel nauseous or vomit.    For at least 24 hours after the procedure:  Have a responsible adult stay with you or frequently check on you until you are awake and alert.   Rest as needed.    Do not:  Participate in activities in which you could fall or become injured.  Drive or operate heavy machinery  Take sleeping pills or medicines that cause drowsiness.  Make important decisions or sign legal documents.  Take care of children on your own.    Eating and drinking: Clear liquids then progress slowly to a normal diet.  If you vomit, drink water, juice, or soup when you can drink without vomiting.  Make sure you have little or no nausea before eating solid foods.    General instructions: Take over-the-counter and prescription medicines only as told by your health care provider .If you have sleep apnea, surgery and certain medicines can increase your risk for breathing problems. Follow instructions from your health care provider about wearing your sleep device: If you smoke, do not smoke without supervision.  Keep all follow-up visits as told by your health care provider. This is important.    Contact your physician if:  You continue to keep feeling nauseous or you keep vomiting. You continue to feel light-headed, develop a fever or a rash.  Get help right away if you have trouble breathing    I acknowledge the above instructions:    Patient/ Responsible person _________________________________Date ____________Time ____________    Witnessed by____________________________________________ Date_____________ Time____________

## 2023-01-30 NOTE — PROGRESS NOTES
"[facility] CBC GROUP OUTPATIENT FOLLOW UP CLINIC VISIT    REASON FOR FOLLOW-UP:    1.  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  CT imaging of the abdomen and pelvis on 5/8/2019 shows multiple small tissue nodules in the perirenal fat adjacent to the left kidney and a soft tissue mass interposed between the left psoas muscle and spine.  2.  RA previously on Cimzia (certollizumab) injections by Dr. Velásquez  3.  Lumbar puncture 5/31/2019 negative for involvement with lymphoma  4.  Bone marrow aspiration and biopsy 5/29/2018 negative for lymphoma  5.  PET scan on 5/30/2019 with hypermetabolic perinephric soft tissue lesions with mesenteric lymphadenopathy, left paraspinal mass at L5, 6 cm intramuscular mass in the right upper thigh, lesion at the left clavicle and a 5 cm soft tissue mass between the right scapula and right posterior fourth rib.  6.  Right subclavian Mediport placed by Dr. Herrera on 6/12/2019.  7.  Admission to [facility] for cycle #1 of dose adjusted R-EPOCH on 6/17/2019.  Plan for a lumbar puncture with intrathecal methotrexate with each cycle.  8.  6 cycles of therapy with lumbar punctures with intrathecal methotrexate administered with each cycle complete as of 10/4/2019.  9.  PET scan on 10/15/2019 negative aside from hypermetabolic activity in the bone marrow and in the spleen.    HISTORY OF PRESENT ILLNESS:  Edward Srinivasan is a 82 y.o. male who returns today for follow up of the above issue.     He has been having some symptoms including weight loss and worsening fatigue.    CT imaging performed on 1/3/2023 showed emphysema, right upper lobe mass at 1.8 cm, centrally located left hilar mass at 2.4 cm with a more peripheral 3.1 cm mass, new.  Diffuse abnormality within the liver noted.  Heterogeneous soft tissue masses surrounding the right kidney are new with 2 measuring " "3.9 cm.    Based on this he had a PET scan on 1/13/2023 and also done a CT-guided needle biopsy was liver on 1/24/2023.  He returns today to review these results.    He is doing poorly.  He spends the vast majority of his time in bed.  He has a very poor appetite.  He continues to lose weight and has lost about 20 pounds in the past couple of months.  He complains of back pain today but this is not usually a big problem for him and he only uses occasional Aleve.      REVIEW OF SYSTEMS:  As per the HPI      Vitals:    01/31/23 0841 01/31/23 0845   BP: 90/51 92/48   Pulse: 117    Resp: 18    Temp: 98 °F (36.7 °C)    TempSrc: Temporal    SpO2: 96%    Weight: 55.3 kg (121 lb 14.4 oz)    Height: 170.2 cm (67.01\")    PainSc:   8    PainLoc: Back        PHYSICAL EXAMINATION:  Lying on the examination table.  He has obviously lost a significant amount of weight.  He is awake alert and oriented.  He is not jaundiced.  Sclerae are anicteric.  Lungs are clear to auscultation bilaterally.  Heart is tachycardic with a grade 2 out of 6 systolic murmur.  Abdomen is soft.  His liver is firm and hard and palpable below the right costal margin.  Extremities are warm and well perfused without edema.    DIAGNOSTIC DATA:  Retic With IRF & RET-He (09/27/2022 09:34)  CBC & Differential (09/27/2022 09:34)  Lactate Dehydrogenase (01/31/2023 08:17)      IMAGING:  CT Abdomen Pelvis With Contrast (01/03/2023 12:58)  CT Chest With Contrast Diagnostic (01/03/2023 12:58)  NM PET/CT Skull Base to Mid Thigh (01/13/2023 09:07)    PET scan images personally reviewed.  Bilateral hypermetabolic pulmonary nodules.  Significant FDG uptake within the liver.  Mesenteric uptake adjacent to small bowel loops.    ASSESSMENT:  This is a 82 y.o. 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    · CT imaging of the abdomen and pelvis on 5/8/2019 " shows multiple small tissue nodules in the perirenal fat adjacent to the left kidney and a soft tissue mass interposed between the left psoas muscle and spine.    · Baseline PET scan on 5/30/2019 with hypermetabolic perinephric soft tissue lesions with mesenteric lymphadenopathy, left paraspinal mass at L5, 6 cm intramuscular mass in the right upper thigh, lesion at the left clavicle and a 5 cm soft tissue mass between the right scapula and right posterior fourth rib.  · Negative lumbar puncture.  Negative bone marrow biopsy.    · He has now completed 6 cycles of dose adjusted R-EPOCH with discharge following cycle #6 on 10/4/2019.    · He received lumbar punctures with intrathecal methotrexate with each cycle.    · His end of treatment PET scan on 10/15/2019 is negative for lymphoma.    · CT 5/4/2020 negative  · CT imaging 10/27/2020 with no evidence for recurrence.  He is now 1 year from completing therapy.  · CT imaging 4/15/2021 negative for disease  · CT imaging 9/28/2021 with no evidence for lymphoma  · No further imaging planned  · 9/27/2022: Clinically, no evidence of disease    *RA previously on Cimzia (certollizumab) injections by Dr. Velásquez.  He did have some worsening pain in his right wrist and hands.  He will follow-up with Dr. Velásquez regarding this.  He should not be on any medication that could potentially lead to malignancy.  No arthritis pain at this time and he is not medicated    *Cytopenias from chemotherapy:   · White blood cell count has normalized  · Platelets normalized and remain normal at 179,000    *Normocytic anemia  · Labs on 10/27/2020 were unremarkable  · Hemoglobin is a little bit lower at 9.6 with a normal MCV of 90.9    *Small cell lung cancer  · He has been losing weight and not doing well at home  · CT imaging on 1/3/2023 showed evidence for possible lung cancer with an abnormal liver  · PET scan shows significant hypermetabolic activity within essentially the entire liver  · A  CT-guided needle biopsy of the liver on 1/24/2023 shows small cell carcinoma    PLAN:  1. He has very poor performance status at this time.  I discussed with him and his wife today that without treatment his life expectancy is measured in a few weeks.  With some treatment, he might be expected to live a few months but he would not have to deal with consequences of palliative chemotherapy which would certainly cause worsening fatigue.  I advised him and his wife today that any treatment for this at this point would not be curable.  He would have to be hospitalized for treatment due to risks of tumor lysis.  At 82 years old with history of aggressive lymphoma, prior chemotherapy, and with a very poor performance status at this time I have not recommended therapy.  He and his wife are in agreement.  We have agreed to pursue hospice.  He will return home and we will contact [facility] to arrange ongoing care.

## 2023-03-24 ENCOUNTER — TELEPHONE (OUTPATIENT)
Dept: ONCOLOGY | Facility: CLINIC | Age: 83
End: 2023-03-24

## 2023-03-24 NOTE — TELEPHONE ENCOUNTER
“Please be informed that patient has passed. Patient has been marked  in the system. The date of death is: 3-    Caller: HOSPICE    Relationship:     Best call back number: [phone]

## 2023-09-08 NOTE — PROGRESS NOTES
"[facility] CBC GROUP ADMISSION HISTORY AND PHYSICAL   REASON FOR ADMISSION / CHIEF COMPLAINT:   1. 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 CT imaging of the abdomen and pelvis on 5/8/2019 shows multiple small tissue nodules in the perirenal fat adjacent to the left kidney and a soft tissue mass interposed between the left psoas muscle and spine.   2. RA previously on Cimzia (certollizumab) injections by Dr. Velásquez   3. Lumbar puncture 5/31/2019 negative for involvement with lymphoma   4. Bone marrow aspiration and biopsy 5/29/2018 negative for lymphoma   5. PET scan on 5/30/2019 with hypermetabolic perinephric soft tissue lesions with mesenteric lymphadenopathy, left paraspinal mass at L5, 6 cm intramuscular mass in the right upper thigh, lesion at the left clavicle and a 5 cm soft tissue mass between the right scapula and right posterior fourth rib.   6. Right subclavian Mediport placed by Dr. Herrera on 6/12/2019.   7. Admission to [facility] for cycle #1 of dose adjusted R-EPOCH on 6/17/2019. Plan for a lumbar puncture with intrathecal methotrexate with each cycle.   8. Cycle #2 initiated on 7/8/2019. Same dosees pursued per the protocol.   9. Admission for cycle #3 on 7/29/2019. 20% dose increase per protocol.   10. Admission for cycle #4 on 8/19/2019. 20% dose increase per protocol.   11. Admission for cycle #5 on 9/9/2019.       INTERVAL HISTORY:  Patient was admitted on 9/9/2019, and was started cycle # 5-day 1 chemotherapy R-EPOCH regimen.      On 9/10/2019, patient reports he tolerated chemotherapy well.  No nausea no vomiting.  Patient has been ambulating.  No dizziness or fainting.  Patient reports no bleeding.  His hemoglobin decreased at 8.1.  Has persistent leukocytosis WBC 17,500 and low normal platelets 156,000.        HISTORY OF PRESENT ILLNESS: Edward Srinivasan is a 79 y.o. " How Severe Are Your Spot(S)?: mild What Is The Reason For Today's Visit?: Full Body Skin Examination male who is being admitted for cycle #5 of dose adjusted R-EPOCH.   With cycle #4, his sim platelet count was 21,000 with a sim neutrophil count of 1.78. Therefore, for cycle #5, we will reduce his doses by 20% to the last prior tolerated dose.   He has required packed red blood cell transfusion for anemia. Otherwise, following cycle #4 he has done very well. Minimal constipation. Minimal nausea. He does report fatigue which is been ongoing and a little bit worse compared to prior cycles. No fevers or chills. Decreased appetite and he has struggled to gain weight. Otherwise, he has tolerated therapy extremely well.       ONCOLOGIC HISTORY:   He has a history of rheumatoid arthritis currently on injections by Dr. Velásquez, type 2 diabetes on oral medication, coronary artery disease status post stenting x2, aortic stenosis, and nonmelanoma skin cancer resected from his face. Continues to smoke a little bit less than a half a pack per day and has done this for several decades.   He has a long history of chronic back pain. In March 2019 he developed some low back pain on the left. With some physical therapy this is actually improved significantly but over the past couple of weeks he has developed some right-sided back pain. Actually over the past couple of days this has essentially resolved. He denies any radiculopathy. He has chronic vision changes but nothing acute. He denies any headaches. No other focal neurologic changes. He has occasional lightheadedness but this is been a chronic problem for him. No fevers. He is cold a lot but no rigors. No night sweats. He has lost a little bit of weight which she relates to his rheumatoid arthritis and decreased appetite secondary to pain.   He had an MRI of his lumbar spine on 4/25/2019 showing some degenerative changes but also there was a soft tissue mass extending posteriorly from the left common iliac vessels hugging the lateral margin of the L5 vertebrae extending  What Is The Reason For Today's Visit? (Being Monitored For X): surveillance against the recurrence of atypical nevi from the left lateral margin of L4-5 interspace to L5-S1 where it pressed on the left L5 nerve root. The mass measures 4.4 x 2.1 x 3.5 cm. CT imaging was suggested and performed on 5/8/2019. The CT scan showed multiple soft tissue nodules in the perirenal fat on the left adjacent to the kidney and a soft tissue mass interposed between the left psoas muscle and spine. The mass measures 4.1 x 2.1 cm.     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   He was seen initially on 5/28/2019. PET scan, LP, and a bone marrow aspiration and biopsy were requested.   LDH was 341 with uric acid 4.4.   Bone marrow biopsy on 5/29/2019: normocellular marrow with no evidence for lymphoma. Flow cytometry negative. Karyotype pending as of 6/7.     PET scan on 5/20/2019:   IMPRESSION:   1. There are hypermetabolic perinephric soft tissue lesions,   hypermetabolic mesenteric lymphadenopathy, left paraspinal mass at L5,   as well as an intensely hypermetabolic approximately 6 cm intramuscular   mass within the visualized right upper thigh. There is no hypermetabolic   lymphadenopathy within the abdomen or pelvis, but the shotty mediastinal   and hilar nodes are hypermetabolic and there is also a hypermetabolic   right upper lobe nodular opacity. In addition, there is an intensely   hypermetabolic approximately 5 cm intramuscular mass at the right upper   back and there is a tiny hypermetabolic lesion within the left clavicle.   2. Shotty cervical nodes have low level activity.   3. Uncertain significance of the more than typical metabolic activity at   dependent atelectatic change at both lower lobes.   LP on 5/31/2019: Cytology and flow cytometry negative.   Mediport placed in the right subclavian area by Dr. Herrera on 6/12/2019.   Plan to start therapy with dose adjusted R-EPOCH with CNS  prophylaxis with intrathecal methotrexate on 6/17/2019.   He previously had an echocardiogram on 5/15/2019 showing a normal left ventricular ejection fraction of 60%.   Admission to [facility] on 6/19/2019 for cycle #1 of therapy.   He received Neulasta on discharge on 6/22/2019.   His lowest ANC was 0.04 on 6/27 with a platelet count of 47,000 that day. He was only neutropenic on one day and his WBC and platelet count improved rapidly.   Admission for cycle #2 on 7/8/2019. We proceeded with the same doses per the protocol.   Admission for cycle #3 on 7/29/2019. 20% increase in Adriamycin, etoposide, and Cytoxan doses per the protocol.   Sim platelet count 36,000 with a sim white blood cell count of 2.43 following cycle #3.   With cycle #4, dose increase by 20% for Adriamycin, etoposide, and Cytoxan per protocol.   Following cycle #4, his sim platelet count was 21,000 with a sim neutrophil count of 1.78. Therefore, for cycle #5, we will reduce his doses by 20% to the last prior tolerated dose (cycle #3 doses).       SOCIAL HISTORY:   reports that he has been smoking cigarettes. He has a 25.00 pack-year smoking history. He has never used smokeless tobacco. He reports that he does not drink alcohol or use drugs.     FAMILY HISTORY:   family history includes Coronary artery disease in his other; Heart attack (age of onset: 47) in his brother; Heart attack (age of onset: 54) in his father; Heart disease in his brother and father; Heart failure in his mother.     ALLERGIES:        Allergies   Allergen Reactions   • Oxaprozin Hives     MEDICATIONS: As listed in the electronic medical record.       Review of Systems   Constitutional: Positive for fatigue. Negative for appetite change, chills, fever and unexpected weight change.   HENT: Negative for congestion, dental problem, ear pain, hearing loss, mouth sores, nosebleeds, postnasal drip, rhinorrhea, tinnitus and trouble swallowing.   Eyes:  Negative.   Respiratory: Negative for cough, chest tightness, shortness of breath, wheezing and stridor.   Cardiovascular: Negative for chest pain, palpitations and leg swelling.   Gastrointestinal: Positive for constipation. Negative for abdominal distention, abdominal pain, blood in stool, diarrhea, nausea and vomiting.   Endocrine: Negative.   Genitourinary: Negative for decreased urine volume, difficulty urinating, dysuria, flank pain, frequency, hematuria, scrotal swelling, testicular pain and urgency.   Musculoskeletal: Negative for arthralgias, back pain and joint swelling.   Allergic/Immunologic: Negative.   Neurological: Negative for dizziness, seizures, syncope, weakness, light-headedness, numbness and headaches.   Hematological: Negative for adenopathy. Does not bruise/bleed easily.   Psychiatric/Behavioral: Negative for confusion and sleep disturbance. The patient is not nervous/anxious.   All other systems reviewed and are negative.   No change from prior except as noted         Vitals:    09/09/19 1939 09/10/19 0038 09/10/19 0412 09/10/19 0748   BP: 99/55 118/63 117/57 107/55   BP Location: Left arm Right arm Right arm Left arm   Patient Position: Lying Lying Lying Sitting   Pulse: 81 68 63 66   Resp: 16 16 18 18   Temp: 97.7 °F (36.5 °C) 97.3 °F (36.3 °C) 97.1 °F (36.2 °C) 97.3 °F (36.3 °C)   TempSrc: Oral Oral Oral Oral   SpO2: 95% 98% 97% 99%   Weight:       Height:           Physical Exam   Constitutional: He is oriented to person, place, and time. He appears well-developed and well-nourished. No distress.  Patient is ambulating well.  HENT:   Head: Normocephalic and atraumatic. Hair is normal.   Mouth/Throat: Oropharynx is clear and moist.   Eyes: Conjunctivae, left upper lid seems mildly swelling but no apparent redness.,  Patient is asymptomatic.     Neck: Neck supple. No thyroid mass and no thyromegaly present.   Cardiovascular: Normal rate and regular rhythm. Exam reveals no gallop and no  "friction rub.  Systolic murmur is present with a grade of 2/6.   Pulmonary/Chest: Effort normal and breath sounds normal. No respiratory distress. He has no wheezes. He has no rales.   Benign-appearing Mediport   Abdominal: Soft.  No tenderness, no distension and no mass. There is no splenomegaly or hepatomegaly.   Musculoskeletal: Normal range of motion. He exhibits no edema, tenderness or deformity.   Lymphadenopathy:   He has no cervical adenopathy.   He has no axillary adenopathy.   Right: No inguinal and no supraclavicular adenopathy present.   Left: No inguinal and no supraclavicular adenopathy present.   Neurological: He is alert and oriented to person, place, and time. He has normal strength.   Skin: Skin is warm and dry. No rash noted.   Psychiatric: He has a normal mood and affect. His behavior is normal. Judgment and thought content normal. Cognition and memory are normal.         DIAGNOSTIC DATA:   Results from last 7 days   Lab Units 09/10/19  0416 09/09/19  0735 09/06/19  1336   WBC 10*3/mm3 17.54* 18.83* 24.29*   HEMOGLOBIN g/dL 8.1* 10.2* 10.0*   HEMATOCRIT % 25.4* 30.7* 29.6*   PLATELETS 10*3/mm3 156 194 166     Lab Results   Component Value Date    NEUTROABS 16.23 (H) 09/10/2019     Results from last 7 days   Lab Units 09/10/19  0416   SODIUM mmol/L 134*   POTASSIUM mmol/L 4.7   CHLORIDE mmol/L 102   CO2 mmol/L 23.1   BUN mg/dL 20   CREATININE mg/dL 0.58*   CALCIUM mg/dL 7.9*   BILIRUBIN mg/dL 0.2   ALK PHOS U/L 75   ALT (SGPT) U/L 13   AST (SGOT) U/L 14   GLUCOSE mg/dL 171*         ASSESSMENT:   This is a 79 y.o. male with:     1. 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 His LP and bone marrow biopsies are negative for involvement.    This patient was admitted on 9/9/2019 for cycle #5 chemotherapy with R-EPOCH.   Following cycle #4, his sim platelet count was 21,000 with a sim neutrophil " count of 1.78. Therefore, for cycle #5, we will reduce his doses by 20% to the last prior tolerated dose (cycle #3 doses).     Patient is tolerating chemotherapy well, with no specific side effects except decrease the hemoglobin by 2 g.  No bleeding.  We will continue chemotherapy as written for day #2.     2. Rheumatoid arthritis previously on Cimzia (certolizumab) injections by Dr. Velásquez. Plan to discontinue these during chemotherapy. He was previously on Simponi Aria (golimumab) and Actemra (tocilizumab). These certainly could contribute to the development of lymphoma.     3. Coronary artery disease status post stenting as well as moderate aortic stenosis by echocardiogram. His left ventricular ejection fraction is normal at 60% by echocardiogram on 5/14/2019.   · Patient has no edema in lower extremity with intravenous hydration.  No dyspnea.  Continue to monitor.    4. Anxiety: I previously gave him a prescription for alprazolam 0.25 mg to take twice daily as needed. Continue benzodiazepines as needed during his hospitalization.     5. Hyponatremia: Sodium 134 today.  Continue to monitor.     6. Volume overload with cycle #1: We will likely need to decrease IV fluids with this cycle.     7. Constipation: Continue Senokot and MiraLAX daily while hospitalized.     8. Type 2 diabetes: Insulin sliding scale during the admission.  We will continue home medication for diabetes management.     9.  Anemia secondary to chemotherapy and possibly contributed by IV hydration.   · Hemoglobin 8.1 on 9/10/2019.  Patient reports no evidence of bleeding.  Continue to monitor.        PLAN:   1.  Proceed ahead with C5-day #2 dose adjusted R- EPOCH chemotherapy.  2.  Pending results of lumbar puncture flow cytometry. Done Intrathecal methotrexate 12 mg administration for CNS prophylaxis.   3. Benzodiazepines as needed for anxiety although he has not required any at this point.   4.  Continue Lovenox 40 mg daily for DVT  prophylaxis. No allopurinol at this point.  5. IV fluid hydration. We have decreased his maintenance fluids to 50 ml/hr due to volume overload with prior cycles.   6. Continue Bactrim and acyclovir for prophylaxis.   7.  Continue home medication for diabetes management, and moderate dose sliding scale insulin.   8.  Continue GI prophylaxis with PPI.    9. Neulasta next Monday. Twice weekly CBC on Mondays and Thursdays for 2 weeks. Plan total of 6 cycles of therapy if tolerated with intrathecal methotrexate with each cycle for CNS prophylaxis.   10. MD visit in three weeks for his sixth and final cycle of therapy. Twice weekly CBCs thereafter. Neulasta the Monday following chemotherapy. PET scan in 5 weeks.  Dr. Garcia will see him back in 6 weeks for follow-up to review the PET scan. We anticipate that his therapy will be complete at that point assuming a complete response visualized on his PET scan.    I discussed with the patient and his wife today.  I also spoke with his nurse today.     RAJIV TIJERINA M.D., Ph.D.    9/10/2019.

## 2024-09-14 NOTE — OUTREACH NOTE
Patient: Cam Hernandez    Procedure: Procedure(s):  INTERNAL FIXATION, FRACTURE, TROCHANTERIC, HIP, USING NAIL       Diagnosis: Hip fracture, right, closed, initial encounter (H) [S72.001A]  Diagnosis Additional Information: No value filed.    Anesthesia Type:   General     Note:    Oropharynx: spontaneously breathing and oropharynx clear of all foreign objects  Level of Consciousness: awake  Oxygen Supplementation: nasal cannula  Level of Supplemental Oxygen (L/min / FiO2): 2  Independent Airway: airway patency satisfactory and stable  Dentition: dentition unchanged  Vital Signs Stable: post-procedure vital signs reviewed and stable  Report to RN Given: handoff report given  Patient transferred to: PACU    Handoff Report: Identifed the Patient, Identified the Reponsible Provider, Reviewed the pertinent medical history, Discussed the surgical course, Reviewed Intra-OP anesthesia mangement and issues during anesthesia, Set expectations for post-procedure period and Allowed opportunity for questions and acknowledgement of understanding      Vitals:  Vitals Value Taken Time   /70 09/14/24 1707   Temp 36.3  C (97.34  F) 09/14/24 1711   Pulse 85 09/14/24 1711   Resp 10 09/14/24 1711   SpO2 100 % 09/14/24 1711   Vitals shown include unfiled device data.    Electronically Signed By: RUTH Verma CRNA  September 14, 2024  5:12 PM   Medical Week 2 Survey      Responses   Facility patient discharged from?  [facility]   Does the patient have one of the following disease processes/diagnoses(primary or secondary)?  Other   Week 2 attempt successful?  Yes   Call start time  1643   Discharge diagnosis  s/p port placement and chemotherapy, Diffuse large B-cell lymphoma of lymph nodes of multiple regions   Call end time  1645   Meds reviewed with patient/caregiver?  Yes   Is the patient having any side effects they believe may be caused by any medication additions or changes?  No   Does the patient have all medications ordered at discharge?  Yes   Is the patient taking all medications as directed (includes completed medication regime)?  Yes   Has the patient kept scheduled appointments due by today?  Yes   Psychosocial issues?  No   What is the patient's perception of their health status since discharge?  Improving   Week 2 Call Completed?  Yes          Andree Mayo RN

## (undated) DEVICE — ANTIBACTERIAL UNDYED BRAIDED (POLYGLACTIN 910), SYNTHETIC ABSORBABLE SUTURE: Brand: COATED VICRYL

## (undated) DEVICE — ENCORE® LATEX ORTHO SIZE 7.5, STERILE LATEX POWDER-FREE SURGICAL GLOVE: Brand: ENCORE

## (undated) DEVICE — SUT MNCRYL PLS ANTIB UD 4/0 PS2 18IN

## (undated) DEVICE — DRSNG SURESITE WNDW 4X4.5

## (undated) DEVICE — LOU MINOR PROCEDURE: Brand: MEDLINE INDUSTRIES, INC.

## (undated) DEVICE — DECANT BG O JET

## (undated) DEVICE — NDL HYPO PRECISIONGLIDE REG 25G 1 1/2

## (undated) DEVICE — APPL CHLORAPREP W/TINT 10.5ML PERC STRL

## (undated) DEVICE — SOL NACL 0.9PCT 100ML SGL

## (undated) DEVICE — 3M™ STERI-STRIP™ REINFORCED ADHESIVE SKIN CLOSURES, R1547, 1/2 IN X 4 IN (12 MM X 100 MM), 6 STRIPS/ENVELOPE: Brand: 3M™ STERI-STRIP™

## (undated) DEVICE — DRP C/ARM 41X74IN

## (undated) DEVICE — 3M™ STERI-STRIP™ COMPOUND BENZOIN TINCTURE 40 BAGS/CARTON 4 CARTONS/CASE C1544: Brand: 3M™ STERI-STRIP™

## (undated) DEVICE — SPNG GZ WOVN 4X4IN 12PLY 10/BX STRL